# Patient Record
Sex: FEMALE | Race: WHITE | NOT HISPANIC OR LATINO | Employment: OTHER | URBAN - METROPOLITAN AREA
[De-identification: names, ages, dates, MRNs, and addresses within clinical notes are randomized per-mention and may not be internally consistent; named-entity substitution may affect disease eponyms.]

---

## 2017-01-04 RX ORDER — PROMETHAZINE HYDROCHLORIDE 25 MG/1
TABLET ORAL
Qty: 270 TABLET | Refills: 2 | Status: SHIPPED | OUTPATIENT
Start: 2017-01-04 | End: 2017-12-28 | Stop reason: SDUPTHER

## 2017-01-30 ENCOUNTER — OFFICE VISIT (OUTPATIENT)
Dept: PAIN MEDICINE | Facility: CLINIC | Age: 67
End: 2017-01-30

## 2017-01-30 VITALS
TEMPERATURE: 98.1 F | OXYGEN SATURATION: 96 % | WEIGHT: 191 LBS | HEIGHT: 63 IN | BODY MASS INDEX: 33.84 KG/M2 | HEART RATE: 64 BPM | DIASTOLIC BLOOD PRESSURE: 85 MMHG | SYSTOLIC BLOOD PRESSURE: 135 MMHG

## 2017-01-30 DIAGNOSIS — G89.4 CHRONIC PAIN SYNDROME: Primary | ICD-10-CM

## 2017-01-30 DIAGNOSIS — M54.16 LUMBAR RADICULOPATHY: ICD-10-CM

## 2017-01-30 DIAGNOSIS — Z79.899 ENCOUNTER FOR LONG-TERM (CURRENT) USE OF HIGH-RISK MEDICATION: ICD-10-CM

## 2017-01-30 DIAGNOSIS — M51.36 DEGENERATION OF INTERVERTEBRAL DISC OF LUMBAR REGION: ICD-10-CM

## 2017-01-30 PROCEDURE — 99213 OFFICE O/P EST LOW 20 MIN: CPT | Performed by: ANESTHESIOLOGY

## 2017-01-30 RX ORDER — OXYCODONE HYDROCHLORIDE 80 MG/1
160 TABLET, FILM COATED, EXTENDED RELEASE ORAL EVERY 12 HOURS SCHEDULED
Qty: 120 TABLET | Refills: 0 | Status: SHIPPED | OUTPATIENT
Start: 2017-01-30 | End: 2017-03-01 | Stop reason: SDUPTHER

## 2017-01-30 RX ORDER — OXYCODONE HYDROCHLORIDE 80 MG/1
80 TABLET, FILM COATED, EXTENDED RELEASE ORAL EVERY 8 HOURS SCHEDULED
Qty: 90 TABLET | Refills: 0 | Status: SHIPPED | OUTPATIENT
Start: 2017-01-30 | End: 2017-01-30 | Stop reason: SDUPTHER

## 2017-01-30 RX ORDER — HYDROCODONE BITARTRATE AND ACETAMINOPHEN 10; 325 MG/1; MG/1
1 TABLET ORAL EVERY 8 HOURS PRN
Qty: 90 TABLET | Refills: 0 | Status: SHIPPED | OUTPATIENT
Start: 2017-01-30 | End: 2017-03-01 | Stop reason: SDUPTHER

## 2017-02-02 ENCOUNTER — OFFICE VISIT (OUTPATIENT)
Dept: SPORTS MEDICINE | Facility: CLINIC | Age: 67
End: 2017-02-02

## 2017-02-02 VITALS
BODY MASS INDEX: 34.49 KG/M2 | HEART RATE: 68 BPM | SYSTOLIC BLOOD PRESSURE: 112 MMHG | RESPIRATION RATE: 16 BRPM | OXYGEN SATURATION: 97 % | WEIGHT: 191.6 LBS | DIASTOLIC BLOOD PRESSURE: 72 MMHG

## 2017-02-02 DIAGNOSIS — D68.9 COAGULATION DISORDER (HCC): Primary | ICD-10-CM

## 2017-02-02 DIAGNOSIS — M41.00 INFANTILE IDIOPATHIC SCOLIOSIS, UNSPECIFIED SPINAL REGION: ICD-10-CM

## 2017-02-02 PROCEDURE — 99213 OFFICE O/P EST LOW 20 MIN: CPT | Performed by: FAMILY MEDICINE

## 2017-02-02 NOTE — PROGRESS NOTES
"Subjective   Anne Jewell is a 66 y.o. female.   Chief Complaint   Patient presents with   • Med Refill     here to go over meds  and discuss hematology consult and med change to xeralto       History of Present Illness   1.  Patient wanted to let us know that her visit with hematology resulting in discontinuation of Coumadin and starting Xarelto.  Patient is not having any complaints with Xarelto.  2.  Patient had 3 episodes of syncope at home, patient was seen by neurology and cardiology and [facility], I do not have those reports but patient states her evaluation came out \"normal\".  Patient has not had any further syncopal episodes.  3.  Patient has noted more back and neck pain since her syncopal episodes, patient states that x-rays were done of her cervical and lumbar spine and she was told that her scoliosis was \"horrible\".  I do not have these reports available.  Patient has decided to see Dr. Bazzi for repeat x-rays.  4.  Patient continues to see Dr. Grajeda for pain management, patient states that is going well however she is getting to the point that her medications are very expensive.  She will discuss this with Dr. Grajeda.      I have reviewed the patient's medical history in detail and updated the computerized patient record.        Review of Systems   Constitutional: Negative.    HENT: Negative.    Respiratory: Negative.    Cardiovascular: Negative.    Musculoskeletal: Positive for back pain.     Visit Vitals   • /72 (BP Location: Left arm, Patient Position: Sitting, Cuff Size: Large Adult)   • Pulse 68   • Resp 16   • Wt 191 lb 9.6 oz (86.9 kg)   • SpO2 97%   • BMI 34.49 kg/m2         Objective   Physical Exam   Constitutional: She is oriented to person, place, and time. She appears well-developed and well-nourished.   HENT:   Head: Normocephalic and atraumatic.   Eyes: Conjunctivae and EOM are normal. Pupils are equal, round, and reactive to light.   Cardiovascular: Normal rate, regular " rhythm and normal heart sounds.    No peripheral edema   Pulmonary/Chest: Effort normal.   Musculoskeletal:   Patient uses a wheeled walker for ambulation, she walks with a forward flexion lumbar spine.   Neurological: She is alert and oriented to person, place, and time.   Skin: Skin is warm and dry.   Psychiatric: She has a normal mood and affect. Her behavior is normal.   Vitals reviewed.      Assessment/Plan   Anne was seen today for med refill.    Diagnoses and all orders for this visit:    Coagulation disorder    Infantile idiopathic scoliosis, unspecified spinal region      1.  Patient will continue with Xarelto and follow-up with hematology.  2.  Scoliosis, patient will follow-up with  for repeat x-rays and will continue with pain management with Dr. Grajeda.  3.  We will request records from [facility]  15 minutes were spent face-to-face with the patient with greater than 50% of that time spent counseling the patient.

## 2017-03-01 ENCOUNTER — OFFICE VISIT (OUTPATIENT)
Dept: PAIN MEDICINE | Facility: CLINIC | Age: 67
End: 2017-03-01

## 2017-03-01 VITALS
HEIGHT: 63 IN | HEART RATE: 63 BPM | RESPIRATION RATE: 16 BRPM | DIASTOLIC BLOOD PRESSURE: 79 MMHG | OXYGEN SATURATION: 96 % | BODY MASS INDEX: 33.84 KG/M2 | SYSTOLIC BLOOD PRESSURE: 127 MMHG | WEIGHT: 191 LBS | TEMPERATURE: 98 F

## 2017-03-01 DIAGNOSIS — M06.9 RHEUMATOID ARTHRITIS INVOLVING MULTIPLE SITES, UNSPECIFIED RHEUMATOID FACTOR PRESENCE: ICD-10-CM

## 2017-03-01 DIAGNOSIS — M51.36 DEGENERATION OF INTERVERTEBRAL DISC OF LUMBAR REGION: ICD-10-CM

## 2017-03-01 DIAGNOSIS — M54.16 LUMBAR RADICULOPATHY: ICD-10-CM

## 2017-03-01 DIAGNOSIS — M15.9 GENERALIZED OSTEOARTHRITIS: ICD-10-CM

## 2017-03-01 DIAGNOSIS — Z79.899 ENCOUNTER FOR LONG-TERM (CURRENT) USE OF HIGH-RISK MEDICATION: ICD-10-CM

## 2017-03-01 DIAGNOSIS — G89.4 CHRONIC PAIN SYNDROME: Primary | ICD-10-CM

## 2017-03-01 PROCEDURE — 99213 OFFICE O/P EST LOW 20 MIN: CPT | Performed by: ANESTHESIOLOGY

## 2017-03-01 RX ORDER — HYDROCODONE BITARTRATE AND ACETAMINOPHEN 10; 325 MG/1; MG/1
1 TABLET ORAL EVERY 8 HOURS PRN
Qty: 90 TABLET | Refills: 0 | Status: SHIPPED | OUTPATIENT
Start: 2017-03-01 | End: 2017-04-05 | Stop reason: SDUPTHER

## 2017-03-01 RX ORDER — OXYCODONE HYDROCHLORIDE 80 MG/1
160 TABLET, FILM COATED, EXTENDED RELEASE ORAL EVERY 12 HOURS SCHEDULED
Qty: 120 TABLET | Refills: 0 | Status: SHIPPED | OUTPATIENT
Start: 2017-03-01 | End: 2017-04-05 | Stop reason: SDUPTHER

## 2017-03-01 NOTE — PROGRESS NOTES
CHIEF COMPLAINT  Pt states back pain,R>L side, has moderately improved since 1/30/17 office visit.    Subjective   Anne Jewell is a 66 y.o. female  who presents to the office for follow-up.She has a history of lumbar radic & diffuse pain.      Back Pain   This is a chronic problem. The current episode started more than 1 year ago. The problem occurs constantly. The problem has been gradually improving since onset. The pain is present in the lumbar spine. The quality of the pain is described as aching. The pain radiates to the left foot and right foot. The pain is moderate. Exacerbated by: weather changes. Associated symptoms include headaches and numbness (R lower leg). Pertinent negatives include no bladder incontinence, bowel incontinence, chest pain, dysuria, fever or weakness. She has tried analgesics, bed rest, home exercises and heat (oxycodone, hydrocodone, aquatherapy) for the symptoms. The treatment provided moderate (improved pain control now that she is back on her previous regimen) relief.      Multimodal analgesic therapy is well tolerated.  No confusion, No sedation, No impairment.  She is once again meeting the functional goal of being able to participate in aquatherapy, which is helping her even more.    PEG Assessment   What number best describes your pain on average in the past week?6  What number best describes how, during the past week, pain has interfered with your enjoyment of life?7  What number best describes how, during the past week, pain has interfered with your general activity?  6      The following portions of the patient's history were reviewed and updated as appropriate: allergies, current medications, past family history, past medical history, past social history, past surgical history and problem list.    Review of Systems   Constitutional: Negative for activity change, appetite change, fatigue and fever.   HENT: Negative for ear pain.    Eyes: Negative for pain.   Respiratory:  "Negative for chest tightness and shortness of breath.    Cardiovascular: Negative for chest pain and leg swelling.   Gastrointestinal: Negative for bowel incontinence, constipation, diarrhea and nausea.   Endocrine: Negative for polydipsia, polyphagia and polyuria.   Genitourinary: Negative for bladder incontinence, dyspareunia and dysuria.   Musculoskeletal: Positive for back pain.   Skin: Negative for rash.   Allergic/Immunologic: Negative for immunocompromised state.   Neurological: Positive for numbness (R lower leg) and headaches. Negative for dizziness and weakness.   Hematological: Negative for adenopathy.   Psychiatric/Behavioral: Positive for sleep disturbance. Negative for confusion and suicidal ideas. The patient is nervous/anxious.              Vitals:    03/01/17 1439   BP: 127/79   Pulse: 63   Resp: 16   Temp: 98 °F (36.7 °C)   SpO2: 96%   Weight: 191 lb (86.6 kg)   Height: 62.5\" (158.8 cm)   PainSc: 6  Comment: LBP,R > L,ranges from 6-8/10   PainLoc: Back         Objective   Physical Exam   Constitutional: She is oriented to person, place, and time. She appears well-developed and well-nourished. She is cooperative.   HENT:   Head: Normocephalic and atraumatic.   Right Ear: External ear normal.   Left Ear: External ear normal.   Nose: Nose normal.   Eyes: Conjunctivae, EOM and lids are normal. Pupils are equal, round, and reactive to light.   Neck: Trachea normal and normal range of motion.   Cardiovascular: Normal rate, regular rhythm and normal heart sounds.    Pulmonary/Chest: Effort normal and breath sounds normal.   Abdominal: Soft. Bowel sounds are normal.   Musculoskeletal:        Thoracic back: She exhibits deformity.        Lumbar back: She exhibits decreased range of motion and tenderness.   Diffuse arthritic changes.  No inflammed joints.      Flexion painful past 45 deg.  Extension painful at 5 degrees.  Mild antalgia, use of assistive device.   Neurological: She is alert and oriented to " person, place, and time. She displays atrophy. She displays no tremor. No cranial nerve deficit. She displays a negative Romberg sign. Gait (ambulating with rolling walker) abnormal.   Reflex Scores:       Patellar reflexes are 0 on the right side and 0 on the left side.       Achilles reflexes are 0 on the right side and 0 on the left side.  SLR positive bilaterally   Skin: Skin is intact.   Psychiatric: She has a normal mood and affect. Her speech is normal and behavior is normal. Cognition and memory are normal.   Nursing note and vitals reviewed.          Assessment/Plan   Anne was seen today for back pain.    Diagnoses and all orders for this visit:    Chronic pain syndrome    Encounter for long-term (current) use of high-risk medication    Generalized osteoarthritis    Rheumatoid arthritis involving multiple sites, unspecified rheumatoid factor presence    Degeneration of intervertebral disc of lumbar region    Lumbar radiculopathy    Other orders  -     oxyCODONE ER (oxyCONTIN) 80 MG tablet extended-release 12 hour 12 hr tablet; Take 2 tablets by mouth Every 12 (Twelve) Hours.  -     HYDROcodone-acetaminophen (NORCO)  MG per tablet; Take 1 tablet by mouth Every 8 (Eight) Hours As Needed for severe pain (7-10).        --- Follow-up monthly  -- Patient appears stable with current regimen. No adverse effects. Regarding continuation of opioids, there is no evidence of aberrant behavior or any red flags.  The patient continues with appropriate response to opioid therapy. ADL's remain intact by self.   -- she does have a significant history of low back pain and arthritis and demonstrates moderate improvement with multimodal therapy including opioid therapy, which allows her to engage in ADLs and family activities. The continuation of opioid therapy is therefore not contraindicated. We will however respect the March 2016 CDC Guidelines and will plan to avoid overexposure to opioids and avoid dose  escalation.                 [facility]A REPORT    As part of the patient's treatment plan, I am prescribing controlled substances. The patient has been made aware of appropriate use of such medications, including potential risk of somnolence, limited ability to drive and/or work safely, and the potential for dependence or overdose. It has also bee made clear that these medications are for use by this patient only, without concomitant use of alcohol or other substances unless prescribed.     Patient has completed prescribing agreement detailing terms of continued prescribing of controlled substances, including monitoring VICENTA reports, urine drug screening, and pill counts if necessary. The patient is aware that inappropriate use will results in cessation of prescribing such medications.    [facility]A report has been reviewed and scanned into the patient's chart.    Date of last VICENTA : 2-26-17    History and physical exam exhibit continued safe and appropriate use of controlled substances.      EMR Dragon/Transcription disclaimer:   Much of this encounter note is an electronic transcription/translation of spoken language to printed text. The electronic translation of spoken language may permit erroneous, or at times, nonsensical words or phrases to be inadvertently transcribed; Although I have reviewed the note for such errors, some may still exist.

## 2017-03-27 ENCOUNTER — TELEPHONE (OUTPATIENT)
Dept: SPORTS MEDICINE | Facility: CLINIC | Age: 67
End: 2017-03-27

## 2017-04-05 ENCOUNTER — OFFICE VISIT (OUTPATIENT)
Dept: PAIN MEDICINE | Facility: CLINIC | Age: 67
End: 2017-04-05

## 2017-04-05 VITALS
BODY MASS INDEX: 33.84 KG/M2 | SYSTOLIC BLOOD PRESSURE: 112 MMHG | DIASTOLIC BLOOD PRESSURE: 74 MMHG | WEIGHT: 191 LBS | OXYGEN SATURATION: 99 % | HEART RATE: 71 BPM | HEIGHT: 63 IN | RESPIRATION RATE: 16 BRPM

## 2017-04-05 DIAGNOSIS — M15.9 GENERALIZED OSTEOARTHRITIS: ICD-10-CM

## 2017-04-05 DIAGNOSIS — M06.9 RHEUMATOID ARTHRITIS INVOLVING MULTIPLE SITES, UNSPECIFIED RHEUMATOID FACTOR PRESENCE: ICD-10-CM

## 2017-04-05 DIAGNOSIS — M51.36 DEGENERATION OF INTERVERTEBRAL DISC OF LUMBAR REGION: ICD-10-CM

## 2017-04-05 DIAGNOSIS — Z79.899 ENCOUNTER FOR LONG-TERM (CURRENT) USE OF HIGH-RISK MEDICATION: ICD-10-CM

## 2017-04-05 DIAGNOSIS — G89.4 CHRONIC PAIN SYNDROME: Primary | ICD-10-CM

## 2017-04-05 PROCEDURE — 99213 OFFICE O/P EST LOW 20 MIN: CPT | Performed by: NURSE PRACTITIONER

## 2017-04-05 RX ORDER — OXYCODONE HYDROCHLORIDE 80 MG/1
160 TABLET, FILM COATED, EXTENDED RELEASE ORAL EVERY 12 HOURS SCHEDULED
Qty: 120 TABLET | Refills: 0 | Status: SHIPPED | OUTPATIENT
Start: 2017-04-05 | End: 2017-05-02 | Stop reason: SDUPTHER

## 2017-04-05 RX ORDER — HYDROCODONE BITARTRATE AND ACETAMINOPHEN 10; 325 MG/1; MG/1
1 TABLET ORAL EVERY 8 HOURS PRN
Qty: 90 TABLET | Refills: 0 | Status: SHIPPED | OUTPATIENT
Start: 2017-04-05 | End: 2017-05-02 | Stop reason: SDUPTHER

## 2017-04-05 NOTE — PROGRESS NOTES
CHIEF COMPLAINT  Pt states joint pain continues to be moderately controlled overall    Subjective   Anne Jewell is a 66 y.o. female  who presents to the office for follow-up.She has a history of chronic back and diffuse joint pain, RA.    Pain unchanged, stable with regimen. She continues with Oxycontin 80 mg 2 every 12, Hydrocodone 10/325 3/day.  She denies adverse reactions, reports moderate benefit with this regimen and feels her pain is well controlled overall. ADL's by self.      Back Pain   This is a chronic problem. The current episode started more than 1 year ago. The problem occurs constantly. The problem has been gradually improving since onset. The pain is present in the lumbar spine. The quality of the pain is described as aching. The pain radiates to the left foot and right foot. The pain is at a severity of 5/10. The pain is moderate. The symptoms are aggravated by stress (weather changes). Associated symptoms include headaches and numbness (R lower leg). Pertinent negatives include no bladder incontinence, bowel incontinence, chest pain, dysuria, fever or weakness. She has tried analgesics, bed rest, home exercises and heat (oxycodone, hydrocodone, aquatherapy) for the symptoms. The treatment provided moderate relief.      PEG Assessment   What number best describes your pain on average in the past week?6  What number best describes how, during the past week, pain has interfered with your enjoyment of life?7  What number best describes how, during the past week, pain has interfered with your general activity?  7    The following portions of the patient's history were reviewed and updated as appropriate: allergies, current medications, past family history, past medical history, past social history, past surgical history and problem list.    Review of Systems   Constitutional: Negative for activity change, appetite change, fatigue and fever.   HENT: Negative for ear pain.    Eyes: Negative for pain.  "  Respiratory: Negative for chest tightness and shortness of breath.    Cardiovascular: Negative for chest pain and leg swelling.   Gastrointestinal: Negative for bowel incontinence, constipation, diarrhea and nausea.   Endocrine: Negative for polydipsia, polyphagia and polyuria.   Genitourinary: Negative for bladder incontinence, dyspareunia and dysuria.   Musculoskeletal: Positive for back pain.   Skin: Negative for rash.   Allergic/Immunologic: Negative for immunocompromised state.   Neurological: Positive for numbness (R lower leg) and headaches. Negative for dizziness and weakness.   Hematological: Negative for adenopathy.   Psychiatric/Behavioral: Positive for sleep disturbance. Negative for confusion and suicidal ideas. The patient is nervous/anxious.        Vitals:    04/05/17 1029   BP: 112/74   Pulse: 71   Resp: 16   SpO2: 99%   Weight: 191 lb (86.6 kg)   Height: 62.5\" (158.8 cm)   PainSc: 5  Comment: joint pain ranges from 4-8/10   PainLoc: Generalized     Objective   Physical Exam   Constitutional: She is oriented to person, place, and time. She appears well-developed and well-nourished. She is cooperative.   HENT:   Head: Normocephalic and atraumatic.   Nose: Nose normal.   Eyes: Conjunctivae and lids are normal.   Neck: Trachea normal.   Cardiovascular: Normal rate and regular rhythm.    Pulmonary/Chest: Effort normal. No respiratory distress.   Musculoskeletal:   Diffuse arthritic changes.       Neurological: She is alert and oriented to person, place, and time. Gait (ambulating with rolling walker) abnormal.   Skin: Skin is intact.   Psychiatric: She has a normal mood and affect. Her speech is normal and behavior is normal. Cognition and memory are normal.   Nursing note and vitals reviewed.    Assessment/Plan   Anne was seen today for pain.    Diagnoses and all orders for this visit:    Chronic pain syndrome    Degeneration of intervertebral disc of lumbar region    Generalized " osteoarthritis    Rheumatoid arthritis involving multiple sites, unspecified rheumatoid factor presence    Encounter for long-term (current) use of high-risk medication      --- Refill hydrocodone, OxyContin.  Patient appears stable with current regimen. No adverse effects. Regarding continuation of opioids, there is no evidence of aberrant behavior or any red flags.  The patient continues with appropriate response to opioid therapy. ADL's remain intact by self.   --- The urine drug screen confirmation from 12-6-2016 has been reviewed and the result is appropriate based on patient history and [facility]A report  --- Follow-up 1 month          [facility]ICENTA REPORT  As part of the patient's treatment plan, I am prescribing controlled substances. The patient has been made aware of appropriate use of such medications, including potential risk of somnolence, limited ability to drive and/or work safely, and the potential for dependence or overdose. It has also bee made clear that these medications are for use by this patient only, without concomitant use of alcohol or other substances unless prescribed.     Patient has completed prescribing agreement detailing terms of continued prescribing of controlled substances, including monitoring VICENTA reports, urine drug screening, and pill counts if necessary. The patient is aware that inappropriate use will results in cessation of prescribing such medications.    V[facility]A report has been reviewed and scanned into the patient's chart.    Date of last VICENTA : 4-4-2017    History and physical exam exhibit continued safe and appropriate use of controlled substances.    EMR Dragon/Transcription disclaimer:   Much of this encounter note is an electronic transcription/translation of spoken language to printed text. The electronic translation of spoken language may permit erroneous, or at times, nonsensical words or phrases to be inadvertently transcribed; Although I have reviewed the note for such  errors, some may still exist.

## 2017-05-02 ENCOUNTER — OFFICE VISIT (OUTPATIENT)
Dept: PAIN MEDICINE | Facility: CLINIC | Age: 67
End: 2017-05-02

## 2017-05-02 VITALS
DIASTOLIC BLOOD PRESSURE: 68 MMHG | OXYGEN SATURATION: 95 % | HEART RATE: 65 BPM | BODY MASS INDEX: 34.84 KG/M2 | HEIGHT: 63 IN | RESPIRATION RATE: 16 BRPM | TEMPERATURE: 97.7 F | SYSTOLIC BLOOD PRESSURE: 95 MMHG | WEIGHT: 196.6 LBS

## 2017-05-02 DIAGNOSIS — M06.9 RHEUMATOID ARTHRITIS INVOLVING MULTIPLE SITES, UNSPECIFIED RHEUMATOID FACTOR PRESENCE: ICD-10-CM

## 2017-05-02 DIAGNOSIS — M15.9 GENERALIZED OSTEOARTHRITIS: ICD-10-CM

## 2017-05-02 DIAGNOSIS — G89.4 CHRONIC PAIN SYNDROME: Primary | ICD-10-CM

## 2017-05-02 DIAGNOSIS — Z79.899 ENCOUNTER FOR LONG-TERM (CURRENT) USE OF HIGH-RISK MEDICATION: ICD-10-CM

## 2017-05-02 DIAGNOSIS — M51.36 DEGENERATION OF INTERVERTEBRAL DISC OF LUMBAR REGION: ICD-10-CM

## 2017-05-02 PROCEDURE — 99213 OFFICE O/P EST LOW 20 MIN: CPT | Performed by: NURSE PRACTITIONER

## 2017-05-02 RX ORDER — HYDROCODONE BITARTRATE AND ACETAMINOPHEN 10; 325 MG/1; MG/1
1 TABLET ORAL EVERY 8 HOURS PRN
Qty: 90 TABLET | Refills: 0 | Status: SHIPPED | OUTPATIENT
Start: 2017-05-02 | End: 2017-06-02 | Stop reason: SDUPTHER

## 2017-05-02 RX ORDER — CHLORHEXIDINE GLUCONATE 0.12 MG/ML
RINSE ORAL
COMMUNITY
Start: 2017-03-08 | End: 2017-08-08

## 2017-05-02 RX ORDER — OXYCODONE HYDROCHLORIDE 80 MG/1
160 TABLET, FILM COATED, EXTENDED RELEASE ORAL EVERY 12 HOURS SCHEDULED
Qty: 120 TABLET | Refills: 0 | Status: SHIPPED | OUTPATIENT
Start: 2017-05-02 | End: 2017-06-02 | Stop reason: SDUPTHER

## 2017-05-09 RX ORDER — ACETAMINOPHEN 160 MG
2000 TABLET,DISINTEGRATING ORAL DAILY
Qty: 30 CAPSULE | Refills: 11 | Status: SHIPPED | OUTPATIENT
Start: 2017-05-09 | End: 2018-05-08 | Stop reason: SDUPTHER

## 2017-05-17 ENCOUNTER — TELEPHONE (OUTPATIENT)
Dept: SPORTS MEDICINE | Facility: CLINIC | Age: 67
End: 2017-05-17

## 2017-05-17 RX ORDER — DICYCLOMINE HCL 20 MG
TABLET ORAL
Qty: 120 TABLET | Refills: 6 | Status: SHIPPED | OUTPATIENT
Start: 2017-05-17 | End: 2019-09-06 | Stop reason: SDUPTHER

## 2017-05-17 RX ORDER — DIPHENOXYLATE HYDROCHLORIDE AND ATROPINE SULFATE 2.5; .025 MG/1; MG/1
TABLET ORAL
Qty: 90 TABLET | Refills: 1 | Status: SHIPPED | OUTPATIENT
Start: 2017-05-17 | End: 2017-05-17

## 2017-05-17 RX ORDER — LEVOTHYROXINE SODIUM 0.1 MG/1
TABLET ORAL
Qty: 90 TABLET | Refills: 0 | Status: SHIPPED | OUTPATIENT
Start: 2017-05-17 | End: 2017-12-28 | Stop reason: SDUPTHER

## 2017-05-30 RX ORDER — MAGNESIUM 200 MG
TABLET ORAL
Qty: 30 EACH | Refills: 1 | Status: SHIPPED | OUTPATIENT
Start: 2017-05-30 | End: 2017-07-31 | Stop reason: SDUPTHER

## 2017-05-31 RX ORDER — LEVOTHYROXINE SODIUM 0.1 MG/1
TABLET ORAL
Qty: 90 TABLET | Refills: 0 | Status: SHIPPED | OUTPATIENT
Start: 2017-05-31 | End: 2017-06-28

## 2017-06-02 ENCOUNTER — OFFICE VISIT (OUTPATIENT)
Dept: PAIN MEDICINE | Facility: CLINIC | Age: 67
End: 2017-06-02

## 2017-06-02 VITALS
RESPIRATION RATE: 18 BRPM | OXYGEN SATURATION: 97 % | TEMPERATURE: 97.8 F | SYSTOLIC BLOOD PRESSURE: 121 MMHG | HEART RATE: 78 BPM | BODY MASS INDEX: 34.02 KG/M2 | HEIGHT: 63 IN | WEIGHT: 192 LBS | DIASTOLIC BLOOD PRESSURE: 77 MMHG

## 2017-06-02 DIAGNOSIS — M25.50 ARTHRALGIA, UNSPECIFIED JOINT: ICD-10-CM

## 2017-06-02 DIAGNOSIS — Z79.899 ENCOUNTER FOR LONG-TERM (CURRENT) USE OF HIGH-RISK MEDICATION: ICD-10-CM

## 2017-06-02 DIAGNOSIS — M51.36 DEGENERATION OF INTERVERTEBRAL DISC OF LUMBAR REGION: ICD-10-CM

## 2017-06-02 DIAGNOSIS — M06.9 RHEUMATOID ARTHRITIS INVOLVING MULTIPLE SITES, UNSPECIFIED RHEUMATOID FACTOR PRESENCE: ICD-10-CM

## 2017-06-02 DIAGNOSIS — G89.4 CHRONIC PAIN SYNDROME: Primary | ICD-10-CM

## 2017-06-02 LAB
POC AMPHETAMINES: NEGATIVE
POC BARBITURATES: NEGATIVE
POC BENZODIAZEPHINES: POSITIVE
POC COCAINE: NEGATIVE
POC METHADONE: NEGATIVE
POC METHAMPHETAMINE SCREEN URINE: NEGATIVE
POC OPIATES: POSITIVE
POC OXYCODONE: POSITIVE
POC PHENCYCLIDINE: NEGATIVE
POC PROPOXYPHENE: NEGATIVE
POC THC: NEGATIVE
POC TRICYCLIC ANTIDEPRESSANTS: POSITIVE

## 2017-06-02 PROCEDURE — 99213 OFFICE O/P EST LOW 20 MIN: CPT | Performed by: NURSE PRACTITIONER

## 2017-06-02 PROCEDURE — 80305 DRUG TEST PRSMV DIR OPT OBS: CPT | Performed by: NURSE PRACTITIONER

## 2017-06-02 RX ORDER — HYDROCODONE BITARTRATE AND ACETAMINOPHEN 10; 325 MG/1; MG/1
1 TABLET ORAL EVERY 8 HOURS PRN
Qty: 90 TABLET | Refills: 0 | Status: SHIPPED | OUTPATIENT
Start: 2017-06-02 | End: 2017-06-28 | Stop reason: SDUPTHER

## 2017-06-02 RX ORDER — OXYCODONE HYDROCHLORIDE 80 MG/1
160 TABLET, FILM COATED, EXTENDED RELEASE ORAL EVERY 12 HOURS SCHEDULED
Qty: 120 TABLET | Refills: 0 | Status: SHIPPED | OUTPATIENT
Start: 2017-06-02 | End: 2017-06-28 | Stop reason: SDUPTHER

## 2017-06-02 NOTE — PROGRESS NOTES
CHIEF COMPLAINT  Follow-up for back and joint pain. Ms. Jewell states that her pain is unchanged from her last appt.    Subjective   Anne Jewell is a 66 y.o. female  who presents to the office for follow-up.She has a history of chronic back and joint pain.    She continues with Oxycontin 80 mg 2 every 12, Hydrocodone 10/325 3/day. She denies adverse reactions, reports moderate benefit with this regimen and feels her pain is well controlled overall. ADL's by self.     Back Pain   This is a chronic problem. The current episode started more than 1 year ago. The problem occurs constantly. The problem is unchanged. The pain is present in the lumbar spine. The quality of the pain is described as aching. The pain radiates to the left foot and right foot. The pain is at a severity of 7/10. The pain is moderate. The symptoms are aggravated by stress (weather changes). Pertinent negatives include no bladder incontinence, bowel incontinence, chest pain, dysuria, fever, headaches, numbness or weakness. She has tried analgesics, bed rest, home exercises and heat (oxycodone, hydrocodone, aquatherapy) for the symptoms. The treatment provided moderate relief.      PEG Assessment   What number best describes your pain on average in the past week?8  What number best describes how, during the past week, pain has interfered with your enjoyment of life?7  What number best describes how, during the past week, pain has interfered with your general activity?  8    The following portions of the patient's history were reviewed and updated as appropriate: allergies, current medications, past family history, past medical history, past social history, past surgical history and problem list.    Review of Systems   Constitutional: Negative for fatigue and fever.   HENT: Negative for congestion.    Eyes: Negative for visual disturbance.   Respiratory: Negative for cough, shortness of breath and wheezing.    Cardiovascular: Negative.  Negative for  "chest pain.   Gastrointestinal: Negative for bowel incontinence, constipation and diarrhea.   Genitourinary: Negative for bladder incontinence, difficulty urinating and dysuria.   Musculoskeletal: Positive for arthralgias and back pain.   Neurological: Negative for weakness, numbness and headaches.   Psychiatric/Behavioral: Positive for sleep disturbance. Negative for suicidal ideas. The patient is not nervous/anxious.      Vitals:    06/02/17 1053   BP: 121/77   Pulse: 78   Resp: 18   Temp: 97.8 °F (36.6 °C)   SpO2: 97%   Weight: 192 lb (87.1 kg)   Height: 62.5\" (158.8 cm)   PainSc:   7   PainLoc: Generalized     Objective   Physical Exam   Constitutional: She is oriented to person, place, and time. She appears well-developed and well-nourished. She is cooperative. No distress.   HENT:   Head: Normocephalic and atraumatic.   Nose: Nose normal.   Eyes: Conjunctivae and lids are normal.   Neck: Trachea normal.   Cardiovascular: Normal rate.    Pulmonary/Chest: Effort normal. No respiratory distress.   Musculoskeletal:        Lumbar back: She exhibits tenderness.   Diffuse arthritic changes.     Neurological: She is alert and oriented to person, place, and time. Gait (ambulating with rolling walker) abnormal.   Skin: Skin is intact.   Psychiatric: She has a normal mood and affect. Her speech is normal and behavior is normal. Cognition and memory are normal.   Nursing note and vitals reviewed.    Assessment/Plan   Anne was seen today for joint pain and back pain.    Diagnoses and all orders for this visit:    Chronic pain syndrome    Degeneration of intervertebral disc of lumbar region    Rheumatoid arthritis involving multiple sites, unspecified rheumatoid factor presence    Arthralgia, unspecified joint    Encounter for long-term (current) use of high-risk medication      --- Refill hydrocodone, OxyContin. Patient appears stable with current regimen. No adverse effects. Regarding continuation of opioids, there is " no evidence of aberrant behavior or any red flags. The patient continues with appropriate response to opioid therapy. ADL's remain intact by self.   --- The urine drug screen confirmation from 12-6-2016 has been reviewed and the result is appropriate based on patient history and [facility] report  --- Routine UDS in office today as part of monitoring requirements for controlled substances.  The specimen was viewed and the immunoassay result reviewed and is +[id number],OXY, OPI.  This specimen will be sent to [facility] for confirmation.     --- Follow-up 1 month          [facility]A REPORT  As part of the patient's treatment plan, I am prescribing controlled substances. The patient has been made aware of appropriate use of such medications, including potential risk of somnolence, limited ability to drive and/or work safely, and the potential for dependence or overdose. It has also bee made clear that these medications are for use by this patient only, without concomitant use of alcohol or other substances unless prescribed.     Patient has completed prescribing agreement detailing terms of continued prescribing of controlled substances, including monitoring VICENTA reports, urine drug screening, and pill counts if necessary. The patient is aware that inappropriate use will results in cessation of prescribing such medications.    [facility]A report has been reviewed and scanned into the patient's chart.    Date of last VICENTA : 6-1-17    History and physical exam exhibit continued safe and appropriate use of controlled substances.    EMR Dragon/Transcription disclaimer:   Much of this encounter note is an electronic transcription/translation of spoken language to printed text. The electronic translation of spoken language may permit erroneous, or at times, nonsensical words or phrases to be inadvertently transcribed; Although I have reviewed the note for such errors, some may still exist.

## 2017-06-06 ENCOUNTER — TELEPHONE (OUTPATIENT)
Dept: SPORTS MEDICINE | Facility: CLINIC | Age: 67
End: 2017-06-06

## 2017-06-06 NOTE — TELEPHONE ENCOUNTER
Patient would like refill on promethazine 25mg as she is back on oxycodone. All her other physicians instructed her to talk to her PCP

## 2017-06-12 RX ORDER — PROMETHAZINE HYDROCHLORIDE 25 MG/1
25 TABLET ORAL EVERY 6 HOURS PRN
Qty: 90 TABLET | Refills: 3 | Status: SHIPPED | OUTPATIENT
Start: 2017-06-12 | End: 2017-08-08

## 2017-06-28 ENCOUNTER — OFFICE VISIT (OUTPATIENT)
Dept: PAIN MEDICINE | Facility: CLINIC | Age: 67
End: 2017-06-28

## 2017-06-28 VITALS
SYSTOLIC BLOOD PRESSURE: 121 MMHG | RESPIRATION RATE: 16 BRPM | HEART RATE: 70 BPM | HEIGHT: 63 IN | TEMPERATURE: 98.3 F | BODY MASS INDEX: 33.66 KG/M2 | OXYGEN SATURATION: 96 % | DIASTOLIC BLOOD PRESSURE: 70 MMHG | WEIGHT: 190 LBS

## 2017-06-28 DIAGNOSIS — M41.00 INFANTILE IDIOPATHIC SCOLIOSIS, UNSPECIFIED SPINAL REGION: ICD-10-CM

## 2017-06-28 DIAGNOSIS — M25.50 ARTHRALGIA, UNSPECIFIED JOINT: ICD-10-CM

## 2017-06-28 DIAGNOSIS — M51.36 DEGENERATION OF INTERVERTEBRAL DISC OF LUMBAR REGION: ICD-10-CM

## 2017-06-28 DIAGNOSIS — Z79.899 ENCOUNTER FOR LONG-TERM (CURRENT) USE OF HIGH-RISK MEDICATION: ICD-10-CM

## 2017-06-28 DIAGNOSIS — G89.4 CHRONIC PAIN SYNDROME: Primary | ICD-10-CM

## 2017-06-28 DIAGNOSIS — M06.9 RHEUMATOID ARTHRITIS INVOLVING MULTIPLE SITES, UNSPECIFIED RHEUMATOID FACTOR PRESENCE: ICD-10-CM

## 2017-06-28 PROCEDURE — 99213 OFFICE O/P EST LOW 20 MIN: CPT | Performed by: NURSE PRACTITIONER

## 2017-06-28 RX ORDER — HYDROCODONE BITARTRATE AND ACETAMINOPHEN 10; 325 MG/1; MG/1
1 TABLET ORAL EVERY 8 HOURS PRN
Qty: 90 TABLET | Refills: 0 | Status: SHIPPED | OUTPATIENT
Start: 2017-06-28 | End: 2017-07-26 | Stop reason: SDUPTHER

## 2017-06-28 RX ORDER — OXYCODONE HYDROCHLORIDE 80 MG/1
160 TABLET, FILM COATED, EXTENDED RELEASE ORAL EVERY 12 HOURS SCHEDULED
Qty: 120 TABLET | Refills: 0 | Status: SHIPPED | OUTPATIENT
Start: 2017-06-28 | End: 2017-07-26 | Stop reason: SDUPTHER

## 2017-06-28 NOTE — PROGRESS NOTES
"CHIEF COMPLAINT    Since last visit, pt states her back and joint pain is unchanged and been under control with pain medication.     Subjective   Anne Jewell is a 66 y.o. female  who presents to the office for follow-up.She has a history of chronic back and joint pain. Overall, her pain pattern is relatively unchanged since her last office visit.    Complains of pain in her back and joints. Today her pain is 6/10VAS. Describes the pain as continuous but not worse since last office visit.  Continues with OxyContin 80 mg 2 BID, Hydrocodone 10/325 3/day and baclofen 10 mg PRN.  \"With my medication, I'm good.\"  Denies any side effects from the regimen. The regimen helps decrease her pain moderately. \"It lets me get up and gives me the strength to have a quality of life i wouldn't have.\" ADL's by self.    Having issues with finances from hospitalization from September 2016.    Back Pain   This is a chronic problem. The current episode started more than 1 year ago. The problem occurs constantly. The problem is unchanged. The pain is present in the lumbar spine. The quality of the pain is described as aching. The pain radiates to the left foot and right foot. The pain is at a severity of 6/10. The pain is moderate. The symptoms are aggravated by stress (weather changes). Pertinent negatives include no bladder incontinence, bowel incontinence, chest pain, dysuria, fever, headaches, numbness or weakness. She has tried analgesics, bed rest, home exercises and heat (oxycodone, hydrocodone, aquatherapy) for the symptoms. The treatment provided moderate relief.   Joint Pain   This is a chronic (today her pain is 6/10VAS) problem. The current episode started more than 1 year ago. The problem occurs constantly. Progression since onset: unchanged since last office visit. Associated symptoms include arthralgias. Pertinent negatives include no chest pain, congestion, coughing, fatigue, fever, headaches, numbness or weakness. She " "has tried oral narcotics for the symptoms. The treatment provided moderate relief.      PEG Assessment   What number best describes your pain on average in the past week?6  What number best describes how, during the past week, pain has interfered with your enjoyment of life?6  What number best describes how, during the past week, pain has interfered with your general activity?  6    The following portions of the patient's history were reviewed and updated as appropriate: allergies, current medications, past family history, past medical history, past social history, past surgical history and problem list.    Review of Systems   Constitutional: Negative for activity change, appetite change, fatigue and fever.   HENT: Negative for congestion.    Eyes: Negative for visual disturbance.   Respiratory: Negative for cough, shortness of breath and wheezing.    Cardiovascular: Negative.  Negative for chest pain.   Gastrointestinal: Negative for bowel incontinence, constipation and diarrhea.   Genitourinary: Negative for bladder incontinence, difficulty urinating and dysuria.   Musculoskeletal: Positive for arthralgias and back pain.   Neurological: Negative for dizziness, weakness, light-headedness, numbness and headaches.   Psychiatric/Behavioral: Positive for sleep disturbance. Negative for agitation, confusion, hallucinations and suicidal ideas. The patient is not nervous/anxious.        Vitals:    06/28/17 0835   BP: 121/70   Pulse: 70   Resp: 16   Temp: 98.3 °F (36.8 °C)   SpO2: 96%   Weight: 190 lb (86.2 kg)   Height: 62.5\" (158.8 cm)   PainSc:   6   PainLoc: Back  Comment: and joints     Objective   Physical Exam   Constitutional: She is oriented to person, place, and time. She appears well-developed and well-nourished. She is cooperative. No distress.   HENT:   Head: Normocephalic and atraumatic.   Nose: Nose normal.   Eyes: Conjunctivae and lids are normal.   Neck: Trachea normal.   Cardiovascular: Normal rate.  "   Pulmonary/Chest: Effort normal. No respiratory distress.   Musculoskeletal:        Lumbar back: She exhibits tenderness.   Diffuse arthritic changes.      scolioitic curvature of thoracolumar spine   Neurological: She is alert and oriented to person, place, and time. Gait (ambulating with rolling walker) abnormal.   Skin: Skin is intact.   Psychiatric: She has a normal mood and affect. Her speech is normal and behavior is normal. Cognition and memory are normal.   Nursing note and vitals reviewed.      Assessment/Plan   Anne was seen today for back pain.    Diagnoses and all orders for this visit:    Chronic pain syndrome    Rheumatoid arthritis involving multiple sites, unspecified rheumatoid factor presence    Arthralgia, unspecified joint    Degeneration of intervertebral disc of lumbar region    Infantile idiopathic scoliosis, unspecified spinal region    Encounter for long-term (current) use of high-risk medication      --- The urine drug screen confirmation from 6-2-17 has been reviewed and the result is appropriate based on patient history and [facility] report  --- Refill OxyContin and Hydrocodone. Patient appears stable with current regimen. No adverse effects. Regarding continuation of opioids, there is no evidence of aberrant behavior or any red flags.  The patient continues with appropriate response to opioid therapy. ADL's remain intact by self.   --- Follow-up 1 month or sooner if needed.       [facility] REPORT    As part of the patient's treatment plan, I am prescribing controlled substances. The patient has been made aware of appropriate use of such medications, including potential risk of somnolence, limited ability to drive and/or work safely, and the potential for dependence or overdose. It has also bee made clear that these medications are for use by this patient only, without concomitant use of alcohol or other substances unless prescribed.     Patient has completed prescribing agreement detailing  terms of continued prescribing of controlled substances, including monitoring [facility] reports, urine drug screening, and pill counts if necessary. The patient is aware that inappropriate use will results in cessation of prescribing such medications.    [facility] report has been reviewed and scanned into the patient's chart.    Date of last VICENTA : 6-28-17    History and physical exam exhibit continued safe and appropriate use of controlled substances.      EMR Dragon/Transcription disclaimer:   Much of this encounter note is an electronic transcription/translation of spoken language to printed text. The electronic translation of spoken language may permit erroneous, or at times, nonsensical words or phrases to be inadvertently transcribed; Although I have reviewed the note for such errors, some may still exist.

## 2017-07-26 ENCOUNTER — OFFICE VISIT (OUTPATIENT)
Dept: PAIN MEDICINE | Facility: CLINIC | Age: 67
End: 2017-07-26

## 2017-07-26 VITALS
TEMPERATURE: 98.4 F | BODY MASS INDEX: 33.66 KG/M2 | DIASTOLIC BLOOD PRESSURE: 82 MMHG | HEIGHT: 63 IN | RESPIRATION RATE: 16 BRPM | SYSTOLIC BLOOD PRESSURE: 138 MMHG | WEIGHT: 190 LBS | OXYGEN SATURATION: 96 % | HEART RATE: 65 BPM

## 2017-07-26 DIAGNOSIS — M25.50 ARTHRALGIA, UNSPECIFIED JOINT: ICD-10-CM

## 2017-07-26 DIAGNOSIS — Z79.899 ENCOUNTER FOR LONG-TERM (CURRENT) USE OF HIGH-RISK MEDICATION: ICD-10-CM

## 2017-07-26 DIAGNOSIS — M15.9 GENERALIZED OSTEOARTHRITIS: ICD-10-CM

## 2017-07-26 DIAGNOSIS — G89.4 CHRONIC PAIN SYNDROME: Primary | ICD-10-CM

## 2017-07-26 DIAGNOSIS — M51.36 DEGENERATION OF INTERVERTEBRAL DISC OF LUMBAR REGION: ICD-10-CM

## 2017-07-26 DIAGNOSIS — M06.9 RHEUMATOID ARTHRITIS INVOLVING MULTIPLE SITES, UNSPECIFIED RHEUMATOID FACTOR PRESENCE: ICD-10-CM

## 2017-07-26 PROCEDURE — 99213 OFFICE O/P EST LOW 20 MIN: CPT | Performed by: NURSE PRACTITIONER

## 2017-07-26 RX ORDER — HYDROCODONE BITARTRATE AND ACETAMINOPHEN 10; 325 MG/1; MG/1
1 TABLET ORAL EVERY 8 HOURS PRN
Qty: 90 TABLET | Refills: 0 | Status: SHIPPED | OUTPATIENT
Start: 2017-07-26 | End: 2017-08-29 | Stop reason: SDUPTHER

## 2017-07-26 RX ORDER — OXYCODONE HYDROCHLORIDE 80 MG/1
160 TABLET, FILM COATED, EXTENDED RELEASE ORAL EVERY 12 HOURS SCHEDULED
Qty: 120 TABLET | Refills: 0 | Status: SHIPPED | OUTPATIENT
Start: 2017-07-26 | End: 2017-08-29 | Stop reason: SDUPTHER

## 2017-07-26 NOTE — PROGRESS NOTES
CHIEF COMPLAINT  Pt states LBP and generalized joint pain is basically unchanged, being tolerated overall.    Subjective   Anne Jewell is a 66 y.o. female  who presents to the office for follow-up.She has a history of chronic back and joint pain(OA). Overall, her pain pattern has been relatively unchanged.  She has good and bad days.      Complains of pain her low back and joints. Today her pain is 7/10VAS. Describes the pain as continuous.  She continues with Oxycontin 80 mg 2 every 12, Hydrocodone 10/325 3/day. She denies adverse reactions, reports moderate benefit with this regimen and feels her pain is well controlled overall. ADL's by self.     Seeing ortho regarding right knee 8/22/17. Also will be seeing hand specialist.      Back Pain   This is a chronic problem. The current episode started more than 1 year ago. The problem occurs constantly. The problem is unchanged. The pain is present in the lumbar spine. The quality of the pain is described as aching. The pain radiates to the left foot and right foot. The pain is at a severity of 7/10. The pain is moderate. The symptoms are aggravated by stress (weather changes). Associated symptoms include weakness (legs bilaterally). Pertinent negatives include no bladder incontinence, bowel incontinence, chest pain, dysuria, fever, headaches or numbness. She has tried analgesics, bed rest, home exercises and heat (oxycodone, hydrocodone, aquatherapy) for the symptoms. The treatment provided moderate relief.   Joint Pain   This is a chronic (today her pain is 6710VAS) problem. The current episode started more than 1 year ago. The problem occurs constantly. Progression since onset: unchanged since last office visit. Associated symptoms include arthralgias and weakness (legs bilaterally). Pertinent negatives include no chest pain, congestion, coughing, fatigue, fever, headaches, nausea or numbness. She has tried oral narcotics for the symptoms. The treatment provided  "moderate relief.      PEG Assessment   What number best describes your pain on average in the past week?7  What number best describes how, during the past week, pain has interfered with your enjoyment of life?8  What number best describes how, during the past week, pain has interfered with your general activity?  7    The following portions of the patient's history were reviewed and updated as appropriate: allergies, current medications, past family history, past medical history, past social history, past surgical history and problem list.    Review of Systems   Constitutional: Negative for activity change, appetite change, fatigue and fever.   HENT: Negative for congestion.    Eyes: Negative for visual disturbance.   Respiratory: Negative for cough, shortness of breath and wheezing.    Cardiovascular: Negative.  Negative for chest pain.   Gastrointestinal: Negative for bowel incontinence, constipation, diarrhea and nausea.   Genitourinary: Negative for bladder incontinence, difficulty urinating and dysuria.   Musculoskeletal: Positive for arthralgias, back pain and gait problem (uses walker,cane).   Neurological: Positive for weakness (legs bilaterally). Negative for dizziness, light-headedness, numbness and headaches.   Psychiatric/Behavioral: Positive for sleep disturbance. Negative for agitation, confusion, hallucinations and suicidal ideas. The patient is not nervous/anxious.      Vitals:    07/26/17 0952   BP: 138/82   Pulse: 65   Resp: 16   Temp: 98.4 °F (36.9 °C)   SpO2: 96%   Weight: 190 lb (86.2 kg)   Height: 62.5\" (158.8 cm)   PainSc: 7  Comment: joint,back pain ranges from 5-8/10   PainLoc: Generalized     Objective   Physical Exam   Constitutional: She is oriented to person, place, and time. She appears well-developed and well-nourished. She is cooperative.   HENT:   Head: Normocephalic and atraumatic.   Nose: Nose normal.   Eyes: Conjunctivae and lids are normal.   Neck: Trachea normal. "   Cardiovascular: Normal rate.    Pulmonary/Chest: Effort normal. No respiratory distress.   Musculoskeletal:        Lumbar back: She exhibits tenderness.   Diffuse arthritic changes.    Wearing lumbar brace   Neurological: She is alert and oriented to person, place, and time. Gait (ambulating with rolling walker) abnormal.   Skin: Skin is intact.   Psychiatric: She has a normal mood and affect. Her speech is normal and behavior is normal. Cognition and memory are normal.   Nursing note and vitals reviewed.      Assessment/Plan   Anne was seen today for back pain and joint pain.    Diagnoses and all orders for this visit:    Chronic pain syndrome    Rheumatoid arthritis involving multiple sites, unspecified rheumatoid factor presence    Generalized osteoarthritis    Degeneration of intervertebral disc of lumbar region    Arthralgia, unspecified joint    Encounter for long-term (current) use of high-risk medication    Other orders  -     oxyCODONE ER (oxyCONTIN) 80 MG tablet extended-release 12 hour 12 hr tablet; Take 2 tablets by mouth Every 12 (Twelve) Hours.  -     HYDROcodone-acetaminophen (NORCO)  MG per tablet; Take 1 tablet by mouth Every 8 (Eight) Hours As Needed for Severe Pain  (7-10).      --- The urine drug screen confirmation from 6-2-17 has been reviewed and the result is appropriate based on patient history and [facility] report  --- Refill OxyContin and Hydrocodone.  Patient appears stable with current regimen. No adverse effects. Regarding continuation of opioids, there is no evidence of aberrant behavior or any red flags.  The patient continues with appropriate response to opioid therapy. ADL's remain intact by self.   --- Follow-up 1 month or sooner if neded.       [facility] REPORT  As part of the patient's treatment plan, I am prescribing controlled substances. The patient has been made aware of appropriate use of such medications, including potential risk of somnolence, limited ability to drive  and/or work safely, and the potential for dependence or overdose. It has also bee made clear that these medications are for use by this patient only, without concomitant use of alcohol or other substances unless prescribed.     Patient has completed prescribing agreement detailing terms of continued prescribing of controlled substances, including monitoring [facility] reports, urine drug screening, and pill counts if necessary. The patient is aware that inappropriate use will results in cessation of prescribing such medications.    [facility] report has been reviewed and scanned into the patient's chart.    Date of last VICENTA : 7-22-17    History and physical exam exhibit continued safe and appropriate use of controlled substances.    EMR Dragon/Transcription disclaimer:   Much of this encounter note is an electronic transcription/translation of spoken language to printed text. The electronic translation of spoken language may permit erroneous, or at times, nonsensical words or phrases to be inadvertently transcribed; Although I have reviewed the note for such errors, some may still exist.

## 2017-08-01 RX ORDER — CHOLECALCIFEROL (VITAMIN D3) 25 MCG
TABLET,CHEWABLE ORAL
Qty: 30 LOZENGE | Refills: 3 | Status: SHIPPED | OUTPATIENT
Start: 2017-08-01 | End: 2017-12-11 | Stop reason: SDUPTHER

## 2017-08-08 ENCOUNTER — OFFICE VISIT (OUTPATIENT)
Dept: SPORTS MEDICINE | Facility: CLINIC | Age: 67
End: 2017-08-08

## 2017-08-08 VITALS
BODY MASS INDEX: 33.66 KG/M2 | DIASTOLIC BLOOD PRESSURE: 90 MMHG | HEIGHT: 63 IN | HEART RATE: 69 BPM | OXYGEN SATURATION: 98 % | SYSTOLIC BLOOD PRESSURE: 132 MMHG | WEIGHT: 190 LBS

## 2017-08-08 DIAGNOSIS — E03.9 ACQUIRED HYPOTHYROIDISM: ICD-10-CM

## 2017-08-08 DIAGNOSIS — Z00.00 MEDICARE ANNUAL WELLNESS VISIT, SUBSEQUENT: Primary | ICD-10-CM

## 2017-08-08 DIAGNOSIS — Z11.59 NEED FOR HEPATITIS C SCREENING TEST: ICD-10-CM

## 2017-08-08 DIAGNOSIS — E55.9 VITAMIN D DEFICIENCY: ICD-10-CM

## 2017-08-08 DIAGNOSIS — D51.0 PERNICIOUS ANEMIA: ICD-10-CM

## 2017-08-08 DIAGNOSIS — I10 ESSENTIAL HYPERTENSION: ICD-10-CM

## 2017-08-08 DIAGNOSIS — Z23 IMMUNIZATION DUE: ICD-10-CM

## 2017-08-08 PROCEDURE — 99214 OFFICE O/P EST MOD 30 MIN: CPT | Performed by: FAMILY MEDICINE

## 2017-08-08 PROCEDURE — G0009 ADMIN PNEUMOCOCCAL VACCINE: HCPCS | Performed by: FAMILY MEDICINE

## 2017-08-08 PROCEDURE — 90670 PCV13 VACCINE IM: CPT | Performed by: FAMILY MEDICINE

## 2017-08-08 PROCEDURE — 90471 IMMUNIZATION ADMIN: CPT | Performed by: FAMILY MEDICINE

## 2017-08-08 PROCEDURE — G0439 PPPS, SUBSEQ VISIT: HCPCS | Performed by: FAMILY MEDICINE

## 2017-08-08 PROCEDURE — 90715 TDAP VACCINE 7 YRS/> IM: CPT | Performed by: FAMILY MEDICINE

## 2017-08-08 NOTE — PROGRESS NOTES
QUICK REFERENCE INFORMATION:  The ABCs of the Annual Wellness Visit    Subsequent Medicare Wellness Visit    HEALTH RISK ASSESSMENT    1950    Recent Hospitalizations:  No hospitalization(s) within the last year..        Current Medical Providers:  Patient Care Team:  Kenneth Feldman MD as PCP - General (Sports Medicine)  EUGENIO Aguilar as PCP - Claims Attributed  Edilberto Lemus MD as Consulting Physician (Nephrology)  Rashaun Alvarado MD as Surgeon (Orthopedic Surgery)  Delma Montoya MD as Consulting Physician (Obstetrics and Gynecology)  Stuart Borges MD (Hematology and Oncology)        Smoking Status:  History   Smoking Status   • Former Smoker   Smokeless Tobacco   • Never Used       Alcohol Consumption:  History   Alcohol Use No       Depression Screen:   PHQ-9 Depression Screening 8/8/2017   Little interest or pleasure in doing things 0   Feeling down, depressed, or hopeless 0   PHQ-9 Total Score 0       Health Habits and Functional and Cognitive Screening:  No flowsheet data found.           Does the patient have evidence of cognitive impairment? No    Aspirin use counseling: Does not need ASA (and currently is not on it)      Recent Lab Results:  CMP:  Lab Results   Component Value Date     (H) 04/27/2016    BUN 18 04/27/2016    CREATININE 1.20 (H) 04/27/2016    EGFRIFNONA 45 (L) 04/27/2016    EGFRIFAFRI 55 (L) 04/27/2016    BCR 15.0 04/27/2016     04/27/2016    K 3.9 04/27/2016    CO2 23.8 04/27/2016    CALCIUM 8.9 04/27/2016    PROTENTOTREF 6.6 04/27/2016    ALBUMIN 3.90 04/27/2016    LABGLOBREF 2.7 04/27/2016    LABIL2 1.4 04/27/2016    BILITOT 0.5 04/27/2016    ALKPHOS 182 (H) 04/27/2016    AST 44 (H) 04/27/2016    ALT 23 04/27/2016     Lipid Panel:  Lab Results   Component Value Date    TRIG 130 04/27/2016    HDL 35 (L) 04/27/2016    VLDL 26 04/27/2016     HbA1c:  Lab Results   Component Value Date    HGBA1C 4.54 (L) 10/03/2016       Visual Acuity:  No exam data  present    Age-appropriate Screening Schedule:  Refer to the list below for future screening recommendations based on patient's age, sex and/or medical conditions. Orders for these recommended tests are listed in the plan section. The patient has been provided with a written plan.    Health Maintenance   Topic Date Due   • TDAP/TD VACCINES (1 - Tdap) 09/10/1969   • PNEUMOCOCCAL VACCINES (65+ LOW/MEDIUM RISK) (1 of 2 - PCV13) 09/10/2015   • MAMMOGRAM  01/27/2016   • COLONOSCOPY  01/27/2016   • DXA SCAN  04/04/2017   • ZOSTER VACCINE  08/10/2017 (Originally 4/4/2017)   • INFLUENZA VACCINE  09/01/2017        Subjective   History of Present Illness    Anne Jewell is a 66 y.o. female who presents for an Subsequent Wellness Visit.    The following portions of the patient's history were reviewed and updated as appropriate: allergies, current medications, past family history, past medical history, past social history, past surgical history and problem list.    Outpatient Medications Prior to Visit   Medication Sig Dispense Refill   • albuterol (PROVENTIL HFA;VENTOLIN HFA) 108 (90 BASE) MCG/ACT inhaler Proventil  (90 Base) MCG/ACT Inhalation Aerosol Solution; Patient Sig: Proventil  (90 Base) MCG/ACT Inhalation Aerosol Solution Inhale 2 puff(s) every 6 to 8 hours as needed; 6.7; 0; 05-Apr-2013; Active     • ALPRAZolam (XANAX) 2 MG tablet      • Azelastine-Fluticasone 137-50 MCG/ACT suspension Inhale 1 spray every 12 (twelve) hours.     • baclofen (LIORESAL) 10 MG tablet TAKE ONE TABLET BY MOUTH THREE TIMES A DAY AS NEEDED FOR MUSCLE SPASMS 30 tablet 0   • Cholecalciferol (VITAMIN D3) 2000 UNITS capsule Take 1 capsule by mouth Daily. 30 capsule 11   • Cyanocobalamin (B-12) 1000 MCG lozenge PLACE ONE TABLET UNDER THE TONGUE AND LET DISSOLVE ONCE DAILY 30 lozenge 3   • dicyclomine (BENTYL) 20 MG tablet 1 q 6-h prn 120 tablet 6   • escitalopram (LEXAPRO) 10 MG tablet Take 1 tablet by mouth daily.     •  hydrochlorothiazide (HYDRODIURIL) 25 MG tablet Take 25 mg by mouth daily.     • HYDROcodone-acetaminophen (NORCO)  MG per tablet Take 1 tablet by mouth Every 8 (Eight) Hours As Needed for Severe Pain  (7-10). 90 tablet 0   • KLOR-CON 20 MEQ CR tablet TAKE ONE TABLET BY MOUTH TWICE A  tablet 1   • levothyroxine (SYNTHROID, LEVOTHROID) 100 MCG tablet TAKE ONE TABLET BY MOUTH DAILY 90 tablet 0   • montelukast (SINGULAIR) 10 MG tablet Take 1 tablet by mouth daily.     • Multiple Vitamin tablet Take 1 tablet by mouth daily.     • oxyCODONE ER (oxyCONTIN) 80 MG tablet extended-release 12 hour 12 hr tablet Take 2 tablets by mouth Every 12 (Twelve) Hours. 120 tablet 0   • promethazine (PHENERGAN) 25 MG tablet TAKE ONE TABLET BY MOUTH EVERY 8 HOURS AS NEEDED 270 tablet 2   • rivaroxaban (XARELTO) 20 MG tablet Take 1 tablet by mouth Daily. 30 tablet 11   • calcitonin, salmon, (MIACALCIN) 200 UNIT/ACT nasal spray 1 spray into each nostril daily. Alternating days     • chlorhexidine (PERIDEX) 0.12 % solution      • promethazine (PHENERGAN) 25 MG tablet Take 1 tablet by mouth Every 6 (Six) Hours As Needed for Nausea or Vomiting. 90 tablet 3   • Sodium Fluoride 1.1 % cream Apply to teeth.     • vitamin D (ERGOCALCIFEROL) 40548 UNITS capsule capsule Take 2 capsules by mouth once a week.       No facility-administered medications prior to visit.        Patient Active Problem List   Diagnosis   • Chronic pain syndrome   • Rheumatoid arthritis   • Osteoarthritis of knee   • Generalized osteoarthritis   • Degeneration of intervertebral disc of lumbar region   • Neck pain   • Lumbar radiculopathy   • Atopic rhinitis   • Anxiety   • Diarrhea   • Indigestion   • Dysthymic disorder   • Gastroesophageal reflux disease   • Hypothyroidism   • Insomnia   • Pernicious anemia   • Osteoporosis   • Scoliosis   • Vasovagal syncope   • Vitamin D deficiency   • Trigger middle finger of right hand   • Trigger ring finger of right hand  "  • Trigger little finger of right hand   • Encounter for long-term (current) use of high-risk medication   • Coagulation disorder   • Hyperglycemia   • Joint pain       Advance Care Planning:  has an advance directive - a copy has been provided and is in file    Identification of Risk Factors:  Risk factors include: cardiovascular risk, increased fall risk and chronic pain.    Review of Systems    Compared to one year ago, the patient feels her physical health is the same.  Compared to one year ago, the patient feels her mental health is the same.    Objective     Physical Exam    Vitals:    08/08/17 0954   BP: 132/90   Pulse: 69   SpO2: 98%   Weight: 190 lb (86.2 kg)   Height: 62.5\" (158.8 cm)       Body mass index is 34.2 kg/(m^2).  Discussed the patient's BMI with her. The BMI is in the acceptable range.    Assessment/Plan   Patient Self-Management and Personalized Health Advice  The patient has been provided with information about: prevention of cardiac or vascular disease and fall prevention and preventive services including:   · Pneumococcal vaccine , TdaP vaccine.    Visit Diagnoses:    ICD-10-CM ICD-9-CM   1. Medicare annual wellness visit, subsequent Z00.00 V70.0   2. Immunization due Z23 V05.9       Orders Placed This Encounter   Procedures   • Tdap Vaccine Greater Than or Equal To 6yo IM   • Pneumococcal Conjugate Vaccine 13-Valent All       Outpatient Encounter Prescriptions as of 8/8/2017   Medication Sig Dispense Refill   • albuterol (PROVENTIL HFA;VENTOLIN HFA) 108 (90 BASE) MCG/ACT inhaler Proventil  (90 Base) MCG/ACT Inhalation Aerosol Solution; Patient Sig: Proventil  (90 Base) MCG/ACT Inhalation Aerosol Solution Inhale 2 puff(s) every 6 to 8 hours as needed; 6.7; 0; 05-Apr-2013; Active     • ALPRAZolam (XANAX) 2 MG tablet      • Azelastine-Fluticasone 137-50 MCG/ACT suspension Inhale 1 spray every 12 (twelve) hours.     • baclofen (LIORESAL) 10 MG tablet TAKE ONE TABLET BY MOUTH " THREE TIMES A DAY AS NEEDED FOR MUSCLE SPASMS 30 tablet 0   • Cholecalciferol (VITAMIN D3) 2000 UNITS capsule Take 1 capsule by mouth Daily. 30 capsule 11   • Cyanocobalamin (B-12) 1000 MCG lozenge PLACE ONE TABLET UNDER THE TONGUE AND LET DISSOLVE ONCE DAILY 30 lozenge 3   • dicyclomine (BENTYL) 20 MG tablet 1 q 6-h prn 120 tablet 6   • escitalopram (LEXAPRO) 10 MG tablet Take 1 tablet by mouth daily.     • hydrochlorothiazide (HYDRODIURIL) 25 MG tablet Take 25 mg by mouth daily.     • HYDROcodone-acetaminophen (NORCO)  MG per tablet Take 1 tablet by mouth Every 8 (Eight) Hours As Needed for Severe Pain  (7-10). 90 tablet 0   • KLOR-CON 20 MEQ CR tablet TAKE ONE TABLET BY MOUTH TWICE A  tablet 1   • levothyroxine (SYNTHROID, LEVOTHROID) 100 MCG tablet TAKE ONE TABLET BY MOUTH DAILY 90 tablet 0   • montelukast (SINGULAIR) 10 MG tablet Take 1 tablet by mouth daily.     • Multiple Vitamin tablet Take 1 tablet by mouth daily.     • oxyCODONE ER (oxyCONTIN) 80 MG tablet extended-release 12 hour 12 hr tablet Take 2 tablets by mouth Every 12 (Twelve) Hours. 120 tablet 0   • promethazine (PHENERGAN) 25 MG tablet TAKE ONE TABLET BY MOUTH EVERY 8 HOURS AS NEEDED 270 tablet 2   • rivaroxaban (XARELTO) 20 MG tablet Take 1 tablet by mouth Daily. 30 tablet 11   • [DISCONTINUED] calcitonin, salmon, (MIACALCIN) 200 UNIT/ACT nasal spray 1 spray into each nostril daily. Alternating days     • [DISCONTINUED] chlorhexidine (PERIDEX) 0.12 % solution      • [DISCONTINUED] promethazine (PHENERGAN) 25 MG tablet Take 1 tablet by mouth Every 6 (Six) Hours As Needed for Nausea or Vomiting. 90 tablet 3   • [DISCONTINUED] Sodium Fluoride 1.1 % cream Apply to teeth.     • [DISCONTINUED] vitamin D (ERGOCALCIFEROL) 39637 UNITS capsule capsule Take 2 capsules by mouth once a week.       No facility-administered encounter medications on file as of 8/8/2017.        Reviewed use of high risk medication in the elderly: not  applicable  Reviewed for potential of harmful drug interactions in the elderly: not applicable    Follow Up:  No Follow-up on file.     An After Visit Summary and PPPS with all of these plans were given to the patient.

## 2017-08-08 NOTE — PROGRESS NOTES
"Anne is a 66 y.o. year old female    Chief Complaint   Patient presents with   • Annual Exam   • Coagulation Disorder       History of Present Illness   HPI   1.  FU Xarelto, will need to have dental work, how long to stay off prior to procedure.     2.  FU low vit D  3.  FU B12 deficiency  4.  FU HTN, no chest pain, syncope or presyncope, no SOA  5.  FU Hypothyroidism  6.  Sees gyn in [place], Dr Delma Montoya  7.  Had cscope last year with Dr Fish Montoya in [place]       I have reviewed the patient's medical, family, and social history in detail and updated the computerized patient record.    Review of Systems   Constitutional: Negative for activity change, appetite change, chills, diaphoresis, fatigue, fever and unexpected weight change.   HENT: Negative for congestion, ear pain, postnasal drip, rhinorrhea, sinus pressure, sneezing, sore throat and trouble swallowing.    Eyes: Negative for visual disturbance.   Respiratory: Negative for cough, chest tightness, shortness of breath and wheezing.    Cardiovascular: Negative for chest pain and palpitations.   Gastrointestinal: Negative for abdominal pain, blood in stool, nausea and vomiting.   Endocrine: Negative for cold intolerance, polydipsia, polyphagia and polyuria.   Genitourinary: Negative for dysuria, flank pain, frequency, hematuria and urgency.   Musculoskeletal: Negative for arthralgias, back pain, joint swelling and myalgias.   Skin: Negative for rash.   Allergic/Immunologic: Negative for environmental allergies.   Neurological: Negative for dizziness, syncope, weakness, numbness and headaches.   Hematological: Negative for adenopathy. Does not bruise/bleed easily.   Psychiatric/Behavioral: Negative for agitation, decreased concentration, dysphoric mood, sleep disturbance and suicidal ideas. The patient is not nervous/anxious.        /90  Pulse 69  Ht 62.5\" (158.8 cm)  Wt 190 lb (86.2 kg)  SpO2 98%  BMI 34.2 kg/m2     Physical Exam "   Constitutional: She is oriented to person, place, and time. She appears well-developed and well-nourished.   HENT:   Head: Normocephalic and atraumatic.   Eyes: Conjunctivae and EOM are normal. No scleral icterus.   Neck: No thyromegaly present.   Cardiovascular: Normal rate, regular rhythm and normal heart sounds.    No murmur heard.  Pulmonary/Chest: Effort normal and breath sounds normal. She has no wheezes.   Lymphadenopathy:     She has no cervical adenopathy.   Neurological: She is alert and oriented to person, place, and time.   Skin: Skin is warm and dry.   Psychiatric: She has a normal mood and affect. Her behavior is normal.   Vitals reviewed.       Diagnoses and all orders for this visit:    Medicare annual wellness visit, subsequent  -     Lipid Panel With / Chol / HDL Ratio    Immunization due  -     Tdap Vaccine Greater Than or Equal To 8yo IM  -     Pneumococcal Conjugate Vaccine 13-[facility] All    Vitamin D deficiency  -     Vitamin D 25 Hydroxy    Essential hypertension  -     CBC & Differential  -     Comprehensive Metabolic Panel    Acquired hypothyroidism  -     TSH  -     T4, Free  -     Vitamin B12  -     UA / M With / Rflx Culture([facility] ONLY)    Pernicious anemia    Need for hepatitis C screening test  -     Hepatitis C Antibody        Can hold Xarelto for 3 days prior to dental procedure then restart immediately after procedure.

## 2017-08-11 LAB
25(OH)D3+25(OH)D2 SERPL-MCNC: 34.3 NG/ML (ref 30–100)
ALBUMIN SERPL-MCNC: 3.4 G/DL (ref 3.5–5.2)
ALBUMIN/GLOB SERPL: 1 G/DL
ALP SERPL-CCNC: 183 U/L (ref 39–117)
ALT SERPL-CCNC: 18 U/L (ref 1–33)
APPEARANCE UR: CLEAR
AST SERPL-CCNC: 36 U/L (ref 1–32)
BACTERIA #/AREA URNS HPF: NORMAL /HPF
BACTERIA UR CULT: ABNORMAL
BACTERIA UR CULT: ABNORMAL
BASOPHILS # BLD AUTO: 0.02 10*3/MM3 (ref 0–0.2)
BASOPHILS NFR BLD AUTO: 0.3 % (ref 0–1.5)
BILIRUB SERPL-MCNC: 0.5 MG/DL (ref 0.1–1.2)
BILIRUB UR QL STRIP: NEGATIVE
BUN SERPL-MCNC: 14 MG/DL (ref 8–23)
BUN/CREAT SERPL: 13.5 (ref 7–25)
CALCIUM SERPL-MCNC: 9.1 MG/DL (ref 8.6–10.5)
CHLORIDE SERPL-SCNC: 100 MMOL/L (ref 98–107)
CHOLEST SERPL-MCNC: 95 MG/DL (ref 0–200)
CHOLEST/HDLC SERPL: 3.17 {RATIO}
CO2 SERPL-SCNC: 25.5 MMOL/L (ref 22–29)
COLOR UR: YELLOW
CREAT SERPL-MCNC: 1.04 MG/DL (ref 0.57–1)
EOSINOPHIL # BLD AUTO: 0.34 10*3/MM3 (ref 0–0.7)
EOSINOPHIL NFR BLD AUTO: 5.9 % (ref 0.3–6.2)
EPI CELLS #/AREA URNS HPF: NORMAL /HPF
ERYTHROCYTE [DISTWIDTH] IN BLOOD BY AUTOMATED COUNT: 13.6 % (ref 11.7–13)
GLOBULIN SER CALC-MCNC: 3.4 GM/DL
GLUCOSE SERPL-MCNC: 92 MG/DL (ref 65–99)
GLUCOSE UR QL: NEGATIVE
HCT VFR BLD AUTO: 41.7 % (ref 35.6–45.5)
HCV AB S/CO SERPL IA: <0.1 S/CO RATIO (ref 0–0.9)
HDLC SERPL-MCNC: 30 MG/DL (ref 40–60)
HGB BLD-MCNC: 13.2 G/DL (ref 11.9–15.5)
HGB UR QL STRIP: NEGATIVE
IMM GRANULOCYTES # BLD: 0 10*3/MM3 (ref 0–0.03)
IMM GRANULOCYTES NFR BLD: 0 % (ref 0–0.5)
KETONES UR QL STRIP: NEGATIVE
LDLC SERPL CALC-MCNC: 47 MG/DL (ref 0–100)
LEUKOCYTE ESTERASE UR QL STRIP: ABNORMAL
LYMPHOCYTES # BLD AUTO: 2.3 10*3/MM3 (ref 0.9–4.8)
LYMPHOCYTES NFR BLD AUTO: 40 % (ref 19.6–45.3)
MCH RBC QN AUTO: 31.1 PG (ref 26.9–32)
MCHC RBC AUTO-ENTMCNC: 31.7 G/DL (ref 32.4–36.3)
MCV RBC AUTO: 98.3 FL (ref 80.5–98.2)
MICRO URNS: ABNORMAL
MONOCYTES # BLD AUTO: 0.31 10*3/MM3 (ref 0.2–1.2)
MONOCYTES NFR BLD AUTO: 5.4 % (ref 5–12)
MUCOUS THREADS URNS QL MICRO: PRESENT /HPF
NEUTROPHILS # BLD AUTO: 2.78 10*3/MM3 (ref 1.9–8.1)
NEUTROPHILS NFR BLD AUTO: 48.4 % (ref 42.7–76)
NITRITE UR QL STRIP: POSITIVE
OTHER ANTIBIOTIC SUSC ISLT: ABNORMAL
PH UR STRIP: 6 [PH] (ref 5–7.5)
PLATELET # BLD AUTO: 221 10*3/MM3 (ref 140–500)
POTASSIUM SERPL-SCNC: 3.8 MMOL/L (ref 3.5–5.2)
PROT SERPL-MCNC: 6.8 G/DL (ref 6–8.5)
PROT UR QL STRIP: NEGATIVE
RBC # BLD AUTO: 4.24 10*6/MM3 (ref 3.9–5.2)
RBC #/AREA URNS HPF: NORMAL /HPF
SODIUM SERPL-SCNC: 142 MMOL/L (ref 136–145)
SP GR UR: 1.01 (ref 1–1.03)
T4 FREE SERPL-MCNC: 1.32 NG/DL (ref 0.93–1.7)
TRIGL SERPL-MCNC: 92 MG/DL (ref 0–150)
TSH SERPL DL<=0.005 MIU/L-ACNC: 2.62 MIU/ML (ref 0.27–4.2)
URINALYSIS REFLEX: ABNORMAL
UROBILINOGEN UR STRIP-MCNC: 1 MG/DL (ref 0.2–1)
VIT B12 SERPL-MCNC: 1574 PG/ML (ref 211–946)
VLDLC SERPL CALC-MCNC: 18.4 MG/DL (ref 5–40)
WBC # BLD AUTO: 5.75 10*3/MM3 (ref 4.5–10.7)
WBC #/AREA URNS HPF: NORMAL /HPF

## 2017-08-22 ENCOUNTER — OFFICE VISIT (OUTPATIENT)
Dept: ORTHOPEDIC SURGERY | Facility: CLINIC | Age: 67
End: 2017-08-22

## 2017-08-22 DIAGNOSIS — Z96.652 STATUS POST TOTAL KNEE REPLACEMENT, LEFT: ICD-10-CM

## 2017-08-22 DIAGNOSIS — M18.11 OSTEOARTHRITIS OF THUMB, RIGHT: ICD-10-CM

## 2017-08-22 DIAGNOSIS — M25.562 LEFT KNEE PAIN, UNSPECIFIED CHRONICITY: Primary | ICD-10-CM

## 2017-08-22 DIAGNOSIS — Z96.651 STATUS POST TOTAL KNEE REPLACEMENT, RIGHT: ICD-10-CM

## 2017-08-22 PROCEDURE — 73560 X-RAY EXAM OF KNEE 1 OR 2: CPT | Performed by: ORTHOPAEDIC SURGERY

## 2017-08-22 PROCEDURE — 99213 OFFICE O/P EST LOW 20 MIN: CPT | Performed by: ORTHOPAEDIC SURGERY

## 2017-08-29 ENCOUNTER — OFFICE VISIT (OUTPATIENT)
Dept: PAIN MEDICINE | Facility: CLINIC | Age: 67
End: 2017-08-29

## 2017-08-29 VITALS
BODY MASS INDEX: 33.84 KG/M2 | WEIGHT: 191 LBS | TEMPERATURE: 99.7 F | SYSTOLIC BLOOD PRESSURE: 124 MMHG | HEART RATE: 66 BPM | OXYGEN SATURATION: 97 % | RESPIRATION RATE: 16 BRPM | HEIGHT: 63 IN | DIASTOLIC BLOOD PRESSURE: 74 MMHG

## 2017-08-29 DIAGNOSIS — G89.4 CHRONIC PAIN SYNDROME: Primary | ICD-10-CM

## 2017-08-29 DIAGNOSIS — M51.36 DEGENERATION OF INTERVERTEBRAL DISC OF LUMBAR REGION: ICD-10-CM

## 2017-08-29 DIAGNOSIS — M06.9 RHEUMATOID ARTHRITIS INVOLVING MULTIPLE SITES, UNSPECIFIED RHEUMATOID FACTOR PRESENCE: ICD-10-CM

## 2017-08-29 DIAGNOSIS — M15.9 GENERALIZED OSTEOARTHRITIS: ICD-10-CM

## 2017-08-29 DIAGNOSIS — Z79.899 ENCOUNTER FOR LONG-TERM (CURRENT) USE OF HIGH-RISK MEDICATION: ICD-10-CM

## 2017-08-29 PROCEDURE — 99213 OFFICE O/P EST LOW 20 MIN: CPT | Performed by: NURSE PRACTITIONER

## 2017-08-29 RX ORDER — OXYCODONE HYDROCHLORIDE 80 MG/1
160 TABLET, FILM COATED, EXTENDED RELEASE ORAL EVERY 12 HOURS SCHEDULED
Qty: 120 TABLET | Refills: 0 | Status: SHIPPED | OUTPATIENT
Start: 2017-08-29 | End: 2017-09-28 | Stop reason: SDUPTHER

## 2017-08-29 RX ORDER — HYDROCODONE BITARTRATE AND ACETAMINOPHEN 10; 325 MG/1; MG/1
1 TABLET ORAL EVERY 8 HOURS PRN
Qty: 90 TABLET | Refills: 0 | Status: SHIPPED | OUTPATIENT
Start: 2017-08-29 | End: 2017-09-28 | Stop reason: SDUPTHER

## 2017-08-29 NOTE — PROGRESS NOTES
CHIEF COMPLAINT  Since last visit on 7-26-17, pt states generalized joint pain and back pain is unchanged.     Subjective   Anne Jewell is a 66 y.o. female  who presents to the office for follow-up.She has a history of chronic diffuse joint pain, RA, and back pain.  Overall pain is unchanged, stable.      She continues with Oxycontin 80 mg 2 every 12, Hydrocodone 10/325 3/day. She denies adverse reactions, reports moderate benefit with this regimen and feels her pain is well controlled overall. ADL's by self.      Saw ortho regarding right knee 8/22/17, Dr. 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 Also xray ed right hand, history of hand surgery as well, might need more surgery in the future.      Back Pain   This is a chronic problem. The current episode started more than 1 year ago. The problem occurs constantly. The problem is unchanged. The pain is present in the lumbar spine. The quality of the pain is described as aching. The pain radiates to the left foot and right foot. The pain is at a severity of 6/10. The pain is moderate. The symptoms are aggravated by stress (weather changes). Associated symptoms include headaches (allergy related) and weakness (legs bilaterally). Pertinent negatives include no bladder incontinence, bowel incontinence, chest pain, dysuria, fever or numbness. She has tried analgesics, bed rest, home exercises and heat (oxycodone, hydrocodone, aquatherapy) for the symptoms. The treatment provided moderate relief.   Joint Pain   This is a chronic (today her pain is 6/10VAS) problem. The current episode started more than 1 year ago. The problem occurs constantly. Progression since onset: unchanged since last office visit. Associated symptoms include arthralgias, headaches (allergy related) and weakness (legs bilaterally). Pertinent negatives include no chest pain, congestion, coughing, fatigue, fever, nausea or numbness. She has tried oral narcotics  "for the symptoms. The treatment provided moderate relief.      PEG Assessment   What number best describes your pain on average in the past week?7  What number best describes how, during the past week, pain has interfered with your enjoyment of life?7  What number best describes how, during the past week, pain has interfered with your general activity?  7    The following portions of the patient's history were reviewed and updated as appropriate: allergies, current medications, past family history, past medical history, past social history, past surgical history and problem list.    Review of Systems   Constitutional: Negative for activity change, appetite change, fatigue and fever.   HENT: Negative for congestion.    Eyes: Negative for visual disturbance.   Respiratory: Negative for cough, shortness of breath and wheezing.    Cardiovascular: Negative.  Negative for chest pain.   Gastrointestinal: Negative for bowel incontinence, constipation, diarrhea and nausea.   Genitourinary: Negative for bladder incontinence, difficulty urinating and dysuria.   Musculoskeletal: Positive for arthralgias, back pain and gait problem (uses walker,cane).   Neurological: Positive for weakness (legs bilaterally) and headaches (allergy related). Negative for dizziness, light-headedness and numbness.   Psychiatric/Behavioral: Positive for sleep disturbance. Negative for agitation, confusion, hallucinations and suicidal ideas. The patient is not nervous/anxious.      Vitals:    08/29/17 0951   BP: 124/74   Pulse: 66   Resp: 16   Temp: 99.7 °F (37.6 °C)   SpO2: 97%   Weight: 191 lb (86.6 kg)   Height: 62.5\" (158.8 cm)   PainSc:   6   PainLoc: Back  Comment: generalized     Objective   Physical Exam   Constitutional: She is oriented to person, place, and time. She appears well-developed and well-nourished. She is cooperative.   HENT:   Head: Normocephalic and atraumatic.   Nose: Nose normal.   Eyes: Conjunctivae and lids are normal. "   Neck: Trachea normal.   Cardiovascular: Normal rate.    Pulmonary/Chest: Effort normal. No respiratory distress.   Musculoskeletal:        Lumbar back: She exhibits tenderness.   Diffuse arthritic changes.    Wearing lumbar brace   Neurological: She is alert and oriented to person, place, and time. Gait (ambulating with rolling walker) abnormal.   Reflex Scores:       Patellar reflexes are 1+ on the right side and 1+ on the left side.  Skin: Skin is intact.   Psychiatric: She has a normal mood and affect. Her speech is normal and behavior is normal. Cognition and memory are normal.   Nursing note and vitals reviewed.    Assessment/Plan   Anne was seen today for pain and back pain.    Diagnoses and all orders for this visit:    Chronic pain syndrome    Rheumatoid arthritis involving multiple sites, unspecified rheumatoid factor presence    Generalized osteoarthritis    Degeneration of intervertebral disc of lumbar region    Encounter for long-term (current) use of high-risk medication    Other orders  -     HYDROcodone-acetaminophen (NORCO)  MG per tablet; Take 1 tablet by mouth Every 8 (Eight) Hours As Needed for Severe Pain .  -     oxyCODONE ER (oxyCONTIN) 80 MG tablet extended-release 12 hour 12 hr tablet; Take 2 tablets by mouth Every 12 (Twelve) Hours.      --- The urine drug screen confirmation from 6-2-17 has been reviewed and the result is appropriate based on patient history and [facility] report  --- Refill OxyContin and Hydrocodone.  Patient appears stable with current regimen. No adverse effects. Regarding continuation of opioids, there is no evidence of aberrant behavior or any red flags.  The patient continues with appropriate response to opioid therapy. ADL's remain intact by self.   --- Follow-up 1 month          [facility] REPORT  As part of the patient's treatment plan, I am prescribing controlled substances. The patient has been made aware of appropriate use of such medications, including  potential risk of somnolence, limited ability to drive and/or work safely, and the potential for dependence or overdose. It has also bee made clear that these medications are for use by this patient only, without concomitant use of alcohol or other substances unless prescribed.     Patient has completed prescribing agreement detailing terms of continued prescribing of controlled substances, including monitoring [facility] reports, urine drug screening, and pill counts if necessary. The patient is aware that inappropriate use will results in cessation of prescribing such medications.    [facility] report has been reviewed and scanned into the patient's chart.    Date of last [facility] : 8/28/2017    History and physical exam exhibit continued safe and appropriate use of controlled substances.    EMR Dragon/Transcription disclaimer:   Much of this encounter note is an electronic transcription/translation of spoken language to printed text. The electronic translation of spoken language may permit erroneous, or at times, nonsensical words or phrases to be inadvertently transcribed; Although I have reviewed the note for such errors, some may still exist.

## 2017-08-31 PROBLEM — Z96.651 STATUS POST TOTAL KNEE REPLACEMENT, RIGHT: Status: ACTIVE | Noted: 2017-08-31

## 2017-08-31 PROBLEM — M25.562 LEFT KNEE PAIN: Status: ACTIVE | Noted: 2017-08-31

## 2017-08-31 PROBLEM — Z96.652 STATUS POST TOTAL KNEE REPLACEMENT, LEFT: Status: ACTIVE | Noted: 2017-08-31

## 2017-08-31 PROBLEM — M18.10 OSTEOARTHRITIS OF THUMB: Status: ACTIVE | Noted: 2017-08-31

## 2017-09-28 RX ORDER — HYDROCODONE BITARTRATE AND ACETAMINOPHEN 10; 325 MG/1; MG/1
1 TABLET ORAL EVERY 8 HOURS PRN
Qty: 90 TABLET | Refills: 0 | Status: SHIPPED | OUTPATIENT
Start: 2017-09-28 | End: 2017-10-26 | Stop reason: SDUPTHER

## 2017-09-28 RX ORDER — OXYCODONE HYDROCHLORIDE 80 MG/1
160 TABLET, FILM COATED, EXTENDED RELEASE ORAL EVERY 12 HOURS SCHEDULED
Qty: 120 TABLET | Refills: 0 | Status: SHIPPED | OUTPATIENT
Start: 2017-09-28 | End: 2017-10-26 | Stop reason: SDUPTHER

## 2017-09-29 ENCOUNTER — OFFICE VISIT (OUTPATIENT)
Dept: PAIN MEDICINE | Facility: CLINIC | Age: 67
End: 2017-09-29

## 2017-09-29 VITALS
DIASTOLIC BLOOD PRESSURE: 75 MMHG | OXYGEN SATURATION: 98 % | SYSTOLIC BLOOD PRESSURE: 118 MMHG | RESPIRATION RATE: 16 BRPM | HEART RATE: 59 BPM | HEIGHT: 63 IN | BODY MASS INDEX: 33.95 KG/M2 | WEIGHT: 191.6 LBS | TEMPERATURE: 97.6 F

## 2017-09-29 DIAGNOSIS — M15.9 GENERALIZED OSTEOARTHRITIS: ICD-10-CM

## 2017-09-29 DIAGNOSIS — M51.36 DEGENERATION OF INTERVERTEBRAL DISC OF LUMBAR REGION: ICD-10-CM

## 2017-09-29 DIAGNOSIS — M06.9 RHEUMATOID ARTHRITIS INVOLVING MULTIPLE SITES, UNSPECIFIED RHEUMATOID FACTOR PRESENCE: ICD-10-CM

## 2017-09-29 DIAGNOSIS — Z79.899 ENCOUNTER FOR LONG-TERM (CURRENT) USE OF HIGH-RISK MEDICATION: ICD-10-CM

## 2017-09-29 DIAGNOSIS — G89.4 CHRONIC PAIN SYNDROME: Primary | ICD-10-CM

## 2017-09-29 PROCEDURE — 99213 OFFICE O/P EST LOW 20 MIN: CPT | Performed by: NURSE PRACTITIONER

## 2017-09-29 NOTE — PROGRESS NOTES
CHIEF COMPLAINT  F/U 8-29-17 back and joint pain. Pt states is unchanged since last visit.    Subjective   Anne Jewell is a 67 y.o. female  who presents to the office for follow-up.She has a history of chronic back and multiple joint pain, RA.    She continues with Oxycontin 80 mg 2 every 12, Hydrocodone 10/325 3/day. She denies adverse reactions, reports moderate benefit with this regimen and feels her pain is well controlled overall. ADL's by self.     She is interested in a new back brace.  Has an old one, Wattage that worked very well but is falling apart. She got a new one a year or two ago which she is not happy about.      Back Pain   This is a chronic problem. The current episode started more than 1 year ago. The problem occurs constantly. The problem is unchanged. The pain is present in the lumbar spine. The quality of the pain is described as aching. The pain radiates to the left foot and right foot. The pain is at a severity of 6/10. The pain is moderate. The symptoms are aggravated by stress (weather changes). Associated symptoms include weakness (legs bilaterally). Pertinent negatives include no bladder incontinence, bowel incontinence, chest pain, dysuria, fever, headaches or numbness. She has tried analgesics, bed rest, home exercises and heat (oxycodone, hydrocodone, aquatherapy) for the symptoms. The treatment provided moderate relief.   Joint Pain   This is a chronic (today her pain is 6/10VAS) problem. The current episode started more than 1 year ago. The problem occurs constantly. Progression since onset: unchanged since last office visit. Associated symptoms include arthralgias and weakness (legs bilaterally). Pertinent negatives include no chest pain, congestion, coughing, fatigue, fever, headaches, nausea or numbness. She has tried oral narcotics for the symptoms. The treatment provided moderate relief.      PEG Assessment   What number best describes your pain on average in the past  "week?6  What number best describes how, during the past week, pain has interfered with your enjoyment of life?6  What number best describes how, during the past week, pain has interfered with your general activity?  7    The following portions of the patient's history were reviewed and updated as appropriate: allergies, current medications, past family history, past medical history, past social history, past surgical history and problem list.    Review of Systems   Constitutional: Negative for activity change, appetite change, fatigue and fever.   HENT: Negative for congestion.    Eyes: Negative for visual disturbance.   Respiratory: Negative for cough, shortness of breath and wheezing.    Cardiovascular: Negative.  Negative for chest pain.   Gastrointestinal: Negative for bowel incontinence, constipation, diarrhea and nausea.   Genitourinary: Negative for bladder incontinence, difficulty urinating and dysuria.   Musculoskeletal: Positive for arthralgias, back pain and gait problem (uses walker,cane).   Neurological: Positive for weakness (legs bilaterally). Negative for dizziness, light-headedness, numbness and headaches.   Psychiatric/Behavioral: Positive for sleep disturbance. Negative for agitation, confusion, hallucinations and suicidal ideas. The patient is not nervous/anxious.        Vitals:    09/29/17 0937   BP: 118/75   Pulse: 59   Resp: 16   Temp: 97.6 °F (36.4 °C)   SpO2: 98%   Weight: 191 lb 9.6 oz (86.9 kg)   Height: 62.5\" (158.8 cm)   PainSc:   6   PainLoc: Back     Objective   Physical Exam   Constitutional: She is oriented to person, place, and time. She appears well-developed and well-nourished. She is cooperative.   HENT:   Head: Normocephalic and atraumatic.   Nose: Nose normal.   Eyes: Conjunctivae and lids are normal.   Neck: Trachea normal.   Cardiovascular: Normal rate.    Pulmonary/Chest: Effort normal. No respiratory distress.   Musculoskeletal:        Lumbar back: She exhibits tenderness. "   Diffuse arthritic changes.    Wearing lumbar brace   Neurological: She is alert and oriented to person, place, and time. Gait (ambulating with rolling walker) abnormal.   Reflex Scores:       Patellar reflexes are 1+ on the right side and 1+ on the left side.  Skin: Skin is intact.   Psychiatric: She has a normal mood and affect. Her speech is normal and behavior is normal. Cognition and memory are normal.   Nursing note and vitals reviewed.    Assessment/Plan   Anne was seen today for back pain.    Diagnoses and all orders for this visit:    Chronic pain syndrome    Degeneration of intervertebral disc of lumbar region    Generalized osteoarthritis    Rheumatoid arthritis involving multiple sites, unspecified rheumatoid factor presence    Encounter for long-term (current) use of high-risk medication      --- The urine drug screen confirmation from 6-2-17 has been reviewed and the result is appropriate based on patient history and [facility] report  --- Refill OxyContin and Hydrocodone.  Patient appears stable with current regimen. No adverse effects. Regarding continuation of opioids, there is no evidence of aberrant behavior or any red flags.  The patient continues with appropriate response to opioid therapy. ADL's remain intact by self.   --- Order for new back brace.  Ordering a back brace with anterior, lateral, and posterior support from T-9 to S-1 to reduce pain by restricting mobility of the trunk.    --- Follow-up 1 month          [facility] REPORT  As part of the patient's treatment plan, I am prescribing controlled substances. The patient has been made aware of appropriate use of such medications, including potential risk of somnolence, limited ability to drive and/or work safely, and the potential for dependence or overdose. It has also bee made clear that these medications are for use by this patient only, without concomitant use of alcohol or other substances unless prescribed.     Patient has completed  prescribing agreement detailing terms of continued prescribing of controlled substances, including monitoring [facility] reports, urine drug screening, and pill counts if necessary. The patient is aware that inappropriate use will results in cessation of prescribing such medications.    [facility] report has been reviewed and scanned into the patient's chart.    Date of last [facility] : 9/28/2017    History and physical exam exhibit continued safe and appropriate use of controlled substances.    EMR Dragon/Transcription disclaimer:   Much of this encounter note is an electronic transcription/translation of spoken language to printed text. The electronic translation of spoken language may permit erroneous, or at times, nonsensical words or phrases to be inadvertently transcribed; Although I have reviewed the note for such errors, some may still exist.

## 2017-10-26 RX ORDER — HYDROCODONE BITARTRATE AND ACETAMINOPHEN 10; 325 MG/1; MG/1
1 TABLET ORAL EVERY 8 HOURS PRN
Qty: 90 TABLET | Refills: 0 | Status: SHIPPED | OUTPATIENT
Start: 2017-10-26 | End: 2017-11-28 | Stop reason: SDUPTHER

## 2017-10-26 RX ORDER — OXYCODONE HYDROCHLORIDE 80 MG/1
160 TABLET, FILM COATED, EXTENDED RELEASE ORAL EVERY 12 HOURS SCHEDULED
Qty: 120 TABLET | Refills: 0 | Status: SHIPPED | OUTPATIENT
Start: 2017-10-26 | End: 2017-11-28 | Stop reason: SDUPTHER

## 2017-10-27 ENCOUNTER — OFFICE VISIT (OUTPATIENT)
Dept: PAIN MEDICINE | Facility: CLINIC | Age: 67
End: 2017-10-27

## 2017-10-27 VITALS
RESPIRATION RATE: 18 BRPM | SYSTOLIC BLOOD PRESSURE: 127 MMHG | HEIGHT: 63 IN | OXYGEN SATURATION: 96 % | WEIGHT: 189.8 LBS | DIASTOLIC BLOOD PRESSURE: 70 MMHG | BODY MASS INDEX: 33.63 KG/M2 | HEART RATE: 66 BPM | TEMPERATURE: 97.6 F

## 2017-10-27 DIAGNOSIS — M15.9 GENERALIZED OSTEOARTHRITIS: ICD-10-CM

## 2017-10-27 DIAGNOSIS — M06.9 RHEUMATOID ARTHRITIS INVOLVING MULTIPLE SITES, UNSPECIFIED RHEUMATOID FACTOR PRESENCE: ICD-10-CM

## 2017-10-27 DIAGNOSIS — Z79.899 ENCOUNTER FOR LONG-TERM (CURRENT) USE OF HIGH-RISK MEDICATION: ICD-10-CM

## 2017-10-27 DIAGNOSIS — M51.36 DEGENERATION OF INTERVERTEBRAL DISC OF LUMBAR REGION: ICD-10-CM

## 2017-10-27 DIAGNOSIS — G89.4 CHRONIC PAIN SYNDROME: Primary | ICD-10-CM

## 2017-10-27 PROCEDURE — 99213 OFFICE O/P EST LOW 20 MIN: CPT | Performed by: NURSE PRACTITIONER

## 2017-10-27 NOTE — PROGRESS NOTES
CHIEF COMPLAINT  Follow-up for back pain. Ms. Jewell states that her back pain is unchanged from her last appt.    Subjective   Anne Jewell is a 67 y.o. female  who presents to the office for follow-up.She has a history of chronic pain of multiple joints, RA, low back pain.    C/o low back pain.  She continues with Oxycontin 80 mg 2 every 12, Hydrocodone 10/325 3/day. She denies adverse reactions, reports moderate benefit with this regimen and feels her pain is well controlled overall. ADL's by self.     Back Pain   This is a chronic problem. The current episode started more than 1 year ago. The problem occurs constantly. The problem is unchanged. The pain is present in the lumbar spine. The quality of the pain is described as aching. The pain radiates to the left foot and right foot. The pain is at a severity of 5/10. The pain is moderate. The symptoms are aggravated by stress (weather changes). Associated symptoms include numbness (bilateral feet) and weakness. Pertinent negatives include no bladder incontinence, bowel incontinence, chest pain, dysuria, fever or headaches. She has tried analgesics, bed rest, home exercises and heat (oxycodone, hydrocodone, aquatherapy) for the symptoms. The treatment provided moderate relief.   Joint Pain   This is a chronic problem. The current episode started more than 1 year ago. The problem occurs constantly. The problem has been unchanged. Associated symptoms include arthralgias, numbness (bilateral feet) and weakness. Pertinent negatives include no chest pain, congestion, coughing, fatigue, fever, headaches or nausea. She has tried oral narcotics for the symptoms. The treatment provided moderate relief.      PEG Assessment   What number best describes your pain on average in the past week?6  What number best describes how, during the past week, pain has interfered with your enjoyment of life?7  What number best describes how, during the past week, pain has interfered with  "your general activity?  6    The following portions of the patient's history were reviewed and updated as appropriate: allergies, current medications, past family history, past medical history, past social history, past surgical history and problem list.    Review of Systems   Constitutional: Negative for fatigue and fever.   HENT: Negative for congestion.    Eyes: Negative for visual disturbance.   Respiratory: Negative for cough, shortness of breath and wheezing.    Cardiovascular: Negative.  Negative for chest pain.   Gastrointestinal: Negative for bowel incontinence, constipation, diarrhea and nausea.   Genitourinary: Negative for bladder incontinence, difficulty urinating and dysuria.   Musculoskeletal: Positive for arthralgias and back pain.   Neurological: Positive for weakness and numbness (bilateral feet). Negative for headaches.   Psychiatric/Behavioral: Positive for sleep disturbance. Negative for suicidal ideas. The patient is not nervous/anxious.        Vitals:    10/27/17 0925   BP: 127/70   Pulse: 66   Resp: 18   Temp: 97.6 °F (36.4 °C)   SpO2: 96%   Weight: 189 lb 12.8 oz (86.1 kg)   Height: 62.5\" (158.8 cm)   PainSc:   5   PainLoc: Back       Objective   Physical Exam   Constitutional: She is oriented to person, place, and time. She appears well-developed and well-nourished. She is cooperative.   HENT:   Head: Normocephalic and atraumatic.   Nose: Nose normal.   Eyes: Conjunctivae and lids are normal.   Neck: Trachea normal.   Cardiovascular: Normal rate.    Pulmonary/Chest: Effort normal. No respiratory distress.   Musculoskeletal:        Lumbar back: She exhibits tenderness.   Diffuse arthritic changes.    Wearing lumbar brace   Neurological: She is alert and oriented to person, place, and time. Gait (ambulating with rolling walker) abnormal.   Reflex Scores:       Patellar reflexes are 1+ on the right side and 1+ on the left side.  Skin: Skin is intact.   Psychiatric: She has a normal mood and " affect. Her speech is normal and behavior is normal. Cognition and memory are normal.   Nursing note and vitals reviewed.    Assessment/Plan   Anne was seen today for back pain.    Diagnoses and all orders for this visit:    Chronic pain syndrome    Generalized osteoarthritis    Rheumatoid arthritis involving multiple sites, unspecified rheumatoid factor presence    Degeneration of intervertebral disc of lumbar region    Encounter for long-term (current) use of high-risk medication      --- The urine drug screen confirmation from 6-2-17 has been reviewed and the result is appropriate based on patient history and [facility] report  --- Refill OxyContin and Hydrocodone.  Patient appears stable with current regimen. No adverse effects. Regarding continuation of opioids, there is no evidence of aberrant behavior or any red flags.  The patient continues with appropriate response to opioid therapy. ADL's remain intact by self.   --- Follow-up 1 month          [facility] REPORT  As part of the patient's treatment plan, I am prescribing controlled substances. The patient has been made aware of appropriate use of such medications, including potential risk of somnolence, limited ability to drive and/or work safely, and the potential for dependence or overdose. It has also bee made clear that these medications are for use by this patient only, without concomitant use of alcohol or other substances unless prescribed.     Patient has completed prescribing agreement detailing terms of continued prescribing of controlled substances, including monitoring [facility] reports, urine drug screening, and pill counts if necessary. The patient is aware that inappropriate use will results in cessation of prescribing such medications.    [facility] report has been reviewed and scanned into the patient's chart.    Date of last [facility] : 10/26/2017    History and physical exam exhibit continued safe and appropriate use of controlled substances.    EMR  Dragon/Transcription disclaimer:   Much of this encounter note is an electronic transcription/translation of spoken language to printed text. The electronic translation of spoken language may permit erroneous, or at times, nonsensical words or phrases to be inadvertently transcribed; Although I have reviewed the note for such errors, some may still exist.

## 2017-11-07 RX ORDER — PROMETHAZINE HYDROCHLORIDE 25 MG/1
TABLET ORAL
Qty: 90 TABLET | Refills: 2 | Status: SHIPPED | OUTPATIENT
Start: 2017-11-07 | End: 2018-03-25 | Stop reason: SDUPTHER

## 2017-11-07 RX ORDER — LEVOTHYROXINE SODIUM 0.1 MG/1
TABLET ORAL
Qty: 90 TABLET | Refills: 0 | Status: SHIPPED | OUTPATIENT
Start: 2017-11-07 | End: 2018-02-25 | Stop reason: SDUPTHER

## 2017-11-28 ENCOUNTER — OFFICE VISIT (OUTPATIENT)
Dept: PAIN MEDICINE | Facility: CLINIC | Age: 67
End: 2017-11-28

## 2017-11-28 VITALS
RESPIRATION RATE: 18 BRPM | WEIGHT: 188 LBS | TEMPERATURE: 98.1 F | SYSTOLIC BLOOD PRESSURE: 119 MMHG | BODY MASS INDEX: 33.31 KG/M2 | HEIGHT: 63 IN | DIASTOLIC BLOOD PRESSURE: 70 MMHG | OXYGEN SATURATION: 96 % | HEART RATE: 64 BPM

## 2017-11-28 DIAGNOSIS — M51.36 DEGENERATION OF INTERVERTEBRAL DISC OF LUMBAR REGION: ICD-10-CM

## 2017-11-28 DIAGNOSIS — Z79.899 ENCOUNTER FOR LONG-TERM (CURRENT) USE OF HIGH-RISK MEDICATION: ICD-10-CM

## 2017-11-28 DIAGNOSIS — M06.9 RHEUMATOID ARTHRITIS INVOLVING MULTIPLE SITES, UNSPECIFIED RHEUMATOID FACTOR PRESENCE: ICD-10-CM

## 2017-11-28 DIAGNOSIS — G89.4 CHRONIC PAIN SYNDROME: Primary | ICD-10-CM

## 2017-11-28 DIAGNOSIS — M25.50 ARTHRALGIA, UNSPECIFIED JOINT: ICD-10-CM

## 2017-11-28 PROCEDURE — 99213 OFFICE O/P EST LOW 20 MIN: CPT | Performed by: NURSE PRACTITIONER

## 2017-11-28 RX ORDER — HYDROCODONE BITARTRATE AND ACETAMINOPHEN 10; 325 MG/1; MG/1
1 TABLET ORAL EVERY 8 HOURS PRN
Qty: 90 TABLET | Refills: 0 | Status: SHIPPED | OUTPATIENT
Start: 2017-11-28 | End: 2017-12-28 | Stop reason: SDUPTHER

## 2017-11-28 RX ORDER — OXYCODONE HYDROCHLORIDE 80 MG/1
160 TABLET, FILM COATED, EXTENDED RELEASE ORAL EVERY 12 HOURS SCHEDULED
Qty: 120 TABLET | Refills: 0 | Status: SHIPPED | OUTPATIENT
Start: 2017-11-28 | End: 2017-12-28 | Stop reason: SDUPTHER

## 2017-11-28 NOTE — PROGRESS NOTES
"CHIEF COMPLAINT  Back pain is unchanged since last visit.    Subjective   Anne Jewell is a 67 y.o. female  who presents to the office for follow-up.She has a history of chronic back pain and joint pain due to RA. Overall, her pain pattern is relatively unchanged since her last office visit.    Complains of pain in her low back and joints. Today her pain is 7/10VAS. Describes the pain as continuous and unchanged. Continues with OxyContin 80 mg 2 BID, Hydrocodone 10/325 3/day and baclofen 10 mg. Denies any side effects from the regimen. The regimen helps decrease her pain by 50%. Notes improved function and activity with regimen. \"I honestly think I'd be laying in bed all the time.\"  ADL's by self.    Back Pain   This is a chronic problem. The current episode started more than 1 year ago. The problem occurs constantly. Progression since onset: unchanged since last office visit. The pain is present in the lumbar spine. The quality of the pain is described as aching. The pain radiates to the left foot and right foot. The pain is at a severity of 7/10. The pain is moderate. The symptoms are aggravated by stress (weather changes). Associated symptoms include weakness. Pertinent negatives include no bladder incontinence, bowel incontinence, chest pain, dysuria, fever, headaches or numbness. She has tried analgesics, bed rest, home exercises and heat (oxycodone, hydrocodone, aquatherapy) for the symptoms. The treatment provided moderate relief.   Joint Pain   This is a chronic problem. The current episode started more than 1 year ago. The problem occurs constantly. The problem has been unchanged. Associated symptoms include arthralgias and weakness. Pertinent negatives include no chest pain, chills, congestion, coughing, fatigue, fever, headaches, nausea, numbness or vomiting. She has tried oral narcotics for the symptoms. The treatment provided moderate relief.      PEG Assessment   What number best describes your pain " "on average in the past week?8  What number best describes how, during the past week, pain has interfered with your enjoyment of life?8  What number best describes how, during the past week, pain has interfered with your general activity?  8    The following portions of the patient's history were reviewed and updated as appropriate: allergies, current medications, past family history, past medical history, past social history, past surgical history and problem list.    Review of Systems   Constitutional: Negative for chills, fatigue and fever.   HENT: Negative for congestion.    Respiratory: Negative for cough and shortness of breath.    Cardiovascular: Negative for chest pain.   Gastrointestinal: Negative for bowel incontinence, constipation, diarrhea, nausea and vomiting.   Genitourinary: Negative for bladder incontinence, difficulty urinating, dyspareunia and dysuria.   Musculoskeletal: Positive for arthralgias and back pain.   Neurological: Positive for weakness. Negative for dizziness, light-headedness, numbness and headaches.   Psychiatric/Behavioral: Negative for confusion, hallucinations, self-injury, sleep disturbance and suicidal ideas. The patient is not nervous/anxious.        Vitals:    11/28/17 0923   BP: 119/70   Pulse: 64   Resp: 18   Temp: 98.1 °F (36.7 °C)   SpO2: 96%   Weight: 188 lb (85.3 kg)   Height: 62.5\" (158.8 cm)   PainSc:   7   PainLoc: Back     Objective   Physical Exam   Constitutional: She is oriented to person, place, and time. She appears well-developed and well-nourished. She is cooperative.   HENT:   Head: Normocephalic and atraumatic.   Nose: Nose normal.   Eyes: Conjunctivae and lids are normal.   Neck: Trachea normal.   Cardiovascular: Normal rate.    Pulmonary/Chest: Effort normal. No respiratory distress.   Musculoskeletal:        Lumbar back: She exhibits tenderness.   Diffuse arthritic changes with no acute synovitis     Neurological: She is alert and oriented to person, " place, and time. Gait (ambulating with rolling walker) abnormal.   Reflex Scores:       Patellar reflexes are 1+ on the right side and 1+ on the left side.  Skin: Skin is intact.   Psychiatric: She has a normal mood and affect. Her speech is normal and behavior is normal. Cognition and memory are normal.   Nursing note and vitals reviewed.    Assessment/Plan   Anne was seen today for back pain.    Diagnoses and all orders for this visit:    Chronic pain syndrome    Rheumatoid arthritis involving multiple sites, unspecified rheumatoid factor presence    Arthralgia, unspecified joint    Degeneration of intervertebral disc of lumbar region    Encounter for long-term (current) use of high-risk medication    Other orders  -     oxyCODONE ER (oxyCONTIN) 80 MG tablet extended-release 12 hour 12 hr tablet; Take 2 tablets by mouth Every 12 (Twelve) Hours.  -     HYDROcodone-acetaminophen (NORCO)  MG per tablet; Take 1 tablet by mouth Every 8 (Eight) Hours As Needed for Severe Pain .      --- The urine drug screen confirmation from 6-2-17 has been reviewed and the result is appropriate based on patient history and [facility] report  --- Refill OxyContin and Hydrocodone. Patient appears stable with current regimen. No adverse effects. Regarding continuation of opioids, there is no evidence of aberrant behavior or any red flags.  The patient continues with appropriate response to opioid therapy. ADL's remain intact by self.   --- Follow-up 1 month or sooner if needed.       [facility] REPORT    As part of the patient's treatment plan, I am prescribing controlled substances. The patient has been made aware of appropriate use of such medications, including potential risk of somnolence, limited ability to drive and/or work safely, and the potential for dependence or overdose. It has also bee made clear that these medications are for use by this patient only, without concomitant use of alcohol or other substances unless  prescribed.     Patient has completed prescribing agreement detailing terms of continued prescribing of controlled substances, including monitoring [facility] reports, urine drug screening, and pill counts if necessary. The patient is aware that inappropriate use will results in cessation of prescribing such medications.    [facility] report has been reviewed and scanned into the patient's chart.    Date of last [facility] : 11-27-17    History and physical exam exhibit continued safe and appropriate use of controlled substances.      EMR Dragon/Transcription disclaimer:   Much of this encounter note is an electronic transcription/translation of spoken language to printed text. The electronic translation of spoken language may permit erroneous, or at times, nonsensical words or phrases to be inadvertently transcribed; Although I have reviewed the note for such errors, some may still exist.

## 2017-12-11 RX ORDER — MAGNESIUM 200 MG
TABLET ORAL
Qty: 30 EACH | Refills: 2 | Status: SHIPPED | OUTPATIENT
Start: 2017-12-11 | End: 2018-03-12 | Stop reason: SDUPTHER

## 2017-12-28 ENCOUNTER — OFFICE VISIT (OUTPATIENT)
Dept: PAIN MEDICINE | Facility: CLINIC | Age: 67
End: 2017-12-28

## 2017-12-28 VITALS
HEART RATE: 61 BPM | DIASTOLIC BLOOD PRESSURE: 53 MMHG | SYSTOLIC BLOOD PRESSURE: 108 MMHG | WEIGHT: 189 LBS | RESPIRATION RATE: 18 BRPM | OXYGEN SATURATION: 97 % | TEMPERATURE: 96.1 F | HEIGHT: 63 IN | BODY MASS INDEX: 33.49 KG/M2

## 2017-12-28 DIAGNOSIS — Z79.899 ENCOUNTER FOR LONG-TERM (CURRENT) USE OF HIGH-RISK MEDICATION: ICD-10-CM

## 2017-12-28 DIAGNOSIS — M06.9 RHEUMATOID ARTHRITIS INVOLVING MULTIPLE SITES, UNSPECIFIED RHEUMATOID FACTOR PRESENCE: ICD-10-CM

## 2017-12-28 DIAGNOSIS — M51.36 DEGENERATION OF INTERVERTEBRAL DISC OF LUMBAR REGION: ICD-10-CM

## 2017-12-28 DIAGNOSIS — M25.50 ARTHRALGIA, UNSPECIFIED JOINT: ICD-10-CM

## 2017-12-28 DIAGNOSIS — G89.4 CHRONIC PAIN SYNDROME: Primary | ICD-10-CM

## 2017-12-28 DIAGNOSIS — M15.9 GENERALIZED OSTEOARTHRITIS: ICD-10-CM

## 2017-12-28 PROCEDURE — 99213 OFFICE O/P EST LOW 20 MIN: CPT | Performed by: NURSE PRACTITIONER

## 2017-12-28 RX ORDER — CHLORHEXIDINE GLUCONATE 0.12 MG/ML
RINSE ORAL
COMMUNITY
Start: 2017-12-16

## 2017-12-28 RX ORDER — OXYCODONE HYDROCHLORIDE 80 MG/1
160 TABLET, FILM COATED, EXTENDED RELEASE ORAL EVERY 12 HOURS SCHEDULED
Qty: 120 TABLET | Refills: 0 | Status: SHIPPED | OUTPATIENT
Start: 2017-12-28 | End: 2018-01-24 | Stop reason: SDUPTHER

## 2017-12-28 RX ORDER — HYDROCODONE BITARTRATE AND ACETAMINOPHEN 10; 325 MG/1; MG/1
1 TABLET ORAL EVERY 8 HOURS PRN
Qty: 90 TABLET | Refills: 0 | Status: SHIPPED | OUTPATIENT
Start: 2017-12-28 | End: 2018-01-24 | Stop reason: SDUPTHER

## 2017-12-28 NOTE — PROGRESS NOTES
"CHIEF COMPLAINT  Follow-up for back pain. Ms. Jewell states that her back pain is unchanged from her last appt.    Subjective   Anne Jewell is a 67 y.o. female  who presents to the office for follow-up.She has a history of chronic back and joint pain, as well as a history of RA, which she reports as being unchanged since last office visit.    Complains of pain in her low back and joints. Today her pain is 5/10VAS. Describes the pain as continuous and unchanged. Continues with OxyContin 80 mg 2 BID, Hydrocodone 10/325 3/day and baclofen 10 mg. Denies any side effects from the regimen. The regimen helps decrease her pain by 50%. Notes improved function and activity with regimen. \"I wouldn't be alive if it weren't for the pain medication.\"  ADL's by self.     Has had family stressors. 18 year old grandson in hospital with blood clot.     Is having dental issues.    Back Pain   This is a chronic problem. The current episode started more than 1 year ago. The problem occurs constantly. Progression since onset: unchanged since last office visit. The pain is present in the lumbar spine. The quality of the pain is described as aching. The pain radiates to the left foot and right foot. The pain is at a severity of 5/10. The pain is moderate. The symptoms are aggravated by stress (weather changes). Associated symptoms include numbness (bilateral feet). Pertinent negatives include no bladder incontinence, bowel incontinence, chest pain, dysuria, fever, headaches or weakness. She has tried analgesics, bed rest, home exercises and heat (oxycodone, hydrocodone, aquatherapy) for the symptoms. The treatment provided moderate relief.   Joint Pain   This is a chronic problem. The current episode started more than 1 year ago. The problem occurs constantly. The problem has been unchanged. Associated symptoms include arthralgias, neck pain and numbness (bilateral feet). Pertinent negatives include no chest pain, chills, congestion, " "coughing, fatigue, fever, headaches, nausea, vomiting or weakness. She has tried oral narcotics for the symptoms. The treatment provided moderate relief.      PEG Assessment   What number best describes your pain on average in the past week?6  What number best describes how, during the past week, pain has interfered with your enjoyment of life?7  What number best describes how, during the past week, pain has interfered with your general activity?  6    The following portions of the patient's history were reviewed and updated as appropriate: allergies, current medications, past family history, past medical history, past social history, past surgical history and problem list.    Review of Systems   Constitutional: Negative for chills, fatigue and fever.   HENT: Negative for congestion.    Eyes: Negative for visual disturbance.   Respiratory: Negative for cough, shortness of breath and wheezing.    Cardiovascular: Negative.  Negative for chest pain.   Gastrointestinal: Negative for bowel incontinence, constipation, diarrhea, nausea and vomiting.   Genitourinary: Negative for bladder incontinence, difficulty urinating and dysuria.   Musculoskeletal: Positive for arthralgias, back pain and neck pain.   Neurological: Positive for numbness (bilateral feet). Negative for weakness and headaches.   Psychiatric/Behavioral: Negative for sleep disturbance and suicidal ideas. The patient is nervous/anxious.        Vitals:    12/28/17 0841   BP: 108/53   Pulse: 61   Resp: 18   Temp: 96.1 °F (35.6 °C)   SpO2: 97%   Weight: 85.7 kg (189 lb)   Height: 158.8 cm (62.5\")   PainSc:   5   PainLoc: Back     Objective   Physical Exam   Constitutional: She is oriented to person, place, and time. She appears well-developed and well-nourished. She is cooperative.   HENT:   Head: Normocephalic and atraumatic.   Nose: Nose normal.   Eyes: Conjunctivae and lids are normal.   Neck: Trachea normal.   Cardiovascular: Normal rate.    Pulmonary/Chest: " Effort normal.   Musculoskeletal:        Lumbar back: She exhibits tenderness.   Diffuse arthritic changes with no acute synovitis     Neurological: She is alert and oriented to person, place, and time. Gait (ambulating with rolling walker) abnormal.   Reflex Scores:       Patellar reflexes are 1+ on the right side and 1+ on the left side.  Skin: Skin is intact.   Psychiatric: She has a normal mood and affect. Her speech is normal and behavior is normal. Cognition and memory are normal.   Nursing note and vitals reviewed.    Assessment/Plan   Anne was seen today for back pain.    Diagnoses and all orders for this visit:    Chronic pain syndrome    Rheumatoid arthritis involving multiple sites, unspecified rheumatoid factor presence    Generalized osteoarthritis    Degeneration of intervertebral disc of lumbar region    Arthralgia, unspecified joint    Encounter for long-term (current) use of high-risk medication    Other orders  -     oxyCODONE ER (oxyCONTIN) 80 MG tablet extended-release 12 hour 12 hr tablet; Take 2 tablets by mouth Every 12 (Twelve) Hours.  -     HYDROcodone-acetaminophen (NORCO)  MG per tablet; Take 1 tablet by mouth Every 8 (Eight) Hours As Needed for Severe Pain .      --- Routine oral drug screen in office today as part of monitoring requirements for controlled substances.  Patient was unable to void.  This specimen will be sent to [facility] laboratory for confirmation.  --- Refill OxyContin and Hydrocodone as per Dr. Grajeda order. Discussed medication with the patient.  Included in this discussion was the potential for side effects and adverse events.  Patient verbalized understanding and wished to proceed.  Prescription will be sent to pharmacy.  --- Follow-up 1 month or sooner if needed.         [facility] REPORT    As part of the patient's treatment plan, I am prescribing controlled substances. The patient has been made aware of appropriate use of such medications, including  potential risk of somnolence, limited ability to drive and/or work safely, and the potential for dependence or overdose. It has also bee made clear that these medications are for use by this patient only, without concomitant use of alcohol or other substances unless prescribed.     Patient has completed prescribing agreement detailing terms of continued prescribing of controlled substances, including monitoring [facility] reports, urine drug screening, and pill counts if necessary. The patient is aware that inappropriate use will results in cessation of prescribing such medications.    [facility] report has been reviewed and scanned into the patient's chart.    Date of last [facility] : 12-27-17    History and physical exam exhibit continued safe and appropriate use of controlled substances.      EMR Dragon/Transcription disclaimer:   Much of this encounter note is an electronic transcription/translation of spoken language to printed text. The electronic translation of spoken language may permit erroneous, or at times, nonsensical words or phrases to be inadvertently transcribed; Although I have reviewed the note for such errors, some may still exist.

## 2018-01-22 ENCOUNTER — TELEPHONE (OUTPATIENT)
Dept: SPORTS MEDICINE | Facility: CLINIC | Age: 68
End: 2018-01-22

## 2018-01-22 RX ORDER — AZITHROMYCIN 250 MG/1
TABLET, FILM COATED ORAL
Qty: 6 TABLET | Refills: 0 | Status: SHIPPED | OUTPATIENT
Start: 2018-01-22 | End: 2018-03-27

## 2018-01-24 ENCOUNTER — OFFICE VISIT (OUTPATIENT)
Dept: PAIN MEDICINE | Facility: CLINIC | Age: 68
End: 2018-01-24

## 2018-01-24 VITALS
WEIGHT: 190 LBS | RESPIRATION RATE: 18 BRPM | HEIGHT: 63 IN | DIASTOLIC BLOOD PRESSURE: 78 MMHG | OXYGEN SATURATION: 96 % | BODY MASS INDEX: 33.66 KG/M2 | SYSTOLIC BLOOD PRESSURE: 136 MMHG | TEMPERATURE: 97.9 F | HEART RATE: 61 BPM

## 2018-01-24 DIAGNOSIS — G89.4 CHRONIC PAIN SYNDROME: Primary | ICD-10-CM

## 2018-01-24 DIAGNOSIS — M51.36 DEGENERATION OF INTERVERTEBRAL DISC OF LUMBAR REGION: ICD-10-CM

## 2018-01-24 DIAGNOSIS — M06.9 RHEUMATOID ARTHRITIS INVOLVING MULTIPLE SITES, UNSPECIFIED RHEUMATOID FACTOR PRESENCE: ICD-10-CM

## 2018-01-24 DIAGNOSIS — M15.9 GENERALIZED OSTEOARTHRITIS: ICD-10-CM

## 2018-01-24 DIAGNOSIS — M25.50 ARTHRALGIA, UNSPECIFIED JOINT: ICD-10-CM

## 2018-01-24 DIAGNOSIS — Z79.899 ENCOUNTER FOR LONG-TERM (CURRENT) USE OF HIGH-RISK MEDICATION: ICD-10-CM

## 2018-01-24 PROCEDURE — 99213 OFFICE O/P EST LOW 20 MIN: CPT | Performed by: NURSE PRACTITIONER

## 2018-01-24 RX ORDER — OXYCODONE HYDROCHLORIDE 80 MG/1
160 TABLET, FILM COATED, EXTENDED RELEASE ORAL EVERY 12 HOURS SCHEDULED
Qty: 120 TABLET | Refills: 0 | Status: SHIPPED | OUTPATIENT
Start: 2018-01-24 | End: 2018-02-22 | Stop reason: SDUPTHER

## 2018-01-24 RX ORDER — HYDROCODONE BITARTRATE AND ACETAMINOPHEN 10; 325 MG/1; MG/1
1 TABLET ORAL EVERY 8 HOURS PRN
Qty: 90 TABLET | Refills: 0 | Status: SHIPPED | OUTPATIENT
Start: 2018-01-24 | End: 2018-02-22 | Stop reason: SDUPTHER

## 2018-01-24 NOTE — PROGRESS NOTES
CHIEF COMPLAINT  Back pain is unchanged since last visit.    Subjective   Anne Jewell is a 67 y.o. female  who presents to the office for follow-up.She has a history of chronic back pain, as well as chronic joint pain due to RA.    She recently found her old back brace. It is a laminectomy belt. She found it online.     Complains of pain in her low back and joints. Today her pain is 5/10VAS. Describes the pain as continuous and unchanged since last office visit. Continues with OxyContin 80 mg 2 BID, Hydrocodone 10/325 3/day and baclofen 10 mg. Denies any side effects from the regimen. The regimen helps decrease her pain by 50%. Notes improved function and activity with regimen.  ADL's by self.     Back Pain   This is a chronic problem. The current episode started more than 1 year ago. The problem occurs constantly. Progression since onset: unchanged since last office visit. The pain is present in the lumbar spine. The quality of the pain is described as aching. The pain radiates to the left foot and right foot. The pain is at a severity of 5/10. The pain is moderate. The symptoms are aggravated by stress (weather changes). Pertinent negatives include no bladder incontinence, bowel incontinence, chest pain, dysuria, fever, headaches, numbness or weakness. She has tried analgesics, bed rest, home exercises and heat (oxycodone, hydrocodone, aquatherapy) for the symptoms. The treatment provided moderate relief.   Joint Pain   This is a chronic problem. The current episode started more than 1 year ago. The problem occurs constantly. Progression since onset: unchanged from last office visit. Associated symptoms include arthralgias and neck pain. Pertinent negatives include no chest pain, chills, congestion, coughing, fatigue, fever, headaches, nausea, numbness, vomiting or weakness. She has tried oral narcotics for the symptoms. The treatment provided moderate relief.      PEG Assessment   What number best describes  "your pain on average in the past week?6  What number best describes how, during the past week, pain has interfered with your enjoyment of life?9  What number best describes how, during the past week, pain has interfered with your general activity?  6    The following portions of the patient's history were reviewed and updated as appropriate: allergies, current medications, past family history, past medical history, past social history, past surgical history and problem list.    Review of Systems   Constitutional: Negative for chills, fatigue and fever.   HENT: Negative for congestion.    Respiratory: Negative for cough and shortness of breath.    Cardiovascular: Negative for chest pain.   Gastrointestinal: Negative for bowel incontinence, constipation, diarrhea, nausea and vomiting.   Genitourinary: Negative for bladder incontinence, difficulty urinating, dyspareunia and dysuria.   Musculoskeletal: Positive for arthralgias, back pain and neck pain.   Neurological: Negative for dizziness, weakness, light-headedness, numbness and headaches.   Psychiatric/Behavioral: Negative for confusion, hallucinations, self-injury, sleep disturbance and suicidal ideas. The patient is not nervous/anxious.        Vitals:    01/24/18 0921   BP: 136/78   Pulse: 61   Resp: 18   Temp: 97.9 °F (36.6 °C)   SpO2: 96%   Weight: 86.2 kg (190 lb)   Height: 158.8 cm (62.5\")   PainSc:   5   PainLoc: Back     Objective   Physical Exam   Constitutional: She is oriented to person, place, and time. She appears well-developed and well-nourished. She is cooperative.   HENT:   Head: Normocephalic and atraumatic.   Nose: Nose normal.   Eyes: Conjunctivae and lids are normal.   Neck: Trachea normal.   Cardiovascular: Normal rate.    Pulmonary/Chest: Effort normal.   Musculoskeletal:        Lumbar back: She exhibits tenderness.   Diffuse arthritic changes with no acute synovitis     Neurological: She is alert and oriented to person, place, and time. Gait " (ambulating with rolling walker) abnormal.   Reflex Scores:       Patellar reflexes are 1+ on the right side and 1+ on the left side.  Skin: Skin is intact.   Psychiatric: She has a normal mood and affect. Her speech is normal and behavior is normal. Cognition and memory are normal.   Nursing note and vitals reviewed.      Assessment/Plan   Anne was seen today for back pain.    Diagnoses and all orders for this visit:    Chronic pain syndrome    Rheumatoid arthritis involving multiple sites, unspecified rheumatoid factor presence    Arthralgia, unspecified joint    Generalized osteoarthritis    Degeneration of intervertebral disc of lumbar region    Encounter for long-term (current) use of high-risk medication    Other orders  -     oxyCODONE ER (oxyCONTIN) 80 MG tablet extended-release 12 hour 12 hr tablet; Take 2 tablets by mouth Every 12 (Twelve) Hours.  -     HYDROcodone-acetaminophen (NORCO)  MG per tablet; Take 1 tablet by mouth Every 8 (Eight) Hours As Needed for Severe Pain .      --- The oral drug screen confirmation from 12-28-17 has been reviewed and the result is appropriate based on patient history and [facility] report  --- Refill OxyContin and Hydrocodone as per Dr. Grajeda plan of care. Patient appears stable with current regimen. No adverse effects. Regarding continuation of opioids, there is no evidence of aberrant behavior or any red flags.  The patient continues with appropriate response to opioid therapy. ADL's remain intact by self.   --- Follow-up 1 month or sooner if needed.       [facility] REPORT    As part of the patient's treatment plan, I am prescribing controlled substances. The patient has been made aware of appropriate use of such medications, including potential risk of somnolence, limited ability to drive and/or work safely, and the potential for dependence or overdose. It has also bee made clear that these medications are for use by this patient only, without concomitant use of  alcohol or other substances unless prescribed.     Patient has completed prescribing agreement detailing terms of continued prescribing of controlled substances, including monitoring [facility] reports, urine drug screening, and pill counts if necessary. The patient is aware that inappropriate use will results in cessation of prescribing such medications.    [facility] report has been reviewed and scanned into the patient's chart.    Date of last [facility] : 1-23-18    History and physical exam exhibit continued safe and appropriate use of controlled substances.      EMR Dragon/Transcription disclaimer:   Much of this encounter note is an electronic transcription/translation of spoken language to printed text. The electronic translation of spoken language may permit erroneous, or at times, nonsensical words or phrases to be inadvertently transcribed; Although I have reviewed the note for such errors, some may still exist.         .

## 2018-02-19 RX ORDER — POTASSIUM CHLORIDE 1500 MG/1
TABLET, EXTENDED RELEASE ORAL
Qty: 180 TABLET | Refills: 0 | Status: SHIPPED | OUTPATIENT
Start: 2018-02-19 | End: 2018-04-26

## 2018-02-22 RX ORDER — OXYCODONE HYDROCHLORIDE 80 MG/1
160 TABLET, FILM COATED, EXTENDED RELEASE ORAL EVERY 12 HOURS SCHEDULED
Qty: 120 TABLET | Refills: 0 | Status: SHIPPED | OUTPATIENT
Start: 2018-02-22 | End: 2018-03-27 | Stop reason: SDUPTHER

## 2018-02-22 RX ORDER — HYDROCODONE BITARTRATE AND ACETAMINOPHEN 10; 325 MG/1; MG/1
1 TABLET ORAL EVERY 8 HOURS PRN
Qty: 90 TABLET | Refills: 0 | Status: SHIPPED | OUTPATIENT
Start: 2018-02-22 | End: 2018-03-27 | Stop reason: SDUPTHER

## 2018-02-26 ENCOUNTER — OFFICE VISIT (OUTPATIENT)
Dept: PAIN MEDICINE | Facility: CLINIC | Age: 68
End: 2018-02-26

## 2018-02-26 VITALS
DIASTOLIC BLOOD PRESSURE: 80 MMHG | SYSTOLIC BLOOD PRESSURE: 120 MMHG | OXYGEN SATURATION: 99 % | BODY MASS INDEX: 34.91 KG/M2 | RESPIRATION RATE: 18 BRPM | HEART RATE: 91 BPM | HEIGHT: 63 IN | WEIGHT: 197 LBS | TEMPERATURE: 98.1 F

## 2018-02-26 DIAGNOSIS — M25.50 ARTHRALGIA, UNSPECIFIED JOINT: ICD-10-CM

## 2018-02-26 DIAGNOSIS — M41.00 INFANTILE IDIOPATHIC SCOLIOSIS, UNSPECIFIED SPINAL REGION: ICD-10-CM

## 2018-02-26 DIAGNOSIS — M06.9 RHEUMATOID ARTHRITIS INVOLVING MULTIPLE SITES, UNSPECIFIED RHEUMATOID FACTOR PRESENCE: ICD-10-CM

## 2018-02-26 DIAGNOSIS — M51.36 DEGENERATION OF INTERVERTEBRAL DISC OF LUMBAR REGION: ICD-10-CM

## 2018-02-26 DIAGNOSIS — Z79.899 ENCOUNTER FOR LONG-TERM (CURRENT) USE OF HIGH-RISK MEDICATION: ICD-10-CM

## 2018-02-26 DIAGNOSIS — G89.4 CHRONIC PAIN SYNDROME: Primary | ICD-10-CM

## 2018-02-26 PROCEDURE — 99213 OFFICE O/P EST LOW 20 MIN: CPT | Performed by: NURSE PRACTITIONER

## 2018-02-26 RX ORDER — LEVOTHYROXINE SODIUM 0.1 MG/1
TABLET ORAL
Qty: 90 TABLET | Refills: 0 | Status: SHIPPED | OUTPATIENT
Start: 2018-02-26 | End: 2018-05-28 | Stop reason: SDUPTHER

## 2018-02-26 NOTE — PROGRESS NOTES
"CHIEF COMPLAINT  Back pain is unchanged since last visit.    Subjective   Anne Jewell is a 67 y.o. female  who presents to the office for follow-up.She has a history of chronic back pain, as well as a history of chronic joint pain due to RA. Reports her pain pattern as being unchanged since last office visit. Has been having issues with her asthma though.  Did get a Zpack from Dr. Feldman recently. Is also under care of allergist.     Complains of pain in her back and joints. Today her pain is 6/10VAS. Describes the pain as continuous and unchanged since last office visit. Continues with OxyContin 80 mg 2 BID, Hydrocodone 10/325 3/day and baclofen 10 mg. Denies any side effects from the regimen. The regimen helps decrease her pain by 50%. Notes improved function and activity with regimen.  ADL's by self. Has not been as active in aqua-therapy.     Since last office visit, her ex- passed away. This has been stressful with her daughter.     Has gained 7 lbs since last office visit.  She states this is due to her ex- dying and \"there's food everywhere.\"     Remains under care of psychiatrist.     Medication Management Agreement with Dr. Grajeda--  10-17-16    Back Pain   This is a chronic problem. The current episode started more than 1 year ago. The problem occurs constantly. Progression since onset: unchanged since last office visit. The pain is present in the lumbar spine. The quality of the pain is described as aching. The pain radiates to the left foot and right foot. The pain is at a severity of 6/10. The pain is moderate. The symptoms are aggravated by stress (weather changes). Pertinent negatives include no bladder incontinence, bowel incontinence, chest pain, dysuria, fever, headaches, numbness or weakness. She has tried analgesics, bed rest, home exercises and heat (OxyContin, hydrocodone, aquatherapy) for the symptoms. The treatment provided moderate relief.   Joint Pain   This is a chronic " "problem. The current episode started more than 1 year ago. The problem occurs constantly. Progression since onset: unchanged from last office visit. Associated symptoms include arthralgias and neck pain. Pertinent negatives include no chest pain, chills, congestion, coughing, fatigue, fever, headaches, nausea, numbness, vomiting or weakness. She has tried oral narcotics for the symptoms. The treatment provided moderate relief.      PEG Assessment   What number best describes your pain on average in the past week?6  What number best describes how, during the past week, pain has interfered with your enjoyment of life?7  What number best describes how, during the past week, pain has interfered with your general activity?  5    The following portions of the patient's history were reviewed and updated as appropriate: allergies, current medications, past family history, past medical history, past social history, past surgical history and problem list.    Review of Systems   Constitutional: Negative for chills, fatigue and fever.   HENT: Negative for congestion.    Respiratory: Negative for cough and shortness of breath.    Cardiovascular: Negative for chest pain.   Gastrointestinal: Negative for bowel incontinence, constipation, diarrhea, nausea and vomiting.   Genitourinary: Negative for bladder incontinence, difficulty urinating, dyspareunia and dysuria.   Musculoskeletal: Positive for arthralgias, back pain and neck pain.   Neurological: Negative for dizziness, weakness, light-headedness, numbness and headaches.   Psychiatric/Behavioral: Negative for confusion, hallucinations, self-injury, sleep disturbance and suicidal ideas. The patient is not nervous/anxious.        Vitals:    02/26/18 0921   BP: 120/80   Pulse: 91   Resp: 18   Temp: 98.1 °F (36.7 °C)   SpO2: 99%   Weight: 89.4 kg (197 lb)   Height: 158.8 cm (62.5\")   PainSc:   6   PainLoc: Back     Objective   Physical Exam   Constitutional: She is oriented to " person, place, and time. She appears well-developed and well-nourished. She is cooperative.   HENT:   Head: Normocephalic and atraumatic.   Nose: Nose normal.   Eyes: Conjunctivae and lids are normal.   Neck: Trachea normal.   Cardiovascular: Normal rate.    Pulmonary/Chest: Effort normal.   Musculoskeletal:        Lumbar back: She exhibits tenderness.   Diffuse arthritic changes with no acute synovitis     Neurological: She is alert and oriented to person, place, and time. Gait (ambulating with rolling walker) abnormal.   Reflex Scores:       Patellar reflexes are 1+ on the right side and 1+ on the left side.  Skin: Skin is intact.   Psychiatric: She has a normal mood and affect. Her speech is normal and behavior is normal. Cognition and memory are normal.   Nursing note and vitals reviewed.      Assessment/Plan   Anne was seen today for back pain.    Diagnoses and all orders for this visit:    Chronic pain syndrome    Degeneration of intervertebral disc of lumbar region    Infantile idiopathic scoliosis, unspecified spinal region    Rheumatoid arthritis involving multiple sites, unspecified rheumatoid factor presence    Arthralgia, unspecified joint    Encounter for long-term (current) use of high-risk medication      --- The urine drug screen confirmation from 12-28-17 has been reviewed and the result is appropriate based on patient history and [facility] report  --- Refill OxyContin and Hydrocodone as per Dr. Grajeda plan of care. Patient appears stable with current regimen. No adverse effects. Regarding continuation of opioids, there is no evidence of aberrant behavior or any red flags.  The patient continues with appropriate response to opioid therapy. ADL's remain intact by self.   --- Discussed increasing activity and work on weight loss.  --- Follow-up 1 month or sooner if needed.     [facility] REPORT    As part of the patient's treatment plan, I am prescribing controlled substances. The patient has been made  aware of appropriate use of such medications, including potential risk of somnolence, limited ability to drive and/or work safely, and the potential for dependence or overdose. It has also bee made clear that these medications are for use by this patient only, without concomitant use of alcohol or other substances unless prescribed.     Patient has completed prescribing agreement detailing terms of continued prescribing of controlled substances, including monitoring [facility] reports, urine drug screening, and pill counts if necessary. The patient is aware that inappropriate use will results in cessation of prescribing such medications.    [facility] report has been reviewed and scanned into the patient's chart.    Date of last [facility] : 2-23-18    History and physical exam exhibit continued safe and appropriate use of controlled substances.      EMR Dragon/Transcription disclaimer:   Much of this encounter note is an electronic transcription/translation of spoken language to printed text. The electronic translation of spoken language may permit erroneous, or at times, nonsensical words or phrases to be inadvertently transcribed; Although I have reviewed the note for such errors, some may still exist.

## 2018-03-06 ENCOUNTER — PRIOR AUTHORIZATION (OUTPATIENT)
Dept: PAIN MEDICINE | Facility: CLINIC | Age: 68
End: 2018-03-06

## 2018-03-06 NOTE — TELEPHONE ENCOUNTER
Sent PA for OxyContin 80 mg ER tablets to Medicare Part D through [employer] (Key # [id number]) and waiting on response

## 2018-03-12 RX ORDER — MAGNESIUM 200 MG
TABLET ORAL
Qty: 30 EACH | Refills: 1 | Status: SHIPPED | OUTPATIENT
Start: 2018-03-12 | End: 2018-04-26

## 2018-03-13 RX ORDER — MAGNESIUM 200 MG
TABLET ORAL
Qty: 30 EACH | Refills: 1 | Status: SHIPPED | OUTPATIENT
Start: 2018-03-13

## 2018-03-27 ENCOUNTER — OFFICE VISIT (OUTPATIENT)
Dept: PAIN MEDICINE | Facility: CLINIC | Age: 68
End: 2018-03-27

## 2018-03-27 VITALS
HEART RATE: 63 BPM | WEIGHT: 195 LBS | SYSTOLIC BLOOD PRESSURE: 123 MMHG | TEMPERATURE: 96.4 F | DIASTOLIC BLOOD PRESSURE: 77 MMHG | RESPIRATION RATE: 16 BRPM | BODY MASS INDEX: 34.55 KG/M2 | OXYGEN SATURATION: 99 % | HEIGHT: 63 IN

## 2018-03-27 DIAGNOSIS — Z79.899 ENCOUNTER FOR LONG-TERM (CURRENT) USE OF HIGH-RISK MEDICATION: ICD-10-CM

## 2018-03-27 DIAGNOSIS — M06.9 RHEUMATOID ARTHRITIS INVOLVING MULTIPLE SITES, UNSPECIFIED RHEUMATOID FACTOR PRESENCE: ICD-10-CM

## 2018-03-27 DIAGNOSIS — M51.36 DEGENERATION OF INTERVERTEBRAL DISC OF LUMBAR REGION: ICD-10-CM

## 2018-03-27 DIAGNOSIS — G89.4 CHRONIC PAIN SYNDROME: Primary | ICD-10-CM

## 2018-03-27 PROCEDURE — 99213 OFFICE O/P EST LOW 20 MIN: CPT | Performed by: NURSE PRACTITIONER

## 2018-03-27 RX ORDER — OXYCODONE HYDROCHLORIDE 80 MG/1
160 TABLET, FILM COATED, EXTENDED RELEASE ORAL EVERY 12 HOURS SCHEDULED
Qty: 120 TABLET | Refills: 0 | Status: SHIPPED | OUTPATIENT
Start: 2018-03-27 | End: 2018-04-26 | Stop reason: SDUPTHER

## 2018-03-27 RX ORDER — HYDROCODONE BITARTRATE AND ACETAMINOPHEN 10; 325 MG/1; MG/1
1 TABLET ORAL EVERY 8 HOURS PRN
Qty: 90 TABLET | Refills: 0 | Status: SHIPPED | OUTPATIENT
Start: 2018-03-27 | End: 2018-04-26 | Stop reason: SDUPTHER

## 2018-03-27 RX ORDER — PROMETHAZINE HYDROCHLORIDE 25 MG/1
TABLET ORAL
Qty: 90 TABLET | Refills: 1 | Status: SHIPPED | OUTPATIENT
Start: 2018-03-27 | End: 2018-06-18 | Stop reason: SDUPTHER

## 2018-03-27 NOTE — PROGRESS NOTES
CHIEF COMPLAINT  F/U back pain, last visit 2-26-18. States unchanged except worse with bad weather.    Subjective   Anne Jewell is a 67 y.o. female  who presents to the office for follow-up.She has a history of chronic diffuse joint pain, RA, low back pain.    Complains of pain in her back and joints. Today her pain is 5/10VAS. Describes the pain as continuous and unchanged since last office visit. Continues with OxyContin 80 mg 2 BID, Hydrocodone 10/325 3/day and baclofen 10 mg. Denies any side effects from the regimen. The regimen helps decrease her pain by 50%. Notes improved function and activity with regimen.  ADL's by self.     Back Pain   This is a chronic problem. The current episode started more than 1 year ago. The problem occurs constantly. Progression since onset: unchanged since last office visit. The pain is present in the lumbar spine. The quality of the pain is described as aching. The pain radiates to the left foot and right foot. The pain is at a severity of 5/10. The pain is moderate. The symptoms are aggravated by stress (weather changes). Associated symptoms include headaches (sinus headaches). Pertinent negatives include no bladder incontinence, bowel incontinence, chest pain, dysuria, fever, numbness or weakness. She has tried analgesics, bed rest, home exercises and heat (OxyContin, hydrocodone, aquatherapy) for the symptoms. The treatment provided moderate relief.   Joint Pain   This is a chronic problem. The current episode started more than 1 year ago. The problem occurs constantly. Progression since onset: unchanged from last office visit. Associated symptoms include arthralgias, headaches (sinus headaches) and neck pain. Pertinent negatives include no chest pain, chills, congestion, coughing, fatigue, fever, nausea, numbness, vomiting or weakness. She has tried oral narcotics for the symptoms. The treatment provided moderate relief.      PEG Assessment   What number best describes  "your pain on average in the past week?7  What number best describes how, during the past week, pain has interfered with your enjoyment of life?6  What number best describes how, during the past week, pain has interfered with your general activity?  6    The following portions of the patient's history were reviewed and updated as appropriate: allergies, current medications, past family history, past medical history, past social history, past surgical history and problem list.    Review of Systems   Constitutional: Negative for chills, fatigue and fever.   HENT: Positive for sinus pressure. Negative for congestion.    Respiratory: Negative for cough and shortness of breath.    Cardiovascular: Negative for chest pain.   Gastrointestinal: Negative for bowel incontinence, constipation, diarrhea, nausea and vomiting.   Genitourinary: Negative for bladder incontinence, difficulty urinating, dyspareunia and dysuria.   Musculoskeletal: Positive for arthralgias, back pain and neck pain.   Neurological: Positive for headaches (sinus headaches). Negative for dizziness, weakness, light-headedness and numbness.   Psychiatric/Behavioral: Negative for confusion, hallucinations, self-injury, sleep disturbance and suicidal ideas. The patient is not nervous/anxious.      Vitals:    03/27/18 0844   BP: 123/77   Pulse: 63   Resp: 16   Temp: 96.4 °F (35.8 °C)   SpO2: 99%   Weight: 88.5 kg (195 lb)   Height: 158.8 cm (62.52\")   PainSc:   5   PainLoc: Back     Objective   Physical Exam   Constitutional: She is oriented to person, place, and time. She appears well-developed and well-nourished. She is cooperative.   HENT:   Head: Normocephalic and atraumatic.   Nose: Nose normal.   Eyes: Conjunctivae and lids are normal.   Neck: Trachea normal.   Cardiovascular: Normal rate.    Pulmonary/Chest: Effort normal.   Musculoskeletal:        Lumbar back: She exhibits tenderness and pain.   Diffuse arthritic changes      Neurological: She is alert " and oriented to person, place, and time. Gait (ambulating with rolling walker) abnormal.   Reflex Scores:       Patellar reflexes are 1+ on the right side and 1+ on the left side.  Skin: Skin is intact.   Psychiatric: She has a normal mood and affect. Her speech is normal and behavior is normal. Cognition and memory are normal.   Nursing note and vitals reviewed.    Assessment/Plan   Anne was seen today for back pain.    Diagnoses and all orders for this visit:    Chronic pain syndrome    Rheumatoid arthritis involving multiple sites, unspecified rheumatoid factor presence    Degeneration of intervertebral disc of lumbar region    Encounter for long-term (current) use of high-risk medication    Other orders  -     oxyCODONE ER (oxyCONTIN) 80 MG tablet extended-release 12 hour 12 hr tablet; Take 2 tablets by mouth Every 12 (Twelve) Hours.  -     HYDROcodone-acetaminophen (NORCO)  MG per tablet; Take 1 tablet by mouth Every 8 (Eight) Hours As Needed for Severe Pain .      --- The urine drug screen confirmation from 12-28-17 has been reviewed and the result is appropriate based on patient history and [facility] report  --- Refill OxyContin and Hydrocodone as per Dr. Grajeda plan of care. Patient appears stable with current regimen. No adverse effects. Regarding continuation of opioids, there is no evidence of aberrant behavior or any red flags.  The patient continues with appropriate response to opioid therapy. ADL's remain intact by self.   --- Follow-up 1 month          [facility] REPORT  As part of the patient's treatment plan, I am prescribing controlled substances. The patient has been made aware of appropriate use of such medications, including potential risk of somnolence, limited ability to drive and/or work safely, and the potential for dependence or overdose. It has also bee made clear that these medications are for use by this patient only, without concomitant use of alcohol or other substances unless  prescribed.     Patient has completed prescribing agreement detailing terms of continued prescribing of controlled substances, including monitoring [facility] reports, urine drug screening, and pill counts if necessary. The patient is aware that inappropriate use will results in cessation of prescribing such medications.    [facility] report has been reviewed and scanned into the patient's chart.    Date of last [facility] : 3/26/2018    History and physical exam exhibit continued safe and appropriate use of controlled substances.    EMR Dragon/Transcription disclaimer:   Much of this encounter note is an electronic transcription/translation of spoken language to printed text. The electronic translation of spoken language may permit erroneous, or at times, nonsensical words or phrases to be inadvertently transcribed; Although I have reviewed the note for such errors, some may still exist.

## 2018-04-18 ENCOUNTER — TELEPHONE (OUTPATIENT)
Dept: SPORTS MEDICINE | Facility: CLINIC | Age: 68
End: 2018-04-18

## 2018-04-18 RX ORDER — AZITHROMYCIN 250 MG/1
TABLET, FILM COATED ORAL
Qty: 6 TABLET | Refills: 0 | Status: SHIPPED | OUTPATIENT
Start: 2018-04-18 | End: 2018-06-26

## 2018-04-23 RX ORDER — RIVAROXABAN 20 MG/1
TABLET, FILM COATED ORAL
Qty: 30 TABLET | Refills: 10 | Status: SHIPPED | OUTPATIENT
Start: 2018-04-23 | End: 2019-03-20 | Stop reason: SDUPTHER

## 2018-04-26 ENCOUNTER — OFFICE VISIT (OUTPATIENT)
Dept: SPORTS MEDICINE | Facility: CLINIC | Age: 68
End: 2018-04-26

## 2018-04-26 ENCOUNTER — OFFICE VISIT (OUTPATIENT)
Dept: PAIN MEDICINE | Facility: CLINIC | Age: 68
End: 2018-04-26

## 2018-04-26 VITALS
HEIGHT: 63 IN | TEMPERATURE: 97.6 F | WEIGHT: 197.2 LBS | DIASTOLIC BLOOD PRESSURE: 64 MMHG | SYSTOLIC BLOOD PRESSURE: 122 MMHG | OXYGEN SATURATION: 94 % | BODY MASS INDEX: 34.94 KG/M2 | HEART RATE: 66 BPM

## 2018-04-26 VITALS
SYSTOLIC BLOOD PRESSURE: 133 MMHG | BODY MASS INDEX: 35.26 KG/M2 | TEMPERATURE: 97.9 F | WEIGHT: 199 LBS | HEIGHT: 63 IN | HEART RATE: 58 BPM | DIASTOLIC BLOOD PRESSURE: 83 MMHG | OXYGEN SATURATION: 97 % | RESPIRATION RATE: 18 BRPM

## 2018-04-26 DIAGNOSIS — M79.10 MYALGIA: Primary | ICD-10-CM

## 2018-04-26 DIAGNOSIS — R53.83 OTHER FATIGUE: ICD-10-CM

## 2018-04-26 DIAGNOSIS — Z79.899 ENCOUNTER FOR LONG-TERM (CURRENT) USE OF HIGH-RISK MEDICATION: ICD-10-CM

## 2018-04-26 DIAGNOSIS — M51.36 DEGENERATION OF INTERVERTEBRAL DISC OF LUMBAR REGION: ICD-10-CM

## 2018-04-26 DIAGNOSIS — R79.89 LOW VITAMIN D LEVEL: ICD-10-CM

## 2018-04-26 DIAGNOSIS — M25.50 ARTHRALGIA, UNSPECIFIED JOINT: ICD-10-CM

## 2018-04-26 DIAGNOSIS — E03.9 ACQUIRED HYPOTHYROIDISM: ICD-10-CM

## 2018-04-26 DIAGNOSIS — M54.16 LUMBAR RADICULOPATHY: ICD-10-CM

## 2018-04-26 DIAGNOSIS — R79.9 ABNORMAL FINDING OF BLOOD CHEMISTRY: ICD-10-CM

## 2018-04-26 DIAGNOSIS — M06.9 RHEUMATOID ARTHRITIS INVOLVING MULTIPLE SITES, UNSPECIFIED RHEUMATOID FACTOR PRESENCE: ICD-10-CM

## 2018-04-26 DIAGNOSIS — G89.4 CHRONIC PAIN SYNDROME: Primary | ICD-10-CM

## 2018-04-26 DIAGNOSIS — M15.9 GENERALIZED OSTEOARTHRITIS: ICD-10-CM

## 2018-04-26 DIAGNOSIS — D51.0 PERNICIOUS ANEMIA: ICD-10-CM

## 2018-04-26 LAB
25(OH)D3+25(OH)D2 SERPL-MCNC: 45 NG/ML (ref 30–100)
ALBUMIN SERPL-MCNC: 3.8 G/DL (ref 3.5–5.2)
ALBUMIN/GLOB SERPL: 1.5 G/DL
ALP SERPL-CCNC: 118 U/L (ref 39–117)
ALT SERPL-CCNC: 12 U/L (ref 1–33)
AST SERPL-CCNC: 26 U/L (ref 1–32)
BASOPHILS # BLD AUTO: 0.02 10*3/MM3 (ref 0–0.2)
BASOPHILS NFR BLD AUTO: 0.3 % (ref 0–1.5)
BILIRUB SERPL-MCNC: 0.5 MG/DL (ref 0.1–1.2)
BUN SERPL-MCNC: 15 MG/DL (ref 8–23)
BUN/CREAT SERPL: 12.5 (ref 7–25)
CALCIUM SERPL-MCNC: 9 MG/DL (ref 8.6–10.5)
CHLORIDE SERPL-SCNC: 97 MMOL/L (ref 98–107)
CO2 SERPL-SCNC: 28.9 MMOL/L (ref 22–29)
CREAT SERPL-MCNC: 1.2 MG/DL (ref 0.57–1)
EOSINOPHIL # BLD AUTO: 0.33 10*3/MM3 (ref 0–0.7)
EOSINOPHIL NFR BLD AUTO: 5.7 % (ref 0.3–6.2)
ERYTHROCYTE [DISTWIDTH] IN BLOOD BY AUTOMATED COUNT: 13.4 % (ref 11.7–13)
FERRITIN SERPL-MCNC: 38.62 NG/ML (ref 13–150)
FOLATE SERPL-MCNC: >20 NG/ML (ref 4.78–24.2)
GFR SERPLBLD CREATININE-BSD FMLA CKD-EPI: 45 ML/MIN/1.73
GFR SERPLBLD CREATININE-BSD FMLA CKD-EPI: 54 ML/MIN/1.73
GLOBULIN SER CALC-MCNC: 2.6 GM/DL
GLUCOSE SERPL-MCNC: 89 MG/DL (ref 65–99)
HCT VFR BLD AUTO: 42 % (ref 35.6–45.5)
HGB BLD-MCNC: 13.2 G/DL (ref 11.9–15.5)
IMM GRANULOCYTES # BLD: 0 10*3/MM3 (ref 0–0.03)
IMM GRANULOCYTES NFR BLD: 0 % (ref 0–0.5)
IRON SATN MFR SERPL: 37 % (ref 20–50)
IRON SERPL-MCNC: 128 MCG/DL (ref 37–145)
LYMPHOCYTES # BLD AUTO: 2.61 10*3/MM3 (ref 0.9–4.8)
LYMPHOCYTES NFR BLD AUTO: 44.7 % (ref 19.6–45.3)
MAGNESIUM SERPL-MCNC: 1.6 MG/DL (ref 1.6–2.4)
MCH RBC QN AUTO: 30.9 PG (ref 26.9–32)
MCHC RBC AUTO-ENTMCNC: 31.4 G/DL (ref 32.4–36.3)
MCV RBC AUTO: 98.4 FL (ref 80.5–98.2)
MONOCYTES # BLD AUTO: 0.39 10*3/MM3 (ref 0.2–1.2)
MONOCYTES NFR BLD AUTO: 6.7 % (ref 5–12)
NEUTROPHILS # BLD AUTO: 2.49 10*3/MM3 (ref 1.9–8.1)
NEUTROPHILS NFR BLD AUTO: 42.6 % (ref 42.7–76)
PLATELET # BLD AUTO: 204 10*3/MM3 (ref 140–500)
POTASSIUM SERPL-SCNC: 3.7 MMOL/L (ref 3.5–5.2)
PROT SERPL-MCNC: 6.4 G/DL (ref 6–8.5)
RBC # BLD AUTO: 4.27 10*6/MM3 (ref 3.9–5.2)
SODIUM SERPL-SCNC: 138 MMOL/L (ref 136–145)
TIBC SERPL-MCNC: 347 MCG/DL
TSH SERPL DL<=0.005 MIU/L-ACNC: 1.47 MIU/ML (ref 0.27–4.2)
UIBC SERPL-MCNC: 219 MCG/DL
VIT B12 SERPL-MCNC: 1676 PG/ML (ref 211–946)
WBC # BLD AUTO: 5.84 10*3/MM3 (ref 4.5–10.7)

## 2018-04-26 PROCEDURE — 99213 OFFICE O/P EST LOW 20 MIN: CPT | Performed by: NURSE PRACTITIONER

## 2018-04-26 PROCEDURE — 99214 OFFICE O/P EST MOD 30 MIN: CPT | Performed by: FAMILY MEDICINE

## 2018-04-26 RX ORDER — HYDROCODONE BITARTRATE AND ACETAMINOPHEN 10; 325 MG/1; MG/1
1 TABLET ORAL EVERY 8 HOURS PRN
Qty: 90 TABLET | Refills: 0 | Status: SHIPPED | OUTPATIENT
Start: 2018-04-26 | End: 2018-05-24 | Stop reason: SDUPTHER

## 2018-04-26 RX ORDER — OXYCODONE HYDROCHLORIDE 80 MG/1
160 TABLET, FILM COATED, EXTENDED RELEASE ORAL EVERY 12 HOURS SCHEDULED
Qty: 120 TABLET | Refills: 0 | Status: SHIPPED | OUTPATIENT
Start: 2018-04-26 | End: 2018-05-24 | Stop reason: SDUPTHER

## 2018-04-26 NOTE — PROGRESS NOTES
"Anne is a 67 y.o. year old female    Chief Complaint   Patient presents with   • Muscle Pain       History of Present Illness   HPI      1.  Myalgias and fatigue for past several months. The myalgias are mostly in her axial spine and not generalized.   2.  Is going to need to have teeth pulled, will need to hold Xaretlo  3.  Self d/c baclofen because seemed not to be helpful for the back spasms asks about alternatives.     I have reviewed the patient's medical, family, and social history in detail and updated the computerized patient record.    Review of Systems   Constitutional: Positive for fatigue. Negative for appetite change, chills, diaphoresis and fever.   HENT: Negative.    Respiratory: Negative.    Cardiovascular: Negative.    Gastrointestinal: Negative.    Endocrine: Negative.    Genitourinary: Negative.    Musculoskeletal: Positive for back pain and myalgias.   Neurological: Negative.    Psychiatric/Behavioral: Negative.        /64 (BP Location: Left arm, Patient Position: Sitting, Cuff Size: Adult)   Pulse 66   Temp 97.6 °F (36.4 °C) (Oral)   Ht 158.8 cm (62.52\")   Wt 89.4 kg (197 lb 3.2 oz)   SpO2 94%   BMI 35.47 kg/m²      Physical Exam   Constitutional: She is oriented to person, place, and time. She appears well-developed and well-nourished.   HENT:   Head: Normocephalic and atraumatic.   Eyes: Conjunctivae and EOM are normal. Pupils are equal, round, and reactive to light.   Cardiovascular: Normal rate, regular rhythm and normal heart sounds.    No peripheral edema   Pulmonary/Chest: Effort normal and breath sounds normal.   Neurological: She is alert and oriented to person, place, and time.   Skin: Skin is warm and dry.   Psychiatric: She has a normal mood and affect. Her behavior is normal.   Vitals reviewed.       Diagnoses and all orders for this visit:    Myalgia  -     CBC & Differential  -     Comprehensive Metabolic Panel  -     Magnesium    Pernicious anemia  -     CBC & " Differential  -     Vitamin B12  -     Folate    Acquired hypothyroidism  -     TSH    Other fatigue  -     TSH  -     Vitamin D 25 Hydroxy  -     Vitamin B12  -     Folate  -     Iron Profile  -     Ferritin    Low vitamin D level  -     Vitamin D 25 Hydroxy    Abnormal finding of blood chemistry   -     Iron Profile  -     Ferritin       I spent greater than 50% of this 25 minute visit discussing the diagnosis, prognosis, treatment plan, etc. Patient's questions were answered in detail with appropriate counseling and anticipatory guidance.         EMR Dragon/Transcription disclaimer:    Much of this encounter note is an electronic transcription/translation of spoken language to printed text.  The electronic translation of spoken language may permit erroneous, or at times, nonsensical words or phrases to be inadvertently transcribed.  Although I have reviewed the note for such errors some may still exist.

## 2018-04-26 NOTE — PROGRESS NOTES
"CHIEF COMPLAINT  Back pain has increased since last visit. She states the weather has taken a toll on her.    Subjective   Anne Jewell is a 67 y.o. female  who presents to the office for follow-up.She has a history of chronic back pain and joint pain due to RA. Reports her back pain is worse since last office visit but associates this with the weather. Also states she has been cleaning cabinets. Also had a sinus infection and dog has been sick. \"I'm having muscle problems. I'm going to see Dr. Feldman later today.\"     Complains of pain in her back and joints. Today her pain is 6/10VAS. Describes the pain as continuous and slightly worse.Pain increases with movement, household chores; pain decreases with medication and rest.  Continues with OxyContin 80 mg 2 BID and Hydrocodone 10/325 3/day. Denies any side effects from the regimen, including constipation and somnolence. The regimen helps decrease her pain by 50%. Notes improved function and activity with regimen.  ADL's by self.  Tries to go to aquatherapy when able to obtain a ride.    Is planning on getting dentures soon but will need teeth removed first. Is anxious about this.   Back Pain   This is a chronic problem. The current episode started more than 1 year ago. The problem occurs constantly. Progression since onset: unchanged since last office visit. The pain is present in the lumbar spine. The quality of the pain is described as aching. The pain radiates to the left foot and right foot. The pain is at a severity of 6/10. The pain is moderate. The symptoms are aggravated by stress (weather changes). Pertinent negatives include no bladder incontinence, bowel incontinence, chest pain, dysuria, fever, headaches, numbness or weakness. She has tried analgesics, bed rest, home exercises and heat (OxyContin, hydrocodone, aquatherapy) for the symptoms. The treatment provided moderate relief.   Joint Pain   This is a chronic problem. The current episode started " "more than 1 year ago. The problem occurs constantly. Progression since onset: unchanged from last office visit. Associated symptoms include arthralgias and neck pain. Pertinent negatives include no chest pain, chills, congestion, coughing, fatigue, fever, headaches, nausea, numbness, vomiting or weakness. She has tried oral narcotics for the symptoms. The treatment provided moderate relief.      PEG Assessment   What number best describes your pain on average in the past week?5  What number best describes how, during the past week, pain has interfered with your enjoyment of life?6  What number best describes how, during the past week, pain has interfered with your general activity?  6    The following portions of the patient's history were reviewed and updated as appropriate: allergies, current medications, past family history, past medical history, past social history, past surgical history and problem list.    Review of Systems   Constitutional: Negative for chills, fatigue and fever.   HENT: Negative for congestion.    Respiratory: Negative for cough and shortness of breath.    Cardiovascular: Negative for chest pain.   Gastrointestinal: Negative for bowel incontinence, constipation, diarrhea, nausea and vomiting.   Genitourinary: Negative for bladder incontinence, difficulty urinating, dyspareunia and dysuria.   Musculoskeletal: Positive for arthralgias, back pain and neck pain.   Neurological: Negative for dizziness, weakness, light-headedness, numbness and headaches.   Psychiatric/Behavioral: Negative for confusion, hallucinations, self-injury, sleep disturbance and suicidal ideas. The patient is nervous/anxious.        Vitals:    04/26/18 0921   BP: 133/83   Pulse: 58   Resp: 18   Temp: 97.9 °F (36.6 °C)   SpO2: 97%   Weight: 90.3 kg (199 lb)   Height: 158.8 cm (62.52\")   PainSc:   6   PainLoc: Back     Objective   Physical Exam   Constitutional: She is oriented to person, place, and time. She appears " well-developed and well-nourished. She is cooperative.   HENT:   Head: Normocephalic and atraumatic.   Nose: Nose normal.   Eyes: Conjunctivae and lids are normal.   Neck: Trachea normal.   Cardiovascular: Normal rate.    Pulmonary/Chest: Effort normal.   Musculoskeletal:        Lumbar back: She exhibits tenderness.   Diffuse arthritic changes with no acute synovitis     Neurological: She is alert and oriented to person, place, and time. Gait (ambulating with rolling walker) abnormal.   Reflex Scores:       Patellar reflexes are 1+ on the right side and 1+ on the left side.  Skin: Skin is intact.   Psychiatric: She has a normal mood and affect. Her speech is normal and behavior is normal. Cognition and memory are normal.   Nursing note and vitals reviewed.      Assessment/Plan   Anne was seen today for back pain.    Diagnoses and all orders for this visit:    Chronic pain syndrome    Degeneration of intervertebral disc of lumbar region    Generalized osteoarthritis    Rheumatoid arthritis involving multiple sites, unspecified rheumatoid factor presence    Arthralgia, unspecified joint    Lumbar radiculopathy    Encounter for long-term (current) use of high-risk medication    Other orders  -     HYDROcodone-acetaminophen (NORCO)  MG per tablet; Take 1 tablet by mouth Every 8 (Eight) Hours As Needed for Severe Pain .  -     oxyCODONE ER (oxyCONTIN) 80 MG tablet extended-release 12 hour 12 hr tablet; Take 2 tablets by mouth Every 12 (Twelve) Hours.      --- The urine drug screen confirmation from 12-28-17 has been reviewed and the result is appropriate based on patient history and [facility] report  --- refill OxyContin and hydrocodone. Patient appears stable with current regimen. No adverse effects. Regarding continuation of opioids, there is no evidence of aberrant behavior or any red flags.  The patient continues with appropriate response to opioid therapy. ADL's remain intact by self.   --- Follow-up 1 month  or sooner if needed.     [facility] REPORT    As part of the patient's treatment plan, I am prescribing controlled substances. The patient has been made aware of appropriate use of such medications, including potential risk of somnolence, limited ability to drive and/or work safely, and the potential for dependence or overdose. It has also bee made clear that these medications are for use by this patient only, without concomitant use of alcohol or other substances unless prescribed.     Patient has completed prescribing agreement detailing terms of continued prescribing of controlled substances, including monitoring [facility] reports, urine drug screening, and pill counts if necessary. The patient is aware that inappropriate use will results in cessation of prescribing such medications.    [facility] report has been reviewed and scanned into the patient's chart.    Date of last [facility] : 4-25-18    History and physical exam exhibit continued safe and appropriate use of controlled substances.      EMR Dragon/Transcription disclaimer:   Much of this encounter note is an electronic transcription/translation of spoken language to printed text. The electronic translation of spoken language may permit erroneous, or at times, nonsensical words or phrases to be inadvertently transcribed; Although I have reviewed the note for such errors, some may still exist.

## 2018-04-30 ENCOUNTER — TELEPHONE (OUTPATIENT)
Dept: SPORTS MEDICINE | Facility: CLINIC | Age: 68
End: 2018-04-30

## 2018-04-30 NOTE — TELEPHONE ENCOUNTER
Patient notified via voice mail    ----- Message from Kenneth Feldman MD sent at 4/27/2018 12:28 PM EDT -----  Labs look good

## 2018-05-09 RX ORDER — ACETAMINOPHEN 160 MG
TABLET,DISINTEGRATING ORAL
Qty: 30 CAPSULE | Refills: 10 | Status: SHIPPED | OUTPATIENT
Start: 2018-05-09 | End: 2019-04-13 | Stop reason: SDUPTHER

## 2018-05-09 RX ORDER — MAGNESIUM 200 MG
TABLET ORAL
Qty: 30 EACH | Refills: 0 | Status: SHIPPED | OUTPATIENT
Start: 2018-05-09 | End: 2018-06-08 | Stop reason: SDUPTHER

## 2018-05-24 ENCOUNTER — OFFICE VISIT (OUTPATIENT)
Dept: PAIN MEDICINE | Facility: CLINIC | Age: 68
End: 2018-05-24

## 2018-05-24 VITALS
OXYGEN SATURATION: 96 % | SYSTOLIC BLOOD PRESSURE: 116 MMHG | WEIGHT: 193 LBS | BODY MASS INDEX: 34.2 KG/M2 | HEART RATE: 68 BPM | DIASTOLIC BLOOD PRESSURE: 71 MMHG | RESPIRATION RATE: 18 BRPM | TEMPERATURE: 97.4 F | HEIGHT: 63 IN

## 2018-05-24 DIAGNOSIS — M54.16 LUMBAR RADICULOPATHY: ICD-10-CM

## 2018-05-24 DIAGNOSIS — Z79.899 ENCOUNTER FOR LONG-TERM (CURRENT) USE OF HIGH-RISK MEDICATION: ICD-10-CM

## 2018-05-24 DIAGNOSIS — M15.9 GENERALIZED OSTEOARTHRITIS: ICD-10-CM

## 2018-05-24 DIAGNOSIS — M06.9 RHEUMATOID ARTHRITIS INVOLVING MULTIPLE SITES, UNSPECIFIED RHEUMATOID FACTOR PRESENCE: ICD-10-CM

## 2018-05-24 DIAGNOSIS — M51.36 DEGENERATION OF INTERVERTEBRAL DISC OF LUMBAR REGION: ICD-10-CM

## 2018-05-24 DIAGNOSIS — G89.4 CHRONIC PAIN SYNDROME: Primary | ICD-10-CM

## 2018-05-24 PROCEDURE — 99213 OFFICE O/P EST LOW 20 MIN: CPT | Performed by: NURSE PRACTITIONER

## 2018-05-24 RX ORDER — HYDROCODONE BITARTRATE AND ACETAMINOPHEN 10; 325 MG/1; MG/1
1 TABLET ORAL EVERY 8 HOURS PRN
Qty: 90 TABLET | Refills: 0 | Status: SHIPPED | OUTPATIENT
Start: 2018-05-24 | End: 2018-06-26 | Stop reason: SDUPTHER

## 2018-05-24 RX ORDER — OXYCODONE HYDROCHLORIDE 80 MG/1
160 TABLET, FILM COATED, EXTENDED RELEASE ORAL EVERY 12 HOURS SCHEDULED
Qty: 120 TABLET | Refills: 0 | Status: SHIPPED | OUTPATIENT
Start: 2018-05-24 | End: 2018-06-26 | Stop reason: SDUPTHER

## 2018-05-24 NOTE — PROGRESS NOTES
CHIEF COMPLAINT  Back pain is unchanged since last visit.    Subjective   Anne Jewell is a 67 y.o. female  who presents to the office for follow-up.She has a history of back pain.    Complains of pain in her back and joints. Today her pain is 6/10VAS. Continues with OxyContin 80 mg 2 BID and Hydrocodone 10/325 3/day. Denies any side effects from the regimen, including constipation and somnolence. The regimen helps decrease her pain by 50%. Notes improved function and activity with regimen.  ADL's by self.      Has started swimming at the [employer].      Back Pain   This is a chronic problem. The current episode started more than 1 year ago. The problem occurs constantly. The problem is unchanged. The pain is present in the lumbar spine. The quality of the pain is described as aching. The pain radiates to the left foot and right foot. The pain is at a severity of 6/10. The pain is moderate. The symptoms are aggravated by stress (weather changes). Associated symptoms include weakness. Pertinent negatives include no bladder incontinence, bowel incontinence, chest pain, dysuria, fever, headaches or numbness. She has tried analgesics, bed rest, home exercises and heat (OxyContin, hydrocodone, aquatherapy) for the symptoms. The treatment provided moderate relief.   Joint Pain   This is a chronic problem. The current episode started more than 1 year ago. The problem occurs constantly. Progression since onset: unchanged from last office visit. Associated symptoms include arthralgias, neck pain and weakness. Pertinent negatives include no chest pain, chills, congestion, coughing, fatigue, fever, headaches, nausea, numbness or vomiting. She has tried oral narcotics for the symptoms. The treatment provided moderate relief.      PEG Assessment   What number best describes your pain on average in the past week?6  What number best describes how, during the past week, pain has interfered with your enjoyment of life?6  What  "number best describes how, during the past week, pain has interfered with your general activity?  5    The following portions of the patient's history were reviewed and updated as appropriate: allergies, current medications, past family history, past medical history, past social history, past surgical history and problem list.    Review of Systems   Constitutional: Negative for chills, fatigue and fever.   HENT: Negative for congestion.    Respiratory: Negative for cough and shortness of breath.    Cardiovascular: Negative for chest pain.   Gastrointestinal: Negative for bowel incontinence, constipation, diarrhea, nausea and vomiting.   Genitourinary: Negative for bladder incontinence, difficulty urinating, dyspareunia and dysuria.   Musculoskeletal: Positive for arthralgias, back pain and neck pain.   Neurological: Positive for weakness. Negative for dizziness, light-headedness, numbness and headaches.   Psychiatric/Behavioral: Negative for confusion, hallucinations, self-injury, sleep disturbance and suicidal ideas. The patient is not nervous/anxious.      Vitals:    05/24/18 0943   BP: 116/71   Pulse: 68   Resp: 18   Temp: 97.4 °F (36.3 °C)   SpO2: 96%   Weight: 87.5 kg (193 lb)   Height: 158.8 cm (62.52\")   PainSc:   6   PainLoc: Back     Objective   Physical Exam   Constitutional: She is oriented to person, place, and time. She appears well-developed and well-nourished. She is cooperative.   HENT:   Head: Normocephalic and atraumatic.   Nose: Nose normal.   Eyes: Conjunctivae and lids are normal.   Neck: Trachea normal.   Cardiovascular: Normal rate.    Pulmonary/Chest: Effort normal.   Musculoskeletal:        Lumbar back: She exhibits tenderness.   Diffuse arthritic changes     Neurological: She is alert and oriented to person, place, and time. Gait (ambulating with rolling walker) abnormal.   Reflex Scores:       Patellar reflexes are 1+ on the right side and 1+ on the left side.  Skin: Skin is intact. "   Psychiatric: She has a normal mood and affect. Her speech is normal and behavior is normal. Cognition and memory are normal.   Nursing note and vitals reviewed.      Assessment/Plan   Anne was seen today for back pain.    Diagnoses and all orders for this visit:    Chronic pain syndrome    Degeneration of intervertebral disc of lumbar region    Lumbar radiculopathy    Generalized osteoarthritis    Rheumatoid arthritis involving multiple sites, unspecified rheumatoid factor presence    Encounter for long-term (current) use of high-risk medication      --- Refill OxyContin and hydrocodone. Patient appears stable with current regimen. No adverse effects. Regarding continuation of opioids, there is no evidence of aberrant behavior or any red flags.  The patient continues with appropriate response to opioid therapy. ADL's remain intact by self.   --- The urine drug screen confirmation from 12/28/17 has been reviewed and the result is appropriate based on patient history and [facility] report  --- Follow-up 1 month         [facility] REPORT  As part of the patient's treatment plan, I am prescribing controlled substances. The patient has been made aware of appropriate use of such medications, including potential risk of somnolence, limited ability to drive and/or work safely, and the potential for dependence or overdose. It has also bee made clear that these medications are for use by this patient only, without concomitant use of alcohol or other substances unless prescribed.     Patient has completed prescribing agreement detailing terms of continued prescribing of controlled substances, including monitoring [facility] reports, urine drug screening, and pill counts if necessary. The patient is aware that inappropriate use will results in cessation of prescribing such medications.    [facility] report has been reviewed and scanned into the patient's chart.    As the clinician, I personally reviewed the [facility] from 5/23/2018 while the  patient was in the office today.    History and physical exam exhibit continued safe and appropriate use of controlled substances.    EMR Dragon/Transcription disclaimer:   Much of this encounter note is an electronic transcription/translation of spoken language to printed text. The electronic translation of spoken language may permit erroneous, or at times, nonsensical words or phrases to be inadvertently transcribed; Although I have reviewed the note for such errors, some may still exist.

## 2018-05-25 DIAGNOSIS — I10 HYPERTENSION, UNSPECIFIED TYPE: Primary | ICD-10-CM

## 2018-05-25 RX ORDER — HYDROCHLOROTHIAZIDE 25 MG/1
25 TABLET ORAL DAILY
Qty: 30 TABLET | Refills: 3 | Status: SHIPPED | OUTPATIENT
Start: 2018-05-25 | End: 2018-09-20 | Stop reason: SDUPTHER

## 2018-05-29 RX ORDER — LEVOTHYROXINE SODIUM 0.1 MG/1
TABLET ORAL
Qty: 90 TABLET | Refills: 0 | Status: SHIPPED | OUTPATIENT
Start: 2018-05-29 | End: 2018-08-27 | Stop reason: SDUPTHER

## 2018-06-08 RX ORDER — MAGNESIUM 200 MG
TABLET ORAL
Qty: 30 EACH | Refills: 0 | Status: SHIPPED | OUTPATIENT
Start: 2018-06-08 | End: 2018-07-11 | Stop reason: SDUPTHER

## 2018-06-11 RX ORDER — MAGNESIUM 200 MG
TABLET ORAL
Qty: 30 EACH | Refills: 0 | Status: SHIPPED | OUTPATIENT
Start: 2018-06-11 | End: 2019-07-16

## 2018-06-18 RX ORDER — PROMETHAZINE HYDROCHLORIDE 25 MG/1
TABLET ORAL
Qty: 90 TABLET | Refills: 0 | Status: SHIPPED | OUTPATIENT
Start: 2018-06-18 | End: 2019-01-10 | Stop reason: SDUPTHER

## 2018-06-25 RX ORDER — PROMETHAZINE HYDROCHLORIDE 25 MG/1
TABLET ORAL
Qty: 90 TABLET | Refills: 0 | Status: SHIPPED | OUTPATIENT
Start: 2018-06-25 | End: 2018-06-26 | Stop reason: SDUPTHER

## 2018-06-26 ENCOUNTER — OFFICE VISIT (OUTPATIENT)
Dept: PAIN MEDICINE | Facility: CLINIC | Age: 68
End: 2018-06-26

## 2018-06-26 VITALS
HEIGHT: 63 IN | SYSTOLIC BLOOD PRESSURE: 121 MMHG | BODY MASS INDEX: 34.87 KG/M2 | RESPIRATION RATE: 16 BRPM | DIASTOLIC BLOOD PRESSURE: 83 MMHG | OXYGEN SATURATION: 94 % | HEART RATE: 61 BPM | WEIGHT: 196.8 LBS | TEMPERATURE: 96.7 F

## 2018-06-26 DIAGNOSIS — M51.36 DEGENERATION OF INTERVERTEBRAL DISC OF LUMBAR REGION: ICD-10-CM

## 2018-06-26 DIAGNOSIS — Z79.899 ENCOUNTER FOR LONG-TERM (CURRENT) USE OF HIGH-RISK MEDICATION: ICD-10-CM

## 2018-06-26 DIAGNOSIS — G89.4 CHRONIC PAIN SYNDROME: Primary | ICD-10-CM

## 2018-06-26 DIAGNOSIS — M06.9 RHEUMATOID ARTHRITIS INVOLVING MULTIPLE SITES, UNSPECIFIED RHEUMATOID FACTOR PRESENCE: ICD-10-CM

## 2018-06-26 LAB
POC AMPHETAMINES: NEGATIVE
POC BARBITURATES: NEGATIVE
POC BENZODIAZEPHINES: POSITIVE
POC COCAINE: NEGATIVE
POC METHADONE: NEGATIVE
POC METHAMPHETAMINE SCREEN URINE: NEGATIVE
POC OPIATES: POSITIVE
POC OXYCODONE: POSITIVE
POC PHENCYCLIDINE: NEGATIVE
POC PROPOXYPHENE: NEGATIVE
POC THC: NEGATIVE
POC TRICYCLIC ANTIDEPRESSANTS: NEGATIVE

## 2018-06-26 PROCEDURE — 99214 OFFICE O/P EST MOD 30 MIN: CPT | Performed by: NURSE PRACTITIONER

## 2018-06-26 PROCEDURE — 80305 DRUG TEST PRSMV DIR OPT OBS: CPT | Performed by: NURSE PRACTITIONER

## 2018-06-26 RX ORDER — HYDROCODONE BITARTRATE AND ACETAMINOPHEN 10; 325 MG/1; MG/1
1 TABLET ORAL EVERY 8 HOURS PRN
Qty: 90 TABLET | Refills: 0 | Status: SHIPPED | OUTPATIENT
Start: 2018-06-26 | End: 2018-07-25 | Stop reason: SDUPTHER

## 2018-06-26 RX ORDER — OXYCODONE HYDROCHLORIDE 80 MG/1
160 TABLET, FILM COATED, EXTENDED RELEASE ORAL EVERY 12 HOURS SCHEDULED
Qty: 120 TABLET | Refills: 0 | Status: SHIPPED | OUTPATIENT
Start: 2018-06-26 | End: 2018-07-25 | Stop reason: SDUPTHER

## 2018-06-26 NOTE — PROGRESS NOTES
CHIEF COMPLAINT  F/U back pain. Pt pain unchanged, controlled moderately with mediation. States swimming has been helping and she is using back brace.    Subjective   Anne Jewell is a 67 y.o. female  who presents to the office for follow-up.She has a history of chronic back pain.    Complains of pain in her back and joints. Today her pain is 6/10VAS. Continues with OxyContin 80 mg 2 BID and Hydrocodone 10/325 3/day. Denies any side effects from the regimen, including constipation and somnolence. The regimen helps decrease her pain by 50%. Notes improved function and activity with regimen.  ADL's by self.      Has been swimming, feels wonderful. Reports that it is helping her pain and energy levels.      Back Pain   This is a chronic problem. The current episode started more than 1 year ago. The problem occurs constantly. The problem is unchanged. The pain is present in the lumbar spine. The quality of the pain is described as aching. The pain radiates to the left foot and right foot. The pain is at a severity of 6/10. The pain is moderate. The symptoms are aggravated by stress (weather changes). Associated symptoms include weakness. Pertinent negatives include no bladder incontinence, bowel incontinence, chest pain, dysuria, fever, headaches or numbness. She has tried analgesics, bed rest, home exercises and heat (OxyContin, hydrocodone, aquatherapy) for the symptoms. The treatment provided moderate relief.   Joint Pain   This is a chronic problem. The current episode started more than 1 year ago. The problem occurs constantly. Progression since onset: unchanged from last office visit. Associated symptoms include arthralgias, neck pain and weakness. Pertinent negatives include no chest pain, chills, congestion, coughing, fatigue, fever, headaches, nausea, numbness or vomiting. She has tried oral narcotics for the symptoms. The treatment provided moderate relief.      I have reviewed the above HPI today, 06/26/18.  " The HPI is unchanged from the previous office visit.      PEG Assessment   What number best describes your pain on average in the past week?6  What number best describes how, during the past week, pain has interfered with your enjoyment of life?7  What number best describes how, during the past week, pain has interfered with your general activity?  6    The following portions of the patient's history were reviewed and updated as appropriate: allergies, current medications, past family history, past medical history, past social history, past surgical history and problem list.    Review of Systems   Constitutional: Negative for activity change, chills, fatigue and fever.   HENT: Negative for congestion.    Respiratory: Negative for cough and shortness of breath.    Cardiovascular: Negative for chest pain.   Gastrointestinal: Negative for bowel incontinence, constipation, diarrhea, nausea and vomiting.   Genitourinary: Negative for bladder incontinence, difficulty urinating, dyspareunia and dysuria.   Musculoskeletal: Positive for arthralgias, back pain and neck pain.   Neurological: Positive for weakness. Negative for dizziness, light-headedness, numbness and headaches.   Psychiatric/Behavioral: Negative for confusion, hallucinations, self-injury, sleep disturbance and suicidal ideas. The patient is not nervous/anxious.      Vitals:    06/26/18 0955   BP: 121/83   Pulse: 61   Resp: 16   Temp: 96.7 °F (35.9 °C)   SpO2: 94%   Weight: 89.3 kg (196 lb 12.8 oz)   Height: 158.8 cm (62.52\")   PainSc:   6   PainLoc: Back     Objective   Physical Exam   Constitutional: She is oriented to person, place, and time. She appears well-developed and well-nourished. She is cooperative.   HENT:   Head: Normocephalic and atraumatic.   Nose: Nose normal.   Eyes: Conjunctivae and lids are normal.   Neck: Trachea normal.   Cardiovascular: Normal rate.    Pulmonary/Chest: Effort normal.   Musculoskeletal:        Lumbar back: She exhibits " tenderness.   Diffuse arthritic changes     Neurological: She is alert and oriented to person, place, and time. Gait (ambulating with rolling walker) abnormal.   Reflex Scores:       Patellar reflexes are 1+ on the right side and 1+ on the left side.  Skin: Skin is intact.   Psychiatric: She has a normal mood and affect. Her speech is normal and behavior is normal. Cognition and memory are normal.   Nursing note and vitals reviewed.    I HAVE PERFORMED THE PHYSICAL EXAMINATION AS DOCUMENTED ABOVE TODAY, 06/26/2018.  THE EXAM IS UNCHANGED FROM PREVIOUS VISIT.      Assessment/Plan   Anne was seen today for back pain.    Diagnoses and all orders for this visit:    Chronic pain syndrome    Degeneration of intervertebral disc of lumbar region    Rheumatoid arthritis involving multiple sites, unspecified rheumatoid factor presence    Encounter for long-term (current) use of high-risk medication      --- Refill Oxycontin and Norco. Patient appears stable with current regimen. No adverse effects. Regarding continuation of opioids, there is no evidence of aberrant behavior or any red flags.  The patient continues with appropriate response to opioid therapy. ADL's remain intact by self.   --- The urine drug screen confirmation from 12/28/17 has been reviewed and the result is appropriate based on patient history and [facility] report  --- Routine UDS in office today as part of monitoring requirements for controlled substances.  The specimen was viewed and the immunoassay result reviewed and is +BZD, OPI, OXY.  This specimen will be sent to [facility] for confirmation.     --- Follow-up 1 month          [facility] REPORT  As part of the patient's treatment plan, I am prescribing controlled substances. The patient has been made aware of appropriate use of such medications, including potential risk of somnolence, limited ability to drive and/or work safely, and the potential for dependence or overdose. It has also bee made  clear that these medications are for use by this patient only, without concomitant use of alcohol or other substances unless prescribed.     Patient has completed prescribing agreement detailing terms of continued prescribing of controlled substances, including monitoring [facility] reports, urine drug screening, and pill counts if necessary. The patient is aware that inappropriate use will results in cessation of prescribing such medications.    [facility] report has been reviewed and scanned into the patient's chart.    As the clinician, I personally reviewed the [facility] from 6/25/2018 while the patient was in the office today.    History and physical exam exhibit continued safe and appropriate use of controlled substances.    EMR Dragon/Transcription disclaimer:   Much of this encounter note is an electronic transcription/translation of spoken language to printed text. The electronic translation of spoken language may permit erroneous, or at times, nonsensical words or phrases to be inadvertently transcribed; Although I have reviewed the note for such errors, some may still exist.

## 2018-07-01 ENCOUNTER — RESULTS ENCOUNTER (OUTPATIENT)
Dept: PAIN MEDICINE | Facility: CLINIC | Age: 68
End: 2018-07-01

## 2018-07-01 DIAGNOSIS — M06.9 RHEUMATOID ARTHRITIS INVOLVING MULTIPLE SITES, UNSPECIFIED RHEUMATOID FACTOR PRESENCE: ICD-10-CM

## 2018-07-01 DIAGNOSIS — Z79.899 ENCOUNTER FOR LONG-TERM (CURRENT) USE OF HIGH-RISK MEDICATION: ICD-10-CM

## 2018-07-01 DIAGNOSIS — G89.4 CHRONIC PAIN SYNDROME: ICD-10-CM

## 2018-07-01 DIAGNOSIS — M51.36 DEGENERATION OF INTERVERTEBRAL DISC OF LUMBAR REGION: ICD-10-CM

## 2018-07-11 RX ORDER — MAGNESIUM 200 MG
TABLET ORAL
Qty: 30 EACH | Refills: 0 | Status: SHIPPED | OUTPATIENT
Start: 2018-07-11 | End: 2018-09-13 | Stop reason: SDUPTHER

## 2018-07-16 ENCOUNTER — CLINICAL SUPPORT (OUTPATIENT)
Dept: PAIN MEDICINE | Facility: CLINIC | Age: 68
End: 2018-07-16

## 2018-07-16 DIAGNOSIS — G89.4 CHRONIC PAIN SYNDROME: Primary | ICD-10-CM

## 2018-07-16 PROCEDURE — 80305 DRUG TEST PRSMV DIR OPT OBS: CPT | Performed by: NURSE PRACTITIONER

## 2018-07-21 ENCOUNTER — RESULTS ENCOUNTER (OUTPATIENT)
Dept: PAIN MEDICINE | Facility: CLINIC | Age: 68
End: 2018-07-21

## 2018-07-21 DIAGNOSIS — G89.4 CHRONIC PAIN SYNDROME: ICD-10-CM

## 2018-07-25 ENCOUNTER — OFFICE VISIT (OUTPATIENT)
Dept: PAIN MEDICINE | Facility: CLINIC | Age: 68
End: 2018-07-25

## 2018-07-25 VITALS
SYSTOLIC BLOOD PRESSURE: 163 MMHG | OXYGEN SATURATION: 97 % | HEART RATE: 67 BPM | BODY MASS INDEX: 34.73 KG/M2 | RESPIRATION RATE: 18 BRPM | TEMPERATURE: 98.4 F | WEIGHT: 196 LBS | DIASTOLIC BLOOD PRESSURE: 99 MMHG | HEIGHT: 63 IN

## 2018-07-25 DIAGNOSIS — G89.4 CHRONIC PAIN SYNDROME: Primary | ICD-10-CM

## 2018-07-25 DIAGNOSIS — M06.9 RHEUMATOID ARTHRITIS INVOLVING MULTIPLE SITES, UNSPECIFIED RHEUMATOID FACTOR PRESENCE: ICD-10-CM

## 2018-07-25 DIAGNOSIS — M51.36 DEGENERATION OF INTERVERTEBRAL DISC OF LUMBAR REGION: ICD-10-CM

## 2018-07-25 DIAGNOSIS — Z79.899 ENCOUNTER FOR LONG-TERM (CURRENT) USE OF HIGH-RISK MEDICATION: ICD-10-CM

## 2018-07-25 PROCEDURE — 99214 OFFICE O/P EST MOD 30 MIN: CPT | Performed by: NURSE PRACTITIONER

## 2018-07-25 RX ORDER — HYDROCODONE BITARTRATE AND ACETAMINOPHEN 10; 325 MG/1; MG/1
1 TABLET ORAL EVERY 8 HOURS PRN
Qty: 90 TABLET | Refills: 0 | Status: SHIPPED | OUTPATIENT
Start: 2018-07-25 | End: 2018-08-22 | Stop reason: SDUPTHER

## 2018-07-25 RX ORDER — OXYCODONE HYDROCHLORIDE 80 MG/1
160 TABLET, FILM COATED, EXTENDED RELEASE ORAL EVERY 12 HOURS SCHEDULED
Qty: 120 TABLET | Refills: 0 | Status: SHIPPED | OUTPATIENT
Start: 2018-07-25 | End: 2018-08-22 | Stop reason: SDUPTHER

## 2018-07-25 NOTE — PROGRESS NOTES
CHIEF COMPLAINT  Back pain is unchanged since last visit.    Subjective   Anne Jewell is a 67 y.o. female  who presents to the office for follow-up.She has a history of back pain and diffuse joint pain (RA).    Complains of pain in her back and joints. Today her pain is 6/10VAS. Continues with OxyContin 80 mg 2 BID and Hydrocodone 10/325 3/day. Denies any side effects from the regimen, including constipation and somnolence. The regimen helps decrease her pain by 50%. Notes improved function and activity with regimen.  ADL's by self. Continues with swimming for exercise.     Back Pain   This is a chronic problem. The current episode started more than 1 year ago. The problem occurs constantly. The problem is unchanged. The pain is present in the lumbar spine. The quality of the pain is described as aching. The pain radiates to the left foot and right foot. The pain is at a severity of 6/10. The pain is moderate. The symptoms are aggravated by stress (weather changes). Associated symptoms include weakness. Pertinent negatives include no bladder incontinence, bowel incontinence, chest pain, dysuria, fever, headaches or numbness. She has tried analgesics, bed rest, home exercises and heat (OxyContin, hydrocodone, aquatherapy) for the symptoms. The treatment provided moderate relief.   Joint Pain   This is a chronic problem. The current episode started more than 1 year ago. The problem occurs constantly. Progression since onset: unchanged from last office visit. Associated symptoms include arthralgias, neck pain and weakness. Pertinent negatives include no chest pain, chills, congestion, coughing, fatigue, fever, headaches, nausea, numbness or vomiting. She has tried oral narcotics for the symptoms. The treatment provided moderate relief.      PEG Assessment   What number best describes your pain on average in the past week?5  What number best describes how, during the past week, pain has interfered with your enjoyment  "of life?6  What number best describes how, during the past week, pain has interfered with your general activity?  7    The following portions of the patient's history were reviewed and updated as appropriate: allergies, current medications, past family history, past medical history, past social history, past surgical history and problem list.    Review of Systems   Constitutional: Negative for chills, fatigue and fever.   HENT: Negative for congestion.    Respiratory: Negative for cough and shortness of breath.    Cardiovascular: Negative for chest pain.   Gastrointestinal: Negative for bowel incontinence, constipation, diarrhea, nausea and vomiting.   Genitourinary: Negative for bladder incontinence, difficulty urinating, dyspareunia and dysuria.   Musculoskeletal: Positive for arthralgias, back pain and neck pain.   Neurological: Positive for weakness. Negative for dizziness, light-headedness, numbness and headaches.   Psychiatric/Behavioral: Negative for confusion, hallucinations, self-injury, sleep disturbance and suicidal ideas. The patient is not nervous/anxious.      Vitals:    07/25/18 1007   BP: 163/99   Pulse: 67   Resp: 18   Temp: 98.4 °F (36.9 °C)   SpO2: 97%   Weight: 88.9 kg (196 lb)   Height: 158.8 cm (62.52\")   PainSc:   6   PainLoc: Back     Objective   Physical Exam   Constitutional: She is oriented to person, place, and time. She appears well-developed and well-nourished. She is cooperative.   HENT:   Head: Normocephalic and atraumatic.   Nose: Nose normal.   Eyes: Conjunctivae and lids are normal.   Neck: Trachea normal.   Cardiovascular: Normal rate.    Pulmonary/Chest: Effort normal.   Musculoskeletal:        Lumbar back: She exhibits tenderness and pain.   Diffuse arthritic changes     Neurological: She is alert and oriented to person, place, and time. Gait (ambulating with rolling walker) abnormal.   Skin: Skin is intact.   Psychiatric: She has a normal mood and affect. Her speech is normal " and behavior is normal. Cognition and memory are normal.   Nursing note and vitals reviewed.     Assessment/Plan   Anne was seen today for back pain.    Diagnoses and all orders for this visit:    Chronic pain syndrome    Degeneration of intervertebral disc of lumbar region    Rheumatoid arthritis involving multiple sites, unspecified rheumatoid factor presence (CMS/[facility])    Encounter for long-term (current) use of high-risk medication      --- Refill OxyContin and Hydrocodone.  Patient appears stable with current regimen. No adverse effects. Regarding continuation of opioids, there is no evidence of aberrant behavior or any red flags.  The patient continues with appropriate response to opioid therapy. ADL's remain intact by self.   --- The urine drug screen confirmation from 7/16/18 has been reviewed and the result is appropriate based on patient history and [facility] report  --- Follow-up 1 month          [facility] REPORT  As part of the patient's treatment plan, I am prescribing controlled substances. The patient has been made aware of appropriate use of such medications, including potential risk of somnolence, limited ability to drive and/or work safely, and the potential for dependence or overdose. It has also bee made clear that these medications are for use by this patient only, without concomitant use of alcohol or other substances unless prescribed.     Patient has completed prescribing agreement detailing terms of continued prescribing of controlled substances, including monitoring [facility] reports, urine drug screening, and pill counts if necessary. The patient is aware that inappropriate use will results in cessation of prescribing such medications.    [facility] report has been reviewed and scanned into the patient's chart.    As the clinician, I personally reviewed the [facility] from 7/24/2018 while the patient was in the office today.    History and physical exam exhibit continued safe and appropriate use of  controlled substances.    EMR Dragon/Transcription disclaimer:   Much of this encounter note is an electronic transcription/translation of spoken language to printed text. The electronic translation of spoken language may permit erroneous, or at times, nonsensical words or phrases to be inadvertently transcribed; Although I have reviewed the note for such errors, some may still exist.

## 2018-08-06 RX ORDER — POTASSIUM CHLORIDE 20 MEQ/1
TABLET, EXTENDED RELEASE ORAL
Qty: 180 TABLET | Refills: 0 | Status: SHIPPED | OUTPATIENT
Start: 2018-08-06 | End: 2019-07-16

## 2018-08-17 RX ORDER — PROMETHAZINE HYDROCHLORIDE 25 MG/1
TABLET ORAL
Qty: 90 TABLET | Refills: 0 | Status: SHIPPED | OUTPATIENT
Start: 2018-08-17 | End: 2018-09-20 | Stop reason: SDUPTHER

## 2018-08-22 ENCOUNTER — OFFICE VISIT (OUTPATIENT)
Dept: PAIN MEDICINE | Facility: CLINIC | Age: 68
End: 2018-08-22

## 2018-08-22 VITALS
HEIGHT: 63 IN | TEMPERATURE: 98.4 F | BODY MASS INDEX: 35.61 KG/M2 | DIASTOLIC BLOOD PRESSURE: 72 MMHG | SYSTOLIC BLOOD PRESSURE: 102 MMHG | RESPIRATION RATE: 18 BRPM | OXYGEN SATURATION: 94 % | HEART RATE: 76 BPM | WEIGHT: 201 LBS

## 2018-08-22 DIAGNOSIS — Z79.899 ENCOUNTER FOR LONG-TERM (CURRENT) USE OF HIGH-RISK MEDICATION: ICD-10-CM

## 2018-08-22 DIAGNOSIS — M51.36 DEGENERATION OF INTERVERTEBRAL DISC OF LUMBAR REGION: ICD-10-CM

## 2018-08-22 DIAGNOSIS — M06.9 RHEUMATOID ARTHRITIS INVOLVING MULTIPLE SITES, UNSPECIFIED RHEUMATOID FACTOR PRESENCE: ICD-10-CM

## 2018-08-22 DIAGNOSIS — G89.4 CHRONIC PAIN SYNDROME: Primary | ICD-10-CM

## 2018-08-22 PROCEDURE — 99214 OFFICE O/P EST MOD 30 MIN: CPT | Performed by: NURSE PRACTITIONER

## 2018-08-22 RX ORDER — OXYCODONE HYDROCHLORIDE 80 MG/1
160 TABLET, FILM COATED, EXTENDED RELEASE ORAL EVERY 12 HOURS SCHEDULED
Qty: 120 TABLET | Refills: 0 | Status: SHIPPED | OUTPATIENT
Start: 2018-08-22 | End: 2018-09-20 | Stop reason: SDUPTHER

## 2018-08-22 RX ORDER — HYDROCODONE BITARTRATE AND ACETAMINOPHEN 10; 325 MG/1; MG/1
1 TABLET ORAL EVERY 8 HOURS PRN
Qty: 90 TABLET | Refills: 0 | Status: SHIPPED | OUTPATIENT
Start: 2018-08-22 | End: 2018-09-20 | Stop reason: SDUPTHER

## 2018-08-22 NOTE — PROGRESS NOTES
CHIEF COMPLAINT  Back pain is unchanged since last visit.    Subjective   Anne Jewell is a 67 y.o. female  who presents to the office for follow-up.She has a history of back pain.    Complains of pain in her back and joints. Today her pain is 6/10VAS. Continues with OxyContin 80 mg 2 BID and Hydrocodone 10/325 3/day. Denies any side effects from the regimen, including constipation and somnolence. The regimen helps decrease her pain by 50%. Notes improved function and activity with regimen.  ADL's by self. Continues with swimming for exercise.     Back Pain   This is a chronic problem. The current episode started more than 1 year ago. The problem occurs constantly. The problem is unchanged. The pain is present in the lumbar spine. The quality of the pain is described as aching. The pain radiates to the left foot and right foot. The pain is at a severity of 6/10. The pain is moderate. The symptoms are aggravated by stress (weather changes). Pertinent negatives include no bladder incontinence, bowel incontinence, chest pain, dysuria, fever, headaches, numbness or weakness. She has tried analgesics, bed rest, home exercises and heat (OxyContin, hydrocodone, aquatherapy) for the symptoms. The treatment provided moderate relief.   Joint Pain   This is a chronic problem. The current episode started more than 1 year ago. The problem occurs constantly. Progression since onset: unchanged from last office visit. Associated symptoms include arthralgias and neck pain. Pertinent negatives include no chest pain, chills, congestion, coughing, fatigue, fever, headaches, nausea, numbness, vomiting or weakness. She has tried oral narcotics for the symptoms. The treatment provided moderate relief.      I have reviewed the above HPI today, 08/22/18.  The HPI is unchanged from the previous office visit.      PEG Assessment   What number best describes your pain on average in the past week?6  What number best describes how, during the  "past week, pain has interfered with your enjoyment of life?6  What number best describes how, during the past week, pain has interfered with your general activity?  7    The following portions of the patient's history were reviewed and updated as appropriate: allergies, current medications, past family history, past medical history, past social history, past surgical history and problem list.    Review of Systems   Constitutional: Negative for chills, fatigue and fever.   HENT: Negative for congestion.    Respiratory: Negative for cough and shortness of breath.    Cardiovascular: Negative for chest pain.   Gastrointestinal: Negative for bowel incontinence, constipation, diarrhea, nausea and vomiting.   Genitourinary: Negative for bladder incontinence, difficulty urinating, dyspareunia and dysuria.   Musculoskeletal: Positive for arthralgias, back pain and neck pain.   Neurological: Negative for dizziness, weakness, light-headedness, numbness and headaches.   Psychiatric/Behavioral: Negative for confusion, hallucinations, self-injury, sleep disturbance and suicidal ideas. The patient is not nervous/anxious.      Vitals:    08/22/18 1040   BP: 102/72   Pulse: 76   Resp: 18   Temp: 98.4 °F (36.9 °C)   SpO2: 94%   Weight: 91.2 kg (201 lb)   Height: 158.8 cm (62.52\")   PainSc:   6   PainLoc: Back     Objective   Physical Exam   Constitutional: She is oriented to person, place, and time. She appears well-developed and well-nourished. She is cooperative.   HENT:   Head: Normocephalic and atraumatic.   Nose: Nose normal.   Eyes: Conjunctivae and lids are normal.   Neck: Trachea normal.   Cardiovascular: Normal rate.    Pulmonary/Chest: Effort normal.   Musculoskeletal:        Lumbar back: She exhibits tenderness and pain.   Diffuse arthritic changes   Neurological: She is alert and oriented to person, place, and time. She has normal strength. Gait (ambulating with rolling walker) abnormal.   Skin: Skin is intact. "   Psychiatric: She has a normal mood and affect. Her speech is normal and behavior is normal. Cognition and memory are normal.   Nursing note and vitals reviewed.    Assessment/Plan   Anne was seen today for back pain.    Diagnoses and all orders for this visit:    Chronic pain syndrome    Degeneration of intervertebral disc of lumbar region    Rheumatoid arthritis involving multiple sites, unspecified rheumatoid factor presence (CMS/[facility])    Encounter for long-term (current) use of high-risk medication    Other orders  -     oxyCODONE ER (oxyCONTIN) 80 MG tablet extended-release 12 hour 12 hr tablet; Take 2 tablets by mouth Every 12 (Twelve) Hours.  -     HYDROcodone-acetaminophen (NORCO)  MG per tablet; Take 1 tablet by mouth Every 8 (Eight) Hours As Needed for Severe Pain .      --- Refill OxyContin and Hydrocodone. Patient appears stable with current regimen. No adverse effects. Regarding continuation of opioids, there is no evidence of aberrant behavior or any red flags.  The patient continues with appropriate response to opioid therapy. ADL's remain intact by self.   --- The urine drug screen confirmation from 7/16/18 has been reviewed and the result is appropriate based on patient history and [facility] report  --- Follow-up 1 month         [facility] REPORT    As part of the patient's treatment plan, I am prescribing controlled substances. The patient has been made aware of appropriate use of such medications, including potential risk of somnolence, limited ability to drive and/or work safely, and the potential for dependence or overdose. It has also bee made clear that these medications are for use by this patient only, without concomitant use of alcohol or other substances unless prescribed.     Patient has completed prescribing agreement detailing terms of continued prescribing of controlled substances, including monitoring [facility] reports, urine drug screening, and pill counts if necessary. The patient is  aware that inappropriate use will results in cessation of prescribing such medications.    [facility] report has been reviewed and scanned into the patient's chart.    As the clinician, I personally reviewed the [facility] from 8/21/2018 while the patient was in the office today.    History and physical exam exhibit continued safe and appropriate use of controlled substances.    EMR Dragon/Transcription disclaimer:   Much of this encounter note is an electronic transcription/translation of spoken language to printed text. The electronic translation of spoken language may permit erroneous, or at times, nonsensical words or phrases to be inadvertently transcribed; Although I have reviewed the note for such errors, some may still exist.

## 2018-08-27 RX ORDER — LEVOTHYROXINE SODIUM 0.1 MG/1
TABLET ORAL
Qty: 90 TABLET | Refills: 0 | Status: SHIPPED | OUTPATIENT
Start: 2018-08-27 | End: 2019-01-10 | Stop reason: SDUPTHER

## 2018-09-04 RX ORDER — LEVOTHYROXINE SODIUM 0.1 MG/1
TABLET ORAL
Qty: 90 TABLET | Refills: 0 | Status: SHIPPED | OUTPATIENT
Start: 2018-09-04 | End: 2019-02-27 | Stop reason: SDUPTHER

## 2018-09-13 RX ORDER — MAGNESIUM 200 MG
TABLET ORAL
Qty: 30 EACH | Refills: 6 | Status: SHIPPED | OUTPATIENT
Start: 2018-09-13 | End: 2019-04-13 | Stop reason: SDUPTHER

## 2018-09-20 ENCOUNTER — OFFICE VISIT (OUTPATIENT)
Dept: PAIN MEDICINE | Facility: CLINIC | Age: 68
End: 2018-09-20

## 2018-09-20 VITALS
DIASTOLIC BLOOD PRESSURE: 67 MMHG | TEMPERATURE: 98 F | WEIGHT: 200 LBS | BODY MASS INDEX: 35.44 KG/M2 | OXYGEN SATURATION: 96 % | HEIGHT: 63 IN | SYSTOLIC BLOOD PRESSURE: 110 MMHG | HEART RATE: 76 BPM | RESPIRATION RATE: 15 BRPM

## 2018-09-20 DIAGNOSIS — Z79.899 ENCOUNTER FOR LONG-TERM (CURRENT) USE OF HIGH-RISK MEDICATION: ICD-10-CM

## 2018-09-20 DIAGNOSIS — M06.9 RHEUMATOID ARTHRITIS INVOLVING MULTIPLE SITES, UNSPECIFIED RHEUMATOID FACTOR PRESENCE: ICD-10-CM

## 2018-09-20 DIAGNOSIS — M51.36 DEGENERATION OF INTERVERTEBRAL DISC OF LUMBAR REGION: ICD-10-CM

## 2018-09-20 DIAGNOSIS — I10 HYPERTENSION, UNSPECIFIED TYPE: ICD-10-CM

## 2018-09-20 DIAGNOSIS — G89.4 CHRONIC PAIN SYNDROME: Primary | ICD-10-CM

## 2018-09-20 PROCEDURE — 99214 OFFICE O/P EST MOD 30 MIN: CPT | Performed by: NURSE PRACTITIONER

## 2018-09-20 RX ORDER — CLINDAMYCIN HYDROCHLORIDE 150 MG/1
CAPSULE ORAL
COMMUNITY
Start: 2018-09-13 | End: 2019-03-13

## 2018-09-20 RX ORDER — HYDROCHLOROTHIAZIDE 25 MG/1
TABLET ORAL
Qty: 30 TABLET | Refills: 2 | Status: SHIPPED | OUTPATIENT
Start: 2018-09-20 | End: 2018-12-23 | Stop reason: SDUPTHER

## 2018-09-20 RX ORDER — HYDROCODONE BITARTRATE AND ACETAMINOPHEN 10; 325 MG/1; MG/1
1 TABLET ORAL EVERY 8 HOURS PRN
Qty: 90 TABLET | Refills: 0 | Status: SHIPPED | OUTPATIENT
Start: 2018-09-20 | End: 2018-10-17 | Stop reason: SDUPTHER

## 2018-09-20 RX ORDER — PROMETHAZINE HYDROCHLORIDE 25 MG/1
TABLET ORAL
Qty: 90 TABLET | Refills: 0 | Status: SHIPPED | OUTPATIENT
Start: 2018-09-20 | End: 2018-10-12 | Stop reason: SDUPTHER

## 2018-09-20 RX ORDER — OXYCODONE HYDROCHLORIDE 80 MG/1
160 TABLET, FILM COATED, EXTENDED RELEASE ORAL EVERY 12 HOURS SCHEDULED
Qty: 120 TABLET | Refills: 0 | Status: SHIPPED | OUTPATIENT
Start: 2018-09-20 | End: 2018-10-17 | Stop reason: SDUPTHER

## 2018-09-20 NOTE — PROGRESS NOTES
CHIEF COMPLAINT  F/U back pain. No changes to report.    Subjective   Anne eJwell is a 68 y.o. female  who presents to the office for follow-up.She has a history of chronic back pain.    Complains of pain in her back and joints. Today her pain is 6/10VAS. Continues with OxyContin 80 mg 2 BID and Hydrocodone 10/325 3/day. Denies any side effects from the regimen, including constipation and somnolence. The regimen helps decrease her pain by 50%. Notes improved function and activity with regimen.  ADL's by self. Continues with swimming for exercise when she is able to get to the pool.     Back Pain   This is a chronic problem. The current episode started more than 1 year ago. The problem occurs constantly. The problem is unchanged. The pain is present in the lumbar spine. The quality of the pain is described as aching. The pain radiates to the left foot and right foot. The pain is at a severity of 6/10. The pain is moderate. The symptoms are aggravated by stress (weather changes). Pertinent negatives include no bladder incontinence, bowel incontinence, chest pain, dysuria, fever, headaches, numbness or weakness. She has tried analgesics, bed rest, home exercises and heat (OxyContin, hydrocodone, aquatherapy) for the symptoms. The treatment provided moderate relief.   Joint Pain   This is a chronic problem. The current episode started more than 1 year ago. The problem occurs constantly. Progression since onset: unchanged from last office visit. Associated symptoms include arthralgias and neck pain. Pertinent negatives include no chest pain, chills, congestion, coughing, fatigue, fever, headaches, nausea, numbness, vomiting or weakness. She has tried oral narcotics for the symptoms. The treatment provided moderate relief.      I have reviewed the above HPI today, 09/20/18.  The HPI is unchanged from the previous office visit.      PEG Assessment   What number best describes your pain on average in the past  "week?5  What number best describes how, during the past week, pain has interfered with your enjoyment of life?6  What number best describes how, during the past week, pain has interfered with your general activity?  7    The following portions of the patient's history were reviewed and updated as appropriate: allergies, current medications, past family history, past medical history, past social history, past surgical history and problem list.    Review of Systems   Constitutional: Negative for chills, fatigue and fever.   HENT: Positive for dental problem. Negative for congestion.    Respiratory: Negative for cough and shortness of breath.    Cardiovascular: Negative for chest pain.   Gastrointestinal: Negative for bowel incontinence, constipation, diarrhea, nausea and vomiting.   Genitourinary: Negative for bladder incontinence, difficulty urinating, dyspareunia and dysuria.   Musculoskeletal: Positive for arthralgias, back pain and neck pain.   Neurological: Negative for dizziness, weakness, light-headedness, numbness and headaches.   Psychiatric/Behavioral: Negative for confusion, hallucinations, self-injury, sleep disturbance and suicidal ideas. The patient is not nervous/anxious.      Vitals:    09/20/18 0904   BP: 110/67   Pulse: 76   Resp: 15   Temp: 98 °F (36.7 °C)   SpO2: 96%   Weight: 90.7 kg (200 lb)   Height: 158.8 cm (62.52\")   PainSc:   6   PainLoc: Back     Objective   Physical Exam   Constitutional: She is oriented to person, place, and time. She appears well-developed and well-nourished. She is cooperative. No distress.   HENT:   Head: Normocephalic and atraumatic.   Nose: Nose normal.   Eyes: Conjunctivae and lids are normal.   Neck: Trachea normal.   Cardiovascular: Normal rate.    Pulmonary/Chest: Effort normal. No respiratory distress.   Musculoskeletal:        Lumbar back: She exhibits tenderness and pain.   Diffuse arthritic changes  Lumbar brace    Neurological: She is alert and oriented to " person, place, and time. She has normal strength. Gait (ambulating with rolling walker) abnormal.   Skin: Skin is intact.   Psychiatric: She has a normal mood and affect. Her speech is normal and behavior is normal. Cognition and memory are normal.   Nursing note and vitals reviewed.    Assessment/Plan   Anne was seen today for back pain.    Diagnoses and all orders for this visit:    Chronic pain syndrome    Degeneration of intervertebral disc of lumbar region    Rheumatoid arthritis involving multiple sites, unspecified rheumatoid factor presence (CMS/[facility])    Encounter for long-term (current) use of high-risk medication    Other orders  -     HYDROcodone-acetaminophen (NORCO)  MG per tablet; Take 1 tablet by mouth Every 8 (Eight) Hours As Needed for Severe Pain .  -     oxyCODONE ER (oxyCONTIN) 80 MG tablet extended-release 12 hour 12 hr tablet; Take 2 tablets by mouth Every 12 (Twelve) Hours.      --- Refill Hydrocodone and OxyContin. Patient appears stable with current regimen. No adverse effects. Regarding continuation of opioids, there is no evidence of aberrant behavior or any red flags.  The patient continues with appropriate response to opioid therapy. ADL's remain intact by self.   --- The urine drug screen confirmation from 7/16/18 has been reviewed and the result is appropriate based on patient history and [facility] report  --- Follow-up 1 month          [facility] REPORT  As part of the patient's treatment plan, I am prescribing controlled substances. The patient has been made aware of appropriate use of such medications, including potential risk of somnolence, limited ability to drive and/or work safely, and the potential for dependence or overdose. It has also bee made clear that these medications are for use by this patient only, without concomitant use of alcohol or other substances unless prescribed.     Patient has completed prescribing agreement detailing terms of continued prescribing of  controlled substances, including monitoring [facility] reports, urine drug screening, and pill counts if necessary. The patient is aware that inappropriate use will results in cessation of prescribing such medications.    [facility] report has been reviewed and scanned into the patient's chart.    As the clinician, I personally reviewed the [facility] from 9/19/2018 while the patient was in the office today.    History and physical exam exhibit continued safe and appropriate use of controlled substances.    EMR Dragon/Transcription disclaimer:   Much of this encounter note is an electronic transcription/translation of spoken language to printed text. The electronic translation of spoken language may permit erroneous, or at times, nonsensical words or phrases to be inadvertently transcribed; Although I have reviewed the note for such errors, some may still exist.

## 2018-10-12 RX ORDER — PROMETHAZINE HYDROCHLORIDE 25 MG/1
TABLET ORAL
Qty: 90 TABLET | Refills: 0 | Status: SHIPPED | OUTPATIENT
Start: 2018-10-12 | End: 2018-12-26 | Stop reason: SDUPTHER

## 2018-10-17 ENCOUNTER — OFFICE VISIT (OUTPATIENT)
Dept: PAIN MEDICINE | Facility: CLINIC | Age: 68
End: 2018-10-17

## 2018-10-17 VITALS
HEIGHT: 63 IN | DIASTOLIC BLOOD PRESSURE: 66 MMHG | OXYGEN SATURATION: 95 % | BODY MASS INDEX: 34.73 KG/M2 | WEIGHT: 196 LBS | RESPIRATION RATE: 18 BRPM | TEMPERATURE: 98.3 F | SYSTOLIC BLOOD PRESSURE: 106 MMHG | HEART RATE: 73 BPM

## 2018-10-17 DIAGNOSIS — Z79.899 ENCOUNTER FOR LONG-TERM (CURRENT) USE OF HIGH-RISK MEDICATION: ICD-10-CM

## 2018-10-17 DIAGNOSIS — M25.50 ARTHRALGIA, UNSPECIFIED JOINT: ICD-10-CM

## 2018-10-17 DIAGNOSIS — M06.9 RHEUMATOID ARTHRITIS INVOLVING MULTIPLE SITES, UNSPECIFIED RHEUMATOID FACTOR PRESENCE: ICD-10-CM

## 2018-10-17 DIAGNOSIS — G89.4 CHRONIC PAIN SYNDROME: Primary | ICD-10-CM

## 2018-10-17 DIAGNOSIS — G89.29 CHRONIC LOW BACK PAIN, UNSPECIFIED BACK PAIN LATERALITY, WITH SCIATICA PRESENCE UNSPECIFIED: ICD-10-CM

## 2018-10-17 DIAGNOSIS — M54.5 CHRONIC LOW BACK PAIN, UNSPECIFIED BACK PAIN LATERALITY, WITH SCIATICA PRESENCE UNSPECIFIED: ICD-10-CM

## 2018-10-17 PROBLEM — M54.50 CHRONIC LOW BACK PAIN: Status: ACTIVE | Noted: 2018-10-17

## 2018-10-17 PROCEDURE — 99214 OFFICE O/P EST MOD 30 MIN: CPT | Performed by: NURSE PRACTITIONER

## 2018-10-17 RX ORDER — HYDROCODONE BITARTRATE AND ACETAMINOPHEN 10; 325 MG/1; MG/1
1 TABLET ORAL EVERY 8 HOURS PRN
Qty: 90 TABLET | Refills: 0 | Status: SHIPPED | OUTPATIENT
Start: 2018-10-17 | End: 2018-11-14 | Stop reason: SDUPTHER

## 2018-10-17 RX ORDER — OXYCODONE HYDROCHLORIDE 80 MG/1
160 TABLET, FILM COATED, EXTENDED RELEASE ORAL EVERY 12 HOURS SCHEDULED
Qty: 120 TABLET | Refills: 0 | Status: SHIPPED | OUTPATIENT
Start: 2018-10-17 | End: 2018-11-14 | Stop reason: SDUPTHER

## 2018-10-17 NOTE — PROGRESS NOTES
CHIEF COMPLAINT  Follow-up for back pain. Ms. Jewell states that her back pain is unchanged from her last appt.    Subjective   Anne Jewell is a 68 y.o. female  who presents to the office for follow-up.She has a history of chronic back and joint pain.    Complains of pain in her back and joints. Today her pain is 4/10VAS. Continues with OxyContin 80 mg 2 BID and Hydrocodone 10/325 3/day. Denies any side effects from the regimen, including constipation and somnolence. The regimen helps decrease her pain by 50%. Notes improved function and activity with regimen.  ADL's by self. Continues with swimming for exercise when she is able to get to the pool.     Back Pain   This is a chronic problem. The current episode started more than 1 year ago. The problem occurs constantly. The problem has been waxing and waning since onset. The pain is present in the lumbar spine. The quality of the pain is described as aching. The pain radiates to the left foot and right foot. The pain is at a severity of 4/10. The pain is moderate. The symptoms are aggravated by stress (weather changes). Associated symptoms include weakness. Pertinent negatives include no bladder incontinence, bowel incontinence, chest pain, dysuria, fever, headaches or numbness. She has tried analgesics, bed rest, home exercises and heat (OxyContin, hydrocodone, aquatherapy) for the symptoms. The treatment provided moderate relief.   Joint Pain   This is a chronic problem. The current episode started more than 1 year ago. The problem occurs constantly. Progression since onset: unchanged from last office visit. Associated symptoms include arthralgias, neck pain and weakness. Pertinent negatives include no chest pain, chills, congestion, coughing, fatigue, fever, headaches, nausea, numbness or vomiting. She has tried oral narcotics for the symptoms. The treatment provided moderate relief.      PEG Assessment   What number best describes your pain on average in the  "past week?7  What number best describes how, during the past week, pain has interfered with your enjoyment of life?6  What number best describes how, during the past week, pain has interfered with your general activity?  5    The following portions of the patient's history were reviewed and updated as appropriate: allergies, current medications, past family history, past medical history, past social history, past surgical history and problem list.    Review of Systems   Constitutional: Negative for chills, fatigue and fever.   HENT: Negative for congestion.    Eyes: Negative for visual disturbance.   Respiratory: Negative for cough, shortness of breath and wheezing.    Cardiovascular: Positive for leg swelling (right leg). Negative for chest pain and palpitations.   Gastrointestinal: Negative for bowel incontinence, constipation, diarrhea, nausea and vomiting.   Genitourinary: Negative for bladder incontinence, difficulty urinating and dysuria.   Musculoskeletal: Positive for arthralgias, back pain and neck pain.   Neurological: Positive for weakness. Negative for numbness and headaches.   Psychiatric/Behavioral: Positive for sleep disturbance. Negative for suicidal ideas. The patient is nervous/anxious.      Vitals:    10/17/18 1348   BP: 106/66   Pulse: 73   Resp: 18   Temp: 98.3 °F (36.8 °C)   SpO2: 95%   Weight: 88.9 kg (196 lb)   Height: 158.8 cm (62.52\")   PainSc:   4   PainLoc: Back     Objective   Physical Exam   Constitutional: She is oriented to person, place, and time. She appears well-developed and well-nourished. She is cooperative. No distress.   HENT:   Head: Normocephalic and atraumatic.   Nose: Nose normal.   Eyes: Conjunctivae and lids are normal.   Neck: Trachea normal. Neck supple.   Cardiovascular: Normal rate.    Pulmonary/Chest: Effort normal. No respiratory distress.   Musculoskeletal:        Lumbar back: She exhibits tenderness and pain.   Diffuse arthritic changes  Lumbar brace  "   Neurological: She is alert and oriented to person, place, and time. She has normal strength. Gait (ambulating with rolling walker) abnormal.   Skin: Skin is intact.   Psychiatric: She has a normal mood and affect. Her speech is normal and behavior is normal. Cognition and memory are normal.   Nursing note and vitals reviewed.    Assessment/Plan   Anne was seen today for back pain.    Diagnoses and all orders for this visit:    Chronic pain syndrome    Arthralgia, unspecified joint    Rheumatoid arthritis involving multiple sites, unspecified rheumatoid factor presence (CMS/HCC)    Chronic low back pain, unspecified back pain laterality, with sciatica presence unspecified    Encounter for long-term (current) use of high-risk medication    Other orders  -     oxyCODONE ER (oxyCONTIN) 80 MG tablet extended-release 12 hour 12 hr tablet; Take 2 tablets by mouth Every 12 (Twelve) Hours.  -     HYDROcodone-acetaminophen (NORCO)  MG per tablet; Take 1 tablet by mouth Every 8 (Eight) Hours As Needed for Severe Pain .      --- Refill Hydrocodone and OxyContin. Patient appears stable with current regimen. No adverse effects. Regarding continuation of opioids, there is no evidence of aberrant behavior or any red flags.  The patient continues with appropriate response to opioid therapy. ADL's remain intact by self.   --- The urine drug screen confirmation from 7/16/18 has been reviewed and the result is appropriate based on patient history and [facility] report  --- Follow-up 1 month          [facility] REPORT    As part of the patient's treatment plan, I am prescribing controlled substances. The patient has been made aware of appropriate use of such medications, including potential risk of somnolence, limited ability to drive and/or work safely, and the potential for dependence or overdose. It has also bee made clear that these medications are for use by this patient only, without concomitant use of alcohol or other  substances unless prescribed.     Patient has completed prescribing agreement detailing terms of continued prescribing of controlled substances, including monitoring [facility] reports, urine drug screening, and pill counts if necessary. The patient is aware that inappropriate use will results in cessation of prescribing such medications.    [facility] report has been reviewed and scanned into the patient's chart.    As the clinician, I personally reviewed the [facility] from 10/16/2018 while the patient was in the office today.    History and physical exam exhibit continued safe and appropriate use of controlled substances.    EMR Dragon/Transcription disclaimer:   Much of this encounter note is an electronic transcription/translation of spoken language to printed text. The electronic translation of spoken language may permit erroneous, or at times, nonsensical words or phrases to be inadvertently transcribed; Although I have reviewed the note for such errors, some may still exist.

## 2018-11-14 ENCOUNTER — OFFICE VISIT (OUTPATIENT)
Dept: PAIN MEDICINE | Facility: CLINIC | Age: 68
End: 2018-11-14

## 2018-11-14 VITALS
HEIGHT: 63 IN | BODY MASS INDEX: 34.48 KG/M2 | HEART RATE: 68 BPM | SYSTOLIC BLOOD PRESSURE: 120 MMHG | RESPIRATION RATE: 15 BRPM | WEIGHT: 194.6 LBS | DIASTOLIC BLOOD PRESSURE: 78 MMHG | OXYGEN SATURATION: 96 % | TEMPERATURE: 96.6 F

## 2018-11-14 DIAGNOSIS — G89.4 CHRONIC PAIN SYNDROME: Primary | ICD-10-CM

## 2018-11-14 DIAGNOSIS — G89.29 CHRONIC LOW BACK PAIN, UNSPECIFIED BACK PAIN LATERALITY, WITH SCIATICA PRESENCE UNSPECIFIED: ICD-10-CM

## 2018-11-14 DIAGNOSIS — M54.5 CHRONIC LOW BACK PAIN, UNSPECIFIED BACK PAIN LATERALITY, WITH SCIATICA PRESENCE UNSPECIFIED: ICD-10-CM

## 2018-11-14 DIAGNOSIS — Z79.899 ENCOUNTER FOR LONG-TERM (CURRENT) USE OF HIGH-RISK MEDICATION: ICD-10-CM

## 2018-11-14 DIAGNOSIS — M06.9 RHEUMATOID ARTHRITIS INVOLVING MULTIPLE SITES, UNSPECIFIED RHEUMATOID FACTOR PRESENCE: ICD-10-CM

## 2018-11-14 PROCEDURE — 99214 OFFICE O/P EST MOD 30 MIN: CPT | Performed by: NURSE PRACTITIONER

## 2018-11-14 RX ORDER — OXYCODONE HYDROCHLORIDE 80 MG/1
160 TABLET, FILM COATED, EXTENDED RELEASE ORAL EVERY 12 HOURS SCHEDULED
Qty: 120 TABLET | Refills: 0 | Status: SHIPPED | OUTPATIENT
Start: 2018-11-14 | End: 2018-12-13 | Stop reason: SDUPTHER

## 2018-11-14 RX ORDER — HYDROCODONE BITARTRATE AND ACETAMINOPHEN 10; 325 MG/1; MG/1
1 TABLET ORAL EVERY 8 HOURS PRN
Qty: 90 TABLET | Refills: 0 | Status: SHIPPED | OUTPATIENT
Start: 2018-11-14 | End: 2018-12-13 | Stop reason: SDUPTHER

## 2018-11-14 NOTE — PROGRESS NOTES
CHIEF COMPLAINT  F/U back pain. Pt states pain medication is helping to relieve pain.     Subjective   Anne Jewell is a 68 y.o. female  who presents to the office for follow-up.She has a history of back and joint pain.    Complains of pain in her back and joints. Today her pain is 6/10VAS. Continues with OxyContin 80 mg 2 BID and Hydrocodone 10/325 3/day. Denies any side effects from the regimen, including constipation and somnolence. The regimen helps decrease her pain by 50%. Notes improved function and activity with regimen.  ADL's by self.     Back Pain   This is a chronic problem. The current episode started more than 1 year ago. The problem occurs constantly. The problem has been waxing and waning since onset. The pain is present in the lumbar spine. The quality of the pain is described as aching. The pain radiates to the left foot and right foot. The pain is at a severity of 6/10. The pain is moderate. The symptoms are aggravated by stress (weather changes). Associated symptoms include weakness. Pertinent negatives include no bladder incontinence, bowel incontinence, chest pain, dysuria, fever, headaches or numbness. She has tried analgesics, bed rest, home exercises and heat (OxyContin, hydrocodone, aquatherapy) for the symptoms. The treatment provided moderate relief.   Joint Pain   This is a chronic problem. The current episode started more than 1 year ago. The problem occurs daily. The problem has been waxing and waning. Associated symptoms include arthralgias, neck pain and weakness. Pertinent negatives include no chest pain, chills, congestion, coughing, fatigue, fever, headaches, nausea, numbness or vomiting. She has tried oral narcotics for the symptoms. The treatment provided moderate relief.     PEG Assessment   What number best describes your pain on average in the past week?6  What number best describes how, during the past week, pain has interfered with your enjoyment of life?6  What number  "best describes how, during the past week, pain has interfered with your general activity?  6    The following portions of the patient's history were reviewed and updated as appropriate: allergies, current medications, past family history, past medical history, past social history, past surgical history and problem list.    Review of Systems   Constitutional: Negative for chills, fatigue and fever.   HENT: Negative for congestion.    Eyes: Negative for visual disturbance.   Respiratory: Negative for cough, shortness of breath and wheezing.    Cardiovascular: Positive for leg swelling (right leg). Negative for chest pain and palpitations.   Gastrointestinal: Negative for bowel incontinence, constipation, diarrhea, nausea and vomiting.   Genitourinary: Negative for bladder incontinence, difficulty urinating and dysuria.   Musculoskeletal: Positive for arthralgias, back pain and neck pain.   Neurological: Positive for weakness. Negative for numbness and headaches.   Psychiatric/Behavioral: Positive for sleep disturbance. Negative for suicidal ideas. The patient is nervous/anxious.      Vitals:    11/14/18 0840   BP: 120/78   Pulse: 68   Resp: 15   Temp: 96.6 °F (35.9 °C)   SpO2: 96%   Weight: 88.3 kg (194 lb 9.6 oz)   Height: 158.8 cm (62.52\")   PainSc:   6   PainLoc: Back     Objective   Physical Exam   Constitutional: She is oriented to person, place, and time. She appears well-developed and well-nourished. She is cooperative. No distress.   HENT:   Head: Normocephalic and atraumatic.   Nose: Nose normal.   Eyes: Conjunctivae and lids are normal.   Neck: Trachea normal. Neck supple.   Cardiovascular: Normal rate.   Pulmonary/Chest: Effort normal. No respiratory distress.   Musculoskeletal:        Lumbar back: She exhibits tenderness and pain.   Diffuse arthritic changes  Lumbar brace    Neurological: She is alert and oriented to person, place, and time. She has normal strength. Gait (ambulating with rolling walker) " abnormal.   Skin: Skin is intact.   Psychiatric: She has a normal mood and affect. Her speech is normal and behavior is normal. Cognition and memory are normal.   Nursing note and vitals reviewed.    I HAVE PERFORMED THE PHYSICAL EXAMINATION AS DOCUMENTED ABOVE TODAY, 11/14/2018.  THE EXAM IS UNCHANGED FROM PREVIOUS VISIT.      Assessment/Plan   Anne was seen today for back pain.    Diagnoses and all orders for this visit:    Chronic pain syndrome    Chronic low back pain, unspecified back pain laterality, with sciatica presence unspecified    Rheumatoid arthritis involving multiple sites, unspecified rheumatoid factor presence (CMS/formerly [facility])    Encounter for long-term (current) use of high-risk medication    Other orders  -     HYDROcodone-acetaminophen (NORCO)  MG per tablet; Take 1 tablet by mouth Every 8 (Eight) Hours As Needed for Severe Pain .  -     oxyCODONE ER (oxyCONTIN) 80 MG tablet extended-release 12 hour 12 hr tablet; Take 2 tablets by mouth Every 12 (Twelve) Hours.      --- Refill OxyContin and Hydrocodone.  Patient appears stable with current regimen. No adverse effects. Regarding continuation of opioids, there is no evidence of aberrant behavior or any red flags.  The patient continues with appropriate response to opioid therapy. ADL's remain intact by self.   --- The urine drug screen confirmation from 7/16/18 has been reviewed and the result is appropriate based on patient history and [facility] report  --- Follow-up 1 month          [facility] REPORT  As part of the patient's treatment plan, I am prescribing controlled substances. The patient has been made aware of appropriate use of such medications, including potential risk of somnolence, limited ability to drive and/or work safely, and the potential for dependence or overdose. It has also bee made clear that these medications are for use by this patient only, without concomitant use of alcohol or other substances unless prescribed.     Patient has  completed prescribing agreement detailing terms of continued prescribing of controlled substances, including monitoring [facility] reports, urine drug screening, and pill counts if necessary. The patient is aware that inappropriate use will results in cessation of prescribing such medications.    [facility] report has been reviewed and scanned into the patient's chart.    As the clinician, I personally reviewed the [facility] from 11/13/2018 while the patient was in the office today.    History and physical exam exhibit continued safe and appropriate use of controlled substances.    EMR Dragon/Transcription disclaimer:   Much of this encounter note is an electronic transcription/translation of spoken language to printed text. The electronic translation of spoken language may permit erroneous, or at times, nonsensical words or phrases to be inadvertently transcribed; Although I have reviewed the note for such errors, some may still exist.

## 2018-12-13 ENCOUNTER — OFFICE VISIT (OUTPATIENT)
Dept: PAIN MEDICINE | Facility: CLINIC | Age: 68
End: 2018-12-13

## 2018-12-13 VITALS
OXYGEN SATURATION: 94 % | SYSTOLIC BLOOD PRESSURE: 130 MMHG | HEART RATE: 79 BPM | WEIGHT: 197 LBS | BODY MASS INDEX: 34.91 KG/M2 | DIASTOLIC BLOOD PRESSURE: 80 MMHG | HEIGHT: 63 IN | TEMPERATURE: 98.5 F | RESPIRATION RATE: 16 BRPM

## 2018-12-13 DIAGNOSIS — G89.29 CHRONIC LOW BACK PAIN, UNSPECIFIED BACK PAIN LATERALITY, WITH SCIATICA PRESENCE UNSPECIFIED: ICD-10-CM

## 2018-12-13 DIAGNOSIS — Z79.899 ENCOUNTER FOR LONG-TERM (CURRENT) USE OF HIGH-RISK MEDICATION: ICD-10-CM

## 2018-12-13 DIAGNOSIS — M15.9 GENERALIZED OSTEOARTHRITIS: ICD-10-CM

## 2018-12-13 DIAGNOSIS — G89.4 CHRONIC PAIN SYNDROME: Primary | ICD-10-CM

## 2018-12-13 DIAGNOSIS — M06.9 RHEUMATOID ARTHRITIS INVOLVING MULTIPLE SITES, UNSPECIFIED RHEUMATOID FACTOR PRESENCE: ICD-10-CM

## 2018-12-13 DIAGNOSIS — M54.5 CHRONIC LOW BACK PAIN, UNSPECIFIED BACK PAIN LATERALITY, WITH SCIATICA PRESENCE UNSPECIFIED: ICD-10-CM

## 2018-12-13 PROCEDURE — 99214 OFFICE O/P EST MOD 30 MIN: CPT | Performed by: NURSE PRACTITIONER

## 2018-12-13 RX ORDER — OXYCODONE HYDROCHLORIDE 80 MG/1
160 TABLET, FILM COATED, EXTENDED RELEASE ORAL EVERY 12 HOURS SCHEDULED
Qty: 120 TABLET | Refills: 0 | Status: SHIPPED | OUTPATIENT
Start: 2018-12-13 | End: 2019-01-10 | Stop reason: SDUPTHER

## 2018-12-13 RX ORDER — OXYCODONE HYDROCHLORIDE 80 MG/1
160 TABLET, FILM COATED, EXTENDED RELEASE ORAL EVERY 12 HOURS SCHEDULED
Qty: 120 TABLET | Refills: 0 | Status: SHIPPED | OUTPATIENT
Start: 2018-12-13 | End: 2018-12-13 | Stop reason: SDUPTHER

## 2018-12-13 RX ORDER — HYDROCODONE BITARTRATE AND ACETAMINOPHEN 10; 325 MG/1; MG/1
1 TABLET ORAL EVERY 8 HOURS PRN
Qty: 90 TABLET | Refills: 0 | Status: SHIPPED | OUTPATIENT
Start: 2018-12-13 | End: 2019-01-10 | Stop reason: SDUPTHER

## 2018-12-13 NOTE — PROGRESS NOTES
CHIEF COMPLAINT  Pt states her upper to lower back pain has increased since last seen here on 11/14/18.  Pt has had teeth pulled on 12/11/18.    Subjective   Anne Jewell is a 68 y.o. female  who presents to the office for follow-up.She has a history of chronic back pain.    Complains of pain in her back and joints. Today her pain is 7/10VAS. Continues with OxyContin 80 mg 2 BID and Hydrocodone 10/325 3/day. Denies any side effects from the regimen, including constipation and somnolence. The regimen helps decrease her pain by 50%. Notes improved function and activity with regimen.  ADL's by self.     Back Pain   This is a chronic problem. The current episode started more than 1 year ago. The problem occurs constantly. The problem has been waxing and waning since onset. The pain is present in the lumbar spine. The quality of the pain is described as aching. The pain radiates to the left foot and right foot. The pain is at a severity of 7/10. The pain is moderate. The symptoms are aggravated by stress (weather changes). Pertinent negatives include no bladder incontinence, bowel incontinence, chest pain, dysuria, fever, headaches, numbness or weakness. She has tried analgesics, bed rest, home exercises and heat (OxyContin, hydrocodone, aquatherapy) for the symptoms. The treatment provided moderate relief.   Joint Pain   This is a chronic problem. The current episode started more than 1 year ago. The problem occurs daily. The problem has been waxing and waning. Associated symptoms include arthralgias and neck pain. Pertinent negatives include no chest pain, chills, congestion, coughing, fatigue, fever, headaches, nausea, numbness, vomiting or weakness. She has tried oral narcotics for the symptoms. The treatment provided moderate relief.      PEG Assessment   What number best describes your pain on average in the past week?6  What number best describes how, during the past week, pain has interfered with your enjoyment  "of life?6  What number best describes how, during the past week, pain has interfered with your general activity?  6    The following portions of the patient's history were reviewed and updated as appropriate: allergies, current medications, past family history, past medical history, past social history, past surgical history and problem list.    Review of Systems   Constitutional: Negative for activity change, chills, fatigue and fever.   HENT: Negative for congestion.    Eyes: Negative for visual disturbance.   Respiratory: Negative for cough, shortness of breath and wheezing.    Cardiovascular: Negative for chest pain, palpitations and leg swelling (ankles bilat.).   Gastrointestinal: Negative for bowel incontinence, constipation, diarrhea, nausea and vomiting.   Genitourinary: Negative for bladder incontinence, difficulty urinating and dysuria.   Musculoskeletal: Positive for arthralgias, back pain and neck pain.   Neurological: Negative for dizziness, weakness, light-headedness, numbness and headaches.   Psychiatric/Behavioral: Positive for sleep disturbance. Negative for suicidal ideas. The patient is nervous/anxious.      Vitals:    12/13/18 1022   BP: 130/80   Pulse: 79   Resp: 16   Temp: 98.5 °F (36.9 °C)   SpO2: 94%   Weight: 89.4 kg (197 lb)   Height: 158.8 cm (62.52\")   PainSc:   7   PainLoc: Back  Comment: back pain (upper to lower) ranges from 6-7/10     Objective   Physical Exam   Constitutional: She is oriented to person, place, and time. She appears well-developed and well-nourished. She is cooperative. No distress.   HENT:   Head: Normocephalic and atraumatic.   Nose: Nose normal.   Eyes: Conjunctivae and lids are normal.   Neck: Trachea normal. Neck supple.   Cardiovascular: Normal rate.   Pulmonary/Chest: Effort normal. No respiratory distress.   Musculoskeletal:        Lumbar back: She exhibits tenderness and pain.   Diffuse arthritic changes  Lumbar brace    Neurological: She is alert and " oriented to person, place, and time. She has normal strength. Gait (ambulating with rolling walker) abnormal.   Skin: Skin is intact.   Psychiatric: She has a normal mood and affect. Her speech is normal and behavior is normal. Cognition and memory are normal.   Nursing note and vitals reviewed.    Assessment/Plan   Anne was seen today for back pain.    Diagnoses and all orders for this visit:    Chronic pain syndrome    Chronic low back pain, unspecified back pain laterality, with sciatica presence unspecified    Generalized osteoarthritis    Rheumatoid arthritis involving multiple sites, unspecified rheumatoid factor presence (CMS/[facility])    Encounter for long-term (current) use of high-risk medication    Other orders  -     oxyCODONE ER (oxyCONTIN) 80 MG tablet extended-release 12 hour 12 hr tablet; Take 2 tablets by mouth Every 12 (Twelve) Hours.      --- Refill OxyContin. Patient appears stable with current regimen. No adverse effects. Regarding continuation of opioids, there is no evidence of aberrant behavior or any red flags.  The patient continues with appropriate response to opioid therapy. ADL's remain intact by self.   --- The urine drug screen confirmation from 7/16/18 has been reviewed and the result is APPROPRIATE based on patient history and [facility] report  --- Follow-up 1 month        [facility] REPORT  As part of the patient's treatment plan, I am prescribing controlled substances. The patient has been made aware of appropriate use of such medications, including potential risk of somnolence, limited ability to drive and/or work safely, and the potential for dependence or overdose. It has also bee made clear that these medications are for use by this patient only, without concomitant use of alcohol or other substances unless prescribed.     Patient has completed prescribing agreement detailing terms of continued prescribing of controlled substances, including monitoring [facility] reports, urine drug  screening, and pill counts if necessary. The patient is aware that inappropriate use will results in cessation of prescribing such medications.    [facility] report has been reviewed and scanned into the patient's chart.    As the clinician, I personally reviewed the [facility] from 12/10/2018 while the patient was in the office today.    History and physical exam exhibit continued safe and appropriate use of controlled substances.    EMR Dragon/Transcription disclaimer:   Much of this encounter note is an electronic transcription/translation of spoken language to printed text. The electronic translation of spoken language may permit erroneous, or at times, nonsensical words or phrases to be inadvertently transcribed; Although I have reviewed the note for such errors, some may still exist.

## 2018-12-23 DIAGNOSIS — I10 HYPERTENSION, UNSPECIFIED TYPE: ICD-10-CM

## 2018-12-24 RX ORDER — HYDROCHLOROTHIAZIDE 25 MG/1
TABLET ORAL
Qty: 30 TABLET | Refills: 1 | Status: SHIPPED | OUTPATIENT
Start: 2018-12-24 | End: 2019-02-22 | Stop reason: SDUPTHER

## 2018-12-26 RX ORDER — PROMETHAZINE HYDROCHLORIDE 25 MG/1
TABLET ORAL
Qty: 90 TABLET | Refills: 0 | Status: SHIPPED | OUTPATIENT
Start: 2018-12-26 | End: 2019-01-25 | Stop reason: SDUPTHER

## 2019-01-10 ENCOUNTER — OFFICE VISIT (OUTPATIENT)
Dept: PAIN MEDICINE | Facility: CLINIC | Age: 69
End: 2019-01-10

## 2019-01-10 VITALS
WEIGHT: 199 LBS | HEART RATE: 91 BPM | BODY MASS INDEX: 35.26 KG/M2 | HEIGHT: 63 IN | OXYGEN SATURATION: 95 % | RESPIRATION RATE: 16 BRPM | TEMPERATURE: 98.3 F | DIASTOLIC BLOOD PRESSURE: 80 MMHG | SYSTOLIC BLOOD PRESSURE: 119 MMHG

## 2019-01-10 DIAGNOSIS — M54.5 CHRONIC LOW BACK PAIN, UNSPECIFIED BACK PAIN LATERALITY, WITH SCIATICA PRESENCE UNSPECIFIED: ICD-10-CM

## 2019-01-10 DIAGNOSIS — Z79.899 ENCOUNTER FOR LONG-TERM (CURRENT) USE OF HIGH-RISK MEDICATION: ICD-10-CM

## 2019-01-10 DIAGNOSIS — G89.29 CHRONIC LOW BACK PAIN, UNSPECIFIED BACK PAIN LATERALITY, WITH SCIATICA PRESENCE UNSPECIFIED: ICD-10-CM

## 2019-01-10 DIAGNOSIS — M06.9 RHEUMATOID ARTHRITIS INVOLVING MULTIPLE SITES, UNSPECIFIED RHEUMATOID FACTOR PRESENCE: ICD-10-CM

## 2019-01-10 DIAGNOSIS — G89.4 CHRONIC PAIN SYNDROME: Primary | ICD-10-CM

## 2019-01-10 DIAGNOSIS — M51.36 DEGENERATION OF INTERVERTEBRAL DISC OF LUMBAR REGION: ICD-10-CM

## 2019-01-10 LAB
POC AMPHETAMINES: NEGATIVE
POC BARBITURATES: NEGATIVE
POC BENZODIAZEPHINES: POSITIVE
POC COCAINE: NEGATIVE
POC METHADONE: NEGATIVE
POC METHAMPHETAMINE SCREEN URINE: NEGATIVE
POC OPIATES: NEGATIVE
POC OXYCODONE: POSITIVE
POC PHENCYCLIDINE: NEGATIVE
POC PROPOXYPHENE: NEGATIVE
POC THC: NEGATIVE
POC TRICYCLIC ANTIDEPRESSANTS: NEGATIVE

## 2019-01-10 PROCEDURE — 80305 DRUG TEST PRSMV DIR OPT OBS: CPT | Performed by: NURSE PRACTITIONER

## 2019-01-10 PROCEDURE — 99214 OFFICE O/P EST MOD 30 MIN: CPT | Performed by: NURSE PRACTITIONER

## 2019-01-10 RX ORDER — OXYCODONE HYDROCHLORIDE 80 MG/1
160 TABLET, FILM COATED, EXTENDED RELEASE ORAL EVERY 12 HOURS SCHEDULED
Qty: 120 TABLET | Refills: 0 | Status: SHIPPED | OUTPATIENT
Start: 2019-01-10 | End: 2019-02-12 | Stop reason: SDUPTHER

## 2019-01-10 RX ORDER — HYDROCODONE BITARTRATE AND ACETAMINOPHEN 10; 325 MG/1; MG/1
1 TABLET ORAL EVERY 8 HOURS PRN
Qty: 90 TABLET | Refills: 0 | Status: SHIPPED | OUTPATIENT
Start: 2019-01-10 | End: 2019-04-15

## 2019-01-10 NOTE — PROGRESS NOTES
CHIEF COMPLAINT  Pt states her LBP has improved/decreased notably since last seen here on 12/13/18.    Subjective   Anne Jewell is a 68 y.o. female  who presents to the office for follow-up.She has a history of chronic back and joint pain.    Complains of pain in her back and joints. Today her pain is 5/10VAS. Continues with OxyContin 80 mg 2 BID and Hydrocodone 10/325 3/day. She reports that she can tell a big difference in efficacy of the Oxycontin when it is Brand Name.  Denies any side effects from the regimen, including constipation and somnolence. The regimen helps decrease her pain by 50%. Notes improved function and activity with regimen.  ADL's by self.     Back Pain   This is a chronic problem. The current episode started more than 1 year ago. The problem occurs daily. The problem is unchanged. The pain is present in the lumbar spine. The quality of the pain is described as aching. The pain radiates to the left foot and right foot. The pain is at a severity of 5/10. The pain is moderate. The symptoms are aggravated by stress (weather changes). Pertinent negatives include no bladder incontinence, bowel incontinence, chest pain, dysuria, fever, headaches, numbness or weakness. She has tried analgesics, bed rest, home exercises and heat (OxyContin, hydrocodone, aquatherapy) for the symptoms. The treatment provided moderate relief.   Joint Pain   This is a chronic problem. The current episode started more than 1 year ago. The problem occurs constantly. The problem has been unchanged. Associated symptoms include arthralgias and neck pain. Pertinent negatives include no chest pain, chills, congestion, coughing, fatigue, fever, headaches, nausea, numbness, vomiting or weakness. She has tried oral narcotics for the symptoms. The treatment provided moderate relief.      PEG Assessment   What number best describes your pain on average in the past week?5  What number best describes how, during the past week, pain  "has interfered with your enjoyment of life?5  What number best describes how, during the past week, pain has interfered with your general activity?  4    The following portions of the patient's history were reviewed and updated as appropriate: allergies, current medications, past family history, past medical history, past social history, past surgical history and problem list.    Review of Systems   Constitutional: Negative for activity change, chills, fatigue and fever.   HENT: Negative for congestion.    Eyes: Negative for visual disturbance.   Respiratory: Negative for cough, shortness of breath and wheezing.    Cardiovascular: Negative for chest pain, palpitations and leg swelling (ankles bilat.).   Gastrointestinal: Negative for bowel incontinence, constipation, diarrhea, nausea and vomiting.   Genitourinary: Negative for bladder incontinence, difficulty urinating and dysuria.   Musculoskeletal: Positive for arthralgias, back pain and neck pain.   Neurological: Negative for dizziness, weakness, light-headedness, numbness and headaches.   Psychiatric/Behavioral: Positive for sleep disturbance. Negative for suicidal ideas. The patient is nervous/anxious.      Vitals:    01/10/19 1122   BP: 119/80   Pulse: 91   Resp: 16   Temp: 98.3 °F (36.8 °C)   SpO2: 95%   Weight: 90.3 kg (199 lb)   Height: 158.8 cm (62.52\")   PainSc:   5   PainLoc: Back  Comment: LBP from middle to R ranges from 5-7/10     Objective   Physical Exam   Constitutional: She is oriented to person, place, and time. She appears well-developed and well-nourished. She is cooperative. No distress.   HENT:   Head: Normocephalic and atraumatic.   Nose: Nose normal.   Eyes: Conjunctivae and lids are normal.   Neck: Trachea normal. Neck supple.   Cardiovascular: Normal rate.   Pulmonary/Chest: Effort normal. No respiratory distress.   Musculoskeletal:        Lumbar back: She exhibits tenderness, bony tenderness and pain.   Diffuse arthritic changes - no " acute synovitis  Lumbar brace    Neurological: She is alert and oriented to person, place, and time. She has normal strength. Gait (ambulating with rolling walker) abnormal.   Skin: Skin is intact. She is not diaphoretic.   Psychiatric: She has a normal mood and affect. Her speech is normal and behavior is normal. Cognition and memory are normal.   Nursing note and vitals reviewed.    Assessment/Plan   Anne was seen today for back pain.    Diagnoses and all orders for this visit:    Chronic pain syndrome    Chronic low back pain, unspecified back pain laterality, with sciatica presence unspecified    Degeneration of intervertebral disc of lumbar region    Rheumatoid arthritis involving multiple sites, unspecified rheumatoid factor presence (CMS/[facility])    Encounter for long-term (current) use of high-risk medication      --- Refill Oxycontin and Hydrocodone. Patient appears stable with current regimen. No adverse effects. Regarding continuation of opioids, there is no evidence of aberrant behavior or any red flags.  The patient continues with appropriate response to opioid therapy. ADL's remain intact by self.   --- The urine drug screen confirmation from 7/16/18 has been reviewed and the result is appropriate based on patient history and [facility] report  --- Routine UDS in office today as part of monitoring requirements for controlled substances.  The specimen was viewed and the immunoassay result reviewed and is +BZD, OXY.  This specimen will be sent to [facility] laboratory for confirmation.     --- Follow-up 1 month          [facility] REPORT  As part of the patient's treatment plan, I am prescribing controlled substances. The patient has been made aware of appropriate use of such medications, including potential risk of somnolence, limited ability to drive and/or work safely, and the potential for dependence or overdose. It has also bee made clear that these medications are for use by this patient only, without  concomitant use of alcohol or other substances unless prescribed.     Patient has completed prescribing agreement detailing terms of continued prescribing of controlled substances, including monitoring [facility] reports, urine drug screening, and pill counts if necessary. The patient is aware that inappropriate use will results in cessation of prescribing such medications.    [facility] report has been reviewed and scanned into the patient's chart.    As the clinician, I personally reviewed the [facility]A from 1/8/19 while the patient was in the office today.    History and physical exam exhibit continued safe and appropriate use of controlled substances.    EMR Dragon/Transcription disclaimer:   Much of this encounter note is an electronic transcription/translation of spoken language to printed text. The electronic translation of spoken language may permit erroneous, or at times, nonsensical words or phrases to be inadvertently transcribed; Although I have reviewed the note for such errors, some may still exist.

## 2019-01-15 ENCOUNTER — RESULTS ENCOUNTER (OUTPATIENT)
Dept: PAIN MEDICINE | Facility: CLINIC | Age: 69
End: 2019-01-15

## 2019-01-15 DIAGNOSIS — M51.36 DEGENERATION OF INTERVERTEBRAL DISC OF LUMBAR REGION: ICD-10-CM

## 2019-01-15 DIAGNOSIS — M54.5 CHRONIC LOW BACK PAIN, UNSPECIFIED BACK PAIN LATERALITY, WITH SCIATICA PRESENCE UNSPECIFIED: ICD-10-CM

## 2019-01-15 DIAGNOSIS — Z79.899 ENCOUNTER FOR LONG-TERM (CURRENT) USE OF HIGH-RISK MEDICATION: ICD-10-CM

## 2019-01-15 DIAGNOSIS — M06.9 RHEUMATOID ARTHRITIS INVOLVING MULTIPLE SITES, UNSPECIFIED RHEUMATOID FACTOR PRESENCE: ICD-10-CM

## 2019-01-15 DIAGNOSIS — G89.4 CHRONIC PAIN SYNDROME: ICD-10-CM

## 2019-01-15 DIAGNOSIS — G89.29 CHRONIC LOW BACK PAIN, UNSPECIFIED BACK PAIN LATERALITY, WITH SCIATICA PRESENCE UNSPECIFIED: ICD-10-CM

## 2019-01-22 NOTE — PROGRESS NOTES
This is the second abnormal uds in 7 months - both completely lacking hydrocodone.  She did have a normal uds/pill count 7/16/18, but has had another abnormal uds since this time.  How would you like to proceed?

## 2019-01-23 NOTE — PROGRESS NOTES
I reviewed all your notes.  I am glad to see you were specific in mentioning in the July 2018 note that the UDS confirmation from the July 16th random recheck was appropriate.  You've shown her improvements as well with the branded oxycontin.  Let's just plan to cut out the hydrocodone and continue with monthly follow ups.  At the next visit it would be great to find out why she was negative on the hydrocodone....  I would like to call her in for a random pill count and UDS a few days before that visit.... The visit is 2-12-18, let's make the pill count 2-7-18.  I will place that order into the future now.    If there is an extenuating circumstance that you discover at the next office visit and wish to just maybe wean down the hydrocodone instead of cancelling it all together that will be fine.

## 2019-01-25 RX ORDER — PROMETHAZINE HYDROCHLORIDE 25 MG/1
TABLET ORAL
Qty: 90 TABLET | Refills: 0 | Status: SHIPPED | OUTPATIENT
Start: 2019-01-25 | End: 2019-02-25 | Stop reason: SDUPTHER

## 2019-01-29 ENCOUNTER — TELEPHONE (OUTPATIENT)
Dept: SPORTS MEDICINE | Facility: CLINIC | Age: 69
End: 2019-01-29

## 2019-01-29 NOTE — TELEPHONE ENCOUNTER
Pt called to ask if it's OK that she take a prenatal multivitamin. Will it interact with her other medications?

## 2019-01-30 RX ORDER — POTASSIUM CHLORIDE 20 MEQ/1
20 TABLET, EXTENDED RELEASE ORAL 2 TIMES DAILY
Qty: 90 TABLET | Refills: 0 | Status: SHIPPED | OUTPATIENT
Start: 2019-01-30 | End: 2019-03-19 | Stop reason: SDUPTHER

## 2019-02-08 ENCOUNTER — TELEPHONE (OUTPATIENT)
Dept: PAIN MEDICINE | Facility: CLINIC | Age: 69
End: 2019-02-08

## 2019-02-12 ENCOUNTER — OFFICE VISIT (OUTPATIENT)
Dept: PAIN MEDICINE | Facility: CLINIC | Age: 69
End: 2019-02-12

## 2019-02-12 VITALS
RESPIRATION RATE: 15 BRPM | DIASTOLIC BLOOD PRESSURE: 81 MMHG | TEMPERATURE: 98 F | WEIGHT: 196 LBS | HEART RATE: 71 BPM | HEIGHT: 63 IN | BODY MASS INDEX: 34.73 KG/M2 | OXYGEN SATURATION: 96 % | SYSTOLIC BLOOD PRESSURE: 145 MMHG

## 2019-02-12 DIAGNOSIS — M06.9 RHEUMATOID ARTHRITIS INVOLVING MULTIPLE SITES, UNSPECIFIED RHEUMATOID FACTOR PRESENCE: ICD-10-CM

## 2019-02-12 DIAGNOSIS — M51.36 DEGENERATION OF INTERVERTEBRAL DISC OF LUMBAR REGION: ICD-10-CM

## 2019-02-12 DIAGNOSIS — G89.4 CHRONIC PAIN SYNDROME: Primary | ICD-10-CM

## 2019-02-12 DIAGNOSIS — Z79.899 ENCOUNTER FOR LONG-TERM (CURRENT) USE OF HIGH-RISK MEDICATION: ICD-10-CM

## 2019-02-12 PROCEDURE — 99214 OFFICE O/P EST MOD 30 MIN: CPT | Performed by: NURSE PRACTITIONER

## 2019-02-12 RX ORDER — OXYCODONE HYDROCHLORIDE 80 MG/1
160 TABLET, FILM COATED, EXTENDED RELEASE ORAL EVERY 12 HOURS SCHEDULED
Qty: 120 TABLET | Refills: 0 | Status: SHIPPED | OUTPATIENT
Start: 2019-02-12 | End: 2019-03-13 | Stop reason: SDUPTHER

## 2019-02-12 NOTE — PROGRESS NOTES
CHIEF COMPLAINT  F/U back pain.     Subjective   Anne Jewell is a 68 y.o. female  who presents to the office for follow-up.She has a history of chronic back pain and joint pain due to RA.    Abnormal drug screen 6/26/18 -- lacking hydrocodone  Called in for random urine drug screen and pill count on 7/16/18 -- normal  Abnormal urine drug screen 1/10/2019 -- lacking hydrocodone   Called in for random urine drug screen and pill count to 2/8/18 when she did not show up for despite multiple attempts to contact patient.  Denies change of contact information.      Admits that she does not take hydrocodone every day but does continue to fill them at [facility] every 30 days...    Complains of pain in her back and joints. Today her pain is 6/10VAS. Continues with OxyContin 80 mg 2 BID and Hydrocodone 10/325 3/day. She reports that she can tell a big difference in efficacy of the Oxycontin when it is Brand Name.  Denies any side effects from the regimen, including constipation and somnolence. The regimen helps decrease her pain by 50%. Notes improved function and activity with regimen.  ADL's by self.     Back Pain   This is a chronic problem. The current episode started more than 1 year ago. The problem occurs daily. The problem is unchanged. The pain is present in the lumbar spine. The quality of the pain is described as aching. The pain radiates to the left foot and right foot. The pain is at a severity of 6/10. The pain is moderate. The symptoms are aggravated by stress (weather changes). Pertinent negatives include no bladder incontinence, bowel incontinence, chest pain, dysuria, fever, headaches, numbness or weakness. She has tried analgesics, bed rest, home exercises and heat (OxyContin, hydrocodone, aquatherapy) for the symptoms. The treatment provided moderate relief.   Joint Pain   This is a chronic problem. The current episode started more than 1 year ago. The problem occurs constantly. The problem has been unchanged.  "Associated symptoms include arthralgias and neck pain. Pertinent negatives include no chest pain, chills, congestion, coughing, fatigue, fever, headaches, nausea, numbness, vomiting or weakness. She has tried oral narcotics for the symptoms. The treatment provided moderate relief.      PEG Assessment   What number best describes your pain on average in the past week?6  What number best describes how, during the past week, pain has interfered with your enjoyment of life?6  What number best describes how, during the past week, pain has interfered with your general activity?  6    The following portions of the patient's history were reviewed and updated as appropriate: allergies, current medications, past family history, past medical history, past social history, past surgical history and problem list.    Review of Systems   Constitutional: Negative for activity change, chills, fatigue and fever.   HENT: Positive for dental problem. Negative for congestion.    Eyes: Negative for visual disturbance.   Respiratory: Negative for cough, shortness of breath and wheezing.    Cardiovascular: Negative for chest pain, palpitations and leg swelling (ankles bilat.).   Gastrointestinal: Negative for bowel incontinence, constipation, diarrhea, nausea and vomiting.   Genitourinary: Negative for bladder incontinence, difficulty urinating and dysuria.   Musculoskeletal: Positive for arthralgias, back pain and neck pain.   Neurological: Negative for dizziness, weakness, light-headedness, numbness and headaches.   Psychiatric/Behavioral: Positive for sleep disturbance. Negative for agitation, confusion, hallucinations and suicidal ideas. The patient is nervous/anxious.      Vitals:    02/12/19 1146   BP: 145/81   Pulse: 71   Resp: 15   Temp: 98 °F (36.7 °C)   SpO2: 96%   Weight: 88.9 kg (196 lb)   Height: 158.8 cm (62.52\")   PainSc:   6   PainLoc: Back     Objective   Physical Exam   Constitutional: She is oriented to person, place, and " time. She appears well-developed and well-nourished. She is cooperative. No distress.   HENT:   Head: Normocephalic and atraumatic.   Nose: Nose normal.   Eyes: Conjunctivae and lids are normal.   Neck: Trachea normal. Neck supple.   Cardiovascular: Normal rate.   Pulmonary/Chest: Effort normal. No respiratory distress.   Musculoskeletal:        Lumbar back: She exhibits tenderness, bony tenderness and pain.   Diffuse arthritic changes - no acute synovitis  Lumbar brace    Neurological: She is alert and oriented to person, place, and time. She has normal strength. Gait (ambulating with rolling walker) abnormal.   Skin: Skin is intact. She is not diaphoretic.   Psychiatric: She has a normal mood and affect. Her speech is normal and behavior is normal. Cognition and memory are normal.   Nursing note and vitals reviewed.    Assessment/Plan   Anne was seen today for back pain.    Diagnoses and all orders for this visit:    Chronic pain syndrome    Rheumatoid arthritis involving multiple sites, unspecified rheumatoid factor presence (CMS/HCC)    Degeneration of intervertebral disc of lumbar region    Encounter for long-term (current) use of high-risk medication      --- The urine drug screen confirmation from 1-10-19 has been reviewed and the result is ABNORMAL (lacking Hydrocodone) based on patient history and [facility] report  --- Refill OxyContin.  Will discontinue hydrocodone due to above mentioned aberrancies - discussed with Dr. Grajeda. Patient appears stable with current regimen. No adverse effects. Regarding continuation of opioids, there is no evidence of aberrant behavior or any red flags.  The patient continues with appropriate response to opioid therapy. ADL's remain intact by self.   --- Follow-up 1 month         [facility] REPORT  As part of the patient's treatment plan, I am prescribing controlled substances. The patient has been made aware of appropriate use of such medications, including potential risk of  somnolence, limited ability to drive and/or work safely, and the potential for dependence or overdose. It has also bee made clear that these medications are for use by this patient only, without concomitant use of alcohol or other substances unless prescribed.     Patient has completed prescribing agreement detailing terms of continued prescribing of controlled substances, including monitoring [facility] reports, urine drug screening, and pill counts if necessary. The patient is aware that inappropriate use will results in cessation of prescribing such medications.    [facility] report has been reviewed and scanned into the patient's chart.    As the clinician, I personally reviewed the V[facility]ENTA from 2-11-19 while the patient was in the office today.    History and physical exam exhibit continued safe and appropriate use of controlled substances.    EMR Dragon/Transcription disclaimer:   Much of this encounter note is an electronic transcription/translation of spoken language to printed text. The electronic translation of spoken language may permit erroneous, or at times, nonsensical words or phrases to be inadvertently transcribed; Although I have reviewed the note for such errors, some may still exist.

## 2019-02-22 DIAGNOSIS — I10 HYPERTENSION, UNSPECIFIED TYPE: ICD-10-CM

## 2019-02-22 RX ORDER — HYDROCHLOROTHIAZIDE 25 MG/1
TABLET ORAL
Qty: 30 TABLET | Refills: 0 | Status: SHIPPED | OUTPATIENT
Start: 2019-02-22 | End: 2019-03-26 | Stop reason: SDUPTHER

## 2019-02-25 RX ORDER — PROMETHAZINE HYDROCHLORIDE 25 MG/1
TABLET ORAL
Qty: 90 TABLET | Refills: 0 | Status: SHIPPED | OUTPATIENT
Start: 2019-02-25 | End: 2019-02-26 | Stop reason: SDUPTHER

## 2019-02-26 ENCOUNTER — OFFICE VISIT (OUTPATIENT)
Dept: SPORTS MEDICINE | Facility: CLINIC | Age: 69
End: 2019-02-26

## 2019-02-26 VITALS
SYSTOLIC BLOOD PRESSURE: 124 MMHG | WEIGHT: 190 LBS | OXYGEN SATURATION: 96 % | HEART RATE: 99 BPM | HEIGHT: 63 IN | BODY MASS INDEX: 33.66 KG/M2 | DIASTOLIC BLOOD PRESSURE: 62 MMHG

## 2019-02-26 DIAGNOSIS — D51.0 PERNICIOUS ANEMIA: ICD-10-CM

## 2019-02-26 DIAGNOSIS — D68.9 COAGULATION DISORDER (HCC): ICD-10-CM

## 2019-02-26 DIAGNOSIS — E03.9 ACQUIRED HYPOTHYROIDISM: Primary | ICD-10-CM

## 2019-02-26 DIAGNOSIS — F34.1 DYSTHYMIC DISORDER: ICD-10-CM

## 2019-02-26 DIAGNOSIS — R79.9 ABNORMAL FINDING OF BLOOD CHEMISTRY: ICD-10-CM

## 2019-02-26 DIAGNOSIS — R11.0 NAUSEA: ICD-10-CM

## 2019-02-26 PROCEDURE — 99214 OFFICE O/P EST MOD 30 MIN: CPT | Performed by: FAMILY MEDICINE

## 2019-02-26 RX ORDER — PROMETHAZINE HYDROCHLORIDE 25 MG/1
25 TABLET ORAL EVERY 8 HOURS PRN
Qty: 90 TABLET | Refills: 11 | Status: SHIPPED | OUTPATIENT
Start: 2019-02-26 | End: 2020-03-02

## 2019-02-26 NOTE — PROGRESS NOTES
"Anne is a 68 y.o. year old female    Chief Complaint   Patient presents with   • Follow-up     med check        History of Present Illness   HPI   1.  FU hypothyroid, no symptoms of hypothyroid  2.  FU chronic nausea, this has been an ongoing issue for many years, the promethazine helps considerably and without adverse effects  3.  FU dysthymic d/o, very stressful past year, had to have all teeth removed, living alone now but the Lexapro is still working well and without adverse effects.     I have reviewed the patient's medical, family, and social history in detail and updated the computerized patient record.    Review of Systems   Constitutional: Negative.    HENT: Negative.    Respiratory: Negative.    Cardiovascular: Negative.    Gastrointestinal: Positive for nausea. Negative for abdominal pain, blood in stool, constipation and vomiting.   Endocrine: Negative.    Psychiatric/Behavioral: Positive for dysphoric mood. Negative for self-injury and suicidal ideas.       /62   Pulse 99   Ht 158.8 cm (62.52\")   Wt 86.2 kg (190 lb)   SpO2 96%   BMI 34.18 kg/m²      Physical Exam   Constitutional: She is oriented to person, place, and time. She appears well-developed and well-nourished.   HENT:   Head: Normocephalic and atraumatic.   Eyes: Conjunctivae and EOM are normal. Pupils are equal, round, and reactive to light.   Cardiovascular:   No peripheral edema   Pulmonary/Chest: Effort normal.   Neurological: She is alert and oriented to person, place, and time.   Skin: Skin is warm and dry.   Psychiatric: Her behavior is normal.   Is a bit anxious today but no SI or HI.    Vitals reviewed.        Current Outpatient Medications:   •  albuterol (PROVENTIL HFA;VENTOLIN HFA) 108 (90 BASE) MCG/ACT inhaler, Proventil  (90 Base) MCG/ACT Inhalation Aerosol Solution; Patient Sig: Proventil  (90 Base) MCG/ACT Inhalation Aerosol Solution Inhale 2 puff(s) every 6 to 8 hours as needed; 6.7; 0; 05-Apr-2013; " Active, Disp: , Rfl:   •  ALPRAZolam (XANAX) 2 MG tablet, , Disp: , Rfl:   •  Azelastine-Fluticasone 137-50 MCG/ACT suspension, Inhale 1 spray every 12 (twelve) hours., Disp: , Rfl:   •  chlorhexidine (PERIDEX) 0.12 % solution, , Disp: , Rfl:   •  Cholecalciferol (VITAMIN D3) 2000 units capsule, TAKE ONE CAPSULE BY MOUTH DAILY, Disp: 30 capsule, Rfl: 10  •  clindamycin (CLEOCIN) 150 MG capsule, , Disp: , Rfl:   •  Cyanocobalamin (B-12) 1000 MCG sublingual tablet, PLACE 1 TABLET UNDER THE TONGUE AND LET DISSOLVE ONCE DAILY, Disp: 30 each, Rfl: 1  •  Cyanocobalamin (VITAMIN B-12) 1000 MCG sublingual tablet, PLACE ONE TABLET UNDER THE TONGUE DAILY, Disp: 30 each, Rfl: 0  •  Cyanocobalamin (VITAMIN B-12) 1000 MCG sublingual tablet, PLACE ONE TABLET UNDER THE TONGUE DAILY, Disp: 30 each, Rfl: 6  •  dicyclomine (BENTYL) 20 MG tablet, 1 q 6-h prn, Disp: 120 tablet, Rfl: 6  •  escitalopram (LEXAPRO) 10 MG tablet, Take 1 tablet by mouth daily., Disp: , Rfl:   •  hydrochlorothiazide (HYDRODIURIL) 25 MG tablet, TAKE ONE TABLET BY MOUTH DAILY, Disp: 30 tablet, Rfl: 0  •  HYDROcodone-acetaminophen (NORCO)  MG per tablet, Take 1 tablet by mouth Every 8 (Eight) Hours As Needed for Severe Pain ., Disp: 90 tablet, Rfl: 0  •  levothyroxine (SYNTHROID, LEVOTHROID) 100 MCG tablet, TAKE ONE TABLET BY MOUTH DAILY, Disp: 90 tablet, Rfl: 0  •  montelukast (SINGULAIR) 10 MG tablet, Take 1 tablet by mouth daily., Disp: , Rfl:   •  Multiple Vitamin tablet, Take 1 tablet by mouth daily., Disp: , Rfl:   •  oxyCODONE ER (oxyCONTIN) 80 MG tablet extended-release 12 hour 12 hr tablet, Take 2 tablets by mouth Every 12 (Twelve) Hours., Disp: 120 tablet, Rfl: 0  •  potassium chloride (K-DUR,KLOR-CON) 20 MEQ CR tablet, TAKE ONE TABLET BY MOUTH TWICE A DAY, Disp: 180 tablet, Rfl: 0  •  potassium chloride (KLOR-CON) 20 MEQ CR tablet, Take 1 tablet by mouth 2 (Two) Times a Day., Disp: 90 tablet, Rfl: 0  •  promethazine (PHENERGAN) 25 MG tablet,  Take 1 tablet by mouth Every 8 (Eight) Hours As Needed for Nausea or Vomiting., Disp: 90 tablet, Rfl: 11  •  XARELTO 20 MG tablet, TAKE ONE TABLET BY MOUTH DAILY, Disp: 30 tablet, Rfl: 10     Diagnoses and all orders for this visit:    Acquired hypothyroidism  -     CBC & Differential  -     Comprehensive Metabolic Panel  -     TSH  -     T4, Free  -     Hemoglobin A1c  -     Lipid Panel With / Chol / HDL Ratio  -     UA / M With / Rflx Culture([facility] ONLY) - Urine, Clean Catch    Pernicious anemia  -     CBC & Differential  -     Comprehensive Metabolic Panel  -     TSH  -     T4, Free  -     Hemoglobin A1c  -     Lipid Panel With / Chol / HDL Ratio  -     UA / M With / Rflx Culture([facility] ONLY) - Urine, Clean Catch    Coagulation disorder (CMS/HCC)  -     CBC & Differential  -     Comprehensive Metabolic Panel  -     TSH  -     T4, Free  -     Hemoglobin A1c  -     Lipid Panel With / Chol / HDL Ratio  -     UA / M With / Rflx Culture([facility] ONLY) - Urine, Clean Catch    Dysthymic disorder  -     CBC & Differential  -     Comprehensive Metabolic Panel  -     TSH  -     T4, Free  -     Hemoglobin A1c  -     Lipid Panel With / Chol / HDL Ratio  -     UA / M With / Rflx Culture([facility] ONLY) - Urine, Clean Catch    Nausea  -     promethazine (PHENERGAN) 25 MG tablet; Take 1 tablet by mouth Every 8 (Eight) Hours As Needed for Nausea or Vomiting.    Abnormal finding of blood chemistry   -     Hemoglobin A1c         I spent greater than 50% of this 25 minute visit discussing the diagnosis, prognosis, treatment plan, etc. Patient's questions were answered in detail with appropriate counseling and anticipatory guidance.       EMR Dragon/Transcription disclaimer:    Much of this encounter note is an electronic transcription/translation of spoken language to printed text.  The electronic translation of spoken language may permit erroneous, or at times, nonsensical words or phrases to be inadvertently transcribed.   Although I have reviewed the note for such errors some may still exist.

## 2019-02-27 DIAGNOSIS — I10 HYPERTENSION, UNSPECIFIED TYPE: ICD-10-CM

## 2019-02-28 LAB
ALBUMIN SERPL-MCNC: 3 G/DL (ref 3.5–5.2)
ALBUMIN/GLOB SERPL: 1.1 G/DL
ALP SERPL-CCNC: 140 U/L (ref 39–117)
ALT SERPL-CCNC: 18 U/L (ref 1–33)
APPEARANCE UR: CLEAR
AST SERPL-CCNC: 33 U/L (ref 1–32)
BACTERIA #/AREA URNS HPF: ABNORMAL /HPF
BACTERIA UR CULT: NO GROWTH
BACTERIA UR CULT: NORMAL
BASOPHILS # BLD AUTO: 0.04 10*3/MM3 (ref 0–0.2)
BASOPHILS NFR BLD AUTO: 0.6 % (ref 0–1.5)
BILIRUB SERPL-MCNC: 0.3 MG/DL (ref 0.1–1.2)
BILIRUB UR QL STRIP: NEGATIVE
BUN SERPL-MCNC: 24 MG/DL (ref 8–23)
BUN/CREAT SERPL: 20 (ref 7–25)
CALCIUM SERPL-MCNC: 8.5 MG/DL (ref 8.6–10.5)
CASTS URNS MICRO: ABNORMAL
CASTS URNS QL MICRO: PRESENT /LPF
CHLORIDE SERPL-SCNC: 101 MMOL/L (ref 98–107)
CHOLEST SERPL-MCNC: 90 MG/DL (ref 0–200)
CHOLEST/HDLC SERPL: 2.37 {RATIO}
CO2 SERPL-SCNC: 25.9 MMOL/L (ref 22–29)
COLOR UR: YELLOW
CREAT SERPL-MCNC: 1.2 MG/DL (ref 0.57–1)
EOSINOPHIL # BLD AUTO: 0.22 10*3/MM3 (ref 0–0.4)
EOSINOPHIL NFR BLD AUTO: 3.1 % (ref 0.3–6.2)
EPI CELLS #/AREA URNS HPF: ABNORMAL /HPF
ERYTHROCYTE [DISTWIDTH] IN BLOOD BY AUTOMATED COUNT: 12.7 % (ref 12.3–15.4)
GLOBULIN SER CALC-MCNC: 2.8 GM/DL
GLUCOSE SERPL-MCNC: 80 MG/DL (ref 65–99)
GLUCOSE UR QL: NEGATIVE
HBA1C MFR BLD: 4.6 % (ref 4.8–5.6)
HCT VFR BLD AUTO: 43.3 % (ref 34–46.6)
HDLC SERPL-MCNC: 38 MG/DL (ref 40–60)
HGB BLD-MCNC: 14 G/DL (ref 12–15.9)
HGB UR QL STRIP: NEGATIVE
IMM GRANULOCYTES # BLD AUTO: 0.01 10*3/MM3 (ref 0–0.05)
IMM GRANULOCYTES NFR BLD AUTO: 0.1 % (ref 0–0.5)
KETONES UR QL STRIP: NEGATIVE
LDLC SERPL CALC-MCNC: 36 MG/DL (ref 0–100)
LEUKOCYTE ESTERASE UR QL STRIP: ABNORMAL
LYMPHOCYTES # BLD AUTO: 2.65 10*3/MM3 (ref 0.7–3.1)
LYMPHOCYTES NFR BLD AUTO: 36.8 % (ref 19.6–45.3)
MCH RBC QN AUTO: 32.7 PG (ref 26.6–33)
MCHC RBC AUTO-ENTMCNC: 32.3 G/DL (ref 31.5–35.7)
MCV RBC AUTO: 101.2 FL (ref 79–97)
MICRO URNS: ABNORMAL
MONOCYTES # BLD AUTO: 0.46 10*3/MM3 (ref 0.1–0.9)
MONOCYTES NFR BLD AUTO: 6.4 % (ref 5–12)
MUCOUS THREADS URNS QL MICRO: PRESENT /HPF
NEUTROPHILS # BLD AUTO: 3.82 10*3/MM3 (ref 1.4–7)
NEUTROPHILS NFR BLD AUTO: 53 % (ref 42.7–76)
NITRITE UR QL STRIP: NEGATIVE
NRBC BLD AUTO-RTO: 0 /100 WBC (ref 0–0)
PH UR STRIP: 5 [PH] (ref 5–7.5)
PLATELET # BLD AUTO: 214 10*3/MM3 (ref 140–450)
POTASSIUM SERPL-SCNC: 3.8 MMOL/L (ref 3.5–5.2)
PROT SERPL-MCNC: 5.8 G/DL (ref 6–8.5)
PROT UR QL STRIP: NEGATIVE
RBC # BLD AUTO: 4.28 10*6/MM3 (ref 3.77–5.28)
RBC #/AREA URNS HPF: ABNORMAL /HPF
SODIUM SERPL-SCNC: 140 MMOL/L (ref 136–145)
SP GR UR: 1.02 (ref 1–1.03)
T4 FREE SERPL-MCNC: 1.24 NG/DL (ref 0.93–1.7)
TRIGL SERPL-MCNC: 82 MG/DL (ref 0–150)
TSH SERPL DL<=0.005 MIU/L-ACNC: 1.76 MIU/ML (ref 0.27–4.2)
URINALYSIS REFLEX: ABNORMAL
UROBILINOGEN UR STRIP-MCNC: 0.2 MG/DL (ref 0.2–1)
VLDLC SERPL CALC-MCNC: 16.4 MG/DL (ref 5–40)
WBC # BLD AUTO: 7.2 10*3/MM3 (ref 3.4–10.8)
WBC #/AREA URNS HPF: ABNORMAL /HPF

## 2019-02-28 RX ORDER — HYDROCHLOROTHIAZIDE 25 MG/1
TABLET ORAL
Qty: 30 TABLET | Refills: 0 | Status: SHIPPED | OUTPATIENT
Start: 2019-02-28 | End: 2019-04-17 | Stop reason: SDUPTHER

## 2019-02-28 RX ORDER — LEVOTHYROXINE SODIUM 0.1 MG/1
TABLET ORAL
Qty: 90 TABLET | Refills: 0 | Status: SHIPPED | OUTPATIENT
Start: 2019-02-28 | End: 2019-05-21 | Stop reason: SDUPTHER

## 2019-03-06 ENCOUNTER — TELEPHONE (OUTPATIENT)
Dept: SPORTS MEDICINE | Facility: CLINIC | Age: 69
End: 2019-03-06

## 2019-03-13 ENCOUNTER — OFFICE VISIT (OUTPATIENT)
Dept: PAIN MEDICINE | Facility: CLINIC | Age: 69
End: 2019-03-13

## 2019-03-13 VITALS
SYSTOLIC BLOOD PRESSURE: 105 MMHG | TEMPERATURE: 96.7 F | DIASTOLIC BLOOD PRESSURE: 78 MMHG | WEIGHT: 199.6 LBS | OXYGEN SATURATION: 97 % | HEIGHT: 63 IN | RESPIRATION RATE: 15 BRPM | HEART RATE: 92 BPM | BODY MASS INDEX: 35.37 KG/M2

## 2019-03-13 DIAGNOSIS — G89.4 CHRONIC PAIN SYNDROME: Primary | ICD-10-CM

## 2019-03-13 DIAGNOSIS — Z79.899 ENCOUNTER FOR LONG-TERM (CURRENT) USE OF HIGH-RISK MEDICATION: ICD-10-CM

## 2019-03-13 PROCEDURE — 99214 OFFICE O/P EST MOD 30 MIN: CPT | Performed by: ANESTHESIOLOGY

## 2019-03-13 RX ORDER — CEPHALEXIN 500 MG/1
500 CAPSULE ORAL 2 TIMES DAILY
COMMUNITY
End: 2019-07-16

## 2019-03-13 RX ORDER — OXYCODONE HYDROCHLORIDE 80 MG/1
160 TABLET, FILM COATED, EXTENDED RELEASE ORAL EVERY 12 HOURS SCHEDULED
Qty: 120 TABLET | Refills: 0 | Status: SHIPPED | OUTPATIENT
Start: 2019-03-13 | End: 2019-04-15 | Stop reason: SDUPTHER

## 2019-03-13 NOTE — PROGRESS NOTES
CHIEF COMPLAINT    F/U back pain. Pt states increased since last visit.     Subjective   Anne Jewell is a 68 y.o. female  who presents to the office for follow-up.She has a history of pain syndrome and back pain.    She is previously had an apparent see on urine drug screening, which was lack of prescribed hydrocodone.  She has been using hydrocodone for intermittent flareups of pain.  For her chronic pain she utilizes extended release oxycodone.    History of Present Illness     Aching back pain is slightly worse.  She attributes the worsening of her pain to lack of hydrocodone use.  She has radicular symptoms to both feet.  She has had more pain recently which she also attributes to weather changes including the barometric changes.  She has chronic joint pain as well and this includes neck pain.  She gets moderate relief from the use of the oxycodone.  She states that she has used hydrocodone very judiciously and diligently only for severe flareups and that she makes every effort to not use it on a daily basis.  She states that she uses hydrocodone only when laying down and resting and relaxing are not enough to get out of a severe pain flareup.  She expresses that she does continue to do aqua therapy exercises several times a week.    PEG Assessment   What number best describes your pain on average in the past week?8-9  What number best describes how, during the past week, pain has interfered with your enjoyment of life?10  What number best describes how, during the past week, pain has interfered with your general activity?  10    --  The aforementioned information the Chief Complaint section and above subjective data including any HPI data has been personally reviewed and affirmed.  --        The following portions of the patient's history were reviewed and updated as appropriate: allergies, current medications, past family history, past medical history, past social history, past surgical history and problem  list.    -------    The following portions of the patient's history were reviewed and updated as appropriate: allergies, current medications, past family history, past medical history, past social history, past surgical history and problem list.    Allergies   Allergen Reactions   • Penicillins          Current Outpatient Medications:   •  albuterol (PROVENTIL HFA;VENTOLIN HFA) 108 (90 BASE) MCG/ACT inhaler, Proventil  (90 Base) MCG/ACT Inhalation Aerosol Solution; Patient Sig: Proventil  (90 Base) MCG/ACT Inhalation Aerosol Solution Inhale 2 puff(s) every 6 to 8 hours as needed; 6.7; 0; 05-Apr-2013; Active, Disp: , Rfl:   •  ALPRAZolam (XANAX) 2 MG tablet, , Disp: , Rfl:   •  Azelastine-Fluticasone 137-50 MCG/ACT suspension, Inhale 1 spray every 12 (twelve) hours., Disp: , Rfl:   •  cephalexin (KEFLEX) 500 MG capsule, Take 500 mg by mouth 2 (Two) Times a Day., Disp: , Rfl:   •  chlorhexidine (PERIDEX) 0.12 % solution, , Disp: , Rfl:   •  Cholecalciferol (VITAMIN D3) 2000 units capsule, TAKE ONE CAPSULE BY MOUTH DAILY, Disp: 30 capsule, Rfl: 10  •  Cyanocobalamin (B-12) 1000 MCG sublingual tablet, PLACE 1 TABLET UNDER THE TONGUE AND LET DISSOLVE ONCE DAILY, Disp: 30 each, Rfl: 1  •  Cyanocobalamin (VITAMIN B-12) 1000 MCG sublingual tablet, PLACE ONE TABLET UNDER THE TONGUE DAILY, Disp: 30 each, Rfl: 0  •  Cyanocobalamin (VITAMIN B-12) 1000 MCG sublingual tablet, PLACE ONE TABLET UNDER THE TONGUE DAILY, Disp: 30 each, Rfl: 6  •  dicyclomine (BENTYL) 20 MG tablet, 1 q 6-h prn, Disp: 120 tablet, Rfl: 6  •  escitalopram (LEXAPRO) 10 MG tablet, Take 1 tablet by mouth daily., Disp: , Rfl:   •  hydrochlorothiazide (HYDRODIURIL) 25 MG tablet, TAKE ONE TABLET BY MOUTH DAILY, Disp: 30 tablet, Rfl: 0  •  hydrochlorothiazide (HYDRODIURIL) 25 MG tablet, TAKE ONE TABLET BY MOUTH DAILY, Disp: 30 tablet, Rfl: 0  •  levothyroxine (SYNTHROID, LEVOTHROID) 100 MCG tablet, TAKE ONE TABLET BY MOUTH DAILY, Disp: 90 tablet,  Rfl: 0  •  montelukast (SINGULAIR) 10 MG tablet, Take 1 tablet by mouth daily., Disp: , Rfl:   •  Multiple Vitamin tablet, Take 1 tablet by mouth daily., Disp: , Rfl:   •  oxyCODONE ER (oxyCONTIN) 80 MG tablet extended-release 12 hour 12 hr tablet, Take 2 tablets by mouth Every 12 (Twelve) Hours., Disp: 120 tablet, Rfl: 0  •  potassium chloride (K-DUR,KLOR-CON) 20 MEQ CR tablet, TAKE ONE TABLET BY MOUTH TWICE A DAY, Disp: 180 tablet, Rfl: 0  •  potassium chloride (KLOR-CON) 20 MEQ CR tablet, Take 1 tablet by mouth 2 (Two) Times a Day., Disp: 90 tablet, Rfl: 0  •  promethazine (PHENERGAN) 25 MG tablet, Take 1 tablet by mouth Every 8 (Eight) Hours As Needed for Nausea or Vomiting., Disp: 90 tablet, Rfl: 11  •  XARELTO 20 MG tablet, TAKE ONE TABLET BY MOUTH DAILY, Disp: 30 tablet, Rfl: 10  •  HYDROcodone-acetaminophen (NORCO)  MG per tablet, Take 1 tablet by mouth Every 8 (Eight) Hours As Needed for Severe Pain ., Disp: 90 tablet, Rfl: 0    Current Outpatient Medications on File Prior to Visit   Medication Sig Dispense Refill   • albuterol (PROVENTIL HFA;VENTOLIN HFA) 108 (90 BASE) MCG/ACT inhaler Proventil  (90 Base) MCG/ACT Inhalation Aerosol Solution; Patient Sig: Proventil  (90 Base) MCG/ACT Inhalation Aerosol Solution Inhale 2 puff(s) every 6 to 8 hours as needed; 6.7; 0; 05-Apr-2013; Active     • ALPRAZolam (XANAX) 2 MG tablet      • Azelastine-Fluticasone 137-50 MCG/ACT suspension Inhale 1 spray every 12 (twelve) hours.     • cephalexin (KEFLEX) 500 MG capsule Take 500 mg by mouth 2 (Two) Times a Day.     • chlorhexidine (PERIDEX) 0.12 % solution      • Cholecalciferol (VITAMIN D3) 2000 units capsule TAKE ONE CAPSULE BY MOUTH DAILY 30 capsule 10   • Cyanocobalamin (B-12) 1000 MCG sublingual tablet PLACE 1 TABLET UNDER THE TONGUE AND LET DISSOLVE ONCE DAILY 30 each 1   • Cyanocobalamin (VITAMIN B-12) 1000 MCG sublingual tablet PLACE ONE TABLET UNDER THE TONGUE DAILY 30 each 0   •  Cyanocobalamin (VITAMIN B-12) 1000 MCG sublingual tablet PLACE ONE TABLET UNDER THE TONGUE DAILY 30 each 6   • dicyclomine (BENTYL) 20 MG tablet 1 q 6-h prn 120 tablet 6   • escitalopram (LEXAPRO) 10 MG tablet Take 1 tablet by mouth daily.     • hydrochlorothiazide (HYDRODIURIL) 25 MG tablet TAKE ONE TABLET BY MOUTH DAILY 30 tablet 0   • hydrochlorothiazide (HYDRODIURIL) 25 MG tablet TAKE ONE TABLET BY MOUTH DAILY 30 tablet 0   • levothyroxine (SYNTHROID, LEVOTHROID) 100 MCG tablet TAKE ONE TABLET BY MOUTH DAILY 90 tablet 0   • montelukast (SINGULAIR) 10 MG tablet Take 1 tablet by mouth daily.     • Multiple Vitamin tablet Take 1 tablet by mouth daily.     • potassium chloride (K-DUR,KLOR-CON) 20 MEQ CR tablet TAKE ONE TABLET BY MOUTH TWICE A  tablet 0   • potassium chloride (KLOR-CON) 20 MEQ CR tablet Take 1 tablet by mouth 2 (Two) Times a Day. 90 tablet 0   • promethazine (PHENERGAN) 25 MG tablet Take 1 tablet by mouth Every 8 (Eight) Hours As Needed for Nausea or Vomiting. 90 tablet 11   • XARELTO 20 MG tablet TAKE ONE TABLET BY MOUTH DAILY 30 tablet 10   • HYDROcodone-acetaminophen (NORCO)  MG per tablet Take 1 tablet by mouth Every 8 (Eight) Hours As Needed for Severe Pain . 90 tablet 0     No current facility-administered medications on file prior to visit.        Patient Active Problem List   Diagnosis   • Chronic pain syndrome   • Rheumatoid arthritis (CMS/HCC)   • Osteoarthritis of knee   • Generalized osteoarthritis   • Degeneration of intervertebral disc of lumbar region   • Neck pain   • Lumbar radiculopathy   • Atopic rhinitis   • Anxiety   • Diarrhea   • Indigestion   • Dysthymic disorder   • Gastroesophageal reflux disease   • Hypothyroidism   • Insomnia   • Pernicious anemia   • Osteoporosis   • Scoliosis   • Vasovagal syncope   • Vitamin D deficiency   • Trigger middle finger of right hand   • Trigger ring finger of right hand   • Trigger little finger of right hand   • Encounter for  long-term (current) use of high-risk medication   • Coagulation disorder (CMS/HCC)   • Hyperglycemia   • Joint pain   • Status post total knee replacement, left   • Left knee pain   • Status post total knee replacement, right   • Osteoarthritis of thumb   • Chronic low back pain       Past Medical History:   Diagnosis Date   • Allergic rhinitis    • Asthma    • Bronchospasm    • Clotting disorder (CMS/HCC)    • Deep vein thrombosis (CMS/HCC)    • Disc degeneration, lumbar    • Edema    • Esophageal reflux    • Glaucoma    • Joint pain    • Low back pain    • Osteoarthritis    • Osteopenia    • Osteoporosis    • Scoliosis    • UTI (urinary tract infection)    • Venous thrombosis        Past Surgical History:   Procedure Laterality Date   • BILATERAL BREAST REDUCTION     • BREAST SURGERY     • COLONOSCOPY  08/05/2016   • GASTRIC BYPASS     • HAND SURGERY Right 01/14/2016   • HERNIA REPAIR      x7   • HYSTERECTOMY     • OTHER SURGICAL HISTORY      vaginal sling operation for stress incontinence   • TOTAL KNEE ARTHROPLASTY Left    • TOTAL KNEE ARTHROPLASTY Bilateral        Family History   Problem Relation Age of Onset   • Prostate cancer Father    • Arthritis Other    • Hypertension Other         benign essential   • Cancer Other    • Diabetes Other    • Heart disease Other    • Nephrolithiasis Other        Social History     Socioeconomic History   • Marital status:      Spouse name: Not on file   • Number of children: Not on file   • Years of education: Not on file   • Highest education level: Not on file   Social Needs   • Financial resource strain: Not on file   • Food insecurity - worry: Not on file   • Food insecurity - inability: Not on file   • Transportation needs - medical: Not on file   • Transportation needs - non-medical: Not on file   Occupational History   • Not on file   Tobacco Use   • Smoking status: Former Smoker   • Smokeless tobacco: Never Used   Substance and Sexual Activity   • Alcohol  "use: No   • Drug use: No   • Sexual activity: Defer   Other Topics Concern   • Not on file   Social History Narrative   • Not on file       -------        Review of Systems   Constitutional: Negative for activity change, chills, fatigue and fever.   HENT: Positive for dental problem. Negative for congestion.    Eyes: Negative for visual disturbance.   Respiratory: Negative for cough, shortness of breath and wheezing.    Cardiovascular: Negative for chest pain, palpitations and leg swelling (ankles bilat.).   Gastrointestinal: Negative for constipation, diarrhea, nausea and vomiting.   Genitourinary: Negative for difficulty urinating and dysuria.   Musculoskeletal: Positive for arthralgias, back pain and neck pain.   Neurological: Positive for dizziness and headaches. Negative for weakness, light-headedness and numbness.   Psychiatric/Behavioral: Positive for sleep disturbance. Negative for agitation, confusion, hallucinations and suicidal ideas. The patient is nervous/anxious.      ---    I reviewed her Bob report today with a request #[id number].  It is a 5 page report, and the first 3 pages have data.  She has been using the same pharmacy which is a [facility] in [facility].  The last hydrocodone prescription was on January 14 of this year.  She uses alprazolam prescribed by Dr. Héctor Freeman.    I reviewed the previous [facility] urine drug screen confirmation which was positive for alprazolam and also oxycodone as well as the metabolites of oxycodone.  It was negative for hydrocodone nor its metabolites.  The date of this request was completed on January 12, 2019.      ----    Vitals:    03/13/19 1057   BP: 105/78   Pulse: 92   Resp: 15   Temp: 96.7 °F (35.9 °C)   SpO2: 97%   Weight: 90.5 kg (199 lb 9.6 oz)   Height: 158.8 cm (62.52\")   PainSc:   8   PainLoc: Back         Objective   Physical Exam  VSS, NNR, NCAT, NMNA, NRD, AAOx3.  She is quite emotional and in fact tearful when " talking about her medication use and need for medication.      Assessment/Plan   Anne was seen today for back pain.    Diagnoses and all orders for this visit:    Chronic pain syndrome    Encounter for long-term (current) use of high-risk medication    Other orders  -     oxyCODONE ER (oxyCONTIN) 80 MG tablet extended-release 12 hour 12 hr tablet; Take 2 tablets by mouth Every 12 (Twelve) Hours.      Education in this office visit was extensive with extended counseling.  This was a 26-minute office visit and 17 minutes were spent in counseling.  We discussed [place] statute same guidelines as well as [facility] [place] guidelines with regards to opiate prescribing.  We discussed the objective nature of urine drug screen confirmation.  We discussed her feelings about how she felt singled out and more somewhat mistreated or offended because of the discovery of an abnormal drug screen and we discussed that this is just an objective finding and it does not actually cause us to call into question whether she not she needs the hydrocodone as it something she is not using regularly.  A big portion of this counseling was actually to try to reassure her and in fact praise her very judicious use in attempts to try to manage her pain through other means instead of just relying on breakthrough narcotics.    The counseling session went on to discuss morphine equivalent dosages.  We discussed that with the morphine daily equivalents that she is consuming from the use of extended release oxycodone that I do question how much of any fact that PRN hydrocodone could be helping her.  I discussed the nature of pain relief that can be expected with the use of acetaminophen and next expressed to her how she is most likely getting pain relief to a higher degree with regards to breakthrough relief through the use of acetaminophen, that is because of her already high levels of opiates that I do not feel that a little more opiate really makes much  clinical significance.    --- Follow-up 1 month    -- no need for a refill of hydrocodone at this time  -- there may be a very limited but reasonable use for acute/intermittent use of hydrocodone/apap but at a much reduced supply...  -- however she will trial taking 650mg acetaminophen only for breaking-through pain / severe pain flareup instead of hydrocodone/acetaminophen               [facility] REPORT    As part of the patient's treatment plan, I am prescribing controlled substances. The patient has been made aware of appropriate use of such medications, including potential risk of somnolence, limited ability to drive and/or work safely, and the potential for dependence or overdose. It has also bee made clear that these medications are for use by this patient only, without concomitant use of alcohol or other substances unless prescribed.     Patient has completed prescribing agreement detailing terms of continued prescribing of controlled substances, including monitoring [facility] reports, urine drug screening, and pill counts if necessary. The patient is aware that inappropriate use will results in cessation of prescribing such medications.    [facility] report has been reviewed and scanned into the patient's chart.    As the clinician, I personally reviewed the [facility]ENTA from as above while the patient was in the office today.    History and physical exam exhibit continued safe and appropriate use of controlled substances.      EMR Dragon/Transcription disclaimer:   Much of this encounter note is an electronic transcription/translation of spoken language to printed text. The electronic translation of spoken language may permit erroneous, or at times, nonsensical words or phrases to be inadvertently transcribed; Although I have reviewed the note for such errors, some may still exist.

## 2019-03-13 NOTE — PATIENT INSTRUCTIONS
--- Follow-up 1 month    -- no need for a refill of hydrocodone at this time  -- there may be a reasonable use for acute/intermittent use of hydrocodone/apap but at a much reduced supply...  --- however she will trial taking 650mg acetaminophen only for breaking-through pain / severe pain flareup instead of hydrocodone/acetaminophen

## 2019-03-15 ENCOUNTER — TELEPHONE (OUTPATIENT)
Dept: PAIN MEDICINE | Facility: CLINIC | Age: 69
End: 2019-03-15

## 2019-03-18 RX ORDER — PROMETHAZINE HYDROCHLORIDE 25 MG/1
TABLET ORAL
Qty: 90 TABLET | Refills: 0 | OUTPATIENT
Start: 2019-03-18

## 2019-03-19 ENCOUNTER — TELEPHONE (OUTPATIENT)
Dept: SPORTS MEDICINE | Facility: CLINIC | Age: 69
End: 2019-03-19

## 2019-03-19 RX ORDER — POTASSIUM CHLORIDE 20 MEQ/1
TABLET, EXTENDED RELEASE ORAL
Qty: 90 TABLET | Refills: 0 | Status: SHIPPED | OUTPATIENT
Start: 2019-03-19 | End: 2019-05-02 | Stop reason: SDUPTHER

## 2019-03-20 RX ORDER — RIVAROXABAN 20 MG/1
TABLET, FILM COATED ORAL
Qty: 30 TABLET | Refills: 9 | Status: SHIPPED | OUTPATIENT
Start: 2019-03-20 | End: 2020-01-14

## 2019-03-20 RX ORDER — BACLOFEN 10 MG/1
10 TABLET ORAL 3 TIMES DAILY
Qty: 30 TABLET | Refills: 9 | Status: SHIPPED | OUTPATIENT
Start: 2019-03-20 | End: 2020-06-03

## 2019-03-26 DIAGNOSIS — I10 HYPERTENSION, UNSPECIFIED TYPE: ICD-10-CM

## 2019-03-26 RX ORDER — HYDROCHLOROTHIAZIDE 25 MG/1
TABLET ORAL
Qty: 30 TABLET | Refills: 0 | Status: SHIPPED | OUTPATIENT
Start: 2019-03-26 | End: 2019-11-04 | Stop reason: SDUPTHER

## 2019-04-15 ENCOUNTER — OFFICE VISIT (OUTPATIENT)
Dept: PAIN MEDICINE | Facility: CLINIC | Age: 69
End: 2019-04-15

## 2019-04-15 VITALS
HEIGHT: 63 IN | TEMPERATURE: 97.4 F | HEART RATE: 102 BPM | SYSTOLIC BLOOD PRESSURE: 137 MMHG | DIASTOLIC BLOOD PRESSURE: 81 MMHG | BODY MASS INDEX: 34.34 KG/M2 | OXYGEN SATURATION: 97 % | RESPIRATION RATE: 18 BRPM | WEIGHT: 193.8 LBS

## 2019-04-15 DIAGNOSIS — M15.9 GENERALIZED OSTEOARTHRITIS: ICD-10-CM

## 2019-04-15 DIAGNOSIS — M06.9 RHEUMATOID ARTHRITIS, INVOLVING UNSPECIFIED SITE, UNSPECIFIED RHEUMATOID FACTOR PRESENCE: ICD-10-CM

## 2019-04-15 DIAGNOSIS — Z79.899 ENCOUNTER FOR LONG-TERM (CURRENT) USE OF HIGH-RISK MEDICATION: ICD-10-CM

## 2019-04-15 DIAGNOSIS — M51.36 DEGENERATION OF INTERVERTEBRAL DISC OF LUMBAR REGION: ICD-10-CM

## 2019-04-15 DIAGNOSIS — G89.4 CHRONIC PAIN SYNDROME: Primary | ICD-10-CM

## 2019-04-15 DIAGNOSIS — Z79.01 ANTICOAGULANT LONG-TERM USE: ICD-10-CM

## 2019-04-15 DIAGNOSIS — M41.9 SCOLIOSIS, UNSPECIFIED SCOLIOSIS TYPE, UNSPECIFIED SPINAL REGION: ICD-10-CM

## 2019-04-15 PROCEDURE — 99214 OFFICE O/P EST MOD 30 MIN: CPT | Performed by: ANESTHESIOLOGY

## 2019-04-15 RX ORDER — MAGNESIUM 200 MG
TABLET ORAL
Qty: 30 EACH | Refills: 5 | Status: SHIPPED | OUTPATIENT
Start: 2019-04-15 | End: 2019-07-16

## 2019-04-15 RX ORDER — ACETAMINOPHEN 160 MG
TABLET,DISINTEGRATING ORAL
Qty: 30 CAPSULE | Refills: 9 | Status: SHIPPED | OUTPATIENT
Start: 2019-04-15

## 2019-04-15 RX ORDER — OXYCODONE HYDROCHLORIDE 80 MG/1
160 TABLET, FILM COATED, EXTENDED RELEASE ORAL EVERY 12 HOURS SCHEDULED
Qty: 120 TABLET | Refills: 0 | Status: SHIPPED | OUTPATIENT
Start: 2019-04-15 | End: 2019-05-15 | Stop reason: SDUPTHER

## 2019-04-15 NOTE — PROGRESS NOTES
CHIEF COMPLAINT  Follow-up for back pain. Ms. Jewell states that her back pain is unchanged from her last appt.    Subjective   Anne Jewell is a 68 y.o. female  who presents to the office for follow-up.She has a history of arthritis, back pain, moderate success with long term pain medication use.      Back Pain   This is a chronic problem. The current episode started more than 1 year ago. The problem occurs 2 to 4 times per day. The problem is unchanged. The pain is present in the lumbar spine. The quality of the pain is described as aching. The pain radiates to the left foot and right foot. The pain is at a severity of 6/10. The pain is moderate. The symptoms are aggravated by stress (weather changes). Associated symptoms include weakness. Pertinent negatives include no bladder incontinence, bowel incontinence, chest pain, dysuria, fever, headaches or numbness. Risk factors include obesity and menopause. She has tried analgesics, bed rest, home exercises and heat (OxyContin, hydrocodone, aquatherapy) for the symptoms. The treatment provided mild relief.   Joint Pain   This is a chronic problem. The current episode started more than 1 year ago. The problem occurs constantly. The problem has been unchanged. Associated symptoms include arthralgias, neck pain and weakness. Pertinent negatives include no chest pain, chills, congestion, coughing, fatigue, fever, headaches, nausea, numbness or vomiting. She has tried oral narcotics, acetaminophen and heat for the symptoms. The treatment provided mild relief.        PEG Assessment   What number best describes your pain on average in the past week?6  What number best describes how, during the past week, pain has interfered with your enjoyment of life?6  What number best describes how, during the past week, pain has interfered with your general activity?  6    --  The aforementioned information the Chief Complaint section and above subjective data including any HPI data has  been personally reviewed and affirmed.  --        The following portions of the patient's history were reviewed and updated as appropriate: allergies, current medications, past family history, past medical history, past social history, past surgical history and problem list.    -------    The following portions of the patient's history were reviewed and updated as appropriate: allergies, current medications, past family history, past medical history, past social history, past surgical history and problem list.    Allergies   Allergen Reactions   • Penicillins          Current Outpatient Medications:   •  albuterol (PROVENTIL HFA;VENTOLIN HFA) 108 (90 BASE) MCG/ACT inhaler, Proventil  (90 Base) MCG/ACT Inhalation Aerosol Solution; Patient Sig: Proventil  (90 Base) MCG/ACT Inhalation Aerosol Solution Inhale 2 puff(s) every 6 to 8 hours as needed; 6.7; 0; 05-Apr-2013; Active, Disp: , Rfl:   •  ALPRAZolam (XANAX) 2 MG tablet, , Disp: , Rfl:   •  Azelastine-Fluticasone 137-50 MCG/ACT suspension, Inhale 1 spray every 12 (twelve) hours., Disp: , Rfl:   •  baclofen (LIORESAL) 10 MG tablet, Take 1 tablet by mouth 3 (Three) Times a Day., Disp: 30 tablet, Rfl: 9  •  cephalexin (KEFLEX) 500 MG capsule, Take 500 mg by mouth 2 (Two) Times a Day., Disp: , Rfl:   •  chlorhexidine (PERIDEX) 0.12 % solution, , Disp: , Rfl:   •  Cholecalciferol (VITAMIN D3) 2000 units capsule, TAKE ONE CAPSULE BY MOUTH DAILY, Disp: 30 capsule, Rfl: 9  •  Cyanocobalamin (B-12) 1000 MCG sublingual tablet, PLACE 1 TABLET UNDER THE TONGUE AND LET DISSOLVE ONCE DAILY, Disp: 30 each, Rfl: 1  •  Cyanocobalamin (VITAMIN B-12) 1000 MCG sublingual tablet, PLACE ONE TABLET UNDER THE TONGUE DAILY, Disp: 30 each, Rfl: 0  •  Cyanocobalamin (VITAMIN B-12) 1000 MCG sublingual tablet, PLACE ONE TABLET UNDER THE TONGUE DAILY, Disp: 30 each, Rfl: 5  •  dicyclomine (BENTYL) 20 MG tablet, 1 q 6-h prn, Disp: 120 tablet, Rfl: 6  •  escitalopram (LEXAPRO) 10 MG  tablet, Take 1 tablet by mouth daily., Disp: , Rfl:   •  hydrochlorothiazide (HYDRODIURIL) 25 MG tablet, TAKE ONE TABLET BY MOUTH DAILY, Disp: 30 tablet, Rfl: 0  •  levothyroxine (SYNTHROID, LEVOTHROID) 100 MCG tablet, TAKE ONE TABLET BY MOUTH DAILY, Disp: 90 tablet, Rfl: 0  •  montelukast (SINGULAIR) 10 MG tablet, Take 1 tablet by mouth daily., Disp: , Rfl:   •  Multiple Vitamin tablet, Take 1 tablet by mouth daily., Disp: , Rfl:   •  oxyCODONE ER (oxyCONTIN) 80 MG tablet extended-release 12 hour 12 hr tablet, Take 2 tablets by mouth Every 12 (Twelve) Hours., Disp: 120 tablet, Rfl: 0  •  potassium chloride (K-DUR,KLOR-CON) 20 MEQ CR tablet, TAKE ONE TABLET BY MOUTH TWICE A DAY, Disp: 180 tablet, Rfl: 0  •  potassium chloride (K-DUR,KLOR-CON) 20 MEQ CR tablet, TAKE ONE TABLET BY MOUTH TWICE A DAY **MUST CALL MD FOR APPOINTMENT **, Disp: 90 tablet, Rfl: 0  •  promethazine (PHENERGAN) 25 MG tablet, Take 1 tablet by mouth Every 8 (Eight) Hours As Needed for Nausea or Vomiting., Disp: 90 tablet, Rfl: 11  •  XARELTO 20 MG tablet, TAKE ONE TABLET BY MOUTH DAILY, Disp: 30 tablet, Rfl: 9  •  hydrochlorothiazide (HYDRODIURIL) 25 MG tablet, TAKE ONE TABLET BY MOUTH DAILY, Disp: 90 tablet, Rfl: 2    Current Outpatient Medications on File Prior to Visit   Medication Sig Dispense Refill   • albuterol (PROVENTIL HFA;VENTOLIN HFA) 108 (90 BASE) MCG/ACT inhaler Proventil  (90 Base) MCG/ACT Inhalation Aerosol Solution; Patient Sig: Proventil  (90 Base) MCG/ACT Inhalation Aerosol Solution Inhale 2 puff(s) every 6 to 8 hours as needed; 6.7; 0; 05-Apr-2013; Active     • ALPRAZolam (XANAX) 2 MG tablet      • Azelastine-Fluticasone 137-50 MCG/ACT suspension Inhale 1 spray every 12 (twelve) hours.     • baclofen (LIORESAL) 10 MG tablet Take 1 tablet by mouth 3 (Three) Times a Day. 30 tablet 9   • cephalexin (KEFLEX) 500 MG capsule Take 500 mg by mouth 2 (Two) Times a Day.     • chlorhexidine (PERIDEX) 0.12 % solution      •  Cholecalciferol (VITAMIN D3) 2000 units capsule TAKE ONE CAPSULE BY MOUTH DAILY 30 capsule 9   • Cyanocobalamin (B-12) 1000 MCG sublingual tablet PLACE 1 TABLET UNDER THE TONGUE AND LET DISSOLVE ONCE DAILY 30 each 1   • Cyanocobalamin (VITAMIN B-12) 1000 MCG sublingual tablet PLACE ONE TABLET UNDER THE TONGUE DAILY 30 each 0   • Cyanocobalamin (VITAMIN B-12) 1000 MCG sublingual tablet PLACE ONE TABLET UNDER THE TONGUE DAILY 30 each 5   • dicyclomine (BENTYL) 20 MG tablet 1 q 6-h prn 120 tablet 6   • escitalopram (LEXAPRO) 10 MG tablet Take 1 tablet by mouth daily.     • hydrochlorothiazide (HYDRODIURIL) 25 MG tablet TAKE ONE TABLET BY MOUTH DAILY 30 tablet 0   • levothyroxine (SYNTHROID, LEVOTHROID) 100 MCG tablet TAKE ONE TABLET BY MOUTH DAILY 90 tablet 0   • montelukast (SINGULAIR) 10 MG tablet Take 1 tablet by mouth daily.     • Multiple Vitamin tablet Take 1 tablet by mouth daily.     • potassium chloride (K-DUR,KLOR-CON) 20 MEQ CR tablet TAKE ONE TABLET BY MOUTH TWICE A  tablet 0   • potassium chloride (K-DUR,KLOR-CON) 20 MEQ CR tablet TAKE ONE TABLET BY MOUTH TWICE A DAY **MUST CALL MD FOR APPOINTMENT ** 90 tablet 0   • promethazine (PHENERGAN) 25 MG tablet Take 1 tablet by mouth Every 8 (Eight) Hours As Needed for Nausea or Vomiting. 90 tablet 11   • XARELTO 20 MG tablet TAKE ONE TABLET BY MOUTH DAILY 30 tablet 9     No current facility-administered medications on file prior to visit.        Patient Active Problem List   Diagnosis   • Chronic pain syndrome   • Rheumatoid arthritis (CMS/HCC)   • Osteoarthritis of knee   • Generalized osteoarthritis   • Degeneration of intervertebral disc of lumbar region   • Neck pain   • Lumbar radiculopathy   • Atopic rhinitis   • Anxiety   • Diarrhea   • Indigestion   • Dysthymic disorder   • Gastroesophageal reflux disease   • Hypothyroidism   • Insomnia   • Pernicious anemia   • Osteoporosis   • Scoliosis   • Vasovagal syncope   • Vitamin D deficiency   • Trigger  middle finger of right hand   • Trigger ring finger of right hand   • Trigger little finger of right hand   • Encounter for long-term (current) use of high-risk medication   • Coagulation disorder (CMS/HCC)   • Hyperglycemia   • Joint pain   • Status post total knee replacement, left   • Left knee pain   • Status post total knee replacement, right   • Osteoarthritis of thumb   • Chronic low back pain       Past Medical History:   Diagnosis Date   • Allergic rhinitis    • Asthma    • Bronchospasm    • Clotting disorder (CMS/HCC)    • Deep vein thrombosis (CMS/HCC)    • Disc degeneration, lumbar    • Edema    • Esophageal reflux    • Glaucoma    • Joint pain    • Low back pain    • Osteoarthritis    • Osteopenia    • Osteoporosis    • Scoliosis    • Status post total knee replacement, left 8/31/2017   • Status post total knee replacement, right 8/31/2017   • UTI (urinary tract infection)    • Venous thrombosis        Past Surgical History:   Procedure Laterality Date   • BILATERAL BREAST REDUCTION     • BREAST SURGERY     • COLONOSCOPY  08/05/2016   • GASTRIC BYPASS     • HAND SURGERY Right 01/14/2016   • HERNIA REPAIR      x7   • HYSTERECTOMY     • OTHER SURGICAL HISTORY      vaginal sling operation for stress incontinence   • TOTAL KNEE ARTHROPLASTY Left    • TOTAL KNEE ARTHROPLASTY Bilateral        Family History   Problem Relation Age of Onset   • Prostate cancer Father    • Arthritis Other    • Hypertension Other         benign essential   • Cancer Other    • Diabetes Other    • Heart disease Other    • Nephrolithiasis Other        Social History     Socioeconomic History   • Marital status:      Spouse name: Not on file   • Number of children: Not on file   • Years of education: Not on file   • Highest education level: Not on file   Tobacco Use   • Smoking status: Former Smoker   • Smokeless tobacco: Never Used   Substance and Sexual Activity   • Alcohol use: No   • Drug use: No   • Sexual activity: Defer  "      -------        Review of Systems   Constitutional: Negative for chills, fatigue and fever.   HENT: Negative for congestion.    Eyes: Negative for visual disturbance.   Respiratory: Negative for cough, shortness of breath and wheezing.    Cardiovascular: Negative.  Negative for chest pain.   Gastrointestinal: Negative for bowel incontinence, constipation, diarrhea, nausea and vomiting.   Genitourinary: Negative for bladder incontinence, difficulty urinating and dysuria.   Musculoskeletal: Positive for arthralgias, back pain and neck pain.   Neurological: Positive for weakness. Negative for numbness and headaches.   Psychiatric/Behavioral: Positive for sleep disturbance. Negative for suicidal ideas. The patient is not nervous/anxious.                  Vitals:    04/15/19 1103   BP: 137/81   Pulse: 102   Resp: 18   Temp: 97.4 °F (36.3 °C)   SpO2: 97%   Weight: 87.9 kg (193 lb 12.8 oz)   Height: 158.8 cm (62.52\")   PainSc:   6   PainLoc: Back     This entire visit was chaperoned by my medical assistant, KENNY MORRISSEY    Objective   Physical Exam   Constitutional: She is oriented to person, place, and time. Vital signs are normal. She appears well-developed and well-nourished.  Non-toxic appearance. No distress.   HENT:   Head: Normocephalic and atraumatic.   Right Ear: Hearing and external ear normal.   Left Ear: Hearing and external ear normal.   Nose: Nose normal.   Eyes: Conjunctivae and lids are normal. Pupils are equal, round, and reactive to light.   Pulmonary/Chest: Effort normal. No respiratory distress.   Abdominal: Normal appearance.   Musculoskeletal:        Lumbar back: She exhibits decreased range of motion (She has a very difficult time flexing past 45 degrees with moderate pain.) and tenderness.   Neurological: She is alert and oriented to person, place, and time. No cranial nerve deficit.   Reflex Scores:       Patellar reflexes are 0 on the right side and 0 on the left side.       Achilles reflexes are 0 " on the right side and 0 on the left side.  Gait is slow, she uses a walker.   Psychiatric: She has a normal mood and affect. Her behavior is normal.   Nursing note and vitals reviewed.          Assessment/Plan   Anne was seen today for back pain.    Diagnoses and all orders for this visit:    Chronic pain syndrome    Encounter for long-term (current) use of high-risk medication    Degeneration of intervertebral disc of lumbar region    Generalized osteoarthritis    Rheumatoid arthritis, involving unspecified site, unspecified rheumatoid factor presence (CMS/[facility])    Scoliosis, unspecified scoliosis type, unspecified spinal region    Anticoagulant long-term use    Other orders  -     oxyCODONE ER (oxyCONTIN) 80 MG tablet extended-release 12 hour 12 hr tablet; Take 2 tablets by mouth Every 12 (Twelve) Hours.      This visit was extended, is a 25-minute visit, with 20 minutes spent in education and counseling.  She feels that she is doing worse since she had an opiate de-escalation.  Some of that opiate de-escalation was the fact that she is taking less oxycodone, as the oxycodone product she was using was in fact no longer available, and she got all that her pharmacy had left.  Therefore she has the escalated some.  She also was discontinued from immediate acting hydrocodone with acetaminophen product use.  She asks for a new prescription of the hydrocodone with acetaminophen, which is declined.  We spent a great deal of time educating counseling and discussion as to why I did not think this was in her best interest.  When she is using her dose of OxyContin she is able to mobilize and ambulate, be less sedentary, participate in exercise including water aerobics, and get out of her home and drive to appointments other activities.  I also did some mathematics with her regarding morphine equivalents and I think that the addition of 30-40 mg of hydrocodone a day is fairly insignificant compared to the daily dose of the  OxyContin.  Also counseled with her that she is likely to see the difference when she switches back to the branded product.    -- Follow-up 1 month  -- she notes that the generic Oxycontin is not available... Switch back to brand  -- discontinued Hydrocodone         Patient appears stable with current regimen. No adverse effects. Regarding continuation of opioids, there is no evidence of aberrant behavior or any red flags.  The patient continues with appropriate response to opioid therapy. ADL's remain intact by self.     she does have a significant history of back pain and joint pain and demonstrates moderate improvement with multimodal therapy including opioid therapy, which allows her to engage in ADLs and family activities. The continuation of opioid therapy is therefore not contraindicated. We will however respect the March 2016 CDC Guidelines and will plan to avoid overexposure to opioids and avoid dose escalation.    Her management is difficult with regards to multimodal perspective because she requires anticoagulants and this is made it difficult to consider interventional options.  We have discussed some varices in the past, and it seems that she is making efforts to be totally compliant.  She has had a prescription of naloxone in the past, and a new when will be reasonable to re-prescribe once the generic naloxone is commercially available.    VICENTA REPORT    As part of the patient's treatment plan, I am prescribing controlled substances. The patient has been made aware of appropriate use of such medications, including potential risk of somnolence, limited ability to drive and/or work safely, and the potential for dependence or overdose. It has also bee made clear that these medications are for use by this patient only, without concomitant use of alcohol or other substances unless prescribed.     Patient has completed prescribing agreement detailing terms of continued prescribing of controlled substances,  including monitoring VICENTA reports, urine drug screening, and pill counts if necessary. The patient is aware that inappropriate use will results in cessation of prescribing such medications.    VICENTA report has been reviewed and scanned into the patient's chart.    As the clinician, I personally reviewed the VICENTA from as above while the patient was in the office today.    History and physical exam exhibit continued safe and appropriate use of controlled substances.      EMR Dragon/Transcription disclaimer:   Much of this encounter note is an electronic transcription/translation of spoken language to printed text. The electronic translation of spoken language may permit erroneous, or at times, nonsensical words or phrases to be inadvertently transcribed; Although I have reviewed the note for such errors, some may still exist.

## 2019-04-17 DIAGNOSIS — I10 HYPERTENSION, UNSPECIFIED TYPE: ICD-10-CM

## 2019-04-17 RX ORDER — HYDROCHLOROTHIAZIDE 25 MG/1
TABLET ORAL
Qty: 90 TABLET | Refills: 2 | Status: SHIPPED | OUTPATIENT
Start: 2019-04-17 | End: 2019-07-16

## 2019-04-18 ENCOUNTER — OFFICE VISIT (OUTPATIENT)
Dept: ORTHOPEDIC SURGERY | Facility: CLINIC | Age: 69
End: 2019-04-18

## 2019-04-18 VITALS — WEIGHT: 188 LBS | BODY MASS INDEX: 33.31 KG/M2 | HEIGHT: 63 IN

## 2019-04-18 DIAGNOSIS — M65.351 TRIGGER LITTLE FINGER OF RIGHT HAND: Primary | ICD-10-CM

## 2019-04-18 DIAGNOSIS — M65.341 TRIGGER RING FINGER OF RIGHT HAND: ICD-10-CM

## 2019-04-18 DIAGNOSIS — M65.341 TRIGGER FINGER OF ALL DIGITS OF RIGHT HAND: ICD-10-CM

## 2019-04-18 DIAGNOSIS — Z96.652 STATUS POST TOTAL KNEE REPLACEMENT, LEFT: ICD-10-CM

## 2019-04-18 DIAGNOSIS — M65.331 TRIGGER MIDDLE FINGER OF RIGHT HAND: ICD-10-CM

## 2019-04-18 DIAGNOSIS — M65.351 TRIGGER FINGER OF ALL DIGITS OF RIGHT HAND: ICD-10-CM

## 2019-04-18 DIAGNOSIS — M65.321 TRIGGER FINGER OF ALL DIGITS OF RIGHT HAND: ICD-10-CM

## 2019-04-18 DIAGNOSIS — M65.311 TRIGGER FINGER OF ALL DIGITS OF RIGHT HAND: ICD-10-CM

## 2019-04-18 DIAGNOSIS — M65.331 TRIGGER FINGER OF ALL DIGITS OF RIGHT HAND: ICD-10-CM

## 2019-04-18 DIAGNOSIS — Z96.651 STATUS POST TOTAL KNEE REPLACEMENT, RIGHT: ICD-10-CM

## 2019-04-18 PROCEDURE — 99213 OFFICE O/P EST LOW 20 MIN: CPT | Performed by: ORTHOPAEDIC SURGERY

## 2019-04-18 PROCEDURE — 73562 X-RAY EXAM OF KNEE 3: CPT | Performed by: ORTHOPAEDIC SURGERY

## 2019-05-02 PROBLEM — M65.321 TRIGGER FINGER OF ALL DIGITS OF RIGHT HAND: Status: ACTIVE | Noted: 2019-05-02

## 2019-05-02 PROBLEM — M65.351 TRIGGER FINGER OF ALL DIGITS OF RIGHT HAND: Status: ACTIVE | Noted: 2019-05-02

## 2019-05-02 PROBLEM — M65.311 TRIGGER FINGER OF ALL DIGITS OF RIGHT HAND: Status: ACTIVE | Noted: 2019-05-02

## 2019-05-02 PROBLEM — M65.331 TRIGGER FINGER OF ALL DIGITS OF RIGHT HAND: Status: ACTIVE | Noted: 2019-05-02

## 2019-05-02 PROBLEM — M65.341 TRIGGER FINGER OF ALL DIGITS OF RIGHT HAND: Status: ACTIVE | Noted: 2019-05-02

## 2019-05-02 RX ORDER — POTASSIUM CHLORIDE 20 MEQ/1
TABLET, EXTENDED RELEASE ORAL
Qty: 90 TABLET | Refills: 0 | Status: SHIPPED | OUTPATIENT
Start: 2019-05-02 | End: 2019-06-21 | Stop reason: SDUPTHER

## 2019-05-14 RX ORDER — POTASSIUM CHLORIDE 20 MEQ/1
TABLET, EXTENDED RELEASE ORAL
Qty: 90 TABLET | Refills: 0 | OUTPATIENT
Start: 2019-05-14

## 2019-05-15 ENCOUNTER — OFFICE VISIT (OUTPATIENT)
Dept: PAIN MEDICINE | Facility: CLINIC | Age: 69
End: 2019-05-15

## 2019-05-15 ENCOUNTER — RESULTS ENCOUNTER (OUTPATIENT)
Dept: PAIN MEDICINE | Facility: CLINIC | Age: 69
End: 2019-05-15

## 2019-05-15 VITALS
BODY MASS INDEX: 33.81 KG/M2 | TEMPERATURE: 99 F | RESPIRATION RATE: 20 BRPM | WEIGHT: 190.8 LBS | OXYGEN SATURATION: 97 % | DIASTOLIC BLOOD PRESSURE: 78 MMHG | HEART RATE: 91 BPM | HEIGHT: 63 IN | SYSTOLIC BLOOD PRESSURE: 112 MMHG

## 2019-05-15 DIAGNOSIS — G89.29 CHRONIC LOW BACK PAIN, UNSPECIFIED BACK PAIN LATERALITY, WITH SCIATICA PRESENCE UNSPECIFIED: ICD-10-CM

## 2019-05-15 DIAGNOSIS — Z79.899 ENCOUNTER FOR LONG-TERM (CURRENT) USE OF HIGH-RISK MEDICATION: ICD-10-CM

## 2019-05-15 DIAGNOSIS — M54.5 CHRONIC LOW BACK PAIN, UNSPECIFIED BACK PAIN LATERALITY, WITH SCIATICA PRESENCE UNSPECIFIED: ICD-10-CM

## 2019-05-15 DIAGNOSIS — M06.9 RHEUMATOID ARTHRITIS, INVOLVING UNSPECIFIED SITE, UNSPECIFIED RHEUMATOID FACTOR PRESENCE: ICD-10-CM

## 2019-05-15 DIAGNOSIS — G89.4 CHRONIC PAIN SYNDROME: Primary | ICD-10-CM

## 2019-05-15 DIAGNOSIS — G89.4 CHRONIC PAIN SYNDROME: ICD-10-CM

## 2019-05-15 PROCEDURE — 99214 OFFICE O/P EST MOD 30 MIN: CPT | Performed by: NURSE PRACTITIONER

## 2019-05-15 RX ORDER — OXYCODONE HYDROCHLORIDE 80 MG/1
160 TABLET, FILM COATED, EXTENDED RELEASE ORAL EVERY 12 HOURS SCHEDULED
Qty: 120 TABLET | Refills: 0 | Status: SHIPPED | OUTPATIENT
Start: 2019-05-15 | End: 2019-06-14 | Stop reason: SDUPTHER

## 2019-05-15 NOTE — PROGRESS NOTES
"CHIEF COMPLAINT  F/u back pain. Pt states pain has stayed the same since last ov.     Subjective   Anne Jewell is a 68 y.o. female  who presents to the office for follow-up.She has a history of back pain.    Complains of pain in her back and joints. Today her pain is 6/10VAS. Continues with OxyContin 80 mg 2 BID. She reports that she can tell a big difference in efficacy of the Oxycontin when it is Brand Name versus generic.  Denies any side effects from the regimen, including constipation and somnolence. The regimen helps decrease her pain by 50%. Notes improved function and activity with regimen.  ADL's by self.     Says she has been in bed since her last visit with me.  She reports she has never been talked to that way.  \"I felt like I was at a police station\".    Back Pain   This is a chronic problem. The current episode started more than 1 year ago. The problem occurs daily. The problem is unchanged. The pain is present in the lumbar spine. The quality of the pain is described as aching. The pain radiates to the left foot and right foot. The pain is at a severity of 6/10. The pain is moderate. The symptoms are aggravated by stress (weather changes). Pertinent negatives include no bladder incontinence, bowel incontinence, chest pain, dysuria, fever, headaches, numbness or weakness. She has tried analgesics, bed rest, home exercises and heat (OxyContin, hydrocodone, aquatherapy) for the symptoms. The treatment provided moderate relief.   Joint Pain   This is a chronic problem. The current episode started more than 1 year ago. The problem occurs constantly. The problem has been unchanged. Associated symptoms include arthralgias, fatigue (occ) and neck pain. Pertinent negatives include no chest pain, chills, congestion, coughing, fever, headaches, nausea, numbness, vomiting or weakness. She has tried oral narcotics for the symptoms. The treatment provided moderate relief.     PEG Assessment   What number best " "describes your pain on average in the past week?6  What number best describes how, during the past week, pain has interfered with your enjoyment of life?6  What number best describes how, during the past week, pain has interfered with your general activity?  6    The following portions of the patient's history were reviewed and updated as appropriate: allergies, current medications, past family history, past medical history, past social history, past surgical history and problem list.    Review of Systems   Constitutional: Positive for activity change (dec) and fatigue (occ). Negative for chills and fever.   HENT: Negative for congestion.    Eyes: Negative for visual disturbance.   Respiratory: Positive for shortness of breath (hx asthma). Negative for cough.    Cardiovascular: Negative for chest pain.   Gastrointestinal: Negative for bowel incontinence, constipation, diarrhea, nausea and vomiting.   Genitourinary: Negative for bladder incontinence, difficulty urinating and dysuria.   Musculoskeletal: Positive for arthralgias, back pain, gait problem (walker) and neck pain.   Neurological: Negative for dizziness, weakness, numbness and headaches.   Psychiatric/Behavioral: Positive for sleep disturbance (occ). Negative for suicidal ideas.     Vitals:    05/15/19 1123   BP: 112/78   Pulse: 91   Resp: 20   Temp: 99 °F (37.2 °C)   TempSrc: Tympanic   SpO2: 97%   Weight: 86.5 kg (190 lb 12.8 oz)   Height: 158.8 cm (62.5\")   PainSc:   6   PainLoc: Back     Objective   Physical Exam   Constitutional: She is oriented to person, place, and time. She appears well-developed and well-nourished. She is cooperative. No distress.   HENT:   Head: Normocephalic and atraumatic.   Nose: Nose normal.   Eyes: Conjunctivae and lids are normal.   Neck: Trachea normal. Neck supple.   Cardiovascular: Normal rate.   Pulmonary/Chest: Effort normal. No respiratory distress.   Musculoskeletal:        Lumbar back: She exhibits tenderness, bony " tenderness and pain.   Diffuse arthritic changes - no acute synovitis  Lumbar brace    Neurological: She is alert and oriented to person, place, and time. She has normal strength. Gait (ambulating with rolling walker) abnormal.   Skin: Skin is intact. She is not diaphoretic.   Psychiatric: She has a normal mood and affect. Her speech is normal and behavior is normal. Cognition and memory are normal.   Nursing note and vitals reviewed.    Assessment/Plan   Anne was seen today for back pain.    Diagnoses and all orders for this visit:    Chronic pain syndrome    Chronic low back pain, unspecified back pain laterality, with sciatica presence unspecified    Rheumatoid arthritis, involving unspecified site, unspecified rheumatoid factor presence (CMS/[facility])    Encounter for long-term (current) use of high-risk medication    Other orders  -     oxyCODONE ER (oxyCONTIN) 80 MG tablet extended-release 12 hour 12 hr tablet; Take 2 tablets by mouth Every 12 (Twelve) Hours.      --- Refill OxyContin. Patient appears stable with current regimen. No adverse effects. Regarding continuation of opioids, there is no evidence of aberrant behavior or any red flags.  The patient continues with appropriate response to opioid therapy. ADL's remain intact by self.   --- Routine UDS in office today as part of monitoring requirements for controlled substances.  The specimen was viewed and the immunoassay result reviewed and is UNAVAILABLE.  This specimen will be sent to [facility] laboratory for confirmation.     --- Follow-up 1 month        [facility]ICENTA REPORT  As part of the patient's treatment plan, I am prescribing controlled substances. The patient has been made aware of appropriate use of such medications, including potential risk of somnolence, limited ability to drive and/or work safely, and the potential for dependence or overdose. It has also bee made clear that these medications are for use by this patient only, without concomitant use  of alcohol or other substances unless prescribed.     Patient has completed prescribing agreement detailing terms of continued prescribing of controlled substances, including monitoring VICENTA reports, urine drug screening, and pill counts if necessary. The patient is aware that inappropriate use will results in cessation of prescribing such medications.    VICENTA report has been reviewed and scanned into the patient's chart.    As the clinician, I personally reviewed the VICENTA from 5/14/19 while the patient was in the office today.    History and physical exam exhibit continued safe and appropriate use of controlled substances.    EMR Dragon/Transcription disclaimer:   Much of this encounter note is an electronic transcription/translation of spoken language to printed text. The electronic translation of spoken language may permit erroneous, or at times, nonsensical words or phrases to be inadvertently transcribed; Although I have reviewed the note for such errors, some may still exist.

## 2019-05-21 RX ORDER — LEVOTHYROXINE SODIUM 0.1 MG/1
TABLET ORAL
Qty: 90 TABLET | Refills: 0 | Status: SHIPPED | OUTPATIENT
Start: 2019-05-21 | End: 2019-07-23 | Stop reason: SDUPTHER

## 2019-05-22 ENCOUNTER — OFFICE VISIT CONVERTED (OUTPATIENT)
Dept: FAMILY MEDICINE CLINIC | Age: 69
End: 2019-05-22
Attending: NURSE PRACTITIONER

## 2019-06-07 ENCOUNTER — TELEPHONE (OUTPATIENT)
Dept: SPORTS MEDICINE | Facility: CLINIC | Age: 69
End: 2019-06-07

## 2019-06-07 NOTE — TELEPHONE ENCOUNTER
Patient would like to get a medrol dose pack, states that she has a sinus infection and that it usually takes two of those for it to function. Please advise, thank you!

## 2019-06-10 RX ORDER — AZITHROMYCIN 250 MG/1
TABLET, FILM COATED ORAL
Qty: 6 TABLET | Refills: 0 | Status: SHIPPED | OUTPATIENT
Start: 2019-06-10 | End: 2019-07-16

## 2019-06-11 ENCOUNTER — TELEPHONE (OUTPATIENT)
Dept: ORTHOPEDIC SURGERY | Facility: CLINIC | Age: 69
End: 2019-06-11

## 2019-06-11 RX ORDER — CIPROFLOXACIN 500 MG/1
500 TABLET, FILM COATED ORAL DAILY
Qty: 7 TABLET | Refills: 0 | Status: SHIPPED | OUTPATIENT
Start: 2019-06-11 | End: 2019-07-16

## 2019-06-11 NOTE — TELEPHONE ENCOUNTER
Patient called stating she has fluid on her right knee it is warm to touch, she has been keeping it wrapped and elevated.     She has other issues going on (ingrown toenail, allergies, asthma) she is asking if she needs a antibiotic or not?     Patient is allergic to PCN     Please Advise.     Manny Garcia

## 2019-06-11 NOTE — TELEPHONE ENCOUNTER
Please call her in a prescription of ciprofloxacin 500 mg orally once a day for 1 week to cover for the possibility of an infection.  Thank

## 2019-06-14 ENCOUNTER — OFFICE VISIT (OUTPATIENT)
Dept: PAIN MEDICINE | Facility: CLINIC | Age: 69
End: 2019-06-14

## 2019-06-14 VITALS
RESPIRATION RATE: 20 BRPM | OXYGEN SATURATION: 96 % | BODY MASS INDEX: 33.88 KG/M2 | SYSTOLIC BLOOD PRESSURE: 108 MMHG | HEIGHT: 63 IN | WEIGHT: 191.2 LBS | DIASTOLIC BLOOD PRESSURE: 70 MMHG | HEART RATE: 84 BPM | TEMPERATURE: 98.5 F

## 2019-06-14 DIAGNOSIS — M54.5 CHRONIC LOW BACK PAIN, UNSPECIFIED BACK PAIN LATERALITY, WITH SCIATICA PRESENCE UNSPECIFIED: ICD-10-CM

## 2019-06-14 DIAGNOSIS — G89.4 CHRONIC PAIN SYNDROME: Primary | ICD-10-CM

## 2019-06-14 DIAGNOSIS — M06.9 RHEUMATOID ARTHRITIS, INVOLVING UNSPECIFIED SITE, UNSPECIFIED RHEUMATOID FACTOR PRESENCE: ICD-10-CM

## 2019-06-14 DIAGNOSIS — Z79.899 ENCOUNTER FOR LONG-TERM (CURRENT) USE OF HIGH-RISK MEDICATION: ICD-10-CM

## 2019-06-14 DIAGNOSIS — G89.29 CHRONIC LOW BACK PAIN, UNSPECIFIED BACK PAIN LATERALITY, WITH SCIATICA PRESENCE UNSPECIFIED: ICD-10-CM

## 2019-06-14 PROCEDURE — 99214 OFFICE O/P EST MOD 30 MIN: CPT | Performed by: NURSE PRACTITIONER

## 2019-06-14 RX ORDER — OXYCODONE HYDROCHLORIDE 80 MG/1
160 TABLET, FILM COATED, EXTENDED RELEASE ORAL EVERY 12 HOURS SCHEDULED
Qty: 120 TABLET | Refills: 0 | Status: SHIPPED | OUTPATIENT
Start: 2019-06-14 | End: 2019-07-16 | Stop reason: SDUPTHER

## 2019-06-14 NOTE — PROGRESS NOTES
CHIEF COMPLAINT  F/u back pain. Pt states pain has stayed the same since last ov.     Subjective   Anne Jewell is a 68 y.o. female  who presents to the office for follow-up.She has a history of back pain and joint pain.    Complains of pain in her back and joints. Today her pain is 6/10VAS. Continues with OxyContin 80 mg 2 BID. She reports that she can tell a big difference in efficacy of the Oxycontin when it is Brand Name versus generic.  Denies any side effects from the regimen, including constipation and somnolence. The regimen helps decrease her pain by 50%. Notes improved function and activity with regimen.  ADL's by self.     Back Pain   This is a chronic problem. The current episode started more than 1 year ago. The problem occurs daily. The problem is unchanged. The pain is present in the lumbar spine. The quality of the pain is described as aching. The pain radiates to the left foot and right foot. The pain is at a severity of 6/10. The pain is moderate. The symptoms are aggravated by stress (weather changes). Pertinent negatives include no bladder incontinence, bowel incontinence, chest pain, dysuria, fever, headaches, numbness or weakness. She has tried analgesics, bed rest, home exercises and heat (OxyContin, hydrocodone, aquatherapy) for the symptoms. The treatment provided moderate relief.   Joint Pain   This is a chronic problem. The current episode started more than 1 year ago. The problem occurs constantly. The problem has been unchanged. Associated symptoms include arthralgias, fatigue (occ), joint swelling (right leg and ankle) and neck pain. Pertinent negatives include no chest pain, chills, congestion, coughing, fever, headaches, nausea, numbness, vomiting or weakness. She has tried oral narcotics for the symptoms. The treatment provided moderate relief.      PEG Assessment   What number best describes your pain on average in the past week?6  What number best describes how, during the past  "week, pain has interfered with your enjoyment of life?6  What number best describes how, during the past week, pain has interfered with your general activity?  7    The following portions of the patient's history were reviewed and updated as appropriate: allergies, current medications, past family history, past medical history, past social history, past surgical history and problem list.    Review of Systems   Constitutional: Positive for activity change (dec) and fatigue (occ). Negative for chills and fever.   HENT: Negative for congestion.    Eyes: Negative for visual disturbance.   Respiratory: Positive for shortness of breath (hx asthma). Negative for cough.    Cardiovascular: Negative for chest pain.   Gastrointestinal: Negative for bowel incontinence, constipation, diarrhea, nausea and vomiting.   Genitourinary: Negative for bladder incontinence, difficulty urinating and dysuria.   Musculoskeletal: Positive for arthralgias, back pain, gait problem (walker), joint swelling (right leg and ankle) and neck pain. Negative for neck stiffness.   Neurological: Negative for dizziness, weakness, numbness and headaches.   Psychiatric/Behavioral: Positive for sleep disturbance (occ). Negative for suicidal ideas.     Vitals:    06/14/19 1115   BP: 108/70   Pulse: 84   Resp: 20   Temp: 98.5 °F (36.9 °C)   SpO2: 96%   Weight: 86.7 kg (191 lb 3.2 oz)   Height: 158.8 cm (62.5\")   PainSc:   6   PainLoc: Back     Objective   Physical Exam   Constitutional: She is oriented to person, place, and time. She appears well-developed and well-nourished. She is cooperative. No distress.   HENT:   Head: Normocephalic and atraumatic.   Nose: Nose normal.   Eyes: Conjunctivae and lids are normal.   Neck: Trachea normal. Neck supple.   Cardiovascular: Normal rate.   Pulmonary/Chest: Effort normal. No respiratory distress.   Musculoskeletal:        Lumbar back: She exhibits tenderness, bony tenderness and pain.   Diffuse arthritic changes - " no acute synovitis  Lumbar brace    Neurological: She is alert and oriented to person, place, and time. She has normal strength. Gait (ambulating with rolling walker) abnormal.   Skin: Skin is intact. She is not diaphoretic.   Psychiatric: She has a normal mood and affect. Her speech is normal and behavior is normal. Cognition and memory are normal.   Nursing note and vitals reviewed.    Assessment/Plan   Anne was seen today for back pain.    Diagnoses and all orders for this visit:    Chronic pain syndrome    Chronic low back pain, unspecified back pain laterality, with sciatica presence unspecified    Rheumatoid arthritis, involving unspecified site, unspecified rheumatoid factor presence (CMS/[facility])    Encounter for long-term (current) use of high-risk medication      --- Refill OxyContin. Patient appears stable with current regimen. No adverse effects. Regarding continuation of opioids, there is no evidence of aberrant behavior or any red flags.  The patient continues with appropriate response to opioid therapy. ADL's remain intact by self.   --- The urine drug screen confirmation from 5/15/19 has been reviewed and the result is APPROPRIATE based on patient history and [facility] report  --- Follow-up 1 month        [facility] REPORT  As part of the patient's treatment plan, I am prescribing controlled substances. The patient has been made aware of appropriate use of such medications, including potential risk of somnolence, limited ability to drive and/or work safely, and the potential for dependence or overdose. It has also bee made clear that these medications are for use by this patient only, without concomitant use of alcohol or other substances unless prescribed.     Patient has completed prescribing agreement detailing terms of continued prescribing of controlled substances, including monitoring [facility] reports, urine drug screening, and pill counts if necessary. The patient is aware that inappropriate use will  results in cessation of prescribing such medications.    [facility] report has been reviewed and scanned into the patient's chart.    As the clinician, I personally reviewed the [facility] from 6/14/19 while the patient was in the office today.    History and physical exam exhibit continued safe and appropriate use of controlled substances.    EMR Dragon/Transcription disclaimer:   Much of this encounter note is an electronic transcription/translation of spoken language to printed text. The electronic translation of spoken language may permit erroneous, or at times, nonsensical words or phrases to be inadvertently transcribed; Although I have reviewed the note for such errors, some may still exist.

## 2019-06-21 RX ORDER — POTASSIUM CHLORIDE 20 MEQ/1
TABLET, EXTENDED RELEASE ORAL
Qty: 90 TABLET | Refills: 0 | Status: SHIPPED | OUTPATIENT
Start: 2019-06-21 | End: 2019-08-07 | Stop reason: SDUPTHER

## 2019-07-16 ENCOUNTER — OFFICE VISIT (OUTPATIENT)
Dept: PAIN MEDICINE | Facility: CLINIC | Age: 69
End: 2019-07-16

## 2019-07-16 VITALS
RESPIRATION RATE: 18 BRPM | HEART RATE: 78 BPM | TEMPERATURE: 97.7 F | HEIGHT: 63 IN | WEIGHT: 187.6 LBS | SYSTOLIC BLOOD PRESSURE: 135 MMHG | BODY MASS INDEX: 33.24 KG/M2 | DIASTOLIC BLOOD PRESSURE: 83 MMHG | OXYGEN SATURATION: 96 %

## 2019-07-16 DIAGNOSIS — M54.5 CHRONIC LOW BACK PAIN, UNSPECIFIED BACK PAIN LATERALITY, WITH SCIATICA PRESENCE UNSPECIFIED: ICD-10-CM

## 2019-07-16 DIAGNOSIS — Z79.899 ENCOUNTER FOR LONG-TERM (CURRENT) USE OF HIGH-RISK MEDICATION: ICD-10-CM

## 2019-07-16 DIAGNOSIS — G89.4 CHRONIC PAIN SYNDROME: Primary | ICD-10-CM

## 2019-07-16 DIAGNOSIS — M15.9 GENERALIZED OSTEOARTHRITIS: ICD-10-CM

## 2019-07-16 DIAGNOSIS — G89.29 CHRONIC LOW BACK PAIN, UNSPECIFIED BACK PAIN LATERALITY, WITH SCIATICA PRESENCE UNSPECIFIED: ICD-10-CM

## 2019-07-16 PROCEDURE — 99214 OFFICE O/P EST MOD 30 MIN: CPT | Performed by: NURSE PRACTITIONER

## 2019-07-16 RX ORDER — OXYCODONE HYDROCHLORIDE 80 MG/1
160 TABLET, FILM COATED, EXTENDED RELEASE ORAL EVERY 12 HOURS SCHEDULED
Qty: 120 TABLET | Refills: 0 | Status: SHIPPED | OUTPATIENT
Start: 2019-07-16 | End: 2019-07-16 | Stop reason: SDUPTHER

## 2019-07-16 RX ORDER — OXYCODONE HYDROCHLORIDE 80 MG/1
160 TABLET, FILM COATED, EXTENDED RELEASE ORAL EVERY 12 HOURS SCHEDULED
Qty: 120 TABLET | Refills: 0 | Status: SHIPPED | OUTPATIENT
Start: 2019-07-16 | End: 2019-08-14 | Stop reason: SDUPTHER

## 2019-07-16 NOTE — PROGRESS NOTES
CHIEF COMPLAINT  Follow-up for back pain. Ms. Jewell states that her back pain is unchanged from her last appt.    Subjective   Anne Jewell is a 68 y.o. female  who presents to the office for follow-up.She has a history of back and joint pain.    Complains of pain in her back and joints. Today her pain is 5/10VAS. Continues with OxyContin 80 mg 2 BID. She reports that she can tell a big difference in efficacy of the Oxycontin when it is Brand Name versus generic.  Denies any side effects from the regimen, including constipation and somnolence. The regimen helps decrease her pain by 50%. Notes improved function and activity with regimen.  ADL's by self.     Back Pain   This is a chronic problem. The current episode started more than 1 year ago. The problem occurs daily. The problem is unchanged. The pain is present in the lumbar spine. The quality of the pain is described as aching. The pain radiates to the left foot and right foot. The pain is at a severity of 5/10. The pain is moderate. The symptoms are aggravated by stress (weather changes). Associated symptoms include weakness. Pertinent negatives include no bladder incontinence, bowel incontinence, chest pain, dysuria, fever, headaches or numbness. She has tried analgesics, bed rest, home exercises and heat (OxyContin, hydrocodone, aquatherapy) for the symptoms. The treatment provided moderate relief.   Joint Pain   This is a chronic problem. The current episode started more than 1 year ago. The problem occurs constantly. The problem has been unchanged. Associated symptoms include arthralgias, congestion, joint swelling (right leg and ankle), neck pain and weakness. Pertinent negatives include no chest pain, chills, coughing, fatigue, fever, headaches, nausea, numbness or vomiting. She has tried oral narcotics for the symptoms. The treatment provided moderate relief.     PEG Assessment   What number best describes your pain on average in the past week?5  What  "number best describes how, during the past week, pain has interfered with your enjoyment of life?6  What number best describes how, during the past week, pain has interfered with your general activity?  5    The following portions of the patient's history were reviewed and updated as appropriate: allergies, current medications, past family history, past medical history, past social history, past surgical history and problem list.    Review of Systems   Constitutional: Negative for chills, fatigue and fever.   HENT: Positive for congestion.    Eyes: Negative for visual disturbance.   Respiratory: Positive for shortness of breath. Negative for cough and wheezing.    Cardiovascular: Positive for leg swelling (right leg). Negative for chest pain and palpitations.   Gastrointestinal: Negative for bowel incontinence, constipation, diarrhea, nausea and vomiting.   Genitourinary: Negative for bladder incontinence, difficulty urinating and dysuria.   Musculoskeletal: Positive for arthralgias, back pain, joint swelling (right leg and ankle) and neck pain.   Neurological: Positive for weakness. Negative for numbness and headaches.   Psychiatric/Behavioral: Positive for sleep disturbance. Negative for suicidal ideas. The patient is not nervous/anxious.      Vitals:    07/16/19 1419   BP: 135/83   Pulse: 78   Resp: 18   Temp: 97.7 °F (36.5 °C)   SpO2: 96%   Weight: 85.1 kg (187 lb 9.6 oz)   Height: 158.8 cm (62.5\")   PainSc:   5   PainLoc: Back     Objective   Physical Exam   Constitutional: She is oriented to person, place, and time. She appears well-developed and well-nourished. She is cooperative. No distress.   HENT:   Head: Normocephalic and atraumatic.   Nose: Nose normal.   Eyes: Conjunctivae and lids are normal.   Neck: Trachea normal. Neck supple.   Cardiovascular: Normal rate.   Pulmonary/Chest: Effort normal. No respiratory distress.   Musculoskeletal:        Lumbar back: She exhibits tenderness, bony tenderness and " pain.   Diffuse arthritic changes - no acute synovitis  Lumbar brace    Neurological: She is alert and oriented to person, place, and time. She has normal strength. Gait (ambulating with rolling walker) abnormal.   Skin: Skin is intact. She is not diaphoretic.   Psychiatric: She has a normal mood and affect. Her speech is normal and behavior is normal. Cognition and memory are normal.   Nursing note and vitals reviewed.    Assessment/Plan   Anne was seen today for back pain.    Diagnoses and all orders for this visit:    Chronic pain syndrome    Chronic low back pain, unspecified back pain laterality, with sciatica presence unspecified    Generalized osteoarthritis    Encounter for long-term (current) use of high-risk medication    Other orders  -     Discontinue: oxyCODONE ER (oxyCONTIN) 80 MG tablet extended-release 12 hour 12 hr tablet; Take 2 tablets by mouth Every 12 (Twelve) Hours.  -     oxyCODONE ER (oxyCONTIN) 80 MG tablet extended-release 12 hour 12 hr tablet; Take 2 tablets by mouth Every 12 (Twelve) Hours.      --- Refill OxyCOntin. Patient appears stable with current regimen. No adverse effects. Regarding continuation of opioids, there is no evidence of aberrant behavior or any red flags.  The patient continues with appropriate response to opioid therapy. ADL's remain intact by self.   --- The urine drug screen confirmation from 5/15/19 has been reviewed and the result is appropriate based on patient history and [facility] report  --- Follow-up 1 month          [facility] REPORT  As part of the patient's treatment plan, I am prescribing controlled substances. The patient has been made aware of appropriate use of such medications, including potential risk of somnolence, limited ability to drive and/or work safely, and the potential for dependence or overdose. It has also bee made clear that these medications are for use by this patient only, without concomitant use of alcohol or other substances unless  prescribed.     Patient has completed prescribing agreement detailing terms of continued prescribing of controlled substances, including monitoring [facility] reports, urine drug screening, and pill counts if necessary. The patient is aware that inappropriate use will results in cessation of prescribing such medications.    [facility] report has been reviewed and scanned into the patient's chart.    As the clinician, I personally reviewed the [facility] from 7/16/19 while the patient was in the office today.    History and physical exam exhibit continued safe and appropriate use of controlled substances.    EMR Dragon/Transcription disclaimer:   Much of this encounter note is an electronic transcription/translation of spoken language to printed text. The electronic translation of spoken language may permit erroneous, or at times, nonsensical words or phrases to be inadvertently transcribed; Although I have reviewed the note for such errors, some may still exist.

## 2019-07-24 RX ORDER — LEVOTHYROXINE SODIUM 0.1 MG/1
TABLET ORAL
Qty: 90 TABLET | Refills: 3 | Status: SHIPPED | OUTPATIENT
Start: 2019-07-24 | End: 2020-09-10

## 2019-07-26 RX ORDER — AZITHROMYCIN 250 MG/1
TABLET, FILM COATED ORAL
Qty: 6 TABLET | Refills: 0 | Status: SHIPPED | OUTPATIENT
Start: 2019-07-26 | End: 2019-09-06

## 2019-08-07 ENCOUNTER — TELEPHONE (OUTPATIENT)
Dept: SPORTS MEDICINE | Facility: CLINIC | Age: 69
End: 2019-08-07

## 2019-08-07 RX ORDER — POTASSIUM CHLORIDE 20 MEQ/1
TABLET, EXTENDED RELEASE ORAL
Qty: 90 TABLET | Refills: 0 | Status: SHIPPED | OUTPATIENT
Start: 2019-08-07 | End: 2019-09-19 | Stop reason: SDUPTHER

## 2019-08-07 NOTE — TELEPHONE ENCOUNTER
I don't know too much about that medication because I do not treat osteoporosis. From what I have heard form other patients it seems to be well tolerated.

## 2019-08-07 NOTE — TELEPHONE ENCOUNTER
Pt called wanting to know your opinion about prolia shots. She wants to know if you think she will benefit from them. She wants to see if this will work for her before the option of surgery. She had recent dexa scan done on 07/31/19. Her GYN said those results were really bad. The results are laying on your desk.

## 2019-08-14 ENCOUNTER — OFFICE VISIT (OUTPATIENT)
Dept: PAIN MEDICINE | Facility: CLINIC | Age: 69
End: 2019-08-14

## 2019-08-14 VITALS
WEIGHT: 178.6 LBS | BODY MASS INDEX: 31.64 KG/M2 | HEART RATE: 85 BPM | HEIGHT: 63 IN | RESPIRATION RATE: 16 BRPM | SYSTOLIC BLOOD PRESSURE: 112 MMHG | DIASTOLIC BLOOD PRESSURE: 70 MMHG | TEMPERATURE: 97.5 F | OXYGEN SATURATION: 93 %

## 2019-08-14 DIAGNOSIS — M06.9 RHEUMATOID ARTHRITIS, INVOLVING UNSPECIFIED SITE, UNSPECIFIED RHEUMATOID FACTOR PRESENCE: ICD-10-CM

## 2019-08-14 DIAGNOSIS — M54.5 CHRONIC LOW BACK PAIN, UNSPECIFIED BACK PAIN LATERALITY, WITH SCIATICA PRESENCE UNSPECIFIED: ICD-10-CM

## 2019-08-14 DIAGNOSIS — Z79.899 ENCOUNTER FOR LONG-TERM (CURRENT) USE OF HIGH-RISK MEDICATION: ICD-10-CM

## 2019-08-14 DIAGNOSIS — G89.29 CHRONIC LOW BACK PAIN, UNSPECIFIED BACK PAIN LATERALITY, WITH SCIATICA PRESENCE UNSPECIFIED: ICD-10-CM

## 2019-08-14 DIAGNOSIS — G89.4 CHRONIC PAIN SYNDROME: Primary | ICD-10-CM

## 2019-08-14 PROCEDURE — 99214 OFFICE O/P EST MOD 30 MIN: CPT | Performed by: NURSE PRACTITIONER

## 2019-08-14 NOTE — PROGRESS NOTES
CHIEF COMPLAINT  F/U back pain- patient states that her back pain has worsened since her last visit. She has had increased pain in her left hip and is concerned with the pain.     Subjective   Anne Jewell is a 68 y.o. female  who presents to the office for follow-up.She has a history of back and joint pain.    Complains of pain in her back and joints. Today her pain is 6/10VAS. Continues with OxyContin 80 mg 2 BID. She reports that she can tell a big difference in efficacy of the Oxycontin when it is Brand Name versus generic.  Denies any side effects from the regimen, including constipation and somnolence. The regimen helps decrease her pain by 50%. Notes improved function and activity with regimen.  ADL's by self.     She states that she has been diagnosed with osteoporosis.  Says she is scared her hip will break.      Going to see ortho in a few weeks (Dr. August) regarding hip pain.      Starting [employer] program.      Back Pain   This is a chronic problem. The current episode started more than 1 year ago. The problem occurs daily. The problem is unchanged. The pain is present in the lumbar spine. The quality of the pain is described as aching. The pain radiates to the left foot and right foot. The pain is at a severity of 6/10. The pain is moderate. The symptoms are aggravated by stress (weather changes). Associated symptoms include weakness. Pertinent negatives include no abdominal pain, bladder incontinence, bowel incontinence, chest pain, dysuria, fever, headaches or numbness. She has tried analgesics, bed rest, home exercises and heat (OxyContin, hydrocodone, aquatherapy) for the symptoms. The treatment provided moderate relief.   Joint Pain   This is a chronic problem. The current episode started more than 1 year ago. The problem occurs constantly. The problem has been unchanged. Associated symptoms include arthralgias, congestion, joint swelling (right leg and ankle), neck pain and weakness.  "Pertinent negatives include no abdominal pain, chest pain, chills, coughing, fatigue, fever, headaches, nausea, numbness or vomiting. She has tried oral narcotics for the symptoms. The treatment provided moderate relief.     PEG Assessment   What number best describes your pain on average in the past week?5  What number best describes how, during the past week, pain has interfered with your enjoyment of life?7  What number best describes how, during the past week, pain has interfered with your general activity?  6    The following portions of the patient's history were reviewed and updated as appropriate: allergies, current medications, past family history, past medical history, past social history, past surgical history and problem list.    Review of Systems   Constitutional: Positive for activity change (decreased). Negative for chills, fatigue and fever.   HENT: Positive for congestion.    Eyes: Negative for visual disturbance.   Respiratory: Negative for cough, chest tightness, shortness of breath and wheezing.    Cardiovascular: Positive for leg swelling (right leg). Negative for chest pain and palpitations.   Gastrointestinal: Negative for abdominal pain, bowel incontinence, constipation, diarrhea, nausea and vomiting.   Genitourinary: Negative for bladder incontinence, difficulty urinating and dysuria.   Musculoskeletal: Positive for arthralgias, back pain, joint swelling (right leg and ankle) and neck pain.   Neurological: Positive for weakness. Negative for dizziness, light-headedness, numbness and headaches.   Psychiatric/Behavioral: Positive for sleep disturbance. Negative for agitation and suicidal ideas. The patient is nervous/anxious.      Vitals:    08/14/19 1049   BP: 112/70   Pulse: 85   Resp: 16   Temp: 97.5 °F (36.4 °C)   SpO2: 93%   Weight: 81 kg (178 lb 9.6 oz)   Height: 158.8 cm (62.5\")   PainSc:   6   PainLoc: Back     Objective   Physical Exam   Constitutional: She is oriented to person, " place, and time. She appears well-developed and well-nourished. She is cooperative. No distress.   HENT:   Head: Normocephalic and atraumatic.   Nose: Nose normal.   Eyes: Conjunctivae and lids are normal.   Neck: Trachea normal. Neck supple.   Cardiovascular: Normal rate.   Pulmonary/Chest: Effort normal. No respiratory distress.   Musculoskeletal:        Lumbar back: She exhibits tenderness, bony tenderness and pain.   Diffuse arthritic changes - no acute synovitis  Lumbar brace    Neurological: She is alert and oriented to person, place, and time. She has normal strength. Gait (ambulating with rolling walker) abnormal.   Skin: Skin is intact. She is not diaphoretic.   Psychiatric: She has a normal mood and affect. Her speech is normal and behavior is normal. Cognition and memory are normal.   Nursing note and vitals reviewed.    Assessment/Plan   Anne was seen today for back pain.    Diagnoses and all orders for this visit:    Chronic pain syndrome    Chronic low back pain, unspecified back pain laterality, with sciatica presence unspecified    Rheumatoid arthritis, involving unspecified site, unspecified rheumatoid factor presence (CMS/[facility])    Encounter for long-term (current) use of high-risk medication      --- Refill OxyContin. Patient appears stable with current regimen. No adverse effects. Regarding continuation of opioids, there is no evidence of aberrant behavior or any red flags.  The patient continues with appropriate response to opioid therapy. ADL's remain intact by self.   --- The urine drug screen confirmation from 5/15/19 has been reviewed and the result is appropriate based on patient history and [facility] report  --- Follow-up 1 month        [facility] REPORT    As part of the patient's treatment plan, I am prescribing controlled substances. The patient has been made aware of appropriate use of such medications, including potential risk of somnolence, limited ability to drive and/or work safely, and  the potential for dependence or overdose. It has also bee made clear that these medications are for use by this patient only, without concomitant use of alcohol or other substances unless prescribed.     Patient has completed prescribing agreement detailing terms of continued prescribing of controlled substances, including monitoring [facility] reports, urine drug screening, and pill counts if necessary. The patient is aware that inappropriate use will results in cessation of prescribing such medications.    [facility] report has been reviewed and scanned into the patient's chart.    As the clinician, I personally reviewed the [facility] from 8/14/19 while the patient was in the office today.    History and physical exam exhibit continued safe and appropriate use of controlled substances.    EMR Dragon/Transcription disclaimer:   Much of this encounter note is an electronic transcription/translation of spoken language to printed text. The electronic translation of spoken language may permit erroneous, or at times, nonsensical words or phrases to be inadvertently transcribed; Although I have reviewed the note for such errors, some may still exist.

## 2019-08-15 RX ORDER — OXYCODONE HYDROCHLORIDE 80 MG/1
160 TABLET, FILM COATED, EXTENDED RELEASE ORAL EVERY 12 HOURS SCHEDULED
Qty: 120 TABLET | Refills: 0 | Status: SHIPPED | OUTPATIENT
Start: 2019-08-15 | End: 2019-09-12 | Stop reason: SDUPTHER

## 2019-09-05 ENCOUNTER — OFFICE VISIT (OUTPATIENT)
Dept: ORTHOPEDIC SURGERY | Facility: CLINIC | Age: 69
End: 2019-09-05

## 2019-09-05 VITALS — TEMPERATURE: 98.5 F | HEIGHT: 68 IN | WEIGHT: 178 LBS | BODY MASS INDEX: 26.98 KG/M2

## 2019-09-05 DIAGNOSIS — M16.0 PRIMARY OSTEOARTHRITIS OF BOTH HIPS: ICD-10-CM

## 2019-09-05 DIAGNOSIS — M25.552 LEFT HIP PAIN: Primary | ICD-10-CM

## 2019-09-05 DIAGNOSIS — G89.29 CHRONIC RADICULAR LUMBAR PAIN: ICD-10-CM

## 2019-09-05 DIAGNOSIS — M54.16 CHRONIC RADICULAR LUMBAR PAIN: ICD-10-CM

## 2019-09-05 PROCEDURE — 99213 OFFICE O/P EST LOW 20 MIN: CPT | Performed by: ORTHOPAEDIC SURGERY

## 2019-09-05 PROCEDURE — 73502 X-RAY EXAM HIP UNI 2-3 VIEWS: CPT | Performed by: ORTHOPAEDIC SURGERY

## 2019-09-05 NOTE — PROGRESS NOTES
FOLLOW UP VISIT    Patient: Anne Jewell  ?  YOB: 1950    MRN: [id number]  ?  Chief Complaint   Patient presents with   • Left Hip - Pain, Establish Care      ?  HPI: The patient is complaining of bilateral hip pain and discomfort.  Most of the symptoms are on the lateral side of the greater trochanter.  She also has severe scoliosis of the lumbar spine and has been diagnosed with osteoporosis.  There are no risk factors for avascular necrosis of the hip.  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  She states that she is not interested in any form of surgical intervention for her hip pathology at this point.    Pain Location: bilateral hip(s) and lumbar spine  Radiation: Lumbar spine into the hip, the buttock and the thigh.  Quality: aching, burning  Intensity/Severity: moderate  Duration: For several month(s)  Onset quality: gradual   Timing: intermittent  Aggravating Factors: any weight bearing, going up and down stairs, kneeling, rising after sitting, walking/running, squatting, walking or standing for prolonged time  Alleviating Factors: OTC analgesics, NSAID's  Previous Episodes: yes  Associated Symptoms: swelling, decreased strength, numbness/tingling  ADLs Affected: ambulating, recreational activities/sports  Previous Treatment: Anti-inflammatory medication and chiropractic care.    This patient is an established patient.  This problem is not new to this examiner.      Allergies:   Allergies   Allergen Reactions   • Penicillins        Medications:   Home Medications:  Current Outpatient Medications on File Prior to Visit   Medication Sig   • albuterol (PROVENTIL HFA;VENTOLIN HFA) 108 (90 BASE) MCG/ACT inhaler Proventil  (90 Base) MCG/ACT Inhalation Aerosol Solution; Patient Sig: Proventil  (90 Base) MCG/ACT Inhalation Aerosol Solution Inhale 2 puff(s) every 6 to 8 hours as needed; 6.7;  0; 05-Apr-2013; Active   • Azelastine-Fluticasone 137-50 MCG/ACT suspension Inhale 1 spray every 12 (twelve) hours.   • baclofen (LIORESAL) 10 MG tablet Take 1 tablet by mouth 3 (Three) Times a Day.   • chlorhexidine (PERIDEX) 0.12 % solution    • Cholecalciferol (VITAMIN D3) 2000 units capsule TAKE ONE CAPSULE BY MOUTH DAILY   • Cyanocobalamin (B-12) 1000 MCG sublingual tablet PLACE 1 TABLET UNDER THE TONGUE AND LET DISSOLVE ONCE DAILY   • dicyclomine (BENTYL) 20 MG tablet 1 q 6-h prn   • escitalopram (LEXAPRO) 10 MG tablet Take 1 tablet by mouth daily.   • hydrochlorothiazide (HYDRODIURIL) 25 MG tablet TAKE ONE TABLET BY MOUTH DAILY   • levothyroxine (SYNTHROID, LEVOTHROID) 100 MCG tablet TAKE ONE TABLET BY MOUTH DAILY   • montelukast (SINGULAIR) 10 MG tablet Take 1 tablet by mouth daily.   • Multiple Vitamin tablet Take 1 tablet by mouth daily.   • oxyCODONE ER (oxyCONTIN) 80 MG tablet extended-release 12 hour 12 hr tablet Take 2 tablets by mouth Every 12 (Twelve) Hours.   • potassium chloride (K-DUR,KLOR-CON) 20 MEQ CR tablet TAKE ONE TABLET BY MOUTH TWICE A DAY **MUST CALL MD FOR APPOINTMENT   • promethazine (PHENERGAN) 25 MG tablet Take 1 tablet by mouth Every 8 (Eight) Hours As Needed for Nausea or Vomiting.   • XARELTO 20 MG tablet TAKE ONE TABLET BY MOUTH DAILY     No current facility-administered medications on file prior to visit.      Current Medications:  Scheduled Meds:  PRN Meds:.    I have reviewed the patient's medical history in detail and updated the computerized patient record.  Review and summarization of old records include:    Past Medical History:   Diagnosis Date   • Allergic rhinitis    • Asthma    • Bronchospasm    • Clotting disorder (CMS/HCC)    • Deep vein thrombosis (CMS/HCC)    • Disc degeneration, lumbar    • Edema    • Esophageal reflux    • Glaucoma    • Joint pain    • Low back pain    • Osteoarthritis    • Osteopenia    • Osteoporosis    • Scoliosis    • Status post total knee  "replacement, left 8/31/2017   • Status post total knee replacement, right 8/31/2017   • UTI (urinary tract infection)    • Venous thrombosis      Past Surgical History:   Procedure Laterality Date   • BILATERAL BREAST REDUCTION     • BREAST SURGERY     • COLONOSCOPY  08/05/2016   • GASTRIC BYPASS     • HAND SURGERY Right 01/14/2016   • HERNIA REPAIR      x7   • HYSTERECTOMY     • OTHER SURGICAL HISTORY      vaginal sling operation for stress incontinence   • TOTAL KNEE ARTHROPLASTY Left    • TOTAL KNEE ARTHROPLASTY Bilateral      Social History     Occupational History   • Not on file   Tobacco Use   • Smoking status: Former Smoker   • Smokeless tobacco: Never Used   Substance and Sexual Activity   • Alcohol use: No   • Drug use: No   • Sexual activity: Defer      Social History     Social History Narrative   • Not on file     Family History   Problem Relation Age of Onset   • Prostate cancer Father    • Arthritis Other    • Hypertension Other         benign essential   • Cancer Other    • Diabetes Other    • Heart disease Other    • Nephrolithiasis Other          Review of Systems   Constitutional: Negative.  Negative for fever.   HENT: Negative.    Eyes: Negative.    Respiratory: Negative.    Cardiovascular: Negative.    Gastrointestinal: Negative.    Endocrine: Negative.    Genitourinary: Negative.  Negative for dysuria.   Musculoskeletal: Positive for arthralgias, gait problem and joint swelling.   Skin: Negative.  Negative for rash and wound.   Allergic/Immunologic: Negative.    Neurological: Negative for numbness.   Hematological: Negative.    Psychiatric/Behavioral: Negative.           Wt Readings from Last 3 Encounters:   09/06/19 85.3 kg (188 lb)   09/05/19 80.7 kg (178 lb)   08/14/19 81 kg (178 lb 9.6 oz)     Ht Readings from Last 3 Encounters:   09/06/19 172.7 cm (67.99\")   09/05/19 172.7 cm (68\")   08/14/19 158.8 cm (62.5\")     Body mass index is 27.06 kg/m².  Facility age limit for growth percentiles is " 20 years.  Vitals:    09/05/19 1427   Temp: 98.5 °F (36.9 °C)         Physical Exam  Constitutional: Patient is oriented to person, place, and time. Appears well-developed and well-nourished.   HENT:   Head: Normocephalic and atraumatic.   Eyes: Conjunctivae and EOM are normal. Pupils are equal, round, and reactive to light.   Cardiovascular: Normal rate, regular rhythm, normal heart sounds and intact distal pulses.   Pulmonary/Chest: Effort normal and breath sounds normal.   Musculoskeletal:   See detailed exam below   Neurological: Alert and oriented to person, place, and time. No sensory deficit. Coordination normal.   Skin: Skin is warm and dry. Capillary refill takes less than 2 seconds. No rash noted. No erythema.   Psychiatric: Patient has a normal mood and affect. Her behavior is normal. Judgment and thought content normal.   Nursing note and vitals reviewed.      Ortho Exam:   Bilateral hip (gtb). Skin and soft tissues over the greater trochanteric bursa are painful and tender for the patient. Neurovascular status is intact. IT band is painful and tender. Cross body adduction bothers the patient significantly. Internal and external rotations bother the patient significantly with tenderness over the greater trochanter. There is no clinical deformity. No shortening. The patient does have a significant limp because by the trochanteric pain caused by the abductors. The piriformis is tight and with any rotation there is significant capsular tightness. Dorsalis pedis and posterior tibial artery pulses are palpable. Capillary refill is 2 seconds with a brisk return. Common peroneal nerve function is well preserved.  Bilateral hip (djd). Neurovascular status is intact. Patient does have a limp on the affected side. Internal and external rotations are associated with pain and discomfort. Anterior joint line pain and tenderness is significant. Stinchfield sign is positive. Figure of 4 sign is positive. Patient is  unable to perform an active straight leg raise exam. Greater trochanter is tender. Crossover adduction test is positive. Cross body adduction is limited and painful for the patient. Patient has very significant limp and joint line tenderness anteriorly, posteriorly and medially. Dorsalis pedis and posterior tibial artery pulses are palpable. Common peroneal nerve function is well preserved.     Lumbar Spine: The overlying skin and soft tissues are mildly swollen. Deep tendon reflexes are bilaterally, symmetric and equal.  No long tract signs are noted. There is No evidence of myelopathy. No bowel or bladder deficit noted. Mild spasm of erector spinae muscle is noted. bilaterally sided rotation is associated with pain and discomfort. Straight leg raise test is positive to 60 degrees. Contralateral straight leg raise is negative. Pain radiates to buttock and thigh. Get up and go is slow due to the lumbar pain.No objective motor or sensory function loss is noted.       Diagnostics:  xrays obtained today  left Hip X-Ray  Indication: evaluation of pain and discomfort with walking.  AP and lateral  Findings: Moderate osteoarthritis with some narrowing of the joint space  no bony lesion  Soft tissues within normal limits  decreased joint spaces  Hardware appropriately positioned not applicable      no prior studies available for comparison.    X-RAY was ordered and reviewed by Rashaun Alvarado MD    Scan reports discussed with the patient.  These images are available in the office today.  The images show a T score of -2.8.  The patient states that she is afraid of taking bisphosphonates because of jaw osteonecrosis and will contact her primary care physician for assistance with managing her osteoporosis.    Assessment:  Anne was seen today for pain and establish care.    Diagnoses and all orders for this visit:    Left hip pain  -     XR Hip With or Without Pelvis 2 - 3 View Left    Primary osteoarthritis of both  hips    Chronic radicular lumbar pain          Procedures  ?    Plan    · Is a cane/walker for assistance with ambulation.  · Falls precautions.  · Calcium and vitamin D for bone health.  · Glucosamine and chondroitin for cartilage health management.  · Steroid injection to the SI joint and to the greater trochanter of the hip joint discussed and offered to the patient.  · Rest, ice, compression, and elevation (RICE) therapy  · Stretching and strengthening exercises hip flexors and abductors.  · [employer] exercise program for chronic pain of the lumbar spine.  · Discussed with the patient about the merits and the risks of chiropractic care for chronic spinal painful conditions.  · Tylenol 500-1000mg by mouth every 6 hours as needed for pain   · Follow up in 1 year(s)    Date of encounter: 09/05/2019   Rashaun Alvarado MD

## 2019-09-06 ENCOUNTER — OFFICE VISIT (OUTPATIENT)
Dept: SPORTS MEDICINE | Facility: CLINIC | Age: 69
End: 2019-09-06

## 2019-09-06 VITALS
WEIGHT: 188 LBS | OXYGEN SATURATION: 94 % | HEART RATE: 74 BPM | BODY MASS INDEX: 28.49 KG/M2 | DIASTOLIC BLOOD PRESSURE: 60 MMHG | TEMPERATURE: 97.9 F | HEIGHT: 68 IN | SYSTOLIC BLOOD PRESSURE: 116 MMHG

## 2019-09-06 DIAGNOSIS — Z00.00 MEDICARE ANNUAL WELLNESS VISIT, SUBSEQUENT: Primary | ICD-10-CM

## 2019-09-06 DIAGNOSIS — Z23 IMMUNIZATION DUE: ICD-10-CM

## 2019-09-06 DIAGNOSIS — M81.0 AGE-RELATED OSTEOPOROSIS WITHOUT CURRENT PATHOLOGICAL FRACTURE: ICD-10-CM

## 2019-09-06 DIAGNOSIS — E55.9 VITAMIN D DEFICIENCY: ICD-10-CM

## 2019-09-06 PROCEDURE — G0439 PPPS, SUBSEQ VISIT: HCPCS | Performed by: FAMILY MEDICINE

## 2019-09-06 PROCEDURE — 90732 PPSV23 VACC 2 YRS+ SUBQ/IM: CPT | Performed by: FAMILY MEDICINE

## 2019-09-06 PROCEDURE — G0009 ADMIN PNEUMOCOCCAL VACCINE: HCPCS | Performed by: FAMILY MEDICINE

## 2019-09-07 LAB
25(OH)D3+25(OH)D2 SERPL-MCNC: 52.7 NG/ML (ref 30–100)
ALBUMIN SERPL-MCNC: 2.7 G/DL (ref 3.5–5.2)
ALBUMIN/GLOB SERPL: 1 G/DL
ALP SERPL-CCNC: 128 U/L (ref 39–117)
ALT SERPL-CCNC: 38 U/L (ref 1–33)
AST SERPL-CCNC: 46 U/L (ref 1–32)
BILIRUB SERPL-MCNC: 0.5 MG/DL (ref 0.2–1.2)
BUN SERPL-MCNC: 27 MG/DL (ref 8–23)
BUN/CREAT SERPL: 22 (ref 7–25)
CALCIUM SERPL-MCNC: 7.7 MG/DL (ref 8.6–10.5)
CHLORIDE SERPL-SCNC: 103 MMOL/L (ref 98–107)
CO2 SERPL-SCNC: 24.1 MMOL/L (ref 22–29)
CREAT SERPL-MCNC: 1.23 MG/DL (ref 0.57–1)
GLOBULIN SER CALC-MCNC: 2.7 GM/DL
GLUCOSE SERPL-MCNC: 85 MG/DL (ref 65–99)
POTASSIUM SERPL-SCNC: 4.1 MMOL/L (ref 3.5–5.2)
PROT SERPL-MCNC: 5.4 G/DL (ref 6–8.5)
SODIUM SERPL-SCNC: 140 MMOL/L (ref 136–145)

## 2019-09-10 PROBLEM — G89.29 CHRONIC RADICULAR LUMBAR PAIN: Status: ACTIVE | Noted: 2019-09-10

## 2019-09-10 PROBLEM — M54.16 CHRONIC RADICULAR LUMBAR PAIN: Status: ACTIVE | Noted: 2019-09-10

## 2019-09-10 PROBLEM — M16.0 PRIMARY OSTEOARTHRITIS OF BOTH HIPS: Status: ACTIVE | Noted: 2019-09-10

## 2019-09-10 PROBLEM — M25.552 LEFT HIP PAIN: Status: ACTIVE | Noted: 2019-09-10

## 2019-09-10 RX ORDER — DICYCLOMINE HCL 20 MG
TABLET ORAL
Qty: 120 TABLET | Refills: 5 | Status: SHIPPED | OUTPATIENT
Start: 2019-09-10 | End: 2021-01-22

## 2019-09-12 RX ORDER — OXYCODONE HYDROCHLORIDE 80 MG/1
160 TABLET, FILM COATED, EXTENDED RELEASE ORAL EVERY 12 HOURS SCHEDULED
Qty: 120 TABLET | Refills: 0 | Status: SHIPPED | OUTPATIENT
Start: 2019-09-12 | End: 2019-10-15 | Stop reason: SDUPTHER

## 2019-09-13 ENCOUNTER — OFFICE VISIT (OUTPATIENT)
Dept: PAIN MEDICINE | Facility: CLINIC | Age: 69
End: 2019-09-13

## 2019-09-13 VITALS
SYSTOLIC BLOOD PRESSURE: 113 MMHG | OXYGEN SATURATION: 95 % | RESPIRATION RATE: 16 BRPM | BODY MASS INDEX: 28.37 KG/M2 | WEIGHT: 187.2 LBS | DIASTOLIC BLOOD PRESSURE: 67 MMHG | TEMPERATURE: 98 F | HEIGHT: 68 IN | HEART RATE: 76 BPM

## 2019-09-13 DIAGNOSIS — M51.36 DEGENERATION OF INTERVERTEBRAL DISC OF LUMBAR REGION: ICD-10-CM

## 2019-09-13 DIAGNOSIS — Z79.899 ENCOUNTER FOR LONG-TERM (CURRENT) USE OF HIGH-RISK MEDICATION: ICD-10-CM

## 2019-09-13 DIAGNOSIS — M06.9 RHEUMATOID ARTHRITIS, INVOLVING UNSPECIFIED SITE, UNSPECIFIED RHEUMATOID FACTOR PRESENCE: ICD-10-CM

## 2019-09-13 DIAGNOSIS — G89.29 OTHER CHRONIC PAIN: Primary | ICD-10-CM

## 2019-09-13 PROCEDURE — 99214 OFFICE O/P EST MOD 30 MIN: CPT | Performed by: NURSE PRACTITIONER

## 2019-09-13 NOTE — PROGRESS NOTES
CHIEF COMPLAINT  F/U back pain- patient states that her pain has remained the same.     Subjective   Anne Jewell is a 69 y.o. female  who presents to the office for follow-up.She has a history of back pain, joint pain.    Complains of pain in her back and joints. Today her pain is 5/10VAS. Continues with OxyContin 80 mg 2 BID. She reports that she can tell a big difference in efficacy of the Oxycontin when it is Brand Name versus generic.  Denies any side effects from the regimen, including constipation and somnolence. The regimen helps decrease her pain by 50%. Notes improved function and activity with regimen.  ADL's by self.      Saw Dr. Alvarado on 9/5/2019 regarding hip pain.  Patient not interested in surgery currently.  Offered the patient SI joint injection and greater trochanteric hip joint injection.      Starting [employer] program.      Osteoporosis. Cannot take medications due to osteonecrosis of jaw.  Trying to manage with PCP and GYN.      Back Pain   This is a chronic problem. The current episode started more than 1 year ago. The problem occurs daily. The problem is unchanged. The pain is present in the lumbar spine. The quality of the pain is described as aching. The pain radiates to the left foot and right foot. The pain is at a severity of 5/10. The pain is moderate. The symptoms are aggravated by stress (weather changes). Associated symptoms include weakness. Pertinent negatives include no abdominal pain, bladder incontinence, bowel incontinence, chest pain, dysuria, fever, headaches or numbness. She has tried analgesics, bed rest, home exercises and heat (OxyContin, hydrocodone, aquatherapy) for the symptoms. The treatment provided moderate relief.   Joint Pain   This is a chronic problem. The current episode started more than 1 year ago. The problem occurs constantly. The problem has been unchanged. Associated symptoms include arthralgias, joint swelling (right leg and ankle), neck pain  "and weakness. Pertinent negatives include no abdominal pain, chest pain, chills, congestion, coughing, fatigue, fever, headaches, nausea, numbness or vomiting. She has tried oral narcotics for the symptoms. The treatment provided moderate relief.      PEG Assessment   What number best describes your pain on average in the past week?7  What number best describes how, during the past week, pain has interfered with your enjoyment of life?7  What number best describes how, during the past week, pain has interfered with your general activity?  6    The following portions of the patient's history were reviewed and updated as appropriate: allergies, current medications, past family history, past medical history, past social history, past surgical history and problem list.    Review of Systems   Constitutional: Positive for activity change (decreased). Negative for chills, fatigue and fever.   HENT: Negative for congestion.    Eyes: Negative for visual disturbance.   Respiratory: Negative for cough, chest tightness, shortness of breath and wheezing.    Cardiovascular: Positive for leg swelling (right leg). Negative for chest pain and palpitations.   Gastrointestinal: Negative for abdominal pain, bowel incontinence, constipation, diarrhea, nausea and vomiting.   Genitourinary: Negative for bladder incontinence, difficulty urinating and dysuria.   Musculoskeletal: Positive for arthralgias, back pain, joint swelling (right leg and ankle) and neck pain.   Neurological: Positive for weakness. Negative for dizziness, light-headedness, numbness and headaches.   Psychiatric/Behavioral: Positive for sleep disturbance. Negative for agitation and suicidal ideas. The patient is not nervous/anxious.      Vitals:    09/13/19 1056   BP: 113/67   Pulse: 76   Resp: 16   Temp: 98 °F (36.7 °C)   SpO2: 95%   Weight: 84.9 kg (187 lb 3.2 oz)   Height: 172.7 cm (68\")   PainSc:   5   PainLoc: Back     Objective   Physical Exam   Constitutional: " She is oriented to person, place, and time. She appears well-developed and well-nourished. She is cooperative. No distress.   HENT:   Head: Normocephalic and atraumatic.   Nose: Nose normal.   Eyes: Conjunctivae and lids are normal.   Neck: Trachea normal. Neck supple.   Cardiovascular: Normal rate.   Pulmonary/Chest: Effort normal. No respiratory distress.   Musculoskeletal:        Lumbar back: She exhibits tenderness, bony tenderness and pain.   Diffuse arthritic changes - no acute synovitis  Lumbar brace    Neurological: She is alert and oriented to person, place, and time. She has normal strength. Gait (ambulating with rolling walker) abnormal.   Skin: Skin is intact. She is not diaphoretic.   Psychiatric: She has a normal mood and affect. Her speech is normal and behavior is normal. Cognition and memory are normal.   Nursing note and vitals reviewed.    Assessment/Plan   Anne was seen today for back pain.    Diagnoses and all orders for this visit:    Other chronic pain    Rheumatoid arthritis, involving unspecified site, unspecified rheumatoid factor presence (CMS/[facility])    Degeneration of intervertebral disc of lumbar region    Encounter for long-term (current) use of high-risk medication      --- Refill OxyContin. Patient appears stable with current regimen. No adverse effects. Regarding continuation of opioids, there is no evidence of aberrant behavior or any red flags.  The patient continues with appropriate response to opioid therapy. ADL's remain intact by self.   --- The urine drug screen confirmation from 5/15/19 has been reviewed and the result is appropriate based on patient history and [facility] report  --- Follow-up 1 months        [facility] REPORT  As part of the patient's treatment plan, I am prescribing controlled substances. The patient has been made aware of appropriate use of such medications, including potential risk of somnolence, limited ability to drive and/or work safely, and the potential  for dependence or overdose. It has also bee made clear that these medications are for use by this patient only, without concomitant use of alcohol or other substances unless prescribed.     Patient has completed prescribing agreement detailing terms of continued prescribing of controlled substances, including monitoring [facility] reports, urine drug screening, and pill counts if necessary. The patient is aware that inappropriate use will results in cessation of prescribing such medications.    [facility] report has been reviewed and scanned into the patient's chart.    As the clinician, I personally reviewed the [facility] from 9/13/19 while the patient was in the office today.    History and physical exam exhibit continued safe and appropriate use of controlled substances.    EMR Dragon/Transcription disclaimer:   Much of this encounter note is an electronic transcription/translation of spoken language to printed text. The electronic translation of spoken language may permit erroneous, or at times, nonsensical words or phrases to be inadvertently transcribed; Although I have reviewed the note for such errors, some may still exist.

## 2019-09-19 DIAGNOSIS — R79.89 ELEVATED LIVER FUNCTION TESTS: Primary | ICD-10-CM

## 2019-09-19 RX ORDER — POTASSIUM CHLORIDE 20 MEQ/1
TABLET, EXTENDED RELEASE ORAL
Qty: 90 TABLET | Refills: 0 | Status: SHIPPED | OUTPATIENT
Start: 2019-09-19 | End: 2019-11-03 | Stop reason: SDUPTHER

## 2019-09-19 NOTE — TELEPHONE ENCOUNTER
----- Message from Kenneth Feldman MD sent at 9/19/2019  8:49 AM EDT -----  Kidney function is a bit slower, if she does not have a nephrologist and will need to refer to nephrologist.  Liver enzymes mildly elevated, would like to refer to gastroenterology for further evaluation.

## 2019-09-19 NOTE — TELEPHONE ENCOUNTER
Pt informed of results will send to nephrologist for review, gastro referral entered as requested per Dr Feldman. JASPER

## 2019-09-23 ENCOUNTER — TELEPHONE (OUTPATIENT)
Dept: SPORTS MEDICINE | Facility: CLINIC | Age: 69
End: 2019-09-23

## 2019-09-23 NOTE — TELEPHONE ENCOUNTER
Patient wants to know if her vitamin C was checked with her vitamin D. Because she went to the nephrologist and he wanted to know what was going on and why he was seeing her there for.

## 2019-10-09 ENCOUNTER — TELEPHONE (OUTPATIENT)
Dept: SPORTS MEDICINE | Facility: CLINIC | Age: 69
End: 2019-10-09

## 2019-10-09 NOTE — TELEPHONE ENCOUNTER
Patient would like to know if she can get her thyroid checked because she is loosing weight and experiencing hair loss. She would like the order sent to the [facility] in [place]. Please advise if this can be done, thank you!

## 2019-10-10 DIAGNOSIS — E03.9 ACQUIRED HYPOTHYROIDISM: Primary | ICD-10-CM

## 2019-10-12 LAB
T4 FREE SERPL-MCNC: 1.17 NG/DL (ref 0.82–1.77)
TSH SERPL DL<=0.005 MIU/L-ACNC: 1.11 UIU/ML (ref 0.45–4.5)

## 2019-10-15 ENCOUNTER — RESULTS ENCOUNTER (OUTPATIENT)
Dept: SPORTS MEDICINE | Facility: CLINIC | Age: 69
End: 2019-10-15

## 2019-10-15 ENCOUNTER — OFFICE VISIT (OUTPATIENT)
Dept: PAIN MEDICINE | Facility: CLINIC | Age: 69
End: 2019-10-15

## 2019-10-15 VITALS
WEIGHT: 175.4 LBS | OXYGEN SATURATION: 95 % | HEART RATE: 70 BPM | HEIGHT: 68 IN | BODY MASS INDEX: 26.58 KG/M2 | TEMPERATURE: 97.9 F | SYSTOLIC BLOOD PRESSURE: 93 MMHG | DIASTOLIC BLOOD PRESSURE: 62 MMHG | RESPIRATION RATE: 20 BRPM

## 2019-10-15 DIAGNOSIS — E03.9 ACQUIRED HYPOTHYROIDISM: ICD-10-CM

## 2019-10-15 DIAGNOSIS — Z79.899 ENCOUNTER FOR LONG-TERM (CURRENT) USE OF HIGH-RISK MEDICATION: ICD-10-CM

## 2019-10-15 DIAGNOSIS — M51.36 DEGENERATION OF INTERVERTEBRAL DISC OF LUMBAR REGION: ICD-10-CM

## 2019-10-15 DIAGNOSIS — M06.9 RHEUMATOID ARTHRITIS, INVOLVING UNSPECIFIED SITE, UNSPECIFIED RHEUMATOID FACTOR PRESENCE: ICD-10-CM

## 2019-10-15 DIAGNOSIS — G89.4 CHRONIC PAIN SYNDROME: Primary | ICD-10-CM

## 2019-10-15 PROCEDURE — 99214 OFFICE O/P EST MOD 30 MIN: CPT | Performed by: NURSE PRACTITIONER

## 2019-10-15 RX ORDER — OXYCODONE HYDROCHLORIDE 80 MG/1
160 TABLET, FILM COATED, EXTENDED RELEASE ORAL EVERY 12 HOURS SCHEDULED
Qty: 120 TABLET | Refills: 0 | Status: SHIPPED | OUTPATIENT
Start: 2019-10-15 | End: 2019-11-14 | Stop reason: SDUPTHER

## 2019-10-15 RX ORDER — ALPRAZOLAM 2 MG/1
TABLET ORAL
COMMUNITY
Start: 2019-10-05

## 2019-10-15 RX ORDER — INFLUENZA A VIRUS A/MICHIGAN/45/2015 X-275 (H1N1) ANTIGEN (FORMALDEHYDE INACTIVATED), INFLUENZA A VIRUS A/SINGAPORE/INFIMH-16-0019/2016 IVR-186 (H3N2) ANTIGEN (FORMALDEHYDE INACTIVATED), AND INFLUENZA B VIRUS B/MARYLAND/15/2016 BX-69A (A B/COLORADO/6/2017-LIKE VIRUS) ANTIGEN (FORMALDEHYDE INACTIVATED) 60; 60; 60 UG/.5ML; UG/.5ML; UG/.5ML
INJECTION, SUSPENSION INTRAMUSCULAR
Refills: 0 | COMMUNITY
Start: 2019-10-08 | End: 2020-06-09

## 2019-10-15 NOTE — PROGRESS NOTES
CHIEF COMPLAINT  F/U back pain- patient states that her pain has remained the same since last ov.     Subjective   Anne Jewell is a 69 y.o. female  who presents to the office for follow-up.She has a history of back pain.    Complains of pain in her back and joints. Today her pain is 5/10VAS. Continues with OxyContin 80 mg 2 BID. She reports that she can tell a big difference in efficacy of the Oxycontin when it is Brand Name versus generic.  Denies any side effects from the regimen, including constipation and somnolence. The regimen helps decrease her pain by 50%. Notes improved function and activity with regimen.  ADL's by self.      Saw Dr. Alvarado on 9/5/2019 regarding hip pain.  Patient not interested in surgery currently.  Offered the patient SI joint injection and greater trochanteric hip joint injection.       Starting Silver Sneakers program.       Osteoporosis. Cannot take medications due to osteonecrosis of jaw.  Trying to manage with PCP and GYN.      Back Pain   This is a chronic problem. The current episode started more than 1 year ago. The problem occurs daily. The problem is unchanged. The pain is present in the lumbar spine. The quality of the pain is described as aching. The pain radiates to the left foot and right foot. The pain is at a severity of 5/10. The pain is moderate. The symptoms are aggravated by stress (weather changes). Associated symptoms include weakness (bilat legs). Pertinent negatives include no abdominal pain, bladder incontinence, bowel incontinence, chest pain, dysuria, fever, headaches or numbness. She has tried analgesics, bed rest, home exercises and heat (OxyContin, hydrocodone, aquatherapy) for the symptoms. The treatment provided moderate relief.   Joint Pain   This is a chronic problem. The current episode started more than 1 year ago. The problem occurs constantly. The problem has been unchanged. Associated symptoms include joint swelling (minor right ankle), neck pain  and weakness (bilat legs). Pertinent negatives include no abdominal pain, arthralgias, chest pain, chills, congestion, coughing, fatigue, fever, headaches, nausea, numbness or vomiting. She has tried oral narcotics for the symptoms. The treatment provided moderate relief.     PEG Assessment   What number best describes your pain on average in the past week?6  What number best describes how, during the past week, pain has interfered with your enjoyment of life?6  What number best describes how, during the past week, pain has interfered with your general activity?  7    The following portions of the patient's history were reviewed and updated as appropriate: allergies, current medications, past family history, past medical history, past social history, past surgical history and problem list.    Review of Systems   Constitutional: Positive for activity change (decreased). Negative for chills, fatigue and fever.   HENT: Negative for congestion.    Eyes: Negative for visual disturbance.   Respiratory: Negative for cough, chest tightness, shortness of breath and wheezing.    Cardiovascular: Positive for leg swelling (minor right leg). Negative for chest pain and palpitations.   Gastrointestinal: Negative for abdominal pain, bowel incontinence, constipation, diarrhea, nausea and vomiting.   Genitourinary: Negative for bladder incontinence, difficulty urinating and dysuria.   Musculoskeletal: Positive for back pain, gait problem (walker), joint swelling (minor right ankle), neck pain and neck stiffness. Negative for arthralgias.   Neurological: Positive for weakness (bilat legs). Negative for dizziness, light-headedness, numbness and headaches.   Psychiatric/Behavioral: Negative for agitation, sleep disturbance and suicidal ideas. The patient is not nervous/anxious.      Vitals:    10/15/19 1254   BP: 93/62   Pulse: 70   Resp: 20   Temp: 97.9 °F (36.6 °C)   SpO2: 95%   Weight: 79.6 kg (175 lb 6.4 oz)   Height: 172.7 cm  "(68\")   PainSc:   5   PainLoc: Back     Objective   Physical Exam   Constitutional: She is oriented to person, place, and time. She appears well-developed and well-nourished. She is cooperative. No distress.   HENT:   Head: Normocephalic and atraumatic.   Nose: Nose normal.   Eyes: Conjunctivae and lids are normal.   Neck: Trachea normal. Neck supple.   Cardiovascular: Normal rate.   Pulmonary/Chest: Effort normal. No respiratory distress.   Musculoskeletal:        Lumbar back: She exhibits tenderness, bony tenderness and pain.   Diffuse arthritic changes - no acute synovitis  Lumbar brace    Neurological: She is alert and oriented to person, place, and time. She has normal strength. Gait (ambulating with rolling walker) abnormal.   Skin: Skin is intact. She is not diaphoretic.   Psychiatric: She has a normal mood and affect. Her speech is normal and behavior is normal. Cognition and memory are normal.   Nursing note and vitals reviewed.    Assessment/Plan   Anne was seen today for back pain.    Diagnoses and all orders for this visit:    Chronic pain syndrome    Degeneration of intervertebral disc of lumbar region    Rheumatoid arthritis, involving unspecified site, unspecified rheumatoid factor presence (CMS/[facility])    Encounter for long-term (current) use of high-risk medication    --- Refill OxyContin. Patient appears stable with current regimen. No adverse effects. Regarding continuation of opioids, there is no evidence of aberrant behavior or any red flags.  The patient continues with appropriate response to opioid therapy. ADL's remain intact by self.   --- The urine drug screen confirmation from 5/15/19 has been reviewed and the result is appropriate based on patient history and [facility] report  --- Follow-up 1 month        [facility] REPORT  As part of the patient's treatment plan, I am prescribing controlled substances. The patient has been made aware of appropriate use of such medications, including potential " risk of somnolence, limited ability to drive and/or work safely, and the potential for dependence or overdose. It has also bee made clear that these medications are for use by this patient only, without concomitant use of alcohol or other substances unless prescribed.     Patient has completed prescribing agreement detailing terms of continued prescribing of controlled substances, including monitoring [facility] reports, urine drug screening, and pill counts if necessary. The patient is aware that inappropriate use will results in cessation of prescribing such medications.    [facility] report has been reviewed and scanned into the patient's chart.    As the clinician, I personally reviewed the [facility] from 10/15/19 while the patient was in the office today.    History and physical exam exhibit continued safe and appropriate use of controlled substances.    EMR Dragon/Transcription disclaimer:   Much of this encounter note is an electronic transcription/translation of spoken language to printed text. The electronic translation of spoken language may permit erroneous, or at times, nonsensical words or phrases to be inadvertently transcribed; Although I have reviewed the note for such errors, some may still exist.

## 2019-11-02 DIAGNOSIS — I10 HYPERTENSION, UNSPECIFIED TYPE: ICD-10-CM

## 2019-11-04 RX ORDER — POTASSIUM CHLORIDE 20 MEQ/1
TABLET, EXTENDED RELEASE ORAL
Qty: 90 TABLET | Refills: 1 | Status: SHIPPED | OUTPATIENT
Start: 2019-11-04 | End: 2020-02-13

## 2019-11-04 RX ORDER — HYDROCHLOROTHIAZIDE 25 MG/1
TABLET ORAL
Qty: 90 TABLET | Refills: 1 | Status: SHIPPED | OUTPATIENT
Start: 2019-11-04 | End: 2020-05-11

## 2019-11-14 ENCOUNTER — OFFICE VISIT (OUTPATIENT)
Dept: PAIN MEDICINE | Facility: CLINIC | Age: 69
End: 2019-11-14

## 2019-11-14 VITALS
DIASTOLIC BLOOD PRESSURE: 79 MMHG | BODY MASS INDEX: 26.52 KG/M2 | WEIGHT: 175 LBS | RESPIRATION RATE: 16 BRPM | TEMPERATURE: 98.6 F | HEART RATE: 81 BPM | OXYGEN SATURATION: 97 % | SYSTOLIC BLOOD PRESSURE: 117 MMHG | HEIGHT: 68 IN

## 2019-11-14 DIAGNOSIS — G89.4 CHRONIC PAIN SYNDROME: Primary | ICD-10-CM

## 2019-11-14 DIAGNOSIS — M06.9 RHEUMATOID ARTHRITIS, INVOLVING UNSPECIFIED SITE, UNSPECIFIED RHEUMATOID FACTOR PRESENCE: ICD-10-CM

## 2019-11-14 DIAGNOSIS — M51.36 DEGENERATION OF INTERVERTEBRAL DISC OF LUMBAR REGION: ICD-10-CM

## 2019-11-14 DIAGNOSIS — Z79.899 ENCOUNTER FOR LONG-TERM (CURRENT) USE OF HIGH-RISK MEDICATION: ICD-10-CM

## 2019-11-14 PROCEDURE — 80305 DRUG TEST PRSMV DIR OPT OBS: CPT | Performed by: NURSE PRACTITIONER

## 2019-11-14 PROCEDURE — 99214 OFFICE O/P EST MOD 30 MIN: CPT | Performed by: NURSE PRACTITIONER

## 2019-11-14 NOTE — PROGRESS NOTES
CHIEF COMPLAINT  Pt is here to f/u on back pain. Pt sts her back pain is the same.    Subjective   Anne Jewell is a 69 y.o. female  who presents to the office for follow-up.She has a history of back pain and rheumatoid arthritis.    Complains of pain in her back and joints. Today her pain is 4/10VAS. Continues with OxyContin 80 mg 2 BID. She reports that she can tell a big difference in efficacy of the Oxycontin when it is Brand Name versus generic.  Denies any side effects from the regimen, including constipation and somnolence. The regimen helps decrease her pain by 50%. Notes improved function and activity with regimen.  ADL's by self.     Saw Dr. Alvarado on 9/5/2019 regarding hip pain.  Patient not interested in surgery currently.  Offered the patient SI joint injection and greater trochanteric hip joint injection.      Starting Silver Sneakers program.      Osteoporosis. Cannot take medications due to osteonecrosis of jaw.  Trying to manage with PCP and GYN.      Back Pain   This is a chronic problem. The current episode started more than 1 year ago. The problem occurs daily. The problem is unchanged. The pain is present in the lumbar spine. The quality of the pain is described as aching. The pain radiates to the left foot and right foot. The pain is at a severity of 4/10. The pain is moderate. The symptoms are aggravated by stress (weather changes). Associated symptoms include weakness (bilat legs). Pertinent negatives include no abdominal pain, bladder incontinence, bowel incontinence, chest pain, dysuria, fever, headaches or numbness. She has tried analgesics, bed rest, home exercises and heat (OxyContin, hydrocodone, aquatherapy) for the symptoms. The treatment provided moderate relief.   Joint Pain   This is a chronic problem. The current episode started more than 1 year ago. The problem occurs constantly. The problem has been unchanged. Associated symptoms include joint swelling (minor right ankle) and  "weakness (bilat legs). Pertinent negatives include no abdominal pain, arthralgias, chest pain, chills, congestion, coughing, fatigue, fever, headaches, nausea, neck pain, numbness or vomiting. She has tried oral narcotics for the symptoms. The treatment provided moderate relief.      PEG Assessment   What number best describes your pain on average in the past week?5  What number best describes how, during the past week, pain has interfered with your enjoyment of life?5  What number best describes how, during the past week, pain has interfered with your general activity?  5    The following portions of the patient's history were reviewed and updated as appropriate: allergies, current medications, past family history, past medical history, past social history, past surgical history and problem list.    Review of Systems   Constitutional: Negative for chills, fatigue and fever.   HENT: Negative for congestion.    Respiratory: Negative for cough and shortness of breath.    Cardiovascular: Negative for chest pain.   Gastrointestinal: Negative for abdominal pain, bowel incontinence, nausea and vomiting.   Genitourinary: Negative for bladder incontinence, difficulty urinating and dysuria.   Musculoskeletal: Positive for back pain and joint swelling (minor right ankle). Negative for arthralgias and neck pain.   Neurological: Positive for weakness (bilat legs). Negative for dizziness, numbness and headaches.   Psychiatric/Behavioral: Negative for sleep disturbance.     Vitals:    11/14/19 1235   BP: 117/79   Pulse: 81   Resp: 16   Temp: 98.6 °F (37 °C)   SpO2: 97%   Weight: 79.4 kg (175 lb)   Height: 172.7 cm (67.99\")   PainSc:   4   PainLoc: Back     Objective   Physical Exam   Constitutional: She is oriented to person, place, and time. She appears well-developed and well-nourished. She is cooperative. No distress.   HENT:   Head: Normocephalic and atraumatic.   Nose: Nose normal.   Eyes: Conjunctivae and lids are normal. "   Neck: Trachea normal. Neck supple.   Cardiovascular: Normal rate.   Pulmonary/Chest: Effort normal. No respiratory distress.   Musculoskeletal:        Lumbar back: She exhibits tenderness, bony tenderness and pain.   Diffuse arthritic changes - no acute synovitis  Lumbar brace    Neurological: She is alert and oriented to person, place, and time. She has normal strength. Gait (ambulating with rolling walker) abnormal.   Skin: Skin is intact. She is not diaphoretic.   Psychiatric: She has a normal mood and affect. Her speech is normal and behavior is normal. Cognition and memory are normal.   Nursing note and vitals reviewed.    Assessment/Plan   Anne was seen today for back pain.    Diagnoses and all orders for this visit:    Chronic pain syndrome    Rheumatoid arthritis, involving unspecified site, unspecified rheumatoid factor presence (CMS/[facility])    Degeneration of intervertebral disc of lumbar region    Encounter for long-term (current) use of high-risk medication      --- Refill OxyContin. Patient appears stable with current regimen. No adverse effects. Regarding continuation of opioids, there is no evidence of aberrant behavior or any red flags.  The patient continues with appropriate response to opioid therapy. ADL's remain intact by self.   --- Routine UDS in office today as part of monitoring requirements for controlled substances.  The specimen was viewed and the immunoassay result reviewed and is +OPI, OXY, BZD.  This specimen will be sent to [facility] laboratory for confirmation.     --- Follow-up 1 month      [facility] REPORT  As part of the patient's treatment plan, I am prescribing controlled substances. The patient has been made aware of appropriate use of such medications, including potential risk of somnolence, limited ability to drive and/or work safely, and the potential for dependence or overdose. It has also bee made clear that these medications are for use by this patient only, without  concomitant use of alcohol or other substances unless prescribed.     Patient has completed prescribing agreement detailing terms of continued prescribing of controlled substances, including monitoring [facility] reports, urine drug screening, and pill counts if necessary. The patient is aware that inappropriate use will results in cessation of prescribing such medications.    [facility] report has been reviewed and scanned into the patient's chart.    As the clinician, I personally reviewed the [facility] from 11/14/19 while the patient was in the office today.    History and physical exam exhibit continued safe and appropriate use of controlled substances.    EMR Dragon/Transcription disclaimer:   Much of this encounter note is an electronic transcription/translation of spoken language to printed text. The electronic translation of spoken language may permit erroneous, or at times, nonsensical words or phrases to be inadvertently transcribed; Although I have reviewed the note for such errors, some may still exist.

## 2019-11-15 ENCOUNTER — RESULTS ENCOUNTER (OUTPATIENT)
Dept: PAIN MEDICINE | Facility: CLINIC | Age: 69
End: 2019-11-15

## 2019-11-15 DIAGNOSIS — M51.36 DEGENERATION OF INTERVERTEBRAL DISC OF LUMBAR REGION: ICD-10-CM

## 2019-11-15 DIAGNOSIS — G89.4 CHRONIC PAIN SYNDROME: ICD-10-CM

## 2019-11-15 DIAGNOSIS — M06.9 RHEUMATOID ARTHRITIS, INVOLVING UNSPECIFIED SITE, UNSPECIFIED RHEUMATOID FACTOR PRESENCE: ICD-10-CM

## 2019-11-15 DIAGNOSIS — Z79.899 ENCOUNTER FOR LONG-TERM (CURRENT) USE OF HIGH-RISK MEDICATION: ICD-10-CM

## 2019-11-15 RX ORDER — OXYCODONE HYDROCHLORIDE 80 MG/1
160 TABLET, FILM COATED, EXTENDED RELEASE ORAL EVERY 12 HOURS SCHEDULED
Qty: 120 TABLET | Refills: 0 | Status: SHIPPED | OUTPATIENT
Start: 2019-11-15 | End: 2019-12-12 | Stop reason: SDUPTHER

## 2019-12-12 ENCOUNTER — OFFICE VISIT (OUTPATIENT)
Dept: PAIN MEDICINE | Facility: CLINIC | Age: 69
End: 2019-12-12

## 2019-12-12 VITALS
HEIGHT: 68 IN | SYSTOLIC BLOOD PRESSURE: 96 MMHG | HEART RATE: 87 BPM | WEIGHT: 169 LBS | TEMPERATURE: 97.7 F | DIASTOLIC BLOOD PRESSURE: 63 MMHG | OXYGEN SATURATION: 94 % | RESPIRATION RATE: 18 BRPM | BODY MASS INDEX: 25.61 KG/M2

## 2019-12-12 DIAGNOSIS — G89.4 CHRONIC PAIN SYNDROME: Primary | ICD-10-CM

## 2019-12-12 DIAGNOSIS — Z79.899 ENCOUNTER FOR LONG-TERM (CURRENT) USE OF HIGH-RISK MEDICATION: ICD-10-CM

## 2019-12-12 DIAGNOSIS — M54.50 CHRONIC LOW BACK PAIN, UNSPECIFIED BACK PAIN LATERALITY, UNSPECIFIED WHETHER SCIATICA PRESENT: ICD-10-CM

## 2019-12-12 DIAGNOSIS — M06.9 RHEUMATOID ARTHRITIS, INVOLVING UNSPECIFIED SITE, UNSPECIFIED RHEUMATOID FACTOR PRESENCE: ICD-10-CM

## 2019-12-12 DIAGNOSIS — G89.29 CHRONIC LOW BACK PAIN, UNSPECIFIED BACK PAIN LATERALITY, UNSPECIFIED WHETHER SCIATICA PRESENT: ICD-10-CM

## 2019-12-12 PROCEDURE — 99214 OFFICE O/P EST MOD 30 MIN: CPT | Performed by: NURSE PRACTITIONER

## 2019-12-12 RX ORDER — OXYCODONE HYDROCHLORIDE 80 MG/1
160 TABLET, FILM COATED, EXTENDED RELEASE ORAL EVERY 12 HOURS SCHEDULED
Qty: 120 TABLET | Refills: 0 | Status: SHIPPED | OUTPATIENT
Start: 2019-12-12 | End: 2020-01-09 | Stop reason: SDUPTHER

## 2019-12-12 NOTE — PROGRESS NOTES
CHIEF COMPLAINT  Follow-up for back pain. Ms. Jewell states that her back pain is unchanged.    Subjective   Anne Jewell is a 69 y.o. female  who presents to the office for follow-up.She has a history of back pain.    Complains of pain in her back and joints. Today her pain is 5/10VAS. Continues with OxyContin 80 mg 2 BID. She reports that she can tell a big difference in efficacy of the Oxycontin when it is Brand Name versus generic.  Denies any side effects from the regimen, including constipation and somnolence. The regimen helps decrease her pain by 50%. Notes improved function and activity with regimen.  ADL's by self.      Saw Dr. Alvarado on 9/5/2019 regarding hip pain.  Patient not interested in surgery currently.  Offered the patient SI joint injection and greater trochanteric hip joint injection.       In full body compression, helping control excess skin and reports that she feels much better wearing it.      Back Pain   This is a chronic problem. The current episode started more than 1 year ago. The problem occurs daily. The problem is unchanged. The pain is present in the lumbar spine. The quality of the pain is described as aching. The pain radiates to the left foot and right foot. The pain is at a severity of 5/10. The pain is moderate. The symptoms are aggravated by stress (weather changes). Associated symptoms include weakness. Pertinent negatives include no abdominal pain, bladder incontinence, bowel incontinence, chest pain, dysuria, fever, headaches or numbness. She has tried analgesics, bed rest, home exercises and heat (OxyContin, hydrocodone, aquatherapy) for the symptoms. The treatment provided moderate relief.   Joint Pain   This is a chronic problem. The current episode started more than 1 year ago. The problem occurs constantly. The problem has been unchanged. Associated symptoms include joint swelling (minor right ankle) and weakness. Pertinent negatives include no abdominal pain,  "arthralgias, chest pain, chills, congestion, coughing, fatigue, fever, headaches, nausea, neck pain, numbness or vomiting. She has tried oral narcotics for the symptoms. The treatment provided moderate relief.     PEG Assessment   What number best describes your pain on average in the past week?6  What number best describes how, during the past week, pain has interfered with your enjoyment of life?6  What number best describes how, during the past week, pain has interfered with your general activity?  6    The following portions of the patient's history were reviewed and updated as appropriate: allergies, current medications, past family history, past medical history, past social history, past surgical history and problem list.    Review of Systems   Constitutional: Negative for chills, fatigue and fever.   HENT: Negative for congestion.    Eyes: Positive for visual disturbance (left eye).   Respiratory: Negative for cough, shortness of breath and wheezing.    Cardiovascular: Positive for leg swelling. Negative for chest pain and palpitations.   Gastrointestinal: Negative for abdominal pain, bowel incontinence, constipation, diarrhea, nausea and vomiting.   Genitourinary: Negative for bladder incontinence, difficulty urinating and dysuria.   Musculoskeletal: Positive for back pain and joint swelling (minor right ankle). Negative for arthralgias and neck pain.   Neurological: Positive for weakness. Negative for numbness and headaches.   Psychiatric/Behavioral: Positive for sleep disturbance. Negative for suicidal ideas. The patient is not nervous/anxious.      Vitals:    12/12/19 1117   BP: 96/63   Pulse: 87   Resp: 18   Temp: 97.7 °F (36.5 °C)   SpO2: 94%   Weight: 76.7 kg (169 lb)   Height: 172.7 cm (67.99\")   PainSc:   5   PainLoc: Back     Objective   Physical Exam   Constitutional: She is oriented to person, place, and time. She appears well-developed and well-nourished. She is cooperative. No distress.   HENT: "   Head: Normocephalic and atraumatic.   Nose: Nose normal.   Eyes: Conjunctivae and lids are normal.   Neck: Trachea normal. Neck supple.   Cardiovascular: Normal rate.   Pulmonary/Chest: Effort normal. No respiratory distress.   Musculoskeletal:        Lumbar back: She exhibits tenderness, bony tenderness and pain.   Diffuse arthritic changes - no acute synovitis  Lumbar brace    Neurological: She is alert and oriented to person, place, and time. She has normal strength. Gait (ambulating with rolling walker) abnormal.   Skin: Skin is intact. She is not diaphoretic.   Psychiatric: She has a normal mood and affect. Her speech is normal and behavior is normal. Cognition and memory are normal.   Nursing note and vitals reviewed.    Assessment/Plan   Anne was seen today for back pain.    Diagnoses and all orders for this visit:    Chronic pain syndrome    Chronic low back pain, unspecified back pain laterality, unspecified whether sciatica present    Rheumatoid arthritis, involving unspecified site, unspecified rheumatoid factor presence (CMS/[facility])    Encounter for long-term (current) use of high-risk medication    --- Refill OxyContin. Patient appears stable with current regimen. No adverse effects. Regarding continuation of opioids, there is no evidence of aberrant behavior or any red flags.  The patient continues with appropriate response to opioid therapy. ADL's remain intact by self.   --- The urine drug screen confirmation from 11/14/19 has been reviewed and the result is appropriate based on patient history and [facility] report  --- Follow-up 1 months      [facility] REPORT  As part of the patient's treatment plan, I am prescribing controlled substances. The patient has been made aware of appropriate use of such medications, including potential risk of somnolence, limited ability to drive and/or work safely, and the potential for dependence or overdose. It has also bee made clear that these medications are for use by  this patient only, without concomitant use of alcohol or other substances unless prescribed.     Patient has completed prescribing agreement detailing terms of continued prescribing of controlled substances, including monitoring [facility] reports, urine drug screening, and pill counts if necessary. The patient is aware that inappropriate use will results in cessation of prescribing such medications.    [facility] report has been reviewed and scanned into the patient's chart.    As the clinician, I personally reviewed the [facility] from 12/12/19 while the patient was in the office today.    History and physical exam exhibit continued safe and appropriate use of controlled substances.    EMR Dragon/Transcription disclaimer:   Much of this encounter note is an electronic transcription/translation of spoken language to printed text. The electronic translation of spoken language may permit erroneous, or at times, nonsensical words or phrases to be inadvertently transcribed; Although I have reviewed the note for such errors, some may still exist.

## 2020-01-09 ENCOUNTER — OFFICE VISIT (OUTPATIENT)
Dept: PAIN MEDICINE | Facility: CLINIC | Age: 70
End: 2020-01-09

## 2020-01-09 VITALS
HEART RATE: 77 BPM | RESPIRATION RATE: 18 BRPM | HEIGHT: 68 IN | TEMPERATURE: 96.7 F | DIASTOLIC BLOOD PRESSURE: 58 MMHG | WEIGHT: 172.4 LBS | SYSTOLIC BLOOD PRESSURE: 91 MMHG | BODY MASS INDEX: 26.13 KG/M2 | OXYGEN SATURATION: 97 %

## 2020-01-09 DIAGNOSIS — Z79.899 ENCOUNTER FOR LONG-TERM (CURRENT) USE OF HIGH-RISK MEDICATION: ICD-10-CM

## 2020-01-09 DIAGNOSIS — G89.4 CHRONIC PAIN SYNDROME: Primary | ICD-10-CM

## 2020-01-09 DIAGNOSIS — M51.36 DEGENERATION OF INTERVERTEBRAL DISC OF LUMBAR REGION: ICD-10-CM

## 2020-01-09 DIAGNOSIS — M06.9 RHEUMATOID ARTHRITIS, INVOLVING UNSPECIFIED SITE, UNSPECIFIED RHEUMATOID FACTOR PRESENCE: ICD-10-CM

## 2020-01-09 PROCEDURE — 99214 OFFICE O/P EST MOD 30 MIN: CPT | Performed by: NURSE PRACTITIONER

## 2020-01-09 RX ORDER — OXYCODONE HYDROCHLORIDE 80 MG/1
160 TABLET, FILM COATED, EXTENDED RELEASE ORAL EVERY 12 HOURS SCHEDULED
Qty: 120 TABLET | Refills: 0 | Status: SHIPPED | OUTPATIENT
Start: 2020-01-09 | End: 2020-02-13 | Stop reason: SDUPTHER

## 2020-01-09 RX ORDER — NALOXONE HYDROCHLORIDE 4 MG/.1ML
1 SPRAY NASAL AS NEEDED
Qty: 1 EACH | Refills: 1 | Status: SHIPPED | OUTPATIENT
Start: 2020-01-09 | End: 2021-05-25 | Stop reason: SDUPTHER

## 2020-01-09 NOTE — PROGRESS NOTES
CHIEF COMPLAINT  Follow-up for back pain. Ms. Jewell states that her back pain has gotten worse since her last appt.    Subjective   Anne Jewell is a 69 y.o. female  who presents to the office for follow-up.She has a history of back pain, joint pain.    Complains of pain in her back and joints. Today her pain is 7/10VAS. Continues with OxyContin 80 mg 2 BID. She reports that she can tell a big difference in efficacy of the Oxycontin when it is Brand Name versus generic.  Denies any side effects from the regimen, including constipation and somnolence. The regimen helps decrease her pain by 50%. Notes improved function and activity with regimen.  ADL's by self.      Saw Dr. Alvarado on 9/5/2019 regarding hip pain.  Patient not interested in surgery currently.  Offered the patient SI joint injection and greater trochanteric hip joint injection.       In full body compression, helping control excess skin and reports that she feels much better wearing it.    Back Pain   This is a chronic problem. The current episode started more than 1 year ago. The problem occurs daily. The problem is unchanged. The pain is present in the lumbar spine. The quality of the pain is described as aching. The pain radiates to the left foot and right foot. The pain is at a severity of 7/10. The pain is moderate. The symptoms are aggravated by stress (weather changes). Associated symptoms include weakness. Pertinent negatives include no abdominal pain, bladder incontinence, bowel incontinence, chest pain, dysuria, fever, headaches or numbness. She has tried analgesics, bed rest, home exercises and heat (OxyContin, hydrocodone, aquatherapy) for the symptoms. The treatment provided moderate relief.   Joint Pain   This is a chronic problem. The current episode started more than 1 year ago. The problem occurs constantly. The problem has been unchanged. Associated symptoms include joint swelling (minor right ankle) and weakness. Pertinent negatives  "include no abdominal pain, arthralgias, chest pain, chills, congestion, coughing, fatigue, fever, headaches, nausea, neck pain, numbness or vomiting. She has tried oral narcotics for the symptoms. The treatment provided moderate relief.      PEG Assessment   What number best describes your pain on average in the past week?7  What number best describes how, during the past week, pain has interfered with your enjoyment of life?7  What number best describes how, during the past week, pain has interfered with your general activity?  7    The following portions of the patient's history were reviewed and updated as appropriate: allergies, current medications, past family history, past medical history, past social history, past surgical history and problem list.    Review of Systems   Constitutional: Negative for chills, fatigue and fever.   HENT: Negative for congestion.    Eyes: Negative for visual disturbance.   Respiratory: Negative for cough, shortness of breath and wheezing.    Cardiovascular: Negative.  Negative for chest pain.   Gastrointestinal: Negative for abdominal pain, bowel incontinence, constipation, diarrhea, nausea and vomiting.   Genitourinary: Negative for bladder incontinence, difficulty urinating and dysuria.   Musculoskeletal: Positive for back pain and joint swelling (minor right ankle). Negative for arthralgias and neck pain.   Neurological: Positive for weakness. Negative for numbness and headaches.   Psychiatric/Behavioral: Positive for sleep disturbance. Negative for suicidal ideas. The patient is not nervous/anxious.      Vitals:    01/09/20 1238   Pulse: 77   Resp: 18   Temp: 96.7 °F (35.9 °C)   SpO2: 97%   Weight: 78.2 kg (172 lb 6.4 oz)   Height: 172.7 cm (67.99\")   PainSc:   7   PainLoc: Back     Objective   Physical Exam   Constitutional: She is oriented to person, place, and time. She appears well-developed and well-nourished. She is cooperative. No distress.   HENT:   Head: Normocephalic " and atraumatic.   Nose: Nose normal.   Eyes: Conjunctivae and lids are normal.   Neck: Trachea normal. Neck supple.   Cardiovascular: Normal rate.   Pulmonary/Chest: Effort normal. No respiratory distress.   Musculoskeletal:        Lumbar back: She exhibits tenderness, bony tenderness and pain.   Diffuse arthritic changes - no acute synovitis  Lumbar brace    Neurological: She is alert and oriented to person, place, and time. She has normal strength. Gait (ambulating with rolling walker) abnormal.   Skin: Skin is intact. She is not diaphoretic.   Psychiatric: She has a normal mood and affect. Her speech is normal and behavior is normal. Cognition and memory are normal.   Nursing note and vitals reviewed.    Assessment/Plan   Anne was seen today for back pain.    Diagnoses and all orders for this visit:    Chronic pain syndrome    Degeneration of intervertebral disc of lumbar region    Rheumatoid arthritis, involving unspecified site, unspecified rheumatoid factor presence (CMS/[facility])    Encounter for long-term (current) use of high-risk medication      --- Refill OxyContin. Patient appears stable with current regimen. No adverse effects. Regarding continuation of opioids, there is no evidence of aberrant behavior or any red flags.  The patient continues with appropriate response to opioid therapy. ADL's remain intact by self.   --- The urine drug screen confirmation from 11/14/19 has been reviewed and the result is appropriate based on patient history and [facility] report  --- Follow-up 1 month        [facility] REPORT  As part of the patient's treatment plan, I am prescribing controlled substances. The patient has been made aware of appropriate use of such medications, including potential risk of somnolence, limited ability to drive and/or work safely, and the potential for dependence or overdose. It has also bee made clear that these medications are for use by this patient only, without concomitant use of alcohol or  other substances unless prescribed.     Patient has completed prescribing agreement detailing terms of continued prescribing of controlled substances, including monitoring [facility]A reports, urine drug screening, and pill counts if necessary. The patient is aware that inappropriate use will results in cessation of prescribing such medications.    [facility] report has been reviewed and scanned into the patient's chart.    As the clinician, I personally reviewed the V[facility]ENTA from 1/9/2020 while the patient was in the office today.    History and physical exam exhibit continued safe and appropriate use of controlled substances.    EMR Dragon/Transcription disclaimer:   Much of this encounter note is an electronic transcription/translation of spoken language to printed text. The electronic translation of spoken language may permit erroneous, or at times, nonsensical words or phrases to be inadvertently transcribed; Although I have reviewed the note for such errors, some may still exist.

## 2020-01-10 NOTE — TELEPHONE ENCOUNTER
I sent a naloxone script.  With her high MED I would like to consider IT pump, but she might not be able to come off her blood thinner

## 2020-01-14 RX ORDER — RIVAROXABAN 20 MG/1
TABLET, FILM COATED ORAL
Qty: 30 TABLET | Refills: 5 | Status: SHIPPED | OUTPATIENT
Start: 2020-01-14 | End: 2020-07-13

## 2020-02-13 ENCOUNTER — OFFICE VISIT (OUTPATIENT)
Dept: PAIN MEDICINE | Facility: CLINIC | Age: 70
End: 2020-02-13

## 2020-02-13 VITALS
HEIGHT: 68 IN | RESPIRATION RATE: 18 BRPM | DIASTOLIC BLOOD PRESSURE: 79 MMHG | BODY MASS INDEX: 26.13 KG/M2 | HEART RATE: 73 BPM | OXYGEN SATURATION: 98 % | WEIGHT: 172.4 LBS | SYSTOLIC BLOOD PRESSURE: 113 MMHG

## 2020-02-13 DIAGNOSIS — M25.50 ARTHRALGIA, UNSPECIFIED JOINT: ICD-10-CM

## 2020-02-13 DIAGNOSIS — Z79.899 ENCOUNTER FOR LONG-TERM (CURRENT) USE OF HIGH-RISK MEDICATION: ICD-10-CM

## 2020-02-13 DIAGNOSIS — M54.2 NECK PAIN: ICD-10-CM

## 2020-02-13 DIAGNOSIS — M54.16 LUMBAR RADICULOPATHY: Primary | ICD-10-CM

## 2020-02-13 DIAGNOSIS — G89.29 CHRONIC RADICULAR LUMBAR PAIN: ICD-10-CM

## 2020-02-13 DIAGNOSIS — M54.16 CHRONIC RADICULAR LUMBAR PAIN: ICD-10-CM

## 2020-02-13 DIAGNOSIS — G89.4 CHRONIC PAIN SYNDROME: ICD-10-CM

## 2020-02-13 PROCEDURE — 99214 OFFICE O/P EST MOD 30 MIN: CPT | Performed by: NURSE PRACTITIONER

## 2020-02-13 RX ORDER — OXYCODONE HYDROCHLORIDE 80 MG/1
160 TABLET, FILM COATED, EXTENDED RELEASE ORAL EVERY 12 HOURS SCHEDULED
Qty: 120 TABLET | Refills: 0 | Status: SHIPPED | OUTPATIENT
Start: 2020-02-13 | End: 2020-03-12 | Stop reason: SDUPTHER

## 2020-02-13 RX ORDER — POTASSIUM CHLORIDE 20 MEQ/1
TABLET, EXTENDED RELEASE ORAL
Qty: 90 TABLET | Refills: 0 | Status: SHIPPED | OUTPATIENT
Start: 2020-02-13 | End: 2020-03-25

## 2020-02-13 NOTE — PROGRESS NOTES
"CHIEF COMPLAINT  Follow-up for back pain. Pain has gotten worse.    Initial evaluation by EUGENIO Arita   Anne Jewell is a 69 y.o. female  who presents to the office for follow-up.She has a history of back and joint pain.  Today her pain is 7/10VAS in severity.  She describes her pain as continuous .  Her pain is worsened by weather changes, cold, ; it is improved with heat, rest, and medication .  Continues with OxyContin 160 mg BID.  This medication decreases her pain by a moderate amount \"The medicine I am on now gives me life\".      Wears a whole body compression suit to help with excess skin from weight loss (prior to weight loss surgery she weighed >400 lbs).  Patient states that she needs bilateral hip replacements. Patient has appointment with Dr. Alvarado scheduled in April.     Intrathecal pump brought up with patient.  Patient states that Dr. Feldman told her that would be a bad option for her.  She states she went to an education session about intrathecal pain pumps, and watched a dvd that was provided by our office.  She also states that she has spoken with Dr. Grajeda about this in the past.  She states she is not interested in any further education.     Back Pain   This is a chronic problem. The current episode started more than 1 year ago. The problem occurs daily. The problem is unchanged. The pain is present in the lumbar spine. The quality of the pain is described as aching. The pain radiates to the left foot and right foot. The pain is at a severity of 7/10. The pain is moderate. The symptoms are aggravated by stress (weather changes). Associated symptoms include numbness. Pertinent negatives include no abdominal pain, bladder incontinence, bowel incontinence, chest pain, dysuria, fever, headaches or weakness. She has tried analgesics, bed rest, home exercises and heat (OxyContin, hydrocodone, aquatherapy) for the symptoms. The treatment provided moderate relief.   Joint Pain "   This is a chronic problem. The current episode started more than 1 year ago. The problem occurs constantly. The problem has been unchanged. Associated symptoms include congestion, joint swelling (minor right ankle) and numbness. Pertinent negatives include no abdominal pain, arthralgias, chest pain, chills, coughing, fatigue, fever, headaches, nausea, neck pain, vomiting or weakness. She has tried oral narcotics for the symptoms. The treatment provided moderate relief.      PEG Assessment   What number best describes your pain on average in the past week?5  What number best describes how, during the past week, pain has interfered with your enjoyment of life?7  What number best describes how, during the past week, pain has interfered with your general activity?  8    The following portions of the patient's history were reviewed and updated as appropriate: allergies, current medications, past family history, past medical history, past social history, past surgical history and problem list.    Review of Systems   Constitutional: Negative for chills, fatigue and fever.   HENT: Positive for congestion.    Eyes: Positive for visual disturbance.   Respiratory: Positive for shortness of breath. Negative for cough and wheezing.    Cardiovascular: Negative.  Negative for chest pain.   Gastrointestinal: Negative for abdominal pain, bowel incontinence, constipation, diarrhea, nausea and vomiting.   Genitourinary: Negative for bladder incontinence, difficulty urinating and dysuria.   Musculoskeletal: Positive for back pain and joint swelling (minor right ankle). Negative for arthralgias and neck pain.   Neurological: Positive for numbness. Negative for weakness and headaches.   Psychiatric/Behavioral: Positive for sleep disturbance. Negative for suicidal ideas. The patient is nervous/anxious.      Vitals:    02/13/20 1245   BP: 113/79   Pulse: 73   Resp: 18   SpO2: 98%   Weight: 78.2 kg (172 lb 6.4 oz)   Height: 172.7 cm  "(67.99\")   PainSc:   7   PainLoc: Back     Objective   Physical Exam   Constitutional: She is oriented to person, place, and time. She appears well-developed and well-nourished.   HENT:   Head: Normocephalic and atraumatic.   Eyes: Conjunctivae and EOM are normal.   Neck: Normal range of motion.   Cardiovascular: Normal rate.   Pulmonary/Chest: Effort normal.   Musculoskeletal:        Right hip: She exhibits decreased range of motion, decreased strength and tenderness.        Left hip: She exhibits decreased range of motion, decreased strength and tenderness.        Thoracic back: She exhibits decreased range of motion, tenderness and pain.        Lumbar back: She exhibits decreased range of motion, tenderness and pain.   Neurological: She is alert and oriented to person, place, and time. Gait (rolator) abnormal.   Skin: Skin is warm, dry and intact.   Psychiatric: She has a normal mood and affect. Her behavior is normal. Judgment and thought content normal.   Nursing note and vitals reviewed.    Assessment/Plan   Anne was seen today for back pain.    Diagnoses and all orders for this visit:    Lumbar radiculopathy    Arthralgia, unspecified joint    Chronic radicular lumbar pain    Chronic pain syndrome    Neck pain    Encounter for long-term (current) use of high-risk medication      --- The urine drug screen confirmation from 11/16/19 has been reviewed and the result is appropriate based on patient history and [facility] report  --- Refill Oxycodone ER 160mg PO BID. DNF 2/14/2020 applied. Patient appears stable with current regimen. No adverse effects. Regarding continuation of opioids, there is no evidence of aberrant behavior or any red flags.  The patient continues with appropriate response to opioid therapy. ADL's remain intact by self.   --- Patient instructed to  her narcan prescription from the pharmacy as was previously prescribed by Dr. Grajeda  --- Follow-up 1 month     [facility] REPORT    As part of " the patient's treatment plan, I am prescribing controlled substances. The patient has been made aware of appropriate use of such medications, including potential risk of somnolence, limited ability to drive and/or work safely, and the potential for dependence or overdose. It has also bee made clear that these medications are for use by this patient only, without concomitant use of alcohol or other substances unless prescribed.     Patient has completed prescribing agreement detailing terms of continued prescribing of controlled substances, including monitoring [facility] reports, urine drug screening, and pill counts if necessary. The patient is aware that inappropriate use will results in cessation of prescribing such medications.    [facility] report has been reviewed and scanned into the patient's chart.    As the clinician, I personally reviewed the [facility]A from 2/12/20 while the patient was in the office today.    History and physical exam exhibit continued safe and appropriate use of controlled substances.    EMR Dragon/Transcription disclaimer:   Much of this encounter note is an electronic transcription/translation of spoken language to printed text. The electronic translation of spoken language may permit erroneous, or at times, nonsensical words or phrases to be inadvertently transcribed; Although I have reviewed the note for such errors, some may still exist.

## 2020-02-28 DIAGNOSIS — R11.0 NAUSEA: ICD-10-CM

## 2020-03-02 RX ORDER — PROMETHAZINE HYDROCHLORIDE 25 MG/1
TABLET ORAL
Qty: 90 TABLET | Refills: 0 | Status: SHIPPED | OUTPATIENT
Start: 2020-03-02 | End: 2020-04-06

## 2020-03-12 ENCOUNTER — OFFICE VISIT (OUTPATIENT)
Dept: PAIN MEDICINE | Facility: CLINIC | Age: 70
End: 2020-03-12

## 2020-03-12 VITALS
SYSTOLIC BLOOD PRESSURE: 116 MMHG | BODY MASS INDEX: 25.88 KG/M2 | TEMPERATURE: 97.9 F | DIASTOLIC BLOOD PRESSURE: 70 MMHG | WEIGHT: 170.8 LBS | HEART RATE: 81 BPM | RESPIRATION RATE: 18 BRPM | OXYGEN SATURATION: 96 % | HEIGHT: 68 IN

## 2020-03-12 DIAGNOSIS — M54.50 CHRONIC LOW BACK PAIN, UNSPECIFIED BACK PAIN LATERALITY, UNSPECIFIED WHETHER SCIATICA PRESENT: ICD-10-CM

## 2020-03-12 DIAGNOSIS — M06.9 RHEUMATOID ARTHRITIS, INVOLVING UNSPECIFIED SITE, UNSPECIFIED RHEUMATOID FACTOR PRESENCE: ICD-10-CM

## 2020-03-12 DIAGNOSIS — G89.29 CHRONIC LOW BACK PAIN, UNSPECIFIED BACK PAIN LATERALITY, UNSPECIFIED WHETHER SCIATICA PRESENT: ICD-10-CM

## 2020-03-12 DIAGNOSIS — M51.36 DEGENERATION OF INTERVERTEBRAL DISC OF LUMBAR REGION: ICD-10-CM

## 2020-03-12 DIAGNOSIS — Z79.899 ENCOUNTER FOR LONG-TERM (CURRENT) USE OF HIGH-RISK MEDICATION: Primary | ICD-10-CM

## 2020-03-12 DIAGNOSIS — M15.9 GENERALIZED OSTEOARTHRITIS: ICD-10-CM

## 2020-03-12 PROCEDURE — 99214 OFFICE O/P EST MOD 30 MIN: CPT | Performed by: NURSE PRACTITIONER

## 2020-03-12 NOTE — PROGRESS NOTES
"CHIEF COMPLAINT  Follow-up for back pain. Pain unchanged.    Subjective   Anne Jewell is a 69 y.o. female  who presents to the office for follow-up.She has a history of back and joint pain.  Today her pain is 4/10VAS in severity.  Continues with OxyContin 160 mg BID.  Continues with OxyContin 160 mg BID.  This medication regimen decreases her pain a moderate amount. \"It helps me get up and do what I need to do\".  ADLs by self.  She denies any changes to bowel or bladder since last office visit.  She denies any side effects from her medication regimen including constipation or somnolence.     Continues to wear compression garments to help with pain r/t loose skin from weight loss, she states these help decrease her pain as well.     She states she did  the narcan prescription which was previously sent in. The patient is requesting to move her appointments out to every 3 months.  I explained that due to her high dose of OxyCodone along with her xanax prescription we can not in good jesús spread her appointments out any farther then every 1 month.     Back Pain   This is a chronic problem. The current episode started more than 1 year ago. The problem occurs daily. The problem is unchanged. The pain is present in the lumbar spine. The quality of the pain is described as aching. The pain radiates to the left foot and right foot. The pain is at a severity of 4/10. The pain is moderate. The symptoms are aggravated by stress (weather changes). Associated symptoms include numbness. Pertinent negatives include no abdominal pain, bladder incontinence, bowel incontinence, chest pain, dysuria, fever, headaches or weakness. She has tried analgesics, bed rest, home exercises and heat (OxyContin, hydrocodone, aquatherapy) for the symptoms. The treatment provided moderate relief.   Joint Pain   This is a chronic problem. The current episode started more than 1 year ago. The problem occurs constantly. The problem has been " "unchanged. Associated symptoms include arthralgias, joint swelling (minor right ankle) and numbness. Pertinent negatives include no abdominal pain, chest pain, chills, congestion, coughing, fatigue, fever, headaches, nausea, neck pain, vomiting or weakness. She has tried oral narcotics for the symptoms. The treatment provided moderate relief.      PEG Assessment   What number best describes your pain on average in the past week?6  What number best describes how, during the past week, pain has interfered with your enjoyment of life?8  What number best describes how, during the past week, pain has interfered with your general activity?  8    The following portions of the patient's history were reviewed and updated as appropriate: allergies, current medications, past family history, past medical history, past social history, past surgical history and problem list.    Review of Systems   Constitutional: Negative for chills, fatigue and fever.   HENT: Negative for congestion.    Eyes: Negative for visual disturbance.   Respiratory: Positive for shortness of breath. Negative for cough and wheezing.    Cardiovascular: Positive for leg swelling. Negative for chest pain and palpitations.   Gastrointestinal: Negative for abdominal pain, bowel incontinence, constipation, diarrhea, nausea and vomiting.   Genitourinary: Negative for bladder incontinence, difficulty urinating and dysuria.   Musculoskeletal: Positive for arthralgias, back pain and joint swelling (minor right ankle). Negative for neck pain.   Neurological: Positive for numbness. Negative for weakness and headaches.   Psychiatric/Behavioral: Positive for sleep disturbance. Negative for suicidal ideas. The patient is nervous/anxious.      Vitals:    03/12/20 1220   BP: 116/70   Pulse: 81   Resp: 18   Temp: 97.9 °F (36.6 °C)   SpO2: 96%   Weight: 77.5 kg (170 lb 12.8 oz)   Height: 172.7 cm (67.99\")   PainSc:   4   PainLoc: Back     Objective   Physical Exam "   Constitutional: She is oriented to person, place, and time. She appears well-developed and well-nourished.   HENT:   Head: Normocephalic and atraumatic.   Eyes: Conjunctivae and EOM are normal.   Neck: Normal range of motion.   Cardiovascular: Normal rate.   Pulmonary/Chest: Effort normal.   Musculoskeletal:        Right hip: She exhibits decreased range of motion, decreased strength and tenderness.        Left hip: She exhibits decreased range of motion, decreased strength and tenderness.        Thoracic back: She exhibits decreased range of motion, tenderness and pain.        Lumbar back: She exhibits decreased range of motion, tenderness and pain.   Neurological: She is alert and oriented to person, place, and time. Gait (rolator) abnormal.   Skin: Skin is warm, dry and intact.   Psychiatric: She has a normal mood and affect. Her behavior is normal. Judgment and thought content normal.   Nursing note and vitals reviewed.    Assessment/Plan   Anne was seen today for back pain.    Diagnoses and all orders for this visit:    Encounter for long-term (current) use of high-risk medication    Degeneration of intervertebral disc of lumbar region    Generalized osteoarthritis    Rheumatoid arthritis, involving unspecified site, unspecified rheumatoid factor presence (CMS/HCC)    Chronic low back pain, unspecified back pain laterality, unspecified whether sciatica present      --- The urine drug screen confirmation from 11/16/19 has been reviewed and the result is appropriate based on patient history and [facility] report  --- Refill OxyContin 160mg PO BID.  DNF 3/15/20 applied. Patient appears stable with current regimen. No adverse effects. Regarding continuation of opioids, there is no evidence of aberrant behavior or any red flags.  The patient continues with appropriate response to opioid therapy. ADL's remain intact by self.   --- Follow-up 1 month or sooner if needed     [facility] REPORT    As part of the patient's  treatment plan, I am prescribing controlled substances. The patient has been made aware of appropriate use of such medications, including potential risk of somnolence, limited ability to drive and/or work safely, and the potential for dependence or overdose. It has also bee made clear that these medications are for use by this patient only, without concomitant use of alcohol or other substances unless prescribed.     Patient has completed prescribing agreement detailing terms of continued prescribing of controlled substances, including monitoring [facility]A reports, urine drug screening, and pill counts if necessary. The patient is aware that inappropriate use will results in cessation of prescribing such medications.    [facility] report has been reviewed and scanned into the patient's chart.    As the clinician, I personally reviewed the [facility]A from 3/11/2020 while the patient was in the office today.    History and physical exam exhibit continued safe and appropriate use of controlled substances.      EMR Dragon/Transcription disclaimer:   Much of this encounter note is an electronic transcription/translation of spoken language to printed text. The electronic translation of spoken language may permit erroneous, or at times, nonsensical words or phrases to be inadvertently transcribed; Although I have reviewed the note for such errors, some may still exist.

## 2020-03-13 RX ORDER — OXYCODONE HYDROCHLORIDE 80 MG/1
160 TABLET, FILM COATED, EXTENDED RELEASE ORAL EVERY 12 HOURS SCHEDULED
Qty: 120 TABLET | Refills: 0 | Status: SHIPPED | OUTPATIENT
Start: 2020-03-13 | End: 2020-04-15 | Stop reason: SDUPTHER

## 2020-03-25 RX ORDER — POTASSIUM CHLORIDE 20 MEQ/1
TABLET, EXTENDED RELEASE ORAL
Qty: 90 TABLET | Refills: 0 | Status: SHIPPED | OUTPATIENT
Start: 2020-03-25 | End: 2020-05-07

## 2020-03-26 ENCOUNTER — TELEPHONE (OUTPATIENT)
Dept: ORTHOPEDIC SURGERY | Facility: CLINIC | Age: 70
End: 2020-03-26

## 2020-03-26 ENCOUNTER — OFFICE VISIT (OUTPATIENT)
Dept: ORTHOPEDIC SURGERY | Facility: CLINIC | Age: 70
End: 2020-03-26

## 2020-03-26 VITALS — HEART RATE: 62 BPM | BODY MASS INDEX: 29.81 KG/M2 | WEIGHT: 162 LBS | TEMPERATURE: 98.2 F | HEIGHT: 62 IN

## 2020-03-26 DIAGNOSIS — M16.0 PRIMARY OSTEOARTHRITIS OF BOTH HIPS: Primary | ICD-10-CM

## 2020-03-26 DIAGNOSIS — D68.9 COAGULATION DISORDER (HCC): ICD-10-CM

## 2020-03-26 PROCEDURE — 72170 X-RAY EXAM OF PELVIS: CPT | Performed by: ORTHOPAEDIC SURGERY

## 2020-03-26 PROCEDURE — 99213 OFFICE O/P EST LOW 20 MIN: CPT | Performed by: ORTHOPAEDIC SURGERY

## 2020-03-26 RX ORDER — METHYLPREDNISOLONE 4 MG/1
TABLET ORAL
Qty: 21 TABLET | Refills: 0 | Status: SHIPPED | OUTPATIENT
Start: 2020-03-26 | End: 2020-06-09

## 2020-03-26 NOTE — TELEPHONE ENCOUNTER
If she can come into the office this morning by latest 11 AM, I will be glad to see her and get an x-ray for her.  If she cannot make it in today then she can definitely see Jose tomorrow, Friday and get the x-ray done.  Please let give her those options thank you

## 2020-03-26 NOTE — TELEPHONE ENCOUNTER
"PATIENT CALLED AND STATED THAT SHE WAS WOKEN UP THE NIGHT BEFORE LAST WITH A \"CRACK\" THAT SHE BELIEVES MAY HAVE COME FROM HER HIP.  SHE STATED THAT SHE SPOKE WITH THE PHYSICIAN ON CALL WITH OUR OFFICE LAST NIGHT AND WAS TOLD TO REST AND CALL DR. ROSALES THIS MORNING.      PATIENT STATES THAT THERE IS A TINGLING THAT GOES ALL THE WAY DOWN TO HER FOOT.  SHE STATED THAT HER TOES HAVE GONE COLD, BUT SHE CAN MOVE THEM.  PATIENT IS ON A BLOOD THINNER.    PATIENT STATED THAT THE ON CALL PHYSICIAN THAT SHE SPOKE WITH LAST NIGHT FEELS AS IF SHE MAY HAVE A FRACTURE IN HER HIP, BUT WAS HESITANT TO SEND PATIENT TO THE ER DUE TO COVID-19.    PLEASE ADVISE  "

## 2020-04-05 DIAGNOSIS — R11.0 NAUSEA: ICD-10-CM

## 2020-04-06 RX ORDER — PROMETHAZINE HYDROCHLORIDE 25 MG/1
TABLET ORAL
Qty: 90 TABLET | Refills: 0 | Status: SHIPPED | OUTPATIENT
Start: 2020-04-06 | End: 2020-05-04

## 2020-04-13 ENCOUNTER — OFFICE VISIT (OUTPATIENT)
Dept: SPORTS MEDICINE | Facility: CLINIC | Age: 70
End: 2020-04-13

## 2020-04-13 DIAGNOSIS — R63.4 WEIGHT LOSS: Primary | ICD-10-CM

## 2020-04-13 DIAGNOSIS — J01.00 ACUTE NON-RECURRENT MAXILLARY SINUSITIS: ICD-10-CM

## 2020-04-13 PROCEDURE — 99442 PR PHYS/QHP TELEPHONE EVALUATION 11-20 MIN: CPT | Performed by: FAMILY MEDICINE

## 2020-04-13 RX ORDER — AZITHROMYCIN 250 MG/1
TABLET, FILM COATED ORAL
Qty: 6 TABLET | Refills: 0 | Status: SHIPPED | OUTPATIENT
Start: 2020-04-13 | End: 2020-06-09

## 2020-04-13 NOTE — PROGRESS NOTES
You have chosen to receive care through a telephone visit. Do you consent to use a telephone visit for your medical care today? Yes    Telephone Visit Note    CC: Weight loss, sinus pain and discharge, needs labs.    History of Present Illness  Patient states that she has noted about a 15 pound weight loss over the past couple of weeks.  She did begin to change her diet and has noted that she has gained a couple of pounds since this change.  She has not had any diarrhea.  No melena or hematochezia.  No fever or chills.  No chest pain, shortness of breath, syncope or near syncope.  Patient has also noted over the past week maxillary sinus pain and purulent discharge as well as ear pressure.  If any labs need to be done so would like to have this done at [facility] in [place].      Diagnoses and all orders for this visit:    Weight loss  -     CBC & Differential  -     Comprehensive Metabolic Panel  -     TSH  -     T4, Free    Acute non-recurrent maxillary sinusitis  -     azithromycin (Zithromax) 250 MG tablet; Take 2 tablets the first day, then 1 tablet daily for 4 days.      Since the patient has seen some weight gain with change in her diet I would consider that a good sign however I would like to do the above labs.  We will also send in Z-Doni for her sinusitis.    This patient was evaluated by telephone due to current precautions with COVID-19.  I spent a total of 15 minutes on the phone with the patient.  I spent greater than 50% of this 15 minute visit discussing the diagnosis, prognosis, treatment plan, etc. Patient's questions were answered in detail with appropriate counseling and anticipatory guidance.

## 2020-04-15 ENCOUNTER — OFFICE VISIT (OUTPATIENT)
Dept: PAIN MEDICINE | Facility: CLINIC | Age: 70
End: 2020-04-15

## 2020-04-15 DIAGNOSIS — G89.4 CHRONIC PAIN SYNDROME: ICD-10-CM

## 2020-04-15 DIAGNOSIS — G89.29 CHRONIC LOW BACK PAIN, UNSPECIFIED BACK PAIN LATERALITY, UNSPECIFIED WHETHER SCIATICA PRESENT: ICD-10-CM

## 2020-04-15 DIAGNOSIS — Z79.899 ENCOUNTER FOR LONG-TERM (CURRENT) USE OF HIGH-RISK MEDICATION: Primary | ICD-10-CM

## 2020-04-15 DIAGNOSIS — M15.9 GENERALIZED OSTEOARTHRITIS: ICD-10-CM

## 2020-04-15 DIAGNOSIS — M06.9 RHEUMATOID ARTHRITIS, INVOLVING UNSPECIFIED SITE, UNSPECIFIED RHEUMATOID FACTOR PRESENCE: ICD-10-CM

## 2020-04-15 DIAGNOSIS — M54.50 CHRONIC LOW BACK PAIN, UNSPECIFIED BACK PAIN LATERALITY, UNSPECIFIED WHETHER SCIATICA PRESENT: ICD-10-CM

## 2020-04-15 DIAGNOSIS — M51.36 DEGENERATION OF INTERVERTEBRAL DISC OF LUMBAR REGION: ICD-10-CM

## 2020-04-15 PROCEDURE — 99442 PR PHYS/QHP TELEPHONE EVALUATION 11-20 MIN: CPT | Performed by: NURSE PRACTITIONER

## 2020-04-15 RX ORDER — OXYCODONE HYDROCHLORIDE 80 MG/1
160 TABLET, FILM COATED, EXTENDED RELEASE ORAL EVERY 12 HOURS SCHEDULED
Qty: 120 TABLET | Refills: 0 | Status: SHIPPED | OUTPATIENT
Start: 2020-04-15 | End: 2020-05-14 | Stop reason: SDUPTHER

## 2020-04-15 NOTE — PROGRESS NOTES
"CHIEF COMPLAINT  You have chosen to receive care through a telephone visit. Do you consent to use a telephone visit for your medical care today? Yes    F/U Back pain- patient states that her pain has remained the same. She states that she is having increased left hip pain. She states that she heard it pop and she thinks that it has popped out. She went to see Dr. Feldman's office.     Subjective   Anne Jewell is a 69 y.o. female  who presents for a telephone visit  for follow-up.She has a history of back pain and joint pain.  Today her pain is 6/10VAS in severity.  She is currently taking Oxycodone ER 80mg 3-4/day. She has tried to decrease the amount of her medication due to being cost-prohibitive. Her medication decreases her pain \"a lot\", \"I can get dressed, I can go, I can do what I need to do\".  She denies any side effects from her medication regimen.  ADLs by self.  She denies any changes to bowel or bladder since last office visit.     Mrs. Jewell states that she is concerned about her ability to afford her prescription at this time.     Back Pain   This is a chronic problem. The current episode started more than 1 year ago. The problem occurs daily. The problem is unchanged. The pain is present in the lumbar spine. The quality of the pain is described as aching. The pain radiates to the left foot and right foot. The pain is at a severity of 6/10. The pain is moderate. The symptoms are aggravated by stress (weather changes). Associated symptoms include numbness. Pertinent negatives include no abdominal pain, bladder incontinence, bowel incontinence, chest pain, dysuria, fever, headaches or weakness. She has tried analgesics, bed rest, home exercises and heat (OxyContin, hydrocodone, aquatherapy) for the symptoms. The treatment provided moderate relief.   Joint Pain   This is a chronic problem. The current episode started more than 1 year ago. The problem occurs constantly. The problem has been unchanged. " Associated symptoms include arthralgias, joint swelling (minor right ankle) and numbness. Pertinent negatives include no abdominal pain, chest pain, chills, congestion, coughing, fatigue, fever, headaches, nausea, neck pain, vomiting or weakness. She has tried oral narcotics for the symptoms. The treatment provided moderate relief.      The following portions of the patient's history were reviewed and updated as appropriate: allergies, current medications, past family history, past medical history, past social history, past surgical history and problem list.    Review of Systems   Constitutional: Positive for activity change (decreased). Negative for chills, fatigue and fever.   HENT: Negative for congestion.    Eyes: Negative for visual disturbance.   Respiratory: Positive for shortness of breath. Negative for cough and wheezing.    Cardiovascular: Positive for leg swelling. Negative for chest pain and palpitations.   Gastrointestinal: Negative for abdominal pain, bowel incontinence, constipation, diarrhea, nausea and vomiting.   Genitourinary: Negative for bladder incontinence, difficulty urinating and dysuria.   Musculoskeletal: Positive for arthralgias, back pain and joint swelling (minor right ankle). Negative for neck pain.   Neurological: Positive for numbness. Negative for weakness and headaches.   Psychiatric/Behavioral: Positive for sleep disturbance. Negative for suicidal ideas. The patient is nervous/anxious.      There were no vitals filed for this visit.    Objective   Physical Exam   Constitutional: She is oriented to person, place, and time.   Neurological: She is alert and oriented to person, place, and time.   Psychiatric: She has a normal mood and affect. Thought content normal.     Assessment/Plan   Anne was seen today for back pain.    Diagnoses and all orders for this visit:    Encounter for long-term (current) use of high-risk medication    Rheumatoid arthritis, involving unspecified site,  unspecified rheumatoid factor presence (CMS/HCC)    Degeneration of intervertebral disc of lumbar region    Chronic low back pain, unspecified back pain laterality, unspecified whether sciatica present    Chronic pain syndrome    Generalized osteoarthritis      --- This visit has been rescheduled as a phone visit to comply with patient safety concerns in accordance with CDC recommendations. Total time of discussion was 18 minutes.  --- The urine drug screen confirmation from 11/16/2019 has been reviewed and the result is appropriate based on patient history and [facility] report  --- Refill OxyCodone ER 80mg. DNF 4/16/2020 applied. Patient appears stable with current regimen. No adverse effects. Regarding continuation of opioids, there is no evidence of aberrant behavior or any red flags.  The patient continues with appropriate response to opioid therapy. ADL's remain intact by self.   --- Follow-up 1 month or sooner if needed     [facility] REPORT    As part of the patient's treatment plan, I am prescribing controlled substances. The patient has been made aware of appropriate use of such medications, including potential risk of somnolence, limited ability to drive and/or work safely, and the potential for dependence or overdose. It has also bee made clear that these medications are for use by this patient only, without concomitant use of alcohol or other substances unless prescribed.     Patient has completed prescribing agreement detailing terms of continued prescribing of controlled substances, including monitoring [facility] reports, urine drug screening, and pill counts if necessary. The patient is aware that inappropriate use will results in cessation of prescribing such medications.    [facility] report has been reviewed and scanned into the patient's chart.    As the clinician, I personally reviewed the [facility]A from 4/14/2020 while the patient was on the telephone visit today.    History and physical exam exhibit continued  safe and appropriate use of controlled substances.      EMR Dragon/Transcription disclaimer:   Much of this encounter note is an electronic transcription/translation of spoken language to printed text. The electronic translation of spoken language may permit erroneous, or at times, nonsensical words or phrases to be inadvertently transcribed; Although I have reviewed the note for such errors, some may still exist.

## 2020-04-25 LAB
ALBUMIN SERPL-MCNC: 2.7 G/DL (ref 3.8–4.8)
ALBUMIN/GLOB SERPL: 1 {RATIO} (ref 1.2–2.2)
ALP SERPL-CCNC: 105 IU/L (ref 39–117)
ALT SERPL-CCNC: 17 IU/L (ref 0–32)
AST SERPL-CCNC: 32 IU/L (ref 0–40)
BASOPHILS # BLD AUTO: 0 X10E3/UL (ref 0–0.2)
BASOPHILS NFR BLD AUTO: 0 %
BILIRUB SERPL-MCNC: 0.4 MG/DL (ref 0–1.2)
BUN SERPL-MCNC: 24 MG/DL (ref 8–27)
BUN/CREAT SERPL: 20 (ref 12–28)
CALCIUM SERPL-MCNC: 8.2 MG/DL (ref 8.7–10.3)
CHLORIDE SERPL-SCNC: 104 MMOL/L (ref 96–106)
CO2 SERPL-SCNC: 22 MMOL/L (ref 20–29)
CREAT SERPL-MCNC: 1.18 MG/DL (ref 0.57–1)
EOSINOPHIL # BLD AUTO: 0.1 X10E3/UL (ref 0–0.4)
EOSINOPHIL NFR BLD AUTO: 2 %
ERYTHROCYTE [DISTWIDTH] IN BLOOD BY AUTOMATED COUNT: 13 % (ref 11.7–15.4)
GLOBULIN SER CALC-MCNC: 2.6 G/DL (ref 1.5–4.5)
GLUCOSE SERPL-MCNC: 87 MG/DL (ref 65–99)
HCT VFR BLD AUTO: 36.5 % (ref 34–46.6)
HGB BLD-MCNC: 12.2 G/DL (ref 11.1–15.9)
IMM GRANULOCYTES # BLD AUTO: 0 X10E3/UL (ref 0–0.1)
IMM GRANULOCYTES NFR BLD AUTO: 0 %
LYMPHOCYTES # BLD AUTO: 2.2 X10E3/UL (ref 0.7–3.1)
LYMPHOCYTES NFR BLD AUTO: 34 %
MCH RBC QN AUTO: 33.5 PG (ref 26.6–33)
MCHC RBC AUTO-ENTMCNC: 33.4 G/DL (ref 31.5–35.7)
MCV RBC AUTO: 100 FL (ref 79–97)
MONOCYTES # BLD AUTO: 0.3 X10E3/UL (ref 0.1–0.9)
MONOCYTES NFR BLD AUTO: 5 %
NEUTROPHILS # BLD AUTO: 3.7 X10E3/UL (ref 1.4–7)
NEUTROPHILS NFR BLD AUTO: 59 %
PLATELET # BLD AUTO: 191 X10E3/UL (ref 150–450)
POTASSIUM SERPL-SCNC: 3.9 MMOL/L (ref 3.5–5.2)
PROT SERPL-MCNC: 5.3 G/DL (ref 6–8.5)
RBC # BLD AUTO: 3.64 X10E6/UL (ref 3.77–5.28)
SODIUM SERPL-SCNC: 142 MMOL/L (ref 134–144)
T4 FREE SERPL-MCNC: 1.01 NG/DL (ref 0.82–1.77)
TSH SERPL DL<=0.005 MIU/L-ACNC: 1.38 UIU/ML (ref 0.45–4.5)
WBC # BLD AUTO: 6.3 X10E3/UL (ref 3.4–10.8)

## 2020-05-02 DIAGNOSIS — R11.0 NAUSEA: ICD-10-CM

## 2020-05-04 DIAGNOSIS — R11.0 NAUSEA: ICD-10-CM

## 2020-05-04 RX ORDER — PROMETHAZINE HYDROCHLORIDE 25 MG/1
TABLET ORAL
Qty: 90 TABLET | Refills: 2 | Status: SHIPPED | OUTPATIENT
Start: 2020-05-04 | End: 2020-05-05

## 2020-05-05 RX ORDER — PROMETHAZINE HYDROCHLORIDE 25 MG/1
TABLET ORAL
Qty: 90 TABLET | Refills: 3 | Status: SHIPPED | OUTPATIENT
Start: 2020-05-05 | End: 2020-07-27

## 2020-05-07 RX ORDER — POTASSIUM CHLORIDE 20 MEQ/1
TABLET, EXTENDED RELEASE ORAL
Qty: 180 TABLET | Refills: 3 | Status: SHIPPED | OUTPATIENT
Start: 2020-05-07 | End: 2020-05-12

## 2020-05-09 DIAGNOSIS — I10 HYPERTENSION, UNSPECIFIED TYPE: ICD-10-CM

## 2020-05-11 RX ORDER — HYDROCHLOROTHIAZIDE 25 MG/1
TABLET ORAL
Qty: 90 TABLET | Refills: 3 | Status: SHIPPED | OUTPATIENT
Start: 2020-05-11 | End: 2021-05-07

## 2020-05-12 RX ORDER — POTASSIUM CHLORIDE 20 MEQ/1
TABLET, EXTENDED RELEASE ORAL
Qty: 180 TABLET | Refills: 3 | Status: SHIPPED | OUTPATIENT
Start: 2020-05-12 | End: 2021-08-06

## 2020-05-14 ENCOUNTER — OFFICE VISIT (OUTPATIENT)
Dept: PAIN MEDICINE | Facility: CLINIC | Age: 70
End: 2020-05-14

## 2020-05-14 DIAGNOSIS — M51.36 DEGENERATION OF INTERVERTEBRAL DISC OF LUMBAR REGION: ICD-10-CM

## 2020-05-14 DIAGNOSIS — Z79.899 ENCOUNTER FOR LONG-TERM (CURRENT) USE OF HIGH-RISK MEDICATION: Primary | ICD-10-CM

## 2020-05-14 DIAGNOSIS — G89.29 CHRONIC LOW BACK PAIN, UNSPECIFIED BACK PAIN LATERALITY, UNSPECIFIED WHETHER SCIATICA PRESENT: ICD-10-CM

## 2020-05-14 DIAGNOSIS — G89.4 CHRONIC PAIN SYNDROME: ICD-10-CM

## 2020-05-14 DIAGNOSIS — M15.9 GENERALIZED OSTEOARTHRITIS: ICD-10-CM

## 2020-05-14 DIAGNOSIS — M06.9 RHEUMATOID ARTHRITIS, INVOLVING UNSPECIFIED SITE, UNSPECIFIED RHEUMATOID FACTOR PRESENCE: ICD-10-CM

## 2020-05-14 DIAGNOSIS — M54.50 CHRONIC LOW BACK PAIN, UNSPECIFIED BACK PAIN LATERALITY, UNSPECIFIED WHETHER SCIATICA PRESENT: ICD-10-CM

## 2020-05-14 DIAGNOSIS — M25.562 LEFT KNEE PAIN, UNSPECIFIED CHRONICITY: ICD-10-CM

## 2020-05-14 PROCEDURE — 99442 PR PHYS/QHP TELEPHONE EVALUATION 11-20 MIN: CPT | Performed by: NURSE PRACTITIONER

## 2020-05-14 RX ORDER — OXYCODONE HYDROCHLORIDE 80 MG/1
160 TABLET, FILM COATED, EXTENDED RELEASE ORAL EVERY 12 HOURS SCHEDULED
Qty: 120 TABLET | Refills: 0 | Status: SHIPPED | OUTPATIENT
Start: 2020-05-14 | End: 2020-06-09 | Stop reason: SDUPTHER

## 2020-05-14 NOTE — PROGRESS NOTES
"TELEPHONE VISIT    You have chosen to receive care through a telephone visit. Do you consent to use a telephone visit for your medical care today? Yes      CHIEF COMPLAINT: F/u back pain and joint pain    Subjective   Anne Jewell is a 69 y.o. female  who presents for a telephonic follow-up.She has a history of back pain and joint pain. Today her pain is 7/10VAS in severity. She is currently taking Oxycodone ER 80mg (2 tablets in the am and 2 tablets in the pm).  When asked how the medication helps her pain he patient states \"It brings me back to life, I am able get up in the morning\".  This medication regimen decreases her pain by a moderate amount.  She denies any side effects including somnolence or constipation.  ADLs by self. She denies any changes to bowel or bladder since her last office visit.     Back Pain   This is a chronic problem. The current episode started more than 1 year ago. The problem occurs daily. The problem is unchanged. The pain is present in the lumbar spine. The quality of the pain is described as aching. The pain radiates to the left foot and right foot. The pain is at a severity of 7/10. The pain is moderate. The symptoms are aggravated by stress (weather changes). Associated symptoms include numbness. Pertinent negatives include no abdominal pain, bladder incontinence, bowel incontinence, chest pain, dysuria, fever, headaches or weakness. She has tried analgesics, bed rest, home exercises and heat (OxyContin, hydrocodone, aquatherapy) for the symptoms. The treatment provided moderate relief.   Joint Pain   This is a chronic problem. The current episode started more than 1 year ago. The problem occurs constantly. The problem has been unchanged. Associated symptoms include arthralgias, joint swelling (minor right ankle) and numbness. Pertinent negatives include no abdominal pain, chest pain, chills, congestion, coughing, fatigue, fever, headaches, nausea, neck pain, vomiting or weakness. " She has tried oral narcotics for the symptoms. The treatment provided moderate relief.      The following portions of the patient's history were reviewed and updated as appropriate: allergies, current medications, past family history, past medical history, past social history, past surgical history and problem list.    Review of Systems   Constitutional: Negative for chills, fatigue and fever.   HENT: Negative for congestion.    Respiratory: Negative for cough.    Cardiovascular: Negative for chest pain.   Gastrointestinal: Negative for abdominal pain, bowel incontinence, nausea and vomiting.   Genitourinary: Negative for bladder incontinence and dysuria.   Musculoskeletal: Positive for arthralgias, back pain and joint swelling (minor right ankle). Negative for neck pain.   Neurological: Positive for numbness. Negative for weakness and headaches.     Vitals:    05/14/20 1348   PainSc:   7       Objective   Physical Exam  As this is a telephone check-in, the ability to perform a routine physical exam is extremely limited. The patient seems alert and is oriented appropriately.   On this phone call there is not any evidence of respiratory distress.   The patient seems of normal mood.   The remainder of a routine physical exam is deferred.      Assessment/Plan   Diagnoses and all orders for this visit:    Encounter for long-term (current) use of high-risk medication    Left knee pain, unspecified chronicity    Degeneration of intervertebral disc of lumbar region    Generalized osteoarthritis    Rheumatoid arthritis, involving unspecified site, unspecified rheumatoid factor presence (CMS/HCC)    Chronic low back pain, unspecified back pain laterality, unspecified whether sciatica present    Chronic pain syndrome      ----------------    Our practice is offering alternative &/or electronic methods to continue to follow our patients while at the same time further the efforts toward social distancing, in accordance with our  organizational policies, professional societies' guidance, and [facility] mandates.  I support the Healthy at Home campaign and in this visit I have counseled the patient on our needs to limit in-person office visits and physical encounters with medical facilities whenever possible.  I have also educated the patient on the medical necessities of maintaining social distancing while we continue to function during this crisis period.      ----------------    -= This visit has been rescheduled as a phone visit to comply with patient safety concerns in accordance with CDC recommendations. Total time of discussion was 16 minutes.  --- The urine drug screen confirmation from 11/16/2019 has been reviewed and the result is appropriate based on patient history and [facility] report  --- Refill Oxycodone ER. DNF 5/17/2020 applied. Patient appears stable with current regimen. No adverse effects. Regarding continuation of opioids, there is no evidence of aberrant behavior or any red flags.  The patient continues with appropriate response to opioid therapy. ADL's remain intact by self.    --- Follow-up 1 month or sooner if needed     [facility] REPORT    As part of the patient's treatment plan, I am prescribing controlled substances. The patient has been made aware of appropriate use of such medications, including potential risk of somnolence, limited ability to drive and/or work safely, and the potential for dependence or overdose. It has also been made clear that these medications are for use by this patient only, without concomitant use of alcohol or other substances unless prescribed.     Patient has completed prescribing agreement detailing terms of continued prescribing of controlled substances, including monitoring [facility] reports, urine drug screening, and pill counts if necessary. The patient is aware that inappropriate use will results in cessation of prescribing such medications.    [facility] report has been reviewed and  scanned into the patient's chart.    As the clinician, I personally reviewed the [facility] from 5/13/2020 while the patient was on the telephonic visit today.    History and physical exam exhibit continued safe and appropriate use of controlled substances.    -------    EMR Dragon/Transcription disclaimer:   Much of this encounter note is an electronic transcription/translation of spoken language to printed text. The electronic translation of spoken language may permit erroneous, or at times, nonsensical words or phrases to be inadvertently transcribed; Although I have reviewed the note for such errors, some may still exist.

## 2020-06-03 RX ORDER — BACLOFEN 10 MG/1
TABLET ORAL
Qty: 270 TABLET | Refills: 3 | Status: SHIPPED | OUTPATIENT
Start: 2020-06-03 | End: 2021-08-23

## 2020-06-09 ENCOUNTER — TELEPHONE (OUTPATIENT)
Dept: SPORTS MEDICINE | Facility: CLINIC | Age: 70
End: 2020-06-09

## 2020-06-09 ENCOUNTER — RESULTS ENCOUNTER (OUTPATIENT)
Dept: PAIN MEDICINE | Facility: CLINIC | Age: 70
End: 2020-06-09

## 2020-06-09 ENCOUNTER — OFFICE VISIT (OUTPATIENT)
Dept: PAIN MEDICINE | Facility: CLINIC | Age: 70
End: 2020-06-09

## 2020-06-09 ENCOUNTER — OFFICE VISIT (OUTPATIENT)
Dept: SPORTS MEDICINE | Facility: CLINIC | Age: 70
End: 2020-06-09

## 2020-06-09 VITALS
WEIGHT: 174 LBS | TEMPERATURE: 98.5 F | BODY MASS INDEX: 32.02 KG/M2 | HEIGHT: 62 IN | HEART RATE: 73 BPM | RESPIRATION RATE: 20 BRPM | SYSTOLIC BLOOD PRESSURE: 127 MMHG | DIASTOLIC BLOOD PRESSURE: 73 MMHG | OXYGEN SATURATION: 98 %

## 2020-06-09 VITALS
SYSTOLIC BLOOD PRESSURE: 128 MMHG | BODY MASS INDEX: 32.02 KG/M2 | DIASTOLIC BLOOD PRESSURE: 68 MMHG | WEIGHT: 174 LBS | HEIGHT: 62 IN | HEART RATE: 73 BPM | OXYGEN SATURATION: 98 % | TEMPERATURE: 98.4 F

## 2020-06-09 DIAGNOSIS — Z00.00 PE (PHYSICAL EXAM), ANNUAL: Primary | ICD-10-CM

## 2020-06-09 DIAGNOSIS — M06.9 RHEUMATOID ARTHRITIS, INVOLVING UNSPECIFIED SITE, UNSPECIFIED RHEUMATOID FACTOR PRESENCE: ICD-10-CM

## 2020-06-09 DIAGNOSIS — M41.9 SCOLIOSIS, UNSPECIFIED SCOLIOSIS TYPE, UNSPECIFIED SPINAL REGION: ICD-10-CM

## 2020-06-09 DIAGNOSIS — G89.4 CHRONIC PAIN SYNDROME: Primary | ICD-10-CM

## 2020-06-09 DIAGNOSIS — Z79.899 ENCOUNTER FOR LONG-TERM (CURRENT) USE OF HIGH-RISK MEDICATION: ICD-10-CM

## 2020-06-09 DIAGNOSIS — L65.9 HAIR THINNING: ICD-10-CM

## 2020-06-09 DIAGNOSIS — M51.36 DEGENERATION OF INTERVERTEBRAL DISC OF LUMBAR REGION: ICD-10-CM

## 2020-06-09 DIAGNOSIS — M54.16 CHRONIC RADICULAR LUMBAR PAIN: ICD-10-CM

## 2020-06-09 DIAGNOSIS — G89.29 CHRONIC RADICULAR LUMBAR PAIN: ICD-10-CM

## 2020-06-09 DIAGNOSIS — G89.4 CHRONIC PAIN SYNDROME: ICD-10-CM

## 2020-06-09 DIAGNOSIS — E03.9 ACQUIRED HYPOTHYROIDISM: Primary | ICD-10-CM

## 2020-06-09 DIAGNOSIS — E88.09 PROTEINS SERUM PLASMA LOW: ICD-10-CM

## 2020-06-09 PROCEDURE — 80305 DRUG TEST PRSMV DIR OPT OBS: CPT | Performed by: NURSE PRACTITIONER

## 2020-06-09 PROCEDURE — 99214 OFFICE O/P EST MOD 30 MIN: CPT | Performed by: NURSE PRACTITIONER

## 2020-06-09 PROCEDURE — 99214 OFFICE O/P EST MOD 30 MIN: CPT | Performed by: FAMILY MEDICINE

## 2020-06-09 RX ORDER — OXYCODONE HYDROCHLORIDE 80 MG/1
160 TABLET, FILM COATED, EXTENDED RELEASE ORAL EVERY 12 HOURS SCHEDULED
Qty: 120 TABLET | Refills: 0 | Status: SHIPPED | OUTPATIENT
Start: 2020-06-09 | End: 2020-07-15 | Stop reason: SDUPTHER

## 2020-06-09 NOTE — PROGRESS NOTES
"CHIEF COMPLAINT  F/u back and joint pain. Pt sts she saw Dr. Alvarado on 3/26/20 for bilat hip pain. Pt sts left hip pain has worsened since last ov. Pt had XR pelvis on 3/26/20, report not available in chart. Pt sts increased bilat leg swelling.     Subjective   Anne Jewell is a 69 y.o. female  who presents for follow-up.She has a history of chronic back and joint pain. Reports her left hip pain is worse since last evaluation.     Complains of pain in her left hip. Seen by Dr Alvarado. Had xrays. Xrays not available for review. Was treated with medrol dosepack. Unsure if had improvement.  Also states she needs her right knee replaced again. \"the left hip crushed the joint and it fell on the nerve.\" \"that's why my nerves went crazy.\"  Follows up with Dr Alvarado later this week.    Seeing Dr Feldman later today. Concerned about her skin thinning with Xarelto.    Complains of pain in her low back and joints. Today her pain is 4/10VAS. Describes her pain as continuous aching and throbbing. Pain increases with walking, standing, activity, prolonged position; pain decreases with medication and rest.  Continues with OxyContin 80 mg 2 BID and baclofen. Denies any side effects from the regimen. The regimen helps decrease her pain moderately. ADL's by self.     Has Narcan at home.    Is under care of psychiatrist. Is prescribed xanax 2 mg TID PRN.     Patient remained masked during entire encounter. No cough present. I donned a mask and gloves throughout entire visit. Prior to donning mask and gloves, hand hygiene was performed, as well as when it was doffed.  I was closer than 6 feet, but not for an extended period of time. No obvious exposure to any bodily fluids.    Back Pain   This is a chronic problem. The current episode started more than 1 year ago. The problem occurs constantly. Progression since onset: variable since last office visit. The pain is present in the lumbar spine. The quality of the pain is described as " aching. The pain radiates to the left foot and right foot. The pain is at a severity of 4/10. The pain is moderate. The symptoms are aggravated by stress (weather changes). Associated symptoms include numbness. Pertinent negatives include no abdominal pain, bladder incontinence, bowel incontinence, chest pain, dysuria, fever, headaches or weakness. She has tried analgesics, bed rest, home exercises and heat (OxyContin, aquatherapy) for the symptoms. The treatment provided moderate relief.   Joint Pain   This is a chronic problem. The current episode started more than 1 year ago. The problem occurs constantly. Progression since onset: worse  from last office visit-- left hip-- seen by Dr Alvarado. Associated symptoms include arthralgias, joint swelling (minor right ankle) and numbness. Pertinent negatives include no abdominal pain, chest pain, chills, congestion, coughing, fatigue, fever, headaches, nausea, neck pain, vomiting or weakness. She has tried oral narcotics for the symptoms. The treatment provided moderate relief.      PEG Assessment   What number best describes your pain on average in the past week?5  What number best describes how, during the past week, pain has interfered with your enjoyment of life?5  What number best describes how, during the past week, pain has interfered with your general activity?  5    The following portions of the patient's history were reviewed and updated as appropriate: allergies, current medications, past family history, past medical history, past social history, past surgical history and problem list.    Review of Systems   Constitutional: Negative for activity change, chills, fatigue and fever.   HENT: Negative for congestion.    Eyes: Negative for visual disturbance.   Respiratory: Negative for cough and shortness of breath.    Cardiovascular: Negative for chest pain.   Gastrointestinal: Negative for abdominal pain, bowel incontinence, constipation, diarrhea, nausea and  "vomiting.   Endocrine: Negative for polyuria.   Genitourinary: Negative for bladder incontinence, difficulty urinating and dysuria.   Musculoskeletal: Positive for arthralgias, back pain, gait problem (walker) and joint swelling (minor right ankle). Negative for neck pain.   Allergic/Immunologic: Negative for immunocompromised state.   Neurological: Positive for numbness. Negative for dizziness, weakness and headaches.   Psychiatric/Behavioral: Positive for sleep disturbance. Negative for agitation and suicidal ideas. The patient is not nervous/anxious.        Vitals:    06/09/20 1340   BP: 127/73   Pulse: 73   Resp: 20   Temp: 98.5 °F (36.9 °C)   SpO2: 98%   Weight: 78.9 kg (174 lb)   Height: 157.5 cm (62\")   PainSc:   4   PainLoc: Back     Objective   Physical Exam   Constitutional: She is oriented to person, place, and time. She appears well-developed and well-nourished. She is cooperative.   HENT:   Head: Normocephalic and atraumatic.   Nose: Nose normal.   Eyes: Conjunctivae and lids are normal.   Neck: Trachea normal.   Cardiovascular: Normal rate.   Pulmonary/Chest: Effort normal.   Musculoskeletal:        Lumbar back: She exhibits tenderness.   Diffuse arthritic changes with no acute synovitis     Neurological: She is alert and oriented to person, place, and time. Gait (ambulating with rolling walker) abnormal.   Skin: Skin is intact.   Psychiatric: She has a normal mood and affect. Her speech is normal and behavior is normal. Cognition and memory are normal.   Nursing note and vitals reviewed.    Assessment/Plan   Anne was seen today for back pain and joint pain.    Diagnoses and all orders for this visit:    Chronic pain syndrome    Chronic radicular lumbar pain    Rheumatoid arthritis, involving unspecified site, unspecified rheumatoid factor presence (CMS/[facility])    Degeneration of intervertebral disc of lumbar region    Scoliosis, unspecified scoliosis type, unspecified spinal region    Encounter for " long-term (current) use of high-risk medication      --- Routine UDS in office today as part of monitoring requirements for controlled substances.  The specimen was viewed and the immunoassay result reviewed and is +OPI, +OXY, +BZD.  This specimen will be sent to [facility] laboratory for confirmation.     --- Obtain records from Dr Alvarado-- HOWIE  --- Refill OxyContin DNF. Patient appears stable with current regimen. No adverse effects. Regarding continuation of opioids, there is no evidence of aberrant behavior or any red flags.  The patient continues with appropriate response to opioid therapy. ADL's remain intact by self.   --- Follow-up with other specialists as planned.  --- Follow-up 1 month or sooner if needed         [facility] REPORT    As part of the patient's treatment plan, I am prescribing controlled substances. The patient has been made aware of appropriate use of such medications, including potential risk of somnolence, limited ability to drive and/or work safely, and the potential for dependence or overdose. It has also bee made clear that these medications are for use by this patient only, without concomitant use of alcohol or other substances unless prescribed.     Patient has completed prescribing agreement detailing terms of continued prescribing of controlled substances, including monitoring [facility] reports, urine drug screening, and pill counts if necessary. The patient is aware that inappropriate use will results in cessation of prescribing such medications.    [facility] report has been reviewed and scanned into the patient's chart.    As the clinician, I personally reviewed the [facility]A from 6-8-20 .    History and physical exam exhibit continued safe and appropriate use of controlled substances.      EMR Dragon/Transcription disclaimer:   Much of this encounter note is an electronic transcription/translation of spoken language to printed text. The electronic translation of spoken language may permit  erroneous, or at times, nonsensical words or phrases to be inadvertently transcribed; Although I have reviewed the note for such errors, some may still exist.

## 2020-06-09 NOTE — PROGRESS NOTES
"Anne is a 69 y.o. year old female evaluation of a problem that is new to this examiner.    CC: FU hypothyroidism, low protein    History of Present Illness   HPI   1.  FU hypothyroidism, had labs 4/24/2020 and they are normal. Has noted hair thinning over past 4-5 months ( I think this could be due to nutrition).   2.  FU low protein, has been eating more protein over past few months. She was going through a lot of dental procedures over past 6 months so not taking in as many calories. Will get permanent dentures next week.   3.  Questions about Xarelto, wondering if should stay on this. She has history of several DVT and PE, was on warfarin but had problems getting to the lab for PT/INR so was switched to Xarelto. No problems with Xarelto.     I have reviewed the patient's medical, family, and social history in detail and updated the computerized patient record.    Review of Systems   Constitutional: Negative for appetite change, chills, diaphoresis, fatigue and fever.   HENT:        Per HPI dental.   Respiratory: Negative.    Cardiovascular: Negative.    Gastrointestinal: Negative.    Endocrine: Negative.    Genitourinary: Negative.    Skin:        Hair thinning.    Neurological: Negative.        /68 (BP Location: Left arm, Patient Position: Sitting, Cuff Size: Adult)   Pulse 73   Temp 98.4 °F (36.9 °C) (Oral)   Ht 157.5 cm (62.01\")   Wt 78.9 kg (174 lb)   SpO2 98%   BMI 31.82 kg/m²      Physical Exam   Constitutional: She is oriented to person, place, and time. She appears well-developed and well-nourished.   HENT:   Head: Normocephalic and atraumatic.   Eyes: Pupils are equal, round, and reactive to light. Conjunctivae are normal.   Neck: Normal range of motion. No thyromegaly present.   Cardiovascular: Normal rate, regular rhythm and normal heart sounds.   Pulmonary/Chest: Effort normal and breath sounds normal.   Neurological: She is alert and oriented to person, place, and time.   Skin: Skin is " warm and dry.   Mild hair thinning.   Psychiatric: She has a normal mood and affect. Her behavior is normal.   Vitals reviewed.        Current Outpatient Medications:   •  albuterol (PROVENTIL HFA;VENTOLIN HFA) 108 (90 BASE) MCG/ACT inhaler, Proventil  (90 Base) MCG/ACT Inhalation Aerosol Solution; Patient Sig: Proventil  (90 Base) MCG/ACT Inhalation Aerosol Solution Inhale 2 puff(s) every 6 to 8 hours as needed; 6.7; 0; 05-Apr-2013; Active, Disp: , Rfl:   •  ALPRAZolam (XANAX) 2 MG tablet, , Disp: , Rfl:   •  Azelastine-Fluticasone 137-50 MCG/ACT suspension, Inhale 1 spray every 12 (twelve) hours., Disp: , Rfl:   •  baclofen (LIORESAL) 10 MG tablet, TAKE ONE TABLET BY MOUTH THREE TIMES A DAY, Disp: 270 tablet, Rfl: 3  •  chlorhexidine (PERIDEX) 0.12 % solution, , Disp: , Rfl:   •  Cholecalciferol (VITAMIN D3) 2000 units capsule, TAKE ONE CAPSULE BY MOUTH DAILY, Disp: 30 capsule, Rfl: 9  •  Cyanocobalamin (B-12) 1000 MCG sublingual tablet, PLACE 1 TABLET UNDER THE TONGUE AND LET DISSOLVE ONCE DAILY, Disp: 30 each, Rfl: 1  •  escitalopram (LEXAPRO) 10 MG tablet, Take 1 tablet by mouth daily., Disp: , Rfl:   •  hydroCHLOROthiazide (HYDRODIURIL) 25 MG tablet, TAKE ONE TABLET BY MOUTH DAILY, Disp: 90 tablet, Rfl: 3  •  levothyroxine (SYNTHROID, LEVOTHROID) 100 MCG tablet, TAKE ONE TABLET BY MOUTH DAILY, Disp: 90 tablet, Rfl: 3  •  montelukast (SINGULAIR) 10 MG tablet, Take 1 tablet by mouth daily., Disp: , Rfl:   •  Multiple Vitamin tablet, Take 1 tablet by mouth daily., Disp: , Rfl:   •  oxyCODONE ER (oxyCONTIN) 80 MG tablet extended-release 12 hour 12 hr tablet, Take 2 tablets by mouth Every 12 (Twelve) Hours. dnf 5/17/2020, Disp: 120 tablet, Rfl: 0  •  potassium chloride (K-DUR,KLOR-CON) 20 MEQ CR tablet, TAKE ONE TABLET BY MOUTH TWICE A DAY **MUST CALL MD FOR APPOINTMENT **, Disp: 180 tablet, Rfl: 3  •  promethazine (PHENERGAN) 25 MG tablet, TAKE ONE TABLET BY MOUTH EVERY 8 HOURS AS NEEDED FOR NAUSEA  AND VOMITING, Disp: 90 tablet, Rfl: 3  •  XARELTO 20 MG tablet, TAKE ONE TABLET BY MOUTH DAILY, Disp: 30 tablet, Rfl: 5  •  dicyclomine (BENTYL) 20 MG tablet, TAKE ONE TABLET BY MOUTH EVERY 6 HOURS AS NEEDED, Disp: 120 tablet, Rfl: 5  •  naloxone (NARCAN) 4 MG/0.1ML nasal spray, 1 spray into the nostril(s) as directed by provider As Needed (sedation or accidental overdose of opioid)., Disp: 1 each, Rfl: 1     Diagnoses and all orders for this visit:    Acquired hypothyroidism    Proteins serum plasma low    Hair thinning         1.  The thyroid labs were normal 2 months ago. I feel like the hair thinning could be from her nutrition changes since dental issues.   2.  Awaiting labs for protein  3.  Hair thinning due to nutrition.       I spent greater than 50% of this 25 minute visit discussing the diagnosis, prognosis, treatment plan, etc. Patient's questions were answered in detail with appropriate counseling and anticipatory guidance.       EMR Dragon/Transcription disclaimer:    Much of this encounter note is an electronic transcription/translation of spoken language to printed text.  The electronic translation of spoken language may permit erroneous, or at times, nonsensical words or phrases to be inadvertently transcribed.  Although I have reviewed the note for such errors some may still exist.

## 2020-06-10 ENCOUNTER — TELEPHONE (OUTPATIENT)
Dept: SPORTS MEDICINE | Facility: CLINIC | Age: 70
End: 2020-06-10

## 2020-06-10 DIAGNOSIS — R94.5 LIVER FUNCTION ABNORMALITY: Primary | ICD-10-CM

## 2020-06-10 LAB
ALBUMIN SERPL-MCNC: 2.9 G/DL (ref 3.8–4.8)
ALBUMIN/GLOB SERPL: 1.1 {RATIO} (ref 1.2–2.2)
ALP SERPL-CCNC: 128 IU/L (ref 39–117)
ALT SERPL-CCNC: 11 IU/L (ref 0–32)
AST SERPL-CCNC: 25 IU/L (ref 0–40)
BASOPHILS # BLD AUTO: 0 X10E3/UL (ref 0–0.2)
BASOPHILS NFR BLD AUTO: 1 %
BILIRUB SERPL-MCNC: 0.4 MG/DL (ref 0–1.2)
BUN SERPL-MCNC: 21 MG/DL (ref 8–27)
BUN/CREAT SERPL: 19 (ref 12–28)
CALCIUM SERPL-MCNC: 8.3 MG/DL (ref 8.7–10.3)
CHLORIDE SERPL-SCNC: 103 MMOL/L (ref 96–106)
CO2 SERPL-SCNC: 21 MMOL/L (ref 20–29)
CREAT SERPL-MCNC: 1.1 MG/DL (ref 0.57–1)
EOSINOPHIL # BLD AUTO: 0.3 X10E3/UL (ref 0–0.4)
EOSINOPHIL NFR BLD AUTO: 4 %
ERYTHROCYTE [DISTWIDTH] IN BLOOD BY AUTOMATED COUNT: 13.1 % (ref 11.7–15.4)
GLOBULIN SER CALC-MCNC: 2.6 G/DL (ref 1.5–4.5)
GLUCOSE SERPL-MCNC: 85 MG/DL (ref 65–99)
HCT VFR BLD AUTO: 36.4 % (ref 34–46.6)
HGB BLD-MCNC: 12.1 G/DL (ref 11.1–15.9)
IMM GRANULOCYTES # BLD AUTO: 0 X10E3/UL (ref 0–0.1)
IMM GRANULOCYTES NFR BLD AUTO: 0 %
IRON SATN MFR SERPL: 27 % (ref 15–55)
IRON SERPL-MCNC: 52 UG/DL (ref 27–139)
LYMPHOCYTES # BLD AUTO: 3.2 X10E3/UL (ref 0.7–3.1)
LYMPHOCYTES NFR BLD AUTO: 42 %
MCH RBC QN AUTO: 33.2 PG (ref 26.6–33)
MCHC RBC AUTO-ENTMCNC: 33.2 G/DL (ref 31.5–35.7)
MCV RBC AUTO: 100 FL (ref 79–97)
MONOCYTES # BLD AUTO: 0.5 X10E3/UL (ref 0.1–0.9)
MONOCYTES NFR BLD AUTO: 6 %
NEUTROPHILS # BLD AUTO: 3.6 X10E3/UL (ref 1.4–7)
NEUTROPHILS NFR BLD AUTO: 47 %
PLATELET # BLD AUTO: 210 X10E3/UL (ref 150–450)
POTASSIUM SERPL-SCNC: 4.1 MMOL/L (ref 3.5–5.2)
PROT SERPL-MCNC: 5.5 G/DL (ref 6–8.5)
RBC # BLD AUTO: 3.64 X10E6/UL (ref 3.77–5.28)
SODIUM SERPL-SCNC: 138 MMOL/L (ref 134–144)
TIBC SERPL-MCNC: 194 UG/DL (ref 250–450)
UIBC SERPL-MCNC: 142 UG/DL (ref 118–369)
WBC # BLD AUTO: 7.7 X10E3/UL (ref 3.4–10.8)

## 2020-06-10 NOTE — TELEPHONE ENCOUNTER
Patient called and states that the office  is in is not taking new patients. So she would like to get schedule somewhere. Please advise, thank you!

## 2020-06-10 NOTE — TELEPHONE ENCOUNTER
Patient called back with a gastro dr she would like to go to. She found someone at [facility], her name is Dr. Vivi Nieto. She said an order will need to be faxed to them, she gave me this fax number for her [phone].  She said if there is another dr you would recommend just let her know but Dr. Nieto is who she found.  Please advise, thank you.

## 2020-06-11 ENCOUNTER — OFFICE VISIT (OUTPATIENT)
Dept: ORTHOPEDIC SURGERY | Facility: CLINIC | Age: 70
End: 2020-06-11

## 2020-06-11 ENCOUNTER — HOSPITAL ENCOUNTER (OUTPATIENT)
Dept: OTHER | Facility: HOSPITAL | Age: 70
Discharge: HOME | End: 2020-06-11
Attending: ORTHOPAEDIC SURGERY

## 2020-06-11 VITALS — WEIGHT: 174 LBS | TEMPERATURE: 97.8 F | BODY MASS INDEX: 32.02 KG/M2 | HEIGHT: 62 IN

## 2020-06-11 DIAGNOSIS — Z96.651 STATUS POST TOTAL KNEE REPLACEMENT, RIGHT: Primary | ICD-10-CM

## 2020-06-11 DIAGNOSIS — Z96.652 STATUS POST TOTAL KNEE REPLACEMENT, LEFT: Primary | ICD-10-CM

## 2020-06-11 DIAGNOSIS — Z96.651 STATUS POST TOTAL KNEE REPLACEMENT, RIGHT: ICD-10-CM

## 2020-06-11 PROCEDURE — 99213 OFFICE O/P EST LOW 20 MIN: CPT | Performed by: ORTHOPAEDIC SURGERY

## 2020-06-11 NOTE — TELEPHONE ENCOUNTER
I kendallnt see a referral to orthopedic surgery, however she is already scheduled to see Dr. Rashaun Alvarado fora year f/u for hips.     Can you please carify?

## 2020-06-11 NOTE — PROGRESS NOTES
FOLLOW UP VISIT    Patient: Anne Jewell  ?  YOB: 1950    MRN: [id number]  ?  Chief Complaint   Patient presents with   • Right Knee - Pain, Follow-up   • Right Hand - Pain, Follow-up      ?  HPI: 1 YR F/U R KNEE / R HAND  Anne Marie is following up on bilateral knee arthroplasty.  She has had her right knee replaced in 2002.  She does have a progressive valgus orientation of that knee.  The possibility of medial collateral ligament insufficiency has been discussed with the patient.  She has multiple medical problems and is therefore not interested in any form of surgical revision of the knee.  Her left knee was replaced by me in 2011.  She is now 9 years postop and is doing very well with that side.  She does not have any issues with that knee whatsoever.  Because of her knee replacement she has become quite active and states that she has recently lost 20 pounds.  This is helping her overall sense of wellbeing.  She is also following up on her hand where she has had a history of carpal tunnel syndrome and previous injury to the wrist.  She states that her hand is essentially asymptomatic at this point.      Pain Location: bilateral knee  Radiation: none  Quality: dull  Intensity/Severity: mild   Duration: several months  Onset quality: gradual   Timing: intermittent  Aggravating Factors: kneeling  Alleviating Factors: NSAIDs  Previous Episodes: no  Associated Symptoms: clicking/popping  ADLs Affected: ambulating  Previous Treatment: BiLateral total knee arthroplasty.    This patient is an established patient.  This problem is not new to this examiner.      Allergies:   Allergies   Allergen Reactions   • Penicillins Hives       Medications:   Home Medications:  Current Outpatient Medications on File Prior to Visit   Medication Sig   • albuterol (PROVENTIL HFA;VENTOLIN HFA) 108 (90 BASE) MCG/ACT inhaler Proventil  (90 Base) MCG/ACT Inhalation Aerosol Solution; Patient Sig: Proventil   (90 Base) MCG/ACT Inhalation Aerosol Solution Inhale 2 puff(s) every 6 to 8 hours as needed; 6.7; 0; 05-Apr-2013; Active   • ALPRAZolam (XANAX) 2 MG tablet    • Azelastine-Fluticasone 137-50 MCG/ACT suspension Inhale 1 spray every 12 (twelve) hours.   • baclofen (LIORESAL) 10 MG tablet TAKE ONE TABLET BY MOUTH THREE TIMES A DAY   • chlorhexidine (PERIDEX) 0.12 % solution    • Cholecalciferol (VITAMIN D3) 2000 units capsule TAKE ONE CAPSULE BY MOUTH DAILY   • Cyanocobalamin (B-12) 1000 MCG sublingual tablet PLACE 1 TABLET UNDER THE TONGUE AND LET DISSOLVE ONCE DAILY   • dicyclomine (BENTYL) 20 MG tablet TAKE ONE TABLET BY MOUTH EVERY 6 HOURS AS NEEDED   • escitalopram (LEXAPRO) 10 MG tablet Take 1 tablet by mouth daily.   • hydroCHLOROthiazide (HYDRODIURIL) 25 MG tablet TAKE ONE TABLET BY MOUTH DAILY   • levothyroxine (SYNTHROID, LEVOTHROID) 100 MCG tablet TAKE ONE TABLET BY MOUTH DAILY   • montelukast (SINGULAIR) 10 MG tablet Take 1 tablet by mouth daily.   • Multiple Vitamin tablet Take 1 tablet by mouth daily.   • naloxone (NARCAN) 4 MG/0.1ML nasal spray 1 spray into the nostril(s) as directed by provider As Needed (sedation or accidental overdose of opioid).   • oxyCODONE ER (oxyCONTIN) 80 MG tablet extended-release 12 hour 12 hr tablet Take 2 tablets by mouth Every 12 (Twelve) Hours. dnf 6-16-20   • potassium chloride (K-DUR,KLOR-CON) 20 MEQ CR tablet TAKE ONE TABLET BY MOUTH TWICE A DAY **MUST CALL MD FOR APPOINTMENT **   • promethazine (PHENERGAN) 25 MG tablet TAKE ONE TABLET BY MOUTH EVERY 8 HOURS AS NEEDED FOR NAUSEA AND VOMITING   • XARELTO 20 MG tablet TAKE ONE TABLET BY MOUTH DAILY     No current facility-administered medications on file prior to visit.      Current Medications:  Scheduled Meds:  PRN Meds:.    I have reviewed the patient's medical history in detail and updated the computerized patient record.  Review and summarization of old records include:    Past Medical History:   Diagnosis  Date   • Allergic rhinitis    • Asthma    • Bronchospasm    • Clotting disorder (CMS/HCC)    • Deep vein thrombosis (CMS/HCC)    • Disc degeneration, lumbar    • Edema    • Esophageal reflux    • Glaucoma    • Joint pain    • Low back pain    • Osteoarthritis    • Osteopenia    • Osteoporosis    • Scoliosis    • Status post total knee replacement, left 8/31/2017   • Status post total knee replacement, right 8/31/2017   • UTI (urinary tract infection)    • Venous thrombosis      Past Surgical History:   Procedure Laterality Date   • BILATERAL BREAST REDUCTION     • BREAST SURGERY     • COLONOSCOPY  08/05/2016   • GASTRIC BYPASS     • HAND SURGERY Right 01/14/2016   • HERNIA REPAIR      x7   • HYSTERECTOMY     • OTHER SURGICAL HISTORY      vaginal sling operation for stress incontinence   • TOTAL KNEE ARTHROPLASTY Left    • TOTAL KNEE ARTHROPLASTY Bilateral      Social History     Occupational History   • Not on file   Tobacco Use   • Smoking status: Former Smoker   • Smokeless tobacco: Never Used   Substance and Sexual Activity   • Alcohol use: No   • Drug use: No   • Sexual activity: Defer      Family History   Problem Relation Age of Onset   • Prostate cancer Father    • Arthritis Other    • Hypertension Other         benign essential   • Cancer Other    • Diabetes Other    • Heart disease Other    • Nephrolithiasis Other          Review of Systems   Constitutional: Negative.  Negative for fever.   HENT: Negative.    Eyes: Negative.    Respiratory: Negative.    Cardiovascular: Negative.    Endocrine: Negative.    Genitourinary: Negative.    Musculoskeletal: Positive for arthralgias, gait problem and joint swelling.   Skin: Negative.  Negative for rash and wound.   Allergic/Immunologic: Negative.    Neurological: Negative for numbness.   Hematological: Negative.    Psychiatric/Behavioral: Negative.           Wt Readings from Last 3 Encounters:   06/11/20 78.9 kg (174 lb)   06/09/20 78.9 kg (174 lb)   06/09/20 78.9  "kg (174 lb)     Ht Readings from Last 3 Encounters:   06/11/20 157.5 cm (62\")   06/09/20 157.5 cm (62.01\")   06/09/20 157.5 cm (62\")     Body mass index is 31.83 kg/m².  Facility age limit for growth percentiles is 20 years.  Vitals:    06/11/20 1415   Temp: 97.8 °F (36.6 °C)         Physical Exam  Constitutional: Patient is oriented to person, place, and time. Appears well-developed and well-nourished.   HENT:   Head: Normocephalic and atraumatic.   Eyes: Conjunctivae and EOM are normal. Pupils are equal, round, and reactive to light.   Cardiovascular: Normal rate, regular rhythm, normal heart sounds and intact distal pulses.   Pulmonary/Chest: Effort normal and breath sounds normal.   Musculoskeletal:   See detailed exam below   Neurological: Alert and oriented to person, place, and time. No sensory deficit. Coordination normal.   Skin: Skin is warm and dry. Capillary refill takes less than 2 seconds. No rash noted. No erythema.   Psychiatric: Patient has a normal mood and affect. Her behavior is normal. Judgment and thought content normal.   Nursing note and vitals reviewed.      Ortho Exam:     Bilateral knee.The patient is status post total knee arthroplasty postoperative 18 years on the right side and on the left side it has been 9 year(s). Incision is clean. Calf is soft and nontender. Homans sign is negative. There is no clicking, popping or catching. Anterior and posterior drawer signs are negative.  There is no instability of the components. Appropriate amounts of swelling and bruising are noted. Dorsalis pedis and posterior tibial artery pulses are palpable. Common peroneal nerve function is well preserved. Range of motion is from 0-125 degrees of flexion. Gait is cautious but otherwise fairly normal. There is no evidence of a deep seated joint infection.        Diagnostics:  Bilateral knees xrays AP/Lat views were ordered by Rashaun Alvarado MD and performed at [facility]. These images " were independently viewed and interpreted by myself, my impression as follows:  bilateral Knee X-Ray  Indication: evaluation of implant position after TKA  AP, Lateral views  Findings:Well Placed implants with a good cement mantle without any subsidence of the implant.  no bony lesion  Soft tissues within normal limits  within normal limits joint spaces  Hardware appropriately positioned yes      yes prior studies available for comparison.      X-RAY was ordered and reviewed by Rashaun Alvarado MD    Assessment:  Anne was seen today for pain, follow-up, pain and follow-up.    Diagnoses and all orders for this visit:    Status post total knee replacement, left    Status post total knee replacement, right          Procedures  ?    Plan    · Compression/brace especially to the right knee to prevent instability of the knee.  · , GI and dental procedure prophylaxis with antibiotics to prevent metastatic infection of the knee arthroplasty implants.  · Use a supportive brace on the hand to minimize the pressure over the transverse carpal ligament.  · Rest, ice, compression, and elevation (RICE) therapy  · Stretching and strengthening exercises of the quads and the hamstrings.  · Calcium and vitamin D for bone health.  · Falls precautions.  · Ibuprofen 600mg by mouth every 6-8 hours as needed for pain and swelling  · Follow up in 1 year(s)    Date of encounter: 06/11/2020   Rashaun Alvarado MD

## 2020-06-11 NOTE — TELEPHONE ENCOUNTER
Thank you. Referral has been redirected to a different office, they will contact patient to schedule.   no

## 2020-06-11 NOTE — TELEPHONE ENCOUNTER
It is not ortho, it is for a gastroenterologist. The referral is already in the chart and authorized. Patient wanted to get in with  but she is not taking any new patients for gastroenterology so she wanted to inform the office that she would just rather have us schedule with a gastro that is willing to take her in. Sorry about the confusion.

## 2020-06-12 DIAGNOSIS — Z96.651 STATUS POST TOTAL KNEE REPLACEMENT, RIGHT: ICD-10-CM

## 2020-06-14 ENCOUNTER — RESULTS ENCOUNTER (OUTPATIENT)
Dept: SPORTS MEDICINE | Facility: CLINIC | Age: 70
End: 2020-06-14

## 2020-06-14 DIAGNOSIS — Z00.00 PE (PHYSICAL EXAM), ANNUAL: ICD-10-CM

## 2020-07-13 RX ORDER — RIVAROXABAN 20 MG/1
TABLET, FILM COATED ORAL
Qty: 30 TABLET | Refills: 4 | Status: SHIPPED | OUTPATIENT
Start: 2020-07-13 | End: 2020-12-08

## 2020-07-15 ENCOUNTER — OFFICE VISIT (OUTPATIENT)
Dept: PAIN MEDICINE | Facility: CLINIC | Age: 70
End: 2020-07-15

## 2020-07-15 DIAGNOSIS — G89.4 CHRONIC PAIN SYNDROME: ICD-10-CM

## 2020-07-15 DIAGNOSIS — M16.0 PRIMARY OSTEOARTHRITIS OF BOTH HIPS: ICD-10-CM

## 2020-07-15 DIAGNOSIS — G89.29 CHRONIC LOW BACK PAIN, UNSPECIFIED BACK PAIN LATERALITY, UNSPECIFIED WHETHER SCIATICA PRESENT: ICD-10-CM

## 2020-07-15 DIAGNOSIS — M54.50 CHRONIC LOW BACK PAIN, UNSPECIFIED BACK PAIN LATERALITY, UNSPECIFIED WHETHER SCIATICA PRESENT: ICD-10-CM

## 2020-07-15 DIAGNOSIS — M15.9 GENERALIZED OSTEOARTHRITIS: ICD-10-CM

## 2020-07-15 DIAGNOSIS — M51.36 DEGENERATION OF INTERVERTEBRAL DISC OF LUMBAR REGION: ICD-10-CM

## 2020-07-15 DIAGNOSIS — G89.29 CHRONIC RADICULAR LUMBAR PAIN: ICD-10-CM

## 2020-07-15 DIAGNOSIS — M54.16 CHRONIC RADICULAR LUMBAR PAIN: ICD-10-CM

## 2020-07-15 DIAGNOSIS — Z79.899 ENCOUNTER FOR LONG-TERM (CURRENT) USE OF HIGH-RISK MEDICATION: Primary | ICD-10-CM

## 2020-07-15 PROCEDURE — 99443 PR PHYS/QHP TELEPHONE EVALUATION 21-30 MIN: CPT | Performed by: NURSE PRACTITIONER

## 2020-07-15 RX ORDER — OXYCODONE HYDROCHLORIDE 80 MG/1
160 TABLET, FILM COATED, EXTENDED RELEASE ORAL EVERY 12 HOURS SCHEDULED
Qty: 120 TABLET | Refills: 0 | Status: SHIPPED | OUTPATIENT
Start: 2020-07-15 | End: 2020-08-20 | Stop reason: SDUPTHER

## 2020-07-15 NOTE — PROGRESS NOTES
"TELEPHONE VISIT    You have chosen to receive care through a telephone visit. Do you consent to use a telephone visit for your medical care today? Yes    CHIEF COMPLAINT: F/u back pain and joint pain    Subjective   Anne Jewell is a 69 y.o. female  who presents for a telephonic follow-up.She has a history of back and diffuse joint pain.  Today her pain is 6/10VAS in severity. Continues with OxyContin 80 mg 2 BID and Baclofen PRN. Her medication regimen decreases her pain by a moderate amount. ADLs by self. She denies any side effects including constipation or somnolence. She denies any changes to her bowel or bladder since her last office visit.     She was evaluated by Dr. Alvarado for joint pain who recommended bracing of her right knee as well as supportive brace to right wrist/hand for carpal tunnel symptoms.  She states that she is going to see GI in the next few weeks to be evaluated for her lack of appetite and weight loss as well as for what the patient describes as \"scarring\".     Prescribed Xanax 2 mg TID by psychiatrist, Narcan prescription at home (most recent prescription 1/9/2020).     Back Pain   This is a chronic problem. The current episode started more than 1 year ago. The problem occurs constantly. The problem has been waxing and waning since onset. The pain is present in the lumbar spine. The quality of the pain is described as aching. The pain radiates to the left foot and right foot. The pain is at a severity of 6/10. The pain is moderate. The symptoms are aggravated by stress (weather changes). Associated symptoms include numbness. Pertinent negatives include no abdominal pain, bladder incontinence, bowel incontinence, chest pain, dysuria, fever, headaches or weakness. She has tried analgesics, bed rest, home exercises and heat (OxyContin) for the symptoms. The treatment provided moderate relief.   Joint Pain   This is a chronic problem. The current episode started more than 1 year ago. The " problem occurs constantly. Associated symptoms include arthralgias, joint swelling (minor right ankle) and numbness. Pertinent negatives include no abdominal pain, chest pain, chills, congestion, coughing, fatigue, fever, headaches, nausea, neck pain, vomiting or weakness. The symptoms are aggravated by bending, twisting and walking (use of joints). She has tried oral narcotics for the symptoms. The treatment provided moderate relief.      The following portions of the patient's history were reviewed and updated as appropriate: allergies, current medications, past family history, past medical history, past social history, past surgical history and problem list.    Review of Systems   Constitutional: Negative for chills, fatigue and fever.   HENT: Negative for congestion.    Respiratory: Negative for cough.    Cardiovascular: Negative for chest pain.   Gastrointestinal: Negative for abdominal pain, bowel incontinence, nausea and vomiting.   Genitourinary: Negative for bladder incontinence and dysuria.   Musculoskeletal: Positive for arthralgias, back pain and joint swelling (minor right ankle). Negative for neck pain.   Neurological: Positive for numbness. Negative for weakness and headaches.     Vitals:    07/15/20 1339   PainSc:   6     Objective   Physical Exam  As this is a telephone check-in, the ability to perform a routine physical exam is extremely limited. The patient seems alert and is oriented appropriately.   On this phone call there is not any evidence of respiratory distress.   The patient seems of normal mood.   The remainder of a routine physical exam is deferred.    Assessment/Plan   Diagnoses and all orders for this visit:    Encounter for long-term (current) use of high-risk medication    Primary osteoarthritis of both hips    Degeneration of intervertebral disc of lumbar region    Chronic radicular lumbar pain    Chronic low back pain, unspecified back pain laterality, unspecified whether sciatica  present    Chronic pain syndrome      ----------------    Our practice is offering alternative &/or electronic methods to continue to follow our patients while at the same time further the efforts toward social distancing, in accordance with our organizational policies, professional societies' guidance, and [facility] mandates.  I support the Healthy at Home campaign and in this visit I have counseled the patient on our needs to limit in-person office visits and physical encounters with medical facilities whenever possible.  I have also educated the patient on the medical necessities of maintaining social distancing while we continue to function during this crisis period.      ----------------    --- This visit has been rescheduled as a phone visit to comply with patient safety concerns in accordance with CDC recommendations. Total time of discussion was 22 minutes.  --- The urine drug screen confirmation from 6/10/2020 has been reviewed and the result is appropriate based on patient history and [facility] report  --- Refill OxyContin 80 mg two tablets BID. DNF 7/19/2020 applied. Patient appears stable with current regimen. No adverse effects. Regarding continuation of opioids, there is no evidence of aberrant behavior or any red flags.  The patient continues with appropriate response to opioid therapy. ADL's remain intact by self.   --- Follow-up 1 month or sooner if needed     [facility] REPORT    As part of the patient's treatment plan, I am prescribing controlled substances. The patient has been made aware of appropriate use of such medications, including potential risk of somnolence, limited ability to drive and/or work safely, and the potential for dependence or overdose. It has also been made clear that these medications are for use by this patient only, without concomitant use of alcohol or other substances unless prescribed.     Patient has completed prescribing agreement detailing terms of continued prescribing  of controlled substances, including monitoring [facility] reports, urine drug screening, and pill counts if necessary. The patient is aware that inappropriate use will results in cessation of prescribing such medications.    [facility] report has been reviewed and scanned into the patient's chart.    As the clinician, I personally reviewed the [facility] from 7/14/2020 while the patient was on the telephonic visit today.    History and physical exam exhibit continued safe and appropriate use of controlled substances.    -------    EMR Dragon/Transcription disclaimer:   Much of this encounter note is an electronic transcription/translation of spoken language to printed text. The electronic translation of spoken language may permit erroneous, or at times, nonsensical words or phrases to be inadvertently transcribed; Although I have reviewed the note for such errors, some may still exist.

## 2020-07-25 DIAGNOSIS — R11.0 NAUSEA: ICD-10-CM

## 2020-07-27 ENCOUNTER — OFFICE VISIT (OUTPATIENT)
Dept: GASTROENTEROLOGY | Facility: CLINIC | Age: 70
End: 2020-07-27

## 2020-07-27 VITALS
OXYGEN SATURATION: 97 % | DIASTOLIC BLOOD PRESSURE: 80 MMHG | HEIGHT: 62 IN | HEART RATE: 51 BPM | SYSTOLIC BLOOD PRESSURE: 136 MMHG | TEMPERATURE: 97.7 F | BODY MASS INDEX: 32.2 KG/M2 | WEIGHT: 175 LBS

## 2020-07-27 DIAGNOSIS — R63.0 ANOREXIA: ICD-10-CM

## 2020-07-27 DIAGNOSIS — R74.8 ELEVATED ALKALINE PHOSPHATASE LEVEL: Primary | ICD-10-CM

## 2020-07-27 DIAGNOSIS — R74.8 ELEVATED LIVER ENZYMES: ICD-10-CM

## 2020-07-27 DIAGNOSIS — K21.9 GASTROESOPHAGEAL REFLUX DISEASE, ESOPHAGITIS PRESENCE NOT SPECIFIED: ICD-10-CM

## 2020-07-27 DIAGNOSIS — E77.8 HYPOPROTEINEMIA (HCC): ICD-10-CM

## 2020-07-27 DIAGNOSIS — D51.0 PERNICIOUS ANEMIA: ICD-10-CM

## 2020-07-27 PROCEDURE — 99204 OFFICE O/P NEW MOD 45 MIN: CPT | Performed by: INTERNAL MEDICINE

## 2020-07-27 RX ORDER — CETIRIZINE HYDROCHLORIDE 10 MG/1
10 TABLET ORAL DAILY
COMMUNITY

## 2020-07-27 RX ORDER — PROMETHAZINE HYDROCHLORIDE 25 MG/1
TABLET ORAL
Qty: 90 TABLET | Refills: 1 | Status: SHIPPED | OUTPATIENT
Start: 2020-07-27 | End: 2020-12-16 | Stop reason: SDUPTHER

## 2020-07-27 NOTE — PROGRESS NOTES
Establish Care (New pt ) and ELEVATED PROTEIN LEVELS      HPI  Patient is a 69 year old female who presents today for evaluation.    She was referred for low serum protein and elevated liver enzymes. Her total protein level has been low dating back to February of 2019 and ranged from 5.8-5.3. Her albumin level has also been low, ranging from 3.0 to 2.7. She is also noted to have had elevated LFTs dating back to 2015 with elevation of alkaline phosphatase and transaminases.     She reports history of gastric bypass. She reports history of osteonecrosis of the jaw. She has had her teeth removed and uses dentures however reports she has some difficulty eating well due to this. She reports she has had some poor appetite and has not been eating as well during the COVID pandemic.    She reports her last colonoscopy was 3 years ago with Dr. Montoya. She reports a history of polyps.    She has had a cholecystectomy and 7 hernias repaired with mesh placement. She reports a history of pulmonary embolism following a surgery, she is on Xarelto for this.    She denies abdominal pain. She denies history of pancreatitis. Denies any rectal bleeding.    Review of Systems   Constitutional: Negative for appetite change, chills, diaphoresis, fatigue, fever and unexpected weight change.   HENT: Negative for dental problem, ear pain, mouth sores, rhinorrhea, sore throat and voice change.    Eyes: Negative for pain, redness and visual disturbance.   Respiratory: Negative for cough, chest tightness and wheezing.    Cardiovascular: Negative for chest pain, palpitations and leg swelling.   Endocrine: Negative for cold intolerance, heat intolerance, polydipsia, polyphagia and polyuria.   Genitourinary: Negative for dysuria, frequency, hematuria and urgency.   Musculoskeletal: Negative for arthralgias, back pain, joint swelling, myalgias and neck pain.   Skin: Negative for rash.   Allergic/Immunologic: Negative for environmental allergies,  From: Purnima Young  To: Abdoulaye Mendes CNP  Sent: 10/10/2017 7:39 AM EDT  Subject: Non-Urgent Medical Question    Angelito Mcdonough,    I need to schedule a followup visit for my cholesterol refill. I believe you wanted me to get a blood draw before doing so. Can you write an order for me to  at the  to get this done? If so can someone notify me when it is there. Also, I am going on a cruise and would like some patches for nausea.      Thanks I look forward to hearing from you,    Purvi Renteria food allergies and immunocompromised state.   Neurological: Negative for dizziness, seizures, weakness, numbness and headaches.   Hematological: Bruises/bleeds easily.   Psychiatric/Behavioral: Positive for sleep disturbance. The patient is nervous/anxious.         I have reviewed and confirmed the accuracy of the HPI and ROS as documented by the APRN EUGENIO Salazar     Problem List:    Patient Active Problem List   Diagnosis   • Chronic pain syndrome   • Rheumatoid arthritis (CMS/HCC)   • Osteoarthritis of knee   • Generalized osteoarthritis   • Degeneration of intervertebral disc of lumbar region   • Neck pain   • Lumbar radiculopathy   • Atopic rhinitis   • Anxiety   • Diarrhea   • Indigestion   • Dysthymic disorder   • Gastroesophageal reflux disease   • Hypothyroidism   • Insomnia   • Pernicious anemia   • Osteoporosis   • Scoliosis   • Vasovagal syncope   • Vitamin D deficiency   • Trigger middle finger of right hand   • Trigger ring finger of right hand   • Trigger little finger of right hand   • Encounter for long-term (current) use of high-risk medication   • Coagulation disorder (CMS/HCC)   • Hyperglycemia   • Joint pain   • Status post total knee replacement, left   • Left knee pain   • Status post total knee replacement, right   • Osteoarthritis of thumb   • Chronic low back pain   • Trigger finger of all digits of right hand   • Left hip pain   • Primary osteoarthritis of both hips   • Chronic radicular lumbar pain       Medical History:    Past Medical History:   Diagnosis Date   • Allergic rhinitis    • Asthma    • Bronchospasm    • Clotting disorder (CMS/HCC)    • Deep vein thrombosis (CMS/HCC)    • Disc degeneration, lumbar    • Edema    • Esophageal reflux    • Glaucoma    • Joint pain    • Low back pain    • Osteoarthritis    • Osteopenia    • Osteoporosis    • Scoliosis    • Status post total knee replacement, left 8/31/2017   • Status post total knee replacement, right 8/31/2017   • UTI  (urinary tract infection)    • Venous thrombosis         Social History:    Social History     Socioeconomic History   • Marital status:      Spouse name: Not on file   • Number of children: Not on file   • Years of education: Not on file   • Highest education level: Not on file   Tobacco Use   • Smoking status: Former Smoker   • Smokeless tobacco: Never Used   Substance and Sexual Activity   • Alcohol use: No   • Drug use: No   • Sexual activity: Defer       Family History:   Family History   Problem Relation Age of Onset   • Prostate cancer Father    • Arthritis Other    • Hypertension Other         benign essential   • Cancer Other    • Diabetes Other    • Heart disease Other    • Nephrolithiasis Other        Surgical History:   Past Surgical History:   Procedure Laterality Date   • BILATERAL BREAST REDUCTION     • BREAST SURGERY     • COLONOSCOPY  08/05/2016   • GASTRIC BYPASS     • HAND SURGERY Right 01/14/2016   • HERNIA REPAIR      x7   • HYSTERECTOMY     • OTHER SURGICAL HISTORY      vaginal sling operation for stress incontinence   • TOTAL KNEE ARTHROPLASTY Left    • TOTAL KNEE ARTHROPLASTY Bilateral          Current Outpatient Medications:   •  albuterol (PROVENTIL HFA;VENTOLIN HFA) 108 (90 BASE) MCG/ACT inhaler, Proventil  (90 Base) MCG/ACT Inhalation Aerosol Solution; Patient Sig: Proventil  (90 Base) MCG/ACT Inhalation Aerosol Solution Inhale 2 puff(s) every 6 to 8 hours as needed; 6.7; 0; 05-Apr-2013; Active, Disp: , Rfl:   •  ALPRAZolam (XANAX) 2 MG tablet, , Disp: , Rfl:   •  Azelastine-Fluticasone 137-50 MCG/ACT suspension, Inhale 1 spray every 12 (twelve) hours., Disp: , Rfl:   •  baclofen (LIORESAL) 10 MG tablet, TAKE ONE TABLET BY MOUTH THREE TIMES A DAY, Disp: 270 tablet, Rfl: 3  •  cetirizine (zyrTEC) 10 MG tablet, Take 10 mg by mouth Daily., Disp: , Rfl:   •  chlorhexidine (PERIDEX) 0.12 % solution, , Disp: , Rfl:   •  Cholecalciferol (VITAMIN D3) 2000 units capsule, TAKE  ONE CAPSULE BY MOUTH DAILY, Disp: 30 capsule, Rfl: 9  •  Cyanocobalamin (B-12) 1000 MCG sublingual tablet, PLACE 1 TABLET UNDER THE TONGUE AND LET DISSOLVE ONCE DAILY, Disp: 30 each, Rfl: 1  •  dicyclomine (BENTYL) 20 MG tablet, TAKE ONE TABLET BY MOUTH EVERY 6 HOURS AS NEEDED, Disp: 120 tablet, Rfl: 5  •  escitalopram (LEXAPRO) 10 MG tablet, Take 1 tablet by mouth daily., Disp: , Rfl:   •  hydroCHLOROthiazide (HYDRODIURIL) 25 MG tablet, TAKE ONE TABLET BY MOUTH DAILY, Disp: 90 tablet, Rfl: 3  •  Lactobacillus (ACIDOPHILUS PO), Take  by mouth., Disp: , Rfl:   •  levothyroxine (SYNTHROID, LEVOTHROID) 100 MCG tablet, TAKE ONE TABLET BY MOUTH DAILY, Disp: 90 tablet, Rfl: 3  •  montelukast (SINGULAIR) 10 MG tablet, Take 1 tablet by mouth daily., Disp: , Rfl:   •  Multiple Vitamin tablet, Take 1 tablet by mouth daily., Disp: , Rfl:   •  naloxone (NARCAN) 4 MG/0.1ML nasal spray, 1 spray into the nostril(s) as directed by provider As Needed (sedation or accidental overdose of opioid)., Disp: 1 each, Rfl: 1  •  oxyCODONE ER (oxyCONTIN) 80 MG tablet extended-release 12 hour 12 hr tablet, Take 2 tablets by mouth Every 12 (Twelve) Hours. DNF 7/19/2020, Disp: 120 tablet, Rfl: 0  •  potassium chloride (K-DUR,KLOR-CON) 20 MEQ CR tablet, TAKE ONE TABLET BY MOUTH TWICE A DAY **MUST CALL MD FOR APPOINTMENT **, Disp: 180 tablet, Rfl: 3  •  promethazine (PHENERGAN) 25 MG tablet, TAKE ONE TABLET BY MOUTH EVERY 8 HOURS AS NEEDED FOR NAUSEA AND VOMITING, Disp: 90 tablet, Rfl: 1  •  Simethicone (GAS-X PO), Take  by mouth., Disp: , Rfl:   •  XARELTO 20 MG tablet, TAKE ONE TABLET BY MOUTH DAILY, Disp: 30 tablet, Rfl: 4    Allergies:   Allergies   Allergen Reactions   • Penicillins Hives        The following portions of the patient's history were reviewed and updated as appropriate: allergies, current medications, past family history, past medical history, past social history, past surgical history and problem list.    Vitals:    07/27/20  1302   BP: 136/80   Pulse: 51   Temp: 97.7 °F (36.5 °C)   SpO2: 97%         07/27/20  1302   Weight: 79.4 kg (175 lb)     Body mass index is 32 kg/m².      PHYSICAL EXAM:  Physical Exam   Constitutional: She appears well-developed.   HENT:   Nose: Nose normal. No nasal deformity.   Eyes: No scleral icterus.   Neck: No tracheal deviation present.   Pulmonary/Chest: Effort normal and breath sounds normal. No respiratory distress.   Abdominal: Soft. Normal appearance and bowel sounds are normal. She exhibits no shifting dullness and no distension. There is no hepatosplenomegaly. There is no tenderness. There is no rigidity, no rebound and no guarding. No hernia.     Laparotomy scar in the midline   Musculoskeletal: She exhibits edema.   Lymphadenopathy:   No periumbilical lymphadenopathy   Neurological: She is alert.   Skin: Skin is warm. No cyanosis.   Psychiatric: She has a normal mood and affect. Her behavior is normal.   Vitals reviewed.          Assessment/ Plan  Anne was seen today for establish care and elevated protein levels.    Diagnoses and all orders for this visit:    Elevated alkaline phosphatase level    Hypoproteinemia (CMS/HCC)    Pernicious anemia    Gastroesophageal reflux disease, esophagitis presence not specified    Elevated liver enzymes         No follow-ups on file.    Patient Instructions   Check labs to evaluate cause of liver function test elevation and low protein.              Discussion:  We will check a full panel on the liver as well as stool for fecal fat and her celiac panel.  We will also send for records from her prior colonoscopy and polypectomy.  At this point, I do not think she needs endoscopic intervention.  With the elevated alkaline phosphatase level and bone density issues, would like to rule out primary biliary cirrhosis.    Documentation by Haven BECKER acting as a scribe in the following sections on behalf of the billable provider: HPI, ROS, assessment, &  plan.

## 2020-07-27 NOTE — PATIENT INSTRUCTIONS
Check labs to evaluate cause of liver function test elevation and low protein.    Submit stool study to assess for fecal fat.

## 2020-07-29 LAB
A1AT SERPL-MCNC: 140 MG/DL (ref 101–187)
ACTIN IGG SERPL-ACNC: 5 UNITS (ref 0–19)
ALBUMIN SERPL-MCNC: 3.6 G/DL (ref 3.5–5.2)
ALBUMIN/GLOB SERPL: 1.4 G/DL
ALP SERPL-CCNC: 137 U/L (ref 39–117)
ALT SERPL-CCNC: 8 U/L (ref 1–33)
ANA SER QL: POSITIVE
AST SERPL-CCNC: 21 U/L (ref 1–32)
BILIRUB SERPL-MCNC: 0.6 MG/DL (ref 0–1.2)
BUN SERPL-MCNC: 27 MG/DL (ref 8–23)
BUN/CREAT SERPL: 23.5 (ref 7–25)
CALCIUM SERPL-MCNC: 8.9 MG/DL (ref 8.6–10.5)
CERULOPLASMIN SERPL-MCNC: 21.7 MG/DL (ref 19–39)
CHLORIDE SERPL-SCNC: 101 MMOL/L (ref 98–107)
CO2 SERPL-SCNC: 28.3 MMOL/L (ref 22–29)
CREAT SERPL-MCNC: 1.15 MG/DL (ref 0.57–1)
DSDNA AB SER-ACNC: 1 IU/ML (ref 0–9)
ENDOMYSIUM IGA SER QL: NEGATIVE
FERRITIN SERPL-MCNC: 32.2 NG/ML (ref 13–150)
GLOBULIN SER CALC-MCNC: 2.6 GM/DL
GLUCOSE SERPL-MCNC: 101 MG/DL (ref 65–99)
HAV IGM SERPL QL IA: NEGATIVE
HBV CORE IGM SERPL QL IA: NEGATIVE
HBV SURFACE AG SERPL QL IA: NEGATIVE
HCV AB S/CO SERPL IA: <0.1 S/CO RATIO (ref 0–0.9)
IGA SERPL-MCNC: 468 MG/DL (ref 87–352)
IGG SERPL-MCNC: 1078 MG/DL (ref 586–1602)
IGM SERPL-MCNC: 53 MG/DL (ref 26–217)
IRON SATN MFR SERPL: 22 % (ref 20–50)
IRON SERPL-MCNC: 77 MCG/DL (ref 37–145)
Lab: NORMAL
MITOCHONDRIA M2 IGG SER-ACNC: <20 UNITS (ref 0–20)
POTASSIUM SERPL-SCNC: 3.7 MMOL/L (ref 3.5–5.2)
PROT SERPL-MCNC: 6.2 G/DL (ref 6–8.5)
SODIUM SERPL-SCNC: 138 MMOL/L (ref 136–145)
TIBC SERPL-MCNC: 349 MCG/DL
TTG IGA SER-ACNC: <2 U/ML (ref 0–3)
UIBC SERPL-MCNC: 272 MCG/DL (ref 112–346)

## 2020-07-30 ENCOUNTER — TELEPHONE (OUTPATIENT)
Dept: GASTROENTEROLOGY | Facility: CLINIC | Age: 70
End: 2020-07-30

## 2020-07-30 ENCOUNTER — PREP FOR SURGERY (OUTPATIENT)
Dept: OTHER | Facility: HOSPITAL | Age: 70
End: 2020-07-30

## 2020-07-30 DIAGNOSIS — Z12.11 ENCOUNTER FOR SCREENING FOR MALIGNANT NEOPLASM OF COLON: ICD-10-CM

## 2020-07-30 DIAGNOSIS — Z86.010 HISTORY OF COLON POLYPS: Primary | ICD-10-CM

## 2020-07-30 NOTE — TELEPHONE ENCOUNTER
----- Message from EUGENIO Salazar sent at 7/30/2020  9:30 AM EDT -----  Delma, please let patient know we received her prior CLS and Dr. Barker has reviewed. She is due for CLS due to history of colon polyps. I placed order, please schedule. Thanks.  ----- Message -----  From: Isael Barker MD  Sent: 7/30/2020   9:00 AM EDT  To: EUGENIO Salazar    Would go ahead and do now  ----- Message -----  From: Haven Gr APRN  Sent: 7/30/2020   8:25 AM EDT  To: Isael Barker MD    Received prior CLS from [facility]. Per review she had a flat based polyp at the cecum that was resected, on pathology it was adenomatous.  It was done in July 2016.,  Pathologist commented the base of polyp could not be evaluated.  Okay for a 5-year recall or do feel this needs to be performed now?  ----- Message -----  From: Abbey Burroughs MA  Sent: 7/29/2020   3:09 PM EDT  To: EUGENIO Salazar    Indexed into chart    ----- Message -----  From: Haven Gr APRN  Sent: 7/27/2020   1:20 PM EDT  To: [facility] Clinical Pool    Please obtain copy of colonoscopy plus pathology from [facility] for review.

## 2020-07-31 DIAGNOSIS — Z01.818 PRE-OP TESTING: Primary | ICD-10-CM

## 2020-08-03 DIAGNOSIS — R79.89 ELEVATED LFTS: Primary | ICD-10-CM

## 2020-08-04 ENCOUNTER — RESULTS ENCOUNTER (OUTPATIENT)
Dept: GASTROENTEROLOGY | Facility: CLINIC | Age: 70
End: 2020-08-04

## 2020-08-04 DIAGNOSIS — R79.89 ELEVATED LFTS: ICD-10-CM

## 2020-08-06 ENCOUNTER — TELEPHONE (OUTPATIENT)
Dept: GASTROENTEROLOGY | Facility: CLINIC | Age: 70
End: 2020-08-06

## 2020-08-06 NOTE — TELEPHONE ENCOUNTER
Patient is scheduled for a scope on Wednesday.  She was unable to get the fecal fat lab test done.  She will be in [place] for the Covid Swab on Monday and can  the stool kit at that time.   Is it ok if she proceeds with the scope without these results?

## 2020-08-10 ENCOUNTER — TRANSCRIBE ORDERS (OUTPATIENT)
Dept: ADMINISTRATIVE | Facility: HOSPITAL | Age: 70
End: 2020-08-10

## 2020-08-10 ENCOUNTER — HOSPITAL ENCOUNTER (OUTPATIENT)
Dept: CARDIOLOGY | Facility: HOSPITAL | Age: 70
Discharge: HOME | End: 2020-08-10
Admitting: INTERNAL MEDICINE

## 2020-08-10 ENCOUNTER — HOSPITAL ENCOUNTER (OUTPATIENT)
Dept: CARDIOLOGY | Facility: HOSPITAL | Age: 70
Discharge: HOME | End: 2020-08-10

## 2020-08-10 ENCOUNTER — LAB REQUISITION (OUTPATIENT)
Dept: LAB | Facility: HOSPITAL | Age: 70
End: 2020-08-10

## 2020-08-10 DIAGNOSIS — R79.89 ELEVATED LFTS: Primary | ICD-10-CM

## 2020-08-10 DIAGNOSIS — Z01.818 PRE-OP TESTING: ICD-10-CM

## 2020-08-10 DIAGNOSIS — Z00.00 ENCOUNTER FOR GENERAL ADULT MEDICAL EXAMINATION WITHOUT ABNORMAL FINDINGS: ICD-10-CM

## 2020-08-10 DIAGNOSIS — R79.89 ELEVATED LFTS: ICD-10-CM

## 2020-08-10 DIAGNOSIS — Z01.818 PRE-OP TESTING: Primary | ICD-10-CM

## 2020-08-10 PROCEDURE — C9803 HOPD COVID-19 SPEC COLLECT: HCPCS | Performed by: INTERNAL MEDICINE

## 2020-08-10 PROCEDURE — 93005 ELECTROCARDIOGRAM TRACING: CPT | Performed by: INTERNAL MEDICINE

## 2020-08-10 PROCEDURE — U0004 COV-19 TEST NON-CDC HGH THRU: HCPCS | Performed by: INTERNAL MEDICINE

## 2020-08-10 PROCEDURE — 93010 ELECTROCARDIOGRAM REPORT: CPT | Performed by: INTERNAL MEDICINE

## 2020-08-11 LAB
PREALB SERPL-MCNC: 12 MG/DL (ref 10–36)
REF LAB TEST METHOD: NORMAL
SARS-COV-2 RNA RESP QL NAA+PROBE: NOT DETECTED

## 2020-08-12 ENCOUNTER — OUTSIDE FACILITY SERVICE (OUTPATIENT)
Dept: GASTROENTEROLOGY | Facility: CLINIC | Age: 70
End: 2020-08-12

## 2020-08-12 PROCEDURE — G0105 COLORECTAL SCRN; HI RISK IND: HCPCS | Performed by: INTERNAL MEDICINE

## 2020-08-20 ENCOUNTER — OFFICE VISIT (OUTPATIENT)
Dept: PAIN MEDICINE | Facility: CLINIC | Age: 70
End: 2020-08-20

## 2020-08-20 VITALS
HEIGHT: 62 IN | HEART RATE: 75 BPM | TEMPERATURE: 98.1 F | BODY MASS INDEX: 32.94 KG/M2 | RESPIRATION RATE: 18 BRPM | WEIGHT: 179 LBS | DIASTOLIC BLOOD PRESSURE: 67 MMHG | SYSTOLIC BLOOD PRESSURE: 116 MMHG | OXYGEN SATURATION: 97 %

## 2020-08-20 DIAGNOSIS — M06.9 RHEUMATOID ARTHRITIS, INVOLVING UNSPECIFIED SITE, UNSPECIFIED RHEUMATOID FACTOR PRESENCE: ICD-10-CM

## 2020-08-20 DIAGNOSIS — Z79.899 ENCOUNTER FOR LONG-TERM (CURRENT) USE OF HIGH-RISK MEDICATION: ICD-10-CM

## 2020-08-20 DIAGNOSIS — M51.36 DEGENERATION OF INTERVERTEBRAL DISC OF LUMBAR REGION: ICD-10-CM

## 2020-08-20 DIAGNOSIS — G89.4 CHRONIC PAIN SYNDROME: Primary | ICD-10-CM

## 2020-08-20 PROBLEM — G89.29 CHRONIC RADICULAR LUMBAR PAIN: Status: RESOLVED | Noted: 2019-09-10 | Resolved: 2020-08-20

## 2020-08-20 PROBLEM — M54.50 CHRONIC LOW BACK PAIN: Status: RESOLVED | Noted: 2018-10-17 | Resolved: 2020-08-20

## 2020-08-20 PROBLEM — G89.29 CHRONIC LOW BACK PAIN: Status: RESOLVED | Noted: 2018-10-17 | Resolved: 2020-08-20

## 2020-08-20 PROBLEM — M54.16 CHRONIC RADICULAR LUMBAR PAIN: Status: RESOLVED | Noted: 2019-09-10 | Resolved: 2020-08-20

## 2020-08-20 LAB
FAT STL QL: NORMAL
NEUTRAL FAT STL QL: NORMAL
SPECIMEN STATUS: NORMAL

## 2020-08-20 PROCEDURE — 99214 OFFICE O/P EST MOD 30 MIN: CPT | Performed by: NURSE PRACTITIONER

## 2020-08-20 RX ORDER — OXYCODONE HYDROCHLORIDE 80 MG/1
160 TABLET, FILM COATED, EXTENDED RELEASE ORAL EVERY 12 HOURS SCHEDULED
Qty: 120 TABLET | Refills: 0 | Status: SHIPPED | OUTPATIENT
Start: 2020-08-20 | End: 2020-09-17 | Stop reason: SDUPTHER

## 2020-08-20 NOTE — PROGRESS NOTES
"CHIEF COMPLAINT  Follow-up for back and joint pain.    Subjective   Anne Jewell is a 69 y.o. female  who presents for follow-up.  She has a history of chronic back and joint pain. Reports her pain is UNCHANGED since last evaluation.    Complains of pain in her low back and joints. Today her pain is 6/10VAS. Describes the pain as continuous aching and throbbing.  Pain increases with walking, activity, household chores; pain decreases with medication and rest. Also uses a back brace. Continues with OxyContin 80 mg 2 BID and baclofen. Denies any side effects from the regimen, including constipation. The regimen helps decrease her pain moderately. ADL's by self.  Reports her motel that she used to do her water therapy has remained closed during COVID pandemic.     \"my bones are falling apart but i'm walking more.\"  Been having weekly allergy shots.     Was having issues with pharmacy refilling OxyContin. Difficulty with them keeping it ordered/stocked.    Patient was evaluated by GI, Dr. Isael Harley on 7/27/2020.  She presents for low serum protein and elevated liver enzymes, as well as reported weight loss.  Patient has a history of gastric bypass.  Also complains of poor appetite.  Plan is to check labs and evaluate because of liver function test elevation and low protein.  Also checking stool for fecal fat and celiac panel.  Need to rule out primary biliary cirrhosis. She reports she started a new supplement. Her protein has increased. Plan is to repeat colonoscopy per patient. She just completed fecal fat and sent this in. Results pending.     She was evaluated by Dr. Alvarado for joint pain who recommended bracing of her right knee as well as supportive brace to right wrist/hand for carpal tunnel symptoms.  Prescribed Xanax 2 mg TID by psychiatrist, Narcan prescription at home (most recent prescription 1/9/2020).    Patient remained masked during entire encounter. No cough present. I donned a mask and gloves " throughout entire visit. Prior to donning mask and gloves, hand hygiene was performed, as well as when it was doffed.  I was closer than 6 feet, but not for an extended period of time. No obvious exposure to any bodily fluids.    Back Pain   This is a chronic problem. The current episode started more than 1 year ago. The problem occurs constantly. The pain is present in the lumbar spine. The quality of the pain is described as aching. The pain radiates to the left foot and right foot. The pain is at a severity of 6/10. The pain is moderate. The symptoms are aggravated by stress (weather changes). Associated symptoms include numbness (bilateral lower legs) and weakness. Pertinent negatives include no abdominal pain, bladder incontinence, bowel incontinence, chest pain, dysuria, fever or headaches. She has tried analgesics, bed rest, home exercises and heat (OxyContin, aquatherapy) for the symptoms. The treatment provided moderate relief.   Joint Pain   This is a chronic problem. The current episode started more than 1 year ago. The problem occurs constantly. Associated symptoms include arthralgias, congestion, joint swelling (minor right ankle), numbness (bilateral lower legs) and weakness. Pertinent negatives include no abdominal pain, chest pain, chills, coughing, fatigue, fever, headaches, nausea, neck pain or vomiting. She has tried oral narcotics for the symptoms. The treatment provided moderate relief.      PEG Assessment   What number best describes your pain on average in the past week?6  What number best describes how, during the past week, pain has interfered with your enjoyment of life?8  What number best describes how, during the past week, pain has interfered with your general activity?  6    The following portions of the patient's history were reviewed and updated as appropriate: allergies, current medications, past family history, past medical history, past social history, past surgical history and  "problem list.    Review of Systems   Constitutional: Negative for chills, fatigue and fever.   HENT: Positive for congestion.    Eyes: Negative for visual disturbance.   Respiratory: Negative for cough, shortness of breath and wheezing.    Cardiovascular: Positive for leg swelling. Negative for chest pain and palpitations.   Gastrointestinal: Negative for abdominal pain, bowel incontinence, constipation, diarrhea, nausea and vomiting.   Genitourinary: Negative for bladder incontinence, difficulty urinating and dysuria.   Musculoskeletal: Positive for arthralgias, back pain and joint swelling (minor right ankle). Negative for neck pain.   Neurological: Positive for weakness and numbness (bilateral lower legs). Negative for headaches.   Psychiatric/Behavioral: Negative for sleep disturbance and suicidal ideas. The patient is not nervous/anxious.      I have reviewed and confirmed the accuracy of the ROS as documented by the MA/LPN/RN EUGENIO Gaming      Vitals:    08/20/20 1243   BP: 116/67   Pulse: 75   Resp: 18   Temp: 98.1 °F (36.7 °C)   SpO2: 97%   Weight: 81.2 kg (179 lb)   Height: 157.5 cm (62\")   PainSc:   6   PainLoc: Comment: joint pain     Objective   Physical Exam   Constitutional: She is oriented to person, place, and time. She appears well-developed and well-nourished. She is cooperative.   HENT:   Head: Normocephalic and atraumatic.   Nose: Nose normal.   Eyes: Conjunctivae and lids are normal.   Neck: Trachea normal.   Cardiovascular: Normal rate.   Pulmonary/Chest: Effort normal.   Musculoskeletal:        Lumbar back: She exhibits tenderness.   Diffuse arthritic changes with no acute synovitis     Neurological: She is alert and oriented to person, place, and time. Gait (ambulating with rolling walker) abnormal.   Skin: Skin is intact.   Psychiatric: She has a normal mood and affect. Her speech is normal and behavior is normal. Cognition and memory are normal.   Nursing note and vitals " reviewed.      Assessment/Plan   Anne was seen today for back pain and joint pain.    Diagnoses and all orders for this visit:    Chronic pain syndrome    Rheumatoid arthritis, involving unspecified site, unspecified rheumatoid factor presence (CMS/[facility])    Degeneration of intervertebral disc of lumbar region    Encounter for long-term (current) use of high-risk medication      --- The urine drug screen confirmation from 6-10-20 has been reviewed and the result is APPROPRIATE based on patient history and [facility] report  --- Refill OxyContin. Patient appears stable with current regimen. No adverse effects. Regarding continuation of opioids, there is no evidence of aberrant behavior or any red flags.  The patient continues with appropriate response to opioid therapy. ADL's remain intact by self.   --- Reviewed risks related to opioids and Xanax prescriptions. Reviewed risk for respiratory depression.  --- Continue with other specialists as planned.  --- Follow-up 1 month or sooner if needed.       [facility] REPORT  As part of the patient's treatment plan, I am prescribing controlled substances. The patient has been made aware of appropriate use of such medications, including potential risk of somnolence, limited ability to drive and/or work safely, and the potential for dependence or overdose. It has also bee made clear that these medications are for use by this patient only, without concomitant use of alcohol or other substances unless prescribed.     Patient has completed prescribing agreement detailing terms of continued prescribing of controlled substances, including monitoring [facility] reports, urine drug screening, and pill counts if necessary. The patient is aware that inappropriate use will results in cessation of prescribing such medications.    [facility] report has been reviewed and scanned into the patient's chart.    As the clinician, I personally reviewed the [facility] from 8-19-20 while the patient was in the  office today.    History and physical exam exhibit continued safe and appropriate use of controlled substances.        EMR Dragon/Transcription disclaimer:   Much of this encounter note is an electronic transcription/translation of spoken language to printed text. The electronic translation of spoken language may permit erroneous, or at times, nonsensical words or phrases to be inadvertently transcribed; Although I have reviewed the note for such errors, some may still exist.

## 2020-08-24 ENCOUNTER — OFFICE VISIT CONVERTED (OUTPATIENT)
Dept: FAMILY MEDICINE CLINIC | Age: 70
End: 2020-08-24
Attending: NURSE PRACTITIONER

## 2020-08-25 LAB
FAT 24H STL-MRATE: 8.7 G/24 HR (ref 0–7.1)
SPECIMEN WT STL QN: 930 G
WRITTEN AUTHORIZATION: NORMAL

## 2020-08-26 ENCOUNTER — TELEPHONE (OUTPATIENT)
Dept: GASTROENTEROLOGY | Facility: CLINIC | Age: 70
End: 2020-08-26

## 2020-08-26 DIAGNOSIS — K90.9 STEATORRHEA: ICD-10-CM

## 2020-08-26 DIAGNOSIS — R74.8 ELEVATED ALKALINE PHOSPHATASE LEVEL: ICD-10-CM

## 2020-08-26 DIAGNOSIS — R79.89 ELEVATED LFTS: Primary | ICD-10-CM

## 2020-08-26 NOTE — TELEPHONE ENCOUNTER
Please let patient know the Dr. Barker has reviewed her labs.  She had low serum protein and elevated fecal fat which could be consistent with pancreatic insufficiency.  He would like to get a CT scan of the pancreas for further evaluation and start her on pancreatic enzymes.  I sent through the prescription to her pharmacy and placed orders for CT, scheduling will contact her to arrange.

## 2020-08-28 NOTE — TELEPHONE ENCOUNTER
S/W patient, she would like to go to [place] if they can do 9/17 before her other appointment at 1:30pm.  Called [facility] and scheduled patient for 9/17 arriving at 11:00am.  Patient informed.

## 2020-08-31 ENCOUNTER — TELEPHONE (OUTPATIENT)
Dept: GASTROENTEROLOGY | Facility: CLINIC | Age: 70
End: 2020-08-31

## 2020-09-10 RX ORDER — LEVOTHYROXINE SODIUM 0.1 MG/1
TABLET ORAL
Qty: 90 TABLET | Refills: 2 | Status: SHIPPED | OUTPATIENT
Start: 2020-09-10 | End: 2021-06-04

## 2020-09-17 ENCOUNTER — HOSPITAL ENCOUNTER (OUTPATIENT)
Dept: CT IMAGING | Facility: HOSPITAL | Age: 70
Discharge: HOME | End: 2020-09-17
Admitting: INTERNAL MEDICINE

## 2020-09-17 ENCOUNTER — OFFICE VISIT (OUTPATIENT)
Dept: PAIN MEDICINE | Facility: CLINIC | Age: 70
End: 2020-09-17

## 2020-09-17 VITALS
OXYGEN SATURATION: 97 % | RESPIRATION RATE: 20 BRPM | WEIGHT: 178.2 LBS | BODY MASS INDEX: 32.79 KG/M2 | DIASTOLIC BLOOD PRESSURE: 73 MMHG | HEIGHT: 62 IN | SYSTOLIC BLOOD PRESSURE: 146 MMHG | HEART RATE: 65 BPM | TEMPERATURE: 97.1 F

## 2020-09-17 DIAGNOSIS — M51.36 DEGENERATION OF INTERVERTEBRAL DISC OF LUMBAR REGION: ICD-10-CM

## 2020-09-17 DIAGNOSIS — K90.9 STEATORRHEA: ICD-10-CM

## 2020-09-17 DIAGNOSIS — R79.89 ELEVATED LFTS: ICD-10-CM

## 2020-09-17 DIAGNOSIS — Z79.899 ENCOUNTER FOR LONG-TERM (CURRENT) USE OF HIGH-RISK MEDICATION: ICD-10-CM

## 2020-09-17 DIAGNOSIS — R74.8 ELEVATED ALKALINE PHOSPHATASE LEVEL: ICD-10-CM

## 2020-09-17 DIAGNOSIS — G89.4 CHRONIC PAIN SYNDROME: Primary | ICD-10-CM

## 2020-09-17 DIAGNOSIS — M06.9 RHEUMATOID ARTHRITIS, INVOLVING UNSPECIFIED SITE, UNSPECIFIED RHEUMATOID FACTOR PRESENCE: ICD-10-CM

## 2020-09-17 PROCEDURE — 74160 CT ABDOMEN W/CONTRAST: CPT

## 2020-09-17 PROCEDURE — 25010000002 IOPAMIDOL 61 % SOLUTION: Performed by: INTERNAL MEDICINE

## 2020-09-17 PROCEDURE — 82565 ASSAY OF CREATININE: CPT

## 2020-09-17 PROCEDURE — 99214 OFFICE O/P EST MOD 30 MIN: CPT | Performed by: NURSE PRACTITIONER

## 2020-09-17 PROCEDURE — 0 DIATRIZOATE MEGLUMINE & SODIUM PER 1 ML: Performed by: INTERNAL MEDICINE

## 2020-09-17 RX ORDER — INFLUENZA A VIRUS A/MICHIGAN/45/2015 X-275 (H1N1) ANTIGEN (FORMALDEHYDE INACTIVATED), INFLUENZA A VIRUS A/SINGAPORE/INFIMH-16-0019/2016 IVR-186 (H3N2) ANTIGEN (FORMALDEHYDE INACTIVATED), INFLUENZA B VIRUS B/PHUKET/3073/2013 ANTIGEN (FORMALDEHYDE INACTIVATED), AND INFLUENZA B VIRUS B/MARYLAND/15/2016 BX-69A ANTIGEN (FORMALDEHYDE INACTIVATED) 60; 60; 60; 60 UG/.7ML; UG/.7ML; UG/.7ML; UG/.7ML
INJECTION, SUSPENSION INTRAMUSCULAR
COMMUNITY
Start: 2020-09-12 | End: 2021-09-20

## 2020-09-17 RX ORDER — EPINEPHRINE 0.3 MG/.3ML
INJECTION SUBCUTANEOUS
COMMUNITY
Start: 2020-09-11

## 2020-09-17 RX ORDER — OXYCODONE HYDROCHLORIDE 80 MG/1
160 TABLET, FILM COATED, EXTENDED RELEASE ORAL EVERY 12 HOURS SCHEDULED
Qty: 120 TABLET | Refills: 0 | Status: SHIPPED | OUTPATIENT
Start: 2020-09-17 | End: 2020-10-20 | Stop reason: SDUPTHER

## 2020-09-17 RX ADMIN — DIATRIZOATE MEGLUMINE AND DIATRIZOATE SODIUM 30 ML: 660; 100 LIQUID ORAL; RECTAL at 11:34

## 2020-09-17 RX ADMIN — IOPAMIDOL 85 ML: 612 INJECTION, SOLUTION INTRAVENOUS at 12:04

## 2020-09-17 NOTE — PROGRESS NOTES
"CHIEF COMPLAINT  F/u back and joint pain. Pt sts pain is the same.     Subjective   Anne Jewell is a 70 y.o. female  who presents for follow-up.  She has a history of chronic back and joint pain. Reports her pain is unchanged since last evaluation.    Complains of pain in her low back and joints. Today her pain is 6/10VAS. Describes her pain as continuous throbbing with intermittent sharp pain. Pain increases with standing, activity, household chores; pain decreases with medication, rest, and back brace. Continues with OxyContin 80 mg 2 BID and baclofen. Denies any side effects from the regimen, including constipation. The regimen helps decrease her pain moderately.  Notes improvement in her activity and function with medication. \" I worry about what I wouldn't be able to do anything without it.\" ADL's by self.   Can no longer go to her motel for warm water therapy due to COVID pandemic.     Patient was evaluated by GI, Dr. Isael Barker on 7/27/2020.  She presents for low serum protein and elevated liver enzymes, as well as reported weight loss.  Patient has a history of gastric bypass.  Also complains of poor appetite.  Plan is to check labs and evaluate because of liver function test elevation and low protein.  Also checking stool for fecal fat and celiac panel.  Need to rule out primary biliary cirrhosis. She reports she started a new supplement. Her protein has increased. Plan is to repeat colonoscopy per patient. She just completed fecal fat and sent this in. Concern for pancreatic insufficiency. Was given a trial of Creon but could not tolerate this due to diarrhea. Sent for CT-ABD/PEL. Results pending.      She was evaluated by Dr. Alvarado for joint pain who recommended bracing of her right knee as well as supportive brace to right wrist/hand for carpal tunnel symptoms.    Prescribed Xanax 2 mg TID by psychiatrist, Narcan prescription at home (most recent prescription 1/9/2020).    Patient remained " masked during entire encounter. No cough present. I donned a mask and faceshield throughout entire visit. Prior to donning mask and faceshield, hand hygiene was performed, as well as when it was doffed.  I was closer than 6 feet, but not for an extended period of time. No obvious exposure to any bodily fluids.    Back Pain  This is a chronic problem. The current episode started more than 1 year ago. The problem occurs constantly. The problem has been gradually worsening (unchanged since last evaluation) since onset. The pain is present in the lumbar spine. The quality of the pain is described as aching. The pain radiates to the left foot and right foot. The pain is at a severity of 6/10. The pain is moderate. The symptoms are aggravated by stress (weather changes). Pertinent negatives include no abdominal pain, bladder incontinence, bowel incontinence, chest pain, dysuria, fever, headaches, numbness or weakness. She has tried analgesics, bed rest, home exercises and heat (OxyContin, aquatherapy) for the symptoms. The treatment provided moderate relief.   Joint Pain  This is a chronic problem. The current episode started more than 1 year ago. The problem occurs constantly. The problem has been gradually worsening (unchanged since last evaluation). Associated symptoms include arthralgias (joints) and joint swelling (minor right ankle). Pertinent negatives include no abdominal pain, chest pain, chills, congestion, coughing, fatigue, fever, headaches, nausea, neck pain, numbness, vomiting or weakness. She has tried oral narcotics for the symptoms. The treatment provided moderate relief.      PEG Assessment   What number best describes your pain on average in the past week?6  What number best describes how, during the past week, pain has interfered with your enjoyment of life?6  What number best describes how, during the past week, pain has interfered with your general activity?  6    The following portions of the  "patient's history were reviewed and updated as appropriate: allergies, current medications, past family history, past medical history, past social history, past surgical history and problem list.    Review of Systems   Constitutional: Negative for activity change, chills, fatigue and fever.   HENT: Negative for congestion.    Eyes: Negative for visual disturbance.   Respiratory: Negative for cough and shortness of breath.    Cardiovascular: Negative for chest pain.   Gastrointestinal: Negative for abdominal pain, bowel incontinence, constipation, diarrhea, nausea and vomiting.   Genitourinary: Negative for bladder incontinence, difficulty urinating and dysuria.   Musculoskeletal: Positive for arthralgias (joints), back pain, gait problem (walker) and joint swelling (minor right ankle). Negative for neck pain.   Allergic/Immunologic: Negative for immunocompromised state.   Neurological: Negative for dizziness, weakness, light-headedness, numbness and headaches.   Psychiatric/Behavioral: Negative for agitation, sleep disturbance and suicidal ideas. The patient is not nervous/anxious.      I have reviewed and confirmed the accuracy of the ROS as documented by the MA/LPN/RN EUGENIO Gaming    Vitals:    09/17/20 1301   BP: 146/73   Pulse: 65   Resp: 20   Temp: 97.1 °F (36.2 °C)   SpO2: 97%   Weight: 80.8 kg (178 lb 3.2 oz)   Height: 157.5 cm (62\")   PainSc:   6   PainLoc: Comment: joints     Objective   Physical Exam  Vitals signs and nursing note reviewed.   Constitutional:       Appearance: Normal appearance. She is well-developed.   HENT:      Head: Normocephalic and atraumatic.      Nose: Nose normal.   Eyes:      General: Lids are normal.      Conjunctiva/sclera: Conjunctivae normal.   Cardiovascular:      Rate and Rhythm: Normal rate.   Pulmonary:      Effort: Pulmonary effort is normal. No respiratory distress.   Musculoskeletal:      Lumbar back: She exhibits decreased range of motion and tenderness.     "  Comments: Widespread OA changes with no acute synovitis   Skin:     General: Skin is warm and dry.   Neurological:      Mental Status: She is alert and oriented to person, place, and time.      Gait: Gait abnormal (rolling walker).   Psychiatric:         Attention and Perception: Attention normal.         Mood and Affect: Mood normal.         Speech: Speech normal.         Behavior: Behavior normal. Behavior is cooperative.         Thought Content: Thought content normal.         Judgment: Judgment normal.         Assessment/Plan   Anne was seen today for back pain and joint pain.    Diagnoses and all orders for this visit:    Chronic pain syndrome    Rheumatoid arthritis, involving unspecified site, unspecified rheumatoid factor presence (CMS/[facility])    Degeneration of intervertebral disc of lumbar region    Encounter for long-term (current) use of high-risk medication      --- The urine drug screen confirmation from 6-10-20 has been reviewed and the result is APPROPRIATE based on patient history and [facility] report  --- Refill oxyContin DNF. Patient appears stable with current regimen. No adverse effects. Regarding continuation of opioids, there is no evidence of aberrant behavior or any red flags.  The patient continues with appropriate response to opioid therapy. ADL's remain intact by self.   --- Follow-up 1 month or sooner if needed       [facility]A REPORT  As part of the patient's treatment plan, I am prescribing controlled substances. The patient has been made aware of appropriate use of such medications, including potential risk of somnolence, limited ability to drive and/or work safely, and the potential for dependence or overdose. It has also bee made clear that these medications are for use by this patient only, without concomitant use of alcohol or other substances unless prescribed.     Patient has completed prescribing agreement detailing terms of continued prescribing of controlled substances, including  monitoring VICENTA reports, urine drug screening, and pill counts if necessary. The patient is aware that inappropriate use will results in cessation of prescribing such medications.    [facility] report has been reviewed and scanned into the patient's chart.    As the clinician, I personally reviewed the VICENTA from 9-15-20 while the patient was in the office today.    History and physical exam exhibit continued safe and appropriate use of controlled substances.        EMR Dragon/Transcription disclaimer:   Much of this encounter note is an electronic transcription/translation of spoken language to printed text. The electronic translation of spoken language may permit erroneous, or at times, nonsensical words or phrases to be inadvertently transcribed; Although I have reviewed the note for such errors, some may still exist.

## 2020-09-18 LAB — CREAT BLDA-MCNC: 1 MG/DL (ref 0.6–1.3)

## 2020-10-20 ENCOUNTER — OFFICE VISIT (OUTPATIENT)
Dept: PAIN MEDICINE | Facility: CLINIC | Age: 70
End: 2020-10-20

## 2020-10-20 DIAGNOSIS — G89.4 CHRONIC PAIN SYNDROME: Primary | ICD-10-CM

## 2020-10-20 DIAGNOSIS — M51.36 DEGENERATION OF INTERVERTEBRAL DISC OF LUMBAR REGION: ICD-10-CM

## 2020-10-20 DIAGNOSIS — Z79.899 ENCOUNTER FOR LONG-TERM (CURRENT) USE OF HIGH-RISK MEDICATION: ICD-10-CM

## 2020-10-20 DIAGNOSIS — M06.9 RHEUMATOID ARTHRITIS, INVOLVING UNSPECIFIED SITE, UNSPECIFIED WHETHER RHEUMATOID FACTOR PRESENT (HCC): ICD-10-CM

## 2020-10-20 PROCEDURE — 99442 PR PHYS/QHP TELEPHONE EVALUATION 11-20 MIN: CPT | Performed by: NURSE PRACTITIONER

## 2020-10-20 RX ORDER — OXYCODONE HYDROCHLORIDE 80 MG/1
160 TABLET, FILM COATED, EXTENDED RELEASE ORAL EVERY 12 HOURS SCHEDULED
Qty: 120 TABLET | Refills: 0 | Status: SHIPPED | OUTPATIENT
Start: 2020-10-20 | End: 2020-11-18 | Stop reason: SDUPTHER

## 2020-10-20 NOTE — PROGRESS NOTES
TELEPHONE VISIT    You have chosen to receive care through a telephone visit. Do you consent to use a telephone visit for your medical care today? Yes      CHIEF COMPLAINT  Back and joint pain    Subjective   Anne Jewell is a 70 y.o. female  who presents for a telephonic follow-up.She has a history of chronic back and joint pain. Reports her pain is unchanged since last evaluation.    Complains of pain her low back and joints. Today her pain is 6-7/10VAS. Describes the pain as continuous aching and throbbing. Pain increases with activity, household chores, prolonged activity and positioning; pain decreases with medication, rest. Continues with OxyContin 80 mg 2 BID and baclofen. Denies any side effects from the regimen, including constipation. The regimen helps decrease her pain moderately. Reports improvement in function and activity with regimen. ADL's by self.   Can no longer go to her motel for warm water therapy due to COVID pandemic.     Reports her daughter is staying with her. Issues with her health. Reports this has increased her stress as well.     Patient was evaluated by GI, Dr. Isael Barker on 7/27/2020.  She presents for low serum protein and elevated liver enzymes, as well as reported weight loss.  Patient has a history of gastric bypass.  Also complains of poor appetite.  Plan is to check labs and evaluate because of liver function test elevation and low protein.  Also checking stool for fecal fat and celiac panel.  Need to rule out primary biliary cirrhosis. She reports she started a new supplement. Her protein has increased. Plan is to repeat colonoscopy per patient. She just completed fecal fat and sent this in. Concern for pancreatic insufficiency. Was given a trial of Creon but could not tolerate this due to diarrhea. Sent for CT-ABD/PEL. Reports she has not gotten results of this yet. Has not followed back up yet.      She was evaluated by Dr. Alvarado for joint pain who recommended bracing  of her right knee as well as supportive brace to right wrist/hand for carpal tunnel symptoms.     Prescribed Xanax 2 mg TID by psychiatrist, Narcan prescription at home (most recent prescription 1/9/2020).    Back Pain  This is a chronic problem. The current episode started more than 1 year ago. The problem occurs constantly. The problem has been gradually worsening (unchanged since last evaluation) since onset. The pain is present in the lumbar spine. The quality of the pain is described as aching. The pain radiates to the left foot and right foot. The pain is at a severity of 6/10. The pain is moderate. The symptoms are aggravated by stress (weather changes). Pertinent negatives include no abdominal pain, bladder incontinence, bowel incontinence, chest pain, dysuria, fever, headaches, numbness or weakness. She has tried analgesics, bed rest, home exercises and heat (OxyContin, aquatherapy) for the symptoms. The treatment provided moderate relief.   Joint Pain  This is a chronic problem. The current episode started more than 1 year ago. The problem occurs constantly. The problem has been gradually worsening (unchanged since last evaluation). Associated symptoms include arthralgias (joints) and joint swelling (minor right ankle). Pertinent negatives include no abdominal pain, chest pain, chills, congestion, coughing, fatigue, fever, headaches, nausea, neck pain, numbness, vomiting or weakness. She has tried oral narcotics for the symptoms. The treatment provided moderate relief.      The following portions of the patient's history were reviewed and updated as appropriate: allergies, current medications, past family history, past medical history, past social history, past surgical history and problem list.    Review of Systems   Constitutional: Negative for chills, fatigue and fever.   HENT: Negative for congestion.    Respiratory: Negative for cough.    Cardiovascular: Negative for chest pain.   Gastrointestinal:  Negative for abdominal pain, bowel incontinence, nausea and vomiting.   Genitourinary: Negative for bladder incontinence and dysuria.   Musculoskeletal: Positive for arthralgias (joints), back pain and joint swelling (minor right ankle). Negative for neck pain.   Neurological: Negative for weakness, numbness and headaches.     There were no vitals filed for this visit.      Objective   Physical Exam  As this is a telephone check-in, the ability to perform a routine physical exam is extremely limited. The patient seems alert and is oriented appropriately.   On this phone call there is not any evidence of respiratory distress.   The patient seems of normal mood.   The remainder of a routine physical exam is deferred.      Assessment/Plan   Diagnoses and all orders for this visit:    1. Chronic pain syndrome (Primary)    2. Rheumatoid arthritis, involving unspecified site, unspecified whether rheumatoid factor present (CMS/[facility])    3. Degeneration of intervertebral disc of lumbar region    4. Encounter for long-term (current) use of high-risk medication      ----------------  Our practice is offering alternative &/or electronic methods to continue to follow our patients while at the same time further the efforts toward social distancing, in accordance with our organizational policies, professional societies' guidance, and gubernatorial mandates.  I support the Healthy at Home campaign and in this visit I have counseled the patient on our needs to limit in-person office visits and physical encounters with medical facilities whenever possible.  I have also educated the patient on the medical necessities of maintaining social distancing while we continue to function during this crisis period.      The patient was counseled on the need to consider telehealth options. The patient was offered the opportunity for a Video Visit using [employer]. The patient had obstacles which preclude consideration for a Video Visit. As a Video  Visit was not practical, the patient was offered the option for a Telephone Check-In. The patient agreed to a Telephone Check-In. The patient was counseled on the need for a check-in visit. The patient was educated about our efforts to comply with monitoring standards when prescribing potent medications.    TIME:  Total Time:  12 minutes. Topics discussed are outlined in the Assessment/Plan section of the note.      ----------------    --- The urine drug screen confirmation from 6-10-20 has been reviewed and the result is APPROPRIATE based on patient history and [facility] report  --- refill OxyContin. Patient appears stable with current regimen. No adverse effects. Regarding continuation of opioids, there is no evidence of aberrant behavior or any red flags.  The patient continues with appropriate response to opioid therapy. ADL's remain intact by self.   --- Follow-up 1 month or sooner if needed             [facility]A REPORT  As part of the patient's treatment plan, I am prescribing controlled substances. The patient has been made aware of appropriate use of such medications, including potential risk of somnolence, limited ability to drive and/or work safely, and the potential for dependence or overdose. It has also bee made clear that these medications are for use by this patient only, without concomitant use of alcohol or other substances unless prescribed.     Patient has completed prescribing agreement detailing terms of continued prescribing of controlled substances, including monitoring VICENTA reports, urine drug screening, and pill counts if necessary. The patient is aware that inappropriate use will results in cessation of prescribing such medications.    [facility] report has been reviewed and scanned into the patient's chart.    As the clinician, I personally reviewed the [facility]ECNTA from 10-20-20 while the patient was in the office today.    History and physical exam exhibit continued safe and appropriate use of  controlled substances.    -------    EMR Dragon/Transcription disclaimer:   Much of this encounter note is an electronic transcription/translation of spoken language to printed text. The electronic translation of spoken language may permit erroneous, or at times, nonsensical words or phrases to be inadvertently transcribed; Although I have reviewed the note for such errors, some may still exist.

## 2020-11-18 ENCOUNTER — RESULTS ENCOUNTER (OUTPATIENT)
Dept: PAIN MEDICINE | Facility: CLINIC | Age: 70
End: 2020-11-18

## 2020-11-18 ENCOUNTER — OFFICE VISIT (OUTPATIENT)
Dept: PAIN MEDICINE | Facility: CLINIC | Age: 70
End: 2020-11-18

## 2020-11-18 VITALS
SYSTOLIC BLOOD PRESSURE: 132 MMHG | HEIGHT: 62 IN | HEART RATE: 72 BPM | TEMPERATURE: 96.9 F | WEIGHT: 176 LBS | RESPIRATION RATE: 15 BRPM | OXYGEN SATURATION: 94 % | BODY MASS INDEX: 32.39 KG/M2 | DIASTOLIC BLOOD PRESSURE: 89 MMHG

## 2020-11-18 DIAGNOSIS — M51.36 DEGENERATION OF INTERVERTEBRAL DISC OF LUMBAR REGION: ICD-10-CM

## 2020-11-18 DIAGNOSIS — Z79.899 ENCOUNTER FOR LONG-TERM (CURRENT) USE OF HIGH-RISK MEDICATION: ICD-10-CM

## 2020-11-18 DIAGNOSIS — G89.4 CHRONIC PAIN SYNDROME: Primary | ICD-10-CM

## 2020-11-18 DIAGNOSIS — M06.9 RHEUMATOID ARTHRITIS, INVOLVING UNSPECIFIED SITE, UNSPECIFIED WHETHER RHEUMATOID FACTOR PRESENT (HCC): ICD-10-CM

## 2020-11-18 DIAGNOSIS — G89.4 CHRONIC PAIN SYNDROME: ICD-10-CM

## 2020-11-18 PROCEDURE — 99214 OFFICE O/P EST MOD 30 MIN: CPT | Performed by: NURSE PRACTITIONER

## 2020-11-18 PROCEDURE — 80305 DRUG TEST PRSMV DIR OPT OBS: CPT | Performed by: NURSE PRACTITIONER

## 2020-11-18 NOTE — PROGRESS NOTES
CHIEF COMPLAINT  Patient presents to office today for  pain    Subjective   Anne Jewell is a 70 y.o. female  who presents for follow-up.  She has a history of chronic back, neck and joint pain. Reports her pain pattern is UNCHANGED since last evaluation.    Complains of pain in her neck, low back and joints. Today her pain is 3/10VAS. Describes the pain as continuous aching and throbbing. Pain increases with walking, activity, household chores, cold/wet weather; pain decreases with medication, rest and warmth. Admits her pain is worse during the morning. Continues with OxyContin 80 mg 2 BID and baclofen. Denies any side effects from the regimen, including constipation. The regimen helps decrease her pain by 50-75%. Reports improvement in function and activity with regimen. ADL's by self.   Can no longer go to her motel for warm water therapy due to COVID pandemic.      She was evaluated by Dr. Alvarado for joint pain who recommended bracing of her right knee as well as supportive brace to right wrist/hand for carpal tunnel symptoms. Also having issues with right wrist and right thumb pain.      Prescribed Xanax 2 mg TID by psychiatrist, Narcan prescription at home (most recent prescription 1/9/2020).    She reports Dr Feldman started her on oxycontin in 2000.      Patient remained masked during entire encounter. No cough present. I donned a mask and eye protection throughout entire visit. Prior to donning mask and eye protection, hand hygiene was performed, as well as when it was doffed.  I was closer than 6 feet, but not for an extended period of time. No obvious exposure to any bodily fluids.    Back Pain  This is a chronic problem. The current episode started more than 1 year ago. The problem occurs constantly. The problem has been gradually worsening since onset. The pain is present in the lumbar spine. The quality of the pain is described as aching. The pain radiates to the left foot and right foot. The pain is  at a severity of 3/10. The pain is moderate. The symptoms are aggravated by stress (weather changes). Pertinent negatives include no abdominal pain, bladder incontinence, bowel incontinence, chest pain, dysuria, fever, headaches, numbness or weakness. She has tried analgesics, bed rest, home exercises and heat (OxyContin, aquatherapy) for the symptoms. The treatment provided moderate relief.   Joint Pain  This is a chronic problem. The current episode started more than 1 year ago. The problem occurs constantly. The problem has been gradually worsening. Associated symptoms include arthralgias (joints), joint swelling (minor right ankle), myalgias and neck pain. Pertinent negatives include no abdominal pain, chest pain, chills, congestion, coughing, fatigue, fever, headaches, nausea, numbness, vomiting or weakness. She has tried oral narcotics for the symptoms. The treatment provided moderate relief.      PEG Assessment   What number best describes your pain on average in the past week?7  What number best describes how, during the past week, pain has interfered with your enjoyment of life?6  What number best describes how, during the past week, pain has interfered with your general activity?  6    The following portions of the patient's history were reviewed and updated as appropriate: allergies, current medications, past family history, past medical history, past social history, past surgical history and problem list.    Review of Systems   Constitutional: Negative.  Negative for chills, fatigue and fever.   HENT: Positive for sinus pain. Negative for congestion.    Eyes: Positive for pain.   Respiratory: Negative.  Negative for cough.    Cardiovascular: Negative.  Negative for chest pain.   Gastrointestinal: Negative.  Negative for abdominal pain, bowel incontinence, nausea and vomiting.   Endocrine: Negative.    Genitourinary: Negative.  Negative for bladder incontinence and dysuria.   Musculoskeletal: Positive for  "arthralgias (joints), back pain, gait problem, joint swelling (minor right ankle), myalgias, neck pain and neck stiffness.   Skin: Negative.    Allergic/Immunologic: Negative.    Neurological: Negative for weakness, numbness and headaches.   Hematological: Bruises/bleeds easily.   Psychiatric/Behavioral: Negative.      I have reviewed and confirmed the accuracy of the ROS as documented by the MA/OLGA/RN EUGENIO Gaming    Vitals:    11/18/20 1305   BP: 132/89   BP Location: Right arm   Patient Position: Sitting   Cuff Size: Adult   Pulse: 72   Resp: 15   Temp: 96.9 °F (36.1 °C)   TempSrc: Temporal   SpO2: 94%   Weight: 79.8 kg (176 lb)   Height: 157.5 cm (62.01\")   PainSc:   3   PainLoc: Neck     Objective   Physical Exam  Vitals signs and nursing note reviewed.   Constitutional:       Appearance: Normal appearance. She is well-developed.   HENT:      Head: Normocephalic and atraumatic.      Nose: Nose normal.   Eyes:      General: Lids are normal.      Conjunctiva/sclera: Conjunctivae normal.   Cardiovascular:      Rate and Rhythm: Normal rate.   Pulmonary:      Effort: Pulmonary effort is normal. No respiratory distress.   Musculoskeletal:      Lumbar back: She exhibits decreased range of motion and tenderness.      Comments: Widespread OA changes with no acute synovitis   Skin:     General: Skin is warm and dry.   Neurological:      Mental Status: She is alert and oriented to person, place, and time.      Gait: Gait abnormal (rolling walker).   Psychiatric:         Attention and Perception: Attention normal.         Mood and Affect: Mood normal.         Speech: Speech normal.         Behavior: Behavior normal. Behavior is cooperative.         Thought Content: Thought content normal.         Judgment: Judgment normal.         Assessment/Plan   Diagnoses and all orders for this visit:    1. Chronic pain syndrome (Primary)    2. Rheumatoid arthritis, involving unspecified site, unspecified whether rheumatoid " factor present (CMS/HCC)    3. Degeneration of intervertebral disc of lumbar region    4. Encounter for long-term (current) use of high-risk medication      --- Routine UDS in office today as part of monitoring requirements for controlled substances.  The specimen was viewed and the immunoassay result reviewed and is +OXY.  This specimen will be sent to [facility] for confirmation.     --- refill OxyContin DNF 11-25-20. Patient appears stable with current regimen. No adverse effects. Regarding continuation of opioids, there is no evidence of aberrant behavior or any red flags.  The patient continues with appropriate response to opioid therapy. ADL's remain intact by self.   --- Follow-up 1 month or sooner if needed.           VICENTA REPORT  As part of the patient's treatment plan, I am prescribing controlled substances. The patient has been made aware of appropriate use of such medications, including potential risk of somnolence, limited ability to drive and/or work safely, and the potential for dependence or overdose. It has also bee made clear that these medications are for use by this patient only, without concomitant use of alcohol or other substances unless prescribed.     Patient has completed prescribing agreement detailing terms of continued prescribing of controlled substances, including monitoring VICENTA reports, urine drug screening, and pill counts if necessary. The patient is aware that inappropriate use will results in cessation of prescribing such medications.    VICENTA report has been reviewed and scanned into the patient's chart.    As the clinician, I personally reviewed the VICENTA from 11-18-20 while the patient was in the office today.    History and physical exam exhibit continued safe and appropriate use of controlled substances.        EMR Dragon/Transcription disclaimer:   Much of this encounter note is an electronic transcription/translation of spoken language to printed text. The  electronic translation of spoken language may permit erroneous, or at times, nonsensical words or phrases to be inadvertently transcribed; Although I have reviewed the note for such errors, some may still exist.

## 2020-11-22 RX ORDER — OXYCODONE HYDROCHLORIDE 80 MG/1
160 TABLET, FILM COATED, EXTENDED RELEASE ORAL EVERY 12 HOURS SCHEDULED
Qty: 120 TABLET | Refills: 0 | Status: SHIPPED | OUTPATIENT
Start: 2020-11-22 | End: 2020-12-16 | Stop reason: SDUPTHER

## 2020-12-08 RX ORDER — RIVAROXABAN 20 MG/1
TABLET, FILM COATED ORAL
Qty: 30 TABLET | Refills: 3 | Status: SHIPPED | OUTPATIENT
Start: 2020-12-08 | End: 2021-04-05

## 2020-12-16 ENCOUNTER — OFFICE VISIT (OUTPATIENT)
Dept: PAIN MEDICINE | Facility: CLINIC | Age: 70
End: 2020-12-16

## 2020-12-16 ENCOUNTER — OFFICE VISIT (OUTPATIENT)
Dept: SPORTS MEDICINE | Facility: CLINIC | Age: 70
End: 2020-12-16

## 2020-12-16 VITALS
RESPIRATION RATE: 18 BRPM | OXYGEN SATURATION: 94 % | HEART RATE: 71 BPM | WEIGHT: 184 LBS | BODY MASS INDEX: 33.86 KG/M2 | HEIGHT: 62 IN | DIASTOLIC BLOOD PRESSURE: 82 MMHG | SYSTOLIC BLOOD PRESSURE: 144 MMHG | TEMPERATURE: 97.5 F

## 2020-12-16 VITALS
HEIGHT: 62 IN | WEIGHT: 184.2 LBS | BODY MASS INDEX: 33.9 KG/M2 | TEMPERATURE: 96.7 F | HEART RATE: 73 BPM | OXYGEN SATURATION: 95 % | DIASTOLIC BLOOD PRESSURE: 66 MMHG | SYSTOLIC BLOOD PRESSURE: 138 MMHG | RESPIRATION RATE: 14 BRPM

## 2020-12-16 DIAGNOSIS — M51.36 DEGENERATION OF INTERVERTEBRAL DISC OF LUMBAR REGION: ICD-10-CM

## 2020-12-16 DIAGNOSIS — Z00.00 MEDICARE ANNUAL WELLNESS VISIT, SUBSEQUENT: Primary | ICD-10-CM

## 2020-12-16 DIAGNOSIS — R94.5 LIVER FUNCTION ABNORMALITY: ICD-10-CM

## 2020-12-16 DIAGNOSIS — Z79.899 ENCOUNTER FOR LONG-TERM (CURRENT) USE OF HIGH-RISK MEDICATION: ICD-10-CM

## 2020-12-16 DIAGNOSIS — E03.9 ACQUIRED HYPOTHYROIDISM: ICD-10-CM

## 2020-12-16 DIAGNOSIS — G89.4 CHRONIC PAIN SYNDROME: Primary | ICD-10-CM

## 2020-12-16 DIAGNOSIS — M06.9 RHEUMATOID ARTHRITIS, INVOLVING UNSPECIFIED SITE, UNSPECIFIED WHETHER RHEUMATOID FACTOR PRESENT (HCC): ICD-10-CM

## 2020-12-16 DIAGNOSIS — R11.0 NAUSEA: ICD-10-CM

## 2020-12-16 PROCEDURE — G0439 PPPS, SUBSEQ VISIT: HCPCS | Performed by: FAMILY MEDICINE

## 2020-12-16 PROCEDURE — 99214 OFFICE O/P EST MOD 30 MIN: CPT | Performed by: NURSE PRACTITIONER

## 2020-12-16 RX ORDER — PROMETHAZINE HYDROCHLORIDE 25 MG/1
25 TABLET ORAL EVERY 8 HOURS PRN
Qty: 90 TABLET | Refills: 5 | Status: SHIPPED | OUTPATIENT
Start: 2020-12-16 | End: 2021-06-30 | Stop reason: SDUPTHER

## 2020-12-16 RX ORDER — OXYCODONE HYDROCHLORIDE 80 MG/1
160 TABLET, FILM COATED, EXTENDED RELEASE ORAL EVERY 12 HOURS SCHEDULED
Qty: 120 TABLET | Refills: 0 | Status: SHIPPED | OUTPATIENT
Start: 2020-12-16 | End: 2021-01-27 | Stop reason: SDUPTHER

## 2020-12-16 NOTE — PROGRESS NOTES
CHIEF COMPLAINT  General pain is unchanged since last visit    Subjective   Anne Jewell is a 70 y.o. female  who presents for follow-up.  She has a history of general pain with back, neck and joint pain.  Reports her pain pattern is unchanged since last evaluation.    Complains of pain in her low back, neck and joints. Today her pain is 6/10VAS. Describes the pain as continuous aching and throbbing. Pain increases with walking, standing, activity, household chores; pain decreases with medication and rest. Continues with OxyContin 80 mg 2 BID and baclofen. Denies any side effects from the regimen, including constipation. The regimen helps decrease her pain by 40-50%. Reports improvement in function and activity with regimen. ADL's by self.     Can no longer go to her motel for warm water therapy due to COVID pandemic.     Prescribed Xanax 2 mg TID by psychiatrist, Narcan prescription at home (most recent prescription 1/9/2020).     She reports Dr Feldman started her on oxycontin in 2000.     Patient remained masked during entire encounter. No cough present. I donned a mask and eye protection throughout entire visit. Prior to donning mask and eye protection, hand hygiene was performed, as well as when it was doffed.  I was closer than 6 feet, but not for an extended period of time. No obvious exposure to any bodily fluids.    Back Pain  This is a chronic problem. The current episode started more than 1 year ago. The problem occurs constantly. The problem has been gradually worsening since onset. The pain is present in the lumbar spine. The quality of the pain is described as aching. The pain radiates to the left foot and right foot. The pain is at a severity of 6/10. The pain is moderate. The symptoms are aggravated by stress (weather changes). Pertinent negatives include no abdominal pain, bladder incontinence, bowel incontinence, chest pain, dysuria, fever, headaches, numbness or weakness. She has tried  analgesics, bed rest, home exercises and heat (OxyContin, aquatherapy) for the symptoms. The treatment provided moderate relief.   Joint Pain  This is a chronic problem. The current episode started more than 1 year ago. The problem occurs constantly. The problem has been gradually worsening. Associated symptoms include arthralgias (joints), joint swelling (minor right ankle), myalgias and neck pain. Pertinent negatives include no abdominal pain, chest pain, chills, congestion, coughing, fatigue, fever, headaches, nausea, numbness, vomiting or weakness. She has tried oral narcotics for the symptoms. The treatment provided moderate relief.      PEG Assessment   What number best describes your pain on average in the past week?6  What number best describes how, during the past week, pain has interfered with your enjoyment of life?5  What number best describes how, during the past week, pain has interfered with your general activity?  5    The following portions of the patient's history were reviewed and updated as appropriate: allergies, current medications, past family history, past medical history, past social history, past surgical history and problem list.    Review of Systems   Constitutional: Negative for chills, fatigue and fever.   HENT: Negative for congestion.    Respiratory: Negative for cough and shortness of breath.    Cardiovascular: Negative for chest pain.   Gastrointestinal: Negative for abdominal pain, bowel incontinence, constipation, diarrhea, nausea and vomiting.   Genitourinary: Negative for bladder incontinence, difficulty urinating, dyspareunia and dysuria.   Musculoskeletal: Positive for arthralgias (joints), back pain, joint swelling (minor right ankle), myalgias and neck pain.   Neurological: Negative for dizziness, weakness, light-headedness, numbness and headaches.   Psychiatric/Behavioral: Negative for confusion, hallucinations, self-injury, sleep disturbance and suicidal ideas. The patient  "is not nervous/anxious.    All other systems reviewed and are negative.    I have reviewed and confirmed the accuracy of the ROS as documented by the MA/LPN/RN EUGENIO Gaming      Vitals:    12/16/20 1103   BP: 144/82   Pulse: 71   Resp: 18   Temp: 97.5 °F (36.4 °C)   SpO2: 94%   Weight: 83.5 kg (184 lb)   Height: 157.5 cm (62\")   PainSc:   6   PainLoc: Neck     Objective   Physical Exam  Vitals signs and nursing note reviewed.   Constitutional:       Appearance: Normal appearance. She is well-developed.   HENT:      Head: Normocephalic and atraumatic.      Nose: Nose normal.   Eyes:      General: Lids are normal.      Conjunctiva/sclera: Conjunctivae normal.   Cardiovascular:      Rate and Rhythm: Normal rate.   Pulmonary:      Effort: Pulmonary effort is normal. No respiratory distress.   Musculoskeletal:      Lumbar back: She exhibits decreased range of motion and tenderness.      Comments: Widespread OA changes with no acute synovitis  Wearing back brace   Skin:     General: Skin is warm and dry.   Neurological:      Mental Status: She is alert and oriented to person, place, and time.      Gait: Gait abnormal (rolling walker).   Psychiatric:         Attention and Perception: Attention normal.         Mood and Affect: Mood normal.         Speech: Speech normal.         Behavior: Behavior normal. Behavior is cooperative.         Thought Content: Thought content normal.         Judgment: Judgment normal.         Assessment/Plan   Diagnoses and all orders for this visit:    1. Chronic pain syndrome (Primary)    2. Degeneration of intervertebral disc of lumbar region    3. Rheumatoid arthritis, involving unspecified site, unspecified whether rheumatoid factor present (CMS/[facility])    4. Encounter for long-term (current) use of high-risk medication      --- The urine drug screen confirmation from 11-18-20 has been reviewed and the result is APPROPRIATE based on patient history and VICENTA report  --- The patient " signed an updated copy of the controlled substance agreement on 12-16-20.   --- Refill Oxycontin DNF. Patient appears stable with current regimen. No adverse effects. Regarding continuation of opioids, there is no evidence of aberrant behavior or any red flags.  The patient continues with appropriate response to opioid therapy. ADL's remain intact by self.   --- Follow-up 1 month or sooner if needed.       VICENTA REPORT  As part of the patient's treatment plan, I am prescribing controlled substances. The patient has been made aware of appropriate use of such medications, including potential risk of somnolence, limited ability to drive and/or work safely, and the potential for dependence or overdose. It has also bee made clear that these medications are for use by this patient only, without concomitant use of alcohol or other substances unless prescribed.     Patient has completed prescribing agreement detailing terms of continued prescribing of controlled substances, including monitoring VICENTA reports, urine drug screening, and pill counts if necessary. The patient is aware that inappropriate use will results in cessation of prescribing such medications.    VICENTA report has been reviewed and scanned into the patient's chart.    As the clinician, I personally reviewed the VICENTA from 12-16-20 while the patient was in the office today.    History and physical exam exhibit continued safe and appropriate use of controlled substances.        EMR Dragon/Transcription disclaimer:   Much of this encounter note is an electronic transcription/translation of spoken language to printed text. The electronic translation of spoken language may permit erroneous, or at times, nonsensical words or phrases to be inadvertently transcribed; Although I have reviewed the note for such errors, some may still exist.

## 2020-12-16 NOTE — PROGRESS NOTES
Subsequent Medicare Wellness Visit   The ABC's of the Annual Wellness Visit    Chief Complaint   Patient presents with   • Medicare Wellness-subsequent       HPI:  Anne Jewell, -1950, is a 70 y.o. female who presents for a Subsequent Medicare Wellness Visit.    Recent Hospitalizations:  No hospitalization(s) within the last year..    Current Medical Providers:  Patient Care Team:  Kenneth Feldman MD as PCP - General (Sports Medicine)  Edilberto Lemus MD as Consulting Physician (Nephrology)  Rashaun Alvarado MD as Surgeon (Orthopedic Surgery)  Delma Montoya MD as Consulting Physician (Obstetrics and Gynecology)  Stuart Borges MD (Hematology and Oncology)  Isael Barker MD as Consulting Physician (Gastroenterology)    Health Habits and Functional and Cognitive Screening and Depression Screening:  Functional & Cognitive Status 2020   Do you have difficulty preparing food and eating? No   Do you have difficulty bathing yourself, getting dressed or grooming yourself? No   Do you have difficulty using the toilet? No   Do you have difficulty moving around from place to place? No   Do you have trouble with steps or getting out of a bed or a chair? No   Current Diet Well Balanced Diet   Dental Exam Up to date   Eye Exam Up to date   Exercise (times per week) 7 times per week   Current Exercise Activities Include Cardiovasular Workout on Exercise Equipment   Do you need help using the phone?  No   Are you deaf or do you have serious difficulty hearing?  No   Do you need help with transportation? No   Do you need help shopping? No   Do you need help preparing meals?  No   Do you need help with housework?  Yes   Do you need help with laundry? Yes   Do you need help taking your medications? No   Do you need help managing money? No   Do you ever drive or ride in a car without wearing a seat belt? No   Have you felt unusual stress, anger or loneliness in the last month? No   Who do you live  with? Child   If you need help, do you have trouble finding someone available to you? No   Have you been bothered in the last four weeks by sexual problems? No   Do you have difficulty concentrating, remembering or making decisions? No       Compared to one year ago, the patient feels her physical health is the same and her mental health is the same.    Depression Screen:  PHQ-2/PHQ-9 Depression Screening 12/16/2020   Little interest or pleasure in doing things 0   Feeling down, depressed, or hopeless 0   Total Score 0         Past Medical/Family/Social History:  The following portions of the patient's history were reviewed and updated as appropriate: allergies, current medications, past family history, past medical history, past social history, past surgical history and problem list.    Allergies   Allergen Reactions   • Penicillins Hives         Current Outpatient Medications:   •  ALPRAZolam (XANAX) 2 MG tablet, , Disp: , Rfl:   •  Azelastine-Fluticasone 137-50 MCG/ACT suspension, Inhale 1 spray every 12 (twelve) hours., Disp: , Rfl:   •  baclofen (LIORESAL) 10 MG tablet, TAKE ONE TABLET BY MOUTH THREE TIMES A DAY, Disp: 270 tablet, Rfl: 3  •  chlorhexidine (PERIDEX) 0.12 % solution, , Disp: , Rfl:   •  Cholecalciferol (VITAMIN D3) 2000 units capsule, TAKE ONE CAPSULE BY MOUTH DAILY, Disp: 30 capsule, Rfl: 9  •  Cyanocobalamin (B-12) 1000 MCG sublingual tablet, PLACE 1 TABLET UNDER THE TONGUE AND LET DISSOLVE ONCE DAILY, Disp: 30 each, Rfl: 1  •  EPINEPHrine (EPIPEN) 0.3 MG/0.3ML solution auto-injector injection, , Disp: , Rfl:   •  escitalopram (LEXAPRO) 10 MG tablet, Take 1 tablet by mouth daily., Disp: , Rfl:   •  hydroCHLOROthiazide (HYDRODIURIL) 25 MG tablet, TAKE ONE TABLET BY MOUTH DAILY, Disp: 90 tablet, Rfl: 3  •  Lactobacillus (ACIDOPHILUS PO), Take  by mouth., Disp: , Rfl:   •  levothyroxine (SYNTHROID, LEVOTHROID) 100 MCG tablet, TAKE ONE TABLET BY MOUTH DAILY, Disp: 90 tablet, Rfl: 2  •  montelukast  (SINGULAIR) 10 MG tablet, Take 1 tablet by mouth daily., Disp: , Rfl:   •  Multiple Vitamin tablet, Take 1 tablet by mouth daily., Disp: , Rfl:   •  oxyCODONE ER (oxyCONTIN) 80 MG tablet extended-release 12 hour 12 hr tablet, Take 2 tablets by mouth Every 12 (Twelve) Hours. DNF Until 11-25-20, Disp: 120 tablet, Rfl: 0  •  potassium chloride (K-DUR,KLOR-CON) 20 MEQ CR tablet, TAKE ONE TABLET BY MOUTH TWICE A DAY **MUST CALL MD FOR APPOINTMENT **, Disp: 180 tablet, Rfl: 3  •  promethazine (PHENERGAN) 25 MG tablet, Take 1 tablet by mouth Every 8 (Eight) Hours As Needed for Nausea or Vomiting., Disp: 90 tablet, Rfl: 5  •  Simethicone (GAS-X PO), Take  by mouth., Disp: , Rfl:   •  Xarelto 20 MG tablet, TAKE ONE TABLET BY MOUTH DAILY, Disp: 30 tablet, Rfl: 3  •  albuterol (PROVENTIL HFA;VENTOLIN HFA) 108 (90 BASE) MCG/ACT inhaler, Proventil  (90 Base) MCG/ACT Inhalation Aerosol Solution; Patient Sig: Proventil  (90 Base) MCG/ACT Inhalation Aerosol Solution Inhale 2 puff(s) every 6 to 8 hours as needed; 6.7; 0; 05-Apr-2013; Active, Disp: , Rfl:   •  cetirizine (zyrTEC) 10 MG tablet, Take 10 mg by mouth Daily., Disp: , Rfl:   •  dicyclomine (BENTYL) 20 MG tablet, TAKE ONE TABLET BY MOUTH EVERY 6 HOURS AS NEEDED, Disp: 120 tablet, Rfl: 5  •  Fluzone High-Dose Quadrivalent 0.7 ML suspension prefilled syringe, ADM 0.7ML IM UTD, Disp: , Rfl:   •  naloxone (NARCAN) 4 MG/0.1ML nasal spray, 1 spray into the nostril(s) as directed by provider As Needed (sedation or accidental overdose of opioid)., Disp: 1 each, Rfl: 1  •  pancrelipase, Lip-Prot-Amyl, (CREON) 54466-54671 units capsule delayed-release particles capsule, take 2 capsules with each meal and 1 capsule with snacks., Disp: 270 capsule, Rfl: 2    Aspirin use counseling: Does not need ASA (and currently is not on it)    Current medication list contains high risk medications. No harmful drug interactions have been identified.  Plan of action d/w her pain mgmt MD  and psych    Family History   Problem Relation Age of Onset   • Prostate cancer Father    • Arthritis Other    • Hypertension Other         benign essential   • Cancer Other    • Diabetes Other    • Heart disease Other    • Nephrolithiasis Other        Social History     Tobacco Use   • Smoking status: Former Smoker   • Smokeless tobacco: Never Used   Substance Use Topics   • Alcohol use: No       Past Surgical History:   Procedure Laterality Date   • BILATERAL BREAST REDUCTION     • BREAST SURGERY     • COLONOSCOPY  08/05/2016   • GASTRIC BYPASS     • HAND SURGERY Right 01/14/2016   • HERNIA REPAIR      x7   • HYSTERECTOMY     • OTHER SURGICAL HISTORY      vaginal sling operation for stress incontinence   • TOTAL KNEE ARTHROPLASTY Left    • TOTAL KNEE ARTHROPLASTY Bilateral        Patient Active Problem List   Diagnosis   • Chronic pain syndrome   • Rheumatoid arthritis (CMS/HCC)   • Osteoarthritis of knee   • Generalized osteoarthritis   • Degeneration of intervertebral disc of lumbar region   • Neck pain   • Lumbar radiculopathy   • Atopic rhinitis   • Anxiety   • Diarrhea   • Indigestion   • Dysthymic disorder   • Gastroesophageal reflux disease   • Hypothyroidism   • Insomnia   • Pernicious anemia   • Osteoporosis   • Scoliosis   • Vasovagal syncope   • Vitamin D deficiency   • Trigger middle finger of right hand   • Trigger ring finger of right hand   • Trigger little finger of right hand   • Encounter for long-term (current) use of high-risk medication   • Coagulation disorder (CMS/HCC)   • Hyperglycemia   • Joint pain   • Status post total knee replacement, left   • Left knee pain   • Status post total knee replacement, right   • Osteoarthritis of thumb   • Trigger finger of all digits of right hand   • Left hip pain   • Primary osteoarthritis of both hips       Review of Systems   Constitutional: Negative.    HENT: Negative.    Respiratory: Negative.    Cardiovascular: Negative.    Gastrointestinal:  "Positive for nausea.   Genitourinary: Negative.    Musculoskeletal: Positive for arthralgias, back pain and gait problem.   Skin: Negative.    Psychiatric/Behavioral: The patient is nervous/anxious.        Objective     Vitals:    12/16/20 0955   BP: 138/66   BP Location: Left arm   Patient Position: Sitting   Cuff Size: Adult   Pulse: 73   Resp: 14   Temp: 96.7 °F (35.9 °C)   TempSrc: Temporal   SpO2: 95%   Weight: 83.6 kg (184 lb 3.2 oz)   Height: 157.5 cm (62\")       Patient's Body mass index is 33.69 kg/m². BMI is above normal parameters. Recommendations include: nutrition counseling.      No exam data present    The patient has no evidence of cognitve impairment.     Physical Exam  Constitutional:       Appearance: Normal appearance.   HENT:      Head: Normocephalic and atraumatic.   Eyes:      Extraocular Movements: Extraocular movements intact.      Conjunctiva/sclera: Conjunctivae normal.      Pupils: Pupils are equal, round, and reactive to light.   Neck:      Musculoskeletal: Neck supple.   Cardiovascular:      Rate and Rhythm: Normal rate and regular rhythm.   Pulmonary:      Effort: Pulmonary effort is normal.      Breath sounds: Normal breath sounds.   Skin:     General: Skin is warm and dry.   Neurological:      General: No focal deficit present.      Mental Status: She is alert.   Psychiatric:         Mood and Affect: Mood normal.         Behavior: Behavior normal.         Recent Lab Results:  Lab Results   Component Value Date     (H) 07/27/2020     Lab Results   Component Value Date    TRIG 82 02/26/2019    HDL 38 (L) 02/26/2019    VLDL 16.4 02/26/2019       Assessment/Plan   Age-appropriate Screening Schedule:  Refer to the list below for future screening recommendations based on patient's age, sex and/or medical conditions.      Health Maintenance   Topic Date Due   • ZOSTER VACCINE (1 of 2) 09/10/2000   • PAP SMEAR  04/04/2017   • MAMMOGRAM  01/27/2018   • DXA SCAN  08/07/2021   • TDAP/TD " VACCINES (2 - Td) 08/08/2027   • COLONOSCOPY  08/12/2030   • INFLUENZA VACCINE  Completed       Medicare Risks and Personalized Health Plan:  Breast Cancer/Mammogram Screening  Immunizations Discussed/Encouraged (specific immunizations; Shingrix )      CMS-Preventive Services Quick Reference  Medicare Preventive Services Addressed:  Annual Wellness Visit (AWV)    Advance Care Planning:  ACP discussion was held with the patient during this visit. Patient has an advance directive (not in EMR), copy requested.    Diagnoses and all orders for this visit:    1. Medicare annual wellness visit, subsequent (Primary)  -     CBC & Differential  -     Comprehensive Metabolic Panel  -     Lipid Panel With / Chol / HDL Ratio  -     TSH  -     T4, Free  -     Urinalysis With Microscopic - Urine, Clean Catch    2. Nausea  -     promethazine (PHENERGAN) 25 MG tablet; Take 1 tablet by mouth Every 8 (Eight) Hours As Needed for Nausea or Vomiting.  Dispense: 90 tablet; Refill: 5  -     CBC & Differential  -     Comprehensive Metabolic Panel  -     Lipid Panel With / Chol / HDL Ratio  -     TSH  -     T4, Free  -     Urinalysis With Microscopic - Urine, Clean Catch    3. Liver function abnormality  -     CBC & Differential  -     Comprehensive Metabolic Panel  -     Lipid Panel With / Chol / HDL Ratio  -     TSH  -     T4, Free  -     Urinalysis With Microscopic - Urine, Clean Catch    4. Acquired hypothyroidism  -     CBC & Differential  -     Comprehensive Metabolic Panel  -     Lipid Panel With / Chol / HDL Ratio  -     TSH  -     T4, Free  -     Urinalysis With Microscopic - Urine, Clean Catch        An After Visit Summary and PPPS with all of these plans were given to the patient.      Follow Up:  No follow-ups on file.

## 2020-12-17 DIAGNOSIS — D64.9 LOW HEMOGLOBIN: Primary | ICD-10-CM

## 2020-12-17 DIAGNOSIS — D50.8 IRON DEFICIENCY ANEMIA SECONDARY TO INADEQUATE DIETARY IRON INTAKE: Primary | ICD-10-CM

## 2020-12-17 LAB
ALBUMIN SERPL-MCNC: 3.3 G/DL (ref 3.5–5.2)
ALBUMIN/GLOB SERPL: 1.2 G/DL
ALP SERPL-CCNC: 172 U/L (ref 39–117)
ALT SERPL-CCNC: 10 U/L (ref 1–33)
APPEARANCE UR: CLEAR
AST SERPL-CCNC: 25 U/L (ref 1–32)
BACTERIA #/AREA URNS HPF: ABNORMAL /HPF
BASOPHILS # BLD AUTO: 0.01 10*3/MM3 (ref 0–0.2)
BASOPHILS NFR BLD AUTO: 0.2 % (ref 0–1.5)
BILIRUB SERPL-MCNC: 0.4 MG/DL (ref 0–1.2)
BILIRUB UR QL STRIP: NEGATIVE
BUN SERPL-MCNC: 19 MG/DL (ref 8–23)
BUN/CREAT SERPL: 18.1 (ref 7–25)
CALCIUM SERPL-MCNC: 8.6 MG/DL (ref 8.6–10.5)
CASTS URNS MICRO: ABNORMAL
CHLORIDE SERPL-SCNC: 105 MMOL/L (ref 98–107)
CHOLEST SERPL-MCNC: 110 MG/DL (ref 0–200)
CHOLEST/HDLC SERPL: 2.29 {RATIO}
CO2 SERPL-SCNC: 31 MMOL/L (ref 22–29)
COLOR UR: YELLOW
CREAT SERPL-MCNC: 1.05 MG/DL (ref 0.57–1)
EOSINOPHIL # BLD AUTO: 0.18 10*3/MM3 (ref 0–0.4)
EOSINOPHIL NFR BLD AUTO: 3.7 % (ref 0.3–6.2)
EPI CELLS #/AREA URNS HPF: ABNORMAL /HPF
ERYTHROCYTE [DISTWIDTH] IN BLOOD BY AUTOMATED COUNT: 12.8 % (ref 12.3–15.4)
GLOBULIN SER CALC-MCNC: 2.7 GM/DL
GLUCOSE SERPL-MCNC: 97 MG/DL (ref 65–99)
GLUCOSE UR QL: NEGATIVE
HCT VFR BLD AUTO: 34.3 % (ref 34–46.6)
HDLC SERPL-MCNC: 48 MG/DL (ref 40–60)
HGB BLD-MCNC: 11.3 G/DL (ref 12–15.9)
HGB UR QL STRIP: NEGATIVE
IMM GRANULOCYTES # BLD AUTO: 0.01 10*3/MM3 (ref 0–0.05)
IMM GRANULOCYTES NFR BLD AUTO: 0.2 % (ref 0–0.5)
IRON SATN MFR SERPL: 9 % (ref 20–50)
IRON SERPL-MCNC: 37 MCG/DL (ref 37–145)
KETONES UR QL STRIP: NEGATIVE
LDLC SERPL CALC-MCNC: 40 MG/DL (ref 0–100)
LEUKOCYTE ESTERASE UR QL STRIP: NEGATIVE
LYMPHOCYTES # BLD AUTO: 1.77 10*3/MM3 (ref 0.7–3.1)
LYMPHOCYTES NFR BLD AUTO: 36.8 % (ref 19.6–45.3)
Lab: NORMAL
Lab: NORMAL
MCH RBC QN AUTO: 29.8 PG (ref 26.6–33)
MCHC RBC AUTO-ENTMCNC: 32.9 G/DL (ref 31.5–35.7)
MCV RBC AUTO: 90.5 FL (ref 79–97)
MONOCYTES # BLD AUTO: 0.39 10*3/MM3 (ref 0.1–0.9)
MONOCYTES NFR BLD AUTO: 8.1 % (ref 5–12)
NEUTROPHILS # BLD AUTO: 2.45 10*3/MM3 (ref 1.7–7)
NEUTROPHILS NFR BLD AUTO: 51 % (ref 42.7–76)
NITRITE UR QL STRIP: POSITIVE
NRBC BLD AUTO-RTO: 0 /100 WBC (ref 0–0.2)
PH UR STRIP: 5.5 [PH] (ref 5–8)
PLATELET # BLD AUTO: 205 10*3/MM3 (ref 140–450)
POTASSIUM SERPL-SCNC: 4.4 MMOL/L (ref 3.5–5.2)
PROT SERPL-MCNC: 6 G/DL (ref 6–8.5)
PROT UR QL STRIP: NEGATIVE
RBC # BLD AUTO: 3.79 10*6/MM3 (ref 3.77–5.28)
RBC #/AREA URNS HPF: ABNORMAL /HPF
SODIUM SERPL-SCNC: 143 MMOL/L (ref 136–145)
SP GR UR: 1.02 (ref 1–1.03)
T4 FREE SERPL-MCNC: 1.1 NG/DL (ref 0.93–1.7)
TIBC SERPL-MCNC: 391 MCG/DL
TRIGL SERPL-MCNC: 126 MG/DL (ref 0–150)
TSH SERPL DL<=0.005 MIU/L-ACNC: 4.33 UIU/ML (ref 0.27–4.2)
UIBC SERPL-MCNC: 354 MCG/DL (ref 112–346)
UROBILINOGEN UR STRIP-MCNC: ABNORMAL MG/DL
VLDLC SERPL CALC-MCNC: 22 MG/DL (ref 5–40)
WBC # BLD AUTO: 4.81 10*3/MM3 (ref 3.4–10.8)
WBC #/AREA URNS HPF: ABNORMAL /HPF
WRITTEN AUTHORIZATION: NORMAL

## 2021-01-20 DIAGNOSIS — D64.9 LOW HEMOGLOBIN: Primary | ICD-10-CM

## 2021-01-22 RX ORDER — DICYCLOMINE HCL 20 MG
TABLET ORAL
Qty: 120 TABLET | Refills: 5 | Status: SHIPPED | OUTPATIENT
Start: 2021-01-22 | End: 2022-02-24

## 2021-01-23 LAB
BASOPHILS # BLD AUTO: 0 X10E3/UL (ref 0–0.2)
BASOPHILS NFR BLD AUTO: 1 %
EOSINOPHIL # BLD AUTO: 0.2 X10E3/UL (ref 0–0.4)
EOSINOPHIL NFR BLD AUTO: 3 %
ERYTHROCYTE [DISTWIDTH] IN BLOOD BY AUTOMATED COUNT: 16.2 % (ref 11.7–15.4)
HCT VFR BLD AUTO: 37.5 % (ref 34–46.6)
HGB BLD-MCNC: 12.3 G/DL (ref 11.1–15.9)
IMM GRANULOCYTES # BLD AUTO: 0 X10E3/UL (ref 0–0.1)
IMM GRANULOCYTES NFR BLD AUTO: 0 %
IRON SATN MFR SERPL: 27 % (ref 15–55)
IRON SERPL-MCNC: 62 UG/DL (ref 27–139)
LYMPHOCYTES # BLD AUTO: 1.9 X10E3/UL (ref 0.7–3.1)
LYMPHOCYTES NFR BLD AUTO: 43 %
MCH RBC QN AUTO: 30.8 PG (ref 26.6–33)
MCHC RBC AUTO-ENTMCNC: 32.8 G/DL (ref 31.5–35.7)
MCV RBC AUTO: 94 FL (ref 79–97)
MONOCYTES # BLD AUTO: 0.4 X10E3/UL (ref 0.1–0.9)
MONOCYTES NFR BLD AUTO: 9 %
NEUTROPHILS # BLD AUTO: 1.9 X10E3/UL (ref 1.4–7)
NEUTROPHILS NFR BLD AUTO: 44 %
PLATELET # BLD AUTO: 170 X10E3/UL (ref 150–450)
RBC # BLD AUTO: 4 X10E6/UL (ref 3.77–5.28)
TIBC SERPL-MCNC: 227 UG/DL (ref 250–450)
UIBC SERPL-MCNC: 165 UG/DL (ref 118–369)
WBC # BLD AUTO: 4.4 X10E3/UL (ref 3.4–10.8)

## 2021-01-25 ENCOUNTER — TELEPHONE (OUTPATIENT)
Dept: SPORTS MEDICINE | Facility: CLINIC | Age: 71
End: 2021-01-25

## 2021-01-25 NOTE — TELEPHONE ENCOUNTER
Informed her of results. Does she need to still take iron supplement of 325?    ----- Message from Kenneth Feldman MD sent at 1/25/2021 12:39 PM EST -----  This looks much better

## 2021-01-25 NOTE — PROGRESS NOTES
Called patient in regards to blood work results. Informed her (labs look much better) per Dr. Feldman. Addressed with Dr. Feldman if she still needs to take iron supplement of 325.

## 2021-01-27 ENCOUNTER — TELEMEDICINE (OUTPATIENT)
Dept: PAIN MEDICINE | Facility: CLINIC | Age: 71
End: 2021-01-27

## 2021-01-27 DIAGNOSIS — G89.4 CHRONIC PAIN SYNDROME: Primary | ICD-10-CM

## 2021-01-27 DIAGNOSIS — M51.36 DEGENERATION OF INTERVERTEBRAL DISC OF LUMBAR REGION: ICD-10-CM

## 2021-01-27 DIAGNOSIS — Z79.899 ENCOUNTER FOR LONG-TERM (CURRENT) USE OF HIGH-RISK MEDICATION: ICD-10-CM

## 2021-01-27 DIAGNOSIS — M06.9 RHEUMATOID ARTHRITIS, INVOLVING UNSPECIFIED SITE, UNSPECIFIED WHETHER RHEUMATOID FACTOR PRESENT (HCC): ICD-10-CM

## 2021-01-27 PROCEDURE — 99214 OFFICE O/P EST MOD 30 MIN: CPT | Performed by: NURSE PRACTITIONER

## 2021-01-27 RX ORDER — OXYCODONE HYDROCHLORIDE 80 MG/1
160 TABLET, FILM COATED, EXTENDED RELEASE ORAL EVERY 12 HOURS SCHEDULED
Qty: 120 TABLET | Refills: 0 | Status: SHIPPED | OUTPATIENT
Start: 2021-01-27 | End: 2021-02-25 | Stop reason: SDUPTHER

## 2021-01-27 NOTE — PROGRESS NOTES
"TELEMEDICINE - VIDEO VISIT    You have chosen to receive care through a telehealth visit.  Do you consent to use a video/audio connection for your medical care today? Yes    CHIEF COMPLAINT  Back and joint pain    Subjective   Anne Jewell is a 70 y.o. female  who presents for a video visit follow-up.She has a history of chronic back and joint pain. Reports her pain pattern is UNCHANGED since last evaluation.    Complains of pain in her low back and joints. Today her pain is 6/10VAS.  Describes this pain as continuous aching and throbbing.  Continues with OxyContin 80 mg 2 BID and baclofen. Denies any side effects from the regimen, including constipation and somnolence. The regimen helps decrease her pain by 50%. \"it's the only thing that works and it worked for years.\"  Reports improvement in function and activity with regimen. ADL's by self.       Was diagnosed with iron deficiency anemia. Was treated with iron by PCP.   Also evaluated by nephrology since last evaluation. Labs look better.     Can no longer go to her motel for warm water therapy due to COVID pandemic.      Prescribed Xanax 2 mg TID by psychiatrist, Narcan prescription at home (most recent prescription 1-1-21).     She reports Dr Feldman started her on oxycontin in 2000.     Back Pain  This is a chronic problem. The current episode started more than 1 year ago. The problem occurs constantly. The problem has been gradually worsening since onset. The pain is present in the lumbar spine. The quality of the pain is described as aching. The pain radiates to the left foot and right foot. The pain is moderate. The symptoms are aggravated by stress (weather changes). Pertinent negatives include no abdominal pain, bladder incontinence, bowel incontinence, chest pain, dysuria, fever, headaches, numbness or weakness. She has tried analgesics, bed rest, home exercises and heat (OxyContin, aquatherapy) for the symptoms. The treatment provided moderate relief. "   Joint Pain  This is a chronic problem. The current episode started more than 1 year ago. The problem occurs constantly. The problem has been gradually worsening. Associated symptoms include arthralgias (joints), joint swelling (minor right ankle), myalgias and neck pain. Pertinent negatives include no abdominal pain, chest pain, chills, congestion, coughing, fatigue, fever, headaches, nausea, numbness, vomiting or weakness. She has tried oral narcotics for the symptoms. The treatment provided moderate relief.      The following portions of the patient's history were reviewed and updated as appropriate: allergies, current medications, past family history, past medical history, past social history, past surgical history and problem list.    Review of Systems   Constitutional: Negative for chills, fatigue and fever.   HENT: Negative for congestion.    Respiratory: Negative for cough.    Cardiovascular: Negative for chest pain.   Gastrointestinal: Negative for abdominal pain, bowel incontinence, nausea and vomiting.   Genitourinary: Negative for bladder incontinence and dysuria.   Musculoskeletal: Positive for arthralgias (joints), back pain, joint swelling (minor right ankle), myalgias and neck pain.   Neurological: Negative for weakness, numbness and headaches.       Vitals:    01/27/21 1253   PainSc:   6   PainLoc: Back  Comment: joints     Objective   Physical Exam  Constitutional:       Appearance: She is well-developed.   HENT:      Head: Normocephalic and atraumatic.   Pulmonary:      Effort: Pulmonary effort is normal.   Neurological:      Mental Status: She is alert and oriented to person, place, and time.   Psychiatric:         Speech: Speech normal.         Behavior: Behavior normal.         Thought Content: Thought content normal.         Judgment: Judgment normal.         Assessment/Plan   Diagnoses and all orders for this visit:    1. Chronic pain syndrome (Primary)    2. Rheumatoid arthritis, involving  unspecified site, unspecified whether rheumatoid factor present (CMS/[facility])    3. Degeneration of intervertebral disc of lumbar region    4. Encounter for long-term (current) use of high-risk medication      ----------------  Our practice is offering alternative &/or electronic methods to continue to follow our patients while at the same time further the efforts toward social distancing, in accordance with our organizational policies, professional societies' guidance, and gubernatorial mandates.  I support the Healthy at Home campaign and in this visit I have counseled the patient on our needs to limit in-person office visits and physical encounters with medical facilities whenever possible.  I have also educated the patient on the medical necessities of maintaining social distancing while we continue to function during this crisis period.      The patient was counseled on the need to consider telehealth options. The patient was offered the opportunity for a Video Visit using [employer]. The patient agreed to a Video Visit. The patient was counseled on the need for a check-in visit. The patient was educated about our efforts to comply with monitoring standards when prescribing potent medications.    TIME:  Total Time:  15 (in combination with ZOOM and telephone) minutes. Topics discussed are outlined in the Assessment/Plan section of the note.    ----------------  --- The urine drug screen confirmation from 11-18-20 has been reviewed and the result is APPROPRIATE based on patient history and [facility] report  --- The patient signed an updated copy of the controlled substance agreement on 12-16-20  --- Refill OxyContin DNF. Patient appears stable with current regimen. No adverse effects. Regarding continuation of opioids, there is no evidence of aberrant behavior or any red flags.  The patient continues with appropriate response to opioid therapy. ADL's remain intact by self.   --- Follow-up 1 month or sooner if  needed.    Unable to complete visit using a video connection to the patient. A phone visit was used to complete this visits. Total time of discussion was 15(combined) minutes.         [facility] REPORT  As part of the patient's treatment plan, I am prescribing controlled substances. The patient has been made aware of appropriate use of such medications, including potential risk of somnolence, limited ability to drive and/or work safely, and the potential for dependence or overdose. It has also bee made clear that these medications are for use by this patient only, without concomitant use of alcohol or other substances unless prescribed.     Patient has completed prescribing agreement detailing terms of continued prescribing of controlled substances, including monitoring [facility] reports, urine drug screening, and pill counts if necessary. The patient is aware that inappropriate use will results in cessation of prescribing such medications.    [facility] report has been reviewed and scanned into the patient's chart.    As the clinician, I personally reviewed the [facility] from 1-27-21 while the patient was in the office today.    History and physical exam exhibit continued safe and appropriate use of controlled substances.    -------    EMR Dragon/Transcription disclaimer:   Much of this encounter note is an electronic transcription/translation of spoken language to printed text. The electronic translation of spoken language may permit erroneous, or at times, nonsensical words or phrases to be inadvertently transcribed; Although I have reviewed the note for such errors, some may still exist.

## 2021-02-25 ENCOUNTER — TELEMEDICINE (OUTPATIENT)
Dept: PAIN MEDICINE | Facility: CLINIC | Age: 71
End: 2021-02-25

## 2021-02-25 DIAGNOSIS — D50.8 IRON DEFICIENCY ANEMIA SECONDARY TO INADEQUATE DIETARY IRON INTAKE: Primary | ICD-10-CM

## 2021-02-25 DIAGNOSIS — Z79.899 ENCOUNTER FOR LONG-TERM (CURRENT) USE OF HIGH-RISK MEDICATION: ICD-10-CM

## 2021-02-25 DIAGNOSIS — G89.4 CHRONIC PAIN SYNDROME: Primary | ICD-10-CM

## 2021-02-25 DIAGNOSIS — M51.36 DEGENERATION OF INTERVERTEBRAL DISC OF LUMBAR REGION: ICD-10-CM

## 2021-02-25 DIAGNOSIS — M06.9 RHEUMATOID ARTHRITIS, INVOLVING UNSPECIFIED SITE, UNSPECIFIED WHETHER RHEUMATOID FACTOR PRESENT (HCC): ICD-10-CM

## 2021-02-25 PROCEDURE — 99214 OFFICE O/P EST MOD 30 MIN: CPT | Performed by: NURSE PRACTITIONER

## 2021-02-25 RX ORDER — OXYCODONE HYDROCHLORIDE 80 MG/1
160 TABLET, FILM COATED, EXTENDED RELEASE ORAL EVERY 12 HOURS SCHEDULED
Qty: 120 TABLET | Refills: 0 | Status: SHIPPED | OUTPATIENT
Start: 2021-02-25 | End: 2021-03-30 | Stop reason: SDUPTHER

## 2021-02-25 NOTE — PROGRESS NOTES
TELEMEDICINE - VIDEO VISIT    You have chosen to receive care through a telehealth visit.  Do you consent to use a video/audio connection for your medical care today? Yes    CHIEF COMPLAINT  Back and joint pain    Subjective   Anne Jewell is a 70 y.o. female  who presents for a video visit follow-up.She has a history of chronic back and joint pain. Reports her pain is UNCHANGED since last  Evaluation.    Complains of pain in her low back and joints. Today her pain is 6/10VAS. Describes her pain as continuous throbbing. Pain increases with movement, activity and household chores; pain decreases with medication, rest and heat. Continues with OxyContin 80 mg 2 BID and baclofen. Denies any side effects from the regimen, including constipation and somnolence. The regimen helps decrease her pain by 40-50%.  Reports improvement in function and activity with regimen. ADL's by self.  Admits her activity has been lower lately due to winter weather storms and COVID.     Is worried she may need surgery on right hand in near future. Trying to do HEP. Under the care of Dr Alvarado.    Had first dose of COVID vaccine at [facility].    Was diagnosed with iron deficiency anemia. Was treated with iron by PCP.   Also evaluated by nephrology since last evaluation. Labs look better.      Can no longer go to her motel for warm water therapy due to COVID pandemic.      Prescribed Xanax 2 mg TID by psychiatrist, Narcan prescription at home (most recent prescription 1-1-21).     She reports Dr Feldman started her on oxycontin in 2000.     Back Pain  This is a chronic problem. The current episode started more than 1 year ago. The problem occurs constantly. The problem has been gradually worsening since onset. The pain is present in the lumbar spine. The quality of the pain is described as aching. The pain radiates to the left foot and right foot. The pain is at a severity of 6/10. The pain is moderate. The symptoms are aggravated by stress  (weather changes). Pertinent negatives include no abdominal pain, bladder incontinence, bowel incontinence, chest pain, dysuria, fever, headaches, numbness or weakness. She has tried analgesics, bed rest, home exercises and heat (OxyContin, aquatherapy) for the symptoms. The treatment provided moderate relief.   Joint Pain  This is a chronic problem. The current episode started more than 1 year ago. The problem occurs constantly. The problem has been gradually worsening. Associated symptoms include arthralgias (joints), joint swelling (minor right ankle), myalgias and neck pain. Pertinent negatives include no abdominal pain, chest pain, chills, congestion, coughing, fatigue, fever, headaches, nausea, numbness, vomiting or weakness. She has tried oral narcotics for the symptoms. The treatment provided moderate relief.      The following portions of the patient's history were reviewed and updated as appropriate: allergies, current medications, past family history, past medical history, past social history, past surgical history and problem list.    Review of Systems   Constitutional: Negative for chills, fatigue and fever.   HENT: Negative for congestion.    Respiratory: Negative for cough.    Cardiovascular: Negative for chest pain.   Gastrointestinal: Negative for abdominal pain, bowel incontinence, nausea and vomiting.   Genitourinary: Negative for bladder incontinence and dysuria.   Musculoskeletal: Positive for arthralgias (joints), back pain, joint swelling (minor right ankle), myalgias and neck pain.   Neurological: Negative for weakness, numbness and headaches.     Vitals:    02/25/21 1310   PainSc:   6   PainLoc: Back  Comment: joints       Objective   Physical Exam  Constitutional:       Appearance: She is well-developed.   HENT:      Head: Normocephalic and atraumatic.   Pulmonary:      Effort: Pulmonary effort is normal.   Neurological:      Mental Status: She is alert and oriented to person, place, and  time.   Psychiatric:         Speech: Speech normal.         Behavior: Behavior normal.         Thought Content: Thought content normal.         Judgment: Judgment normal.         Assessment/Plan   Diagnoses and all orders for this visit:    1. Chronic pain syndrome (Primary)    2. Degeneration of intervertebral disc of lumbar region    3. Rheumatoid arthritis, involving unspecified site, unspecified whether rheumatoid factor present (CMS/[facility])    4. Encounter for long-term (current) use of high-risk medication      ----------------  Our practice is offering alternative &/or electronic methods to continue to follow our patients while at the same time further the efforts toward social distancing, in accordance with our organizational policies, professional societies' guidance, and gubernatorial mandates.  I support the Healthy at Home campaign and in this visit I have counseled the patient on our needs to limit in-person office visits and physical encounters with medical facilities whenever possible.  I have also educated the patient on the medical necessities of maintaining social distancing while we continue to function during this crisis period.      The patient was counseled on the need to consider telehealth options. The patient was offered the opportunity for a Video Visit using [employer]. The patient agreed to a Video Visit. The patient was counseled on the need for a telehealth visit for necessary monitoring. The patient was educated about our efforts to comply with monitoring standards when prescribing potent medications. During these pandemic conditions, telephone encounters are federally mandated as acceptable alternative to videoconference or in-person encounters to ensure that an individual can receive appropriate care and be monitored regardless of access to technology.   Coding & Billing for telehealth encounter is completed based on medical decision making concurrent with the 2021 E&M criteria, based on  current federally mandated guidelines.    TIME:  Total Time:  12 minutes. Topics discussed are outlined in the Assessment/Plan section of the note.    ----------------    --- The urine drug screen confirmation from 11-18-20 has been reviewed and the result is APPROPRIATE based on patient history and [facility] report  --- The patient signed an updated copy of the controlled substance agreement on 12-16-20  --- Refill OxyContin DNF. Patient appears stable with current regimen. No adverse effects. Regarding continuation of opioids, there is no evidence of aberrant behavior or any red flags.  The patient continues with appropriate response to opioid therapy. ADL's remain intact by self.   --- Follow-up 1 month or sooner if needed.       [facility]ENTA REPORT  As part of the patient's treatment plan, I am prescribing controlled substances. The patient has been made aware of appropriate use of such medications, including potential risk of somnolence, limited ability to drive and/or work safely, and the potential for dependence or overdose. It has also bee made clear that these medications are for use by this patient only, without concomitant use of alcohol or other substances unless prescribed.     Patient has completed prescribing agreement detailing terms of continued prescribing of controlled substances, including monitoring VICENTA reports, urine drug screening, and pill counts if necessary. The patient is aware that inappropriate use will results in cessation of prescribing such medications.    [facility]A report has been reviewed and scanned into the patient's chart.    As the clinician, I personally reviewed the VICENTA from 2-25-21 while the patient was in the office today.    History and physical exam exhibit continued safe and appropriate use of controlled substances.    -------    EMR Dragon/Transcription disclaimer:   Much of this encounter note is an electronic transcription/translation of spoken language to printed text. The  electronic translation of spoken language may permit erroneous, or at times, nonsensical words or phrases to be inadvertently transcribed; Although I have reviewed the note for such errors, some may still exist.

## 2021-03-04 LAB
BASOPHILS # BLD AUTO: 0 X10E3/UL (ref 0–0.2)
BASOPHILS NFR BLD AUTO: 0 %
EOSINOPHIL # BLD AUTO: 0.2 X10E3/UL (ref 0–0.4)
EOSINOPHIL NFR BLD AUTO: 4 %
ERYTHROCYTE [DISTWIDTH] IN BLOOD BY AUTOMATED COUNT: 14.5 % (ref 11.7–15.4)
HCT VFR BLD AUTO: 39.8 % (ref 34–46.6)
HGB BLD-MCNC: 13 G/DL (ref 11.1–15.9)
IMM GRANULOCYTES # BLD AUTO: 0 X10E3/UL (ref 0–0.1)
IMM GRANULOCYTES NFR BLD AUTO: 0 %
IRON SATN MFR SERPL: 30 % (ref 15–55)
IRON SERPL-MCNC: 99 UG/DL (ref 27–139)
LYMPHOCYTES # BLD AUTO: 2.1 X10E3/UL (ref 0.7–3.1)
LYMPHOCYTES NFR BLD AUTO: 36 %
MCH RBC QN AUTO: 31.4 PG (ref 26.6–33)
MCHC RBC AUTO-ENTMCNC: 32.7 G/DL (ref 31.5–35.7)
MCV RBC AUTO: 96 FL (ref 79–97)
MONOCYTES # BLD AUTO: 0.4 X10E3/UL (ref 0.1–0.9)
MONOCYTES NFR BLD AUTO: 6 %
NEUTROPHILS # BLD AUTO: 3.1 X10E3/UL (ref 1.4–7)
NEUTROPHILS NFR BLD AUTO: 54 %
PLATELET # BLD AUTO: 189 X10E3/UL (ref 150–450)
RBC # BLD AUTO: 4.14 X10E6/UL (ref 3.77–5.28)
TIBC SERPL-MCNC: 325 UG/DL (ref 250–450)
UIBC SERPL-MCNC: 226 UG/DL (ref 118–369)
WBC # BLD AUTO: 5.8 X10E3/UL (ref 3.4–10.8)

## 2021-03-05 ENCOUNTER — PRIOR AUTHORIZATION (OUTPATIENT)
Dept: PAIN MEDICINE | Facility: CLINIC | Age: 71
End: 2021-03-05

## 2021-03-05 NOTE — TELEPHONE ENCOUNTER
Sent   THERESA KHAN (Chappell: [id number]) - [id number]  OxyCONTIN 80MG er tablets  Status: PA Request    Created: March 5th, 2021 [phone]    Sent: March 5th, 2021    Open

## 2021-03-05 NOTE — TELEPHONE ENCOUNTER
THERESA KHAN (Chappell: [id number]) - [id number]  OxyCONTIN 80MG er tablets  Status: PA Response - Approved    Created: March 5th, 2021 [phone]    Sent: March 5th, 2021    Open  Archive

## 2021-03-30 ENCOUNTER — OFFICE VISIT (OUTPATIENT)
Dept: PAIN MEDICINE | Facility: CLINIC | Age: 71
End: 2021-03-30

## 2021-03-30 VITALS
SYSTOLIC BLOOD PRESSURE: 147 MMHG | BODY MASS INDEX: 35.88 KG/M2 | TEMPERATURE: 97.2 F | RESPIRATION RATE: 18 BRPM | WEIGHT: 195 LBS | DIASTOLIC BLOOD PRESSURE: 72 MMHG | HEART RATE: 66 BPM | OXYGEN SATURATION: 94 % | HEIGHT: 62 IN

## 2021-03-30 DIAGNOSIS — G89.4 CHRONIC PAIN SYNDROME: Primary | ICD-10-CM

## 2021-03-30 DIAGNOSIS — Z79.899 ENCOUNTER FOR LONG-TERM (CURRENT) USE OF HIGH-RISK MEDICATION: ICD-10-CM

## 2021-03-30 DIAGNOSIS — M15.9 GENERALIZED OSTEOARTHRITIS: ICD-10-CM

## 2021-03-30 DIAGNOSIS — M54.16 LUMBAR RADICULOPATHY: ICD-10-CM

## 2021-03-30 DIAGNOSIS — M51.36 DEGENERATION OF INTERVERTEBRAL DISC OF LUMBAR REGION: ICD-10-CM

## 2021-03-30 PROCEDURE — 99214 OFFICE O/P EST MOD 30 MIN: CPT | Performed by: NURSE PRACTITIONER

## 2021-03-30 RX ORDER — OXYCODONE HYDROCHLORIDE 80 MG/1
160 TABLET, FILM COATED, EXTENDED RELEASE ORAL EVERY 12 HOURS SCHEDULED
Qty: 120 TABLET | Refills: 0 | Status: SHIPPED | OUTPATIENT
Start: 2021-03-30 | End: 2021-04-27 | Stop reason: SDUPTHER

## 2021-03-30 NOTE — PROGRESS NOTES
"CHIEF COMPLAINT  Back and joint pain is unchanged since last visit    Subjective   Anne Jewell is a 70 y.o. female  who presents for follow-up.  She has a history of back and joint pain. Today her pain is 7/10VAS in severity.  Continues with OxyContin 80 mg 2 tablets twice a day and baclofen 10 mg (prescribed by PCP). This medication regimen decreases her pain by \"a lot, it makes me feel human a little bit\". ADLs by self.     Patient brought hemp seed lotion to clinic to ask if this was OK to use.  Discussed with patient that while this is likely OK if this is used in abundance there is a possibility of testing positive for THC. If this were to occur she would need to stop use of the lotion and we would expect a follow up UDS to be normal.  Patient states understanding.     Patient remained masked during entire encounter. No cough present. I donned a mask and eye protection throughout entire visit. Prior to donning mask and eye protection, hand hygiene was performed, as well as when it was doffed.  I was closer than 6 feet, but not for an extended period of time. No obvious exposure to any bodily fluids.    Back Pain  This is a chronic problem. The current episode started more than 1 year ago. The problem occurs constantly. The problem has been gradually worsening since onset. The pain is present in the lumbar spine. The quality of the pain is described as aching. The pain radiates to the left foot and right foot. The pain is at a severity of 7/10. The pain is moderate. The symptoms are aggravated by stress (weather changes). Pertinent negatives include no abdominal pain, bladder incontinence, bowel incontinence, chest pain, dysuria, fever, headaches, numbness or weakness. She has tried analgesics, bed rest, home exercises and heat (OxyContin, aquatherapy) for the symptoms. The treatment provided moderate relief.   Joint Pain  This is a chronic problem. The current episode started more than 1 year ago. The " problem occurs constantly. The problem has been gradually worsening. Associated symptoms include arthralgias (joints), joint swelling, myalgias and neck pain. Pertinent negatives include no abdominal pain, chest pain, chills, congestion, coughing, fatigue, fever, headaches, nausea, numbness, vomiting or weakness. She has tried oral narcotics for the symptoms. The treatment provided moderate relief.      PEG Assessment   What number best describes your pain on average in the past week?5  What number best describes how, during the past week, pain has interfered with your enjoyment of life?5  What number best describes how, during the past week, pain has interfered with your general activity?  4    The following portions of the patient's history were reviewed and updated as appropriate: allergies, current medications, past family history, past medical history, past social history, past surgical history and problem list.    Review of Systems   Constitutional: Negative for chills, fatigue and fever.   HENT: Negative for congestion.    Respiratory: Negative for cough and shortness of breath.    Cardiovascular: Negative for chest pain.   Gastrointestinal: Negative for abdominal pain, bowel incontinence, constipation, diarrhea, nausea and vomiting.   Genitourinary: Negative for bladder incontinence, difficulty urinating, dyspareunia and dysuria.   Musculoskeletal: Positive for arthralgias (joints), back pain, joint swelling, myalgias and neck pain.   Neurological: Negative for dizziness, weakness, light-headedness, numbness and headaches.   Psychiatric/Behavioral: Negative for confusion, hallucinations, self-injury, sleep disturbance and suicidal ideas. The patient is not nervous/anxious.    All other systems reviewed and are negative.    --  The aforementioned information the Chief Complaint section and above subjective data including any HPI data, and also the Review of Systems data, has been personally reviewed and  "affirmed.  --    Vitals:    03/30/21 1041   BP: 147/72   Pulse: 66   Resp: 18   Temp: 97.2 °F (36.2 °C)   SpO2: 94%   Weight: 88.5 kg (195 lb)   Height: 157.5 cm (62\")   PainSc:   7   PainLoc: Back     Objective   Physical Exam  Vitals and nursing note reviewed.   Constitutional:       Appearance: Normal appearance. She is well-developed.   Eyes:      General: Lids are normal.   Cardiovascular:      Rate and Rhythm: Normal rate.   Pulmonary:      Effort: Pulmonary effort is normal.   Musculoskeletal:      Cervical back: Normal range of motion.      Lumbar back: Tenderness present. Decreased range of motion.      Right hip: Tenderness present.      Left hip: Tenderness present.      Right knee: Tenderness present.      Left knee: Tenderness present.      Comments: Lumbar support in place   Neurological:      Mental Status: She is alert and oriented to person, place, and time.      Gait: Gait abnormal (rollator).   Psychiatric:         Attention and Perception: Attention normal.         Mood and Affect: Mood normal.         Speech: Speech normal.         Behavior: Behavior normal.         Judgment: Judgment normal.       Assessment/Plan   Diagnoses and all orders for this visit:    1. Chronic pain syndrome (Primary)    2. Degeneration of intervertebral disc of lumbar region    3. Generalized osteoarthritis    4. Encounter for long-term (current) use of high-risk medication    5. Lumbar radiculopathy      --- The urine drug screen confirmation from 11/18/2020 has been reviewed and the result is appropriate based on patient history and [facility]A report  --- Refill OxyContin 80 mg. DNF 4/7/2021 applied. Patient appears stable with current regimen. No adverse effects. Regarding continuation of opioids, there is no evidence of aberrant behavior or any red flags.  The patient continues with appropriate response to opioid therapy. ADL's remain intact by self.   --- Follow-up 1 month or sooner if needed     VICENTA REPORT  As " part of the patient's treatment plan, I am prescribing controlled substances. The patient has been made aware of appropriate use of such medications, including potential risk of somnolence, limited ability to drive and/or work safely, and the potential for dependence or overdose. It has also bee made clear that these medications are for use by this patient only, without concomitant use of alcohol or other substances unless prescribed.     Patient has completed prescribing agreement detailing terms of continued prescribing of controlled substances, including monitoring VICENTA reports, urine drug screening, and pill counts if necessary. The patient is aware that inappropriate use will results in cessation of prescribing such medications.    V[facility]A report has been reviewed and scanned into the patient's chart.    As the clinician, I personally reviewed the VICENTA from 3/30/2021 while the patient was in the office today.    History and physical exam exhibit continued safe and appropriate use of controlled substances.    EMR Dragon/Transcription disclaimer:   Much of this encounter note is an electronic transcription/translation of spoken language to printed text. The electronic translation of spoken language may permit erroneous, or at times, nonsensical words or phrases to be inadvertently transcribed; Although I have reviewed the note for such errors, some may still exist.

## 2021-04-05 RX ORDER — RIVAROXABAN 20 MG/1
TABLET, FILM COATED ORAL
Qty: 30 TABLET | Refills: 11 | Status: SHIPPED | OUTPATIENT
Start: 2021-04-05 | End: 2022-04-01

## 2021-04-26 ENCOUNTER — TELEPHONE (OUTPATIENT)
Dept: SPORTS MEDICINE | Facility: CLINIC | Age: 71
End: 2021-04-26

## 2021-04-26 DIAGNOSIS — D50.8 IRON DEFICIENCY ANEMIA SECONDARY TO INADEQUATE DIETARY IRON INTAKE: Primary | ICD-10-CM

## 2021-04-26 DIAGNOSIS — D64.9 LOW HEMOGLOBIN: ICD-10-CM

## 2021-04-26 NOTE — TELEPHONE ENCOUNTER
Patient called to make a lab appointment, she will be in tomorrow, 4/27/21.   She stated that the order cannot say routine or medicare will not pay for it like last time.   Can you place those orders for her?  Please advise, thank you.

## 2021-04-27 ENCOUNTER — OFFICE VISIT (OUTPATIENT)
Dept: PAIN MEDICINE | Facility: CLINIC | Age: 71
End: 2021-04-27

## 2021-04-27 VITALS
WEIGHT: 197 LBS | HEIGHT: 62 IN | TEMPERATURE: 96.9 F | HEART RATE: 64 BPM | DIASTOLIC BLOOD PRESSURE: 70 MMHG | OXYGEN SATURATION: 95 % | SYSTOLIC BLOOD PRESSURE: 135 MMHG | BODY MASS INDEX: 36.25 KG/M2 | RESPIRATION RATE: 18 BRPM

## 2021-04-27 DIAGNOSIS — M54.16 LUMBAR RADICULOPATHY: ICD-10-CM

## 2021-04-27 DIAGNOSIS — M15.9 GENERALIZED OSTEOARTHRITIS: ICD-10-CM

## 2021-04-27 DIAGNOSIS — M54.2 NECK PAIN: ICD-10-CM

## 2021-04-27 DIAGNOSIS — Z79.899 ENCOUNTER FOR LONG-TERM (CURRENT) USE OF HIGH-RISK MEDICATION: Primary | ICD-10-CM

## 2021-04-27 DIAGNOSIS — M51.36 DEGENERATION OF INTERVERTEBRAL DISC OF LUMBAR REGION: ICD-10-CM

## 2021-04-27 DIAGNOSIS — G89.4 CHRONIC PAIN SYNDROME: ICD-10-CM

## 2021-04-27 PROCEDURE — 99214 OFFICE O/P EST MOD 30 MIN: CPT | Performed by: NURSE PRACTITIONER

## 2021-04-27 RX ORDER — OXYCODONE HYDROCHLORIDE 80 MG/1
160 TABLET, FILM COATED, EXTENDED RELEASE ORAL EVERY 12 HOURS SCHEDULED
Qty: 120 TABLET | Refills: 0 | Status: SHIPPED | OUTPATIENT
Start: 2021-04-27 | End: 2021-05-25 | Stop reason: SDUPTHER

## 2021-04-27 NOTE — PROGRESS NOTES
CHIEF COMPLAINT:  Back and joint pain is unchanged since last visit    Subjective   Anne Jewell is a 70 y.o. female  who presents for follow-up.  She has a history of back and joint pain. Today her pain is 6-7/10VAS in severity.   Continues with OxyContin 80 mg 2 tablets twice a day and baclofen 10 mg (prescribed by PCP). This medication regimen decreases her pain by a moderate amount and allows her to continue to get up and move. She denies any side effects including constipation or somnolence.     She has started utilizing hemp seed lotion and states that this has been helpful to her bilateral shoulder and arm pain. Discussed with patient that this is likely fine, but if her UDS is abnormal she will need to discontinue this medication.  Patient states understanding.     Patient remained masked during entire encounter. No cough present. I donned a mask and eye protection throughout entire visit. Prior to donning mask and eye protection, hand hygiene was performed, as well as when it was doffed.  I was closer than 6 feet, but not for an extended period of time. No obvious exposure to any bodily fluids.    Back Pain  This is a chronic problem. The current episode started more than 1 year ago. The problem occurs constantly. The problem has been gradually worsening since onset. The pain is present in the lumbar spine. The quality of the pain is described as aching. The pain radiates to the left foot and right foot. The pain is at a severity of 6/10. The pain is moderate. The symptoms are aggravated by stress (weather changes). Associated symptoms include numbness and weakness. Pertinent negatives include no abdominal pain, bladder incontinence, bowel incontinence, chest pain, dysuria, fever or headaches. She has tried analgesics, bed rest, home exercises and heat (OxyContin, aquatherapy) for the symptoms. The treatment provided moderate relief.   Joint Pain  This is a chronic problem. The current episode started  more than 1 year ago. The problem occurs constantly. The problem has been gradually worsening. Associated symptoms include arthralgias (joints), joint swelling, myalgias, neck pain, numbness and weakness. Pertinent negatives include no abdominal pain, chest pain, chills, congestion, coughing, fatigue, fever, headaches, nausea or vomiting. She has tried oral narcotics for the symptoms. The treatment provided moderate relief.      PEG Assessment   What number best describes your pain on average in the past week?5  What number best describes how, during the past week, pain has interfered with your enjoyment of life?5  What number best describes how, during the past week, pain has interfered with your general activity?  4    The following portions of the patient's history were reviewed and updated as appropriate: allergies, current medications, past family history, past medical history, past social history, past surgical history and problem list.    Review of Systems   Constitutional: Negative for chills, fatigue and fever.   HENT: Negative for congestion.    Respiratory: Negative for cough and shortness of breath.    Cardiovascular: Negative for chest pain.   Gastrointestinal: Negative for abdominal pain, bowel incontinence, constipation, diarrhea, nausea and vomiting.   Genitourinary: Negative for bladder incontinence, difficulty urinating, dyspareunia and dysuria.   Musculoskeletal: Positive for arthralgias (joints), back pain, joint swelling, myalgias and neck pain.   Neurological: Positive for weakness and numbness. Negative for dizziness, light-headedness and headaches.   Psychiatric/Behavioral: Negative for confusion, hallucinations, self-injury, sleep disturbance and suicidal ideas. The patient is not nervous/anxious.    All other systems reviewed and are negative.    --  The aforementioned information the Chief Complaint section and above subjective data including any HPI data, and also the Review of Systems  "data, has been personally reviewed and affirmed.  --    Vitals:    04/27/21 1119   BP: 135/70   Pulse: 64   Resp: 18   Temp: 96.9 °F (36.1 °C)   SpO2: 95%   Weight: 89.4 kg (197 lb)   Height: 157.5 cm (62\")   PainSc:   6   PainLoc: Back     Objective   Physical Exam  Vitals and nursing note reviewed.   Constitutional:       Appearance: Normal appearance. She is well-developed.   Eyes:      General: Lids are normal.   Cardiovascular:      Rate and Rhythm: Normal rate.   Pulmonary:      Effort: Pulmonary effort is normal.   Musculoskeletal:      Cervical back: Normal range of motion.      Lumbar back: Tenderness present. Decreased range of motion.      Comments: Diffuse arthritic changes   Neurological:      Mental Status: She is alert and oriented to person, place, and time.   Psychiatric:         Attention and Perception: Attention normal.         Mood and Affect: Mood normal.         Speech: Speech normal.         Behavior: Behavior normal.         Judgment: Judgment normal.       Assessment/Plan   Diagnoses and all orders for this visit:    1. Encounter for long-term (current) use of high-risk medication (Primary)    2. Degeneration of intervertebral disc of lumbar region    3. Neck pain    4. Lumbar radiculopathy    5. Generalized osteoarthritis    6. Chronic pain syndrome      --- The urine drug screen confirmation from 11/18/2020 has been reviewed and the result is appropriate based on patient history and [facility] report   --- Refill OxyContin 80 mg 2 tablets twice a day. DNF 5/7/2021 applied. Patient appears stable with current regimen. No adverse effects. Regarding continuation of opioids, there is no evidence of aberrant behavior or any red flags.  The patient continues with appropriate response to opioid therapy. ADL's remain intact by self.   --- Discussed that she plans to start swimming for exercise.   --- Follow-up 1 month or sooner if needed.      [facility]A REPORT  As part of the patient's treatment plan, I " am prescribing controlled substances. The patient has been made aware of appropriate use of such medications, including potential risk of somnolence, limited ability to drive and/or work safely, and the potential for dependence or overdose. It has also bee made clear that these medications are for use by this patient only, without concomitant use of alcohol or other substances unless prescribed.     Patient has completed prescribing agreement detailing terms of continued prescribing of controlled substances, including monitoring VICENTA reports, urine drug screening, and pill counts if necessary. The patient is aware that inappropriate use will results in cessation of prescribing such medications.    [facility]A report has been reviewed and scanned into the patient's chart.    As the clinician, I personally reviewed the VICENTA from 4/27/2021 while the patient was in the office today.    History and physical exam exhibit continued safe and appropriate use of controlled substances.    EMR Dragon/Transcription disclaimer:   Much of this encounter note is an electronic transcription/translation of spoken language to printed text. The electronic translation of spoken language may permit erroneous, or at times, nonsensical words or phrases to be inadvertently transcribed; Although I have reviewed the note for such errors, some may still exist.

## 2021-04-28 LAB
BASOPHILS # BLD AUTO: 0.03 10*3/MM3 (ref 0–0.2)
BASOPHILS NFR BLD AUTO: 0.6 % (ref 0–1.5)
EOSINOPHIL # BLD AUTO: 0.27 10*3/MM3 (ref 0–0.4)
EOSINOPHIL NFR BLD AUTO: 5 % (ref 0.3–6.2)
ERYTHROCYTE [DISTWIDTH] IN BLOOD BY AUTOMATED COUNT: 12.4 % (ref 12.3–15.4)
HCT VFR BLD AUTO: 39.9 % (ref 34–46.6)
HGB BLD-MCNC: 13.2 G/DL (ref 12–15.9)
IMM GRANULOCYTES # BLD AUTO: 0.01 10*3/MM3 (ref 0–0.05)
IMM GRANULOCYTES NFR BLD AUTO: 0.2 % (ref 0–0.5)
IRON SATN MFR SERPL: 21 % (ref 20–50)
IRON SERPL-MCNC: 81 MCG/DL (ref 37–145)
LYMPHOCYTES # BLD AUTO: 2.02 10*3/MM3 (ref 0.7–3.1)
LYMPHOCYTES NFR BLD AUTO: 37.1 % (ref 19.6–45.3)
MCH RBC QN AUTO: 31.8 PG (ref 26.6–33)
MCHC RBC AUTO-ENTMCNC: 33.1 G/DL (ref 31.5–35.7)
MCV RBC AUTO: 96.1 FL (ref 79–97)
MONOCYTES # BLD AUTO: 0.45 10*3/MM3 (ref 0.1–0.9)
MONOCYTES NFR BLD AUTO: 8.3 % (ref 5–12)
NEUTROPHILS # BLD AUTO: 2.66 10*3/MM3 (ref 1.7–7)
NEUTROPHILS NFR BLD AUTO: 48.8 % (ref 42.7–76)
NRBC BLD AUTO-RTO: 0 /100 WBC (ref 0–0.2)
PLATELET # BLD AUTO: 177 10*3/MM3 (ref 140–450)
RBC # BLD AUTO: 4.15 10*6/MM3 (ref 3.77–5.28)
TIBC SERPL-MCNC: 378 MCG/DL
UIBC SERPL-MCNC: 297 MCG/DL (ref 112–346)
WBC # BLD AUTO: 5.44 10*3/MM3 (ref 3.4–10.8)

## 2021-05-07 DIAGNOSIS — I10 HYPERTENSION, UNSPECIFIED TYPE: ICD-10-CM

## 2021-05-07 RX ORDER — HYDROCHLOROTHIAZIDE 25 MG/1
TABLET ORAL
Qty: 90 TABLET | Refills: 2 | Status: SHIPPED | OUTPATIENT
Start: 2021-05-07 | End: 2022-01-28

## 2021-05-18 NOTE — PROGRESS NOTES
Anne Jewell  1950     Office/Outpatient Visit    Visit Date: Mon, Aug 24, 2020 02:15 pm    Provider: Jewell Stephens N.P. (Assistant: Sheng Guajardo, )    Location: [facility]        Electronically signed by Jewell Stephens N.P. on  2020 03:47:49 PM                             Subjective:        CC: Ms. Jewell is a 69 year old White female.  This is a follow-up visit.  she has a PCP in [place] she has seen for years, gets allergy injections here         HPI:           PHQ-9 Depression Screening: Completed form scanned and in chart; Total Score enter total score :  3           Allergic rhinitis, unspecified details; she is currently on immunotherapy, which was started more than 3 years ago.  Has an appt family allergy and asthma next week.     ROS:     CONSTITUTIONAL:  Negative for fever.      CARDIOVASCULAR:  Negative for chest pain, palpitations, tachycardia, orthopnea, and edema.      RESPIRATORY:  Negative for recent cough and dyspnea.      GASTROINTESTINAL:  Positive for recent colonoscopy.      MUSCULOSKELETAL:  Positive for sees dr nazario/knee pain / DJD.  has chronic pain     NEUROLOGICAL:  Negative for dizziness, headaches, paresthesias, and weakness.      ALLERGIC/IMMUNOLOGIC:  Positive for screened for COVID 8-10-20.      PSYCHIATRIC:  Positive for sees psychiatrist.   Negative for suicidal thoughts.          Past Medical History / Family History / Social History:         Last Reviewed on 2020 02:38 PM by Jewell Stephens    Past Medical History:             PAST MEDICAL HISTORY         Positive for    Deep Venous Thrombosis: 2 DVT and a PE; ;     Positive for    Hypothyroidism: dx'd in ; ;     Positive for    Obesity: used to weigh over 400 pounds; ;         GYNECOLOGICAL HISTORY:             CURRENT MEDICAL PROVIDERS:    Allergist: [facility]    Orthopedist: Dr Nazario    Psychiatrist: [facility]    Dr. Hipolito Feldman, PCP    pain  management Dr. Grajeda and JORGE LUIS Friedman         PREVENTIVE HEALTH MAINTENANCE             COLORECTAL CANCER SCREENING: Up to date (colonoscopy q10y; sigmoidoscopy q5y; Cologuard q3y) was last done 20/ dr Barker         Surgical History:         Cholecystectomy    Hysterectomy: TAHBSO; Other Surgeries    Hernia Repair: 7 different surgeries;     Joint Replacement: bilateral knees; , ;     Gastric Bypass ;    bladder repair;    right hand surgery ;    breast reduction ;     Procedures:    Colonoscopy ( / [facility] )    EGD         Family History:     Father:  at age 89;     ; Positive for Prostate Cancer;     Mother:  at age 78; Cause of death was renal failure    ; Positive for Congestive Heart Failure;     ; Positive for Breast Cancer;     ; Positive for Type 2 Diabetes;     ; Positive for blind;     Brother(s): 1 brother(s) total    ; Positive for Type 2 Diabetes;     Son(s): 1 ;   in 2016     Daughter(s): 1 daughter(s) total;  Migraines         Social History:     Occupation: Disabled (due to DJD)     Marital Status:      Children: 2 children and 2 step-children         Tobacco/Alcohol/Supplements:     Last Reviewed on 2020 02:19 PM by Sheng Guajardo    Tobacco: She has a past history of cigarette smoking; quit date:  .  Non-drinker         Immunizations:     zzPrevnar-13 2017    Influenza A (H1N1), IM (3+ years) Monovalent 2/3/2010    Influenza High-Dose Virus Vaccine pf (>=65 yr) 10/15/2016    Adacel (Tdap) 2017        Allergies:     Last Reviewed on 2020 02:19 PM by Sheng Guajardo    Penicillins:          Current Medications:     Last Reviewed on 2020 02:22 PM by Sheng Guajardo    allergy shot every 2- 28 days     Singulair  10 mg oral tablet [1 tab daily]    levothyroxine 100 mcg oral tablet [1 tab daily]    Multivitamins     Glucosamine     b-12      Potassium Chloride 20 mEq oral Tablet, Extended Release  Particles/Crystals [1 tab daily]    Oxycontin 80 mg oral tablet,oral only,extended release 12 hr [2 tablets q12h]    Baclofen 10 mg oral tablet [1 tab po TID prn for pain/muscle pain]    promethazine 25 mg oral tablet    Escitalopram Oxalate 10 mg oral tablet [1 tab daily]    Alprazolam 2 mg oral tablet [1 tab tid]    hydroCHLOROthiazide 25 mg oral tablet [1 tab daily]    Xarelto 20 mg oral tablet [1 tablet daily]    Vitamin D3 25 mcg (1,000 unit) oral capsule [bid]        Objective:        Vitals:         Current: 8/24/2020 2:23:40 PM    Ht:  5 ft, 4 in;  Wt: 182.2 lbs;  BMI: 31.3T: 97.4 F (temporal);  BP: 145/68 mm Hg (left arm, sitting);  P: 67 bpm (left arm (BP Cuff), sitting)        Exams:     PHYSICAL EXAM:     GENERAL: vital signs recorded - well developed, well nourished;  no apparent distress;     RESPIRATORY: normal respiratory rate and pattern with no distress; normal breath sounds with no rales, rhonchi, wheezes or rubs;     CARDIOVASCULAR: normal rate; rhythm is regular;  no systolic murmur;     MUSCULOSKELETAL: gait: uses a walker;     PSYCHIATRIC:  appropriate affect and demeanor; normal speech pattern; grossly normal memory;         Procedures:     Allergic rhinitis, unspecified        Single Allergy Injection exp 2/21/21/ pdr             Assessment:         Z13.31   Encounter for screening for depression       J30.9   Allergic rhinitis, unspecified       G89.29   Other chronic pain           ORDERS:         Radiology/Test Orders:       [id number]  Professional services for allergen immunotherapy not including provision of allergenic extracts; single injection  (In-House)              Other Orders:         Depression screen negative  (In-House)                      Plan:         Encounter for screening for depressionsend for screening colonoscopy at surgical center per Dr Jose Barker    MIPS PHQ-9 Depression Screening: Completed form scanned and in chart; Total Score 3; Negative Depression Screen            Orders:         Depression screen negative  (In-House)              Allergic rhinitis, unspecifiedcontinue with allergy therapy/keep her follow up next week        FOLLOW-UP: Schedule follow-up appointments on a p.r.n. basis.            Orders:       57144  Professional services for allergen immunotherapy not including provision of allergenic extracts; single injection  (In-House)              Other chronic painsend for Alvarado's notes /x-ray  hip at [facility] and then here right knee            Patient Recommendations:        For  Allergic rhinitis, unspecified:    Schedule follow-up appointments as needed.              Charge Capture:         Primary Diagnosis:     Z13.31  Encounter for screening for depression           Orders:      19006  Office/outpatient visit; established patient, level 3  (In-House)              Depression screen negative  (In-House)              J30.9  Allergic rhinitis, unspecified           Orders:      87540  Professional services for allergen immunotherapy not including provision of allergenic extracts; single injection  (In-House)              G89.29  Other chronic pain

## 2021-05-18 NOTE — PROGRESS NOTES
Anne Jewell 1950     Office/Outpatient Visit    Visit Date: Wed, May 22, 2019 04:26 pm    Provider: Jewell Stephens N.P. (Assistant: Yarely Bruns LPN)    Location: [facility]        Electronically signed by Jewell Stephens N.P. on  2019 07:09:42 PM                             SUBJECTIVE:        CC: Patient not taking Triamterene/HCTZ , Hydrocodone/Acetaminophen     Ms. Jewell is a 68 year old White female.  Allergy shot follow up;         HPI:         Allergies noted.  The allergy pattern seems to be perennial.  Symptoms seem better with allergy imm.  She is currently on immunotherapy.      ROS:     CONSTITUTIONAL:  Negative for chills, fatigue, fever, and weight change.      CARDIOVASCULAR:  Negative for chest pain, palpitations, tachycardia, orthopnea, and edema.      RESPIRATORY:  Negative for cough, dyspnea, and hemoptysis.      MUSCULOSKELETAL:  Positive for arthralgias.  has been on narcotic pain rx since  and goes to pain management in [place] /she also sees dr nazario, has been to [facility]  for PT         PMH/FMH/SH:     Last Reviewed on 2019 04:39 PM by Jewell Stephens    Past Medical History:             PAST MEDICAL HISTORY         Positive for    Deep Venous Thrombosis: 2 DVT and a PE; ;     Positive for    Hypothyroidism: dx'd in ; ;     Positive for    Obesity: used to weigh over 400 pounds; ;         GYNECOLOGICAL HISTORY:             CURRENT MEDICAL PROVIDERS:    [facility]ergist: [facility]    Orthopedist: Dr Nazario    Psychiatrist: [facility]    Dr. Hipolito Feldman, PCP    pain management Dr. Grajeda and NP GRETEL Friedman         Surgical History:         Cholecystectomy    Hysterectomy: TAHBSO; Other Surgeries    Hernia Repair: 7 different surgeries;     Joint Replacement: bilateral knees; , ;     Gastric Bypass ;    bladder repair;    right hand surgery -;    breast reduction ;     Procedures:    Colonoscopy ( /  [facility] )    EGD         Family History:     Father:  at age 89;     ; Positive for Prostate Cancer;     Mother:  at age 78; Cause of death was renal failure    ; Positive for Congestive Heart Failure;     ; Positive for Breast Cancer;     ; Positive for Type 2 Diabetes;     ; Positive for blind;     Brother(s): 1 brother(s) total    ; Positive for Type 2 Diabetes;     Son(s): 1 ;   in 2016     Daughter(s): 1 daughter(s) total;  Migraines         Social History:     Occupation: Disabled (due to DJD)     Marital Status:      Children: 2 children and 2 step-children         Tobacco/Alcohol/Supplements:     Last Reviewed on 2019 04:29 PM by Yarely Burns    Tobacco: She has a past history of cigarette smoking; quit date:  .  Non-drinker             Immunizations:     zzPrevnar-13 2017     Influenza A (H1N1), IM (3+ years) Monovalent 2/3/2010     Influenza High-Dose Virus Vaccine pf (>=65 yr) 10/15/2016     Adacel (Tdap) 2017         Allergies:     Last Reviewed on 2019 04:29 PM by Yarely Burns    Penicillins:        Current Medications:     Last Reviewed on 2019 04:32 PM by Yarely Burns    allergy shot every 2- 28 days     Hydrochlorothiazide (HCTZ) 25mg Tablet 1 tab daily     Xarelto 20mg Tablet 1 tablet daily     Alprazolam 2mg Tablet 1 tab tid     Baclofen 10mg Tablet 1 tab po TID prn for pain/muscle pain     Diphenoxylate/Atropine 2.5mg/0.025mg Tablet     Escitalopram Oxalate 10mg Tablet 1 tab daily     Fish Oil 1,000mg Capsules 1 capsules daily     Oxycontin 80mg Tablets, Controlled Release 2 tablets q12h     Potassium Chloride 20mEq Tablets, Extended Release 1 tab daily     Promethazine HCl 25mg Tablet     b-12     Glucosamine     Levothyroxine Sodium 0.1mg Tablet 1 tab daily     Multivitamins     Singulair  10mg Tablet 1 tab daily     Vitamin D3 50,000IU Capsules 2 capsules wkly         OBJECTIVE:        Vitals:         Current: 2019  4:35:14 PM    Ht:  5 ft, 4 in;  Wt: 192 lbs;  BMI: 33.0    T: 97.6 F (oral);  BP: 110/60 mm Hg (left arm, sitting);  P: 84 bpm (left arm (BP Cuff), sitting)        Exams:     PHYSICAL EXAM:     GENERAL: vital signs recorded - well developed, well nourished;  no apparent distress;     RESPIRATORY: normal respiratory rate and pattern with no distress; normal breath sounds with no rales, rhonchi, wheezes or rubs;     CARDIOVASCULAR: normal rate; rhythm is regular;  no systolic murmur;     PSYCHIATRIC:  appropriate affect and demeanor; normal speech pattern; grossly normal memory;         ASSESSMENT           477.0   J30.9  Allergies              DDx:     780.99   G89.29  chronic pain              DDx:         ORDERS:         Radiology/Test Orders:       [id number]  Pro services for allergen immunotherapy not including provision of allergenic extracts; 1 injection  (In-House)                   PLAN:          Allergies continue allergy immunotherapy           Orders:       [id number]  Pro services for allergen immunotherapy not including provision of allergenic extracts; 1 injection  (In-House)            chronic pain wants come here instead of her current pain specialist in [place]          FOLLOW-UP:.   for pain .Dr Mcgarth             Patient Recommendations:        For  chronic pain:                     APPOINTMENT INFORMATION:        Monday Tuesday Wednesday Thursday Friday Saturday Sunday            Time:___________________AM  PM   Date:_____________________             CHARGE CAPTURE           **Please note: ICD descriptions below are intended for billing purposes only and may not represent clinical diagnoses**        Primary Diagnosis:         477.0 Allergies            J30.9    Allergic rhinitis, unspecified              Orders:          40389   Office/outpatient visit; established patient, level 3  (In-House)             77561   Pro services for allergen immunotherapy not including provision of allergenic  extracts; 1 injection  (In-House)           780.99 chronic pain            G89.29    Other chronic pain

## 2021-05-25 ENCOUNTER — OFFICE VISIT (OUTPATIENT)
Dept: PAIN MEDICINE | Facility: CLINIC | Age: 71
End: 2021-05-25

## 2021-05-25 VITALS
BODY MASS INDEX: 36.8 KG/M2 | OXYGEN SATURATION: 98 % | DIASTOLIC BLOOD PRESSURE: 73 MMHG | WEIGHT: 200 LBS | HEIGHT: 62 IN | RESPIRATION RATE: 18 BRPM | SYSTOLIC BLOOD PRESSURE: 132 MMHG | TEMPERATURE: 97.3 F | HEART RATE: 73 BPM

## 2021-05-25 DIAGNOSIS — M15.9 GENERALIZED OSTEOARTHRITIS: ICD-10-CM

## 2021-05-25 DIAGNOSIS — G89.4 CHRONIC PAIN SYNDROME: ICD-10-CM

## 2021-05-25 DIAGNOSIS — M25.50 ARTHRALGIA, UNSPECIFIED JOINT: ICD-10-CM

## 2021-05-25 DIAGNOSIS — M54.16 LUMBAR RADICULOPATHY: ICD-10-CM

## 2021-05-25 DIAGNOSIS — M54.2 NECK PAIN: ICD-10-CM

## 2021-05-25 DIAGNOSIS — M54.50 CHRONIC LOW BACK PAIN, UNSPECIFIED BACK PAIN LATERALITY, UNSPECIFIED WHETHER SCIATICA PRESENT: ICD-10-CM

## 2021-05-25 DIAGNOSIS — Z79.899 ENCOUNTER FOR LONG-TERM (CURRENT) USE OF HIGH-RISK MEDICATION: Primary | ICD-10-CM

## 2021-05-25 DIAGNOSIS — G89.29 CHRONIC LOW BACK PAIN, UNSPECIFIED BACK PAIN LATERALITY, UNSPECIFIED WHETHER SCIATICA PRESENT: ICD-10-CM

## 2021-05-25 PROCEDURE — 80305 DRUG TEST PRSMV DIR OPT OBS: CPT | Performed by: NURSE PRACTITIONER

## 2021-05-25 PROCEDURE — 99214 OFFICE O/P EST MOD 30 MIN: CPT | Performed by: NURSE PRACTITIONER

## 2021-05-25 RX ORDER — OXYCODONE HYDROCHLORIDE 80 MG/1
160 TABLET, FILM COATED, EXTENDED RELEASE ORAL EVERY 12 HOURS SCHEDULED
Qty: 120 TABLET | Refills: 0 | Status: SHIPPED | OUTPATIENT
Start: 2021-05-25 | End: 2021-06-29 | Stop reason: SDUPTHER

## 2021-05-25 RX ORDER — NALOXONE HYDROCHLORIDE 4 MG/.1ML
1 SPRAY NASAL AS NEEDED
Qty: 1 EACH | Refills: 1 | Status: SHIPPED | OUTPATIENT
Start: 2021-05-25 | End: 2022-08-30 | Stop reason: SDUPTHER

## 2021-05-25 NOTE — PROGRESS NOTES
CHIEF COMPLAINT  Back and joint pain is unchanged since last visit    Subjective   Anne Jewell is a 70 y.o. female  who presents for follow-up.  She has a history of back and joint pain.  Today her pain is 5-6/10VAS in severity.     Continues with OxyContin 80 mg 2 tablets twice a day and baclofen 10 mg (prescribed by PCP). She denies any side effects including somnolence or constipation. She denies any changes to bowel or bladder since her last office visit. Patient states that her medication allows her to continue to function and remain mobile and active.     Patient is scheduled to see Dr. Alvarado (orthopedic surgery) on 6/10/2021.     Patient brought OxyContin 80 mg bottle into office today.  She was prescribed #100 due her pharmacy not having the full prescription. Will adjust her refill date due to this.     Patient remained masked during entire encounter. No cough present. I donned a mask and eye protection throughout entire visit. Prior to donning mask and eye protection, hand hygiene was performed, as well as when it was doffed.  I was closer than 6 feet, but not for an extended period of time. No obvious exposure to any bodily fluids.    Back Pain  This is a chronic problem. The current episode started more than 1 year ago. The problem occurs constantly. The problem is unchanged. The pain is present in the lumbar spine. The quality of the pain is described as aching. The pain radiates to the left foot and right foot. The pain is at a severity of 5/10. The pain is moderate. The symptoms are aggravated by stress (weather changes). Associated symptoms include weakness. Pertinent negatives include no abdominal pain, bladder incontinence, bowel incontinence, chest pain, dysuria, fever, headaches or numbness. She has tried analgesics, bed rest, home exercises and heat (OxyContin, aquatherapy) for the symptoms. The treatment provided moderate relief.   Joint Pain  This is a chronic problem. The current episode  There are some findings in your electrolytes that need followup- namely high sugar, high bilirubin, and high white cell count.  Some of these findings may go away with hydration.    Please followup with your PCP in the next 1-2 days for a recheck.    Also followup with José Miguel.   started more than 1 year ago. The problem occurs constantly. The problem has been unchanged. Associated symptoms include arthralgias (joints), joint swelling, myalgias, neck pain and weakness. Pertinent negatives include no abdominal pain, chest pain, chills, congestion, coughing, fatigue, fever, headaches, nausea, numbness or vomiting. She has tried oral narcotics for the symptoms. The treatment provided moderate relief.      PEG Assessment   What number best describes your pain on average in the past week?5  What number best describes how, during the past week, pain has interfered with your enjoyment of life?4  What number best describes how, during the past week, pain has interfered with your general activity?  5    The following portions of the patient's history were reviewed and updated as appropriate: allergies, current medications, past family history, past medical history, past social history, past surgical history and problem list.    Review of Systems   Constitutional: Negative for chills, fatigue and fever.   HENT: Negative for congestion.    Respiratory: Negative for cough and shortness of breath.    Cardiovascular: Negative for chest pain.   Gastrointestinal: Negative for abdominal pain, bowel incontinence, constipation, diarrhea, nausea and vomiting.   Genitourinary: Negative for bladder incontinence, difficulty urinating, dyspareunia and dysuria.   Musculoskeletal: Positive for arthralgias (joints), back pain, joint swelling, myalgias and neck pain.   Neurological: Positive for weakness. Negative for dizziness, light-headedness, numbness and headaches.   Psychiatric/Behavioral: Negative for confusion, hallucinations, self-injury, sleep disturbance and suicidal ideas. The patient is not nervous/anxious.    All other systems reviewed and are negative.    --  The aforementioned information the Chief Complaint section and above subjective data including any HPI data, and also the Review of Systems data,  "has been personally reviewed and affirmed.  --    Vitals:    05/25/21 1125   BP: 132/73   Pulse: 73   Resp: 18   Temp: 97.3 °F (36.3 °C)   SpO2: 98%   Weight: 90.7 kg (200 lb)   Height: 157.5 cm (62\")   PainSc:   5   PainLoc: Back     Objective   Physical Exam  Vitals and nursing note reviewed.   Constitutional:       Appearance: Normal appearance. She is well-developed.   Eyes:      General: Lids are normal.   Cardiovascular:      Rate and Rhythm: Normal rate.   Pulmonary:      Effort: Pulmonary effort is normal.   Musculoskeletal:      Cervical back: Normal range of motion.      Lumbar back: Tenderness and bony tenderness present. Decreased range of motion. Scoliosis present.      Comments: Diffuse arthritic changes   Neurological:      Mental Status: She is alert and oriented to person, place, and time.      Gait: Gait abnormal (rollator).   Psychiatric:         Attention and Perception: Attention normal.         Mood and Affect: Mood normal.         Speech: Speech normal.         Behavior: Behavior normal.         Judgment: Judgment normal.       Assessment/Plan   Diagnoses and all orders for this visit:    1. Encounter for long-term (current) use of high-risk medication (Primary)    2. Lumbar radiculopathy    3. Chronic low back pain, unspecified back pain laterality, unspecified whether sciatica present    4. Generalized osteoarthritis    5. Neck pain    6. Chronic pain syndrome    7. Arthralgia, unspecified joint    Other orders  -     naloxone (NARCAN) 4 MG/0.1ML nasal spray; 1 spray into the nostril(s) as directed by provider As Needed (sedation or accidental overdose of opioid).  Dispense: 1 each; Refill: 1      --- Routine UDS in office today as part of monitoring requirements for controlled substances.  The specimen was viewed and the immunoassay result reviewed and is +OXY, +BZO.  This specimen will be sent to [facility] for confirmation.     --- Refill OxyContin 80 mg. DNF 6/1/2021 applied. " Patient appears stable with current regimen. No adverse effects. Regarding continuation of opioids, there is no evidence of aberrant behavior or any red flags.  The patient continues with appropriate response to opioid therapy. ADL's remain intact by self.   --- Patient is due for a new Narcan prescription as this was last prescribed in January 2020. Discussed with patient that she must pick this prescription up and have this with her at home. Discussed that this is a nasal spray to be used for over-sedation or accidental overdose which will reverse the affect of her opioid medication. Patient states understanding.   --- Follow-up 1 month or sooner if needed.      VICENTA REPORT  As part of the patient's treatment plan, I am prescribing controlled substances. The patient has been made aware of appropriate use of such medications, including potential risk of somnolence, limited ability to drive and/or work safely, and the potential for dependence or overdose. It has also bee made clear that these medications are for use by this patient only, without concomitant use of alcohol or other substances unless prescribed.     Patient has completed prescribing agreement detailing terms of continued prescribing of controlled substances, including monitoring VICENTA reports, urine drug screening, and pill counts if necessary. The patient is aware that inappropriate use will results in cessation of prescribing such medications.    VICENTA report has been reviewed and scanned into the patient's chart.    As the clinician, I personally reviewed the VICENTA from 5/25/2021 while the patient was in the office today.    History and physical exam exhibit continued safe and appropriate use of controlled substances.    EMR Dragon/Transcription disclaimer:   Much of this encounter note is an electronic transcription/translation of spoken language to printed text. The electronic translation of spoken language may permit erroneous, or at times,  nonsensical words or phrases to be inadvertently transcribed; Although I have reviewed the note for such errors, some may still exist.

## 2021-05-25 NOTE — TELEPHONE ENCOUNTER
Seen Today. DNF 6/1/2021 applied. Patient only given 25 day supply due to pharmacy supply issues with last refill. Thanks, TT

## 2021-06-04 RX ORDER — LEVOTHYROXINE SODIUM 0.1 MG/1
TABLET ORAL
Qty: 90 TABLET | Refills: 3 | Status: SHIPPED | OUTPATIENT
Start: 2021-06-04 | End: 2022-05-31

## 2021-06-07 DIAGNOSIS — Z96.652 STATUS POST TOTAL KNEE REPLACEMENT, LEFT: ICD-10-CM

## 2021-06-07 DIAGNOSIS — Z96.651 STATUS POST TOTAL KNEE REPLACEMENT, RIGHT: Primary | ICD-10-CM

## 2021-06-10 ENCOUNTER — HOSPITAL ENCOUNTER (OUTPATIENT)
Dept: GENERAL RADIOLOGY | Facility: HOSPITAL | Age: 71
Discharge: HOME | End: 2021-06-10

## 2021-06-10 ENCOUNTER — CLINICAL SUPPORT (OUTPATIENT)
Dept: FAMILY MEDICINE CLINIC | Age: 71
End: 2021-06-10

## 2021-06-10 ENCOUNTER — TELEPHONE (OUTPATIENT)
Dept: ORTHOPEDIC SURGERY | Facility: CLINIC | Age: 71
End: 2021-06-10

## 2021-06-10 ENCOUNTER — OFFICE VISIT (OUTPATIENT)
Dept: ORTHOPEDIC SURGERY | Facility: CLINIC | Age: 71
End: 2021-06-10

## 2021-06-10 VITALS — TEMPERATURE: 97.8 F | BODY MASS INDEX: 36.06 KG/M2 | WEIGHT: 191 LBS | HEIGHT: 61 IN

## 2021-06-10 DIAGNOSIS — Z96.652 STATUS POST TOTAL KNEE REPLACEMENT, LEFT: ICD-10-CM

## 2021-06-10 DIAGNOSIS — M25.552 LEFT HIP PAIN: Primary | ICD-10-CM

## 2021-06-10 DIAGNOSIS — Z96.651 STATUS POST TOTAL KNEE REPLACEMENT, RIGHT: ICD-10-CM

## 2021-06-10 DIAGNOSIS — M79.641 BILATERAL HAND PAIN: ICD-10-CM

## 2021-06-10 DIAGNOSIS — J30.9 ALLERGIC RHINITIS, UNSPECIFIED SEASONALITY, UNSPECIFIED TRIGGER: Primary | ICD-10-CM

## 2021-06-10 DIAGNOSIS — M79.642 BILATERAL HAND PAIN: ICD-10-CM

## 2021-06-10 DIAGNOSIS — M25.552 LEFT HIP PAIN: ICD-10-CM

## 2021-06-10 PROCEDURE — 95115 IMMUNOTHERAPY ONE INJECTION: CPT | Performed by: FAMILY MEDICINE

## 2021-06-10 PROCEDURE — 73560 X-RAY EXAM OF KNEE 1 OR 2: CPT

## 2021-06-10 PROCEDURE — 99213 OFFICE O/P EST LOW 20 MIN: CPT | Performed by: ORTHOPAEDIC SURGERY

## 2021-06-10 PROCEDURE — 73502 X-RAY EXAM HIP UNI 2-3 VIEWS: CPT

## 2021-06-10 PROCEDURE — 73130 X-RAY EXAM OF HAND: CPT

## 2021-06-10 NOTE — TELEPHONE ENCOUNTER
Patient had x-rays done after her visit today, left voicemail for the patient with Dr. Alvarado's results..    Can you please call patient Anne Jewell and let her know that the hip x-rays look fine. She has severe scoliosis of the low back. The left-sided hip pain is most likely coming from the back. Her hand x-rays also look good and her symptoms are most likely from carpal tunnel syndrome. No surgical intervention is indicated for either of those conditions.

## 2021-06-10 NOTE — PROGRESS NOTES
"Chief Complaint  Follow-up and Pain of the Left Knee and Follow-up and Pain of the Right Knee    Subjective    History of Present Illness      Anne Jewell is a 70 y.o. female who presents to [facility] for follow-up on bilateral knee replacement surgery.  She is also has bilateral carpal tunnel syndrome.   History of Present Illness she states that the knee replacement is doing quite well.  She does not have any symptoms from her knees as such.  Her rheumatoid arthritis has flared up in the past few months and that has made her very uncomfortable.  She states that the left hip also bothers her over the base of the greater trochanter.  She also has compression of the median nerve and the transverse carpal ligament.  The altered sensation in the poor  strength of the hands is a significant impediment for this patient.  This patient has very significant lumbar spine disease and I do not recommend her obtaining a chiropractic evaluation because her lumbar spine changes are structural and very little can be done nonoperatively to improve her lumbar spine symptoms.  Pain Location:  BILATERAL knee and  BILATERAL wrist  Radiation: none  Quality: dull, aching  Intensity/Severity: moderate  Duration: Several months  Progression of symptoms: no worsening, symptoms stable/unchanged  Onset quality: sudden  Timing: intermittent  Aggravating Factors: going up and down stairs, kneeling, rising after sitting  Alleviating Factors: Tylenol, NSAIDs  Previous Episodes: none mentioned  Associated Symptoms: pain, decreased ROM, decreased strength  ADLs Affected: grooming/hygiene/toileting/personal care, dressing, ambulating  Previous Treatment: NSAIDs       Objective   Vital Signs:   Temp 97.8 °F (36.6 °C)   Ht 154.9 cm (61\")   Wt 86.6 kg (191 lb)   BMI 36.09 kg/m²     Physical Exam  Physical Exam  Vitals signs and nursing note reviewed.   Constitutional:       Appearance: Normal appearance. "   Pulmonary:      Effort: Pulmonary effort is normal.   Skin:     General: Skin is warm and dry.      Capillary Refill: Capillary refill takes less than 2 seconds.   Neurological:      General: No focal deficit present.      Mental Status: He is alert and oriented to person, place, and time. Mental status is at baseline.   Psychiatric:         Mood and Affect: Mood normal.         Behavior: Behavior normal.         Thought Content: Thought content normal.         Judgment: Judgment normal.     Ortho Exam   Bilateral wrist-carpal tunnel. The patient is right hand dominant. The Skin is mildly swollen volarly over the proximal crease of the wrist. Radial pulse is palpable. Capillary refill is 2 seconds with a brisk return. Positive Tinel's sign at the wrist. Positive Phalen's sign at the wrist .  strength is impaired.  There is no muscle wasting or atrophy specifically involving the thenar muscle group.  Sensation to light touch and pinprick are diminished over the thumb, index and middle fingers.  Flexor and extensor tendon functions are well preserved. Moving 2 point discrimination is prolonged to 12mm over the median nerve distribution. No evidence of RSD.  There is no clinical evidence of renal disease, thyroid disease or any generalized neurological condition that could be causing nerve dysfunction.    Bilateral knee.The patient is status post total knee arthroplasty postoperative 19 years on the right side and on the left side it has been 1 year(s). Incision is clean. Calf is soft and nontender. Homans sign is negative. There is no clicking, popping or catching. Anterior and posterior drawer signs are negative.  There is no instability of the components. Appropriate amounts of swelling and bruising are noted. Dorsalis pedis and posterior tibial artery pulses are palpable. Common peroneal nerve function is well preserved. Range of motion is from 0-125 degrees of flexion. Gait is cautious but otherwise fairly  normal. There is no evidence of a deep seated joint infection.      Result Review :   The following data was reviewed by: Rashaun Alvarado MD on 06/10/2021:    xrays obtained today  bilateral Knee X-Ray  Indication: Evaluation of implant position after total knee arthroplasty.  AP, Lateral views  Findings: Well-placed implants with a good cement mantle without any subsidence of the implant.  no bony lesion  Soft tissues within normal limits  within normal limits joint spaces  Hardware appropriately positioned yes      no prior studies available for comparison.        X-RAY was ordered and reviewed by Rashaun Alvarado MD    bilateral Hand X-Ray  Indication: Pain and limited range of motion following rheumatoid arthritis.  AP, Lateral views  Findings: Periarticular osteopenia with slight narrowing of the PIP and DIP joint spaces  no bony lesion  Soft tissues within normal limits  decreased joint spaces  Hardware appropriately positioned not applicable      no prior studies available for comparison.    X-RAY was ordered and reviewed by Rashaun Alvarado MD      Procedures           Assessment   Assessment and Plan    Diagnoses and all orders for this visit:    1. Left hip pain (Primary)  -     XR Hip With or Without Pelvis 2 - 3 View Left; Future    2. Bilateral hand pain  -     XR Hand 3+ View Bilateral; Future    3. Status post total knee replacement, right    4. Status post total knee replacement, left          Follow Up   · Compression/brace to the hands to minimize the pressure over the transverse carpal ligament and the median nerve at the wrist.  · Calcium and vitamin D for bone health.  · Discussed with the patient about a possible steroid injection to the transverse carpal ligament to help improve the symptoms of median nerve compression.  · , GI and dental procedure prophylaxis with antibiotics to prevent metastatic infection of the hand arthroplasties.  · There is no indication for revision surgery for any component  of the knee replacement implants.  · Rest, ice, compression, and elevation (RICE) therapy  · Stretching and strengthening exercises of the quads and the hamstrings.  · Falls precaution discussed with the patient and she will use a walker for assistance with ambulation.  · She will talk to her primary care physician in managing the rheumatoid disease with disease modifying agents.  · OTC Tylenol 500-1000mg by mouth every 6 hours as needed for pain   · Follow up in 1 year(s)  • Patient was given instructions and counseling regarding her condition or for health maintenance advice. Please see specific information pulled into the AVS if appropriate.     Rashaun Alvarado MD   Date of Encounter: 6/10/2021       EMR Dragon/Transcription disclaimer:  Much of this encounter note is an electronic transcription/translation of spoken language to printed text. The electronic translation of spoken language may permit erroneous, or at times, nonsensical words or phrases to be inadvertently transcribed; Although I have reviewed the note for such errors, some may still exist.

## 2021-06-22 ENCOUNTER — CLINICAL SUPPORT (OUTPATIENT)
Dept: FAMILY MEDICINE CLINIC | Age: 71
End: 2021-06-22

## 2021-06-22 DIAGNOSIS — J30.9 ALLERGIC RHINITIS, UNSPECIFIED SEASONALITY, UNSPECIFIED TRIGGER: Primary | ICD-10-CM

## 2021-06-22 PROCEDURE — 95115 IMMUNOTHERAPY ONE INJECTION: CPT | Performed by: FAMILY MEDICINE

## 2021-06-29 ENCOUNTER — OFFICE VISIT (OUTPATIENT)
Dept: SPORTS MEDICINE | Facility: CLINIC | Age: 71
End: 2021-06-29

## 2021-06-29 ENCOUNTER — OFFICE VISIT (OUTPATIENT)
Dept: PAIN MEDICINE | Facility: CLINIC | Age: 71
End: 2021-06-29

## 2021-06-29 VITALS
RESPIRATION RATE: 16 BRPM | TEMPERATURE: 98.2 F | OXYGEN SATURATION: 97 % | SYSTOLIC BLOOD PRESSURE: 140 MMHG | HEART RATE: 76 BPM | BODY MASS INDEX: 37.57 KG/M2 | HEIGHT: 61 IN | WEIGHT: 199 LBS | DIASTOLIC BLOOD PRESSURE: 90 MMHG

## 2021-06-29 VITALS
OXYGEN SATURATION: 97 % | HEIGHT: 61 IN | HEART RATE: 68 BPM | TEMPERATURE: 97.1 F | BODY MASS INDEX: 37.95 KG/M2 | SYSTOLIC BLOOD PRESSURE: 132 MMHG | RESPIRATION RATE: 20 BRPM | WEIGHT: 201 LBS | DIASTOLIC BLOOD PRESSURE: 77 MMHG

## 2021-06-29 DIAGNOSIS — G89.4 CHRONIC PAIN SYNDROME: ICD-10-CM

## 2021-06-29 DIAGNOSIS — M06.9 RHEUMATOID ARTHRITIS, INVOLVING UNSPECIFIED SITE, UNSPECIFIED WHETHER RHEUMATOID FACTOR PRESENT (HCC): Primary | Chronic | ICD-10-CM

## 2021-06-29 DIAGNOSIS — E03.9 ACQUIRED HYPOTHYROIDISM: Chronic | ICD-10-CM

## 2021-06-29 DIAGNOSIS — M25.50 ARTHRALGIA, UNSPECIFIED JOINT: ICD-10-CM

## 2021-06-29 DIAGNOSIS — M54.16 LUMBAR RADICULOPATHY: ICD-10-CM

## 2021-06-29 DIAGNOSIS — Z79.899 ENCOUNTER FOR LONG-TERM (CURRENT) USE OF HIGH-RISK MEDICATION: Primary | ICD-10-CM

## 2021-06-29 DIAGNOSIS — M51.36 DEGENERATION OF INTERVERTEBRAL DISC OF LUMBAR REGION: ICD-10-CM

## 2021-06-29 DIAGNOSIS — G56.03 BILATERAL CARPAL TUNNEL SYNDROME: ICD-10-CM

## 2021-06-29 DIAGNOSIS — M06.9 RHEUMATOID ARTHRITIS, INVOLVING UNSPECIFIED SITE, UNSPECIFIED WHETHER RHEUMATOID FACTOR PRESENT (HCC): ICD-10-CM

## 2021-06-29 PROCEDURE — 99214 OFFICE O/P EST MOD 30 MIN: CPT | Performed by: NURSE PRACTITIONER

## 2021-06-29 PROCEDURE — 99214 OFFICE O/P EST MOD 30 MIN: CPT | Performed by: FAMILY MEDICINE

## 2021-06-29 RX ORDER — OXYCODONE HYDROCHLORIDE 80 MG/1
160 TABLET, FILM COATED, EXTENDED RELEASE ORAL EVERY 12 HOURS SCHEDULED
Qty: 120 TABLET | Refills: 0 | Status: SHIPPED | OUTPATIENT
Start: 2021-06-29 | End: 2021-07-27 | Stop reason: SDUPTHER

## 2021-06-29 NOTE — PROGRESS NOTES
"Chief Complaint  Follow-up (f/u after eval with ortho who informed she has carpal tunnel in bilateral hands - thought to be due to her RA  - also wants to discuss possible order for ambulatory devices )    Subjective          Anne Jewell presents to [facility]  History of Present Illness  1.  Bilateral carpal tunnel, is due for thyroid labs.   2.  Would like a 1500 james diet. She has begun to gain weight again.  3.   Has not done well with DMR in the distant past.     Objective   Vital Signs:   /90 (BP Location: Left arm, Patient Position: Sitting, Cuff Size: Adult)   Pulse 76   Temp 98.2 °F (36.8 °C) (Temporal)   Resp 16   Ht 154.9 cm (61\")   Wt 90.3 kg (199 lb)   SpO2 97%   BMI 37.60 kg/m²     Physical Exam  Vitals reviewed.   Constitutional:       Appearance: She is well-developed.   HENT:      Head: Normocephalic and atraumatic.   Eyes:      Conjunctiva/sclera: Conjunctivae normal.      Pupils: Pupils are equal, round, and reactive to light.   Pulmonary:      Effort: Pulmonary effort is normal.   Musculoskeletal:      Comments: + Tinel and Phalen over palmar wrist bilaterally.M5/5   Skin:     General: Skin is warm and dry.   Neurological:      Mental Status: She is alert and oriented to person, place, and time.   Psychiatric:         Behavior: Behavior normal.        Result Review :{Labs  Result Review  Imaging  Med Tab  Media  Procedures :23}                 Assessment and Plan    Diagnoses and all orders for this visit:    1. Rheumatoid arthritis, involving unspecified site, unspecified whether rheumatoid factor present (CMS/[facility]) (Primary)    2. Bilateral carpal tunnel syndrome    3. Acquired hypothyroidism  -     TSH  -     T4, Free      Place in cock-up wrist splints at  and check thyroid labs. She will continue with her current prescriptions medications at their present dosages.       Follow Up   No follow-ups on file.  Patient was given instructions " and counseling regarding her condition or for health maintenance advice. Please see specific information pulled into the AVS if appropriate.

## 2021-06-29 NOTE — PROGRESS NOTES
"CHIEF COMPLAINT  F/u back and joint pain. Pt sts pain is the same.     Subjective   Anne Jewell is a 70 y.o. female  who presents for follow-up.  She has a history of back and joint pain.  Today her pain is 6/10VAS in severity. Continues with OxyContin 80 mg 2 tablets every 12 hours and baclofen 10 mg TID (prescribed by PCP). This medication regimen decreases her pain by a significant amount. \"I wouldn't be able to walk if I didn't have my medication\".  She denies any side effects including somnolence or constipation.     Patient states that she did refill her Narcan nasal spray due to her previous prescription being over 1 year old.     Patient is also utilizing a CBD lotion which she states is helpful.     Patient remained masked during entire encounter. No cough present. I donned a mask and eye protection throughout entire visit. Prior to donning mask and eye protection, hand hygiene was performed, as well as when it was doffed.  I was closer than 6 feet, but not for an extended period of time. No obvious exposure to any bodily fluids.    Back Pain  This is a chronic problem. The current episode started more than 1 year ago. The problem occurs constantly. The problem is unchanged. The pain is present in the lumbar spine. The quality of the pain is described as aching. The pain radiates to the left foot and right foot. The pain is at a severity of 6/10. The pain is moderate. The symptoms are aggravated by stress (weather changes). Pertinent negatives include no abdominal pain, bladder incontinence, bowel incontinence, chest pain, dysuria, fever, headaches, numbness or weakness. She has tried analgesics, bed rest, home exercises and heat (OxyContin, aquatherapy) for the symptoms. The treatment provided moderate relief.   Joint Pain  This is a chronic problem. The current episode started more than 1 year ago. The problem occurs constantly. The problem has been unchanged. Associated symptoms include arthralgias " (joint), joint swelling, myalgias and neck pain. Pertinent negatives include no abdominal pain, chest pain, chills, congestion, coughing, fatigue, fever, headaches, nausea, numbness, vomiting or weakness. She has tried oral narcotics for the symptoms. The treatment provided moderate relief.      PEG Assessment   What number best describes your pain on average in the past week?5  What number best describes how, during the past week, pain has interfered with your enjoyment of life?5  What number best describes how, during the past week, pain has interfered with your general activity?  5    The following portions of the patient's history were reviewed and updated as appropriate: allergies, current medications, past family history, past medical history, past social history, past surgical history and problem list.    Review of Systems   Constitutional: Negative for activity change, chills, fatigue and fever.   HENT: Negative for congestion.    Eyes: Negative for visual disturbance.   Respiratory: Negative for cough and shortness of breath.    Cardiovascular: Negative for chest pain.   Gastrointestinal: Negative for abdominal pain, bowel incontinence, constipation, diarrhea, nausea and vomiting.   Genitourinary: Negative for bladder incontinence, difficulty urinating and dysuria.   Musculoskeletal: Positive for arthralgias (joint), back pain, gait problem (walker), joint swelling, myalgias and neck pain.   Neurological: Negative for dizziness, weakness, light-headedness, numbness and headaches.   Psychiatric/Behavioral: Negative for agitation, sleep disturbance and suicidal ideas. The patient is not nervous/anxious.      --  The aforementioned information the Chief Complaint section and above subjective data including any HPI data, and also the Review of Systems data, has been personally reviewed and affirmed.  --    Vitals:    06/29/21 1149   BP: 132/77   Pulse: 68   Resp: 20   Temp: 97.1 °F (36.2 °C)   SpO2: 97%  "  Weight: 91.2 kg (201 lb)   Height: 154.9 cm (61\")   PainSc:   6   PainLoc: Back  Comment: and joint     Objective   Physical Exam  Vitals and nursing note reviewed.   Constitutional:       Appearance: Normal appearance. She is well-developed.   Eyes:      General: Lids are normal.   Cardiovascular:      Rate and Rhythm: Normal rate.   Pulmonary:      Effort: Pulmonary effort is normal.   Musculoskeletal:      Cervical back: Normal range of motion.      Lumbar back: Tenderness and bony tenderness present. Decreased range of motion.      Comments: Diffuse arthritic changes   Neurological:      Mental Status: She is alert and oriented to person, place, and time.      Gait: Gait abnormal (rollator).   Psychiatric:         Attention and Perception: Attention normal.         Mood and Affect: Mood normal.         Speech: Speech normal.         Behavior: Behavior normal.         Judgment: Judgment normal.       Assessment/Plan   Diagnoses and all orders for this visit:    1. Encounter for long-term (current) use of high-risk medication (Primary)    2. Arthralgia, unspecified joint    3. Chronic pain syndrome    4. Degeneration of intervertebral disc of lumbar region    5. Lumbar radiculopathy    6. Rheumatoid arthritis, involving unspecified site, unspecified whether rheumatoid factor present (CMS/[facility])      --- The urine drug screen confirmation from 5/25/2021 has been reviewed and the result is appropriate based on patient history and [facility] report  --- Refill OxyContin 80 mg. DNF 7/2/2021 applied. Patient appears stable with current regimen. No adverse effects. Regarding continuation of opioids, there is no evidence of aberrant behavior or any red flags.  The patient continues with appropriate response to opioid therapy. ADL's remain intact by self.   --- Follow-up 1 month or sooner if needed.      VICENTA REPORT  As part of the patient's treatment plan, I am prescribing controlled substances. The patient has been made " aware of appropriate use of such medications, including potential risk of somnolence, limited ability to drive and/or work safely, and the potential for dependence or overdose. It has also bee made clear that these medications are for use by this patient only, without concomitant use of alcohol or other substances unless prescribed.     Patient has completed prescribing agreement detailing terms of continued prescribing of controlled substances, including monitoring VICENTA reports, urine drug screening, and pill counts if necessary. The patient is aware that inappropriate use will results in cessation of prescribing such medications.    As the clinician, I personally reviewed the VICENTA from 6/29/2021 while the patient was in the office today.    History and physical exam exhibit continued safe and appropriate use of controlled substances.    EMR Dragon/Transcription disclaimer:   Much of this encounter note is an electronic transcription/translation of spoken language to printed text. The electronic translation of spoken language may permit erroneous, or at times, nonsensical words or phrases to be inadvertently transcribed; Although I have reviewed the note for such errors, some may still exist.

## 2021-06-30 DIAGNOSIS — R11.0 NAUSEA: ICD-10-CM

## 2021-06-30 LAB
T4 FREE SERPL-MCNC: 1.13 NG/DL (ref 0.93–1.7)
TSH SERPL DL<=0.005 MIU/L-ACNC: 3.43 UIU/ML (ref 0.27–4.2)

## 2021-06-30 NOTE — PROGRESS NOTES
Called patient via phone and verbally relayed results and recommendations per Dr. Feldman. All information was verbally understood by the patient.   Thank you  Graciela

## 2021-07-01 VITALS
HEART RATE: 84 BPM | BODY MASS INDEX: 32.78 KG/M2 | WEIGHT: 192 LBS | HEIGHT: 64 IN | SYSTOLIC BLOOD PRESSURE: 110 MMHG | TEMPERATURE: 97.6 F | DIASTOLIC BLOOD PRESSURE: 60 MMHG

## 2021-07-01 RX ORDER — PROMETHAZINE HYDROCHLORIDE 25 MG/1
25 TABLET ORAL EVERY 8 HOURS PRN
Qty: 90 TABLET | Refills: 6 | Status: SHIPPED | OUTPATIENT
Start: 2021-07-01 | End: 2022-03-24

## 2021-07-02 VITALS
TEMPERATURE: 97.4 F | SYSTOLIC BLOOD PRESSURE: 145 MMHG | HEART RATE: 67 BPM | DIASTOLIC BLOOD PRESSURE: 68 MMHG | WEIGHT: 182.2 LBS | HEIGHT: 64 IN | BODY MASS INDEX: 31.1 KG/M2

## 2021-07-07 ENCOUNTER — CLINICAL SUPPORT (OUTPATIENT)
Dept: FAMILY MEDICINE CLINIC | Age: 71
End: 2021-07-07

## 2021-07-07 DIAGNOSIS — J30.9 ALLERGIC RHINITIS, UNSPECIFIED SEASONALITY, UNSPECIFIED TRIGGER: Primary | ICD-10-CM

## 2021-07-07 PROCEDURE — 95115 IMMUNOTHERAPY ONE INJECTION: CPT | Performed by: FAMILY MEDICINE

## 2021-07-21 ENCOUNTER — TELEPHONE (OUTPATIENT)
Dept: PAIN MEDICINE | Facility: CLINIC | Age: 71
End: 2021-07-21

## 2021-07-21 ENCOUNTER — CLINICAL SUPPORT (OUTPATIENT)
Dept: FAMILY MEDICINE CLINIC | Age: 71
End: 2021-07-21

## 2021-07-21 DIAGNOSIS — J30.9 ALLERGIC RHINITIS, UNSPECIFIED SEASONALITY, UNSPECIFIED TRIGGER: Primary | ICD-10-CM

## 2021-07-21 PROCEDURE — 95115 IMMUNOTHERAPY ONE INJECTION: CPT | Performed by: FAMILY MEDICINE

## 2021-07-22 NOTE — PROGRESS NOTES
I have reviewed the notes, assessments, and/or procedures performed by Rocío Issa, and I concur with her documentation of Anne Jewell.

## 2021-07-27 ENCOUNTER — OFFICE VISIT (OUTPATIENT)
Dept: PAIN MEDICINE | Facility: CLINIC | Age: 71
End: 2021-07-27

## 2021-07-27 VITALS
RESPIRATION RATE: 18 BRPM | TEMPERATURE: 97.1 F | HEART RATE: 58 BPM | DIASTOLIC BLOOD PRESSURE: 69 MMHG | SYSTOLIC BLOOD PRESSURE: 146 MMHG | BODY MASS INDEX: 37.52 KG/M2 | OXYGEN SATURATION: 98 % | WEIGHT: 198.7 LBS | HEIGHT: 61 IN

## 2021-07-27 DIAGNOSIS — M15.9 GENERALIZED OSTEOARTHRITIS: Chronic | ICD-10-CM

## 2021-07-27 DIAGNOSIS — M54.16 LUMBAR RADICULOPATHY: Chronic | ICD-10-CM

## 2021-07-27 DIAGNOSIS — G89.4 CHRONIC PAIN SYNDROME: Chronic | ICD-10-CM

## 2021-07-27 DIAGNOSIS — Z79.899 ENCOUNTER FOR LONG-TERM (CURRENT) USE OF HIGH-RISK MEDICATION: Primary | ICD-10-CM

## 2021-07-27 DIAGNOSIS — M06.9 RHEUMATOID ARTHRITIS, INVOLVING UNSPECIFIED SITE, UNSPECIFIED WHETHER RHEUMATOID FACTOR PRESENT (HCC): Chronic | ICD-10-CM

## 2021-07-27 DIAGNOSIS — M51.36 DEGENERATION OF INTERVERTEBRAL DISC OF LUMBAR REGION: Chronic | ICD-10-CM

## 2021-07-27 PROCEDURE — 99214 OFFICE O/P EST MOD 30 MIN: CPT | Performed by: NURSE PRACTITIONER

## 2021-07-27 RX ORDER — OXYCODONE HYDROCHLORIDE 80 MG/1
160 TABLET, FILM COATED, EXTENDED RELEASE ORAL EVERY 12 HOURS SCHEDULED
Qty: 120 TABLET | Refills: 0 | Status: SHIPPED | OUTPATIENT
Start: 2021-07-27 | End: 2021-08-25 | Stop reason: SDUPTHER

## 2021-08-04 ENCOUNTER — CLINICAL SUPPORT (OUTPATIENT)
Dept: FAMILY MEDICINE CLINIC | Age: 71
End: 2021-08-04

## 2021-08-04 DIAGNOSIS — J30.9 ALLERGIC RHINITIS, UNSPECIFIED SEASONALITY, UNSPECIFIED TRIGGER: Primary | ICD-10-CM

## 2021-08-04 PROCEDURE — 95115 IMMUNOTHERAPY ONE INJECTION: CPT | Performed by: FAMILY MEDICINE

## 2021-08-06 RX ORDER — POTASSIUM CHLORIDE 1500 MG/1
TABLET, EXTENDED RELEASE ORAL
Qty: 180 TABLET | Refills: 3 | Status: SHIPPED | OUTPATIENT
Start: 2021-08-06 | End: 2022-08-15

## 2021-08-18 ENCOUNTER — CLINICAL SUPPORT (OUTPATIENT)
Dept: FAMILY MEDICINE CLINIC | Age: 71
End: 2021-08-18

## 2021-08-18 DIAGNOSIS — J30.9 ALLERGIC RHINITIS, UNSPECIFIED SEASONALITY, UNSPECIFIED TRIGGER: Primary | ICD-10-CM

## 2021-08-18 PROCEDURE — 95115 IMMUNOTHERAPY ONE INJECTION: CPT | Performed by: FAMILY MEDICINE

## 2021-08-23 RX ORDER — BACLOFEN 10 MG/1
TABLET ORAL
Qty: 270 TABLET | Refills: 3 | Status: SHIPPED | OUTPATIENT
Start: 2021-08-23 | End: 2022-04-11 | Stop reason: SDUPTHER

## 2021-08-25 ENCOUNTER — OFFICE VISIT (OUTPATIENT)
Dept: PAIN MEDICINE | Facility: CLINIC | Age: 71
End: 2021-08-25

## 2021-08-25 VITALS
RESPIRATION RATE: 16 BRPM | HEIGHT: 61 IN | DIASTOLIC BLOOD PRESSURE: 69 MMHG | WEIGHT: 195.4 LBS | BODY MASS INDEX: 36.89 KG/M2 | OXYGEN SATURATION: 96 % | TEMPERATURE: 96.6 F | HEART RATE: 64 BPM | SYSTOLIC BLOOD PRESSURE: 152 MMHG

## 2021-08-25 DIAGNOSIS — M06.9 RHEUMATOID ARTHRITIS, INVOLVING UNSPECIFIED SITE, UNSPECIFIED WHETHER RHEUMATOID FACTOR PRESENT (HCC): ICD-10-CM

## 2021-08-25 DIAGNOSIS — M51.36 DEGENERATION OF INTERVERTEBRAL DISC OF LUMBAR REGION: ICD-10-CM

## 2021-08-25 DIAGNOSIS — M15.9 GENERALIZED OSTEOARTHRITIS: ICD-10-CM

## 2021-08-25 DIAGNOSIS — Z79.899 ENCOUNTER FOR LONG-TERM (CURRENT) USE OF HIGH-RISK MEDICATION: Primary | ICD-10-CM

## 2021-08-25 DIAGNOSIS — M25.50 ARTHRALGIA, UNSPECIFIED JOINT: ICD-10-CM

## 2021-08-25 DIAGNOSIS — G89.4 CHRONIC PAIN SYNDROME: ICD-10-CM

## 2021-08-25 PROCEDURE — 99214 OFFICE O/P EST MOD 30 MIN: CPT | Performed by: NURSE PRACTITIONER

## 2021-08-25 NOTE — PROGRESS NOTES
"CHIEF COMPLAINT  F/U for back and joint pain . Pt states her pain has remained the same since her last office visit.    Subjective   Anne Jewell is a 70 y.o. female  who presents for follow-up.  She has a history of back and diffuse joint pain. Today her pain is 7/10VAS in severity. She continues with OxyContin 80 mg 2 tablets every 12 hours and baclofen 10 mg TID (prescribed by PCP). This medication regimen decreases her pain by significant amount. \"I don't know what'd I do without it\". ADLs by self. She denies any side effects including somnolence or constipation.     She has been swimming once a week for exercise which has been helpful for her pain.     She states she plans to get her Pfizer COVID19 Booster today.     Patient remained masked during entire encounter. No cough present. I donned a mask and eye protection throughout entire visit. Prior to donning mask and eye protection, hand hygiene was performed, as well as when it was doffed.  I was closer than 6 feet, but not for an extended period of time. No obvious exposure to any bodily fluids.    Back Pain  This is a chronic problem. The current episode started more than 1 year ago. The problem occurs constantly. The problem is unchanged. The pain is present in the lumbar spine. The quality of the pain is described as aching. The pain radiates to the left foot and right foot. The pain is at a severity of 7/10. The pain is moderate. The symptoms are aggravated by stress (weather changes). Pertinent negatives include no abdominal pain, bladder incontinence, bowel incontinence, chest pain, dysuria, fever, headaches, numbness or weakness. She has tried analgesics, bed rest, home exercises and heat (OxyContin, aquatherapy) for the symptoms. The treatment provided moderate relief.   Joint Pain  This is a chronic problem. The current episode started more than 1 year ago. The problem occurs constantly. The problem has been unchanged. Associated symptoms include " arthralgias, joint swelling, myalgias and neck pain. Pertinent negatives include no abdominal pain, chest pain, chills, congestion, coughing, fatigue, fever, headaches, nausea, numbness, vomiting or weakness. She has tried oral narcotics for the symptoms. The treatment provided moderate relief.      PEG Assessment   What number best describes your pain on average in the past week?6  What number best describes how, during the past week, pain has interfered with your enjoyment of life?6  What number best describes how, during the past week, pain has interfered with your general activity?  6    The following portions of the patient's history were reviewed and updated as appropriate: allergies, current medications, past family history, past medical history, past social history, past surgical history and problem list.    Review of Systems   Constitutional: Negative for activity change, chills, fatigue and fever.   HENT: Negative for congestion.    Eyes: Negative for visual disturbance.   Respiratory: Negative for cough and chest tightness.    Cardiovascular: Negative for chest pain.   Gastrointestinal: Negative for abdominal pain, bowel incontinence, constipation, diarrhea, nausea and vomiting.   Genitourinary: Negative for bladder incontinence, difficulty urinating and dysuria.   Musculoskeletal: Positive for arthralgias, back pain, joint swelling, myalgias and neck pain.   Neurological: Negative for dizziness, weakness, light-headedness, numbness and headaches.   Psychiatric/Behavioral: Negative for agitation, sleep disturbance and suicidal ideas. The patient is not nervous/anxious.      --  The aforementioned information the Chief Complaint section and above subjective data including any HPI data, and also the Review of Systems data, has been personally reviewed and affirmed.  --    Vitals:    08/25/21 1110   BP: 152/69   Pulse: 64   Resp: 16   Temp: 96.6 °F (35.9 °C)   TempSrc: Temporal   SpO2: 96%   Weight: 88.6 kg  "(195 lb 6.4 oz)   Height: 154.9 cm (61\")   PainSc:   7   PainLoc: Back  Comment: and joint pain     Objective   Physical Exam  Vitals and nursing note reviewed.   Constitutional:       Appearance: Normal appearance. She is well-developed.   Eyes:      General: Lids are normal.   Cardiovascular:      Rate and Rhythm: Normal rate.   Pulmonary:      Effort: Pulmonary effort is normal.   Musculoskeletal:      Cervical back: Normal range of motion.      Lumbar back: Tenderness and bony tenderness present. Decreased range of motion. Scoliosis present.      Comments: Diffuse arthritic changes  Back brace in place   Neurological:      Mental Status: She is alert and oriented to person, place, and time.      Gait: Gait abnormal (rollator).   Psychiatric:         Attention and Perception: Attention normal.         Mood and Affect: Mood normal.         Speech: Speech normal.         Behavior: Behavior normal.         Judgment: Judgment normal.       Assessment/Plan   Diagnoses and all orders for this visit:    1. Encounter for long-term (current) use of high-risk medication (Primary)    2. Chronic pain syndrome    3. Generalized osteoarthritis    4. Degeneration of intervertebral disc of lumbar region    5. Arthralgia, unspecified joint    6. Rheumatoid arthritis, involving unspecified site, unspecified whether rheumatoid factor present (CMS/[facility])      --- The urine drug screen confirmation from 5/25/2021 has been reviewed and the result is appropriate based on patient history and V[facility] report  --- CSA updated 12/16/2020  --- Refill OxyContin 80 mg. DNF 9/9/2021 applied. Patient appears stable with current regimen. No adverse effects. Regarding continuation of opioids, there is no evidence of aberrant behavior or any red flags.  The patient continues with appropriate response to opioid therapy. ADL's remain intact by self.   --- Follow-up 1 month or sooner if needed.      [facility]A REPORT  As part of the patient's treatment plan, " I am prescribing controlled substances. The patient has been made aware of appropriate use of such medications, including potential risk of somnolence, limited ability to drive and/or work safely, and the potential for dependence or overdose. It has also bee made clear that these medications are for use by this patient only, without concomitant use of alcohol or other substances unless prescribed.     Patient has completed prescribing agreement detailing terms of continued prescribing of controlled substances, including monitoring [facility] reports, urine drug screening, and pill counts if necessary. The patient is aware that inappropriate use will results in cessation of prescribing such medications.    As the clinician, I personally reviewed the VICENTA from 8/25/2021 while the patient was in the office today.    History and physical exam exhibit continued safe and appropriate use of controlled substances.     Dictated utilizing Dragon dictation.

## 2021-08-30 RX ORDER — OXYCODONE HYDROCHLORIDE 80 MG/1
160 TABLET, FILM COATED, EXTENDED RELEASE ORAL EVERY 12 HOURS SCHEDULED
Qty: 120 TABLET | Refills: 0 | Status: SHIPPED | OUTPATIENT
Start: 2021-08-30 | End: 2021-10-11 | Stop reason: SDUPTHER

## 2021-09-02 ENCOUNTER — CLINICAL SUPPORT (OUTPATIENT)
Dept: FAMILY MEDICINE CLINIC | Age: 71
End: 2021-09-02

## 2021-09-02 DIAGNOSIS — J30.9 ALLERGIC RHINITIS, UNSPECIFIED SEASONALITY, UNSPECIFIED TRIGGER: Primary | ICD-10-CM

## 2021-09-02 PROCEDURE — 95115 IMMUNOTHERAPY ONE INJECTION: CPT | Performed by: FAMILY MEDICINE

## 2021-09-15 ENCOUNTER — CLINICAL SUPPORT (OUTPATIENT)
Dept: FAMILY MEDICINE CLINIC | Age: 71
End: 2021-09-15

## 2021-09-15 DIAGNOSIS — J30.9 ALLERGIC RHINITIS, UNSPECIFIED SEASONALITY, UNSPECIFIED TRIGGER: Primary | ICD-10-CM

## 2021-09-15 PROCEDURE — 95115 IMMUNOTHERAPY ONE INJECTION: CPT | Performed by: FAMILY MEDICINE

## 2021-09-20 ENCOUNTER — OFFICE VISIT (OUTPATIENT)
Dept: PAIN MEDICINE | Facility: CLINIC | Age: 71
End: 2021-09-20

## 2021-09-20 VITALS
HEIGHT: 61 IN | HEART RATE: 58 BPM | WEIGHT: 198.2 LBS | BODY MASS INDEX: 37.42 KG/M2 | RESPIRATION RATE: 18 BRPM | TEMPERATURE: 96.8 F | DIASTOLIC BLOOD PRESSURE: 86 MMHG | OXYGEN SATURATION: 97 % | SYSTOLIC BLOOD PRESSURE: 168 MMHG

## 2021-09-20 DIAGNOSIS — M06.9 RHEUMATOID ARTHRITIS, INVOLVING UNSPECIFIED SITE, UNSPECIFIED WHETHER RHEUMATOID FACTOR PRESENT (HCC): Chronic | ICD-10-CM

## 2021-09-20 DIAGNOSIS — M16.0 PRIMARY OSTEOARTHRITIS OF BOTH HIPS: ICD-10-CM

## 2021-09-20 DIAGNOSIS — Z79.899 ENCOUNTER FOR LONG-TERM (CURRENT) USE OF HIGH-RISK MEDICATION: ICD-10-CM

## 2021-09-20 DIAGNOSIS — M51.36 DEGENERATION OF INTERVERTEBRAL DISC OF LUMBAR REGION: Chronic | ICD-10-CM

## 2021-09-20 DIAGNOSIS — M15.9 GENERALIZED OSTEOARTHRITIS: Chronic | ICD-10-CM

## 2021-09-20 DIAGNOSIS — G89.4 CHRONIC PAIN SYNDROME: Primary | Chronic | ICD-10-CM

## 2021-09-20 PROCEDURE — 99214 OFFICE O/P EST MOD 30 MIN: CPT | Performed by: ANESTHESIOLOGY

## 2021-09-20 NOTE — PROGRESS NOTES
CHIEF COMPLAINT  Follow-up for back and joint pain.    Subjective   Anne Jewell is a 71 y.o. female  who presents for follow-up.  She has a history of chronic pain.    We reviewed that she has a current Narcan prescription for safety that is good until August 2022.      She continues to see Dr. Alvarado for arthritic issues.  RA & OA.  Bilateral hip and knee issues.   Also carpal tunnel bilaterally.     Patient remained masked during entire encounter. No cough present. I donned a mask and eye protection throughout entire visit. Prior to donning mask and eye protection, hand hygiene was performed, as well as when it was doffed.  I was closer than 6 feet, but not for an extended period of time. No obvious exposure to any bodily fluids.    Back Pain  This is a chronic problem. The current episode started more than 1 year ago. The problem occurs constantly. The problem is unchanged. The pain is present in the lumbar spine. The quality of the pain is described as aching. The pain radiates to the left foot and right foot. The pain is moderate. The symptoms are aggravated by stress (weather changes). Associated symptoms include numbness (bilateral hands) and weakness. Pertinent negatives include no abdominal pain, bladder incontinence, bowel incontinence, chest pain, dysuria, fever or headaches. Risk factors include sedentary lifestyle and poor posture. She has tried analgesics, bed rest, home exercises and heat (OxyContin, aquatherapy) for the symptoms. The treatment provided moderate relief.   Joint Pain  This is a chronic problem. The current episode started more than 1 year ago. The problem occurs constantly. The problem has been unchanged. Associated symptoms include arthralgias, joint swelling, myalgias, neck pain, numbness (bilateral hands) and weakness. Pertinent negatives include no abdominal pain, chest pain, chills, congestion, coughing, fatigue, fever, headaches, nausea or vomiting. She has tried oral narcotics  for the symptoms. The treatment provided moderate relief.        PEG Assessment   What number best describes your pain on average in the past week?6  What number best describes how, during the past week, pain has interfered with your enjoyment of life?6  What number best describes how, during the past week, pain has interfered with your general activity?  6    --  The aforementioned information the Chief Complaint section and above subjective data including any HPI data, and also the Review of Systems data, has been personally reviewed and affirmed.  --        The following portions of the patient's history were reviewed and updated as appropriate: allergies, current medications, past family history, past medical history, past social history, past surgical history and problem list.    -------    The following portions of the patient's history were reviewed and updated as appropriate: allergies, current medications, past family history, past medical history, past social history, past surgical history and problem list.    Allergies   Allergen Reactions   • Penicillins Hives         Current Outpatient Medications:   •  albuterol (PROVENTIL HFA;VENTOLIN HFA) 108 (90 BASE) MCG/ACT inhaler, Proventil  (90 Base) MCG/ACT Inhalation Aerosol Solution; Patient Sig: Proventil  (90 Base) MCG/ACT Inhalation Aerosol Solution Inhale 2 puff(s) every 6 to 8 hours as needed; 6.7; 0; 05-Apr-2013; Active, Disp: , Rfl:   •  ALPRAZolam (XANAX) 2 MG tablet, , Disp: , Rfl:   •  baclofen (LIORESAL) 10 MG tablet, TAKE ONE TABLET BY MOUTH THREE TIMES A DAY, Disp: 270 tablet, Rfl: 3  •  cetirizine (zyrTEC) 10 MG tablet, Take 10 mg by mouth Daily., Disp: , Rfl:   •  chlorhexidine (PERIDEX) 0.12 % solution, , Disp: , Rfl:   •  Cholecalciferol (VITAMIN D3) 2000 units capsule, TAKE ONE CAPSULE BY MOUTH DAILY, Disp: 30 capsule, Rfl: 9  •  Cyanocobalamin (B-12) 1000 MCG sublingual tablet, PLACE 1 TABLET UNDER THE TONGUE AND LET DISSOLVE  ONCE DAILY, Disp: 30 each, Rfl: 1  •  dicyclomine (BENTYL) 20 MG tablet, TAKE ONE TABLET BY MOUTH EVERY 6 HOURS AS NEEDED, Disp: 120 tablet, Rfl: 5  •  EPINEPHrine (EPIPEN) 0.3 MG/0.3ML solution auto-injector injection, , Disp: , Rfl:   •  escitalopram (LEXAPRO) 10 MG tablet, Take 1 tablet by mouth daily., Disp: , Rfl:   •  hydroCHLOROthiazide (HYDRODIURIL) 25 MG tablet, TAKE ONE TABLET BY MOUTH DAILY, Disp: 90 tablet, Rfl: 2  •  KLOR-CON 20 MEQ CR tablet, TAKE ONE TABLET BY MOUTH TWICE A DAY **MUST CALL MD FOR APPOINTMENT, Disp: 180 tablet, Rfl: 3  •  Lactobacillus (ACIDOPHILUS PO), Take  by mouth., Disp: , Rfl:   •  levothyroxine (SYNTHROID, LEVOTHROID) 100 MCG tablet, TAKE ONE TABLET BY MOUTH DAILY, Disp: 90 tablet, Rfl: 3  •  montelukast (SINGULAIR) 10 MG tablet, Take 1 tablet by mouth daily., Disp: , Rfl:   •  Multiple Vitamin tablet, Take 1 tablet by mouth daily., Disp: , Rfl:   •  naloxone (NARCAN) 4 MG/0.1ML nasal spray, 1 spray into the nostril(s) as directed by provider As Needed (sedation or accidental overdose of opioid)., Disp: 1 each, Rfl: 1  •  oxyCODONE ER (oxyCONTIN) 80 MG tablet extended-release 12 hour 12 hr tablet, Take 2 tablets by mouth Every 12 (Twelve) Hours. DNF Until 9/9/2021, Disp: 120 tablet, Rfl: 0  •  promethazine (PHENERGAN) 25 MG tablet, Take 1 tablet by mouth Every 8 (Eight) Hours As Needed for Nausea or Vomiting., Disp: 90 tablet, Rfl: 6  •  Xarelto 20 MG tablet, TAKE ONE TABLET BY MOUTH DAILY, Disp: 30 tablet, Rfl: 11    Current Outpatient Medications on File Prior to Visit   Medication Sig Dispense Refill   • albuterol (PROVENTIL HFA;VENTOLIN HFA) 108 (90 BASE) MCG/ACT inhaler Proventil  (90 Base) MCG/ACT Inhalation Aerosol Solution; Patient Sig: Proventil  (90 Base) MCG/ACT Inhalation Aerosol Solution Inhale 2 puff(s) every 6 to 8 hours as needed; 6.7; 0; 05-Apr-2013; Active     • ALPRAZolam (XANAX) 2 MG tablet      • baclofen (LIORESAL) 10 MG tablet TAKE ONE TABLET  BY MOUTH THREE TIMES A  tablet 3   • cetirizine (zyrTEC) 10 MG tablet Take 10 mg by mouth Daily.     • chlorhexidine (PERIDEX) 0.12 % solution      • Cholecalciferol (VITAMIN D3) 2000 units capsule TAKE ONE CAPSULE BY MOUTH DAILY 30 capsule 9   • Cyanocobalamin (B-12) 1000 MCG sublingual tablet PLACE 1 TABLET UNDER THE TONGUE AND LET DISSOLVE ONCE DAILY 30 each 1   • dicyclomine (BENTYL) 20 MG tablet TAKE ONE TABLET BY MOUTH EVERY 6 HOURS AS NEEDED 120 tablet 5   • EPINEPHrine (EPIPEN) 0.3 MG/0.3ML solution auto-injector injection      • escitalopram (LEXAPRO) 10 MG tablet Take 1 tablet by mouth daily.     • hydroCHLOROthiazide (HYDRODIURIL) 25 MG tablet TAKE ONE TABLET BY MOUTH DAILY 90 tablet 2   • KLOR-CON 20 MEQ CR tablet TAKE ONE TABLET BY MOUTH TWICE A DAY **MUST CALL MD FOR APPOINTMENT 180 tablet 3   • Lactobacillus (ACIDOPHILUS PO) Take  by mouth.     • levothyroxine (SYNTHROID, LEVOTHROID) 100 MCG tablet TAKE ONE TABLET BY MOUTH DAILY 90 tablet 3   • montelukast (SINGULAIR) 10 MG tablet Take 1 tablet by mouth daily.     • Multiple Vitamin tablet Take 1 tablet by mouth daily.     • naloxone (NARCAN) 4 MG/0.1ML nasal spray 1 spray into the nostril(s) as directed by provider As Needed (sedation or accidental overdose of opioid). 1 each 1   • oxyCODONE ER (oxyCONTIN) 80 MG tablet extended-release 12 hour 12 hr tablet Take 2 tablets by mouth Every 12 (Twelve) Hours. DNF Until 9/9/2021 120 tablet 0   • promethazine (PHENERGAN) 25 MG tablet Take 1 tablet by mouth Every 8 (Eight) Hours As Needed for Nausea or Vomiting. 90 tablet 6   • Xarelto 20 MG tablet TAKE ONE TABLET BY MOUTH DAILY 30 tablet 11   • [DISCONTINUED] Fluzone High-Dose Quadrivalent 0.7 ML suspension prefilled syringe ADM 0.7ML IM UTD     • [DISCONTINUED] Simethicone (GAS-X PO) Take  by mouth.       No current facility-administered medications on file prior to visit.       Patient Active Problem List   Diagnosis   • Chronic pain syndrome   •  Rheumatoid arthritis (CMS/HCC)   • Osteoarthritis of knee   • Generalized osteoarthritis   • Degeneration of intervertebral disc of lumbar region   • Neck pain   • Lumbar radiculopathy   • Atopic rhinitis   • Anxiety   • Diarrhea   • Indigestion   • Dysthymic disorder   • Gastroesophageal reflux disease   • Hypothyroidism   • Insomnia   • Pernicious anemia   • Osteoporosis   • Scoliosis   • Vasovagal syncope   • Vitamin D deficiency   • Trigger middle finger of right hand   • Trigger ring finger of right hand   • Trigger little finger of right hand   • Encounter for long-term (current) use of high-risk medication   • Coagulation disorder (CMS/HCC)   • Hyperglycemia   • Joint pain   • Status post total knee replacement, left   • Left knee pain   • Status post total knee replacement, right   • Osteoarthritis of thumb   • Trigger finger of all digits of right hand   • Left hip pain   • Primary osteoarthritis of both hips       Past Medical History:   Diagnosis Date   • Allergic rhinitis    • Asthma    • Bronchospasm    • Carpal tunnel syndrome, bilateral    • Clotting disorder (CMS/HCC)    • Deep vein thrombosis (CMS/[facility])    • Disc degeneration, lumbar    • Edema    • Esophageal reflux    • Glaucoma    • Joint pain    • Low back pain    • Osteoarthritis    • Osteopenia    • Osteoporosis    • Scoliosis    • Status post total knee replacement, left 8/31/2017   • Status post total knee replacement, right 8/31/2017   • UTI (urinary tract infection)    • Venous thrombosis        Past Surgical History:   Procedure Laterality Date   • BILATERAL BREAST REDUCTION     • BREAST SURGERY     • COLONOSCOPY  08/05/2016   • GASTRIC BYPASS     • HAND SURGERY Right 01/14/2016   • HERNIA REPAIR      x7   • HYSTERECTOMY     • OTHER SURGICAL HISTORY      vaginal sling operation for stress incontinence   • TOTAL KNEE ARTHROPLASTY Left    • TOTAL KNEE ARTHROPLASTY Bilateral        Family History   Problem Relation Age of Onset   • Prostate  cancer Father    • Arthritis Other    • Hypertension Other         benign essential   • Cancer Other    • Diabetes Other    • Heart disease Other    • Nephrolithiasis Other        Social History     Socioeconomic History   • Marital status: Other     Spouse name: Not on file   • Number of children: Not on file   • Years of education: Not on file   • Highest education level: Not on file   Tobacco Use   • Smoking status: Former Smoker   • Smokeless tobacco: Never Used   Vaping Use   • Vaping Use: Never used   Substance and Sexual Activity   • Alcohol use: No   • Drug use: No   • Sexual activity: Defer       -------        Review of Systems   Constitutional: Negative for chills, fatigue and fever.   HENT: Negative for congestion.    Eyes: Negative for visual disturbance.   Respiratory: Negative for cough and shortness of breath.    Cardiovascular: Negative for chest pain.   Gastrointestinal: Negative for abdominal pain, bowel incontinence, constipation, nausea and vomiting.   Genitourinary: Negative for bladder incontinence, difficulty urinating and dysuria.   Musculoskeletal: Positive for arthralgias, back pain, joint swelling, myalgias and neck pain.   Neurological: Positive for weakness and numbness (bilateral hands). Negative for headaches.   Psychiatric/Behavioral: Negative for sleep disturbance and suicidal ideas. The patient is not nervous/anxious.      E-shan report is reviewed:  I reviewed the document in the electronic form under the PDMP tab in the Epic EMR...  - In this function, the report is a current report in as close to real-time as possible.  - The report was available for immediate review.    - I did cesar the report as reviewed.  - The patient has been using the same pharmacy. The patient has most recently used a [facility] pharmacy. There is concern for aberrant behavior based on this ekasper review.    Vitals:    09/20/21 1039   BP: 168/86   Pulse: 58   Resp: 18   Temp: 96.8 °F (36 °C)   SpO2: 97%  "  Weight: 89.9 kg (198 lb 3.2 oz)   Height: 154.9 cm (61\")   PainSc:   8   PainLoc: Neck         Objective   Physical Exam  Vitals and nursing note reviewed.   Constitutional:       Appearance: Normal appearance. She is well-developed. She is ill-appearing. She is not toxic-appearing.   HENT:      Head: Normocephalic and atraumatic.   Eyes:      General: Lids are normal.   Pulmonary:      Effort: No respiratory distress.   Musculoskeletal:      Lumbar back: Tenderness and bony tenderness present. Decreased range of motion. Scoliosis present.      Comments: Diffuse stigmata of arthritic changes noted.   Back brace being worn.   Neurological:      Mental Status: She is alert and oriented to person, place, and time.      Gait: Gait abnormal (using a walker).      Deep Tendon Reflexes:      Reflex Scores:       Patellar reflexes are 0 on the right side and 0 on the left side.       Achilles reflexes are 0 on the right side and 0 on the left side.  Psychiatric:         Attention and Perception: Attention normal.         Mood and Affect: Mood normal.         Speech: Speech normal.         Behavior: Behavior normal.         Judgment: Judgment normal.             Assessment/Plan   Diagnoses and all orders for this visit:    1. Chronic pain syndrome (Primary)    2. Encounter for long-term (current) use of high-risk medication    3. Primary osteoarthritis of both hips    4. Rheumatoid arthritis, involving unspecified site, unspecified whether rheumatoid factor present (CMS/[facility])    5. Generalized osteoarthritis    6. Degeneration of intervertebral disc of lumbar region        -- continue monthly follow ups  -- in review, she is not a good candidate for interventional pain management options  -- Follow-up monthly with EUGENIO  -- continue oxycontin  -- continue baclofen             VICENTA GERMAIN  As part of the patient's treatment plan, I am prescribing controlled substances. The patient has been made aware of appropriate use of " such medications, including potential risk of somnolence, limited ability to drive and/or work safely, and the potential for dependence or overdose. It has also bee made clear that these medications are for use by this patient only, without concomitant use of alcohol or other substances unless prescribed.     Patient has completed prescribing agreement detailing terms of continued prescribing of controlled substances, including monitoring VICENTA reports, urine drug screening, and pill counts if necessary. The patient is aware that inappropriate use will results in cessation of prescribing such medications.    As the clinician, I personally reviewed the VICENTA from as above while the patient was in the office today.    History and physical exam exhibit continued safe and appropriate use of controlled substances.  --    Patient appears stable with current regimen. No adverse effects. Regarding continuation of opioids, there is no evidence of aberrant behavior or any red flags.  The patient continues with appropriate response to opioid therapy. ADL's remain intact by self.     she does have a significant history of spine degeneration and arthritis, and demonstrates moderate improvement with multimodal therapy including opioid therapy, which allows her to engage in ADLs and family activities. The continuation of opioid therapy is therefore not contraindicated. We will however respect the March 2016 CDC Guidelines and will plan to avoid overexposure to opioids and avoid dose escalation.  She has some factors which make consideration of advanced interventional options quite difficult, including transportation and support, as well as anticoagulation.    ---   Dictated utilizing Dragon dictation.   ---    Vitals:    09/20/21 1039   PainSc:   8   PainLoc: Neck        Anne Jewell reports a pain score of 8.  Given her pain assessment as noted, treatment options were discussed and the following options were decided upon as a  follow-up plan to address the patient's pain: continuation of current treatment plan for pain, prescription for non-opiod analgesics and prescription for opiod analgesics.

## 2021-09-29 ENCOUNTER — CLINICAL SUPPORT (OUTPATIENT)
Dept: FAMILY MEDICINE CLINIC | Age: 71
End: 2021-09-29

## 2021-09-29 DIAGNOSIS — J30.9 ALLERGIC RHINITIS, UNSPECIFIED SEASONALITY, UNSPECIFIED TRIGGER: Primary | ICD-10-CM

## 2021-09-29 PROCEDURE — 95115 IMMUNOTHERAPY ONE INJECTION: CPT | Performed by: FAMILY MEDICINE

## 2021-10-06 ENCOUNTER — CLINICAL SUPPORT (OUTPATIENT)
Dept: FAMILY MEDICINE CLINIC | Age: 71
End: 2021-10-06

## 2021-10-06 DIAGNOSIS — J30.9 ALLERGIC RHINITIS, UNSPECIFIED SEASONALITY, UNSPECIFIED TRIGGER: Primary | ICD-10-CM

## 2021-10-06 PROCEDURE — 95115 IMMUNOTHERAPY ONE INJECTION: CPT | Performed by: FAMILY MEDICINE

## 2021-10-11 ENCOUNTER — TELEPHONE (OUTPATIENT)
Dept: PAIN MEDICINE | Facility: CLINIC | Age: 71
End: 2021-10-11

## 2021-10-11 RX ORDER — OXYCODONE HYDROCHLORIDE 80 MG/1
160 TABLET, FILM COATED, EXTENDED RELEASE ORAL EVERY 12 HOURS SCHEDULED
Qty: 120 TABLET | Refills: 0 | Status: SHIPPED | OUTPATIENT
Start: 2021-10-13 | End: 2021-11-12 | Stop reason: SDUPTHER

## 2021-10-11 NOTE — TELEPHONE ENCOUNTER
Medication Refill Request    Date of phone call: 10/11/21    Medication being requested: oxycodone ER 80mg si tabs po q 12 hours   Qty: 120    Date of last visit: 21    Date of last refill:     VICENTA up to date?:     Next Follow up?: 10/20/21    Any new pertinent information? (i.e, new medication allergies, new use of medications, change in patient's health or condition, non-compliance or inconsistency with prescribing agreement?): can you please fill for Dr. Grajeda? Thank you.

## 2021-10-11 NOTE — TELEPHONE ENCOUNTER
VICENTA reviewed and appropriate Request# [id number].  UDS 5/25/21 appropriate.  Refill sent.  This patient is under the care of my colleague and I am covering patient care for him at this time.  I have reviewed pertinent information/documentation as necessary and will continue the plan of care as previously directed to the best of my ability.

## 2021-10-11 NOTE — TELEPHONE ENCOUNTER
Caller: THERESA KHAN    Relationship: SELF    Medication requested (name and dosage):   oxyCODONE ER (oxyCONTIN) 80 MG tablet extended-release 12 hour 12 hr tablet    Pharmacy where request should be sent: [facility] - [place], [place] - [address]  - [phone]  - [phone]    Additional details provided by patient: PATIENT NEEDS TO GET THIS REFILLED. SHE CURRENTLY HAS MEDICATION, BUT SHE NEEDS TO HAVE IT REFILLED ASAP. HER PHARMACY SOMETIMES HAS TO ORDER IT AND IT MAY TAKE HER SOME TIME TO GET IT. THEY WILL NOT ORDER IT UNTIL THEY RECEIVE THE PRESCRIPTION.    Best call back number: [phone]    Does the patient have less than a 3 day supply:  [] Yes  [x] No    [facility] Rep   10/11/21 15:32 EDT

## 2021-10-13 ENCOUNTER — CLINICAL SUPPORT (OUTPATIENT)
Dept: FAMILY MEDICINE CLINIC | Age: 71
End: 2021-10-13

## 2021-10-13 DIAGNOSIS — J30.9 ALLERGIC RHINITIS, UNSPECIFIED SEASONALITY, UNSPECIFIED TRIGGER: Primary | ICD-10-CM

## 2021-10-13 PROCEDURE — 95115 IMMUNOTHERAPY ONE INJECTION: CPT | Performed by: FAMILY MEDICINE

## 2021-10-15 ENCOUNTER — TELEPHONE (OUTPATIENT)
Dept: PAIN MEDICINE | Facility: CLINIC | Age: 71
End: 2021-10-15

## 2021-10-15 NOTE — TELEPHONE ENCOUNTER
Pt returned my call. We have a PA approval on file for oxycontin good from 1/1/21 to 12/31/21. Printed letter for pt, when she comes in for appt with Varsha on 10/20/21 I will give her a copy for her pharmacy.

## 2021-10-15 NOTE — TELEPHONE ENCOUNTER
Caller: THERESA KHAN    Relationship: SELF    Best call back number: [phone]    What was the call regarding: PATIENT'S PHARMACY INFORMED HER THAT HER APPROVAL FOR HER MEDICATION WOULD  ON 10/26/2021. THEY INSTRUCTED HER TO LET HER DOCTOR'S OFFICE KNOW.     Do you require a callback: ONLY IF NEEDED

## 2021-10-20 ENCOUNTER — OFFICE VISIT (OUTPATIENT)
Dept: PAIN MEDICINE | Facility: CLINIC | Age: 71
End: 2021-10-20

## 2021-10-20 VITALS
OXYGEN SATURATION: 97 % | BODY MASS INDEX: 37.57 KG/M2 | HEART RATE: 55 BPM | HEIGHT: 61 IN | SYSTOLIC BLOOD PRESSURE: 157 MMHG | DIASTOLIC BLOOD PRESSURE: 85 MMHG | TEMPERATURE: 96.8 F | WEIGHT: 199 LBS | RESPIRATION RATE: 20 BRPM

## 2021-10-20 DIAGNOSIS — G89.29 CHRONIC LOW BACK PAIN, UNSPECIFIED BACK PAIN LATERALITY, UNSPECIFIED WHETHER SCIATICA PRESENT: ICD-10-CM

## 2021-10-20 DIAGNOSIS — M16.0 PRIMARY OSTEOARTHRITIS OF BOTH HIPS: ICD-10-CM

## 2021-10-20 DIAGNOSIS — G89.4 CHRONIC PAIN SYNDROME: Primary | ICD-10-CM

## 2021-10-20 DIAGNOSIS — M54.16 CHRONIC RADICULAR LUMBAR PAIN: ICD-10-CM

## 2021-10-20 DIAGNOSIS — M54.50 CHRONIC LOW BACK PAIN, UNSPECIFIED BACK PAIN LATERALITY, UNSPECIFIED WHETHER SCIATICA PRESENT: ICD-10-CM

## 2021-10-20 DIAGNOSIS — Z79.899 ENCOUNTER FOR LONG-TERM (CURRENT) USE OF HIGH-RISK MEDICATION: ICD-10-CM

## 2021-10-20 DIAGNOSIS — M25.50 ARTHRALGIA, UNSPECIFIED JOINT: ICD-10-CM

## 2021-10-20 DIAGNOSIS — G89.29 CHRONIC RADICULAR LUMBAR PAIN: ICD-10-CM

## 2021-10-20 DIAGNOSIS — M06.9 RHEUMATOID ARTHRITIS, INVOLVING UNSPECIFIED SITE, UNSPECIFIED WHETHER RHEUMATOID FACTOR PRESENT (HCC): ICD-10-CM

## 2021-10-20 PROCEDURE — 99214 OFFICE O/P EST MOD 30 MIN: CPT | Performed by: NURSE PRACTITIONER

## 2021-10-20 NOTE — PROGRESS NOTES
CHIEF COMPLAINT  F/u back and joint pain. Pt sts pain is the same.     Subjective   Anne Jewell is a 71 y.o. female  who presents for follow-up.  She has a history of back and diffuse joint pain. She continues with OxyContin 80 mg 2 tablets every 12 hours and baclofen 10 mg 3/day (prescribed by PCP). This medication regimen decreases her pain.  She states that it helps her be able to get up, remain active, and move. ADLs by self. She denies any side effects including somnolence or constipation.     She state that she was able to get COVID Booster.     Patient remained masked during entire encounter. No cough present. I donned a mask and eye protection throughout entire visit. Prior to donning mask and eye protection, hand hygiene was performed, as well as when it was doffed.  I was closer than 6 feet, but not for an extended period of time. No obvious exposure to any bodily fluids.    Back Pain  This is a chronic problem. The current episode started more than 1 year ago. The problem occurs constantly. The problem is unchanged. The pain is present in the lumbar spine. The quality of the pain is described as aching. The pain radiates to the left foot and right foot. The pain is at a severity of 4/10. The pain is moderate. The symptoms are aggravated by stress (weather changes). Pertinent negatives include no abdominal pain, bladder incontinence, bowel incontinence, chest pain, dysuria, fever, headaches, numbness or weakness. She has tried analgesics, bed rest, home exercises and heat (OxyContin, aquatherapy) for the symptoms. The treatment provided moderate relief.   Joint Pain  This is a chronic problem. The current episode started more than 1 year ago. The problem occurs constantly. The problem has been unchanged. Associated symptoms include arthralgias (joint), fatigue (occ), myalgias and neck pain. Pertinent negatives include no abdominal pain, chest pain, chills, congestion, coughing, fever, headaches, joint  swelling, nausea, numbness, vomiting or weakness. She has tried oral narcotics for the symptoms. The treatment provided moderate relief.      PEG Assessment   What number best describes your pain on average in the past week?5  What number best describes how, during the past week, pain has interfered with your enjoyment of life?5  What number best describes how, during the past week, pain has interfered with your general activity?  5    The following portions of the patient's history were reviewed and updated as appropriate: allergies, current medications, past family history, past medical history, past social history, past surgical history and problem list.    Review of Systems   Constitutional: Positive for fatigue (occ). Negative for activity change, chills and fever.   HENT: Negative for congestion.    Eyes: Negative for visual disturbance.   Respiratory: Negative for cough and shortness of breath.    Cardiovascular: Negative for chest pain.   Gastrointestinal: Negative for abdominal pain, bowel incontinence, constipation, diarrhea, nausea and vomiting.   Genitourinary: Negative for bladder incontinence, difficulty urinating and dysuria.   Musculoskeletal: Positive for arthralgias (joint), back pain, gait problem (walker), myalgias and neck pain. Negative for joint swelling.   Neurological: Negative for dizziness, weakness, light-headedness, numbness and headaches.   Psychiatric/Behavioral: Negative for agitation, sleep disturbance and suicidal ideas. The patient is not nervous/anxious.      --  The aforementioned information the Chief Complaint section and above subjective data including any HPI data, and also the Review of Systems data, has been personally reviewed and affirmed.  --    Office visit 9/20/2020 with Dr. Grajeda reviewed.  Continue OxyContin and baclofen, continue monthly follow-ups.  Patient is not a good candidate for interventional pain management options.  Follow-up monthly with  "APRN.    Vitals:    10/20/21 1247   BP: 157/85   Pulse: 55   Resp: 20   Temp: 96.8 °F (36 °C)   SpO2: 97%   Weight: 90.3 kg (199 lb)   Height: 154.9 cm (61\")   PainSc:   4   PainLoc: Back  Comment: and joint     Objective   Physical Exam  Vitals and nursing note reviewed.   Constitutional:       Appearance: Normal appearance. She is well-developed.   Eyes:      General: Lids are normal.   Cardiovascular:      Rate and Rhythm: Normal rate.   Pulmonary:      Effort: Pulmonary effort is normal.   Musculoskeletal:      Cervical back: Normal range of motion.      Thoracic back: Scoliosis present.      Lumbar back: Tenderness and bony tenderness present. Decreased range of motion. Scoliosis present.      Comments: Diffuse arthritic changes   Neurological:      Mental Status: She is alert and oriented to person, place, and time.      Gait: Gait abnormal (rolator).   Psychiatric:         Attention and Perception: Attention normal.         Mood and Affect: Mood normal.         Speech: Speech normal.         Behavior: Behavior normal.         Judgment: Judgment normal.       Assessment/Plan   Diagnoses and all orders for this visit:    1. Chronic pain syndrome (Primary)    2. Encounter for long-term (current) use of high-risk medication    3. Primary osteoarthritis of both hips    4. Arthralgia, unspecified joint    5. Rheumatoid arthritis, involving unspecified site, unspecified whether rheumatoid factor present (HCC)    6. Chronic low back pain, unspecified back pain laterality, unspecified whether sciatica present    7. Chronic radicular lumbar pain      --- The urine drug screen confirmation from 5/25/2021 has been reviewed and the result is appropriate based on patient history and VICENTA report  --- CSA updated 12/16/2020  --- Continue OxyContin 80 mg every 12 hours. Patient appears stable with current regimen. No adverse effects. Regarding continuation of opioids, there is no evidence of aberrant behavior or any red " flags.  The patient continues with appropriate response to opioid therapy. ADL's remain intact by self.   --- Follow-up 1 month or sooner if needed.      VICENTA REPORT  As part of the patient's treatment plan, I am prescribing controlled substances. The patient has been made aware of appropriate use of such medications, including potential risk of somnolence, limited ability to drive and/or work safely, and the potential for dependence or overdose. It has also bee made clear that these medications are for use by this patient only, without concomitant use of alcohol or other substances unless prescribed.     Patient has completed prescribing agreement detailing terms of continued prescribing of controlled substances, including monitoring VICENTA reports, urine drug screening, and pill counts if necessary. The patient is aware that inappropriate use will results in cessation of prescribing such medications.    As the clinician, I personally reviewed the VICENTA from 10/20/2021 while the patient was in the office today.    History and physical exam exhibit continued safe and appropriate use of controlled substances.    Dictated utilizing Dragon dictation.

## 2021-10-27 ENCOUNTER — CLINICAL SUPPORT (OUTPATIENT)
Dept: FAMILY MEDICINE CLINIC | Age: 71
End: 2021-10-27

## 2021-10-27 DIAGNOSIS — J30.9 ALLERGIC RHINITIS, UNSPECIFIED SEASONALITY, UNSPECIFIED TRIGGER: Primary | ICD-10-CM

## 2021-10-27 PROCEDURE — 95115 IMMUNOTHERAPY ONE INJECTION: CPT | Performed by: FAMILY MEDICINE

## 2021-11-02 ENCOUNTER — CLINICAL SUPPORT (OUTPATIENT)
Dept: FAMILY MEDICINE CLINIC | Age: 71
End: 2021-11-02

## 2021-11-02 DIAGNOSIS — J30.9 ALLERGIC RHINITIS, UNSPECIFIED SEASONALITY, UNSPECIFIED TRIGGER: Primary | ICD-10-CM

## 2021-11-02 PROCEDURE — 95115 IMMUNOTHERAPY ONE INJECTION: CPT | Performed by: FAMILY MEDICINE

## 2021-11-12 ENCOUNTER — TELEPHONE (OUTPATIENT)
Dept: PAIN MEDICINE | Facility: CLINIC | Age: 71
End: 2021-11-12

## 2021-11-12 NOTE — TELEPHONE ENCOUNTER
Caller: Anne Jewell    Relationship: Self    Best call back number: [phone]    Requested Prescriptions:   Requested Prescriptions     Pending Prescriptions Disp Refills   • oxyCODONE ER (oxyCONTIN) 80 MG tablet extended-release 12 hour 12 hr tablet 120 tablet 0     Sig: Take 2 tablets by mouth Every 12 (Twelve) Hours. DNF Until 9/9/2021        Pharmacy where request should be sent:  [facility]- IN PATIENT'S CHART    Additional details provided by patient: PATIENT ADVISED HER PHARMACY TO HER TO CALL DR. EVANGELISTA TO CALL IN PRESCRIPTION.     Does the patient have less than a 3 day supply:  [] Yes  [x] No    [employer]Bandar Pearce Rep   11/12/21 14:50 EST

## 2021-11-12 NOTE — TELEPHONE ENCOUNTER
Medication Refill Request    Date of phone call: 21    Medication being requested: oxycontin 80mg ER 12 hr si tabs po q 12 hours  Qty: 120    Date of last visit: 10/20/21    Date of last refill:     VICENTA up to date?:     Next Follow up?: 21    Any new pertinent information? (i.e, new medication allergies, new use of medications, change in patient's health or condition, non-compliance or inconsistency with prescribing agreement?):

## 2021-11-15 RX ORDER — OXYCODONE HYDROCHLORIDE 80 MG/1
160 TABLET, FILM COATED, EXTENDED RELEASE ORAL EVERY 12 HOURS SCHEDULED
Qty: 120 TABLET | Refills: 0 | Status: SHIPPED | OUTPATIENT
Start: 2021-11-15 | End: 2021-12-09 | Stop reason: SDUPTHER

## 2021-11-17 ENCOUNTER — CLINICAL SUPPORT (OUTPATIENT)
Dept: FAMILY MEDICINE CLINIC | Age: 71
End: 2021-11-17

## 2021-11-17 DIAGNOSIS — J30.9 ALLERGIC RHINITIS, UNSPECIFIED SEASONALITY, UNSPECIFIED TRIGGER: Primary | ICD-10-CM

## 2021-11-17 PROCEDURE — 95115 IMMUNOTHERAPY ONE INJECTION: CPT | Performed by: FAMILY MEDICINE

## 2021-11-18 ENCOUNTER — TELEPHONE (OUTPATIENT)
Dept: SPORTS MEDICINE | Facility: CLINIC | Age: 71
End: 2021-11-18

## 2021-11-18 NOTE — TELEPHONE ENCOUNTER
Lm for patient to call the office back. Also scheduled pt for next available in April 2022.  
No, I may be needing to take that day off  
Patient called and wants to know if we can work her in on 12/20/21 for a physical.   Please advise, thank you.     
Pt called back and notified.     
03-Nov-2017

## 2021-11-22 ENCOUNTER — OFFICE VISIT (OUTPATIENT)
Dept: PAIN MEDICINE | Facility: CLINIC | Age: 71
End: 2021-11-22

## 2021-11-22 VITALS
WEIGHT: 195.8 LBS | DIASTOLIC BLOOD PRESSURE: 90 MMHG | SYSTOLIC BLOOD PRESSURE: 156 MMHG | HEART RATE: 62 BPM | RESPIRATION RATE: 16 BRPM | HEIGHT: 61 IN | OXYGEN SATURATION: 97 % | TEMPERATURE: 96.6 F | BODY MASS INDEX: 36.97 KG/M2

## 2021-11-22 DIAGNOSIS — G89.29 CHRONIC LOW BACK PAIN, UNSPECIFIED BACK PAIN LATERALITY, UNSPECIFIED WHETHER SCIATICA PRESENT: ICD-10-CM

## 2021-11-22 DIAGNOSIS — G89.4 CHRONIC PAIN SYNDROME: Primary | ICD-10-CM

## 2021-11-22 DIAGNOSIS — M15.9 GENERALIZED OSTEOARTHRITIS: ICD-10-CM

## 2021-11-22 DIAGNOSIS — Z79.899 ENCOUNTER FOR LONG-TERM (CURRENT) USE OF HIGH-RISK MEDICATION: ICD-10-CM

## 2021-11-22 DIAGNOSIS — G89.29 CHRONIC RADICULAR LUMBAR PAIN: ICD-10-CM

## 2021-11-22 DIAGNOSIS — M54.16 CHRONIC RADICULAR LUMBAR PAIN: ICD-10-CM

## 2021-11-22 DIAGNOSIS — M25.50 ARTHRALGIA, UNSPECIFIED JOINT: ICD-10-CM

## 2021-11-22 DIAGNOSIS — M54.50 CHRONIC LOW BACK PAIN, UNSPECIFIED BACK PAIN LATERALITY, UNSPECIFIED WHETHER SCIATICA PRESENT: ICD-10-CM

## 2021-11-22 PROCEDURE — 80305 DRUG TEST PRSMV DIR OPT OBS: CPT | Performed by: NURSE PRACTITIONER

## 2021-11-22 PROCEDURE — 99214 OFFICE O/P EST MOD 30 MIN: CPT | Performed by: NURSE PRACTITIONER

## 2021-11-22 NOTE — PROGRESS NOTES
"CHIEF COMPLAINT  F/U for back and joint pain.  Pt states her pain level has gotten worse.     Subjective    Anne Jewell is a 71 y.o. female  who presents for follow-up.  She has a history of back and joint pain.  Today her pain is 8/10VAS in severity. She continues with OxyContin 80 mg 2 tablets every 12 hours and baclofen 10 mg 3/day (prescribed by PCP).  This medication regimen decreases her pain by a significant amount.  \"I wouldn't be able to get up without it\".  ADLs by self. She denies any side effects including somnolence or constipation.     Prescribed Xanax 2 mg TID by psychiatrist, Narcan prescription at home (most recent prescription 5/25/2021).     She reports Dr Feldman started her on OxyContin in 2000    Patient remained masked during entire encounter. No cough present. I donned a mask and eye protection throughout entire visit. Prior to donning mask and eye protection, hand hygiene was performed, as well as when it was doffed.  I was closer than 6 feet, but not for an extended period of time. No obvious exposure to any bodily fluids.    Back Pain  This is a chronic problem. The current episode started more than 1 year ago. The problem occurs constantly. The problem is unchanged. The pain is present in the lumbar spine. The quality of the pain is described as aching. The pain radiates to the left foot and right foot. The pain is at a severity of 8/10. The pain is moderate. The symptoms are aggravated by stress (weather changes). Associated symptoms include weakness. Pertinent negatives include no abdominal pain, bladder incontinence, bowel incontinence, chest pain, dysuria, fever, headaches or numbness. She has tried analgesics, bed rest, home exercises and heat (OxyContin, aquatherapy) for the symptoms. The treatment provided moderate relief.   Joint Pain  This is a chronic problem. The current episode started more than 1 year ago. The problem occurs constantly. The problem has been unchanged. " Associated symptoms include arthralgias (joint), myalgias, neck pain and weakness. Pertinent negatives include no abdominal pain, chest pain, chills, congestion, coughing, fatigue, fever, headaches, joint swelling, nausea, numbness or vomiting. She has tried oral narcotics for the symptoms. The treatment provided moderate relief.      PEG Assessment   What number best describes your pain on average in the past week?8  What number best describes how, during the past week, pain has interfered with your enjoyment of life?8  What number best describes how, during the past week, pain has interfered with your general activity?  8    The following portions of the patient's history were reviewed and updated as appropriate: allergies, current medications, past family history, past medical history, past social history, past surgical history and problem list.    Review of Systems   Constitutional: Negative for activity change, chills, fatigue and fever.   HENT: Negative for congestion.    Eyes: Negative for visual disturbance.   Respiratory: Negative for cough and chest tightness.    Cardiovascular: Negative for chest pain.   Gastrointestinal: Negative for abdominal pain, bowel incontinence, constipation, diarrhea, nausea and vomiting.   Genitourinary: Negative for bladder incontinence, difficulty urinating and dysuria.   Musculoskeletal: Positive for arthralgias (joint), back pain, myalgias and neck pain. Negative for joint swelling.   Neurological: Positive for weakness. Negative for dizziness, light-headedness, numbness and headaches.   Psychiatric/Behavioral: Negative for agitation, sleep disturbance and suicidal ideas. The patient is not nervous/anxious.      --  The aforementioned information the Chief Complaint section and above subjective data including any HPI data, and also the Review of Systems data, has been personally reviewed and affirmed.  --    Vitals:    11/22/21 0959   BP: 156/90   Pulse: 62   Resp: 16  "  Temp: 96.6 °F (35.9 °C)   SpO2: 97%   Weight: 88.8 kg (195 lb 12.8 oz)   Height: 154.9 cm (61\")   PainSc:   8   PainLoc: Back  Comment: joint     Objective   Physical Exam  Vitals and nursing note reviewed.   Constitutional:       Appearance: Normal appearance. She is well-developed.   Eyes:      General: Lids are normal.   Cardiovascular:      Rate and Rhythm: Normal rate.   Pulmonary:      Effort: Pulmonary effort is normal.   Musculoskeletal:      Cervical back: Normal range of motion.      Thoracic back: Scoliosis present.      Lumbar back: Tenderness and bony tenderness present. Decreased range of motion. Scoliosis present.      Comments: Diffuse arthritic changes   Neurological:      Mental Status: She is alert and oriented to person, place, and time.      Gait: Gait abnormal (rolator).   Psychiatric:         Attention and Perception: Attention normal.         Mood and Affect: Mood normal.         Speech: Speech normal.         Behavior: Behavior normal.         Judgment: Judgment normal.       Assessment/Plan   Diagnoses and all orders for this visit:    1. Chronic pain syndrome (Primary)    2. Encounter for long-term (current) use of high-risk medication    3. Arthralgia, unspecified joint    4. Chronic low back pain, unspecified back pain laterality, unspecified whether sciatica present    5. Chronic radicular lumbar pain    6. Generalized osteoarthritis      --- Routine UDS in office today as part of monitoring requirements for controlled substances.  The specimen was viewed and the immunoassay result reviewed and is +OXY, +BZO.  This specimen will be sent to [facility] laboratory for confirmation.     --- CSA updated 12/16/2020  --- Continue OxyContin. No refill needed. Patient appears stable with current regimen. No adverse effects. Regarding continuation of opioids, there is no evidence of aberrant behavior or any red flags.  The patient continues with appropriate response to opioid therapy. ADL's " remain intact by self.   --- Discussed that with regards of treatment of her osteoporosis I recommend she discussed this with her PCP and OBGYN. Patient states understanding.   --- Follow-up 1 month or sooner if needed.      VICENTA REPORT  As part of the patient's treatment plan, I am prescribing controlled substances. The patient has been made aware of appropriate use of such medications, including potential risk of somnolence, limited ability to drive and/or work safely, and the potential for dependence or overdose. It has also bee made clear that these medications are for use by this patient only, without concomitant use of alcohol or other substances unless prescribed.     Patient has completed prescribing agreement detailing terms of continued prescribing of controlled substances, including monitoring VICENTA reports, urine drug screening, and pill counts if necessary. The patient is aware that inappropriate use will results in cessation of prescribing such medications.    As the clinician, I personally reviewed the VICENTA from 11/22/2021 while the patient was in the office today.    History and physical exam exhibit continued safe and appropriate use of controlled substances.    Dictated utilizing Dragon dictation.

## 2021-11-29 ENCOUNTER — TELEPHONE (OUTPATIENT)
Dept: PAIN MEDICINE | Facility: CLINIC | Age: 71
End: 2021-11-29

## 2021-11-29 NOTE — TELEPHONE ENCOUNTER
Caller: THERESA  Relationship to Patient: SELF    Phone Number: [phone]  Reason for Call: PT CALLED STATING THAT SHE HAS AN APPT 12/20/2021 WITH STEVE AND WOULD LIKE TO KNOW IF SHE CAN BE SQUEEZED IN EARLIER THAT DAY DUE TO SCHEDULING CONFLICTS AND HER MEDICATION SCHEDULE. PLEASE ADVISE PT AT ABOVE PHONE NUMBER

## 2021-11-30 ENCOUNTER — CLINICAL SUPPORT (OUTPATIENT)
Dept: FAMILY MEDICINE CLINIC | Age: 71
End: 2021-11-30

## 2021-11-30 DIAGNOSIS — J30.9 ALLERGIC RHINITIS, UNSPECIFIED SEASONALITY, UNSPECIFIED TRIGGER: Primary | ICD-10-CM

## 2021-11-30 PROCEDURE — 95115 IMMUNOTHERAPY ONE INJECTION: CPT | Performed by: FAMILY MEDICINE

## 2021-11-30 NOTE — PROGRESS NOTES
I have reviewed the notes, assessments, and/or procedures performed by Candida Ventura LPN, and I concur with her documentation of Anne Jewell.

## 2021-12-09 NOTE — TELEPHONE ENCOUNTER
Medication Refill Request    Date of phone call: 12/9/21    Medication being requested: Oxycontin 80 mg sig: Take 2 tabs po q 12 hrs  Qty: 120    Date of last visit: 11/22/21    Date of last refill:     VICENTA up to date?: no    Next Follow up?: 12/20/21    Any new pertinent information? (i.e, new medication allergies, new use of medications, change in patient's health or condition, non-compliance or inconsistency with prescribing agreement?):

## 2021-12-10 RX ORDER — OXYCODONE HYDROCHLORIDE 80 MG/1
160 TABLET, FILM COATED, EXTENDED RELEASE ORAL EVERY 12 HOURS SCHEDULED
Qty: 120 TABLET | Refills: 0 | Status: SHIPPED | OUTPATIENT
Start: 2021-12-10 | End: 2022-01-10 | Stop reason: SDUPTHER

## 2021-12-14 ENCOUNTER — CLINICAL SUPPORT (OUTPATIENT)
Dept: FAMILY MEDICINE CLINIC | Age: 71
End: 2021-12-14

## 2021-12-14 DIAGNOSIS — J30.9 ALLERGIC RHINITIS, UNSPECIFIED SEASONALITY, UNSPECIFIED TRIGGER: Primary | ICD-10-CM

## 2021-12-14 PROCEDURE — 95115 IMMUNOTHERAPY ONE INJECTION: CPT | Performed by: FAMILY MEDICINE

## 2021-12-20 ENCOUNTER — OFFICE VISIT (OUTPATIENT)
Dept: PAIN MEDICINE | Facility: CLINIC | Age: 71
End: 2021-12-20

## 2021-12-20 VITALS
SYSTOLIC BLOOD PRESSURE: 144 MMHG | OXYGEN SATURATION: 98 % | HEART RATE: 70 BPM | HEIGHT: 61 IN | WEIGHT: 197.8 LBS | DIASTOLIC BLOOD PRESSURE: 78 MMHG | RESPIRATION RATE: 16 BRPM | BODY MASS INDEX: 37.34 KG/M2 | TEMPERATURE: 96.9 F

## 2021-12-20 DIAGNOSIS — M15.9 GENERALIZED OSTEOARTHRITIS: ICD-10-CM

## 2021-12-20 DIAGNOSIS — G89.29 CHRONIC RADICULAR LUMBAR PAIN: ICD-10-CM

## 2021-12-20 DIAGNOSIS — G89.4 CHRONIC PAIN SYNDROME: Primary | ICD-10-CM

## 2021-12-20 DIAGNOSIS — M54.16 CHRONIC RADICULAR LUMBAR PAIN: ICD-10-CM

## 2021-12-20 DIAGNOSIS — Z79.899 ENCOUNTER FOR LONG-TERM (CURRENT) USE OF HIGH-RISK MEDICATION: ICD-10-CM

## 2021-12-20 DIAGNOSIS — M54.50 CHRONIC LOW BACK PAIN, UNSPECIFIED BACK PAIN LATERALITY, UNSPECIFIED WHETHER SCIATICA PRESENT: ICD-10-CM

## 2021-12-20 DIAGNOSIS — M25.50 ARTHRALGIA, UNSPECIFIED JOINT: ICD-10-CM

## 2021-12-20 DIAGNOSIS — G89.29 CHRONIC LOW BACK PAIN, UNSPECIFIED BACK PAIN LATERALITY, UNSPECIFIED WHETHER SCIATICA PRESENT: ICD-10-CM

## 2021-12-20 PROCEDURE — 99214 OFFICE O/P EST MOD 30 MIN: CPT | Performed by: NURSE PRACTITIONER

## 2021-12-20 NOTE — PROGRESS NOTES
CHIEF COMPLAINT  F/U back and joint.   Pt states her pain has stayed the same.     Subjective   Anne Jewell is a 71 y.o. female  who presents for follow-up.  She has a history of back and joint pain.  Today her pain is 5/10VAS in severity. She continues with OxyContin 80 mg 2 tablets every 12 hours and baclofen 10 mg 3/day (prescribed by PCP).  This medication regimen is helpful and allows her to remain active. She denies any side effects including somnolence or constipation.     Prescribed Xanax 2 mg TID by psychiatrist, Narcan prescription at home (most recent prescription 5/25/2021).    Not a good candidate for interventions.     Patient remained masked during entire encounter. No cough present. I donned a mask and eye protection throughout entire visit. Prior to donning mask and eye protection, hand hygiene was performed, as well as when it was doffed.  I was closer than 6 feet, but not for an extended period of time. No obvious exposure to any bodily fluids.    Back Pain  This is a chronic problem. The current episode started more than 1 year ago. The problem occurs constantly. The problem is unchanged. The pain is present in the lumbar spine. The quality of the pain is described as aching. The pain radiates to the left foot and right foot. The pain is at a severity of 5/10. The pain is moderate. The symptoms are aggravated by stress (weather changes). Associated symptoms include numbness (fingers) and weakness (fingers). Pertinent negatives include no abdominal pain, bladder incontinence, bowel incontinence, chest pain, dysuria, fever or headaches. She has tried analgesics, bed rest, home exercises and heat (OxyContin, aquatherapy) for the symptoms. The treatment provided moderate relief.   Joint Pain  This is a chronic problem. The current episode started more than 1 year ago. The problem occurs constantly. The problem has been unchanged. Associated symptoms include arthralgias (joint), myalgias, neck  "pain, numbness (fingers) and weakness (fingers). Pertinent negatives include no abdominal pain, chest pain, chills, congestion, coughing, fatigue, fever, headaches, joint swelling, nausea or vomiting. She has tried oral narcotics for the symptoms. The treatment provided moderate relief.      The following portions of the patient's history were reviewed and updated as appropriate: allergies, current medications, past family history, past medical history, past social history, past surgical history and problem list.    Review of Systems   Constitutional: Negative for activity change, chills, fatigue and fever.   HENT: Negative for congestion.    Eyes: Negative for visual disturbance.   Respiratory: Negative for cough and chest tightness.    Cardiovascular: Negative for chest pain.   Gastrointestinal: Negative for abdominal pain, bowel incontinence, constipation, diarrhea, nausea and vomiting.   Genitourinary: Negative for bladder incontinence, difficulty urinating and dysuria.   Musculoskeletal: Positive for arthralgias (joint), back pain, myalgias and neck pain. Negative for joint swelling.   Neurological: Positive for weakness (fingers) and numbness (fingers). Negative for dizziness, light-headedness and headaches.   Psychiatric/Behavioral: Negative for agitation, sleep disturbance and suicidal ideas. The patient is not nervous/anxious.      --  The aforementioned information the Chief Complaint section and above subjective data including any HPI data, and also the Review of Systems data, has been personally reviewed and affirmed.  --    Vitals:    12/20/21 1313   BP: 144/78   Pulse: 70   Resp: 16   Temp: 96.9 °F (36.1 °C)   SpO2: 98%   Weight: 89.7 kg (197 lb 12.8 oz)   Height: 154.9 cm (61\")   PainSc:   5   PainLoc: Back  Comment: joint     Objective   Physical Exam  Vitals and nursing note reviewed.   Constitutional:       Appearance: Normal appearance. She is well-developed.   Eyes:      General: Lids are normal. "   Cardiovascular:      Rate and Rhythm: Normal rate.   Pulmonary:      Effort: Pulmonary effort is normal.   Musculoskeletal:      Cervical back: Normal range of motion.      Lumbar back: Tenderness and bony tenderness present. Decreased range of motion. Scoliosis present.      Comments: Diffuse arthritic changes   Neurological:      Mental Status: She is alert and oriented to person, place, and time.      Gait: Gait abnormal (rolator).   Psychiatric:         Attention and Perception: Attention normal.         Mood and Affect: Mood normal.         Speech: Speech normal.         Behavior: Behavior normal.         Judgment: Judgment normal.       Assessment/Plan   Diagnoses and all orders for this visit:    1. Chronic pain syndrome (Primary)    2. Encounter for long-term (current) use of high-risk medication    3. Arthralgia, unspecified joint    4. Chronic low back pain, unspecified back pain laterality, unspecified whether sciatica present    5. Generalized osteoarthritis    6. Chronic radicular lumbar pain      --- The urine drug screen confirmation from 11/22/2021 has been reviewed and the result is appropriate based on patient history and [facility] report  --- The patient signed an updated copy of the controlled substance agreement on 12/20/2021  --- Continue OxyContin. No refill needed. Patient appears stable with current regimen. No adverse effects. Regarding continuation of opioids, there is no evidence of aberrant behavior or any red flags.  The patient continues with appropriate response to opioid therapy. ADL's remain intact by self.   --- Follow-up 3 weeks or sooner if needed.      [facility] REPORT  As part of the patient's treatment plan, I am prescribing controlled substances. The patient has been made aware of appropriate use of such medications, including potential risk of somnolence, limited ability to drive and/or work safely, and the potential for dependence or overdose. It has also bee made clear that  these medications are for use by this patient only, without concomitant use of alcohol or other substances unless prescribed.     Patient has completed prescribing agreement detailing terms of continued prescribing of controlled substances, including monitoring [facility] reports, urine drug screening, and pill counts if necessary. The patient is aware that inappropriate use will results in cessation of prescribing such medications.    As the clinician, I personally reviewed the [facility] from 12/20/2021 while the patient was in the office today.    History and physical exam exhibit continued safe and appropriate use of controlled substances.    Dictated utilizing Dragon dictation.     This document is intended for medical expert use only. Reading of this document by patients and/or patient's family without participating medical staff guidance may result in misinterpretation and unintended morbidity.   Any interpretation of such data is the responsibility of the patient and/or family member responsible for the patient in concert with their primary or specialist providers, not to be left for sources of online searches such as [employer], [employer] or similar queries. Relying on these approaches to knowledge may result in misinterpretation, misguided goals of care and even death should patients or family members try recommendations outside of the realm of professional medical care in a supervised way.

## 2022-01-03 ENCOUNTER — CLINICAL SUPPORT (OUTPATIENT)
Dept: FAMILY MEDICINE CLINIC | Age: 72
End: 2022-01-03

## 2022-01-03 DIAGNOSIS — J30.9 ALLERGIC RHINITIS, UNSPECIFIED SEASONALITY, UNSPECIFIED TRIGGER: Primary | ICD-10-CM

## 2022-01-03 PROCEDURE — 95115 IMMUNOTHERAPY ONE INJECTION: CPT | Performed by: FAMILY MEDICINE

## 2022-01-10 RX ORDER — OXYCODONE HYDROCHLORIDE 80 MG/1
160 TABLET, FILM COATED, EXTENDED RELEASE ORAL EVERY 12 HOURS SCHEDULED
Qty: 120 TABLET | Refills: 0 | Status: SHIPPED | OUTPATIENT
Start: 2022-01-15 | End: 2022-02-18 | Stop reason: SDUPTHER

## 2022-01-10 NOTE — TELEPHONE ENCOUNTER
Medication Refill Request    Date of phone call: 01/10/2022    Medication being requested: oxycodone 80 mg sig: Take 2 tablets by mouth Every 12 (Twelve) Hours  Qty: 120    Date of last visit: 12/20/21    Date of last refill:     [facility]A up to date?:     Next Follow up?: 01/19/2022    Any new pertinent information? (i.e, new medication allergies, new use of medications, change in patient's health or condition, non-compliance or inconsistency with prescribing agreement?):

## 2022-01-11 ENCOUNTER — CLINICAL SUPPORT (OUTPATIENT)
Dept: FAMILY MEDICINE CLINIC | Age: 72
End: 2022-01-11

## 2022-01-11 DIAGNOSIS — J30.9 ALLERGIC RHINITIS, UNSPECIFIED SEASONALITY, UNSPECIFIED TRIGGER: Primary | ICD-10-CM

## 2022-01-11 PROCEDURE — 95115 IMMUNOTHERAPY ONE INJECTION: CPT | Performed by: FAMILY MEDICINE

## 2022-01-19 ENCOUNTER — OFFICE VISIT (OUTPATIENT)
Dept: PAIN MEDICINE | Facility: CLINIC | Age: 72
End: 2022-01-19

## 2022-01-19 VITALS
SYSTOLIC BLOOD PRESSURE: 123 MMHG | RESPIRATION RATE: 16 BRPM | OXYGEN SATURATION: 96 % | WEIGHT: 196 LBS | HEART RATE: 60 BPM | BODY MASS INDEX: 37 KG/M2 | TEMPERATURE: 96 F | DIASTOLIC BLOOD PRESSURE: 76 MMHG | HEIGHT: 61 IN

## 2022-01-19 DIAGNOSIS — M15.9 GENERALIZED OSTEOARTHRITIS: ICD-10-CM

## 2022-01-19 DIAGNOSIS — M54.50 CHRONIC LOW BACK PAIN, UNSPECIFIED BACK PAIN LATERALITY, UNSPECIFIED WHETHER SCIATICA PRESENT: ICD-10-CM

## 2022-01-19 DIAGNOSIS — G89.4 CHRONIC PAIN SYNDROME: Primary | ICD-10-CM

## 2022-01-19 DIAGNOSIS — M25.50 ARTHRALGIA, UNSPECIFIED JOINT: ICD-10-CM

## 2022-01-19 DIAGNOSIS — M54.16 CHRONIC RADICULAR LUMBAR PAIN: ICD-10-CM

## 2022-01-19 DIAGNOSIS — G89.29 CHRONIC RADICULAR LUMBAR PAIN: ICD-10-CM

## 2022-01-19 DIAGNOSIS — Z79.899 ENCOUNTER FOR LONG-TERM (CURRENT) USE OF HIGH-RISK MEDICATION: ICD-10-CM

## 2022-01-19 DIAGNOSIS — G89.29 CHRONIC LOW BACK PAIN, UNSPECIFIED BACK PAIN LATERALITY, UNSPECIFIED WHETHER SCIATICA PRESENT: ICD-10-CM

## 2022-01-19 PROCEDURE — 99214 OFFICE O/P EST MOD 30 MIN: CPT | Performed by: NURSE PRACTITIONER

## 2022-01-19 NOTE — PROGRESS NOTES
"CHIEF COMPLAINT  F/U back and joint pain.  Pt states her pain level has been the same since last office visit .     Subjective   Anne Jewell is a 71 y.o. female  who presents for follow-up.  She has a history of back and joint pain.  Today her pain is 8/10VAS in severity. She continues with OxyContin 80 mg 2 tablets every 12 hours and baclofen 10 mg 3/day (prescribed by PCP).  This medication regimen decreases her pain by moderate amount.  \"I wouldn't be able to get out of bed without it\".  ADLs by self.     Prescribed Xanax 2 mg TID by psychiatrist, Narcan prescription at home (most recent prescription 5/25/2021).    Not a good candidate for interventions    Patient remained masked during entire encounter. No cough present. I donned a mask and eye protection throughout entire visit. Prior to donning mask and eye protection, hand hygiene was performed, as well as when it was doffed.  I was closer than 6 feet, but not for an extended period of time. No obvious exposure to any bodily fluids.    Back Pain  This is a chronic problem. The current episode started more than 1 year ago. The problem occurs constantly. The problem is unchanged. The pain is present in the lumbar spine. The quality of the pain is described as aching. The pain radiates to the left foot and right foot. The pain is at a severity of 8/10. The pain is moderate. The symptoms are aggravated by stress (weather changes). Pertinent negatives include no abdominal pain, bladder incontinence, bowel incontinence, chest pain, dysuria, fever, headaches, numbness or weakness. She has tried analgesics, bed rest, home exercises and heat (OxyContin, aquatherapy) for the symptoms. The treatment provided moderate relief.   Joint Pain  This is a chronic (8/10VAS in severity) problem. The current episode started more than 1 year ago. The problem occurs constantly. The problem has been unchanged. Associated symptoms include arthralgias (joint), myalgias and neck " pain. Pertinent negatives include no abdominal pain, chest pain, chills, congestion, coughing, fatigue, fever, headaches, joint swelling, nausea, numbness, vomiting or weakness. She has tried oral narcotics for the symptoms. The treatment provided moderate relief.      PEG Assessment   What number best describes your pain on average in the past week?10  What number best describes how, during the past week, pain has interfered with your enjoyment of life?10  What number best describes how, during the past week, pain has interfered with your general activity?  10    The following portions of the patient's history were reviewed and updated as appropriate: allergies, current medications, past family history, past medical history, past social history, past surgical history and problem list.    Review of Systems   Constitutional: Negative for activity change, chills, fatigue and fever.   HENT: Negative for congestion.    Eyes: Negative for visual disturbance.   Respiratory: Negative for cough and chest tightness.    Cardiovascular: Negative for chest pain.   Gastrointestinal: Negative for abdominal pain, bowel incontinence, constipation, diarrhea, nausea and vomiting.   Genitourinary: Negative for bladder incontinence, difficulty urinating and dysuria.   Musculoskeletal: Positive for arthralgias (joint), back pain, myalgias and neck pain. Negative for joint swelling.   Neurological: Negative for dizziness, weakness, light-headedness, numbness and headaches.   Psychiatric/Behavioral: Negative for agitation, sleep disturbance and suicidal ideas. The patient is not nervous/anxious.      --  The aforementioned information the Chief Complaint section and above subjective data including any HPI data, and also the Review of Systems data, has been personally reviewed and affirmed.  --    Vitals:    01/19/22 1448   BP: 123/76   Pulse: 60   Resp: 16   Temp: 96 °F (35.6 °C)   SpO2: 96%   Weight: 88.9 kg (196 lb)   Height: 154.9 cm  "(61\")   PainSc:   8   PainLoc: Back  Comment: joint     Objective   Physical Exam  Vitals and nursing note reviewed.   Constitutional:       Appearance: Normal appearance. She is well-developed.   Eyes:      General: Lids are normal.   Cardiovascular:      Rate and Rhythm: Normal rate.   Pulmonary:      Effort: Pulmonary effort is normal.   Musculoskeletal:      Cervical back: Tenderness and bony tenderness present. Decreased range of motion.      Lumbar back: Tenderness and bony tenderness present. Decreased range of motion. Scoliosis present.      Comments: Diffuse arthritic changes   Neurological:      Mental Status: She is alert and oriented to person, place, and time.      Gait: Gait abnormal (rollator).   Psychiatric:         Attention and Perception: Attention normal.         Mood and Affect: Mood normal.         Speech: Speech normal.         Behavior: Behavior normal.         Judgment: Judgment normal.       Assessment/Plan   Diagnoses and all orders for this visit:    1. Chronic pain syndrome (Primary)    2. Encounter for long-term (current) use of high-risk medication    3. Arthralgia, unspecified joint    4. Chronic low back pain, unspecified back pain laterality, unspecified whether sciatica present    5. Generalized osteoarthritis    6. Chronic radicular lumbar pain      --- The urine drug screen confirmation from 11/22/2021 has been reviewed and the result is appropriate based on patient history and [facility] report  --- CSA updated 12/16/2020  --- Continue OxyContin 80 mg. Patient appears stable with current regimen. No adverse effects. Regarding continuation of opioids, there is no evidence of aberrant behavior or any red flags.  The patient continues with appropriate response to opioid therapy. ADL's remain intact by self.    --- Follow-up 1 month or sooner if needed.      [facility] REPORT  As part of the patient's treatment plan, I am prescribing controlled substances. The patient has been made aware of " appropriate use of such medications, including potential risk of somnolence, limited ability to drive and/or work safely, and the potential for dependence or overdose. It has also bee made clear that these medications are for use by this patient only, without concomitant use of alcohol or other substances unless prescribed.     Patient has completed prescribing agreement detailing terms of continued prescribing of controlled substances, including monitoring [facility] reports, urine drug screening, and pill counts if necessary. The patient is aware that inappropriate use will results in cessation of prescribing such medications.    As the clinician, I personally reviewed the [facility] from 1/19/2022 while the patient was in the office today.    History and physical exam exhibit continued safe and appropriate use of controlled substances.    Dictated utilizing Dragon dictation.     This document is intended for medical expert use only. Reading of this document by patients and/or patient's family without participating medical staff guidance may result in misinterpretation and unintended morbidity.   Any interpretation of such data is the responsibility of the patient and/or family member responsible for the patient in concert with their primary or specialist providers, not to be left for sources of online searches such as [employer], [employer] or similar queries. Relying on these approaches to knowledge may result in misinterpretation, misguided goals of care and even death should patients or family members try recommendations outside of the realm of professional medical care in a supervised way.

## 2022-01-28 DIAGNOSIS — I10 HYPERTENSION, UNSPECIFIED TYPE: ICD-10-CM

## 2022-01-28 RX ORDER — HYDROCHLOROTHIAZIDE 25 MG/1
TABLET ORAL
Qty: 90 TABLET | Refills: 2 | Status: SHIPPED | OUTPATIENT
Start: 2022-01-28 | End: 2022-10-19 | Stop reason: SDUPTHER

## 2022-02-02 ENCOUNTER — CLINICAL SUPPORT (OUTPATIENT)
Dept: FAMILY MEDICINE CLINIC | Age: 72
End: 2022-02-02

## 2022-02-02 DIAGNOSIS — J30.9 ALLERGIC RHINITIS, UNSPECIFIED SEASONALITY, UNSPECIFIED TRIGGER: Primary | ICD-10-CM

## 2022-02-02 PROCEDURE — 95115 IMMUNOTHERAPY ONE INJECTION: CPT | Performed by: FAMILY MEDICINE

## 2022-02-16 ENCOUNTER — CLINICAL SUPPORT (OUTPATIENT)
Dept: FAMILY MEDICINE CLINIC | Age: 72
End: 2022-02-16

## 2022-02-16 DIAGNOSIS — J30.9 ALLERGIC RHINITIS, UNSPECIFIED SEASONALITY, UNSPECIFIED TRIGGER: Primary | ICD-10-CM

## 2022-02-16 PROCEDURE — 95115 IMMUNOTHERAPY ONE INJECTION: CPT | Performed by: FAMILY MEDICINE

## 2022-02-18 ENCOUNTER — OFFICE VISIT (OUTPATIENT)
Dept: PAIN MEDICINE | Facility: CLINIC | Age: 72
End: 2022-02-18

## 2022-02-18 VITALS
OXYGEN SATURATION: 96 % | DIASTOLIC BLOOD PRESSURE: 76 MMHG | RESPIRATION RATE: 12 BRPM | TEMPERATURE: 97.1 F | HEART RATE: 60 BPM | WEIGHT: 197.8 LBS | HEIGHT: 61 IN | BODY MASS INDEX: 37.34 KG/M2 | SYSTOLIC BLOOD PRESSURE: 158 MMHG

## 2022-02-18 DIAGNOSIS — M25.50 ARTHRALGIA, UNSPECIFIED JOINT: ICD-10-CM

## 2022-02-18 DIAGNOSIS — G89.29 CHRONIC LOW BACK PAIN, UNSPECIFIED BACK PAIN LATERALITY, UNSPECIFIED WHETHER SCIATICA PRESENT: ICD-10-CM

## 2022-02-18 DIAGNOSIS — G89.29 CHRONIC RADICULAR LUMBAR PAIN: ICD-10-CM

## 2022-02-18 DIAGNOSIS — M15.9 GENERALIZED OSTEOARTHRITIS: ICD-10-CM

## 2022-02-18 DIAGNOSIS — G89.4 CHRONIC PAIN SYNDROME: Primary | ICD-10-CM

## 2022-02-18 DIAGNOSIS — Z79.899 ENCOUNTER FOR LONG-TERM (CURRENT) USE OF HIGH-RISK MEDICATION: ICD-10-CM

## 2022-02-18 DIAGNOSIS — M54.50 CHRONIC LOW BACK PAIN, UNSPECIFIED BACK PAIN LATERALITY, UNSPECIFIED WHETHER SCIATICA PRESENT: ICD-10-CM

## 2022-02-18 DIAGNOSIS — M54.16 CHRONIC RADICULAR LUMBAR PAIN: ICD-10-CM

## 2022-02-18 PROCEDURE — 99214 OFFICE O/P EST MOD 30 MIN: CPT | Performed by: NURSE PRACTITIONER

## 2022-02-18 RX ORDER — OXYCODONE HYDROCHLORIDE 80 MG/1
160 TABLET, FILM COATED, EXTENDED RELEASE ORAL EVERY 12 HOURS SCHEDULED
Qty: 120 TABLET | Refills: 0 | Status: SHIPPED | OUTPATIENT
Start: 2022-02-18 | End: 2022-03-21 | Stop reason: SDUPTHER

## 2022-02-18 NOTE — PROGRESS NOTES
CHIEF COMPLAINT  FOLLOW-UP BACK AND JOINT PAIN.   Pt states her pain level has been stable since last office visit.     Subjective   Anne Jewell is a 71 y.o. female  who presents for follow-up.  She has a history of back and diffuse joint pain.  Today her pain is 7/10VAS in severity. She continues with OxyContin 80 mg 2 tablets every 12 hours and baclofen 10 mg 3/day (prescribed by PCP). This medication greatly decreases her pain and she denies any side effects including somnolence or constipation.     Discussed consideration of decreasing her dosage.  She states she has started to utilize 1 tablet of the OxyContin at night compared to 2 intermittently. I encouraged her to continue this.     Prescribed Xanax 2 mg TID by psychiatrist, Narcan prescription at home (most recent prescription 5/25/2021).    Not a good candidate for interventions     Patient remained masked during entire encounter. No cough present. I donned a mask and eye protection throughout entire visit. Prior to donning mask and eye protection, hand hygiene was performed, as well as when it was doffed.  I was closer than 6 feet, but not for an extended period of time. No obvious exposure to any bodily fluids.    Back Pain  This is a chronic problem. The current episode started more than 1 year ago. The problem occurs constantly. The problem is unchanged. The pain is present in the lumbar spine. The quality of the pain is described as aching. The pain radiates to the left foot and right foot. The pain is at a severity of 7/10. The pain is moderate. The symptoms are aggravated by stress (weather changes). Pertinent negatives include no abdominal pain, bladder incontinence, bowel incontinence, chest pain, dysuria, fever, headaches, numbness or weakness. She has tried analgesics, bed rest, home exercises and heat (OxyContin, aquatherapy) for the symptoms. The treatment provided moderate relief.   Joint Pain  This is a chronic (7/10VAS in severity)  problem. The current episode started more than 1 year ago. The problem occurs constantly. The problem has been unchanged. Associated symptoms include arthralgias (joint), myalgias and neck pain. Pertinent negatives include no abdominal pain, chest pain, chills, congestion, coughing, fatigue, fever, headaches, joint swelling, nausea, numbness, vomiting or weakness. She has tried oral narcotics for the symptoms. The treatment provided moderate relief.      PEG Assessment   What number best describes your pain on average in the past week?10  What number best describes how, during the past week, pain has interfered with your enjoyment of life?10  What number best describes how, during the past week, pain has interfered with your general activity?  10    The following portions of the patient's history were reviewed and updated as appropriate: allergies, current medications, past family history, past medical history, past social history, past surgical history and problem list.    Review of Systems   Constitutional: Negative for activity change, chills, fatigue and fever.   HENT: Negative for congestion.    Eyes: Negative for visual disturbance.   Respiratory: Negative for cough and chest tightness.    Cardiovascular: Negative for chest pain.   Gastrointestinal: Negative for abdominal pain, bowel incontinence, constipation, diarrhea, nausea and vomiting.   Genitourinary: Negative for bladder incontinence, difficulty urinating and dysuria.   Musculoskeletal: Positive for arthralgias (joint), back pain, myalgias and neck pain. Negative for joint swelling.   Neurological: Negative for dizziness, weakness, light-headedness, numbness and headaches.   Psychiatric/Behavioral: Negative for agitation, sleep disturbance and suicidal ideas. The patient is nervous/anxious.      --  The aforementioned information the Chief Complaint section and above subjective data including any HPI data, and also the Review of Systems data, has been  "personally reviewed and affirmed.  --    Vitals:    02/18/22 0842   BP: 158/76   Pulse: 60   Resp: 12   Temp: 97.1 °F (36.2 °C)   SpO2: 96%   Weight: 89.7 kg (197 lb 12.8 oz)   Height: 154.9 cm (61\")   PainSc:   7   PainLoc: Back  Comment: JOINT     Objective   Physical Exam  Vitals and nursing note reviewed.   Constitutional:       Appearance: Normal appearance. She is well-developed.   Eyes:      General: Lids are normal.   Cardiovascular:      Rate and Rhythm: Normal rate.   Pulmonary:      Effort: Pulmonary effort is normal.   Musculoskeletal:      Lumbar back: Tenderness present. Decreased range of motion. Scoliosis present.      Comments: Diffuse arthritic changes   Neurological:      Mental Status: She is alert and oriented to person, place, and time.   Psychiatric:         Speech: Speech normal.         Behavior: Behavior normal.         Judgment: Judgment normal.       Assessment/Plan   Diagnoses and all orders for this visit:    1. Chronic pain syndrome (Primary)    2. Encounter for long-term (current) use of high-risk medication    3. Arthralgia, unspecified joint    4. Chronic low back pain, unspecified back pain laterality, unspecified whether sciatica present    5. Generalized osteoarthritis    6. Chronic radicular lumbar pain      --- The urine drug screen confirmation from 11/22/2021 has been reviewed and the result is appropriate based on patient history and [facility] report  --- CSA updated 12/16/2020  --- Refill OxyContin 80 mg. DNF 2/20/2022 applied. Patient appears stable with current regimen. No adverse effects. Regarding continuation of opioids, there is no evidence of aberrant behavior or any red flags.  The patient continues with appropriate response to opioid therapy. ADL's remain intact by self.   --- Follow-up 1 month or sooner if needed.      [facility] REPORT  As part of the patient's treatment plan, I am prescribing controlled substances. The patient has been made aware of appropriate use of " such medications, including potential risk of somnolence, limited ability to drive and/or work safely, and the potential for dependence or overdose. It has also bee made clear that these medications are for use by this patient only, without concomitant use of alcohol or other substances unless prescribed.     Patient has completed prescribing agreement detailing terms of continued prescribing of controlled substances, including monitoring [facility] reports, urine drug screening, and pill counts if necessary. The patient is aware that inappropriate use will results in cessation of prescribing such medications.    As the clinician, I personally reviewed the [facility] from 2/18/2022 while the patient was in the office today.    History and physical exam exhibit continued safe and appropriate use of controlled substances.    Dictated utilizing Dragon dictation.     This document is intended for medical expert use only. Reading of this document by patients and/or patient's family without participating medical staff guidance may result in misinterpretation and unintended morbidity.   Any interpretation of such data is the responsibility of the patient and/or family member responsible for the patient in concert with their primary or specialist providers, not to be left for sources of online searches such as [employer], [employer] or similar queries. Relying on these approaches to knowledge may result in misinterpretation, misguided goals of care and even death should patients or family members try recommendations outside of the realm of professional medical care in a supervised way.

## 2022-02-24 RX ORDER — DICYCLOMINE HCL 20 MG
TABLET ORAL
Qty: 120 TABLET | Refills: 5 | Status: SHIPPED | OUTPATIENT
Start: 2022-02-24 | End: 2022-11-16 | Stop reason: SDUPTHER

## 2022-03-02 ENCOUNTER — CLINICAL SUPPORT (OUTPATIENT)
Dept: FAMILY MEDICINE CLINIC | Age: 72
End: 2022-03-02

## 2022-03-02 DIAGNOSIS — J30.9 ALLERGIC RHINITIS, UNSPECIFIED SEASONALITY, UNSPECIFIED TRIGGER: Primary | ICD-10-CM

## 2022-03-02 PROCEDURE — 95115 IMMUNOTHERAPY ONE INJECTION: CPT | Performed by: FAMILY MEDICINE

## 2022-03-14 ENCOUNTER — CLINICAL SUPPORT (OUTPATIENT)
Dept: FAMILY MEDICINE CLINIC | Age: 72
End: 2022-03-14

## 2022-03-14 DIAGNOSIS — J30.9 ALLERGIC RHINITIS, UNSPECIFIED SEASONALITY, UNSPECIFIED TRIGGER: Primary | ICD-10-CM

## 2022-03-14 PROCEDURE — 95115 IMMUNOTHERAPY ONE INJECTION: CPT | Performed by: FAMILY MEDICINE

## 2022-03-14 NOTE — PROGRESS NOTES
I have reviewed the notes, assessments, and/or procedures performed by Rocío Issa LPN, and I concur with her documentation of Anne Jewell.

## 2022-03-21 RX ORDER — OXYCODONE HYDROCHLORIDE 80 MG/1
160 TABLET, FILM COATED, EXTENDED RELEASE ORAL EVERY 12 HOURS SCHEDULED
Qty: 120 TABLET | Refills: 0 | Status: SHIPPED | OUTPATIENT
Start: 2022-03-21 | End: 2022-04-19 | Stop reason: SDUPTHER

## 2022-03-21 NOTE — TELEPHONE ENCOUNTER
/Medication Refill Request    Date of phone call: 03/21/2022    Medication being requested: Oxycodone 80MG sig: Take 2 tablets by mouth Every 12 (Twelve) Hours.  Qty: 120    Date of last visit: 02/18/2022    Date of last refill:     V[facility]ENTA up to date?:     Next Follow up?: 03/22/2022    Any new pertinent information? (i.e, new medication allergies, new use of medications, change in patient's health or condition, non-compliance or inconsistency with prescribing agreement?):

## 2022-03-22 ENCOUNTER — OFFICE VISIT (OUTPATIENT)
Dept: PAIN MEDICINE | Facility: CLINIC | Age: 72
End: 2022-03-22

## 2022-03-22 VITALS
RESPIRATION RATE: 20 BRPM | BODY MASS INDEX: 36.32 KG/M2 | DIASTOLIC BLOOD PRESSURE: 78 MMHG | HEIGHT: 61 IN | HEART RATE: 68 BPM | WEIGHT: 192.4 LBS | TEMPERATURE: 97.3 F | OXYGEN SATURATION: 98 % | SYSTOLIC BLOOD PRESSURE: 152 MMHG

## 2022-03-22 DIAGNOSIS — G89.29 CHRONIC LOW BACK PAIN, UNSPECIFIED BACK PAIN LATERALITY, UNSPECIFIED WHETHER SCIATICA PRESENT: ICD-10-CM

## 2022-03-22 DIAGNOSIS — M54.16 CHRONIC RADICULAR LUMBAR PAIN: ICD-10-CM

## 2022-03-22 DIAGNOSIS — M25.50 ARTHRALGIA, UNSPECIFIED JOINT: ICD-10-CM

## 2022-03-22 DIAGNOSIS — Z79.899 ENCOUNTER FOR LONG-TERM (CURRENT) USE OF HIGH-RISK MEDICATION: ICD-10-CM

## 2022-03-22 DIAGNOSIS — G89.29 CHRONIC RADICULAR LUMBAR PAIN: ICD-10-CM

## 2022-03-22 DIAGNOSIS — G89.4 CHRONIC PAIN SYNDROME: Primary | ICD-10-CM

## 2022-03-22 DIAGNOSIS — M15.9 GENERALIZED OSTEOARTHRITIS: ICD-10-CM

## 2022-03-22 DIAGNOSIS — M54.50 CHRONIC LOW BACK PAIN, UNSPECIFIED BACK PAIN LATERALITY, UNSPECIFIED WHETHER SCIATICA PRESENT: ICD-10-CM

## 2022-03-22 PROCEDURE — 99214 OFFICE O/P EST MOD 30 MIN: CPT | Performed by: NURSE PRACTITIONER

## 2022-03-22 PROCEDURE — 80305 DRUG TEST PRSMV DIR OPT OBS: CPT | Performed by: NURSE PRACTITIONER

## 2022-03-22 NOTE — PROGRESS NOTES
CHIEF COMPLAINT  F/U severe chronic low back pain     Subjective   Anne Jewell is a 71 y.o. female  who presents to the office for follow-up of chronic back pain and diffuse joint pain.  Today her pain is 8/10VAS in severity. She has a new dog which has increased her physical activity.  She has lost ~5 lbs with her increased activity level.     She continues with OxyContin 80 mg 2 tablets every 12 hours and baclofen 10 mg 3/day (prescribed by PCP). She denies any side effects including somnolence or constipation. ADLs by self.     Prescribed Xanax 2 mg TID by psychiatrist, Narcan prescription at home (most recent prescription 5/25/2021).     Not a good candidate for interventions.     Patient remained masked during entire encounter. No cough present. I donned a mask and eye protection throughout entire visit. Prior to donning mask and eye protection, hand hygiene was performed, as well as when it was doffed.  I was closer than 6 feet, but not for an extended period of time. No obvious exposure to any bodily fluids.    Back Pain  This is a chronic problem. The current episode started more than 1 year ago. The problem occurs constantly. The problem is unchanged. The pain is present in the lumbar spine. The quality of the pain is described as aching. The pain radiates to the left foot and right foot. The pain is at a severity of 8/10. The pain is moderate. The symptoms are aggravated by stress (weather changes). Pertinent negatives include no abdominal pain, bladder incontinence, bowel incontinence, chest pain, dysuria, fever, headaches, numbness or weakness. She has tried analgesics, bed rest, home exercises and heat (OxyContin, aquatherapy) for the symptoms. The treatment provided moderate relief.   Joint Pain  This is a chronic (8/10VAS in severity) problem. The current episode started more than 1 year ago. The problem occurs constantly. The problem has been unchanged. Associated symptoms include arthralgias,  myalgias and neck pain. Pertinent negatives include no abdominal pain, chest pain, chills, congestion, coughing, fatigue, fever, headaches, joint swelling, nausea, numbness, vomiting or weakness. She has tried oral narcotics for the symptoms. The treatment provided moderate relief.      PEG Assessment   What number best describes your pain on average in the past week?7  What number best describes how, during the past week, pain has interfered with your enjoyment of life?7  What number best describes how, during the past week, pain has interfered with your general activity?  7    The following portions of the patient's history were reviewed and updated as appropriate: allergies, current medications, past family history, past medical history, past social history, past surgical history and problem list.    Review of Systems   Constitutional: Negative.  Negative for chills, fatigue and fever.   HENT: Negative.  Negative for congestion.    Eyes: Negative.    Respiratory: Negative.  Negative for cough.    Cardiovascular: Negative.  Negative for chest pain.   Gastrointestinal: Negative.  Negative for abdominal pain, bowel incontinence, nausea and vomiting.   Endocrine: Negative.    Genitourinary: Negative.  Negative for bladder incontinence and dysuria.   Musculoskeletal: Positive for arthralgias, back pain, gait problem, myalgias and neck pain. Negative for joint swelling.   Skin: Negative.    Allergic/Immunologic: Negative.    Neurological: Negative for weakness, numbness and headaches.   Hematological: Negative.    Psychiatric/Behavioral: Negative.      --  The aforementioned information the Chief Complaint section and above subjective data including any HPI data, and also the Review of Systems data, has been personally reviewed and affirmed.  --     Vitals:    03/22/22 1145   BP: 152/78   BP Location: Left arm   Patient Position: Sitting   Cuff Size: Adult   Pulse: 68   Resp: 20   Temp: 97.3 °F (36.3 °C)   TempSrc:  "Temporal   SpO2: 98%   Weight: 87.3 kg (192 lb 6.4 oz)   Height: 154.9 cm (61\")   PainSc: 8  Comment: low back     Objective   Physical Exam  Vitals and nursing note reviewed.   Constitutional:       Appearance: Normal appearance. She is well-developed.   Eyes:      General: Lids are normal.   Cardiovascular:      Rate and Rhythm: Normal rate.   Pulmonary:      Effort: Pulmonary effort is normal.   Musculoskeletal:      Lumbar back: Tenderness present. Decreased range of motion.      Comments: Diffuse arthritic joint changes  Back brace in place  Compression gloves to bilateral hands   Neurological:      Mental Status: She is alert and oriented to person, place, and time.      Gait: Gait abnormal (rollator).   Psychiatric:         Attention and Perception: Attention normal.         Mood and Affect: Mood normal.         Speech: Speech normal.         Behavior: Behavior normal.         Judgment: Judgment normal.       Assessment/Plan   Diagnoses and all orders for this visit:    1. Chronic pain syndrome (Primary)    2. Encounter for long-term (current) use of high-risk medication    3. Arthralgia, unspecified joint    4. Chronic low back pain, unspecified back pain laterality, unspecified whether sciatica present    5. Generalized osteoarthritis    6. Chronic radicular lumbar pain      --- The urine drug screen confirmation from 11/22/2021 has been reviewed and the result is appropriate based on patient history and [facility] report  --- CSA updated 3/22/2022  --- Continue OxyContin 80 mg. No refill needed. Patient appears stable with current regimen. No adverse effects. Regarding continuation of opioids, there is no evidence of aberrant behavior or any red flags.  The patient continues with appropriate response to opioid therapy. ADL's remain intact by self.   --- Narcan prescription current.   --- Follow-up 1 month or sooner if needed.      [facility] REPORT  As part of the patient's treatment plan, I am prescribing " controlled substances. The patient has been made aware of appropriate use of such medications, including potential risk of somnolence, limited ability to drive and/or work safely, and the potential for dependence or overdose. It has also bee made clear that these medications are for use by this patient only, without concomitant use of alcohol or other substances unless prescribed.     Patient has completed prescribing agreement detailing terms of continued prescribing of controlled substances, including monitoring [facility] reports, urine drug screening, and pill counts if necessary. The patient is aware that inappropriate use will results in cessation of prescribing such medications.    As the clinician, I personally reviewed the [facility]A from 3/22/2022 while the patient was in the office today.    History and physical exam exhibit continued safe and appropriate use of controlled substances.    Dictated utilizing Dragon dictation.      This document is intended for medical expert use only. Reading of this document by patients and/or patient's family without participating medical staff guidance may result in misinterpretation and unintended morbidity.   Any interpretation of such data is the responsibility of the patient and/or family member responsible for the patient in concert with their primary or specialist providers, not to be left for sources of online searches such as Display[employer], [employer] or similar queries. Relying on these approaches to knowledge may result in misinterpretation, misguided goals of care and even death should patients or family members try recommendations outside of the realm of professional medical care in a supervised way.

## 2022-03-23 DIAGNOSIS — R11.0 NAUSEA: ICD-10-CM

## 2022-03-24 RX ORDER — PROMETHAZINE HYDROCHLORIDE 25 MG/1
TABLET ORAL
Qty: 90 TABLET | Refills: 2 | Status: SHIPPED | OUTPATIENT
Start: 2022-03-24 | End: 2022-04-11 | Stop reason: SDUPTHER

## 2022-03-30 ENCOUNTER — CLINICAL SUPPORT (OUTPATIENT)
Dept: FAMILY MEDICINE CLINIC | Age: 72
End: 2022-03-30

## 2022-03-30 DIAGNOSIS — J30.9 ALLERGIC RHINITIS, UNSPECIFIED SEASONALITY, UNSPECIFIED TRIGGER: Primary | ICD-10-CM

## 2022-03-30 PROCEDURE — 95115 IMMUNOTHERAPY ONE INJECTION: CPT | Performed by: FAMILY MEDICINE

## 2022-04-01 RX ORDER — RIVAROXABAN 20 MG/1
TABLET, FILM COATED ORAL
Qty: 90 TABLET | Refills: 0 | Status: SHIPPED | OUTPATIENT
Start: 2022-04-01 | End: 2022-07-07

## 2022-04-04 ENCOUNTER — TELEPHONE (OUTPATIENT)
Dept: FAMILY MEDICINE CLINIC | Age: 72
End: 2022-04-04

## 2022-04-04 NOTE — TELEPHONE ENCOUNTER
Caller: Anne Jewell    Relationship to patient: Self    Best call back number: [phone]       Patient is needing: PATIENT IS WANTING TO KNOW CAN YOU BE HER NEW PCP. PLEASE CALL AND ADVISE.

## 2022-04-04 NOTE — PROGRESS NOTES
Subsequent Medicare Wellness Visit   The ABC's of the Annual Wellness Visit    Chief Complaint   Patient presents with   • Annual Exam     Recent DEXA with osteoporosis, need vit d level. Will need to check vit d level today.     HPI:  Anne Jewell -1950, is a 68 y.o. female who presents for a Subsequent Medicare Wellness Visit.    Recent Hospitalizations:  No hospitalization(s) within the last year..    Current Medical Providers:  Patient Care Team:  Kenneth Feldman MD as PCP - General (Sports Medicine)  Vaishnavi Cazares APRN as PCP - Claims Attributed  Edilberto Lemus MD as Consulting Physician (Nephrology)  Rashaun Alvarado MD as Surgeon (Orthopedic Surgery)  Delma Montoya MD as Consulting Physician (Obstetrics and Gynecology)  Stuart Borges MD (Hematology and Oncology)    Health Habits and Functional and Cognitive Screening and Depression Screening:  Functional & Cognitive Status 2019   Do you have difficulty preparing food and eating? Yes   Do you have difficulty bathing yourself, getting dressed or grooming yourself? No   Do you have difficulty using the toilet? No   Do you have difficulty moving around from place to place? Yes   Do you have trouble with steps or getting out of a bed or a chair? Yes   Current Diet Well Balanced Diet   Dental Exam Up to date   Eye Exam Up to date   Exercise (times per week) 0 times per week   Current Exercise Activities Include None   Do you need help using the phone?  No   Are you deaf or do you have serious difficulty hearing?  No   Do you need help with transportation? Yes   Do you need help shopping? Yes   Do you need help preparing meals?  Yes   Do you need help with housework?  Yes   Do you need help with laundry? Yes   Do you need help taking your medications? No   Do you need help managing money? No   Do you ever drive or ride in a car without wearing a seat belt? No       Compared to one year ago, the patient feels her physical health  is the same and her mental health is the same.    Depression Screen:  PHQ-2/PHQ-9 Depression Screening 8/8/2017   Little interest or pleasure in doing things 0   Feeling down, depressed, or hopeless 0   Total Score 0         Past Medical/Family/Social History:  The following portions of the patient's history were reviewed and updated as appropriate: allergies, current medications, past family history, past medical history, past social history, past surgical history and problem list.    Allergies   Allergen Reactions   • Penicillins          Current Outpatient Medications:   •  albuterol (PROVENTIL HFA;VENTOLIN HFA) 108 (90 BASE) MCG/ACT inhaler, Proventil  (90 Base) MCG/ACT Inhalation Aerosol Solution; Patient Sig: Proventil  (90 Base) MCG/ACT Inhalation Aerosol Solution Inhale 2 puff(s) every 6 to 8 hours as needed; 6.7; 0; 05-Apr-2013; Active, Disp: , Rfl:   •  ALPRAZolam (XANAX) 2 MG tablet, , Disp: , Rfl:   •  Azelastine-Fluticasone 137-50 MCG/ACT suspension, Inhale 1 spray every 12 (twelve) hours., Disp: , Rfl:   •  baclofen (LIORESAL) 10 MG tablet, Take 1 tablet by mouth 3 (Three) Times a Day., Disp: 30 tablet, Rfl: 9  •  chlorhexidine (PERIDEX) 0.12 % solution, , Disp: , Rfl:   •  Cholecalciferol (VITAMIN D3) 2000 units capsule, TAKE ONE CAPSULE BY MOUTH DAILY, Disp: 30 capsule, Rfl: 9  •  Cyanocobalamin (B-12) 1000 MCG sublingual tablet, PLACE 1 TABLET UNDER THE TONGUE AND LET DISSOLVE ONCE DAILY, Disp: 30 each, Rfl: 1  •  dicyclomine (BENTYL) 20 MG tablet, 1 q 6-h prn, Disp: 120 tablet, Rfl: 6  •  escitalopram (LEXAPRO) 10 MG tablet, Take 1 tablet by mouth daily., Disp: , Rfl:   •  hydrochlorothiazide (HYDRODIURIL) 25 MG tablet, TAKE ONE TABLET BY MOUTH DAILY, Disp: 30 tablet, Rfl: 0  •  levothyroxine (SYNTHROID, LEVOTHROID) 100 MCG tablet, TAKE ONE TABLET BY MOUTH DAILY, Disp: 90 tablet, Rfl: 3  •  montelukast (SINGULAIR) 10 MG tablet, Take 1 tablet by mouth daily., Disp: , Rfl:   •  Multiple  Vitamin tablet, Take 1 tablet by mouth daily., Disp: , Rfl:   •  oxyCODONE ER (oxyCONTIN) 80 MG tablet extended-release 12 hour 12 hr tablet, Take 2 tablets by mouth Every 12 (Twelve) Hours., Disp: 120 tablet, Rfl: 0  •  potassium chloride (K-DUR,KLOR-CON) 20 MEQ CR tablet, TAKE ONE TABLET BY MOUTH TWICE A DAY **MUST CALL MD FOR APPOINTMENT, Disp: 90 tablet, Rfl: 0  •  promethazine (PHENERGAN) 25 MG tablet, Take 1 tablet by mouth Every 8 (Eight) Hours As Needed for Nausea or Vomiting., Disp: 90 tablet, Rfl: 11  •  XARELTO 20 MG tablet, TAKE ONE TABLET BY MOUTH DAILY, Disp: 30 tablet, Rfl: 9  •  azithromycin (ZITHROMAX) 250 MG tablet, TAKE TWO TABLETS BY MOUTH AS ONE DOSE ON THE FIRST DAY THEN TAKE ONE TABLET DAILY THEREAFTER, Disp: 6 tablet, Rfl: 0    Aspirin use counseling: Does not need ASA (and currently is not on it)    Current medication list contains no high risk medications.  No harmful drug interactions have been identified.     Family History   Problem Relation Age of Onset   • Prostate cancer Father    • Arthritis Other    • Hypertension Other         benign essential   • Cancer Other    • Diabetes Other    • Heart disease Other    • Nephrolithiasis Other        Social History     Tobacco Use   • Smoking status: Former Smoker   • Smokeless tobacco: Never Used   Substance Use Topics   • Alcohol use: No       Past Surgical History:   Procedure Laterality Date   • BILATERAL BREAST REDUCTION     • BREAST SURGERY     • COLONOSCOPY  08/05/2016   • GASTRIC BYPASS     • HAND SURGERY Right 01/14/2016   • HERNIA REPAIR      x7   • HYSTERECTOMY     • OTHER SURGICAL HISTORY      vaginal sling operation for stress incontinence   • TOTAL KNEE ARTHROPLASTY Left    • TOTAL KNEE ARTHROPLASTY Bilateral        Patient Active Problem List   Diagnosis   • Chronic pain syndrome   • Rheumatoid arthritis (CMS/HCC)   • Osteoarthritis of knee   • Generalized osteoarthritis   • Degeneration of intervertebral disc of lumbar region  "  • Neck pain   • Lumbar radiculopathy   • Atopic rhinitis   • Anxiety   • Diarrhea   • Indigestion   • Dysthymic disorder   • Gastroesophageal reflux disease   • Hypothyroidism   • Insomnia   • Pernicious anemia   • Osteoporosis   • Scoliosis   • Vasovagal syncope   • Vitamin D deficiency   • Trigger middle finger of right hand   • Trigger ring finger of right hand   • Trigger little finger of right hand   • Encounter for long-term (current) use of high-risk medication   • Coagulation disorder (CMS/HCC)   • Hyperglycemia   • Joint pain   • Status post total knee replacement, left   • Left knee pain   • Status post total knee replacement, right   • Osteoarthritis of thumb   • Chronic low back pain   • Trigger finger of all digits of right hand       Review of Systems    Objective     Vitals:    09/06/19 1301   BP: 116/60   BP Location: Left arm   Patient Position: Sitting   Cuff Size: Adult   Pulse: 74   Temp: 97.9 °F (36.6 °C)   TempSrc: Oral   SpO2: 94%   Weight: 85.3 kg (188 lb)   Height: 172.7 cm (67.99\")       Patient's Body mass index is 28.59 kg/m². BMI is within normal parameters. No follow-up required..      No exam data present    The patient has no evidence of cognitve impairment.     Physical Exam    Recent Lab Results:  Lab Results   Component Value Date    GLU 80 02/26/2019     Lab Results   Component Value Date    TRIG 82 02/26/2019    HDL 38 (L) 02/26/2019    VLDL 16.4 02/26/2019       Assessment/Plan   Age-appropriate Screening Schedule:  Refer to the list below for future screening recommendations based on patient's age, sex and/or medical conditions.      Health Maintenance   Topic Date Due   • ZOSTER VACCINE (1 of 2) 09/10/2000   • MAMMOGRAM  01/27/2018   • DXA SCAN  01/27/2018   • PNEUMOCOCCAL VACCINES (65+ LOW/MEDIUM RISK) (2 of 2 - PPSV23) 08/08/2018   • INFLUENZA VACCINE  08/01/2019   • COLONOSCOPY  01/27/2026   • TDAP/TD VACCINES (2 - Td) 08/08/2027       Medicare Risks and Personalized " Health Plan:  Immunizations Discussed/Encouraged (specific immunizations; Pneumococcal 23 and Shingrix )      CMS-Preventive Services Quick Reference  Medicare Preventive Services Addressed:  Annual Wellness Visit (AWV)  Pneumococcal Vaccine and Administration    Advance Care Planning:  Patient has an advance directive - a copy has not been provided. Have asked the patient to send this to us to add to record    Diagnoses and all orders for this visit:    1. Medicare annual wellness visit, subsequent (Primary)        An After Visit Summary and PPPS with all of these plans were given to the patient.      Follow Up:  No Follow-up on file.                                             full weight-bearing

## 2022-04-06 ENCOUNTER — CLINICAL SUPPORT (OUTPATIENT)
Dept: FAMILY MEDICINE CLINIC | Age: 72
End: 2022-04-06

## 2022-04-06 DIAGNOSIS — J30.9 ALLERGIC RHINITIS, UNSPECIFIED SEASONALITY, UNSPECIFIED TRIGGER: Primary | ICD-10-CM

## 2022-04-06 PROCEDURE — 95115 IMMUNOTHERAPY ONE INJECTION: CPT | Performed by: FAMILY MEDICINE

## 2022-04-11 ENCOUNTER — OFFICE VISIT (OUTPATIENT)
Dept: FAMILY MEDICINE CLINIC | Age: 72
End: 2022-04-11

## 2022-04-11 VITALS
SYSTOLIC BLOOD PRESSURE: 146 MMHG | WEIGHT: 190.8 LBS | HEART RATE: 63 BPM | DIASTOLIC BLOOD PRESSURE: 76 MMHG | BODY MASS INDEX: 36.05 KG/M2

## 2022-04-11 DIAGNOSIS — R60.9 EDEMA, UNSPECIFIED TYPE: ICD-10-CM

## 2022-04-11 DIAGNOSIS — R11.0 NAUSEA: ICD-10-CM

## 2022-04-11 DIAGNOSIS — J30.9 ALLERGIC RHINITIS, UNSPECIFIED SEASONALITY, UNSPECIFIED TRIGGER: ICD-10-CM

## 2022-04-11 DIAGNOSIS — F34.1 DYSTHYMIC DISORDER: Chronic | ICD-10-CM

## 2022-04-11 DIAGNOSIS — E03.9 ACQUIRED HYPOTHYROIDISM: Chronic | ICD-10-CM

## 2022-04-11 DIAGNOSIS — G89.4 CHRONIC PAIN SYNDROME: Primary | Chronic | ICD-10-CM

## 2022-04-11 DIAGNOSIS — K58.0 IRRITABLE BOWEL SYNDROME WITH DIARRHEA: ICD-10-CM

## 2022-04-11 PROCEDURE — 95115 IMMUNOTHERAPY ONE INJECTION: CPT | Performed by: NURSE PRACTITIONER

## 2022-04-11 PROCEDURE — 99214 OFFICE O/P EST MOD 30 MIN: CPT | Performed by: NURSE PRACTITIONER

## 2022-04-11 RX ORDER — BACLOFEN 10 MG/1
10 TABLET ORAL 3 TIMES DAILY
Qty: 270 TABLET | Refills: 1 | Status: SHIPPED | OUTPATIENT
Start: 2022-04-11 | End: 2023-02-08

## 2022-04-11 RX ORDER — PROMETHAZINE HYDROCHLORIDE 25 MG/1
25 TABLET ORAL EVERY 8 HOURS PRN
Qty: 90 TABLET | Refills: 2 | Status: SHIPPED | OUTPATIENT
Start: 2022-04-11 | End: 2022-10-19 | Stop reason: SDUPTHER

## 2022-04-11 NOTE — PROGRESS NOTES
Anne Jewell presents to [facility].    Chief Complaint:  Establish Care (Pt pcp Kenneth Feldman MD is retiring )         History of Present Illness:  Dr Feldman retiring next month/ she has an appt later this week   Takes protein supplement and collagen / gets off [employer]      Anxiety/ Depression  Current medication/lexapro and xanax per her psych   Current symptoms: rx's working for her     Hypothyroidism  Current rx: levothyroxine 100 mcg   Tolerating rx: yes  Refills needed No, seeing shane later this week and she thinks he will order labs   Lab Results       Component                Value               Date                       TSH                      3.430               06/29/2021         Hypertension:  Current medication: HCTZ  Tolerating Medication: Yes  Needs refills: No  Labs:  Lab Results       Component                Value               Date                       GLUCOSE                  97                  12/16/2020                 BUN                      19                  12/16/2020                 CREATININE               1.05 (H)            12/16/2020                 EGFRIFNONA               52 (L)              12/16/2020                 EGFRIFAFRI               63                  12/16/2020                 BCR                      18.1                12/16/2020                 K                        4.4                 12/16/2020                 CO2                      31.0 (H)            12/16/2020                 CALCIUM                  8.6                 12/16/2020                 PROTENTOTREF             6.0                 12/16/2020                 ALBUMIN                  3.30 (L)            12/16/2020                 LABIL2                   1.2                 12/16/2020                 AST                      25                  12/16/2020                 ALT                      10                  12/16/2020                   PAST  MEDICAL HISTORY changes since :         + bilateral CTS     Positive for    Deep Venous Thrombosis: 2 DVT and a PE; ;     Positive for    Hypothyroidism: dx'd in ; ;     Positive for    Obesity: used to weigh over 400 pounds; ;         GYNECOLOGICAL HISTORY:             CURRENT MEDICAL PROVIDERS:    Allergist: [facility]: allergy rx / goes yearly     Orthopedist: Dr Alvarado    Psychiatrist: [facility], oz Gonzales, amando chavez   Chiropractor [facility]  GYN, samson Feldman, PCP, retiring     pain management Dr. Grajeda and NP GRETEL Friedman         PREVENTIVE HEALTH MAINTENANCE         DEXA 3-31-22 at [facility] and mammogram same day     COLORECTAL CANCER SCREENING: Up to date (colonoscopy q10y; sigmoidoscopy q5y; Cologuard q3y) was last done 20/ dr Barker         Surgical History:     Teeth removed      Cholecystectomy    Hysterectomy: TAHBSO; Other Surgeries    Hernia Repair: 7 different surgeries;     Joint Replacement: bilateral knees; , ;     Gastric Bypass ;    bladder repair;    right hand surgery ;    breast reduction ;     Procedures:    Colonoscopy ( / [facility] )    EGD         Family History:     Father:  at age 89;     ; Positive for Prostate Cancer;     Mother:  at age 78; Cause of death was renal failure    ; Positive for Congestive Heart Failure;     ; Positive for Breast Cancer;     ; Positive for Type 2 Diabetes;     ; Positive for blind;     Brother(s): 1 brother(s) total    ; Positive for Type 2 Diabetes;     Son(s): 1 ;   in 2016     Daughter(s): 1 daughter(s) total;  Migraines         Social History:     Occupation: Disabled (due to [employer])     Marital Status:  /spouse comes and goes     Children: 2 children and 2 step-children       Review of Systems:  Review of Systems   Constitutional: Negative for fatigue and fever.   Respiratory: Negative for cough and  shortness of breath.    Cardiovascular: Negative for chest pain, palpitations and leg swelling.   Gastrointestinal: Positive for nausea (takes phenergan regularly TID with her pain rx's ).   Musculoskeletal:        Takes baclofen, needs refills    Allergic/Immunologic:        Takes allergy inj   Neurological: Negative for numbness.   Psychiatric/Behavioral: Negative for suicidal ideas.          Current Outpatient Medications:   •  albuterol (PROVENTIL HFA;VENTOLIN HFA) 108 (90 BASE) MCG/ACT inhaler, Proventil  (90 Base) MCG/ACT Inhalation Aerosol Solution; Patient Sig: Proventil  (90 Base) MCG/ACT Inhalation Aerosol Solution Inhale 2 puff(s) every 6 to 8 hours as needed; 6.7; 0; 05-Apr-2013; Active, Disp: , Rfl:   •  ALPRAZolam (XANAX) 2 MG tablet, , Disp: , Rfl:   •  cetirizine (zyrTEC) 10 MG tablet, Take 10 mg by mouth Daily., Disp: , Rfl:   •  chlorhexidine (PERIDEX) 0.12 % solution, , Disp: , Rfl:   •  Cholecalciferol (VITAMIN D3) 2000 units capsule, TAKE ONE CAPSULE BY MOUTH DAILY, Disp: 30 capsule, Rfl: 9  •  Cyanocobalamin (B-12) 1000 MCG sublingual tablet, PLACE 1 TABLET UNDER THE TONGUE AND LET DISSOLVE ONCE DAILY, Disp: 30 each, Rfl: 1  •  dicyclomine (BENTYL) 20 MG tablet, TAKE ONE TABLET BY MOUTH EVERY 6 HOURS AS NEEDED, Disp: 120 tablet, Rfl: 5  •  EPINEPHrine (EPIPEN) 0.3 MG/0.3ML solution auto-injector injection, , Disp: , Rfl:   •  escitalopram (LEXAPRO) 10 MG tablet, Take 1 tablet by mouth daily., Disp: , Rfl:   •  hydroCHLOROthiazide (HYDRODIURIL) 25 MG tablet, TAKE ONE TABLET BY MOUTH DAILY, Disp: 90 tablet, Rfl: 2  •  KLOR-CON 20 MEQ CR tablet, TAKE ONE TABLET BY MOUTH TWICE A DAY **MUST CALL MD FOR APPOINTMENT, Disp: 180 tablet, Rfl: 3  •  Lactobacillus (ACIDOPHILUS PO), Take  by mouth., Disp: , Rfl:   •  levothyroxine (SYNTHROID, LEVOTHROID) 100 MCG tablet, TAKE ONE TABLET BY MOUTH DAILY, Disp: 90 tablet, Rfl: 3  •  montelukast (SINGULAIR) 10 MG tablet, Take 1 tablet by mouth  daily., Disp: , Rfl:   •  Multiple Vitamin tablet, Take 1 tablet by mouth daily., Disp: , Rfl:   •  naloxone (NARCAN) 4 MG/0.1ML nasal spray, 1 spray into the nostril(s) as directed by provider As Needed (sedation or accidental overdose of opioid)., Disp: 1 each, Rfl: 1  •  oxyCODONE ER (oxyCONTIN) 80 MG tablet extended-release 12 hour 12 hr tablet, Take 2 tablets by mouth Every 12 (Twelve) Hours. DNF Until [facility] 20, 2022, Disp: 120 tablet, Rfl: 0  •  Xarelto 20 MG tablet, TAKE ONE TABLET BY MOUTH DAILY, Disp: 90 tablet, Rfl: 0  •  baclofen (LIORESAL) 10 MG tablet, Take 1 tablet by mouth 3 (Three) Times a Day., Disp: 270 tablet, Rfl: 1  •  promethazine (PHENERGAN) 25 MG tablet, Take 1 tablet by mouth Every 8 (Eight) Hours As Needed for Nausea or Vomiting., Disp: 90 tablet, Rfl: 2    Vital Signs:   Vitals:    04/11/22 1405   BP: 146/76   BP Location: Right arm   Patient Position: Sitting   Pulse: 63   Weight: 86.5 kg (190 lb 12.8 oz)         Physical Exam:  Physical Exam  Vitals reviewed.   Constitutional:       General: She is not in acute distress.     Appearance: Normal appearance.   Neck:      Vascular: No carotid bruit.   Cardiovascular:      Rate and Rhythm: Normal rate and regular rhythm.      Heart sounds: Normal heart sounds. No murmur heard.  Pulmonary:      Effort: Pulmonary effort is normal. No respiratory distress.      Breath sounds: Normal breath sounds.   Musculoskeletal:      Right lower leg: No edema.      Left lower leg: No edema.   Neurological:      Mental Status: She is alert.   Psychiatric:         Mood and Affect: Mood normal.         Behavior: Behavior normal.         Result Review      The following data was reviewed by: EUGENIO Rodriguez on 04/11/2022:    Results for orders placed or performed in visit on 03/22/22   POC Urine Drug Screen, Triage    Specimen: Urine   Result Value Ref Range    Methamphetaine Screen, Urine Negative Negative    POC Amphetamines Negative Negative     Barbiturates Screen Negative Negative    Benzodiazepine Screen Positive Negative    Cocaine Screen Negative Negative    Methadone Screen Negative Negative    Opiate Screen Negative Negative    Oxycodone, Screen Positive Negative    Phencyclidine (PCP) Screen Negative Negative    Propoxyphene Screen Negative Negative    THC, Screen Negative Negative    Tricyclic Antidepressants Screen Negative Negative               Assessment and Plan:          Diagnoses and all orders for this visit:    1. Chronic pain syndrome (Primary)  Assessment & Plan:  Continue to follow up with her pain management, refilled her phenergan she takes with this     Orders:  -     promethazine (PHENERGAN) 25 MG tablet; Take 1 tablet by mouth Every 8 (Eight) Hours As Needed for Nausea or Vomiting.  Dispense: 90 tablet; Refill: 2  -     baclofen (LIORESAL) 10 MG tablet; Take 1 tablet by mouth 3 (Three) Times a Day.  Dispense: 270 tablet; Refill: 1    2. Dysthymic disorder  Assessment & Plan:  Continue to follow up with her psych       3. Acquired hypothyroidism  Assessment & Plan:  Will have labs later this week with her PCP       4. Edema, unspecified type  Assessment & Plan:  Takes water pill and ETHEL / shane   Having medicare wellness appt with her PCP and will have labs       5. Nausea  Assessment & Plan:  Will take Phenergan as needed     Orders:  -     promethazine (PHENERGAN) 25 MG tablet; Take 1 tablet by mouth Every 8 (Eight) Hours As Needed for Nausea or Vomiting.  Dispense: 90 tablet; Refill: 2    6. Irritable bowel syndrome with diarrhea  Assessment & Plan:  Takes bentyl prn, shane has given to her prn           Follow Up   Return for Next scheduled follow up.  Patient was given instructions and counseling regarding her condition or for health maintenance advice. Please see specific information pulled into the AVS if appropriate.

## 2022-04-14 ENCOUNTER — OFFICE VISIT (OUTPATIENT)
Dept: SPORTS MEDICINE | Facility: CLINIC | Age: 72
End: 2022-04-14

## 2022-04-14 VITALS
BODY MASS INDEX: 36.32 KG/M2 | SYSTOLIC BLOOD PRESSURE: 142 MMHG | RESPIRATION RATE: 16 BRPM | OXYGEN SATURATION: 97 % | WEIGHT: 192.4 LBS | HEIGHT: 61 IN | HEART RATE: 61 BPM | DIASTOLIC BLOOD PRESSURE: 76 MMHG | TEMPERATURE: 97.8 F

## 2022-04-14 DIAGNOSIS — N18.31 STAGE 3A CHRONIC KIDNEY DISEASE: Chronic | ICD-10-CM

## 2022-04-14 DIAGNOSIS — F34.1 DYSTHYMIC DISORDER: Chronic | ICD-10-CM

## 2022-04-14 DIAGNOSIS — R79.9 ABNORMAL FINDING OF BLOOD CHEMISTRY, UNSPECIFIED: ICD-10-CM

## 2022-04-14 DIAGNOSIS — Z00.00 MEDICARE ANNUAL WELLNESS VISIT, SUBSEQUENT: Primary | ICD-10-CM

## 2022-04-14 DIAGNOSIS — E03.9 ACQUIRED HYPOTHYROIDISM: Chronic | ICD-10-CM

## 2022-04-14 PROBLEM — N18.30 STAGE 3 CHRONIC KIDNEY DISEASE: Status: ACTIVE | Noted: 2021-12-03

## 2022-04-14 PROCEDURE — 1159F MED LIST DOCD IN RCRD: CPT | Performed by: FAMILY MEDICINE

## 2022-04-14 PROCEDURE — G0439 PPPS, SUBSEQ VISIT: HCPCS | Performed by: FAMILY MEDICINE

## 2022-04-14 NOTE — PROGRESS NOTES
QUICK REFERENCE INFORMATION:  The ABCs of the Annual Wellness Visit    Subsequent Medicare Wellness Visit    HEALTH RISK ASSESSMENT    1950    Recent Hospitalizations:  No hospitalization(s) within the last year..        Current Medical Providers:  Patient Care Team:  Jewell Stephens APRN as PCP - General (Family Medicine)  Edilberto Lemus MD as Consulting Physician (Nephrology)  Rashaun Alvarado MD as Surgeon (Orthopedic Surgery)  Delma oMntoya MD as Consulting Physician (Obstetrics and Gynecology)  Stuart Borges MD (Hematology and Oncology)  Isael Barker MD as Consulting Physician (Gastroenterology)        Smoking Status:  Social History     Tobacco Use   Smoking Status Former Smoker   Smokeless Tobacco Never Used       Alcohol Consumption:  Social History     Substance and Sexual Activity   Alcohol Use No       Depression Screen:   PHQ-2/PHQ-9 Depression Screening 1/19/2022   Retired PHQ-9 Total Score 0   Retired Total Score 0       Health Habits and Functional and Cognitive Screening:  Functional & Cognitive Status 4/14/2022   Do you have difficulty preparing food and eating? No   Do you have difficulty bathing yourself, getting dressed or grooming yourself? No   Do you have difficulty using the toilet? No   Do you have difficulty moving around from place to place? No   Do you have trouble with steps or getting out of a bed or a chair? No   Current Diet Well Balanced Diet   Dental Exam Other        Dental Exam Comment False Teeth Upper and Lower   Eye Exam Up to date   Exercise (times per week) 7 times per week   Current Exercises Include Light Weights   Current Exercise Activities Include -   Do you need help using the phone?  No   Are you deaf or do you have serious difficulty hearing?  No   Do you need help with transportation? No   Do you need help shopping? No   Do you need help preparing meals?  No   Do you need help with housework?  No   Do you need help with laundry? No   Do  you need help taking your medications? No   Do you need help managing money? No   Do you ever drive or ride in a car without wearing a seat belt? No   Have you felt unusual stress, anger or loneliness in the last month? -   Who do you live with? -   If you need help, do you have trouble finding someone available to you? -   Have you been bothered in the last four weeks by sexual problems? -   Do you have difficulty concentrating, remembering or making decisions? -           Does the patient have evidence of cognitive impairment? No    Aspirin use counseling: Does not need ASA (and currently is not on it)      Recent Lab Results:  CMP:  Lab Results   Component Value Date    BUN 19 12/16/2020    CREATININE 1.05 (H) 12/16/2020    EGFRIFNONA 52 (L) 12/16/2020    EGFRIFAFRI 63 12/16/2020    BCR 18.1 12/16/2020     12/16/2020    K 4.4 12/16/2020    CO2 31.0 (H) 12/16/2020    CALCIUM 8.6 12/16/2020    PROTENTOTREF 6.0 12/16/2020    ALBUMIN 3.30 (L) 12/16/2020    LABGLOBREF 2.7 12/16/2020    LABIL2 1.2 12/16/2020    BILITOT 0.4 12/16/2020    ALKPHOS 172 (H) 12/16/2020    AST 25 12/16/2020    ALT 10 12/16/2020     Lipid Panel:  Lab Results   Component Value Date    TRIG 126 12/16/2020    HDL 48 12/16/2020    VLDL 22 12/16/2020     HbA1c:  Lab Results   Component Value Date    HGBA1C 4.60 (L) 02/26/2019       Visual Acuity:  No exam data present    Age-appropriate Screening Schedule:  Refer to the list below for future screening recommendations based on patient's age, sex and/or medical conditions. Orders for these recommended tests are listed in the plan section. The patient has been provided with a written plan.    Health Maintenance   Topic Date Due   • ZOSTER VACCINE (1 of 2) Never done   • PAP SMEAR  Never done   • MAMMOGRAM  07/30/2021   • DXA SCAN  08/07/2021   • INFLUENZA VACCINE  08/01/2022   • TDAP/TD VACCINES (2 - Td or Tdap) 08/08/2027        Subjective   History of Present Illness    Anne Jewell is a 71  y.o. female who presents for an Subsequent Wellness Visit.    The following portions of the patient's history were reviewed and updated as appropriate: allergies, current medications, past family history, past medical history, past social history, past surgical history and problem list.    Outpatient Medications Prior to Visit   Medication Sig Dispense Refill   • albuterol (PROVENTIL HFA;VENTOLIN HFA) 108 (90 BASE) MCG/ACT inhaler Proventil  (90 Base) MCG/ACT Inhalation Aerosol Solution; Patient Sig: Proventil  (90 Base) MCG/ACT Inhalation Aerosol Solution Inhale 2 puff(s) every 6 to 8 hours as needed; 6.7; 0; 05-Apr-2013; Active     • ALPRAZolam (XANAX) 2 MG tablet      • baclofen (LIORESAL) 10 MG tablet Take 1 tablet by mouth 3 (Three) Times a Day. 270 tablet 1   • cetirizine (zyrTEC) 10 MG tablet Take 10 mg by mouth Daily.     • chlorhexidine (PERIDEX) 0.12 % solution      • Cholecalciferol (VITAMIN D3) 2000 units capsule TAKE ONE CAPSULE BY MOUTH DAILY 30 capsule 9   • Cyanocobalamin (B-12) 1000 MCG sublingual tablet PLACE 1 TABLET UNDER THE TONGUE AND LET DISSOLVE ONCE DAILY 30 each 1   • dicyclomine (BENTYL) 20 MG tablet TAKE ONE TABLET BY MOUTH EVERY 6 HOURS AS NEEDED 120 tablet 5   • EPINEPHrine (EPIPEN) 0.3 MG/0.3ML solution auto-injector injection      • escitalopram (LEXAPRO) 10 MG tablet Take 1 tablet by mouth daily.     • hydroCHLOROthiazide (HYDRODIURIL) 25 MG tablet TAKE ONE TABLET BY MOUTH DAILY 90 tablet 2   • KLOR-CON 20 MEQ CR tablet TAKE ONE TABLET BY MOUTH TWICE A DAY **MUST CALL MD FOR APPOINTMENT 180 tablet 3   • Lactobacillus (ACIDOPHILUS PO) Take  by mouth.     • levothyroxine (SYNTHROID, LEVOTHROID) 100 MCG tablet TAKE ONE TABLET BY MOUTH DAILY 90 tablet 3   • montelukast (SINGULAIR) 10 MG tablet Take 1 tablet by mouth daily.     • Multiple Vitamin tablet Take 1 tablet by mouth daily.     • naloxone (NARCAN) 4 MG/0.1ML nasal spray 1 spray into the nostril(s) as directed by  provider As Needed (sedation or accidental overdose of opioid). 1 each 1   • oxyCODONE ER (oxyCONTIN) 80 MG tablet extended-release 12 hour 12 hr tablet Take 2 tablets by mouth Every 12 (Twelve) Hours. DNF Until [facility] 20, 2022 120 tablet 0   • promethazine (PHENERGAN) 25 MG tablet Take 1 tablet by mouth Every 8 (Eight) Hours As Needed for Nausea or Vomiting. 90 tablet 2   • Xarelto 20 MG tablet TAKE ONE TABLET BY MOUTH DAILY 90 tablet 0     No facility-administered medications prior to visit.       Patient Active Problem List   Diagnosis   • Chronic pain syndrome   • Rheumatoid arthritis (HCC)   • Osteoarthritis of knee   • Generalized osteoarthritis   • Degeneration of intervertebral disc of lumbar region   • Neck pain   • Lumbar radiculopathy   • Atopic rhinitis   • Anxiety   • Diarrhea   • Indigestion   • Dysthymic disorder   • Gastroesophageal reflux disease   • Hypothyroidism   • Insomnia   • Pernicious anemia   • Osteoporosis   • Scoliosis   • Vasovagal syncope   • Vitamin D deficiency   • Trigger middle finger of right hand   • Trigger ring finger of right hand   • Trigger little finger of right hand   • Encounter for long-term (current) use of high-risk medication   • Coagulation disorder (HCC)   • Hyperglycemia   • Joint pain   • Status post total knee replacement, left   • Left knee pain   • Status post total knee replacement, right   • Osteoarthritis of thumb   • Trigger finger of all digits of right hand   • Left hip pain   • Primary osteoarthritis of both hips   • Edema   • Nausea   • Irritable bowel syndrome with diarrhea   • Stage 3 chronic kidney disease (HCC)       Advance Care Planning:  ACP discussion was held with the patient during this visit. Patient has an advance directive in EMR which is still valid.     Identification of Risk Factors:  Risk factors include: Breast Cancer/Mammogram Screening  Osteoporosis Risk.    Review of Systems    Compared to one year ago, the patient feels her  "physical health is the same.  Compared to one year ago, the patient feels her mental health is the same.    Objective     Physical Exam  Vitals reviewed.   Constitutional:       Appearance: She is well-developed.   HENT:      Head: Normocephalic and atraumatic.   Eyes:      Conjunctiva/sclera: Conjunctivae normal.      Pupils: Pupils are equal, round, and reactive to light.   Cardiovascular:      Rate and Rhythm: Normal rate and regular rhythm.      Pulses: Normal pulses.      Heart sounds: Normal heart sounds.      Comments: No peripheral edema  Pulmonary:      Effort: Pulmonary effort is normal.      Breath sounds: Normal breath sounds.   Skin:     General: Skin is warm and dry.   Neurological:      Mental Status: She is alert and oriented to person, place, and time.   Psychiatric:         Behavior: Behavior normal.         Vitals:    04/14/22 0840   BP: 142/76   BP Location: Left arm   Patient Position: Sitting   Cuff Size: Adult   Pulse: 61   Resp: 16   Temp: 97.8 °F (36.6 °C)   TempSrc: Temporal   SpO2: 97%   Weight: 87.3 kg (192 lb 6.4 oz)   Height: 154.9 cm (61\")   PainSc:   6       Patient's Body mass index is 36.35 kg/m². indicating that she is obese (BMI >30). Obesity-related health conditions include the following: hypertension. Obesity is unchanged. BMI is is above average; no BMI management plan is appropriate. We discussed portion control and increasing exercise..      Assessment/Plan   Patient Self-Management and Personalized Health Advice  The patient has been provided with information about: exercise and preventive services including:   · Annual Wellness Visit (AWV).    Visit Diagnoses:    ICD-10-CM ICD-9-CM   1. Medicare annual wellness visit, subsequent  Z00.00 V70.0   2. Acquired hypothyroidism  E03.9 244.9   3. Dysthymic disorder  F34.1 300.4   4. Stage 3a chronic kidney disease (HCC)  N18.31 585.3   5. Abnormal finding of blood chemistry, unspecified   R79.9 790.6       Orders Placed This " Encounter   Procedures   • Hemoglobin A1c     Order Specific Question:   Release to patient     Answer:   Immediate   • Comprehensive Metabolic Panel     Order Specific Question:   Release to patient     Answer:   Immediate   • Lipid Panel With / Chol / HDL Ratio     Order Specific Question:   Release to patient     Answer:   Immediate   • TSH     Order Specific Question:   Release to patient     Answer:   Immediate   • T4, Free     Order Specific Question:   Release to patient     Answer:   Immediate   • UA / M With / Rflx Culture([facility] ONLY) - Urine, Clean Catch     Order Specific Question:   Release to patient     Answer:   Immediate   • CBC & Differential     Order Specific Question:   Manual Differential     Answer:   No       Outpatient Encounter Medications as of 4/14/2022   Medication Sig Dispense Refill   • albuterol (PROVENTIL HFA;VENTOLIN HFA) 108 (90 BASE) MCG/ACT inhaler Proventil  (90 Base) MCG/ACT Inhalation Aerosol Solution; Patient Sig: Proventil  (90 Base) MCG/ACT Inhalation Aerosol Solution Inhale 2 puff(s) every 6 to 8 hours as needed; 6.7; 0; 05-Apr-2013; Active     • ALPRAZolam (XANAX) 2 MG tablet      • baclofen (LIORESAL) 10 MG tablet Take 1 tablet by mouth 3 (Three) Times a Day. 270 tablet 1   • cetirizine (zyrTEC) 10 MG tablet Take 10 mg by mouth Daily.     • chlorhexidine (PERIDEX) 0.12 % solution      • Cholecalciferol (VITAMIN D3) 2000 units capsule TAKE ONE CAPSULE BY MOUTH DAILY 30 capsule 9   • Cyanocobalamin (B-12) 1000 MCG sublingual tablet PLACE 1 TABLET UNDER THE TONGUE AND LET DISSOLVE ONCE DAILY 30 each 1   • dicyclomine (BENTYL) 20 MG tablet TAKE ONE TABLET BY MOUTH EVERY 6 HOURS AS NEEDED 120 tablet 5   • EPINEPHrine (EPIPEN) 0.3 MG/0.3ML solution auto-injector injection      • escitalopram (LEXAPRO) 10 MG tablet Take 1 tablet by mouth daily.     • hydroCHLOROthiazide (HYDRODIURIL) 25 MG tablet TAKE ONE TABLET BY MOUTH DAILY 90 tablet 2   • KLOR-CON 20 MEQ CR  tablet TAKE ONE TABLET BY MOUTH TWICE A DAY **MUST CALL MD FOR APPOINTMENT 180 tablet 3   • Lactobacillus (ACIDOPHILUS PO) Take  by mouth.     • levothyroxine (SYNTHROID, LEVOTHROID) 100 MCG tablet TAKE ONE TABLET BY MOUTH DAILY 90 tablet 3   • montelukast (SINGULAIR) 10 MG tablet Take 1 tablet by mouth daily.     • Multiple Vitamin tablet Take 1 tablet by mouth daily.     • naloxone (NARCAN) 4 MG/0.1ML nasal spray 1 spray into the nostril(s) as directed by provider As Needed (sedation or accidental overdose of opioid). 1 each 1   • oxyCODONE ER (oxyCONTIN) 80 MG tablet extended-release 12 hour 12 hr tablet Take 2 tablets by mouth Every 12 (Twelve) Hours. DNF Until [facility] 20, 2022 120 tablet 0   • promethazine (PHENERGAN) 25 MG tablet Take 1 tablet by mouth Every 8 (Eight) Hours As Needed for Nausea or Vomiting. 90 tablet 2   • Xarelto 20 MG tablet TAKE ONE TABLET BY MOUTH DAILY 90 tablet 0     No facility-administered encounter medications on file as of 4/14/2022.       Reviewed use of high risk medication in the elderly: not applicable  Reviewed for potential of harmful drug interactions in the elderly: not applicable    Follow Up:  No follow-ups on file.     An After Visit Summary and PPPS with all of these plans were given to the patient.

## 2022-04-19 ENCOUNTER — TELEPHONE (OUTPATIENT)
Dept: SPORTS MEDICINE | Facility: CLINIC | Age: 72
End: 2022-04-19

## 2022-04-19 LAB
ALBUMIN SERPL-MCNC: 3.7 G/DL (ref 3.7–4.7)
ALBUMIN/GLOB SERPL: 1.4 {RATIO} (ref 1.2–2.2)
ALP SERPL-CCNC: 204 IU/L (ref 44–121)
ALT SERPL-CCNC: 10 IU/L (ref 0–32)
APPEARANCE UR: CLEAR
AST SERPL-CCNC: 22 IU/L (ref 0–40)
BACTERIA #/AREA URNS HPF: ABNORMAL /[HPF]
BACTERIA UR CULT: ABNORMAL
BACTERIA UR CULT: ABNORMAL
BASOPHILS # BLD AUTO: 0 X10E3/UL (ref 0–0.2)
BASOPHILS NFR BLD AUTO: 0 %
BILIRUB SERPL-MCNC: 0.3 MG/DL (ref 0–1.2)
BILIRUB UR QL STRIP: NEGATIVE
BUN SERPL-MCNC: 17 MG/DL (ref 8–27)
BUN/CREAT SERPL: 18 (ref 12–28)
CALCIUM SERPL-MCNC: 9.2 MG/DL (ref 8.7–10.3)
CASTS URNS QL MICRO: ABNORMAL /LPF
CHLORIDE SERPL-SCNC: 102 MMOL/L (ref 96–106)
CHOLEST SERPL-MCNC: 121 MG/DL (ref 100–199)
CHOLEST/HDLC SERPL: 2.5 RATIO (ref 0–4.4)
CO2 SERPL-SCNC: 24 MMOL/L (ref 20–29)
COLOR UR: YELLOW
CREAT SERPL-MCNC: 0.93 MG/DL (ref 0.57–1)
EGFRCR SERPLBLD CKD-EPI 2021: 66 ML/MIN/1.73
EOSINOPHIL # BLD AUTO: 0.2 X10E3/UL (ref 0–0.4)
EOSINOPHIL NFR BLD AUTO: 5 %
EPI CELLS #/AREA URNS HPF: ABNORMAL /HPF (ref 0–10)
ERYTHROCYTE [DISTWIDTH] IN BLOOD BY AUTOMATED COUNT: 14.5 % (ref 11.7–15.4)
GLOBULIN SER CALC-MCNC: 2.6 G/DL (ref 1.5–4.5)
GLUCOSE SERPL-MCNC: 96 MG/DL (ref 65–99)
GLUCOSE UR QL STRIP: NEGATIVE
HBA1C MFR BLD: 5.7 % (ref 4.8–5.6)
HCT VFR BLD AUTO: 38.7 % (ref 34–46.6)
HDLC SERPL-MCNC: 49 MG/DL
HGB BLD-MCNC: 11.7 G/DL (ref 11.1–15.9)
HGB UR QL STRIP: NEGATIVE
IMM GRANULOCYTES # BLD AUTO: 0 X10E3/UL (ref 0–0.1)
IMM GRANULOCYTES NFR BLD AUTO: 0 %
KETONES UR QL STRIP: NEGATIVE
LDLC SERPL CALC-MCNC: 56 MG/DL (ref 0–99)
LEUKOCYTE ESTERASE UR QL STRIP: NEGATIVE
LYMPHOCYTES # BLD AUTO: 1.9 X10E3/UL (ref 0.7–3.1)
LYMPHOCYTES NFR BLD AUTO: 38 %
MCH RBC QN AUTO: 25.4 PG (ref 26.6–33)
MCHC RBC AUTO-ENTMCNC: 30.2 G/DL (ref 31.5–35.7)
MCV RBC AUTO: 84 FL (ref 79–97)
MICRO URNS: ABNORMAL
MONOCYTES # BLD AUTO: 0.4 X10E3/UL (ref 0.1–0.9)
MONOCYTES NFR BLD AUTO: 7 %
NEUTROPHILS # BLD AUTO: 2.5 X10E3/UL (ref 1.4–7)
NEUTROPHILS NFR BLD AUTO: 50 %
NITRITE UR QL STRIP: POSITIVE
OTHER ANTIBIOTIC SUSC ISLT: ABNORMAL
PH UR STRIP: 5.5 [PH] (ref 5–7.5)
PLATELET # BLD AUTO: 219 X10E3/UL (ref 150–450)
POTASSIUM SERPL-SCNC: 4.1 MMOL/L (ref 3.5–5.2)
PROT SERPL-MCNC: 6.3 G/DL (ref 6–8.5)
PROT UR QL STRIP: NEGATIVE
RBC # BLD AUTO: 4.61 X10E6/UL (ref 3.77–5.28)
RBC #/AREA URNS HPF: ABNORMAL /HPF (ref 0–2)
SODIUM SERPL-SCNC: 141 MMOL/L (ref 134–144)
SP GR UR STRIP: 1.02 (ref 1–1.03)
T4 FREE SERPL-MCNC: 1.19 NG/DL (ref 0.82–1.77)
TRIGL SERPL-MCNC: 79 MG/DL (ref 0–149)
TSH SERPL DL<=0.005 MIU/L-ACNC: 2.15 UIU/ML (ref 0.45–4.5)
URINALYSIS REFLEX: ABNORMAL
UROBILINOGEN UR STRIP-MCNC: 1 MG/DL (ref 0.2–1)
VLDLC SERPL CALC-MCNC: 16 MG/DL (ref 5–40)
WBC # BLD AUTO: 5 X10E3/UL (ref 3.4–10.8)
WBC #/AREA URNS HPF: ABNORMAL /HPF (ref 0–5)

## 2022-04-19 RX ORDER — OXYCODONE HYDROCHLORIDE 80 MG/1
160 TABLET, FILM COATED, EXTENDED RELEASE ORAL EVERY 12 HOURS SCHEDULED
Qty: 120 TABLET | Refills: 0 | Status: SHIPPED | OUTPATIENT
Start: 2022-04-23 | End: 2022-05-23 | Stop reason: SDUPTHER

## 2022-04-19 NOTE — TELEPHONE ENCOUNTER
Medication Refill Request    Date of phone call: 22    Medication being requested: oxycodone ER 80mg si tabs po q 12 hours  Qty: 120    Date of last visit: 3/22/22    Date of last refill:     VICENTA up to date?:     Next Follow up?: 22    Any new pertinent information? (i.e, new medication allergies, new use of medications, change in patient's health or condition, non-compliance or inconsistency with prescribing agreement?): can you please fill for Dr. Grajeda? Thank you.

## 2022-04-19 NOTE — TELEPHONE ENCOUNTER
Patient wanted to get something called in, she stated like an antibiotic or a steroid pack. She already had a neg covid-19 test she states. Please advise, thank you! [facility] is [facility]. Thank you!

## 2022-04-19 NOTE — TELEPHONE ENCOUNTER
VICENTA reviewed and appropriate.  Last drug screen 3/22/22 appropriate.     This patient is under the care of my colleague and I am covering patient care for him at this time.  I have reviewed pertinent information/documentation as necessary and will continue the plan of care as previously directed to the best of my ability.

## 2022-04-19 NOTE — TELEPHONE ENCOUNTER
Patient called and states that she believes she has a sinus infection and has tested neg for covid for her at home test. She would like to know if you can send something in for her. Please advise, thank you!

## 2022-04-20 ENCOUNTER — TELEPHONE (OUTPATIENT)
Dept: SPORTS MEDICINE | Facility: CLINIC | Age: 72
End: 2022-04-20

## 2022-04-20 RX ORDER — AZITHROMYCIN 250 MG/1
TABLET, FILM COATED ORAL
Qty: 6 TABLET | Refills: 0 | Status: SHIPPED | OUTPATIENT
Start: 2022-04-20 | End: 2022-07-13

## 2022-04-20 RX ORDER — NITROFURANTOIN 25; 75 MG/1; MG/1
100 CAPSULE ORAL 2 TIMES DAILY
Qty: 20 CAPSULE | Refills: 0 | Status: SHIPPED | OUTPATIENT
Start: 2022-04-20 | End: 2022-07-13

## 2022-04-20 NOTE — TELEPHONE ENCOUNTER
Patient returned Dariela's call - I rellayed Dr. Cazares message about her labs. She states she is having UTI symptoms.     Please advise,

## 2022-04-20 NOTE — TELEPHONE ENCOUNTER
Patient called in wanting to know the status of her message.   Informed rx was sent in, all information was verbally understood.     Thanks  Graciela

## 2022-04-26 ENCOUNTER — CLINICAL SUPPORT (OUTPATIENT)
Dept: FAMILY MEDICINE CLINIC | Age: 72
End: 2022-04-26

## 2022-04-26 ENCOUNTER — OFFICE VISIT (OUTPATIENT)
Dept: PAIN MEDICINE | Facility: CLINIC | Age: 72
End: 2022-04-26

## 2022-04-26 VITALS
BODY MASS INDEX: 37 KG/M2 | WEIGHT: 196 LBS | RESPIRATION RATE: 20 BRPM | HEART RATE: 66 BPM | TEMPERATURE: 96.8 F | OXYGEN SATURATION: 99 % | DIASTOLIC BLOOD PRESSURE: 78 MMHG | SYSTOLIC BLOOD PRESSURE: 153 MMHG | HEIGHT: 61 IN

## 2022-04-26 DIAGNOSIS — M54.50 CHRONIC LOW BACK PAIN, UNSPECIFIED BACK PAIN LATERALITY, UNSPECIFIED WHETHER SCIATICA PRESENT: ICD-10-CM

## 2022-04-26 DIAGNOSIS — Z79.899 ENCOUNTER FOR LONG-TERM (CURRENT) USE OF HIGH-RISK MEDICATION: ICD-10-CM

## 2022-04-26 DIAGNOSIS — G89.29 CHRONIC LOW BACK PAIN, UNSPECIFIED BACK PAIN LATERALITY, UNSPECIFIED WHETHER SCIATICA PRESENT: ICD-10-CM

## 2022-04-26 DIAGNOSIS — M54.16 CHRONIC RADICULAR LUMBAR PAIN: ICD-10-CM

## 2022-04-26 DIAGNOSIS — G89.29 CHRONIC RADICULAR LUMBAR PAIN: ICD-10-CM

## 2022-04-26 DIAGNOSIS — M25.50 ARTHRALGIA, UNSPECIFIED JOINT: ICD-10-CM

## 2022-04-26 DIAGNOSIS — J30.9 ALLERGIC RHINITIS, UNSPECIFIED SEASONALITY, UNSPECIFIED TRIGGER: Primary | ICD-10-CM

## 2022-04-26 DIAGNOSIS — G89.4 CHRONIC PAIN SYNDROME: Primary | ICD-10-CM

## 2022-04-26 DIAGNOSIS — M15.9 GENERALIZED OSTEOARTHRITIS: ICD-10-CM

## 2022-04-26 PROCEDURE — 95115 IMMUNOTHERAPY ONE INJECTION: CPT | Performed by: FAMILY MEDICINE

## 2022-04-26 PROCEDURE — 99214 OFFICE O/P EST MOD 30 MIN: CPT | Performed by: NURSE PRACTITIONER

## 2022-04-26 NOTE — PROGRESS NOTES
CHIEF COMPLAINT  F/u back and joint pain. Pt sts pain is the same.     Subjective   Anne Jewell is a 71 y.o. female  who presents for follow-up.  She has a history of back and diffuse joint pain.  Today her pain is 6/10VAS in severity. She continues with OxyContin 80 mg 2 tablets every 12 hours and baclofen 10 mg 3/day (prescribed by PCP). This medication regimen decreases her pain by a significant amount. ADLs by self. She denies any side effects including somnolence or constipation.     She has a puppy and this is keeping her active and busy.     Prescribed Xanax 2 mg TID by psychiatrist, Narcan prescription at home (most recent prescription 5/25/2021).     Not a good candidate for interventions.     Patient remained masked during entire encounter. No cough present. I donned a mask and eye protection throughout entire visit. Prior to donning mask and eye protection, hand hygiene was performed, as well as when it was doffed.  I was closer than 6 feet, but not for an extended period of time. No obvious exposure to any bodily fluids.    Back Pain  This is a chronic problem. The current episode started more than 1 year ago. The problem occurs constantly. The problem is unchanged. The pain is present in the lumbar spine. The quality of the pain is described as aching. The pain radiates to the left foot and right foot. The pain is at a severity of 6/10. The pain is moderate. The symptoms are aggravated by stress (weather changes). Associated symptoms include numbness (bilat feet). Pertinent negatives include no abdominal pain, bladder incontinence, bowel incontinence, chest pain, dysuria, fever, headaches or weakness. She has tried analgesics, bed rest, home exercises and heat (OxyContin, aquatherapy) for the symptoms. The treatment provided moderate relief.   Joint Pain  This is a chronic (6/10VAS in severity) problem. The current episode started more than 1 year ago. The problem occurs constantly. The problem has  been unchanged. Associated symptoms include arthralgias (joint), myalgias, neck pain and numbness (bilat feet). Pertinent negatives include no abdominal pain, chest pain, chills, congestion, coughing, fatigue, fever, headaches, joint swelling, nausea, vomiting or weakness. She has tried oral narcotics for the symptoms. The treatment provided moderate relief.      PEG Assessment   What number best describes your pain on average in the past week?6  What number best describes how, during the past week, pain has interfered with your enjoyment of life?6  What number best describes how, during the past week, pain has interfered with your general activity?  6    The following portions of the patient's history were reviewed and updated as appropriate: allergies, current medications, past family history, past medical history, past social history, past surgical history and problem list.    Review of Systems   Constitutional: Negative for activity change, chills, fatigue and fever.   HENT: Negative for congestion.    Eyes: Negative for visual disturbance.   Respiratory: Negative for cough and shortness of breath.    Cardiovascular: Negative for chest pain.   Gastrointestinal: Negative for abdominal pain, bowel incontinence, constipation, diarrhea, nausea and vomiting.   Endocrine: Negative for polyuria.   Genitourinary: Negative for bladder incontinence, difficulty urinating and dysuria.   Musculoskeletal: Positive for arthralgias (joint), back pain, gait problem (walker), myalgias and neck pain. Negative for joint swelling.   Neurological: Positive for numbness (bilat feet). Negative for dizziness, weakness, light-headedness and headaches.   Psychiatric/Behavioral: Negative for agitation, sleep disturbance and suicidal ideas. The patient is not nervous/anxious.      --  The aforementioned information the Chief Complaint section and above subjective data including any HPI data, and also the Review of Systems data, has been  "personally reviewed and affirmed.  --    Vitals:    04/26/22 1133   BP: 153/78   Pulse: 66   Resp: 20   Temp: 96.8 °F (36 °C)   SpO2: 99%   Weight: 88.9 kg (196 lb)   Height: 154.9 cm (61\")   PainSc:   6   PainLoc: Back  Comment: and joint     Objective   Physical Exam  Vitals and nursing note reviewed.   Constitutional:       Appearance: Normal appearance. She is well-developed.   Eyes:      General: Lids are normal.   Cardiovascular:      Rate and Rhythm: Normal rate.   Pulmonary:      Effort: Pulmonary effort is normal.   Musculoskeletal:      Lumbar back: Tenderness and bony tenderness present. Decreased range of motion.      Comments: Diffuse arthritic changes   Neurological:      Mental Status: She is alert and oriented to person, place, and time.      Gait: Gait abnormal (rollator).   Psychiatric:         Attention and Perception: Attention normal.         Mood and Affect: Mood normal.         Speech: Speech normal.         Behavior: Behavior normal.         Judgment: Judgment normal.       Assessment/Plan   Diagnoses and all orders for this visit:    1. Chronic pain syndrome (Primary)    2. Encounter for long-term (current) use of high-risk medication    3. Arthralgia, unspecified joint    4. Chronic low back pain, unspecified back pain laterality, unspecified whether sciatica present    5. Generalized osteoarthritis    6. Chronic radicular lumbar pain      --- The urine drug screen confirmation from 11/22/2021 has been reviewed and the result is appropriate based on patient history and V[facility]A report  --- CSA updated 3/22/2022  --- Continue OxyContin. No refill needed. Patient appears stable with current regimen. No adverse effects. Regarding continuation of opioids, there is no evidence of aberrant behavior or any red flags.  The patient continues with appropriate response to opioid therapy. ADL's remain intact by self.   --- Narcan prescription current  --- Follow-up 1 month or sooner if needed     V[facility]ENTA " REPORT  As part of the patient's treatment plan, I am prescribing controlled substances. The patient has been made aware of appropriate use of such medications, including potential risk of somnolence, limited ability to drive and/or work safely, and the potential for dependence or overdose. It has also bee made clear that these medications are for use by this patient only, without concomitant use of alcohol or other substances unless prescribed.     Patient has completed prescribing agreement detailing terms of continued prescribing of controlled substances, including monitoring VICENTA reports, urine drug screening, and pill counts if necessary. The patient is aware that inappropriate use will results in cessation of prescribing such medications.    As the clinician, I personally reviewed the VICENTA from 4/26/2022 while the patient was in the office today.    History and physical exam exhibit continued safe and appropriate use of controlled substances.    Dictated utilizing Dragon dictation.     This document is intended for medical expert use only. Reading of this document by patients and/or patient's family without participating medical staff guidance may result in misinterpretation and unintended morbidity.   Any interpretation of such data is the responsibility of the patient and/or family member responsible for the patient in concert with their primary or specialist providers, not to be left for sources of online searches such as [employer], [employer] or similar queries. Relying on these approaches to knowledge may result in misinterpretation, misguided goals of care and even death should patients or family members try recommendations outside of the realm of professional medical care in a supervised way.

## 2022-05-11 ENCOUNTER — CLINICAL SUPPORT (OUTPATIENT)
Dept: FAMILY MEDICINE CLINIC | Age: 72
End: 2022-05-11

## 2022-05-11 DIAGNOSIS — J30.9 ALLERGIC RHINITIS, UNSPECIFIED SEASONALITY, UNSPECIFIED TRIGGER: Primary | ICD-10-CM

## 2022-05-11 PROCEDURE — 95115 IMMUNOTHERAPY ONE INJECTION: CPT | Performed by: FAMILY MEDICINE

## 2022-05-23 ENCOUNTER — OFFICE VISIT (OUTPATIENT)
Dept: PAIN MEDICINE | Facility: CLINIC | Age: 72
End: 2022-05-23

## 2022-05-23 VITALS
OXYGEN SATURATION: 96 % | HEIGHT: 61 IN | DIASTOLIC BLOOD PRESSURE: 82 MMHG | BODY MASS INDEX: 37.49 KG/M2 | SYSTOLIC BLOOD PRESSURE: 160 MMHG | WEIGHT: 198.6 LBS | TEMPERATURE: 96.4 F | HEART RATE: 81 BPM | RESPIRATION RATE: 12 BRPM

## 2022-05-23 DIAGNOSIS — G89.4 CHRONIC PAIN SYNDROME: Primary | ICD-10-CM

## 2022-05-23 DIAGNOSIS — M25.50 ARTHRALGIA, UNSPECIFIED JOINT: ICD-10-CM

## 2022-05-23 DIAGNOSIS — M54.50 CHRONIC LOW BACK PAIN, UNSPECIFIED BACK PAIN LATERALITY, UNSPECIFIED WHETHER SCIATICA PRESENT: ICD-10-CM

## 2022-05-23 DIAGNOSIS — G89.29 CHRONIC RADICULAR LUMBAR PAIN: ICD-10-CM

## 2022-05-23 DIAGNOSIS — G89.29 CHRONIC LOW BACK PAIN, UNSPECIFIED BACK PAIN LATERALITY, UNSPECIFIED WHETHER SCIATICA PRESENT: ICD-10-CM

## 2022-05-23 DIAGNOSIS — Z79.899 ENCOUNTER FOR LONG-TERM (CURRENT) USE OF HIGH-RISK MEDICATION: ICD-10-CM

## 2022-05-23 DIAGNOSIS — M54.16 CHRONIC RADICULAR LUMBAR PAIN: ICD-10-CM

## 2022-05-23 DIAGNOSIS — M15.9 GENERALIZED OSTEOARTHRITIS: ICD-10-CM

## 2022-05-23 PROCEDURE — 80305 DRUG TEST PRSMV DIR OPT OBS: CPT | Performed by: NURSE PRACTITIONER

## 2022-05-23 PROCEDURE — 99214 OFFICE O/P EST MOD 30 MIN: CPT | Performed by: NURSE PRACTITIONER

## 2022-05-23 RX ORDER — OXYCODONE HYDROCHLORIDE 80 MG/1
160 TABLET, FILM COATED, EXTENDED RELEASE ORAL EVERY 12 HOURS SCHEDULED
Qty: 120 TABLET | Refills: 0 | Status: SHIPPED | OUTPATIENT
Start: 2022-05-23 | End: 2022-06-21 | Stop reason: SDUPTHER

## 2022-05-23 NOTE — TELEPHONE ENCOUNTER
Seen today. DNF applied 6/14/2022 (she is not always taking 4/day, brought  Bottle in and had a surplus). Thanks, TT

## 2022-05-23 NOTE — PROGRESS NOTES
CHIEF COMPLAINT  FOLLOW UP LUMBAR/JOINT PAIN  1 Month evaluation- Patient in office reports her pain level has continued to remain consistent  over the last month.     Subjective   Anne Jewell is a 71 y.o. female  who presents for follow-up.  She has a history of back and diffuse joint.  Today her pain is 6/10VAS in severity.  She continues with OxyContin 80 mg 2 tablets in the morning and 1-2 tablets at night and baclofen 10 mg 3/day (prescribed by PCP). Her medication regimen decreases her pain and allows her to be more active. She denies any side effects including somnolence or constipation.     Prescribed Xanax 2 mg TID by psychiatrist, Narcan prescription at home (patient just filled a new prescription of this and disposed of her old prescription).     Not a good candidate for interventions.     She presents with her OxyContin Bottle today which was filled on 4/25/2022. She has #91 remaining.  She states she has a surplus due to not always taking 2 tablets at night. Discussed that we want to avoid having a surplus of medication.  We will date out her refill and then may consider decreasing her dose in the future. She will bring her bottle to every appointment so her refills are dated appropriately.      Patient remained masked during entire encounter. No cough present. I donned a mask and eye protection throughout entire visit. Prior to donning mask and eye protection, hand hygiene was performed, as well as when it was doffed.  I was closer than 6 feet, but not for an extended period of time. No obvious exposure to any bodily fluids.    Back Pain  This is a chronic problem. The current episode started more than 1 year ago. The problem occurs constantly. The problem is unchanged. The pain is present in the lumbar spine. The quality of the pain is described as aching. The pain radiates to the left foot and right foot. The pain is at a severity of 8/10. The pain is moderate. The symptoms are aggravated by  stress (weather changes). Pertinent negatives include no abdominal pain, bladder incontinence, bowel incontinence, chest pain, dysuria, fever, headaches, numbness or weakness. She has tried analgesics, bed rest, home exercises and heat (OxyContin, aquatherapy) for the symptoms. The treatment provided moderate relief.   Joint Pain  This is a chronic (8/10VAS in severity) problem. The current episode started more than 1 year ago. The problem occurs constantly. The problem has been unchanged. Associated symptoms include arthralgias (joint), myalgias and neck pain. Pertinent negatives include no abdominal pain, chest pain, chills, congestion, coughing, fatigue, fever, headaches, joint swelling, nausea, numbness, vomiting or weakness. She has tried oral narcotics for the symptoms. The treatment provided moderate relief.      PEG Assessment   What number best describes your pain on average in the past week?10  What number best describes how, during the past week, pain has interfered with your enjoyment of life?10  What number best describes how, during the past week, pain has interfered with your general activity?  10    The following portions of the patient's history were reviewed and updated as appropriate: allergies, current medications, past family history, past medical history, past social history, past surgical history and problem list.    Review of Systems   Constitutional: Negative for activity change, chills, fatigue and fever.   HENT: Negative for congestion.    Eyes: Negative for visual disturbance.   Respiratory: Negative for cough and chest tightness.    Cardiovascular: Negative for chest pain.   Gastrointestinal: Negative for abdominal pain, bowel incontinence, constipation, diarrhea, nausea and vomiting.   Genitourinary: Negative for bladder incontinence, difficulty urinating and dysuria.   Musculoskeletal: Positive for arthralgias (joint), back pain, myalgias and neck pain. Negative for joint swelling.  "  Neurological: Negative for dizziness, weakness, light-headedness, numbness and headaches.   Psychiatric/Behavioral: Positive for sleep disturbance. Negative for agitation and suicidal ideas. The patient is not nervous/anxious.      --  The aforementioned information the Chief Complaint section and above subjective data including any HPI data, and also the Review of Systems data, has been personally reviewed and affirmed.  --    Vitals:    05/23/22 1100   BP: 160/82   BP Location: Right arm   Patient Position: Sitting   Pulse: 81   Resp: 12   Temp: 96.4 °F (35.8 °C)   SpO2: 96%   Weight: 90.1 kg (198 lb 9.6 oz)   Height: 154.9 cm (61\")   PainSc:   8   PainLoc: Back  Comment: joint     Objective   Physical Exam  Vitals and nursing note reviewed.   Constitutional:       Appearance: Normal appearance. She is well-developed.   Eyes:      General: Lids are normal.   Cardiovascular:      Rate and Rhythm: Normal rate.   Pulmonary:      Effort: Pulmonary effort is normal.   Musculoskeletal:      Cervical back: Normal range of motion.      Lumbar back: Tenderness present. Decreased range of motion. Scoliosis present.      Comments: Diffuse arthritic changes   Neurological:      Mental Status: She is alert and oriented to person, place, and time.      Gait: Gait abnormal (rollator).   Psychiatric:         Attention and Perception: Attention normal.         Mood and Affect: Mood normal.         Speech: Speech normal.         Behavior: Behavior normal.         Judgment: Judgment normal.       Assessment & Plan   Diagnoses and all orders for this visit:    1. Chronic pain syndrome (Primary)    2. Encounter for long-term (current) use of high-risk medication    3. Arthralgia, unspecified joint    4. Chronic low back pain, unspecified back pain laterality, unspecified whether sciatica present    5. Generalized osteoarthritis    6. Chronic radicular lumbar pain      --- Routine UDS in office today as part of monitoring " requirements for controlled substances.  The specimen was viewed and the immunoassay result reviewed and is +OXY, +BZO.  This specimen will be sent to [facility] for confirmation.     --- CSA updated 3/22/2022  --- Refill OxyContin. DNF 6/14/2022 applied. Patient appears stable with current regimen. No adverse effects. Regarding continuation of opioids, there is no evidence of aberrant behavior or any red flags.  The patient continues with appropriate response to opioid therapy. ADL's remain intact by self.   --- Follow-up 1 month or sooner if needed.      [facility] REPORT  As part of the patient's treatment plan, I am prescribing controlled substances. The patient has been made aware of appropriate use of such medications, including potential risk of somnolence, limited ability to drive and/or work safely, and the potential for dependence or overdose. It has also bee made clear that these medications are for use by this patient only, without concomitant use of alcohol or other substances unless prescribed.     Patient has completed prescribing agreement detailing terms of continued prescribing of controlled substances, including monitoring [facility]A reports, urine drug screening, and pill counts if necessary. The patient is aware that inappropriate use will results in cessation of prescribing such medications.    As the clinician, I personally reviewed the VICENTA from 5/23/2022 while the patient was in the office today.    History and physical exam exhibit continued safe and appropriate use of controlled substances.    Dictated utilizing Dragon dictation.     This document is intended for medical expert use only. Reading of this document by patients and/or patient's family without participating medical staff guidance may result in misinterpretation and unintended morbidity.   Any interpretation of such data is the responsibility of the patient and/or family member responsible for the patient in concert with  their primary or specialist providers, not to be left for sources of online searches such as [employer], [employer] or similar queries. Relying on these approaches to knowledge may result in misinterpretation, misguided goals of care and even death should patients or family members try recommendations outside of the realm of professional medical care in a supervised way.

## 2022-05-25 ENCOUNTER — CLINICAL SUPPORT (OUTPATIENT)
Dept: FAMILY MEDICINE CLINIC | Age: 72
End: 2022-05-25

## 2022-05-25 DIAGNOSIS — J30.9 ALLERGIC RHINITIS, UNSPECIFIED SEASONALITY, UNSPECIFIED TRIGGER: Primary | ICD-10-CM

## 2022-05-25 PROCEDURE — 95115 IMMUNOTHERAPY ONE INJECTION: CPT | Performed by: FAMILY MEDICINE

## 2022-05-31 RX ORDER — LEVOTHYROXINE SODIUM 0.1 MG/1
TABLET ORAL
Qty: 90 TABLET | Refills: 0 | Status: SHIPPED | OUTPATIENT
Start: 2022-05-31 | End: 2022-10-19 | Stop reason: SDUPTHER

## 2022-06-06 ENCOUNTER — TELEPHONE (OUTPATIENT)
Dept: ORTHOPEDIC SURGERY | Facility: CLINIC | Age: 72
End: 2022-06-06

## 2022-06-06 DIAGNOSIS — Z96.652 STATUS POST TOTAL KNEE REPLACEMENT, LEFT: ICD-10-CM

## 2022-06-06 DIAGNOSIS — M79.641 BILATERAL HAND PAIN: ICD-10-CM

## 2022-06-06 DIAGNOSIS — Z96.651 STATUS POST TOTAL KNEE REPLACEMENT, RIGHT: Primary | ICD-10-CM

## 2022-06-06 DIAGNOSIS — M16.0 PRIMARY OSTEOARTHRITIS OF BOTH HIPS: ICD-10-CM

## 2022-06-06 DIAGNOSIS — M79.642 BILATERAL HAND PAIN: ICD-10-CM

## 2022-06-06 NOTE — TELEPHONE ENCOUNTER
Caller: Anne Jewell    Relationship: Self    Best call back number: [phone]    What orders are you requesting (i.e. lab or imaging): XRAY ORDER FOR BILATERAL HIP, BILATERAL KNEES, AND BILATERAL HANDS      In what timeframe would the patient need to come in: COMING IN Thursday      Where will you receive your lab/imaging services: DOWNSTAIRS    Additional notes: PATIENT WANTS TO ENSURE THAT ORDERS ARE SENT DOWNSTAIRS PRIOR TO HER APPT Thursday- VERY HARD FOR HER TO GET UP AND DOWN STAIRS.     PATIENT STATED SHE WILL ARRIVE 1 HOUR EARLY @1 PM TO HAVE IMAGING DONE PRIOR TO APPT AT 2 W/ DR ROSALES--- IF SHE NEEDS TO COME IN EARLIER PLEASE LET HER KNOW. THANK YOU!

## 2022-06-08 ENCOUNTER — HOSPITAL ENCOUNTER (OUTPATIENT)
Dept: GENERAL RADIOLOGY | Facility: HOSPITAL | Age: 72
Discharge: HOME | End: 2022-06-08

## 2022-06-08 DIAGNOSIS — Z96.652 STATUS POST TOTAL KNEE REPLACEMENT, LEFT: ICD-10-CM

## 2022-06-08 DIAGNOSIS — M16.0 PRIMARY OSTEOARTHRITIS OF BOTH HIPS: ICD-10-CM

## 2022-06-08 DIAGNOSIS — M79.641 BILATERAL HAND PAIN: ICD-10-CM

## 2022-06-08 DIAGNOSIS — Z96.651 STATUS POST TOTAL KNEE REPLACEMENT, RIGHT: ICD-10-CM

## 2022-06-08 DIAGNOSIS — M79.642 BILATERAL HAND PAIN: ICD-10-CM

## 2022-06-08 PROCEDURE — 73560 X-RAY EXAM OF KNEE 1 OR 2: CPT

## 2022-06-08 PROCEDURE — 73522 X-RAY EXAM HIPS BI 3-4 VIEWS: CPT

## 2022-06-08 PROCEDURE — 73130 X-RAY EXAM OF HAND: CPT

## 2022-06-09 ENCOUNTER — CLINICAL SUPPORT (OUTPATIENT)
Dept: FAMILY MEDICINE CLINIC | Age: 72
End: 2022-06-09

## 2022-06-09 ENCOUNTER — OFFICE VISIT (OUTPATIENT)
Dept: ORTHOPEDIC SURGERY | Facility: CLINIC | Age: 72
End: 2022-06-09

## 2022-06-09 VITALS — HEIGHT: 61 IN | BODY MASS INDEX: 35.87 KG/M2 | WEIGHT: 190 LBS | TEMPERATURE: 97.8 F

## 2022-06-09 DIAGNOSIS — M65.351 TRIGGER LITTLE FINGER OF RIGHT HAND: ICD-10-CM

## 2022-06-09 DIAGNOSIS — M65.331 TRIGGER MIDDLE FINGER OF RIGHT HAND: ICD-10-CM

## 2022-06-09 DIAGNOSIS — J30.9 ALLERGIC RHINITIS, UNSPECIFIED SEASONALITY, UNSPECIFIED TRIGGER: Primary | ICD-10-CM

## 2022-06-09 DIAGNOSIS — Z96.652 STATUS POST TOTAL KNEE REPLACEMENT, LEFT: ICD-10-CM

## 2022-06-09 DIAGNOSIS — M65.341 TRIGGER RING FINGER OF RIGHT HAND: ICD-10-CM

## 2022-06-09 DIAGNOSIS — Z96.651 STATUS POST TOTAL KNEE REPLACEMENT, RIGHT: Primary | ICD-10-CM

## 2022-06-09 PROCEDURE — 95115 IMMUNOTHERAPY ONE INJECTION: CPT | Performed by: FAMILY MEDICINE

## 2022-06-09 PROCEDURE — 99213 OFFICE O/P EST LOW 20 MIN: CPT | Performed by: ORTHOPAEDIC SURGERY

## 2022-06-09 NOTE — PROGRESS NOTES
Chief Complaint  Follow-up of the Left Hip, Follow-up of the Left Knee, Follow-up of the Right Knee, and Follow-up of the Right Hand    Subjective    History of Present Illness      Anne Jewell is a 71 y.o. female who presents to [facility] for multiple joint pains and discomfort.  History of Present Illness the patient has had bilateral total knee arthroplasty in the past.  The right side was done 20 years ago in 2002 and the left side was done 11 years ago in 2011.  She has reasonable range of motion in both knees.  There are no signs of wear-and-tear from either knee.  The polyethylene inserts appear to be intact.  She does not have any significant symptoms of knee pain and arthritis.  The patient states that she has pain and discomfort in both of her thumbs.  The pain is over the CMC joint of the thumbs.  Axial loading of the thumbs bothers the patient quite a bit.  She states that she can manage her activities of daily living without too much discomfort.  He does have early degenerative change in the hips as well.  The symptoms are based over the greater trochanteric bursa.  There are no risk factors for avascular necrosis.  The symptoms are not bad enough to consider arthroplasty surgery of the hip joint.      Pain Location:  BILATERAL knee, BILATERAL hip and  BILATERAL hand  Radiation: none  Quality: dull, aching  Intensity/Severity: mild-moderate  Duration: several years  Progression of symptoms: no worsening, symptoms stable/unchanged  Onset quality: gradual   Timing: intermittent  Aggravating Factors: going up and down stairs, kneeling, rotating motion, repetitive motion  Alleviating Factors: NSAIDs, rest, brace  Previous Episodes: yes  Associated Symptoms: pain, swelling, decreased strength  ADLs Affected: grooming/hygiene/toileting/personal care, dressing, recreational activities/sports  Previous Treatment: brace and prior surgery       Objective   Vital Signs:   Temp  "97.8 °F (36.6 °C)   Ht 154.9 cm (61\")   Wt 86.2 kg (190 lb)   BMI 35.90 kg/m²     Physical Exam  Physical Exam  Vitals signs and nursing note reviewed.   Constitutional:       Appearance: Normal appearance.   Pulmonary:      Effort: Pulmonary effort is normal.   Skin:     General: Skin is warm and dry.      Capillary Refill: Capillary refill takes less than 2 seconds.   Neurological:      General: No focal deficit present.      Mental Status: He is alert and oriented to person, place, and time. Mental status is at baseline.   Psychiatric:         Mood and Affect: Mood normal.         Behavior: Behavior normal.         Thought Content: Thought content normal.         Judgment: Judgment normal.     Ortho Exam   Right hip (djd): Neurovascular status is intact. Patient does not have a limp on the affected side. Internal and external rotations are not associated with pain and discomfort. Anterior joint line pain and tenderness is significant. Stinchfield sign is positive. Figure of 4 sign is positive. Patient is unable to perform an active straight leg raise exam. Greater trochanter is tender. Crossover adduction test is positive. Cross body adduction is limited and painful for the patient. Patient has very significant limp and joint line tenderness anteriorly, posteriorly and medially. Dorsalis pedis and posterior tibial artery pulses are palpable. Common peroneal nerve function is well preserved.     Bilateral hand-CMC joint. The patient is right hand dominant. Tenderness is present at base of the 1st metacarpal. Skin and soft tissues are mildly swollen. Flexor and extensor tendon function is well preserved. Capillary refill is 2 seconds with a brisk return. Axial lading of the joint causes crepitus and pain. Pinch strength is compromised. Slight subluxation of the 1st metacarpal base is noted. Neurovascular status is intact.  Right knee.The patient is status post total knee arthroplasty postoperative 20 year(s). " Incision is clean. Calf is soft and nontender. Homans sign is negative. There is no clicking, popping or catching. Anterior and posterior drawer signs are negative.  There is no instability of the components. Appropriate amounts of swelling and bruising are noted. Dorsalis pedis and posterior tibial artery pulses are palpable. Common peroneal nerve function is well preserved. Range of motion is from 0-125 degrees of flexion. Gait is cautious but otherwise fairly normal. There is no evidence of a deep seated joint infection.  Left knee.The patient is status post total knee arthroplasty postoperative 11 year(s). Incision is clean. Calf is soft and nontender. Homans sign is negative. There is no clicking, popping or catching. Anterior and posterior drawer signs are negative.  There is no instability of the components. Appropriate amounts of swelling and bruising are noted. Dorsalis pedis and posterior tibial artery pulses are palpable. Common peroneal nerve function is well preserved. Range of motion is from 0-125 degrees of flexion. Gait is cautious but otherwise fairly normal. There is no evidence of a deep seated joint infection.          Result Review :   The following data was reviewed by: Rashaun Alvarado MD on 06/09/2022:  Radiologic studies - see below for interpretation  BILATERAL knee, BILATERAL hip with pelvis and BILATERAL hand xrays  weightbearing/standing ap/lateral views were ordered by Rashaun Alvarado MD. Performed at [facility] on 06/08/2022. Images were independently viewed and interpreted by myself, my impression as follows:      bilateral Knee X-Ray  Indication: Evaluation of implant position after total knee arthroplasty.  AP, Lateral views  Findings: Acceptable position of the implants with a reasonable cement mantle.  There is some evidence of polyethylene wear but no evidence of small particle disease.  no bony lesion  Soft tissues within normal limits  within normal limits  joint spaces  Hardware appropriately positioned yes      no prior studies available for comparison.    X-RAY was ordered and reviewed by Rashaun Alvarado MD     bilateral Hip X-Ray  Indication: Evaluation of pain and discomfort in the lateral aspect of the greater trochanter.  AP and lateral  Findings: Early degenerative changes slight narrowing of the joint space.  no bony lesion  Soft tissues within normal limits  within normal limits joint spaces  Hardware appropriately positioned not applicable      no prior studies available for comparison.    X-RAY was ordered and reviewed by Rashaun Alvarado MD    bilateral Hand X-Ray  Indication: Pain and discomfort at the base of the CMC joint of the thumbs.  AP, Lateral views  Findings: Narrowing of the joint space at the base of the first metacarpal.  no bony lesion  Soft tissues within normal limits  decreased joint spaces  Hardware appropriately positioned not applicable      no prior studies available for comparison.    X-RAY was ordered and reviewed by Rashaun Alvarado MD        Procedures           Assessment   Assessment and Plan    Diagnoses and all orders for this visit:    1. Status post total knee replacement, right (Primary)    2. Status post total knee replacement, left    3. Trigger ring finger of right hand    4. Trigger middle finger of right hand    5. Trigger little finger of right hand          Follow Up   · Compression/brace to the thumbs to minimize pressure over the CMC joint of both thumbs.  · Calcium and vitamin D for bone health.  · , GI and dental procedure prophylaxis with antibiotics to prevent metastatic infection of the knee arthroplasty implants.  · Falls precautions discussed with the patient.  · Steroid injection offered to the patient to help manage the symptoms of the CMC joint.  · Nonoperative care of the hip discussed with the patient.  · Rest, ice, compression, and elevation (RICE) therapy  · Stretching and strengthening exercises  · OTC  Alternate Ibuprofen and Tylenol as needed  · Follow up in 1-2 year(s)  • Patient was given instructions and counseling regarding her condition or for health maintenance advice. Please see specific information pulled into the AVS if appropriate.     Rashaun Alvarado MD   Date of Encounter: 6/9/2022     EMR Dragon/Transcription disclaimer:  Much of this encounter note is an electronic transcription/translation of spoken language to printed text. The electronic translation of spoken language may permit erroneous, or at times, nonsensical words or phrases to be inadvertently transcribed; Although I have reviewed the note for such errors, some may still exist.

## 2022-06-21 ENCOUNTER — OFFICE VISIT (OUTPATIENT)
Dept: PAIN MEDICINE | Facility: CLINIC | Age: 72
End: 2022-06-21

## 2022-06-21 ENCOUNTER — CLINICAL SUPPORT (OUTPATIENT)
Dept: FAMILY MEDICINE CLINIC | Age: 72
End: 2022-06-21

## 2022-06-21 VITALS
HEIGHT: 61 IN | SYSTOLIC BLOOD PRESSURE: 158 MMHG | RESPIRATION RATE: 20 BRPM | BODY MASS INDEX: 36.44 KG/M2 | DIASTOLIC BLOOD PRESSURE: 69 MMHG | TEMPERATURE: 97.8 F | HEART RATE: 71 BPM | WEIGHT: 193 LBS | OXYGEN SATURATION: 97 %

## 2022-06-21 DIAGNOSIS — J30.9 ALLERGIC RHINITIS, UNSPECIFIED SEASONALITY, UNSPECIFIED TRIGGER: Primary | ICD-10-CM

## 2022-06-21 DIAGNOSIS — G89.29 CHRONIC LOW BACK PAIN, UNSPECIFIED BACK PAIN LATERALITY, UNSPECIFIED WHETHER SCIATICA PRESENT: ICD-10-CM

## 2022-06-21 DIAGNOSIS — G89.4 CHRONIC PAIN SYNDROME: Primary | ICD-10-CM

## 2022-06-21 DIAGNOSIS — M15.9 GENERALIZED OSTEOARTHRITIS: ICD-10-CM

## 2022-06-21 DIAGNOSIS — M54.16 CHRONIC RADICULAR LUMBAR PAIN: ICD-10-CM

## 2022-06-21 DIAGNOSIS — G89.29 CHRONIC RADICULAR LUMBAR PAIN: ICD-10-CM

## 2022-06-21 DIAGNOSIS — Z79.899 ENCOUNTER FOR LONG-TERM (CURRENT) USE OF HIGH-RISK MEDICATION: ICD-10-CM

## 2022-06-21 DIAGNOSIS — M25.50 ARTHRALGIA, UNSPECIFIED JOINT: ICD-10-CM

## 2022-06-21 DIAGNOSIS — M54.50 CHRONIC LOW BACK PAIN, UNSPECIFIED BACK PAIN LATERALITY, UNSPECIFIED WHETHER SCIATICA PRESENT: ICD-10-CM

## 2022-06-21 PROCEDURE — 95115 IMMUNOTHERAPY ONE INJECTION: CPT | Performed by: FAMILY MEDICINE

## 2022-06-21 PROCEDURE — 99214 OFFICE O/P EST MOD 30 MIN: CPT | Performed by: NURSE PRACTITIONER

## 2022-06-21 RX ORDER — OXYCODONE HYDROCHLORIDE 80 MG/1
160 TABLET, FILM COATED, EXTENDED RELEASE ORAL EVERY 12 HOURS SCHEDULED
Qty: 120 TABLET | Refills: 0 | Status: SHIPPED | OUTPATIENT
Start: 2022-06-21 | End: 2022-07-26 | Stop reason: SDUPTHER

## 2022-06-21 NOTE — PROGRESS NOTES
CHIEF COMPLAINT  F/u back and joint pain. Pt sts pain is same.     Subjective   Anne Jewell is a 71 y.o. female  who presents for follow-up.  She has a history of back and joint pain.  Today her pain is 6/10VAS in severity. She continues with OxyContin 80 mg 2 tablets in the morning and 1-2 tablets at night and baclofen 10 mg 3/day (prescribed by PCP). This medication regimen decreases her pain by a significant amount. She states it allows her to meet her functional goals including being able to take care of her dog and her house. ADLs by self.     Prescribed Xanax 2 mg TID by psychiatrist, Narcan prescription at home.      Not a good candidate for interventions.     Continues to work with Dr. Alvarado (orthopedics).     Patient remained masked during entire encounter. No cough present. I donned a mask and eye protection throughout entire visit. Prior to donning mask and eye protection, hand hygiene was performed, as well as when it was doffed.  I was closer than 6 feet, but not for an extended period of time. No obvious exposure to any bodily fluids.    Back Pain  This is a chronic problem. The current episode started more than 1 year ago. The problem occurs constantly. The problem is unchanged. The pain is present in the lumbar spine. The quality of the pain is described as aching. The pain radiates to the left foot and right foot. The pain is at a severity of 6/10. The pain is moderate. The symptoms are aggravated by stress (weather changes). Pertinent negatives include no abdominal pain, bladder incontinence, bowel incontinence, chest pain, dysuria, fever, headaches, numbness or weakness. She has tried analgesics, bed rest, home exercises and heat (OxyContin, aquatherapy) for the symptoms. The treatment provided moderate relief.   Joint Pain  This is a chronic (6/10VAS in severity) problem. The current episode started more than 1 year ago. The problem occurs constantly. The problem has been unchanged. Associated  symptoms include arthralgias (joint), myalgias and neck pain. Pertinent negatives include no abdominal pain, chest pain, chills, congestion, coughing, fatigue, fever, headaches, joint swelling, nausea, numbness, vomiting or weakness. She has tried oral narcotics for the symptoms. The treatment provided moderate relief.      PEG Assessment   What number best describes your pain on average in the past week?6  What number best describes how, during the past week, pain has interfered with your enjoyment of life?6  What number best describes how, during the past week, pain has interfered with your general activity?  6    The following portions of the patient's history were reviewed and updated as appropriate: allergies, current medications, past family history, past medical history, past social history, past surgical history and problem list.    Review of Systems   Constitutional: Negative for activity change, chills, fatigue and fever.   HENT: Negative for congestion.    Eyes: Negative for visual disturbance.   Respiratory: Negative for cough and shortness of breath.    Cardiovascular: Negative for chest pain.   Gastrointestinal: Negative for abdominal pain, bowel incontinence, constipation, diarrhea, nausea and vomiting.   Genitourinary: Negative for bladder incontinence, difficulty urinating and dysuria.   Musculoskeletal: Positive for arthralgias (joint), back pain, gait problem (walker), myalgias and neck pain. Negative for joint swelling.   Neurological: Negative for dizziness, weakness, light-headedness, numbness and headaches.   Psychiatric/Behavioral: Negative for agitation, sleep disturbance and suicidal ideas. The patient is not nervous/anxious.      --  The aforementioned information the Chief Complaint section and above subjective data including any HPI data, and also the Review of Systems data, has been personally reviewed and affirmed.  --    Vitals:    06/21/22 1047   BP: 158/69   Pulse: 71   Resp: 20  "  Temp: 97.8 °F (36.6 °C)   SpO2: 97%   Weight: 87.5 kg (193 lb)   Height: 154.9 cm (61\")   PainSc:   6   PainLoc: Back  Comment: and joint     Objective   Physical Exam  Vitals and nursing note reviewed.   Constitutional:       Appearance: Normal appearance. She is well-developed.   Eyes:      General: Lids are normal.   Cardiovascular:      Rate and Rhythm: Normal rate.   Pulmonary:      Effort: Pulmonary effort is normal.   Musculoskeletal:      Lumbar back: Tenderness present. Decreased range of motion.      Comments: Diffuse arthritic changes   Neurological:      Mental Status: She is alert and oriented to person, place, and time.      Gait: Gait abnormal (rollator).   Psychiatric:         Attention and Perception: Attention normal.         Mood and Affect: Mood normal.         Speech: Speech normal.         Behavior: Behavior normal.         Judgment: Judgment normal.       Assessment & Plan   Diagnoses and all orders for this visit:    1. Chronic pain syndrome (Primary)    2. Encounter for long-term (current) use of high-risk medication    3. Arthralgia, unspecified joint    4. Chronic low back pain, unspecified back pain laterality, unspecified whether sciatica present    5. Generalized osteoarthritis    6. Chronic radicular lumbar pain      --- The urine drug screen confirmation from 5/23/2022 has been reviewed and the result is appropriate based on patient history and [facility] report  --- CSA updated 3/22/2022  --- Refill OxyContin. DNF 7/14/2022 applied. Patient appears stable with current regimen. No adverse effects. Regarding continuation of opioids, there is no evidence of aberrant behavior or any red flags.  The patient continues with appropriate response to opioid therapy. ADL's remain intact by self.   --- Follow-up 5 weeks (will see every 5 weeks until appointments are better aligned with  Her refill dates) or sooner if needed      [facility] REPORT  As part of the patient's treatment plan, I am " prescribing controlled substances. The patient has been made aware of appropriate use of such medications, including potential risk of somnolence, limited ability to drive and/or work safely, and the potential for dependence or overdose. It has also been made clear that these medications are for use by this patient only, without concomitant use of alcohol or other substances unless prescribed.     Patient has completed prescribing agreement detailing terms of continued prescribing of controlled substances, including monitoring [facility] reports, urine drug screening, and pill counts if necessary. The patient is aware that inappropriate use will results in cessation of prescribing such medications.    As the clinician, I personally reviewed the [facility] from 6/21/2022 while the patient was in the office today.    History and physical exam exhibit continued safe and appropriate use of controlled substances.    Dictated utilizing Dragon dictation.     This document is intended for medical expert use only. Reading of this document by patients and/or patient's family without participating medical staff guidance may result in misinterpretation and unintended morbidity.   Any interpretation of such data is the responsibility of the patient and/or family member responsible for the patient in concert with their primary or specialist providers, not to be left for sources of online searches such as [employer], [employer] or similar queries. Relying on these approaches to knowledge may result in misinterpretation, misguided goals of care and even death should patients or family members try recommendations outside of the realm of professional medical care in a supervised way.

## 2022-07-06 ENCOUNTER — CLINICAL SUPPORT (OUTPATIENT)
Dept: FAMILY MEDICINE CLINIC | Age: 72
End: 2022-07-06

## 2022-07-06 DIAGNOSIS — J30.9 ALLERGIC RHINITIS, UNSPECIFIED SEASONALITY, UNSPECIFIED TRIGGER: Primary | ICD-10-CM

## 2022-07-06 PROCEDURE — 95115 IMMUNOTHERAPY ONE INJECTION: CPT | Performed by: FAMILY MEDICINE

## 2022-07-07 RX ORDER — RIVAROXABAN 20 MG/1
TABLET, FILM COATED ORAL
Qty: 90 TABLET | Refills: 0 | Status: SHIPPED | OUTPATIENT
Start: 2022-07-07 | End: 2022-08-15

## 2022-07-13 ENCOUNTER — OFFICE VISIT (OUTPATIENT)
Dept: FAMILY MEDICINE CLINIC | Age: 72
End: 2022-07-13

## 2022-07-13 VITALS
OXYGEN SATURATION: 96 % | SYSTOLIC BLOOD PRESSURE: 123 MMHG | DIASTOLIC BLOOD PRESSURE: 62 MMHG | BODY MASS INDEX: 38.17 KG/M2 | HEART RATE: 79 BPM | WEIGHT: 202 LBS | TEMPERATURE: 98.5 F

## 2022-07-13 DIAGNOSIS — J30.9 ALLERGIC RHINITIS, UNSPECIFIED SEASONALITY, UNSPECIFIED TRIGGER: ICD-10-CM

## 2022-07-13 DIAGNOSIS — R05.9 COUGH: Primary | ICD-10-CM

## 2022-07-13 DIAGNOSIS — R11.0 NAUSEA: ICD-10-CM

## 2022-07-13 DIAGNOSIS — J01.10 ACUTE NON-RECURRENT FRONTAL SINUSITIS: ICD-10-CM

## 2022-07-13 LAB
EXPIRATION DATE: NORMAL
FLUAV AG UPPER RESP QL IA.RAPID: NOT DETECTED
FLUBV AG UPPER RESP QL IA.RAPID: NOT DETECTED
INTERNAL CONTROL: NORMAL
Lab: NORMAL
SARS-COV-2 AG UPPER RESP QL IA.RAPID: NOT DETECTED

## 2022-07-13 PROCEDURE — 87428 SARSCOV & INF VIR A&B AG IA: CPT | Performed by: NURSE PRACTITIONER

## 2022-07-13 PROCEDURE — 95115 IMMUNOTHERAPY ONE INJECTION: CPT | Performed by: NURSE PRACTITIONER

## 2022-07-13 PROCEDURE — 99214 OFFICE O/P EST MOD 30 MIN: CPT | Performed by: NURSE PRACTITIONER

## 2022-07-13 RX ORDER — DOXYCYCLINE HYCLATE 100 MG/1
100 CAPSULE ORAL 2 TIMES DAILY
Qty: 20 CAPSULE | Refills: 0 | Status: SHIPPED | OUTPATIENT
Start: 2022-07-13 | End: 2022-08-24

## 2022-07-13 RX ORDER — ONDANSETRON 4 MG/1
4 TABLET, FILM COATED ORAL EVERY 8 HOURS PRN
Qty: 15 TABLET | Refills: 0 | Status: SHIPPED | OUTPATIENT
Start: 2022-07-13 | End: 2022-11-16

## 2022-07-13 NOTE — PROGRESS NOTES
Anne Jewell presents to [facility].    Chief Complaint:  Sinusitis (Pt declines flu and covid test. )         History of Present Illness:  URI  When did symptoms started 7 days ago   Any exposures:no  Symptoms: headache, muscle aches, wheeze, yellow congestion and LGF  Treatment tried:mucinex, helped   covid test at home last week was negative     PAST MEDICAL HISTORY changes since :           + bilateral CTS     Positive for    Deep Venous Thrombosis: 2 DVT and a PE; ;     Positive for    Hypothyroidism: dx'd in ; ;     Positive for    Obesity: used to weigh over 400 pounds; ;         GYNECOLOGICAL HISTORY:             CURRENT MEDICAL PROVIDERS:    Allergist: Bill allergy: allergy rx / goes yearly     Orthopedist: Dr Alvarado    Psychiatrist: [facility], oz Gonzales, amando chavez   Chiropractor [facility]  GYN, samson Feldman, PCP, retiring     pain management Dr. Grajeda and JORGE LUIS Friedman         PREVENTIVE HEALTH MAINTENANCE         DEXA 3-31-22 at [facility] and mammogram same day     COLORECTAL CANCER SCREENING: Up to date (colonoscopy q10y; sigmoidoscopy q5y; Cologuard q3y) was last done 20/ dr Barker         Surgical History:     Teeth removed      Cholecystectomy    Hysterectomy: TAHBSO; Other Surgeries    Hernia Repair: 7 different surgeries;     Joint Replacement: bilateral knees; , ;     Gastric Bypass ;    bladder repair;    right hand surgery ;    breast reduction ;     Procedures:    Colonoscopy ( / [facility] )    EGD         Family History:     Father:  at age 89;     ; Positive for Prostate Cancer;     Mother:  at age 78; Cause of death was renal failure    ; Positive for Congestive Heart Failure;     ; Positive for Breast Cancer;     ; Positive for Type 2 Diabetes;     ; Positive for blind;     Brother(s): 1 brother(s) total    ; Positive for Type 2  Diabetes;     Son(s): 1 ;   in MVA 2016     Daughter(s): 1 daughter(s) total;  Migraines         Social History:     Occupation: Disabled (due to DJD)     Marital Status:  /spouse comes and goes     Children: 2 children and 2 step-children          Review of Systems:  Review of Systems   HENT: Negative for sore throat.    Respiratory: Positive for shortness of breath (was SOA earlier this week).    Gastrointestinal: Positive for nausea (occ, in the past she tried phenergan wants something on hand that does not make you drowsy ).   Neurological:        No loss of taste or smell           Current Outpatient Medications:   •  albuterol (PROVENTIL HFA;VENTOLIN HFA) 108 (90 BASE) MCG/ACT inhaler, Proventil  (90 Base) MCG/ACT Inhalation Aerosol Solution; Patient Sig: Proventil  (90 Base) MCG/ACT Inhalation Aerosol Solution Inhale 2 puff(s) every 6 to 8 hours as needed; 6.7; 0; 2013; Active, Disp: , Rfl:   •  ALPRAZolam (XANAX) 2 MG tablet, , Disp: , Rfl:   •  baclofen (LIORESAL) 10 MG tablet, Take 1 tablet by mouth 3 (Three) Times a Day., Disp: 270 tablet, Rfl: 1  •  cetirizine (zyrTEC) 10 MG tablet, Take 10 mg by mouth Daily., Disp: , Rfl:   •  chlorhexidine (PERIDEX) 0.12 % solution, , Disp: , Rfl:   •  Cholecalciferol (VITAMIN D3) 2000 units capsule, TAKE ONE CAPSULE BY MOUTH DAILY, Disp: 30 capsule, Rfl: 9  •  Cyanocobalamin (B-12) 1000 MCG sublingual tablet, PLACE 1 TABLET UNDER THE TONGUE AND LET DISSOLVE ONCE DAILY, Disp: 30 each, Rfl: 1  •  dicyclomine (BENTYL) 20 MG tablet, TAKE ONE TABLET BY MOUTH EVERY 6 HOURS AS NEEDED, Disp: 120 tablet, Rfl: 5  •  EPINEPHrine (EPIPEN) 0.3 MG/0.3ML solution auto-injector injection, , Disp: , Rfl:   •  escitalopram (LEXAPRO) 10 MG tablet, Take 1 tablet by mouth daily., Disp: , Rfl:   •  hydroCHLOROthiazide (HYDRODIURIL) 25 MG tablet, TAKE ONE TABLET BY MOUTH DAILY, Disp: 90 tablet, Rfl: 2  •  KLOR-CON 20 MEQ CR tablet, TAKE ONE TABLET  BY MOUTH TWICE A DAY **MUST CALL MD FOR APPOINTMENT, Disp: 180 tablet, Rfl: 3  •  Lactobacillus (ACIDOPHILUS PO), Take  by mouth., Disp: , Rfl:   •  levothyroxine (SYNTHROID, LEVOTHROID) 100 MCG tablet, TAKE ONE TABLET BY MOUTH DAILY, Disp: 90 tablet, Rfl: 0  •  montelukast (SINGULAIR) 10 MG tablet, Take 1 tablet by mouth daily., Disp: , Rfl:   •  Multiple Vitamin tablet, Take 1 tablet by mouth daily., Disp: , Rfl:   •  naloxone (NARCAN) 4 MG/0.1ML nasal spray, 1 spray into the nostril(s) as directed by provider As Needed (sedation or accidental overdose of opioid)., Disp: 1 each, Rfl: 1  •  oxyCODONE ER (oxyCONTIN) 80 MG tablet extended-release 12 hour 12 hr tablet, Take 2 tablets by mouth Every 12 (Twelve) Hours., Disp: 120 tablet, Rfl: 0  •  promethazine (PHENERGAN) 25 MG tablet, Take 1 tablet by mouth Every 8 (Eight) Hours As Needed for Nausea or Vomiting., Disp: 90 tablet, Rfl: 2  •  Xarelto 20 MG tablet, TAKE ONE TABLET BY MOUTH DAILY, Disp: 90 tablet, Rfl: 0  •  doxycycline (VIBRAMYCIN) 100 MG capsule, Take 1 capsule by mouth 2 (Two) Times a Day., Disp: 20 capsule, Rfl: 0  •  nitrofurantoin, macrocrystal-monohydrate, (MACROBID) 100 MG capsule, Take 1 capsule by mouth 2 (Two) Times a Day., Disp: 20 capsule, Rfl: 0  •  ondansetron (Zofran) 4 MG tablet, Take 1 tablet by mouth Every 8 (Eight) Hours As Needed for Nausea or Vomiting., Disp: 15 tablet, Rfl: 0  No current facility-administered medications for this visit.    Vital Signs:   Vitals:    07/13/22 1047   BP: 123/62   BP Location: Left arm   Patient Position: Sitting   Pulse: 79   Temp: 98.5 °F (36.9 °C)   TempSrc: Oral   SpO2: 96%   Weight: 91.6 kg (202 lb)         Physical Exam:  Physical Exam  Constitutional:       General: She is not in acute distress.     Appearance: Normal appearance.   HENT:      Right Ear: Tympanic membrane, ear canal and external ear normal.      Left Ear: Tympanic membrane, ear canal and external ear normal.      Nose: Congestion  present.      Comments: Frontal sinus tenderness      Mouth/Throat:      Pharynx: Oropharynx is clear. No posterior oropharyngeal erythema.   Cardiovascular:      Rate and Rhythm: Normal rate and regular rhythm.      Heart sounds: No murmur heard.  Pulmonary:      Effort: Pulmonary effort is normal.      Breath sounds: Normal breath sounds.   Lymphadenopathy:      Cervical: No cervical adenopathy.   Neurological:      Mental Status: She is alert.   Psychiatric:         Mood and Affect: Mood normal.         Behavior: Behavior normal.         Result Review      The following data was reviewed by: EUGENIO Rodriguez on 07/13/2022:    Results for orders placed or performed in visit on 07/13/22   POCT SARS-CoV-2 Antigen PIERRE + Flu    Specimen: Swab   Result Value Ref Range    SARS Antigen Not Detected Not Detected, Presumptive Negative    Influenza A Antigen PIERRE Not Detected Not Detected    Influenza B Antigen PIERRE Not Detected Not Detected    Internal Control Passed Passed    Lot Number [id number]     Expiration Date 1/21/23                Assessment and Plan:          Diagnoses and all orders for this visit:    1. Cough (Primary)  Assessment & Plan:  Rapid flu and covid negative, Rest, increase fluids, follow up if symptoms progress or change   Use her inhaler if needed     Orders:  -     POCT SARS-CoV-2 Antigen PIERRE + Flu    2. Nausea  Assessment & Plan:  Wants to try zofran     Orders:  -     POCT SARS-CoV-2 Antigen PIERRE + Flu  -     ondansetron (Zofran) 4 MG tablet; Take 1 tablet by mouth Every 8 (Eight) Hours As Needed for Nausea or Vomiting.  Dispense: 15 tablet; Refill: 0    3. Acute non-recurrent frontal sinusitis  Assessment & Plan:  Will cover her with doxy, follow up if not improving     Orders:  -     doxycycline (VIBRAMYCIN) 100 MG capsule; Take 1 capsule by mouth 2 (Two) Times a Day.  Dispense: 20 capsule; Refill: 0    4. Allergic rhinitis, unspecified seasonality, unspecified trigger  -     Allergy Serum  Injection        Follow Up   Return if symptoms worsen or fail to improve.  Patient was given instructions and counseling regarding her condition or for health maintenance advice. Please see specific information pulled into the AVS if appropriate.

## 2022-07-13 NOTE — ASSESSMENT & PLAN NOTE
Rapid flu and covid negative, Rest, increase fluids, follow up if symptoms progress or change   Use her inhaler if needed

## 2022-07-26 ENCOUNTER — OFFICE VISIT (OUTPATIENT)
Dept: PAIN MEDICINE | Facility: CLINIC | Age: 72
End: 2022-07-26

## 2022-07-26 VITALS
TEMPERATURE: 96.3 F | SYSTOLIC BLOOD PRESSURE: 125 MMHG | WEIGHT: 196 LBS | OXYGEN SATURATION: 96 % | DIASTOLIC BLOOD PRESSURE: 81 MMHG | HEART RATE: 75 BPM | BODY MASS INDEX: 37 KG/M2 | RESPIRATION RATE: 12 BRPM | HEIGHT: 61 IN

## 2022-07-26 DIAGNOSIS — M15.9 GENERALIZED OSTEOARTHRITIS: ICD-10-CM

## 2022-07-26 DIAGNOSIS — G89.29 CHRONIC LOW BACK PAIN, UNSPECIFIED BACK PAIN LATERALITY, UNSPECIFIED WHETHER SCIATICA PRESENT: ICD-10-CM

## 2022-07-26 DIAGNOSIS — M25.50 ARTHRALGIA, UNSPECIFIED JOINT: ICD-10-CM

## 2022-07-26 DIAGNOSIS — Z79.899 ENCOUNTER FOR LONG-TERM (CURRENT) USE OF HIGH-RISK MEDICATION: ICD-10-CM

## 2022-07-26 DIAGNOSIS — M54.50 CHRONIC LOW BACK PAIN, UNSPECIFIED BACK PAIN LATERALITY, UNSPECIFIED WHETHER SCIATICA PRESENT: ICD-10-CM

## 2022-07-26 DIAGNOSIS — M54.16 CHRONIC RADICULAR LUMBAR PAIN: ICD-10-CM

## 2022-07-26 DIAGNOSIS — G89.29 CHRONIC RADICULAR LUMBAR PAIN: ICD-10-CM

## 2022-07-26 DIAGNOSIS — G89.4 CHRONIC PAIN SYNDROME: Primary | ICD-10-CM

## 2022-07-26 PROCEDURE — 99214 OFFICE O/P EST MOD 30 MIN: CPT | Performed by: NURSE PRACTITIONER

## 2022-07-26 RX ORDER — OXYCODONE HYDROCHLORIDE 80 MG/1
160 TABLET, FILM COATED, EXTENDED RELEASE ORAL EVERY 12 HOURS SCHEDULED
Qty: 120 TABLET | Refills: 0 | Status: SHIPPED | OUTPATIENT
Start: 2022-07-26 | End: 2022-08-30 | Stop reason: SDUPTHER

## 2022-07-26 NOTE — PROGRESS NOTES
CHIEF COMPLAINT  1 Month lumbar and joint pain evaluation  Patient in office reports her pain level has continued to remain stabilized over the last month.     Subjective   Anne Jewell is a 71 y.o. female  who presents for follow-up.  She has a history of back pain.  Today her pain is 6/10VAS in severity. She continues with OxyContin 80 mg 2 tablets in the morning and 1-2 tablets at night and baclofen 10 mg 3/day (prescribed by PCP). This medication regimen decreases her pain by significant amount and allows her to remain physically active. ADLs by self. She denies any side effects.     Prescribed Xanax 2 mg TID by psychiatrist, Narcan prescription at home.      Not a good candidate for interventions.      Continues to work with Dr. Alvarado (orthopedics).     Patient remained masked during entire encounter. No cough present. I donned a mask and eye protection throughout entire visit. Prior to donning mask and eye protection, hand hygiene was performed, as well as when it was doffed.  I was closer than 6 feet, but not for an extended period of time. No obvious exposure to any bodily fluids.    Back Pain  This is a chronic problem. The current episode started more than 1 year ago. The problem occurs constantly. The problem has been waxing and waning since onset. The pain is present in the lumbar spine. The quality of the pain is described as aching. The pain radiates to the left foot and right foot. The pain is at a severity of 6/10. The pain is moderate. The symptoms are aggravated by stress (weather changes). Pertinent negatives include no abdominal pain, bladder incontinence, bowel incontinence, chest pain, dysuria, fever, headaches, numbness or weakness. She has tried analgesics, bed rest, home exercises and heat (OxyContin, aquatherapy) for the symptoms. The treatment provided moderate relief.   Joint Pain  This is a chronic (6/10VAS in severity) problem. The current episode started more than 1 year ago. The  problem occurs constantly. The problem has been waxing and waning. Associated symptoms include arthralgias (joint), myalgias and neck pain. Pertinent negatives include no abdominal pain, chest pain, chills, congestion, coughing, fatigue, fever, headaches, joint swelling, nausea, numbness, vomiting or weakness. She has tried oral narcotics for the symptoms. The treatment provided moderate relief.      PEG Assessment   What number best describes your pain on average in the past week?10  What number best describes how, during the past week, pain has interfered with your enjoyment of life?10  What number best describes how, during the past week, pain has interfered with your general activity?  10    The following portions of the patient's history were reviewed and updated as appropriate: allergies, current medications, past family history, past medical history, past social history, past surgical history and problem list.    Review of Systems   Constitutional: Positive for activity change. Negative for chills, fatigue and fever.   HENT: Negative for congestion.    Eyes: Negative for visual disturbance.   Respiratory: Negative for cough and chest tightness.    Cardiovascular: Negative for chest pain.   Gastrointestinal: Negative for abdominal pain, bowel incontinence, constipation, diarrhea, nausea and vomiting.   Genitourinary: Negative for bladder incontinence, difficulty urinating and dysuria.   Musculoskeletal: Positive for arthralgias (joint), back pain, myalgias and neck pain. Negative for joint swelling.   Neurological: Negative for dizziness, weakness, light-headedness, numbness and headaches.   Psychiatric/Behavioral: Negative for agitation, sleep disturbance and suicidal ideas. The patient is not nervous/anxious.      --  The aforementioned information the Chief Complaint section and above subjective data including any HPI data, and also the Review of Systems data, has been personally reviewed and  "affirmed.  --    Vitals:    07/26/22 1101   BP: 125/81   Pulse: 75   Resp: 12   Temp: 96.3 °F (35.7 °C)   SpO2: 96%   Weight: 88.9 kg (196 lb)   Height: 154.9 cm (61\")   PainSc:   6   PainLoc: Back  Comment: joint     Objective   Physical Exam  Vitals and nursing note reviewed.   Constitutional:       Appearance: Normal appearance. She is well-developed.   Eyes:      General: Lids are normal.   Cardiovascular:      Rate and Rhythm: Normal rate.   Pulmonary:      Effort: Pulmonary effort is normal.   Musculoskeletal:      Cervical back: Tenderness present. Decreased range of motion.      Lumbar back: Tenderness present. Decreased range of motion. Scoliosis present.      Comments: Diffuse joint pain   Neurological:      Mental Status: She is alert and oriented to person, place, and time.   Psychiatric:         Attention and Perception: Attention normal.         Mood and Affect: Mood normal.         Speech: Speech normal.         Behavior: Behavior normal.         Judgment: Judgment normal.       Assessment & Plan   Diagnoses and all orders for this visit:    1. Chronic pain syndrome (Primary)    2. Encounter for long-term (current) use of high-risk medication    3. Arthralgia, unspecified joint    4. Chronic low back pain, unspecified back pain laterality, unspecified whether sciatica present    5. Generalized osteoarthritis    6. Chronic radicular lumbar pain      --- The urine drug screen confirmation from 5/23/2022 has been reviewed and the result is appropriate based on patient history and VICENTA report  --- CSA updated 3/22/2022  --- Refill OxyContin. DNF 8/15/2022 applied. Patient appears stable with current regimen. No adverse effects. Regarding continuation of opioids, there is no evidence of aberrant behavior or any red flags.  The patient continues with appropriate response to opioid therapy. ADL's remain intact by self.   --- Follow-up 5 weeks or sooner if needed (to align refill dates).      VICENTA " REPORT  As part of the patient's treatment plan, I am prescribing controlled substances. The patient has been made aware of appropriate use of such medications, including potential risk of somnolence, limited ability to drive and/or work safely, and the potential for dependence or overdose. It has also been made clear that these medications are for use by this patient only, without concomitant use of alcohol or other substances unless prescribed.     Patient has completed prescribing agreement detailing terms of continued prescribing of controlled substances, including monitoring VICENTA reports, urine drug screening, and pill counts if necessary. The patient is aware that inappropriate use will results in cessation of prescribing such medications.    As the clinician, I personally reviewed the VICENTA from 7/26/2022 while the patient was in the office today.    History and physical exam exhibit continued safe and appropriate use of controlled substances.    Dictated utilizing Dragon dictation.     This document is intended for medical expert use only. Reading of this document by patients and/or patient's family without participating medical staff guidance may result in misinterpretation and unintended morbidity.   Any interpretation of such data is the responsibility of the patient and/or family member responsible for the patient in concert with their primary or specialist providers, not to be left for sources of online searches such as [employer], [employer] or similar queries. Relying on these approaches to knowledge may result in misinterpretation, misguided goals of care and even death should patients or family members try recommendations outside of the realm of professional medical care in a supervised way.

## 2022-07-27 ENCOUNTER — CLINICAL SUPPORT (OUTPATIENT)
Dept: FAMILY MEDICINE CLINIC | Age: 72
End: 2022-07-27

## 2022-07-27 DIAGNOSIS — J30.9 ALLERGIC RHINITIS, UNSPECIFIED SEASONALITY, UNSPECIFIED TRIGGER: Primary | ICD-10-CM

## 2022-07-27 PROCEDURE — 95115 IMMUNOTHERAPY ONE INJECTION: CPT | Performed by: FAMILY MEDICINE

## 2022-08-10 ENCOUNTER — CLINICAL SUPPORT (OUTPATIENT)
Dept: FAMILY MEDICINE CLINIC | Age: 72
End: 2022-08-10

## 2022-08-10 DIAGNOSIS — J30.9 ALLERGIC RHINITIS, UNSPECIFIED SEASONALITY, UNSPECIFIED TRIGGER: Primary | ICD-10-CM

## 2022-08-10 PROCEDURE — 95115 IMMUNOTHERAPY ONE INJECTION: CPT | Performed by: FAMILY MEDICINE

## 2022-08-15 RX ORDER — RIVAROXABAN 20 MG/1
TABLET, FILM COATED ORAL
Qty: 90 TABLET | Refills: 0 | Status: SHIPPED | OUTPATIENT
Start: 2022-08-15 | End: 2022-11-16 | Stop reason: SDUPTHER

## 2022-08-15 RX ORDER — POTASSIUM CHLORIDE 1500 MG/1
TABLET, EXTENDED RELEASE ORAL
Qty: 180 TABLET | Refills: 0 | Status: SHIPPED | OUTPATIENT
Start: 2022-08-15 | End: 2022-11-11 | Stop reason: SDUPTHER

## 2022-08-24 ENCOUNTER — OFFICE VISIT (OUTPATIENT)
Dept: FAMILY MEDICINE CLINIC | Age: 72
End: 2022-08-24

## 2022-08-24 VITALS
SYSTOLIC BLOOD PRESSURE: 145 MMHG | OXYGEN SATURATION: 97 % | TEMPERATURE: 98.9 F | HEIGHT: 61 IN | HEART RATE: 63 BPM | DIASTOLIC BLOOD PRESSURE: 70 MMHG | WEIGHT: 198.8 LBS | BODY MASS INDEX: 37.53 KG/M2

## 2022-08-24 DIAGNOSIS — J30.9 ALLERGIC RHINITIS, UNSPECIFIED SEASONALITY, UNSPECIFIED TRIGGER: ICD-10-CM

## 2022-08-24 DIAGNOSIS — R30.0 DYSURIA: Primary | ICD-10-CM

## 2022-08-24 PROCEDURE — 87086 URINE CULTURE/COLONY COUNT: CPT | Performed by: NURSE PRACTITIONER

## 2022-08-24 PROCEDURE — 95115 IMMUNOTHERAPY ONE INJECTION: CPT | Performed by: NURSE PRACTITIONER

## 2022-08-24 PROCEDURE — 99213 OFFICE O/P EST LOW 20 MIN: CPT | Performed by: NURSE PRACTITIONER

## 2022-08-24 NOTE — PROGRESS NOTES
Anne Jewell presents to [facility].    Chief Complaint:  Dysuria (Possible UTI, painful urination x a couple days )         History of Present Illness:  UTI:  Symptoms started:  Over 3 days ago    Associated symptoms: dysuria & urinary hesitancy   Treatments tried: nothing other than water   Wearing briefs for leakage and compression for her back  Goes to pain management    PAST MEDICAL HISTORY changes since :           + bilateral CTS     Positive for    Deep Venous Thrombosis: 2 DVT and a PE; ;     Positive for    Hypothyroidism: dx'd in ; ;     Positive for    Obesity: used to weigh over 400 pounds; ;         GYNECOLOGICAL HISTORY:             CURRENT MEDICAL PROVIDERS:    Allergist: Bill allergy: allergy rx / goes yearly     Orthopedist: Dr Alvarado    Psychiatrist: [facility], oz Gonzales, amando chavez   Chiropractor [facility]  GYN, samson Feldman, PCP, retiring     pain management Dr. Grajeda and JORGE LUIS Friedman         PREVENTIVE HEALTH MAINTENANCE         DEXA 3-31-22 at [facility] and mammogram same day     COLORECTAL CANCER SCREENING: Up to date (colonoscopy q10y; sigmoidoscopy q5y; Cologuard q3y) was last done 20/ dr Barker         Surgical History:     Teeth removed 2019     Cholecystectomy    Hysterectomy: TAHBSO; Other Surgeries    Hernia Repair: 7 different surgeries;     Joint Replacement: bilateral knees; 2002;     Gastric Bypass ;    bladder repair;    right hand surgery ;    breast reduction ;     Procedures:    Colonoscopy ( / [facility] )    EGD         Family History:     Father:  at age 89;     ; Positive for Prostate Cancer;     Mother:  at age 78; Cause of death was renal failure    ; Positive for Congestive Heart Failure;     ; Positive for Breast Cancer;     ; Positive for Type 2 Diabetes;     ; Positive for blind;     Brother(s): 1 brother(s) total     ; Positive for Type 2 Diabetes;     Son(s): 1 ;   in MVA 2016     Daughter(s): 1 daughter(s) total;  Migraines         Social History:     Occupation: Disabled (due to DJD)     Marital Status:  /spouse comes and goes     Children: 2 children and 2 step-children            Dysuria   This is a new problem. Associated symptoms include hesitancy and nausea (took zofran and it helped ). Pertinent negatives include no hematuria.   Review of Systems:  Review of Systems   Constitutional: Positive for fever (101.6 last week, Fri or Saturday ).   Eyes:        Dry eyes, takes drops and that helps    Gastrointestinal: Positive for nausea (took zofran and it helped ).   Genitourinary: Positive for dysuria and hesitancy. Negative for hematuria.   Neurological: Positive for dizziness (slight ).   Psychiatric/Behavioral: The patient is nervous/anxious (takes lexapro, sees psych, he said if she takes lexparo and zofran over a month, get EKG, only takne a few zofran ).           Current Outpatient Medications:   •  albuterol (PROVENTIL HFA;VENTOLIN HFA) 108 (90 BASE) MCG/ACT inhaler, Proventil  (90 Base) MCG/ACT Inhalation Aerosol Solution; Patient Sig: Proventil  (90 Base) MCG/ACT Inhalation Aerosol Solution Inhale 2 puff(s) every 6 to 8 hours as needed; 6.7; 0; 2013; Active, Disp: , Rfl:   •  ALPRAZolam (XANAX) 2 MG tablet, , Disp: , Rfl:   •  baclofen (LIORESAL) 10 MG tablet, Take 1 tablet by mouth 3 (Three) Times a Day., Disp: 270 tablet, Rfl: 1  •  cetirizine (zyrTEC) 10 MG tablet, Take 10 mg by mouth Daily., Disp: , Rfl:   •  chlorhexidine (PERIDEX) 0.12 % solution, , Disp: , Rfl:   •  Cholecalciferol (VITAMIN D3) 2000 units capsule, TAKE ONE CAPSULE BY MOUTH DAILY, Disp: 30 capsule, Rfl: 9  •  Cyanocobalamin (B-12) 1000 MCG sublingual tablet, PLACE 1 TABLET UNDER THE TONGUE AND LET DISSOLVE ONCE DAILY, Disp: 30 each, Rfl: 1  •  dicyclomine (BENTYL) 20 MG tablet, TAKE ONE TABLET BY  "MOUTH EVERY 6 HOURS AS NEEDED, Disp: 120 tablet, Rfl: 5  •  EPINEPHrine (EPIPEN) 0.3 MG/0.3ML solution auto-injector injection, , Disp: , Rfl:   •  escitalopram (LEXAPRO) 10 MG tablet, Take 1 tablet by mouth daily., Disp: , Rfl:   •  hydroCHLOROthiazide (HYDRODIURIL) 25 MG tablet, TAKE ONE TABLET BY MOUTH DAILY, Disp: 90 tablet, Rfl: 2  •  KLOR-CON 20 MEQ CR tablet, TAKE ONE TABLET BY MOUTH TWICE A DAY ** **MUST CALL MD FOR APPOINTMENT, Disp: 180 tablet, Rfl: 0  •  levothyroxine (SYNTHROID, LEVOTHROID) 100 MCG tablet, TAKE ONE TABLET BY MOUTH DAILY, Disp: 90 tablet, Rfl: 0  •  montelukast (SINGULAIR) 10 MG tablet, Take 1 tablet by mouth daily., Disp: , Rfl:   •  Multiple Vitamin tablet, Take 1 tablet by mouth daily., Disp: , Rfl:   •  naloxone (NARCAN) 4 MG/0.1ML nasal spray, 1 spray into the nostril(s) as directed by provider As Needed (sedation or accidental overdose of opioid)., Disp: 1 each, Rfl: 1  •  ondansetron (Zofran) 4 MG tablet, Take 1 tablet by mouth Every 8 (Eight) Hours As Needed for Nausea or Vomiting., Disp: 15 tablet, Rfl: 0  •  oxyCODONE ER (oxyCONTIN) 80 MG tablet extended-release 12 hour 12 hr tablet, Take 2 tablets by mouth Every 12 (Twelve) Hours., Disp: 120 tablet, Rfl: 0  •  promethazine (PHENERGAN) 25 MG tablet, Take 1 tablet by mouth Every 8 (Eight) Hours As Needed for Nausea or Vomiting., Disp: 90 tablet, Rfl: 2  •  Xarelto 20 MG tablet, TAKE ONE TABLET BY MOUTH DAILY, Disp: 90 tablet, Rfl: 0  •  doxycycline (VIBRAMYCIN) 100 MG capsule, Take 1 capsule by mouth 2 (Two) Times a Day., Disp: 20 capsule, Rfl: 0  •  Lactobacillus (ACIDOPHILUS PO), Take  by mouth., Disp: , Rfl:     Vital Signs:   Vitals:    08/24/22 1254   BP: 145/70   BP Location: Right arm   Patient Position: Sitting   Pulse: 63   Temp: 98.9 °F (37.2 °C)   TempSrc: Oral   SpO2: 97%  Comment: room air   Weight: 90.2 kg (198 lb 12.8 oz)   Height: 154.9 cm (60.98\")         Physical Exam:  Physical Exam  Constitutional:       " Appearance: Normal appearance.   Cardiovascular:      Rate and Rhythm: Normal rate and regular rhythm.      Heart sounds: No murmur heard.  Pulmonary:      Effort: Pulmonary effort is normal.      Breath sounds: Normal breath sounds.   Abdominal:      Tenderness: There is no right CVA tenderness or left CVA tenderness.   Neurological:      General: No focal deficit present.      Mental Status: She is alert.   Psychiatric:         Mood and Affect: Mood normal.         Behavior: Behavior normal.         Result Review      The following data was reviewed by: EUGENIO Rodriguez on 08/24/2022:    Results for orders placed or performed in visit on 07/13/22   POCT SARS-CoV-2 Antigen PIERRE + Flu    Specimen: Swab   Result Value Ref Range    SARS Antigen Not Detected Not Detected, Presumptive Negative    Influenza A Antigen PIERRE Not Detected Not Detected    Influenza B Antigen PIERRE Not Detected Not Detected    Internal Control Passed Passed    Lot Number [id number]     Expiration Date 1/21/23                Assessment and Plan:          Diagnoses and all orders for this visit:    1. Dysuria (Primary)  Assessment & Plan:  Will check a u/a and [facility] if [facility], UTI treatment:  Increase water intake, empty bladder frequently, call or return in 2 days if not improving.     Orders:  -     Urine Culture - Urine, Urine, Clean Catch; Future  -     Urine Culture - Urine, Urine, Clean Catch  -     Urinalysis With Culture If Indicated -        Follow Up   Return if symptoms worsen or fail to improve.  Patient was given instructions and counseling regarding her condition or for health maintenance advice. Please see specific information pulled into the AVS if appropriate.

## 2022-08-24 NOTE — ASSESSMENT & PLAN NOTE
[facility] a u[facility], UTI treatment:  Increase water intake, empty bladder frequently, call or return in 2 days if not improving.

## 2022-08-26 LAB — BACTERIA SPEC AEROBE CULT: NORMAL

## 2022-08-30 ENCOUNTER — OFFICE VISIT (OUTPATIENT)
Dept: PAIN MEDICINE | Facility: CLINIC | Age: 72
End: 2022-08-30

## 2022-08-30 VITALS
RESPIRATION RATE: 18 BRPM | HEIGHT: 61 IN | WEIGHT: 189.2 LBS | OXYGEN SATURATION: 96 % | HEART RATE: 67 BPM | TEMPERATURE: 96.9 F | DIASTOLIC BLOOD PRESSURE: 78 MMHG | SYSTOLIC BLOOD PRESSURE: 150 MMHG | BODY MASS INDEX: 35.72 KG/M2

## 2022-08-30 DIAGNOSIS — G89.4 CHRONIC PAIN SYNDROME: Primary | ICD-10-CM

## 2022-08-30 DIAGNOSIS — M54.50 CHRONIC LOW BACK PAIN, UNSPECIFIED BACK PAIN LATERALITY, UNSPECIFIED WHETHER SCIATICA PRESENT: ICD-10-CM

## 2022-08-30 DIAGNOSIS — G89.29 CHRONIC LOW BACK PAIN, UNSPECIFIED BACK PAIN LATERALITY, UNSPECIFIED WHETHER SCIATICA PRESENT: ICD-10-CM

## 2022-08-30 DIAGNOSIS — Z79.899 ENCOUNTER FOR LONG-TERM (CURRENT) USE OF HIGH-RISK MEDICATION: ICD-10-CM

## 2022-08-30 DIAGNOSIS — M16.0 PRIMARY OSTEOARTHRITIS OF BOTH HIPS: ICD-10-CM

## 2022-08-30 DIAGNOSIS — M15.9 GENERALIZED OSTEOARTHRITIS: ICD-10-CM

## 2022-08-30 DIAGNOSIS — M25.50 ARTHRALGIA, UNSPECIFIED JOINT: ICD-10-CM

## 2022-08-30 PROCEDURE — 99214 OFFICE O/P EST MOD 30 MIN: CPT | Performed by: NURSE PRACTITIONER

## 2022-08-30 RX ORDER — OXYCODONE HYDROCHLORIDE 80 MG/1
160 TABLET, FILM COATED, EXTENDED RELEASE ORAL EVERY 12 HOURS SCHEDULED
Qty: 120 TABLET | Refills: 0 | Status: SHIPPED | OUTPATIENT
Start: 2022-08-30 | End: 2022-09-12 | Stop reason: SDUPTHER

## 2022-08-30 RX ORDER — NALOXONE HYDROCHLORIDE 4 MG/.1ML
1 SPRAY NASAL AS NEEDED
Qty: 2 EACH | Refills: 0 | Status: SHIPPED | OUTPATIENT
Start: 2022-08-30

## 2022-08-30 NOTE — PROGRESS NOTES
CHIEF COMPLAINT  Follow-up for back and joint pain.    Subjective   Anne Jewell is a 71 y.o. female  who presents for follow-up.  She has a history of back and diffuse joint pain.    Today her pain is 6/10VAS in severity. OxyContin 80 mg 2 tablets in the morning and 2 tablets at night and baclofen 10 mg 2-3/day (prescribed by PCP). This medication regimen decreases her pain by a significant amount and allows her to maintain her ADLs. She denies any side effects including somnolence or constipation.     Prescribed Xanax 2 mg TID by psychiatrist, Narcan prescription at home.      Not a good candidate for interventions.      Continues to work with Dr. Alvarado (orthopedics).     She does have Narcan at home.     Back Pain  This is a chronic problem. The current episode started more than 1 year ago. The problem occurs constantly. The problem is unchanged. The pain is present in the lumbar spine. The quality of the pain is described as aching. The pain radiates to the left foot and right foot. The pain is at a severity of 6/10. The pain is moderate. The symptoms are aggravated by stress (weather changes). Associated symptoms include numbness and weakness. Pertinent negatives include no abdominal pain, bladder incontinence, bowel incontinence, chest pain, dysuria, fever or headaches. She has tried analgesics, bed rest, home exercises and heat (OxyContin, aquatherapy) for the symptoms. The treatment provided moderate relief.   Joint Pain  This is a chronic (6/10VAS in severity) problem. The current episode started more than 1 year ago. The problem occurs constantly. The problem has been unchanged. Associated symptoms include arthralgias, congestion, fatigue, myalgias, neck pain, numbness and weakness. Pertinent negatives include no abdominal pain, chest pain, chills, coughing, fever, headaches, joint swelling, nausea or vomiting. She has tried oral narcotics for the symptoms. The treatment provided moderate relief.     "  PEG Assessment   What number best describes your pain on average in the past week?7  What number best describes how, during the past week, pain has interfered with your enjoyment of life?10  What number best describes how, during the past week, pain has interfered with your general activity?  7    The following portions of the patient's history were reviewed and updated as appropriate: allergies, current medications, past family history, past medical history, past social history, past surgical history and problem list.    Review of Systems   Constitutional: Positive for fatigue. Negative for chills and fever.   HENT: Positive for congestion.    Eyes: Negative for visual disturbance.   Respiratory: Negative for cough and shortness of breath.    Cardiovascular: Negative for chest pain.   Gastrointestinal: Negative for abdominal pain, bowel incontinence, constipation, diarrhea, nausea and vomiting.   Genitourinary: Negative for bladder incontinence, difficulty urinating and dysuria.   Musculoskeletal: Positive for arthralgias, back pain, myalgias and neck pain. Negative for joint swelling.   Neurological: Positive for weakness and numbness. Negative for headaches.   Psychiatric/Behavioral: Negative for sleep disturbance and suicidal ideas. The patient is nervous/anxious.      --  The aforementioned information the Chief Complaint section and above subjective data including any HPI data, and also the Review of Systems data, has been personally reviewed and affirmed.  --    Vitals:    08/30/22 1050   BP: 150/78   Pulse: 67   Resp: 18   Temp: 96.9 °F (36.1 °C)   SpO2: 96%   Weight: 85.8 kg (189 lb 3.2 oz)   Height: 154.9 cm (61\")   PainSc:   6   PainLoc: Leg     Objective   Physical Exam  Vitals and nursing note reviewed.   Constitutional:       Appearance: Normal appearance. She is well-developed.   Eyes:      General: Lids are normal.   Cardiovascular:      Rate and Rhythm: Normal rate.   Pulmonary:      Effort: " Pulmonary effort is normal.   Musculoskeletal:      Cervical back: Tenderness present. Decreased range of motion.      Thoracic back: Scoliosis present.      Lumbar back: Tenderness present. Decreased range of motion. Scoliosis present.      Comments: Diffuse arthritic changes   Neurological:      Mental Status: She is alert and oriented to person, place, and time.   Psychiatric:         Attention and Perception: Attention normal.         Mood and Affect: Mood normal.         Speech: Speech normal.         Behavior: Behavior normal.         Judgment: Judgment normal.       Assessment & Plan   Diagnoses and all orders for this visit:    1. Chronic pain syndrome (Primary)    2. Encounter for long-term (current) use of high-risk medication    3. Arthralgia, unspecified joint    4. Chronic low back pain, unspecified back pain laterality, unspecified whether sciatica present    5. Generalized osteoarthritis    6. Primary osteoarthritis of both hips    Other orders  -     naloxone (NARCAN) 4 MG/0.1ML nasal spray; 1 spray into the nostril(s) as directed by provider As Needed (sedation or accidental overdose of opioid).  Dispense: 2 each; Refill: 0      --- New Narcan prescription sent today.   --- The urine drug screen confirmation from 5/23/2022 has been reviewed and the result is appropriate based on patient history and VICENTA report  --- CSA updated 3/22/2022  --- Refill OxyContin. Patient appears stable with current regimen. No adverse effects. Regarding continuation of opioids, there is no evidence of aberrant behavior or any red flags.  The patient continues with appropriate response to opioid therapy. ADL's remain intact by self.   --- Follow-up 1 month or sooner if needed.      VICENTA REPORT  As part of the patient's treatment plan, I am prescribing controlled substances. The patient has been made aware of appropriate use of such medications, including potential risk of somnolence, limited ability to drive and/or  work safely, and the potential for dependence or overdose. It has also been made clear that these medications are for use by this patient only, without concomitant use of alcohol or other substances unless prescribed.     Patient has completed prescribing agreement detailing terms of continued prescribing of controlled substances, including monitoring VICENTA reports, urine drug screening, and pill counts if necessary. The patient is aware that inappropriate use will results in cessation of prescribing such medications.    As the clinician, I personally reviewed the VICENTA from 8/30/2022 while the patient was in the office today.    History and physical exam exhibit continued safe and appropriate use of controlled substances.    Dictated utilizing Dragon dictation.     This document is intended for medical expert use only. Reading of this document by patients and/or patient's family without participating medical staff guidance may result in misinterpretation and unintended morbidity.   Any interpretation of such data is the responsibility of the patient and/or family member responsible for the patient in concert with their primary or specialist providers, not to be left for sources of online searches such as [employer], [employer] or similar queries. Relying on these approaches to knowledge may result in misinterpretation, misguided goals of care and even death should patients or family members try recommendations outside of the realm of professional medical care in a supervised way.    Patient remained masked during entire encounter. No cough present. I donned a mask and eye protection throughout entire visit. Prior to donning mask and eye protection, hand hygiene was performed, as well as when it was doffed.  I was closer than 6 feet, but not for an extended period of time. No obvious exposure to any bodily fluids.

## 2022-08-31 ENCOUNTER — TELEPHONE (OUTPATIENT)
Dept: FAMILY MEDICINE CLINIC | Age: 72
End: 2022-08-31

## 2022-08-31 DIAGNOSIS — R30.0 DYSURIA: Primary | ICD-10-CM

## 2022-08-31 NOTE — TELEPHONE ENCOUNTER
Urine culture done on 08/24/22 showed mixed cherry.     Jewell Oswald Stephens, APRN   8/26/2022 10:27 AM EDT         The culture was mixed cherry which does not suggest infection more contamination, see how she is doing may need her to repeat and get a CLEAN CATCH      Vaishnavi Keller, LPN   8/26/2022 12:24 PM EDT         Pt informed. She said she was having a slow urine flow with some occasional burning. That is a little better. She said she wears pull up underwear and she thinks that my be some on it.  If she continues to have issues she will call us back and let us put in a order for a repeat urine test.      Pt does want to repeat u/a.    not examined

## 2022-09-07 ENCOUNTER — LAB (OUTPATIENT)
Dept: LAB | Facility: HOSPITAL | Age: 72
End: 2022-09-07

## 2022-09-07 ENCOUNTER — CLINICAL SUPPORT (OUTPATIENT)
Dept: FAMILY MEDICINE CLINIC | Age: 72
End: 2022-09-07

## 2022-09-07 DIAGNOSIS — J30.9 ALLERGIC RHINITIS, UNSPECIFIED SEASONALITY, UNSPECIFIED TRIGGER: Primary | ICD-10-CM

## 2022-09-07 DIAGNOSIS — R30.0 DYSURIA: ICD-10-CM

## 2022-09-07 LAB
BACTERIA UR QL AUTO: ABNORMAL /HPF
BILIRUB UR QL STRIP: NEGATIVE
CLARITY UR: CLEAR
COLOR UR: YELLOW
GLUCOSE UR STRIP-MCNC: NEGATIVE MG/DL
HGB UR QL STRIP.AUTO: NEGATIVE
KETONES UR QL STRIP: NEGATIVE
LEUKOCYTE ESTERASE UR QL STRIP.AUTO: ABNORMAL
NITRITE UR QL STRIP: NEGATIVE
PH UR STRIP.AUTO: 6 [PH] (ref 5–8)
PROT UR QL STRIP: NEGATIVE
RBC # UR STRIP: ABNORMAL /HPF
REF LAB TEST METHOD: ABNORMAL
SP GR UR STRIP: 1.02 (ref 1–1.03)
SQUAMOUS #/AREA URNS HPF: ABNORMAL /HPF
UROBILINOGEN UR QL STRIP: ABNORMAL
WBC # UR STRIP: ABNORMAL /HPF

## 2022-09-07 PROCEDURE — 81001 URINALYSIS AUTO W/SCOPE: CPT

## 2022-09-07 PROCEDURE — 95115 IMMUNOTHERAPY ONE INJECTION: CPT | Performed by: FAMILY MEDICINE

## 2022-09-09 RX ORDER — LEVOTHYROXINE SODIUM 0.1 MG/1
TABLET ORAL
Qty: 90 TABLET | Refills: 0 | OUTPATIENT
Start: 2022-09-09

## 2022-09-12 RX ORDER — OXYCODONE HYDROCHLORIDE 80 MG/1
160 TABLET, FILM COATED, EXTENDED RELEASE ORAL EVERY 12 HOURS SCHEDULED
Qty: 120 TABLET | Refills: 0 | Status: SHIPPED | OUTPATIENT
Start: 2022-09-15 | End: 2022-09-27 | Stop reason: SDUPTHER

## 2022-09-12 NOTE — TELEPHONE ENCOUNTER
Medication Refill Request    Date of phone call: 9/12/2022    Medication being requested: Oxycodone ER 80 mg  sig: Take 2 tablets by mouth Every 12 (Twelve) Hours  Qty: 120    Date of last visit: 8/30/2022    Date of last refill:     VICENTA up to date?:     Next Follow up?: 9/27/2022    Any new pertinent information? (i.e, new medication allergies, new use of medications, change in patient's health or condition, non-compliance or inconsistency with prescribing agreement?):     Will you please refill for ? Thanks

## 2022-09-12 NOTE — TELEPHONE ENCOUNTER
VICENTA reviewed and appropriate.  Last drug screen 5/23/22 appropriate.     This patient is under the care of my colleague and I am covering patient care for him at this time.  I have reviewed pertinent information/documentation as necessary and will continue the plan of care as previously directed to the best of my ability.

## 2022-09-20 ENCOUNTER — TELEPHONE (OUTPATIENT)
Dept: FAMILY MEDICINE CLINIC | Age: 72
End: 2022-09-20

## 2022-09-20 NOTE — TELEPHONE ENCOUNTER
Tell her the guidelines re flu vaccine and her allergy shots    Price (Do Not Change): 0.00 Detail Level: Simple Instructions: This plan will send the code FBSE to the PM system.  DO NOT or CHANGE the price.

## 2022-09-20 NOTE — TELEPHONE ENCOUNTER
Caller: Anne Jewell    Relationship: Self    Best call back number:[phone]    What is the best time to reach you:ANY    Who are you requesting to speak with (clinical staff, provider,  specific staff member): CLINICAL STAFF    What was the call regarding: PATIENT IS CALLING TO INQUIRE ABOUT HOW SOON SHE HAS TO WAIT FOR HER FLU SHOT SINCE SHE IS CURRENTLY RECEIVING ALLERGY SHOTS.    Do you require a callback: YES

## 2022-09-21 ENCOUNTER — CLINICAL SUPPORT (OUTPATIENT)
Dept: FAMILY MEDICINE CLINIC | Age: 72
End: 2022-09-21

## 2022-09-21 DIAGNOSIS — Z23 NEED FOR INFLUENZA VACCINATION: Primary | ICD-10-CM

## 2022-09-21 PROCEDURE — 90662 IIV NO PRSV INCREASED AG IM: CPT | Performed by: FAMILY MEDICINE

## 2022-09-21 PROCEDURE — G0008 ADMIN INFLUENZA VIRUS VAC: HCPCS | Performed by: FAMILY MEDICINE

## 2022-09-21 NOTE — TELEPHONE ENCOUNTER
Left a detailed message on pt's cell that she should wait 24hr in between flu shot and allergy shot./pr

## 2022-09-27 ENCOUNTER — OFFICE VISIT (OUTPATIENT)
Dept: PAIN MEDICINE | Facility: CLINIC | Age: 72
End: 2022-09-27

## 2022-09-27 VITALS
OXYGEN SATURATION: 96 % | HEART RATE: 57 BPM | TEMPERATURE: 96.6 F | WEIGHT: 197.8 LBS | SYSTOLIC BLOOD PRESSURE: 144 MMHG | HEIGHT: 61 IN | DIASTOLIC BLOOD PRESSURE: 65 MMHG | BODY MASS INDEX: 37.34 KG/M2

## 2022-09-27 DIAGNOSIS — G89.29 CHRONIC LOW BACK PAIN, UNSPECIFIED BACK PAIN LATERALITY, UNSPECIFIED WHETHER SCIATICA PRESENT: ICD-10-CM

## 2022-09-27 DIAGNOSIS — Z79.899 ENCOUNTER FOR LONG-TERM (CURRENT) USE OF HIGH-RISK MEDICATION: ICD-10-CM

## 2022-09-27 DIAGNOSIS — M06.9 RHEUMATOID ARTHRITIS, INVOLVING UNSPECIFIED SITE, UNSPECIFIED WHETHER RHEUMATOID FACTOR PRESENT: ICD-10-CM

## 2022-09-27 DIAGNOSIS — G89.4 CHRONIC PAIN SYNDROME: Primary | ICD-10-CM

## 2022-09-27 DIAGNOSIS — M25.50 ARTHRALGIA, UNSPECIFIED JOINT: ICD-10-CM

## 2022-09-27 DIAGNOSIS — M54.50 CHRONIC LOW BACK PAIN, UNSPECIFIED BACK PAIN LATERALITY, UNSPECIFIED WHETHER SCIATICA PRESENT: ICD-10-CM

## 2022-09-27 DIAGNOSIS — M15.9 GENERALIZED OSTEOARTHRITIS: ICD-10-CM

## 2022-09-27 PROCEDURE — 99214 OFFICE O/P EST MOD 30 MIN: CPT | Performed by: NURSE PRACTITIONER

## 2022-09-27 RX ORDER — OXYCODONE HYDROCHLORIDE 80 MG/1
160 TABLET, FILM COATED, EXTENDED RELEASE ORAL EVERY 12 HOURS SCHEDULED
Qty: 120 TABLET | Refills: 0 | Status: SHIPPED | OUTPATIENT
Start: 2022-09-27 | End: 2022-11-01 | Stop reason: SDUPTHER

## 2022-09-27 NOTE — PROGRESS NOTES
CHIEF COMPLAINT  F/U back and joint pain- patient states that her pain has worsened since her last visit.     Subjective   Anne Jewell is a 72 y.o. female  who presents for follow-up.  She has a history of back and diffuse joint pain.  Her joint pain has worsened with the recent change in the weather. Today her pain is 7/10VAS in severity. She continues with OxyContin 80 mg 2 tablets in the morning and 2 tablets at night and baclofen 10 mg 2-3/day (prescribed by PCP). This medication regimen decreases her pain by a significant amount and allows her to remain mobile and meet her functional ADLs. She denies any side effects including somnolence or constipation.     Prescribed Xanax 2 mg TID by psychiatrist, Narcan prescription at home.      Not a good candidate for interventions.     Orthopedic surgeon is Dr. Alvarado.      She does have Narcan at home.     Back Pain  This is a chronic problem. The current episode started more than 1 year ago. The problem occurs constantly. The problem has been waxing and waning since onset. The pain is present in the lumbar spine. The quality of the pain is described as aching. The pain radiates to the left foot and right foot. The pain is at a severity of 7/10. The pain is moderate. The symptoms are aggravated by stress (weather changes). Associated symptoms include weakness. Pertinent negatives include no abdominal pain, bladder incontinence, bowel incontinence, chest pain, dysuria, fever, headaches or numbness. She has tried analgesics, bed rest, home exercises and heat (OxyContin, aquatherapy) for the symptoms. The treatment provided moderate relief.   Joint Pain  This is a chronic (7/10VAS in severity) problem. The current episode started more than 1 year ago. The problem occurs constantly. The problem has been waxing and waning. Associated symptoms include arthralgias, fatigue, myalgias, neck pain and weakness. Pertinent negatives include no abdominal pain, chest pain, chills,  "congestion, coughing, fever, headaches, joint swelling, nausea, numbness or vomiting. She has tried oral narcotics for the symptoms. The treatment provided moderate relief.      PEG Assessment   What number best describes your pain on average in the past week?6  What number best describes how, during the past week, pain has interfered with your enjoyment of life?7  What number best describes how, during the past week, pain has interfered with your general activity?  7    The following portions of the patient's history were reviewed and updated as appropriate: allergies, current medications, past family history, past medical history, past social history, past surgical history and problem list.    Review of Systems   Constitutional: Positive for fatigue. Negative for activity change, chills and fever.   HENT: Negative for congestion.    Eyes: Negative for visual disturbance.   Respiratory: Negative for cough, chest tightness and shortness of breath.    Cardiovascular: Negative for chest pain.   Gastrointestinal: Negative for abdominal pain, bowel incontinence, constipation, diarrhea, nausea and vomiting.   Genitourinary: Negative for bladder incontinence, difficulty urinating, dyspareunia and dysuria.   Musculoskeletal: Positive for arthralgias, back pain, myalgias and neck pain. Negative for joint swelling.   Neurological: Positive for weakness. Negative for dizziness, light-headedness, numbness and headaches.   Psychiatric/Behavioral: Negative for agitation and sleep disturbance. The patient is not nervous/anxious.      --  The aforementioned information the Chief Complaint section and above subjective data including any HPI data, and also the Review of Systems data, has been personally reviewed and affirmed.  --    Vitals:    09/27/22 1127   BP: 144/65   Pulse: 57   Temp: 96.6 °F (35.9 °C)   SpO2: 96%   Weight: 89.7 kg (197 lb 12.8 oz)   Height: 154.9 cm (61\")   PainSc:   7   PainLoc: Back     Objective   Physical " Exam  Vitals and nursing note reviewed.   Constitutional:       Appearance: Normal appearance. She is well-developed.   Eyes:      General: Lids are normal.   Cardiovascular:      Rate and Rhythm: Normal rate.   Pulmonary:      Effort: Pulmonary effort is normal.   Musculoskeletal:      Lumbar back: Tenderness and bony tenderness present. Decreased range of motion.      Comments:   Diffuse arthritic changes   Neurological:      Mental Status: She is alert and oriented to person, place, and time.   Psychiatric:         Attention and Perception: Attention normal.         Mood and Affect: Mood normal.         Speech: Speech normal.         Behavior: Behavior normal.         Judgment: Judgment normal.       Assessment & Plan   Diagnoses and all orders for this visit:    1. Chronic pain syndrome (Primary)    2. Encounter for long-term (current) use of high-risk medication    3. Arthralgia, unspecified joint    4. Chronic low back pain, unspecified back pain laterality, unspecified whether sciatica present    5. Generalized osteoarthritis    6. Rheumatoid arthritis, involving unspecified site, unspecified whether rheumatoid factor present ([facility])      --- High risk due to significant MEDD.   --- The urine drug screen confirmation from 5/23/2022 has been reviewed and the result is appropriate based on patient history and VICENTA report  --- CSA updated 3/22/2022  --- Refill OxyContin.  DNF 10/16/2022 applied. Patient appears stable with current regimen. No adverse effects. Regarding continuation of opioids, there is no evidence of aberrant behavior or any red flags.  The patient continues with appropriate response to opioid therapy. ADL's remain intact by self.   --- Follow-up 1 month or sooner if needed.      VICENTA REPORT  As part of the patient's treatment plan, I am prescribing controlled substances. The patient has been made aware of appropriate use of such medications, including potential risk of somnolence, limited  ability to drive and/or work safely, and the potential for dependence or overdose. It has also been made clear that these medications are for use by this patient only, without concomitant use of alcohol or other substances unless prescribed.     Patient has completed prescribing agreement detailing terms of continued prescribing of controlled substances, including monitoring VICENTA reports, urine drug screening, and pill counts if necessary. The patient is aware that inappropriate use will results in cessation of prescribing such medications.    As the clinician, I personally reviewed the VICENTA from 9/27/2022 while the patient was in the office today.    History and physical exam exhibit continued safe and appropriate use of controlled substances.    Dictated utilizing Dragon dictation.     This document is intended for medical expert use only. Reading of this document by patients and/or patient's family without participating medical staff guidance may result in misinterpretation and unintended morbidity.   Any interpretation of such data is the responsibility of the patient and/or family member responsible for the patient in concert with their primary or specialist providers, not to be left for sources of online searches such as [employer], [employer] or similar queries. Relying on these approaches to knowledge may result in misinterpretation, misguided goals of care and even death should patients or family members try recommendations outside of the realm of professional medical care in a supervised way.    Patient remained masked during entire encounter. No cough present. I donned a mask and eye protection throughout entire visit. Prior to donning mask and eye protection, hand hygiene was performed, as well as when it was doffed.  I was closer than 6 feet, but not for an extended period of time. No obvious exposure to any bodily fluids.

## 2022-09-29 ENCOUNTER — CLINICAL SUPPORT (OUTPATIENT)
Dept: FAMILY MEDICINE CLINIC | Age: 72
End: 2022-09-29

## 2022-09-29 DIAGNOSIS — J30.9 ALLERGIC RHINITIS, UNSPECIFIED SEASONALITY, UNSPECIFIED TRIGGER: Primary | ICD-10-CM

## 2022-09-29 PROCEDURE — 95115 IMMUNOTHERAPY ONE INJECTION: CPT | Performed by: FAMILY MEDICINE

## 2022-10-05 ENCOUNTER — IMMUNIZATION (OUTPATIENT)
Dept: FAMILY MEDICINE CLINIC | Age: 72
End: 2022-10-05

## 2022-10-05 DIAGNOSIS — Z23 NEED FOR VACCINATION: Primary | ICD-10-CM

## 2022-10-05 PROCEDURE — 0124A COVID-19 (PFIZER) BIVALENT BOOSTER 12+YRS: CPT | Performed by: FAMILY MEDICINE

## 2022-10-05 PROCEDURE — 91312 COVID-19 (PFIZER) BIVALENT BOOSTER 12+YRS: CPT | Performed by: FAMILY MEDICINE

## 2022-10-12 ENCOUNTER — CLINICAL SUPPORT (OUTPATIENT)
Dept: FAMILY MEDICINE CLINIC | Age: 72
End: 2022-10-12

## 2022-10-12 DIAGNOSIS — J30.9 ALLERGIC RHINITIS, UNSPECIFIED SEASONALITY, UNSPECIFIED TRIGGER: Primary | ICD-10-CM

## 2022-10-12 PROCEDURE — 95115 IMMUNOTHERAPY ONE INJECTION: CPT | Performed by: FAMILY MEDICINE

## 2022-10-15 DIAGNOSIS — R11.0 NAUSEA: ICD-10-CM

## 2022-10-15 DIAGNOSIS — I10 HYPERTENSION, UNSPECIFIED TYPE: ICD-10-CM

## 2022-10-15 DIAGNOSIS — G89.4 CHRONIC PAIN SYNDROME: Chronic | ICD-10-CM

## 2022-10-18 DIAGNOSIS — I10 HYPERTENSION, UNSPECIFIED TYPE: ICD-10-CM

## 2022-10-18 DIAGNOSIS — R11.0 NAUSEA: ICD-10-CM

## 2022-10-18 DIAGNOSIS — G89.4 CHRONIC PAIN SYNDROME: Chronic | ICD-10-CM

## 2022-10-18 RX ORDER — PROMETHAZINE HYDROCHLORIDE 25 MG/1
25 TABLET ORAL EVERY 8 HOURS PRN
Qty: 90 TABLET | Refills: 2 | Status: CANCELLED | OUTPATIENT
Start: 2022-10-18

## 2022-10-18 RX ORDER — HYDROCHLOROTHIAZIDE 25 MG/1
25 TABLET ORAL DAILY
Qty: 90 TABLET | Refills: 2 | Status: CANCELLED | OUTPATIENT
Start: 2022-10-18

## 2022-10-18 NOTE — TELEPHONE ENCOUNTER
Rx Refill Note  Requested Prescriptions     Pending Prescriptions Disp Refills   • promethazine (PHENERGAN) 25 MG tablet 90 tablet 2     Sig: Take 1 tablet by mouth Every 8 (Eight) Hours As Needed for Nausea or Vomiting.   • hydroCHLOROthiazide (HYDRODIURIL) 25 MG tablet 90 tablet 2     Sig: Take 1 tablet by mouth Daily.      Last office visit with prescribing clinician: 8/24/2022    Acute illness  Next office visit with prescribing clinician: 10/19/22 at 3:00  Pt said she has enough to last until her appt tomorrow.     Vaishnavi Keller LPN  10/18/22, 15:45 EDT

## 2022-10-19 ENCOUNTER — OFFICE VISIT (OUTPATIENT)
Dept: FAMILY MEDICINE CLINIC | Age: 72
End: 2022-10-19

## 2022-10-19 ENCOUNTER — LAB (OUTPATIENT)
Dept: LAB | Facility: HOSPITAL | Age: 72
End: 2022-10-19

## 2022-10-19 VITALS
BODY MASS INDEX: 35.38 KG/M2 | RESPIRATION RATE: 18 BRPM | DIASTOLIC BLOOD PRESSURE: 74 MMHG | HEIGHT: 61 IN | HEART RATE: 71 BPM | WEIGHT: 187.4 LBS | SYSTOLIC BLOOD PRESSURE: 130 MMHG

## 2022-10-19 DIAGNOSIS — I10 HYPERTENSION, UNSPECIFIED TYPE: ICD-10-CM

## 2022-10-19 DIAGNOSIS — G89.4 CHRONIC PAIN SYNDROME: Primary | Chronic | ICD-10-CM

## 2022-10-19 DIAGNOSIS — E03.9 ACQUIRED HYPOTHYROIDISM: ICD-10-CM

## 2022-10-19 DIAGNOSIS — J30.9 ALLERGIC RHINITIS, UNSPECIFIED SEASONALITY, UNSPECIFIED TRIGGER: ICD-10-CM

## 2022-10-19 DIAGNOSIS — R11.0 NAUSEA: ICD-10-CM

## 2022-10-19 DIAGNOSIS — Z86.718 HISTORY OF DVT (DEEP VEIN THROMBOSIS): ICD-10-CM

## 2022-10-19 PROCEDURE — 84439 ASSAY OF FREE THYROXINE: CPT | Performed by: NURSE PRACTITIONER

## 2022-10-19 PROCEDURE — 95115 IMMUNOTHERAPY ONE INJECTION: CPT | Performed by: NURSE PRACTITIONER

## 2022-10-19 PROCEDURE — 80053 COMPREHEN METABOLIC PANEL: CPT

## 2022-10-19 PROCEDURE — 84443 ASSAY THYROID STIM HORMONE: CPT | Performed by: NURSE PRACTITIONER

## 2022-10-19 PROCEDURE — 99214 OFFICE O/P EST MOD 30 MIN: CPT | Performed by: NURSE PRACTITIONER

## 2022-10-19 PROCEDURE — 36415 COLL VENOUS BLD VENIPUNCTURE: CPT

## 2022-10-19 RX ORDER — PROMETHAZINE HYDROCHLORIDE 25 MG/1
TABLET ORAL
Qty: 90 TABLET | Refills: 2 | OUTPATIENT
Start: 2022-10-19

## 2022-10-19 RX ORDER — HYDROCHLOROTHIAZIDE 25 MG/1
25 TABLET ORAL DAILY
Qty: 90 TABLET | Refills: 1 | Status: SHIPPED | OUTPATIENT
Start: 2022-10-19

## 2022-10-19 RX ORDER — POTASSIUM CHLORIDE 20 MEQ/1
20 TABLET, EXTENDED RELEASE ORAL 2 TIMES DAILY
Qty: 180 TABLET | Refills: 1 | Status: CANCELLED | OUTPATIENT
Start: 2022-10-19

## 2022-10-19 RX ORDER — PROMETHAZINE HYDROCHLORIDE 25 MG/1
25 TABLET ORAL EVERY 8 HOURS PRN
Qty: 90 TABLET | Refills: 1 | Status: SHIPPED | OUTPATIENT
Start: 2022-10-19 | End: 2022-12-27

## 2022-10-19 RX ORDER — HYDROCHLOROTHIAZIDE 25 MG/1
TABLET ORAL
Qty: 90 TABLET | Refills: 2 | OUTPATIENT
Start: 2022-10-19

## 2022-10-19 RX ORDER — DICYCLOMINE HCL 20 MG
20 TABLET ORAL EVERY 6 HOURS PRN
Qty: 120 TABLET | Refills: 1 | Status: CANCELLED | OUTPATIENT
Start: 2022-10-19

## 2022-10-19 RX ORDER — LEVOTHYROXINE SODIUM 0.1 MG/1
TABLET ORAL
Qty: 90 TABLET | Refills: 0 | OUTPATIENT
Start: 2022-10-19

## 2022-10-19 RX ORDER — LEVOTHYROXINE SODIUM 0.1 MG/1
100 TABLET ORAL DAILY
Qty: 90 TABLET | Refills: 1 | Status: SHIPPED | OUTPATIENT
Start: 2022-10-19

## 2022-10-19 NOTE — PROGRESS NOTES
Anne Jewell presents to [facility].    Chief Complaint:  Follow-up medication refills, her PCP has retired and she needs some rx's          History of Present Illness:  She has a PCP but he has just retired and wants me to refill some of the medications    Hx of DVT: on xarelto just got her rx for 90 days today but will need future refills    Hx chronic pain, she sees pain management, also takes baclofen, I have refilled this rx in past, not due yet     Will need K BID refilled but not today     Hypertension:  Current medication:  HCTZ   Tolerating Medication: Yes  Needs refills: Yes to [facility]   Labs:  Lab Results       Component                Value               Date                       GLUCOSE                  96                  04/14/2022                 BUN                      17                  04/14/2022                 CREATININE               0.93                04/14/2022                 EGFRIFNONA               52 (L)              12/16/2020                 EGFRIFAFRI               63                  12/16/2020                 BCR                      18                  04/14/2022                 K                        4.1                 04/14/2022                 CO2                      24                  04/14/2022                 CALCIUM                  9.2                 04/14/2022                 PROTENTOTREF             6.3                 04/14/2022                 ALBUMIN                  3.7                 04/14/2022                 LABIL2                   1.4                 04/14/2022                 AST                      22                  04/14/2022                 ALT                      10                  04/14/2022           Hypothyroidism  Current rx: levothyroxine 100 mcg   Tolerating rx: yes   Refills needed Yes to [facility]   Lab Results       Component                Value               Date                       TSH                       2.150               2022                 Wants rx phenergan for nausea to take with her pain rx's three times a day     PAST MEDICAL HISTORY changes since :           + bilateral CTS     Positive for    Deep Venous Thrombosis: 2 DVT and a PE; ;     Positive for    Hypothyroidism: dx'd in ; ;     Positive for    Obesity: used to weigh over 400 pounds; ;         GYNECOLOGICAL HISTORY:             CURRENT MEDICAL PROVIDERS:    Allergist: [facility]: allergy rx / goes yearly     Orthopedist: Dr Alvarado    Psychiatrist: [facility], oz Gonzales, amando chavez   Chiropractor [facility]  GYN, samson Feldman, PCP, retiring     pain management Dr. Grajeda and JORGE LUIS Friedman         PREVENTIVE HEALTH MAINTENANCE         DEXA 3-31-22 at [facility] and mammogram same day     COLORECTAL CANCER SCREENING: Up to date (colonoscopy q10y; sigmoidoscopy q5y; Cologuard q3y) was last done 20/ dr Barker         Surgical History:     Teeth removed      Cholecystectomy    Hysterectomy: TAHBSO; Other Surgeries    Hernia Repair: 7 different surgeries;     Joint Replacement: bilateral knees; , ;     Gastric Bypass ;    bladder repair;    right hand surgery ;    breast reduction ;     Procedures:    Colonoscopy ( / [facility] )    EGD         Family History:     Father:  at age 89;     ; Positive for Prostate Cancer;     Mother:  at age 78; Cause of death was renal failure    ; Positive for Congestive Heart Failure;     ; Positive for Breast Cancer;     ; Positive for Type 2 Diabetes;     ; Positive for blind;     Brother(s): 1 brother(s) total    ; Positive for Type 2 Diabetes;     Son(s): 1 ;   in 2016     Daughter(s): 1 daughter(s) total;  Migraines         Social History:     Occupation: Disabled (due to [employer])     Marital Status:  /spouse comes and goes, daughter has moved in with her      Children: 2 children and 2 step-children       Review of Systems:  Review of Systems   Constitutional: Negative for fatigue and fever.   Respiratory: Negative for cough and shortness of breath.    Cardiovascular: Negative for chest pain, palpitations and leg swelling.   Neurological: Negative for numbness.          Current Outpatient Medications:   •  albuterol (PROVENTIL HFA;VENTOLIN HFA) 108 (90 BASE) MCG/ACT inhaler, Proventil  (90 Base) MCG/ACT Inhalation Aerosol Solution; Patient Sig: Proventil  (90 Base) MCG/ACT Inhalation Aerosol Solution Inhale 2 puff(s) every 6 to 8 hours as needed; 6.7; 0; 05-Apr-2013; Active, Disp: , Rfl:   •  ALPRAZolam (XANAX) 2 MG tablet, , Disp: , Rfl:   •  baclofen (LIORESAL) 10 MG tablet, Take 1 tablet by mouth 3 (Three) Times a Day., Disp: 270 tablet, Rfl: 1  •  cetirizine (zyrTEC) 10 MG tablet, Take 10 mg by mouth Daily., Disp: , Rfl:   •  chlorhexidine (PERIDEX) 0.12 % solution, , Disp: , Rfl:   •  Cholecalciferol (VITAMIN D3) 2000 units capsule, TAKE ONE CAPSULE BY MOUTH DAILY, Disp: 30 capsule, Rfl: 9  •  Cyanocobalamin (B-12) 1000 MCG sublingual tablet, PLACE 1 TABLET UNDER THE TONGUE AND LET DISSOLVE ONCE DAILY, Disp: 30 each, Rfl: 1  •  dicyclomine (BENTYL) 20 MG tablet, TAKE ONE TABLET BY MOUTH EVERY 6 HOURS AS NEEDED, Disp: 120 tablet, Rfl: 5  •  EPINEPHrine (EPIPEN) 0.3 MG/0.3ML solution auto-injector injection, , Disp: , Rfl:   •  escitalopram (LEXAPRO) 10 MG tablet, Take 1 tablet by mouth daily., Disp: , Rfl:   •  hydroCHLOROthiazide (HYDRODIURIL) 25 MG tablet, Take 1 tablet by mouth Daily., Disp: 90 tablet, Rfl: 1  •  KLOR-CON 20 MEQ CR tablet, TAKE ONE TABLET BY MOUTH TWICE A DAY ** **MUST CALL MD FOR APPOINTMENT, Disp: 180 tablet, Rfl: 0  •  levothyroxine (SYNTHROID, LEVOTHROID) 100 MCG tablet, Take 1 tablet by mouth Daily., Disp: 90 tablet, Rfl: 1  •  montelukast (SINGULAIR) 10 MG tablet, Take 1 tablet by mouth daily., Disp: , Rfl:   •  Multiple  "Vitamin tablet, Take 1 tablet by mouth daily., Disp: , Rfl:   •  naloxone (NARCAN) 4 MG/0.1ML nasal spray, 1 spray into the nostril(s) as directed by provider As Needed (sedation or accidental overdose of opioid)., Disp: 2 each, Rfl: 0  •  ondansetron (Zofran) 4 MG tablet, Take 1 tablet by mouth Every 8 (Eight) Hours As Needed for Nausea or Vomiting., Disp: 15 tablet, Rfl: 0  •  oxyCODONE ER (oxyCONTIN) 80 MG tablet extended-release 12 hour 12 hr tablet, Take 2 tablets by mouth Every 12 (Twelve) Hours., Disp: 120 tablet, Rfl: 0  •  promethazine (PHENERGAN) 25 MG tablet, Take 1 tablet by mouth Every 8 (Eight) Hours As Needed for Nausea or Vomiting., Disp: 90 tablet, Rfl: 1  •  Xarelto 20 MG tablet, TAKE ONE TABLET BY MOUTH DAILY, Disp: 90 tablet, Rfl: 0  No current facility-administered medications for this visit.    Vital Signs:   Vitals:    10/19/22 1507   BP: 130/74   BP Location: Right arm   Patient Position: Sitting   Cuff Size: Adult   Pulse: 71   Resp: 18   Weight: 85 kg (187 lb 6.4 oz)   Height: 154.9 cm (61\")         Physical Exam:  Physical Exam  Vitals reviewed.   Constitutional:       General: She is not in acute distress.     Appearance: Normal appearance.   Neck:      Vascular: No carotid bruit.   Cardiovascular:      Rate and Rhythm: Normal rate and regular rhythm.      Heart sounds: Normal heart sounds. No murmur heard.  Pulmonary:      Effort: Pulmonary effort is normal. No respiratory distress.      Breath sounds: Normal breath sounds.   Musculoskeletal:      Right lower leg: No edema.      Left lower leg: No edema.   Neurological:      Mental Status: She is alert.   Psychiatric:         Mood and Affect: Mood normal.         Behavior: Behavior normal.         Result Review      The following data was reviewed by: EUGENIO Rodriguez on 10/19/2022:    Results for orders placed or performed in visit on 09/07/22   Urinalysis With Culture If Indicated - Urine, Clean Catch    Specimen: Urine, " Clean Catch   Result Value Ref Range    Color, UA Yellow Yellow, Straw    Appearance, UA Clear Clear    pH, UA 6.0 5.0 - 8.0    Specific Gravity, UA 1.020 1.005 - 1.030    Glucose, UA Negative Negative    Ketones, UA Negative Negative    Bilirubin, UA Negative Negative    Blood, UA Negative Negative    Protein, UA Negative Negative    Leuk Esterase, UA Trace (A) Negative    Nitrite, UA Negative Negative    Urobilinogen, UA 0.2 E.U./dL 0.2 - 1.0 E.U./dL   Urinalysis, Microscopic Only - Urine, Clean Catch    Specimen: Urine, Clean Catch   Result Value Ref Range    RBC, UA None Seen None Seen /HPF    WBC, UA 0-2 (A) None Seen /HPF    Bacteria, UA None Seen None Seen /HPF    Squamous Epithelial Cells, UA 0-2 None Seen, 0-2 /HPF    Methodology Manual Light Microscopy                Assessment and Plan:          Diagnoses and all orders for this visit:    1. Chronic pain syndrome (Primary)  Assessment & Plan:  Continue to follow up with pain management     Orders:  -     promethazine (PHENERGAN) 25 MG tablet; Take 1 tablet by mouth Every 8 (Eight) Hours As Needed for Nausea or Vomiting.  Dispense: 90 tablet; Refill: 1    2. Nausea  -     promethazine (PHENERGAN) 25 MG tablet; Take 1 tablet by mouth Every 8 (Eight) Hours As Needed for Nausea or Vomiting.  Dispense: 90 tablet; Refill: 1    3. Hypertension, unspecified type  -     hydroCHLOROthiazide (HYDRODIURIL) 25 MG tablet; Take 1 tablet by mouth Daily.  Dispense: 90 tablet; Refill: 1  -     Comprehensive metabolic panel; Future    4. History of DVT (deep vein thrombosis)  Assessment & Plan:  continues Xarelto       5. Acquired hypothyroidism  -     levothyroxine (SYNTHROID, LEVOTHROID) 100 MCG tablet; Take 1 tablet by mouth Daily.  Dispense: 90 tablet; Refill: 1  -     TSH Rfx On Abnormal To Free T4    6. Allergic rhinitis, unspecified seasonality, unspecified trigger  -     Allergy Serum Injection        Follow Up   Return for followup pending lab results.  Patient was  given instructions and counseling regarding her condition or for health maintenance advice. Please see specific information pulled into the AVS if appropriate.

## 2022-10-20 LAB
ALBUMIN SERPL-MCNC: 3.4 G/DL (ref 3.5–5.2)
ALBUMIN/GLOB SERPL: 1.2 G/DL
ALP SERPL-CCNC: 129 U/L (ref 39–117)
ALT SERPL W P-5'-P-CCNC: 10 U/L (ref 1–33)
ANION GAP SERPL CALCULATED.3IONS-SCNC: 9 MMOL/L (ref 5–15)
AST SERPL-CCNC: 26 U/L (ref 1–32)
BILIRUB SERPL-MCNC: 0.4 MG/DL (ref 0–1.2)
BUN SERPL-MCNC: 20 MG/DL (ref 8–23)
BUN/CREAT SERPL: 18.2 (ref 7–25)
CALCIUM SPEC-SCNC: 9.1 MG/DL (ref 8.6–10.5)
CHLORIDE SERPL-SCNC: 101 MMOL/L (ref 98–107)
CO2 SERPL-SCNC: 29 MMOL/L (ref 22–29)
CREAT SERPL-MCNC: 1.1 MG/DL (ref 0.57–1)
EGFRCR SERPLBLD CKD-EPI 2021: 53.5 ML/MIN/1.73
GLOBULIN UR ELPH-MCNC: 2.9 GM/DL
GLUCOSE SERPL-MCNC: 92 MG/DL (ref 65–99)
POTASSIUM SERPL-SCNC: 3.8 MMOL/L (ref 3.5–5.2)
PROT SERPL-MCNC: 6.3 G/DL (ref 6–8.5)
SODIUM SERPL-SCNC: 139 MMOL/L (ref 136–145)
T4 FREE SERPL-MCNC: 1.21 NG/DL (ref 0.93–1.7)
TSH SERPL DL<=0.05 MIU/L-ACNC: 0.22 UIU/ML (ref 0.27–4.2)

## 2022-10-21 DIAGNOSIS — N28.9 ABNORMAL KIDNEY FUNCTION: ICD-10-CM

## 2022-10-21 DIAGNOSIS — E03.9 HYPOTHYROIDISM, UNSPECIFIED TYPE: Primary | Chronic | ICD-10-CM

## 2022-11-01 ENCOUNTER — OFFICE VISIT (OUTPATIENT)
Dept: PAIN MEDICINE | Facility: CLINIC | Age: 72
End: 2022-11-01

## 2022-11-01 ENCOUNTER — CLINICAL SUPPORT (OUTPATIENT)
Dept: FAMILY MEDICINE CLINIC | Age: 72
End: 2022-11-01

## 2022-11-01 VITALS
BODY MASS INDEX: 35.42 KG/M2 | WEIGHT: 187.6 LBS | RESPIRATION RATE: 12 BRPM | HEIGHT: 61 IN | DIASTOLIC BLOOD PRESSURE: 70 MMHG | SYSTOLIC BLOOD PRESSURE: 100 MMHG | OXYGEN SATURATION: 95 % | TEMPERATURE: 97.3 F | HEART RATE: 73 BPM

## 2022-11-01 DIAGNOSIS — G89.4 CHRONIC PAIN SYNDROME: Primary | ICD-10-CM

## 2022-11-01 DIAGNOSIS — G89.29 CHRONIC LOW BACK PAIN, UNSPECIFIED BACK PAIN LATERALITY, UNSPECIFIED WHETHER SCIATICA PRESENT: ICD-10-CM

## 2022-11-01 DIAGNOSIS — M15.9 GENERALIZED OSTEOARTHRITIS: ICD-10-CM

## 2022-11-01 DIAGNOSIS — Z79.899 ENCOUNTER FOR LONG-TERM (CURRENT) USE OF HIGH-RISK MEDICATION: ICD-10-CM

## 2022-11-01 DIAGNOSIS — M06.9 RHEUMATOID ARTHRITIS, INVOLVING UNSPECIFIED SITE, UNSPECIFIED WHETHER RHEUMATOID FACTOR PRESENT: ICD-10-CM

## 2022-11-01 DIAGNOSIS — J30.9 ALLERGIC RHINITIS, UNSPECIFIED SEASONALITY, UNSPECIFIED TRIGGER: Primary | ICD-10-CM

## 2022-11-01 DIAGNOSIS — M54.50 CHRONIC LOW BACK PAIN, UNSPECIFIED BACK PAIN LATERALITY, UNSPECIFIED WHETHER SCIATICA PRESENT: ICD-10-CM

## 2022-11-01 DIAGNOSIS — M25.50 ARTHRALGIA, UNSPECIFIED JOINT: ICD-10-CM

## 2022-11-01 PROCEDURE — 99214 OFFICE O/P EST MOD 30 MIN: CPT | Performed by: NURSE PRACTITIONER

## 2022-11-01 PROCEDURE — 95115 IMMUNOTHERAPY ONE INJECTION: CPT | Performed by: FAMILY MEDICINE

## 2022-11-01 RX ORDER — OXYCODONE HYDROCHLORIDE 80 MG/1
160 TABLET, FILM COATED, EXTENDED RELEASE ORAL EVERY 12 HOURS SCHEDULED
Qty: 120 TABLET | Refills: 0 | Status: SHIPPED | OUTPATIENT
Start: 2022-11-01 | End: 2022-12-19 | Stop reason: SDUPTHER

## 2022-11-01 NOTE — PROGRESS NOTES
CHIEF COMPLAINT  1 month back pain follow up  Patient in office reports worsened pain over the last 4 weeks states her kidney levels are high     Subjective   Anne Jewell is a 72 y.o. female  who presents for follow-up.  She has a history of back pain.  Today her pain is 8/10VAS in severity. She continues with OxyContin 80 mg 2 tablets BID and Baclofen 10 mg 2-3/day (managed by PCP). This medication regimen decreases her pain by a significant amount and allows her to remain active. She denies any side effects including somnolence or constipation.     Prescribed Xanax 2 mg TID by psychiatrist, Narcan prescription at home.      Not a good candidate for interventions.      Orthopedic surgeon is Dr. Alvarado.       Back Pain  This is a chronic problem. The current episode started more than 1 year ago. The problem occurs constantly. The problem has been waxing and waning since onset. The pain is present in the lumbar spine. The quality of the pain is described as aching. The pain radiates to the left foot and right foot. The pain is at a severity of 8/10. The pain is moderate. The symptoms are aggravated by stress (weather changes). Associated symptoms include numbness (hands) and weakness (hands). Pertinent negatives include no abdominal pain, bladder incontinence, bowel incontinence, chest pain, dysuria, fever or headaches. She has tried analgesics, bed rest, home exercises and heat (OxyContin, aquatherapy) for the symptoms. The treatment provided moderate relief.   Joint Pain  This is a chronic (8/10VAS in severity) problem. The current episode started more than 1 year ago. The problem occurs constantly. The problem has been waxing and waning. Associated symptoms include arthralgias, myalgias, neck pain, numbness (hands) and weakness (hands). Pertinent negatives include no abdominal pain, chest pain, chills, congestion, coughing, fatigue, fever, headaches, joint swelling, nausea or vomiting. She has tried oral  "narcotics for the symptoms. The treatment provided moderate relief.      PEG Assessment   What number best describes your pain on average in the past week?8  What number best describes how, during the past week, pain has interfered with your enjoyment of life?0  What number best describes how, during the past week, pain has interfered with your general activity?  0    The following portions of the patient's history were reviewed and updated as appropriate: allergies, current medications, past family history, past medical history, past social history, past surgical history and problem list.    Review of Systems   Constitutional: Negative for activity change, chills, fatigue and fever.   HENT: Negative for congestion.    Eyes: Negative for visual disturbance.   Respiratory: Negative for cough and chest tightness.    Cardiovascular: Negative for chest pain.   Gastrointestinal: Negative for abdominal pain, bowel incontinence, constipation, diarrhea, nausea and vomiting.   Genitourinary: Negative for bladder incontinence, difficulty urinating and dysuria.   Musculoskeletal: Positive for arthralgias, back pain, myalgias and neck pain. Negative for joint swelling.   Neurological: Positive for weakness (hands) and numbness (hands). Negative for dizziness, light-headedness and headaches.   Psychiatric/Behavioral: Negative for agitation, sleep disturbance and suicidal ideas. The patient is not nervous/anxious.      --  The aforementioned information the Chief Complaint section and above subjective data including any HPI data, and also the Review of Systems data, has been personally reviewed and affirmed.  --    Vitals:    11/01/22 1410   BP: 100/70   BP Location: Right arm   Patient Position: Sitting   Cuff Size: Adult   Pulse: 73   Resp: 12   Temp: 97.3 °F (36.3 °C)   TempSrc: Temporal   SpO2: 95%   Weight: 85.1 kg (187 lb 9.6 oz)   Height: 154.9 cm (61\")   PainSc:   8   PainLoc: Comment: back     Objective   Physical " Exam  Vitals and nursing note reviewed.   Constitutional:       Appearance: Normal appearance. She is well-developed.   Eyes:      General: Lids are normal.   Cardiovascular:      Rate and Rhythm: Normal rate.   Pulmonary:      Effort: Pulmonary effort is normal.   Musculoskeletal:      Right shoulder: Tenderness present.      Left shoulder: Tenderness present.      Right wrist: Tenderness present.      Left wrist: Tenderness present.      Right hand: Tenderness present.      Left hand: Tenderness present.      Lumbar back: Tenderness present. Decreased range of motion.      Right hip: Tenderness present.      Left hip: Tenderness present.      Right knee: Tenderness present.      Left knee: Tenderness present.      Comments:   Diffuse arthritic changes   Neurological:      Mental Status: She is alert and oriented to person, place, and time.      Gait: Gait abnormal (rolator).   Psychiatric:         Attention and Perception: Attention normal.         Mood and Affect: Mood normal.         Speech: Speech normal.         Behavior: Behavior normal.         Judgment: Judgment normal.       Assessment & Plan   Diagnoses and all orders for this visit:    1. Chronic pain syndrome (Primary)    2. Encounter for long-term (current) use of high-risk medication    3. Arthralgia, unspecified joint    4. Chronic low back pain, unspecified back pain laterality, unspecified whether sciatica present    5. Generalized osteoarthritis    6. Rheumatoid arthritis, involving unspecified site, unspecified whether rheumatoid factor present (HCC)      --- The urine drug screen confirmation from 5/23/2022 has been reviewed and the result is appropriate based on patient history and VICENTA report  --- CSA updated 3/22/2022  --- Refill OxyContin 80 mg. Patient appears stable with current regimen. No adverse effects. Regarding continuation of opioids, there is no evidence of aberrant behavior or any red flags.  The patient continues with  appropriate response to opioid therapy. ADL's remain intact by self.   --- Narcan prescription current.   --- Discussed that we will have to keep an eye on her kidney function as this may affect the amount of medication that is prescribed. Also discussed that there is no morphine equivalent difference in the generic OxyCodone ER and OxyContin.  --- Follow-up 1 month or sooner if needed, follow-up in 2 months with Dr. Grajeda for yearly MD evaluation.      VICENTA REPORT  As part of the patient's treatment plan, I am prescribing controlled substances. The patient has been made aware of appropriate use of such medications, including potential risk of somnolence, limited ability to drive and/or work safely, and the potential for dependence or overdose. It has also been made clear that these medications are for use by this patient only, without concomitant use of alcohol or other substances unless prescribed.     Patient has completed prescribing agreement detailing terms of continued prescribing of controlled substances, including monitoring VICENTA reports, urine drug screening, and pill counts if necessary. The patient is aware that inappropriate use will results in cessation of prescribing such medications.    As the clinician, I personally reviewed the VICENTA from 11/1/2022 while the patient was in the office today.    History and physical exam exhibit continued safe and appropriate use of controlled substances.    Dictated utilizing Dragon dictation.     This document is intended for medical expert use only. Reading of this document by patients and/or patient's family without participating medical staff guidance may result in misinterpretation and unintended morbidity.   Any interpretation of such data is the responsibility of the patient and/or family member responsible for the patient in concert with their primary or specialist providers, not to be left for sources of online searches such as [employer], [employer] or  similar queries. Relying on these approaches to knowledge may result in misinterpretation, misguided goals of care and even death should patients or family members try recommendations outside of the realm of professional medical care in a supervised way.    Patient remained masked during entire encounter. No cough present. I donned a mask and eye protection throughout entire visit. Prior to donning mask and eye protection, hand hygiene was performed, as well as when it was doffed.  I was closer than 6 feet, but not for an extended period of time. No obvious exposure to any bodily fluids.

## 2022-11-10 RX ORDER — POTASSIUM CHLORIDE 1500 MG/1
TABLET, EXTENDED RELEASE ORAL
Qty: 180 TABLET | Refills: 0 | OUTPATIENT
Start: 2022-11-10

## 2022-11-11 RX ORDER — POTASSIUM CHLORIDE 20 MEQ/1
20 TABLET, EXTENDED RELEASE ORAL 2 TIMES DAILY
Qty: 180 TABLET | Refills: 0 | Status: SHIPPED | OUTPATIENT
Start: 2022-11-11 | End: 2023-02-08

## 2022-11-11 NOTE — TELEPHONE ENCOUNTER
Rx Refill Note  Requested Prescriptions     Pending Prescriptions Disp Refills   • potassium chloride (KLOR-CON) 20 MEQ CR tablet 180 tablet 0      Last office visit with prescribing clinician: 10/19/2022      Next office visit with prescribing clinician: 11/16/2022  Last refill was done by Dr Kenneth Feldman, he has retired now.   Comprehensive metabolic panel (10/19/2022 16:24)    Vaishnavi Keller LPN  11/11/22, 14:21 EST

## 2022-11-16 ENCOUNTER — LAB (OUTPATIENT)
Dept: LAB | Facility: HOSPITAL | Age: 72
End: 2022-11-16

## 2022-11-16 ENCOUNTER — OFFICE VISIT (OUTPATIENT)
Dept: FAMILY MEDICINE CLINIC | Age: 72
End: 2022-11-16

## 2022-11-16 VITALS
HEART RATE: 76 BPM | SYSTOLIC BLOOD PRESSURE: 115 MMHG | RESPIRATION RATE: 16 BRPM | BODY MASS INDEX: 35.3 KG/M2 | HEIGHT: 61 IN | DIASTOLIC BLOOD PRESSURE: 71 MMHG | WEIGHT: 187 LBS

## 2022-11-16 DIAGNOSIS — K58.0 IRRITABLE BOWEL SYNDROME WITH DIARRHEA: ICD-10-CM

## 2022-11-16 DIAGNOSIS — N18.30 STAGE 3 CHRONIC KIDNEY DISEASE, UNSPECIFIED WHETHER STAGE 3A OR 3B CKD: ICD-10-CM

## 2022-11-16 DIAGNOSIS — E03.9 HYPOTHYROIDISM, UNSPECIFIED TYPE: Chronic | ICD-10-CM

## 2022-11-16 DIAGNOSIS — Z86.718 HISTORY OF DVT (DEEP VEIN THROMBOSIS): ICD-10-CM

## 2022-11-16 DIAGNOSIS — E03.9 ACQUIRED HYPOTHYROIDISM: Chronic | ICD-10-CM

## 2022-11-16 DIAGNOSIS — J30.9 ALLERGIC RHINITIS, UNSPECIFIED SEASONALITY, UNSPECIFIED TRIGGER: Primary | ICD-10-CM

## 2022-11-16 LAB — TSH SERPL DL<=0.05 MIU/L-ACNC: 1.18 UIU/ML (ref 0.27–4.2)

## 2022-11-16 PROCEDURE — 36415 COLL VENOUS BLD VENIPUNCTURE: CPT

## 2022-11-16 PROCEDURE — 84443 ASSAY THYROID STIM HORMONE: CPT

## 2022-11-16 PROCEDURE — 99214 OFFICE O/P EST MOD 30 MIN: CPT | Performed by: NURSE PRACTITIONER

## 2022-11-16 RX ORDER — MONTELUKAST SODIUM 10 MG/1
10 TABLET ORAL DAILY
Qty: 90 TABLET | Refills: 1 | Status: SHIPPED | OUTPATIENT
Start: 2022-11-16

## 2022-11-16 RX ORDER — DICYCLOMINE HCL 20 MG
20 TABLET ORAL EVERY 6 HOURS PRN
Qty: 90 TABLET | Refills: 1 | Status: SHIPPED | OUTPATIENT
Start: 2022-11-16

## 2022-11-18 ENCOUNTER — PRIOR AUTHORIZATION (OUTPATIENT)
Dept: FAMILY MEDICINE CLINIC | Age: 72
End: 2022-11-18

## 2022-11-29 ENCOUNTER — OFFICE VISIT (OUTPATIENT)
Dept: PAIN MEDICINE | Facility: CLINIC | Age: 72
End: 2022-11-29

## 2022-11-29 VITALS
HEIGHT: 61 IN | SYSTOLIC BLOOD PRESSURE: 126 MMHG | BODY MASS INDEX: 35.19 KG/M2 | RESPIRATION RATE: 18 BRPM | TEMPERATURE: 97.3 F | WEIGHT: 186.4 LBS | DIASTOLIC BLOOD PRESSURE: 71 MMHG | OXYGEN SATURATION: 98 % | HEART RATE: 80 BPM

## 2022-11-29 DIAGNOSIS — M54.50 CHRONIC LOW BACK PAIN, UNSPECIFIED BACK PAIN LATERALITY, UNSPECIFIED WHETHER SCIATICA PRESENT: ICD-10-CM

## 2022-11-29 DIAGNOSIS — M06.9 RHEUMATOID ARTHRITIS, INVOLVING UNSPECIFIED SITE, UNSPECIFIED WHETHER RHEUMATOID FACTOR PRESENT: ICD-10-CM

## 2022-11-29 DIAGNOSIS — G89.4 CHRONIC PAIN SYNDROME: Primary | ICD-10-CM

## 2022-11-29 DIAGNOSIS — Z79.899 ENCOUNTER FOR LONG-TERM (CURRENT) USE OF HIGH-RISK MEDICATION: ICD-10-CM

## 2022-11-29 DIAGNOSIS — M25.50 ARTHRALGIA, UNSPECIFIED JOINT: ICD-10-CM

## 2022-11-29 DIAGNOSIS — G89.29 CHRONIC LOW BACK PAIN, UNSPECIFIED BACK PAIN LATERALITY, UNSPECIFIED WHETHER SCIATICA PRESENT: ICD-10-CM

## 2022-11-29 DIAGNOSIS — M15.9 GENERALIZED OSTEOARTHRITIS: ICD-10-CM

## 2022-11-29 PROCEDURE — 99214 OFFICE O/P EST MOD 30 MIN: CPT

## 2022-11-29 PROCEDURE — 80305 DRUG TEST PRSMV DIR OPT OBS: CPT

## 2022-11-29 NOTE — PROGRESS NOTES
CHIEF COMPLAINT  Back and joint pain    Subjective   Anne Jewell is a 72 y.o. female  who presents for follow-up.  She has a history of chronic back pain.     Today pain is 6/10VAS in severity. Pain is located in her low back and radiates to bilateral feet. Describes this pain as a nearly continuous aching. Pain is worsened by rising from sitting to standing position, stress, weather changes, prolonged position, and walking long distances. Pain improves with rest/reposition, HEP, aqua therapy, heat/ice, and medications.     Continues with OxyContin 80mg 2 tablets BID and Baclofen 10mg 2-3/day (managed by PCP). Denies any side effects from the regimen, including constipation and somnolence. The regimen helps decrease pain by a significant amount. Notes improvement in activity and function with regimen. ADL's by self. Denies any bowel or bladder changes.     Patient reports that she takes Xanax 2mg TID as needed. This is prescribed by psychiatrist.     Orthopedic surgeon is Dr. Alvarado    Back Pain  This is a chronic problem. The current episode started more than 1 year ago. The problem occurs constantly. The problem has been waxing and waning since onset. The pain is present in the lumbar spine. The quality of the pain is described as aching. The pain radiates to the right foot and left foot. The pain is at a severity of 6/10. The pain is moderate. The symptoms are aggravated by standing, position and sitting (rising from sitting to standing, prolonged position, walking long distances, stress, weather changes). Associated symptoms include numbness (hands) and weakness. Pertinent negatives include no abdominal pain or chest pain. She has tried analgesics, home exercises, heat and ice (aqua therapy) for the symptoms. The treatment provided moderate relief.   Joint Pain  This is a chronic (7/10 VAS in severity) problem. The current episode started more than 1 year ago. The problem occurs constantly. The problem has  "been waxing and waning. Associated symptoms include arthralgias, myalgias, neck pain, numbness (hands) and weakness. Pertinent negatives include no abdominal pain, chest pain, congestion, fatigue, vertigo or vomiting. The symptoms are aggravated by stress and exertion (weather changes, increased physical activity,). She has tried oral narcotics, position changes, rest, heat and ice for the symptoms. The treatment provided mild relief.      PEG Assessment   What number best describes your pain on average in the past week?7  What number best describes how, during the past week, pain has interfered with your enjoyment of life?7  What number best describes how, during the past week, pain has interfered with your general activity?  7    The following portions of the patient's history were reviewed and updated as appropriate: allergies, current medications, past family history, past medical history, past social history, past surgical history and problem list.    Review of Systems   Constitutional: Negative for fatigue.   HENT: Negative for congestion.    Eyes: Negative for visual disturbance.   Respiratory: Negative for shortness of breath.    Cardiovascular: Negative for chest pain.   Gastrointestinal: Negative for abdominal pain, constipation, diarrhea and vomiting.   Genitourinary: Negative for difficulty urinating.   Musculoskeletal: Positive for arthralgias, back pain, myalgias and neck pain.   Neurological: Positive for weakness and numbness (hands). Negative for vertigo.   Psychiatric/Behavioral: Negative for sleep disturbance and suicidal ideas. The patient is not nervous/anxious.      I have reviewed and confirmed the accuracy of the ROS as documented by the MA/LPN/RN EUGENIO Ayala    Vitals:    11/29/22 1316   BP: 126/71   Pulse: 80   Resp: 18   Temp: 97.3 °F (36.3 °C)   SpO2: 98%   Weight: 84.6 kg (186 lb 6.4 oz)   Height: 154.9 cm (61\")   PainSc:   6   PainLoc: Neck     Objective   Physical " Exam  Constitutional:       Appearance: Normal appearance.   HENT:      Head: Normocephalic.   Cardiovascular:      Rate and Rhythm: Normal rate and regular rhythm.   Pulmonary:      Effort: Pulmonary effort is normal.      Breath sounds: Normal breath sounds.   Musculoskeletal:      Right shoulder: Tenderness present.      Left shoulder: Tenderness present.      Right wrist: Tenderness present.      Left wrist: Tenderness present.      Right hand: Tenderness present.      Left hand: Tenderness present.      Cervical back: Normal range of motion.      Lumbar back: Tenderness present. Decreased range of motion.      Right hip: Tenderness present.      Left hip: Tenderness present.      Right knee: Tenderness present.      Left knee: Tenderness present.   Skin:     General: Skin is warm and dry.      Capillary Refill: Capillary refill takes less than 2 seconds.   Neurological:      General: No focal deficit present.      Mental Status: She is alert and oriented to person, place, and time.      Gait: Gait abnormal (slowed, ambulating with rolling walker).   Psychiatric:         Mood and Affect: Mood normal.         Behavior: Behavior normal.         Thought Content: Thought content normal.         Cognition and Memory: Cognition normal.       Assessment & Plan   Diagnoses and all orders for this visit:    1. Chronic pain syndrome (Primary)  -     Urine Drug Screen Confirmation - Urine, Clean Catch; Future  -     POC Urine Drug Screen, Triage    2. Arthralgia, unspecified joint  -     Urine Drug Screen Confirmation - Urine, Clean Catch; Future  -     POC Urine Drug Screen, Triage    3. Chronic low back pain, unspecified back pain laterality, unspecified whether sciatica present  -     Urine Drug Screen Confirmation - Urine, Clean Catch; Future  -     POC Urine Drug Screen, Triage    4. Generalized osteoarthritis  -     Urine Drug Screen Confirmation - Urine, Clean Catch; Future  -     POC Urine Drug Screen, Triage    5.  Rheumatoid arthritis, involving unspecified site, unspecified whether rheumatoid factor present (HCC)  -     Urine Drug Screen Confirmation - Urine, Clean Catch; Future  -     POC Urine Drug Screen, Triage    6. Encounter for long-term (current) use of high-risk medication  -     Urine Drug Screen Confirmation - Urine, Clean Catch; Future  -     POC Urine Drug Screen, Triage    --- Routine UDS in office today as part of monitoring requirements for controlled substances.  The specimen was viewed and the immunoassay result reviewed and is +OPI/OXY (appropriate for Oxy Contin). This specimen will be sent to [facility] for confirmation.     --- The patient signed an updated copy of the controlled substance agreement on 3/22/22  --- Narcan prescription is current  --- Continue OxyContin. This was picked up at pharmacy by patient on 11/28/22. This is not displayed on V[facility]A yet but patient brought medication to appointment. Patient appears stable with current regimen. No adverse effects. Regarding continuation of opioids, there is no evidence of aberrant behavior or any red flags.  The patient continues with appropriate response to opioid therapy. ADL's remain intact by self.   --- Follow-up 2 month     VICENTA REPORT  As part of the patient's treatment plan, I am prescribing controlled substances. The patient has been made aware of appropriate use of such medications, including potential risk of somnolence, limited ability to drive and/or work safely, and the potential for dependence or overdose. It has also been made clear that these medications are for use by this patient only, without concomitant use of alcohol or other substances unless prescribed.     Patient has completed prescribing agreement detailing terms of continued prescribing of controlled substances, including monitoring VICENTA reports, urine drug screening, and pill counts if necessary. The patient is aware that inappropriate use will results in  cessation of prescribing such medications.    As the clinician, I personally reviewed the VICENTA from 11/29/22 while the patient was in the office today.    History and physical exam exhibit continued safe and appropriate use of controlled substances.    Dictated utilizing Dragon dictation.     Patient remained masked during entire encounter. No cough present. I donned a mask and eye protection throughout entire visit. Prior to donning mask and eye protection, hand hygiene was performed, as well as when it was doffed.  I was closer than 6 feet, but not for an extended period of time. No obvious exposure to any bodily fluids.

## 2022-11-30 ENCOUNTER — CLINICAL SUPPORT (OUTPATIENT)
Dept: FAMILY MEDICINE CLINIC | Age: 72
End: 2022-11-30

## 2022-11-30 DIAGNOSIS — J30.9 ALLERGIC RHINITIS, UNSPECIFIED SEASONALITY, UNSPECIFIED TRIGGER: Primary | ICD-10-CM

## 2022-11-30 PROCEDURE — 95115 IMMUNOTHERAPY ONE INJECTION: CPT | Performed by: FAMILY MEDICINE

## 2022-12-14 ENCOUNTER — CLINICAL SUPPORT (OUTPATIENT)
Dept: FAMILY MEDICINE CLINIC | Age: 72
End: 2022-12-14

## 2022-12-14 DIAGNOSIS — J30.9 ALLERGIC RHINITIS, UNSPECIFIED SEASONALITY, UNSPECIFIED TRIGGER: Primary | ICD-10-CM

## 2022-12-14 PROCEDURE — 95115 IMMUNOTHERAPY ONE INJECTION: CPT | Performed by: FAMILY MEDICINE

## 2022-12-19 ENCOUNTER — OFFICE VISIT (OUTPATIENT)
Dept: PAIN MEDICINE | Facility: CLINIC | Age: 72
End: 2022-12-19

## 2022-12-19 VITALS
DIASTOLIC BLOOD PRESSURE: 85 MMHG | WEIGHT: 186 LBS | HEART RATE: 74 BPM | SYSTOLIC BLOOD PRESSURE: 117 MMHG | BODY MASS INDEX: 35.12 KG/M2 | OXYGEN SATURATION: 98 % | TEMPERATURE: 97.5 F | HEIGHT: 61 IN | RESPIRATION RATE: 18 BRPM

## 2022-12-19 DIAGNOSIS — M18.11 OSTEOARTHRITIS OF RIGHT THUMB: ICD-10-CM

## 2022-12-19 DIAGNOSIS — M54.16 LUMBAR RADICULOPATHY: Chronic | ICD-10-CM

## 2022-12-19 DIAGNOSIS — Z79.899 ENCOUNTER FOR LONG-TERM (CURRENT) USE OF HIGH-RISK MEDICATION: ICD-10-CM

## 2022-12-19 DIAGNOSIS — M15.9 GENERALIZED OSTEOARTHRITIS: Chronic | ICD-10-CM

## 2022-12-19 DIAGNOSIS — G89.4 CHRONIC PAIN SYNDROME: Primary | Chronic | ICD-10-CM

## 2022-12-19 DIAGNOSIS — M51.36 DEGENERATION OF INTERVERTEBRAL DISC OF LUMBAR REGION: Chronic | ICD-10-CM

## 2022-12-19 DIAGNOSIS — M06.9 RHEUMATOID ARTHRITIS, INVOLVING UNSPECIFIED SITE, UNSPECIFIED WHETHER RHEUMATOID FACTOR PRESENT: Chronic | ICD-10-CM

## 2022-12-19 DIAGNOSIS — M16.0 PRIMARY OSTEOARTHRITIS OF BOTH HIPS: ICD-10-CM

## 2022-12-19 PROCEDURE — 99214 OFFICE O/P EST MOD 30 MIN: CPT | Performed by: ANESTHESIOLOGY

## 2022-12-19 RX ORDER — OXYCODONE HYDROCHLORIDE 80 MG/1
160 TABLET, FILM COATED, EXTENDED RELEASE ORAL EVERY 12 HOURS SCHEDULED
Qty: 120 TABLET | Refills: 0 | Status: SHIPPED | OUTPATIENT
Start: 2022-12-19 | End: 2023-01-24 | Stop reason: SDUPTHER

## 2022-12-19 NOTE — PROGRESS NOTES
CHIEF COMPLAINT  Follow-up for back and joint pain.    Subjective   Anne Jewell is a 72 y.o. female  who presents for follow-up.  She has a history of rather advanced arthritis and also back pain secondary to degenerative disc disease and degenerative joint disease, radicular elements.      Back Pain  This is a chronic problem. The current episode started more than 1 year ago. The problem occurs constantly. The problem has been waxing and waning since onset. The pain is present in the lumbar spine. The quality of the pain is described as aching. The pain radiates to the right foot and left foot. The pain is moderate. The symptoms are aggravated by standing, position and sitting (rising from sitting to standing, prolonged position, walking long distances, stress, weather changes). Associated symptoms include numbness (bilateral hands) and weakness (bilateral hands). Pertinent negatives include no abdominal pain or chest pain. She has tried analgesics, home exercises, heat, ice and muscle relaxant (aqua therapy) for the symptoms. The treatment provided moderate relief.   Joint Pain  This is a chronic (7/10 VAS in severity) problem. The current episode started more than 1 year ago. The problem occurs constantly. The problem has been unchanged. Associated symptoms include arthralgias, myalgias, neck pain, numbness (bilateral hands) and weakness (bilateral hands). Pertinent negatives include no abdominal pain, chest pain, congestion, fatigue, vertigo or vomiting. The symptoms are aggravated by stress and exertion (weather changes, increased physical activity,). She has tried oral narcotics, position changes, rest, heat and ice for the symptoms. The treatment provided moderate relief.        PEG Assessment   What number best describes your pain on average in the past week?6  What number best describes how, during the past week, pain has interfered with your enjoyment of life?6  What number best describes how, during  the past week, pain has interfered with your general activity?  6    --  The aforementioned information the Chief Complaint section and above subjective data including any HPI data, and also the Review of Systems data, has been personally reviewed and affirmed.  --        Review of Pertinent Medical Data ---  Izabella report is reviewed:  I reviewed the document in the electronic form under the PDMP tab in the Epic EMR...  - In this function, the report is a current report in as close to real-time as possible.  - The report was available for immediate review.    - I did cesar the report as reviewed.  - There is not concern for aberrant behavior based on this ekasper review.      The following portions of the patient's history were reviewed and updated as appropriate: allergies, current medications, past family history, past medical history, past social history, past surgical history and problem list.    -------    The following portions of the patient's history were reviewed and updated as appropriate: allergies, current medications, past family history, past medical history, past social history, past surgical history and problem list.    Allergies   Allergen Reactions   • Penicillins Hives         Current Outpatient Medications:   •  albuterol (PROVENTIL HFA;VENTOLIN HFA) 108 (90 BASE) MCG/ACT inhaler, Proventil  (90 Base) MCG/ACT Inhalation Aerosol Solution; Patient Sig: Proventil  (90 Base) MCG/ACT Inhalation Aerosol Solution Inhale 2 puff(s) every 6 to 8 hours as needed; 6.7; 0; 05-Apr-2013; Active, Disp: , Rfl:   •  ALPRAZolam (XANAX) 2 MG tablet, , Disp: , Rfl:   •  baclofen (LIORESAL) 10 MG tablet, Take 1 tablet by mouth 3 (Three) Times a Day., Disp: 270 tablet, Rfl: 1  •  cetirizine (zyrTEC) 10 MG tablet, Take 10 mg by mouth Daily., Disp: , Rfl:   •  chlorhexidine (PERIDEX) 0.12 % solution, , Disp: , Rfl:   •  Cholecalciferol (VITAMIN D3) 2000 units capsule, TAKE ONE CAPSULE BY MOUTH DAILY,  Disp: 30 capsule, Rfl: 9  •  Cyanocobalamin (B-12) 1000 MCG sublingual tablet, PLACE 1 TABLET UNDER THE TONGUE AND LET DISSOLVE ONCE DAILY, Disp: 30 each, Rfl: 1  •  dicyclomine (BENTYL) 20 MG tablet, Take 1 tablet by mouth Every 6 (Six) Hours As Needed (diarrhea)., Disp: 90 tablet, Rfl: 1  •  EPINEPHrine (EPIPEN) 0.3 MG/0.3ML solution auto-injector injection, , Disp: , Rfl:   •  escitalopram (LEXAPRO) 10 MG tablet, Take 1 tablet by mouth daily., Disp: , Rfl:   •  hydroCHLOROthiazide (HYDRODIURIL) 25 MG tablet, Take 1 tablet by mouth Daily., Disp: 90 tablet, Rfl: 1  •  levothyroxine (SYNTHROID, LEVOTHROID) 100 MCG tablet, Take 1 tablet by mouth Daily., Disp: 90 tablet, Rfl: 1  •  montelukast (SINGULAIR) 10 MG tablet, Take 1 tablet by mouth Daily., Disp: 90 tablet, Rfl: 1  •  Multiple Vitamin tablet, Take 1 tablet by mouth daily., Disp: , Rfl:   •  naloxone (NARCAN) 4 MG/0.1ML nasal spray, 1 spray into the nostril(s) as directed by provider As Needed (sedation or accidental overdose of opioid)., Disp: 2 each, Rfl: 0  •  oxyCODONE ER (oxyCONTIN) 80 MG tablet extended-release 12 hour 12 hr tablet, Take 2 tablets by mouth Every 12 (Twelve) Hours., Disp: 120 tablet, Rfl: 0  •  potassium chloride (KLOR-CON) 20 MEQ CR tablet, Take 1 tablet by mouth 2 (Two) Times a Day., Disp: 180 tablet, Rfl: 0  •  promethazine (PHENERGAN) 25 MG tablet, Take 1 tablet by mouth Every 8 (Eight) Hours As Needed for Nausea or Vomiting., Disp: 90 tablet, Rfl: 1  •  rivaroxaban (Xarelto) 20 MG tablet, Take 1 tablet by mouth Daily., Disp: 90 tablet, Rfl: 1    Current Outpatient Medications on File Prior to Visit   Medication Sig Dispense Refill   • albuterol (PROVENTIL HFA;VENTOLIN HFA) 108 (90 BASE) MCG/ACT inhaler Proventil  (90 Base) MCG/ACT Inhalation Aerosol Solution; Patient Sig: Proventil  (90 Base) MCG/ACT Inhalation Aerosol Solution Inhale 2 puff(s) every 6 to 8 hours as needed; 6.7; 0; 05-Apr-2013; Active     • ALPRAZolam  (XANAX) 2 MG tablet      • baclofen (LIORESAL) 10 MG tablet Take 1 tablet by mouth 3 (Three) Times a Day. 270 tablet 1   • cetirizine (zyrTEC) 10 MG tablet Take 10 mg by mouth Daily.     • chlorhexidine (PERIDEX) 0.12 % solution      • Cholecalciferol (VITAMIN D3) 2000 units capsule TAKE ONE CAPSULE BY MOUTH DAILY 30 capsule 9   • Cyanocobalamin (B-12) 1000 MCG sublingual tablet PLACE 1 TABLET UNDER THE TONGUE AND LET DISSOLVE ONCE DAILY 30 each 1   • dicyclomine (BENTYL) 20 MG tablet Take 1 tablet by mouth Every 6 (Six) Hours As Needed (diarrhea). 90 tablet 1   • EPINEPHrine (EPIPEN) 0.3 MG/0.3ML solution auto-injector injection      • escitalopram (LEXAPRO) 10 MG tablet Take 1 tablet by mouth daily.     • hydroCHLOROthiazide (HYDRODIURIL) 25 MG tablet Take 1 tablet by mouth Daily. 90 tablet 1   • levothyroxine (SYNTHROID, LEVOTHROID) 100 MCG tablet Take 1 tablet by mouth Daily. 90 tablet 1   • montelukast (SINGULAIR) 10 MG tablet Take 1 tablet by mouth Daily. 90 tablet 1   • Multiple Vitamin tablet Take 1 tablet by mouth daily.     • naloxone (NARCAN) 4 MG/0.1ML nasal spray 1 spray into the nostril(s) as directed by provider As Needed (sedation or accidental overdose of opioid). 2 each 0   • potassium chloride (KLOR-CON) 20 MEQ CR tablet Take 1 tablet by mouth 2 (Two) Times a Day. 180 tablet 0   • promethazine (PHENERGAN) 25 MG tablet Take 1 tablet by mouth Every 8 (Eight) Hours As Needed for Nausea or Vomiting. 90 tablet 1   • rivaroxaban (Xarelto) 20 MG tablet Take 1 tablet by mouth Daily. 90 tablet 1   • [DISCONTINUED] oxyCODONE ER (oxyCONTIN) 80 MG tablet extended-release 12 hour 12 hr tablet Take 2 tablets by mouth Every 12 (Twelve) Hours. DNF 11/17/2022 120 tablet 0     No current facility-administered medications on file prior to visit.       Patient Active Problem List   Diagnosis   • Chronic pain syndrome   • Rheumatoid arthritis (HCC)   • Osteoarthritis of knee   • Generalized osteoarthritis   •  Degeneration of intervertebral disc of lumbar region   • Neck pain   • Lumbar radiculopathy   • Atopic rhinitis   • Anxiety   • Diarrhea   • Indigestion   • Dysthymic disorder   • Gastroesophageal reflux disease   • Hypothyroidism   • Insomnia   • Pernicious anemia   • Osteoporosis   • Scoliosis   • Vasovagal syncope   • Vitamin D deficiency   • Trigger middle finger of right hand   • Trigger ring finger of right hand   • Trigger little finger of right hand   • Encounter for long-term (current) use of high-risk medication   • Coagulation disorder (HCC)   • Hyperglycemia   • Joint pain   • Status post total knee replacement, left   • Left knee pain   • Status post total knee replacement, right   • Osteoarthritis of thumb   • Trigger finger of all digits of right hand   • Left hip pain   • Primary osteoarthritis of both hips   • Edema   • Nausea   • Irritable bowel syndrome with diarrhea   • Stage 3 chronic kidney disease (HCC)   • Cough   • Acute non-recurrent frontal sinusitis   • Dysuria   • History of DVT (deep vein thrombosis)       Past Medical History:   Diagnosis Date   • Allergic rhinitis    • Arthritis of back    • Arthritis of neck    • Asthma    • Bronchospasm    • Carpal tunnel syndrome, bilateral    • Cervical disc disorder    • Clotting disorder (HCC)    • Deep vein thrombosis (HCC)    • Disc degeneration, lumbar    • Edema    • Esophageal reflux    • Glaucoma    • Hip arthrosis    • Joint pain    • Kidney disease 2018    stage 3   • Liver disease 2018    stage 3   • Low back pain    • Osteoarthritis    • Osteopenia    • Osteoporosis    • Periarthritis of shoulder    • Scoliosis    • Status post total knee replacement, left 08/31/2017   • Status post total knee replacement, right 08/31/2017   • UTI (urinary tract infection)    • Venous thrombosis        Past Surgical History:   Procedure Laterality Date   • BILATERAL BREAST REDUCTION     • BREAST SURGERY     • COLONOSCOPY  08/05/2016   • GASTRIC BYPASS   "   • HAND SURGERY Right 01/14/2016   • HERNIA REPAIR      x7   • HYSTERECTOMY     • JOINT REPLACEMENT      Both knees   • OTHER SURGICAL HISTORY      vaginal sling operation for stress incontinence   • TOTAL KNEE ARTHROPLASTY Left    • TOTAL KNEE ARTHROPLASTY Bilateral        Family History   Problem Relation Age of Onset   • Prostate cancer Father    • Arthritis Other    • Hypertension Other         benign essential   • Cancer Other    • Diabetes Other    • Heart disease Other    • Nephrolithiasis Other        Social History     Socioeconomic History   • Marital status: Legally    Tobacco Use   • Smoking status: Former   • Smokeless tobacco: Never   Vaping Use   • Vaping Use: Never used   Substance and Sexual Activity   • Alcohol use: No   • Drug use: No   • Sexual activity: Defer       -------        Review of Systems   Constitutional: Negative for fatigue.   HENT: Negative for congestion.    Eyes: Negative for visual disturbance.   Respiratory: Negative for shortness of breath.    Cardiovascular: Negative for chest pain.   Gastrointestinal: Negative for abdominal pain, constipation, diarrhea and vomiting.   Genitourinary: Negative for difficulty urinating.   Musculoskeletal: Positive for arthralgias, back pain, myalgias and neck pain.   Neurological: Positive for weakness (bilateral hands) and numbness (bilateral hands). Negative for vertigo.   Psychiatric/Behavioral: Negative for sleep disturbance and suicidal ideas. The patient is nervous/anxious.        Vitals:    12/19/22 1350   BP: 117/85   Pulse: 74   Resp: 18   Temp: 97.5 °F (36.4 °C)   SpO2: 98%   Weight: 84.4 kg (186 lb)   Height: 154.9 cm (61\")   PainSc:   6   PainLoc: Generalized         Objective   Physical Exam  VSS, NNR, NCAT, NMNA, NRD, AAOx3.        Assessment & Plan   Diagnoses and all orders for this visit:    1. Chronic pain syndrome (Primary)    2. Rheumatoid arthritis, involving unspecified site, unspecified whether rheumatoid factor " present (HCC)    3. Generalized osteoarthritis    4. Degeneration of intervertebral disc of lumbar region    5. Encounter for long-term (current) use of high-risk medication    6. Lumbar radiculopathy    7. Primary osteoarthritis of both hips    8. Osteoarthritis of right thumb    Other orders  -     oxyCODONE ER (oxyCONTIN) 80 MG tablet extended-release 12 hour 12 hr tablet; Take 2 tablets by mouth Every 12 (Twelve) Hours.  Dispense: 120 tablet; Refill: 0        --- Follow-up next month           [facility]A REPORT  As part of the patient's treatment plan, I am prescribing controlled substances. The patient has been made aware of appropriate use of such medications, including potential risk of somnolence, limited ability to drive and/or work safely, and the potential for dependence or overdose. It has also been made clear that these medications are for use by this patient only, without concomitant use of alcohol or other substances unless prescribed.     Patient has completed prescribing agreement detailing terms of continued prescribing of controlled substances, including monitoring VICENTA reports, urine drug screening, and pill counts if necessary. The patient is aware that inappropriate use will results in cessation of prescribing such medications.    As the clinician, I personally reviewed the VICENTA from as above while the patient was in the office today.    History and physical exam exhibit continued safe and appropriate use of controlled substances.    --     Dictated utilizing Dragon dictation.       Patient remained masked during entire encounter. No cough present. I donned a mask and eye protection throughout entire visit. Prior to donning mask and eye protection, hand hygiene was performed, as well as when it was doffed.  I was closer than 6 feet, but not for an extended period of time. No obvious exposure to any bodily fluids.    Patient appears stable with current regimen. No adverse effects. Regarding  continuation of opioids, there is no evidence of aberrant behavior or any red flags.  The patient continues with appropriate response to opioid therapy. ADL's remain intact by self.     ---        Vitals:    12/19/22 1350   PainSc:   6   PainLoc: Generalized          Anne Jewell reports a pain score of 6.  Given her pain assessment as noted, treatment options were discussed and the following options were decided upon as a follow-up plan to address the patient's pain: continuation of current treatment plan for pain, prescription for non-opiod analgesics and prescription for opiod analgesics.

## 2022-12-24 DIAGNOSIS — R11.0 NAUSEA: ICD-10-CM

## 2022-12-24 DIAGNOSIS — G89.4 CHRONIC PAIN SYNDROME: Chronic | ICD-10-CM

## 2022-12-27 RX ORDER — PROMETHAZINE HYDROCHLORIDE 25 MG/1
TABLET ORAL
Qty: 90 TABLET | Refills: 1 | Status: SHIPPED | OUTPATIENT
Start: 2022-12-27 | End: 2023-03-22

## 2022-12-28 ENCOUNTER — CLINICAL SUPPORT (OUTPATIENT)
Dept: FAMILY MEDICINE CLINIC | Age: 72
End: 2022-12-28

## 2022-12-28 DIAGNOSIS — J30.9 ALLERGIC RHINITIS, UNSPECIFIED SEASONALITY, UNSPECIFIED TRIGGER: Primary | ICD-10-CM

## 2022-12-28 PROCEDURE — 95115 IMMUNOTHERAPY ONE INJECTION: CPT | Performed by: FAMILY MEDICINE

## 2023-01-13 ENCOUNTER — CLINICAL SUPPORT (OUTPATIENT)
Dept: FAMILY MEDICINE CLINIC | Age: 73
End: 2023-01-13
Payer: MEDICARE

## 2023-01-13 DIAGNOSIS — J30.9 ALLERGIC RHINITIS, UNSPECIFIED SEASONALITY, UNSPECIFIED TRIGGER: Primary | ICD-10-CM

## 2023-01-13 PROCEDURE — 95115 IMMUNOTHERAPY ONE INJECTION: CPT | Performed by: FAMILY MEDICINE

## 2023-01-24 ENCOUNTER — CLINICAL SUPPORT (OUTPATIENT)
Dept: FAMILY MEDICINE CLINIC | Age: 73
End: 2023-01-24
Payer: MEDICARE

## 2023-01-24 ENCOUNTER — OFFICE VISIT (OUTPATIENT)
Dept: PAIN MEDICINE | Facility: CLINIC | Age: 73
End: 2023-01-24
Payer: MEDICARE

## 2023-01-24 VITALS
DIASTOLIC BLOOD PRESSURE: 74 MMHG | SYSTOLIC BLOOD PRESSURE: 108 MMHG | BODY MASS INDEX: 36.55 KG/M2 | OXYGEN SATURATION: 97 % | TEMPERATURE: 97.4 F | HEIGHT: 61 IN | HEART RATE: 85 BPM | WEIGHT: 193.6 LBS

## 2023-01-24 DIAGNOSIS — Z79.899 ENCOUNTER FOR LONG-TERM (CURRENT) USE OF HIGH-RISK MEDICATION: ICD-10-CM

## 2023-01-24 DIAGNOSIS — G89.4 CHRONIC PAIN SYNDROME: Primary | ICD-10-CM

## 2023-01-24 DIAGNOSIS — M51.36 DEGENERATION OF INTERVERTEBRAL DISC OF LUMBAR REGION: ICD-10-CM

## 2023-01-24 DIAGNOSIS — M06.9 RHEUMATOID ARTHRITIS, INVOLVING UNSPECIFIED SITE, UNSPECIFIED WHETHER RHEUMATOID FACTOR PRESENT: ICD-10-CM

## 2023-01-24 DIAGNOSIS — M15.9 GENERALIZED OSTEOARTHRITIS: ICD-10-CM

## 2023-01-24 DIAGNOSIS — M54.16 LUMBAR RADICULOPATHY: ICD-10-CM

## 2023-01-24 DIAGNOSIS — J30.9 ALLERGIC RHINITIS, UNSPECIFIED SEASONALITY, UNSPECIFIED TRIGGER: Primary | ICD-10-CM

## 2023-01-24 PROCEDURE — 99214 OFFICE O/P EST MOD 30 MIN: CPT

## 2023-01-24 PROCEDURE — 95115 IMMUNOTHERAPY ONE INJECTION: CPT | Performed by: FAMILY MEDICINE

## 2023-01-24 RX ORDER — OXYCODONE HYDROCHLORIDE 80 MG/1
160 TABLET, FILM COATED, EXTENDED RELEASE ORAL EVERY 12 HOURS SCHEDULED
Qty: 120 TABLET | Refills: 0 | Status: SHIPPED | OUTPATIENT
Start: 2023-01-24 | End: 2023-02-27 | Stop reason: SDUPTHER

## 2023-01-24 NOTE — TELEPHONE ENCOUNTER
Patient seen in office today. Reviewed UDS and VICENTA. Both updated and appropriate. Refill appropriate.  Jose refill. DNF 1/26/23. Please refill. Thanks. GAYATHRI

## 2023-01-24 NOTE — PROGRESS NOTES
CHIEF COMPLAINT  Back and joint pain     Subjective   Anne Jewell is a 72 y.o. female  who presents for follow-up.  She has a history of chronic back pain. She reports that her back and joint pain have remained consistent since her last office visit.      Today pain is 7/10VAS in severity. Pain is located in her low back and radiates to bilateral feet. Describes this pain as a nearly continuous aching. Pain is worsened by rising from sitting to standing position, stress, weather changes, prolonged position, and walking long distances. Pain improves with rest/reposition, HEP, aqua therapy, heat/ice, and medications.      Continues with OxyContin 80mg 2 tablets BID and Baclofen 10mg 2-3/day (managed by PCP). Denies any side effects from the regimen, including constipation and somnolence. The regimen helps decrease pain by a significant amount. Notes improvement in activity and function with regimen. ADL's by self. Denies any bowel or bladder changes.      Patient reports that she takes Xanax 2mg TID as needed. This is prescribed by psychiatrist.      Orthopedic surgeon is Dr. Alvarado    Back Pain  This is a chronic problem. The current episode started more than 1 year ago. The problem occurs constantly. The problem is unchanged (unchanged since last office visit). The pain is present in the lumbar spine. The quality of the pain is described as aching. The pain radiates to the right foot and left foot. The pain is at a severity of 6/10. The pain is moderate. The symptoms are aggravated by standing, position and sitting (rising from sitting to standing, prolonged position, walking long distances, stress, weather changes). Associated symptoms include numbness and weakness. Pertinent negatives include no abdominal pain, chest pain, dysuria, fever or headaches. She has tried analgesics, home exercises, heat and ice (aqua therapy) for the symptoms. The treatment provided moderate relief.   Joint Pain  This is a chronic  (7/10 VAS in severity) problem. The current episode started more than 1 year ago. The problem occurs constantly. The problem has been unchanged (unchanged since last office visit). Associated symptoms include arthralgias, myalgias, neck pain, numbness and weakness. Pertinent negatives include no abdominal pain, chest pain, chills, congestion, fatigue, fever, headaches, vertigo or vomiting. The symptoms are aggravated by stress and exertion (weather changes, increased physical activity,). She has tried oral narcotics, position changes, rest, heat and ice for the symptoms. The treatment provided mild relief.      PEG Assessment   What number best describes your pain on average in the past week?7  What number best describes how, during the past week, pain has interfered with your enjoyment of life?7  What number best describes how, during the past week, pain has interfered with your general activity?  8    The following portions of the patient's history were reviewed and updated as appropriate: allergies, current medications, past family history, past medical history, past social history, past surgical history and problem list.    Review of Systems   Constitutional: Positive for activity change (decreased). Negative for chills, fatigue and fever.   HENT: Negative for congestion.    Eyes: Negative for visual disturbance.   Respiratory: Negative for chest tightness and shortness of breath.    Cardiovascular: Negative for chest pain.   Gastrointestinal: Negative for abdominal pain, constipation, diarrhea and vomiting.   Genitourinary: Negative for difficulty urinating, dyspareunia and dysuria.   Musculoskeletal: Positive for arthralgias, back pain, myalgias and neck pain.   Neurological: Positive for weakness and numbness. Negative for dizziness, vertigo, light-headedness and headaches.   Psychiatric/Behavioral: Positive for agitation. Negative for sleep disturbance. The patient is nervous/anxious.      I have reviewed  "and confirmed the accuracy of the ROS as documented by the MA/LPN/RN Sara N Simón, APRN    Vitals:    01/24/23 1256   BP: 108/74   Pulse: 85   Temp: 97.4 °F (36.3 °C)   SpO2: 97%   Weight: 87.8 kg (193 lb 9.6 oz)   Height: 154.9 cm (61\")   PainSc:   7   PainLoc: Back     Objective   Physical Exam  Constitutional:       Appearance: Normal appearance.   HENT:      Head: Normocephalic.   Cardiovascular:      Rate and Rhythm: Normal rate and regular rhythm.   Pulmonary:      Effort: Pulmonary effort is normal.      Breath sounds: Normal breath sounds.   Musculoskeletal:      Right shoulder: Tenderness present.      Left shoulder: Tenderness present.      Right wrist: Tenderness present.      Left wrist: Tenderness present.      Right hand: Tenderness present.      Left hand: Tenderness present.      Cervical back: Normal range of motion.      Lumbar back: Tenderness present. Decreased range of motion.      Right hip: Tenderness present.      Left hip: Tenderness present.      Right knee: Tenderness present.      Left knee: Tenderness present.      Comments: Back brace for support in place   Skin:     General: Skin is warm and dry.      Capillary Refill: Capillary refill takes less than 2 seconds.   Neurological:      General: No focal deficit present.      Mental Status: She is alert and oriented to person, place, and time.      Gait: Gait abnormal (slowed, ambulating with rolling walker).   Psychiatric:         Mood and Affect: Mood normal.         Behavior: Behavior normal.         Thought Content: Thought content normal.         Cognition and Memory: Cognition normal.       Assessment & Plan   Diagnoses and all orders for this visit:    1. Chronic pain syndrome (Primary)    2. Rheumatoid arthritis, involving unspecified site, unspecified whether rheumatoid factor present (HCC)    3. Generalized osteoarthritis    4. Degeneration of intervertebral disc of lumbar region    5. Lumbar radiculopathy    6. Encounter for " long-term (current) use of high-risk medication    --- The urine drug screen confirmation from 11/29/22 has been reviewed and the result is appropriate based on patient history and [facility] report  --- The patient signed an updated copy of the controlled substance agreement on 11/29/22  --- Refill OxyContin. DNF 1/26/23. Patient appears stable with current regimen. No adverse effects. Regarding continuation of opioids, there is no evidence of aberrant behavior or any red flags.  The patient continues with appropriate response to opioid therapy. ADL's remain intact by self.   --- Follow-up 1 month     [facility]A REPORT  As part of the patient's treatment plan, I am prescribing controlled substances. The patient has been made aware of appropriate use of such medications, including potential risk of somnolence, limited ability to drive and/or work safely, and the potential for dependence or overdose. It has also been made clear that these medications are for use by this patient only, without concomitant use of alcohol or other substances unless prescribed.     Patient has completed prescribing agreement detailing terms of continued prescribing of controlled substances, including monitoring V[facility]ENTA reports, urine drug screening, and pill counts if necessary. The patient is aware that inappropriate use will results in cessation of prescribing such medications.    As the clinician, I personally reviewed the VICENTA from 1/24/23 while the patient was in the office today.    History and physical exam exhibit continued safe and appropriate use of controlled substances.    Dictated utilizing Dragon dictation.     Patient remained masked during entire encounter. No cough present. I donned a mask and eye protection throughout entire visit. Prior to donning mask and eye protection, hand hygiene was performed, as well as when it was doffed.  I was closer than 6 feet, but not for an extended period of time. No obvious exposure to any  bodily fluids.

## 2023-02-08 ENCOUNTER — CLINICAL SUPPORT (OUTPATIENT)
Dept: FAMILY MEDICINE CLINIC | Age: 73
End: 2023-02-08
Payer: MEDICARE

## 2023-02-08 DIAGNOSIS — G89.4 CHRONIC PAIN SYNDROME: Chronic | ICD-10-CM

## 2023-02-08 DIAGNOSIS — J30.9 ALLERGIC RHINITIS, UNSPECIFIED SEASONALITY, UNSPECIFIED TRIGGER: Primary | ICD-10-CM

## 2023-02-08 PROCEDURE — 95115 IMMUNOTHERAPY ONE INJECTION: CPT | Performed by: FAMILY MEDICINE

## 2023-02-08 RX ORDER — BACLOFEN 10 MG/1
TABLET ORAL
Qty: 270 TABLET | Refills: 0 | Status: SHIPPED | OUTPATIENT
Start: 2023-02-08

## 2023-02-08 RX ORDER — POTASSIUM CHLORIDE 1500 MG/1
TABLET, EXTENDED RELEASE ORAL
Qty: 180 TABLET | Refills: 0 | Status: SHIPPED | OUTPATIENT
Start: 2023-02-08

## 2023-02-08 NOTE — TELEPHONE ENCOUNTER
Rx Refill Note  Requested Prescriptions     Pending Prescriptions Disp Refills   • KLOR-CON 20 MEQ CR tablet [Pharmacy Med Name: KLOR-CON M20 TABLET] 180 tablet 0     Sig: TAKE ONE TABLET BY MOUTH TWICE A DAY   • baclofen (LIORESAL) 10 MG tablet [Pharmacy Med Name: BACLOFEN 10 MG TABLET] 270 tablet 1     Sig: TAKE ONE TABLET BY MOUTH THREE TIMES A DAY      Last office visit with prescribing clinician: 11/16/2022       Next office visit with prescribing clinician: 4/19/2023       Aure Smith LPN  02/08/23, 09:28 EST

## 2023-02-22 ENCOUNTER — CLINICAL SUPPORT (OUTPATIENT)
Dept: FAMILY MEDICINE CLINIC | Age: 73
End: 2023-02-22
Payer: MEDICARE

## 2023-02-22 DIAGNOSIS — G89.4 CHRONIC PAIN SYNDROME: Primary | ICD-10-CM

## 2023-02-22 DIAGNOSIS — J30.9 ALLERGIC RHINITIS, UNSPECIFIED SEASONALITY, UNSPECIFIED TRIGGER: ICD-10-CM

## 2023-02-22 PROCEDURE — 95117 IMMUNOTHERAPY INJECTIONS: CPT | Performed by: FAMILY MEDICINE

## 2023-02-27 ENCOUNTER — OFFICE VISIT (OUTPATIENT)
Dept: PAIN MEDICINE | Facility: CLINIC | Age: 73
End: 2023-02-27
Payer: MEDICARE

## 2023-02-27 VITALS
SYSTOLIC BLOOD PRESSURE: 109 MMHG | HEART RATE: 81 BPM | OXYGEN SATURATION: 95 % | BODY MASS INDEX: 35.8 KG/M2 | HEIGHT: 61 IN | DIASTOLIC BLOOD PRESSURE: 68 MMHG | TEMPERATURE: 97.8 F | RESPIRATION RATE: 18 BRPM | WEIGHT: 189.6 LBS

## 2023-02-27 DIAGNOSIS — G89.4 CHRONIC PAIN SYNDROME: ICD-10-CM

## 2023-02-27 DIAGNOSIS — M15.9 GENERALIZED OSTEOARTHRITIS: ICD-10-CM

## 2023-02-27 DIAGNOSIS — G89.4 CHRONIC PAIN SYNDROME: Primary | ICD-10-CM

## 2023-02-27 DIAGNOSIS — M06.9 RHEUMATOID ARTHRITIS, INVOLVING UNSPECIFIED SITE, UNSPECIFIED WHETHER RHEUMATOID FACTOR PRESENT: ICD-10-CM

## 2023-02-27 DIAGNOSIS — M54.16 LUMBAR RADICULOPATHY: ICD-10-CM

## 2023-02-27 DIAGNOSIS — M51.36 DEGENERATION OF INTERVERTEBRAL DISC OF LUMBAR REGION: ICD-10-CM

## 2023-02-27 PROCEDURE — 99214 OFFICE O/P EST MOD 30 MIN: CPT

## 2023-02-27 NOTE — PROGRESS NOTES
CHIEF COMPLAINT  Back and joint pain    Subjective   Anne Jewell is a 72 y.o. female  who presents for follow-up.  She has a history of chronic back pain. She reports that her back and joint pain have remained consistent since her last office visit.      Today pain is 7/10VAS in severity. Pain is located in her low back and radiates to bilateral feet. Describes this pain as a nearly continuous aching. Pain is worsened by rising from sitting to standing position, stress, weather changes, prolonged position, and walking long distances. Pain improves with rest/reposition, HEP, aqua therapy, heat/ice, and medications.      Continues with OxyContin 80mg 2 tablets BID and Baclofen 10mg 2-3/day (managed by PCP). Denies any side effects from the regimen, including constipation and somnolence. The regimen helps decrease pain by a significant amount. Notes improvement in activity and function with regimen. ADL's by self. Denies any bowel or bladder changes.      Patient reports that she takes Xanax 2mg TID as needed. This is prescribed by psychiatrist.      Orthopedic surgeon is Dr. Alvarado    Back Pain  This is a chronic problem. The current episode started more than 1 year ago. The problem occurs constantly. The problem has been waxing and waning since onset. The pain is present in the lumbar spine. The quality of the pain is described as aching. The pain radiates to the right foot and left foot. The pain is at a severity of 7/10. The pain is moderate. The symptoms are aggravated by standing, position and sitting (rising from sitting to standing, prolonged position, walking long distances, stress, weather changes). Associated symptoms include weakness. Pertinent negatives include no abdominal pain, chest pain, dysuria, fever, headaches or numbness. She has tried analgesics, home exercises, heat and ice (aqua therapy) for the symptoms. The treatment provided moderate relief.   Joint Pain  This is a chronic (7/10 VAS in  severity) problem. The current episode started more than 1 year ago. The problem occurs constantly. The problem has been waxing and waning. Associated symptoms include arthralgias, myalgias, neck pain and weakness. Pertinent negatives include no abdominal pain, chest pain, chills, congestion, fatigue, fever, headaches, numbness, vertigo or vomiting. The symptoms are aggravated by stress and exertion (weather changes, increased physical activity,). She has tried oral narcotics, position changes, rest, heat and ice for the symptoms. The treatment provided mild relief.      PEG Assessment   What number best describes your pain on average in the past week?7  What number best describes how, during the past week, pain has interfered with your enjoyment of life?7  What number best describes how, during the past week, pain has interfered with your general activity?  6    The following portions of the patient's history were reviewed and updated as appropriate: allergies, current medications, past family history, past medical history, past social history, past surgical history and problem list.    Review of Systems   Constitutional: Negative for chills, fatigue and fever.   HENT: Negative for congestion.    Respiratory: Negative for shortness of breath.    Cardiovascular: Negative for chest pain.   Gastrointestinal: Negative for abdominal pain, constipation, diarrhea and vomiting.   Genitourinary: Negative for difficulty urinating and dysuria.   Musculoskeletal: Positive for arthralgias, back pain, myalgias and neck pain.   Neurological: Positive for weakness. Negative for vertigo, numbness and headaches.   Psychiatric/Behavioral: Negative for sleep disturbance and suicidal ideas. The patient is not nervous/anxious.      I have reviewed and confirmed the accuracy of the ROS as documented by the MA/LPN/RN EUGENIO Ayala    Vitals:    02/27/23 1236   BP: 109/68   Pulse: 81   Resp: 18   Temp: 97.8 °F (36.6 °C)   SpO2:  "95%   Weight: 86 kg (189 lb 9.6 oz)   Height: 154.9 cm (61\")   PainSc:   7   PainLoc: Generalized     Objective   Physical Exam  Constitutional:       Appearance: Normal appearance.   HENT:      Head: Normocephalic.   Cardiovascular:      Rate and Rhythm: Normal rate and regular rhythm.   Pulmonary:      Effort: Pulmonary effort is normal.      Breath sounds: Normal breath sounds.   Musculoskeletal:      Right shoulder: Tenderness present.      Left shoulder: Tenderness present.      Right wrist: Tenderness present.      Left wrist: Tenderness present.      Right hand: Tenderness present.      Left hand: Tenderness present.      Cervical back: Normal range of motion.      Lumbar back: Tenderness present. Decreased range of motion.      Right hip: Tenderness present.      Left hip: Tenderness present.      Right knee: Tenderness present.      Left knee: Tenderness present.      Comments: Back brace for support in place   Skin:     General: Skin is warm and dry.      Capillary Refill: Capillary refill takes less than 2 seconds.   Neurological:      General: No focal deficit present.      Mental Status: She is alert and oriented to person, place, and time.      Gait: Gait abnormal (slowed, ambulating with rolling walker).   Psychiatric:         Mood and Affect: Mood normal.         Behavior: Behavior normal.         Thought Content: Thought content normal.         Cognition and Memory: Cognition normal.       Assessment & Plan   Diagnoses and all orders for this visit:    1. Chronic pain syndrome (Primary)    2. Lumbar radiculopathy    3. Rheumatoid arthritis, involving unspecified site, unspecified whether rheumatoid factor present (HCC)    4. Generalized osteoarthritis    5. Degeneration of intervertebral disc of lumbar region    --- The urine drug screen confirmation from 11/29/22 has been reviewed and the result is appopriate based on patient history and [facility] report  --- The patient signed an updated copy of the " controlled substance agreement on 11/29/22  --- Narcan prescription current  --- Refill OxyContin. DNF 3/2/23. Patient appears stable with current regimen. No adverse effects. Regarding continuation of opioids, there is no evidence of aberrant behavior or any red flags.  The patient continues with appropriate response to opioid therapy. ADL's remain intact by self.   --- Follow-up 1 month     [facility] REPORT  As part of the patient's treatment plan, I am prescribing controlled substances. The patient has been made aware of appropriate use of such medications, including potential risk of somnolence, limited ability to drive and/or work safely, and the potential for dependence or overdose. It has also been made clear that these medications are for use by this patient only, without concomitant use of alcohol or other substances unless prescribed.     Patient has completed prescribing agreement detailing terms of continued prescribing of controlled substances, including monitoring VICENTA reports, urine drug screening, and pill counts if necessary. The patient is aware that inappropriate use will results in cessation of prescribing such medications.    As the clinician, I personally reviewed the VICENTA from 2/27/23 while the patient was in the office today.    History and physical exam exhibit continued safe and appropriate use of controlled substances.    Dictated utilizing Dragon dictation.     Patient remained masked during entire encounter. No cough present. I donned a mask and eye protection throughout entire visit. Prior to donning mask and eye protection, hand hygiene was performed, as well as when it was doffed.  I was closer than 6 feet, but not for an extended period of time. No obvious exposure to any bodily fluids.

## 2023-02-27 NOTE — TELEPHONE ENCOUNTER
Patient seen in office today. Reviewed UDS and VICENTA. Both updated and appropriate. Refill appropriate.  DNF 3/2/23 applied. Please refill. Thanks. GAYATHRI

## 2023-02-28 RX ORDER — OXYCODONE HYDROCHLORIDE 80 MG/1
160 TABLET, FILM COATED, EXTENDED RELEASE ORAL EVERY 12 HOURS SCHEDULED
Qty: 120 TABLET | Refills: 0 | Status: SHIPPED | OUTPATIENT
Start: 2023-02-28 | End: 2023-03-30 | Stop reason: SDUPTHER

## 2023-03-08 ENCOUNTER — CLINICAL SUPPORT (OUTPATIENT)
Dept: FAMILY MEDICINE CLINIC | Age: 73
End: 2023-03-08
Payer: MEDICARE

## 2023-03-08 DIAGNOSIS — J30.9 ALLERGIC RHINITIS, UNSPECIFIED SEASONALITY, UNSPECIFIED TRIGGER: Primary | ICD-10-CM

## 2023-03-08 PROCEDURE — 95115 IMMUNOTHERAPY ONE INJECTION: CPT | Performed by: FAMILY MEDICINE

## 2023-03-20 DIAGNOSIS — G89.4 CHRONIC PAIN SYNDROME: Chronic | ICD-10-CM

## 2023-03-20 DIAGNOSIS — R11.0 NAUSEA: ICD-10-CM

## 2023-03-21 NOTE — TELEPHONE ENCOUNTER
Rx Refill Note  Requested Prescriptions     Pending Prescriptions Disp Refills   • promethazine (PHENERGAN) 25 MG tablet [Pharmacy Med Name: PROMETHAZINE 25 MG TABLET] 90 tablet 1     Sig: TAKE ONE TABLET BY MOUTH EVERY 8 HOURS AS NEEDED FOR NAUSEA AND VOMITING      Last office visit with prescribing clinician: 11/16/2022   Last telemedicine visit with prescribing clinician: 4/19/2023   Next office visit with prescribing clinician: 4/19/2023  Last refill sent: 12/27/22, #90, 1 refill    Vaishnavi Keller LPN  03/21/23, 10:35 EDT

## 2023-03-22 ENCOUNTER — CLINICAL SUPPORT (OUTPATIENT)
Dept: FAMILY MEDICINE CLINIC | Age: 73
End: 2023-03-22
Payer: MEDICARE

## 2023-03-22 DIAGNOSIS — J30.9 ALLERGIC RHINITIS, UNSPECIFIED SEASONALITY, UNSPECIFIED TRIGGER: Primary | ICD-10-CM

## 2023-03-22 PROCEDURE — 95115 IMMUNOTHERAPY ONE INJECTION: CPT | Performed by: FAMILY MEDICINE

## 2023-03-22 RX ORDER — PROMETHAZINE HYDROCHLORIDE 25 MG/1
TABLET ORAL
Qty: 90 TABLET | Refills: 0 | Status: SHIPPED | OUTPATIENT
Start: 2023-03-22

## 2023-03-30 ENCOUNTER — OFFICE VISIT (OUTPATIENT)
Dept: PAIN MEDICINE | Facility: CLINIC | Age: 73
End: 2023-03-30
Payer: MEDICARE

## 2023-03-30 VITALS
BODY MASS INDEX: 36.7 KG/M2 | OXYGEN SATURATION: 98 % | DIASTOLIC BLOOD PRESSURE: 71 MMHG | HEIGHT: 61 IN | HEART RATE: 80 BPM | SYSTOLIC BLOOD PRESSURE: 116 MMHG | WEIGHT: 194.4 LBS | TEMPERATURE: 97.2 F

## 2023-03-30 DIAGNOSIS — Z79.899 ENCOUNTER FOR LONG-TERM (CURRENT) USE OF HIGH-RISK MEDICATION: ICD-10-CM

## 2023-03-30 DIAGNOSIS — M54.16 LUMBAR RADICULOPATHY: ICD-10-CM

## 2023-03-30 DIAGNOSIS — G89.4 CHRONIC PAIN SYNDROME: Primary | ICD-10-CM

## 2023-03-30 DIAGNOSIS — M51.36 DEGENERATION OF INTERVERTEBRAL DISC OF LUMBAR REGION: ICD-10-CM

## 2023-03-30 DIAGNOSIS — M15.9 GENERALIZED OSTEOARTHRITIS: ICD-10-CM

## 2023-03-30 DIAGNOSIS — G89.4 CHRONIC PAIN SYNDROME: ICD-10-CM

## 2023-03-30 DIAGNOSIS — M06.9 RHEUMATOID ARTHRITIS, INVOLVING UNSPECIFIED SITE, UNSPECIFIED WHETHER RHEUMATOID FACTOR PRESENT: ICD-10-CM

## 2023-03-30 PROCEDURE — 1159F MED LIST DOCD IN RCRD: CPT

## 2023-03-30 PROCEDURE — 1160F RVW MEDS BY RX/DR IN RCRD: CPT

## 2023-03-30 PROCEDURE — 1125F AMNT PAIN NOTED PAIN PRSNT: CPT

## 2023-03-30 PROCEDURE — 99214 OFFICE O/P EST MOD 30 MIN: CPT

## 2023-03-30 RX ORDER — OXYCODONE HYDROCHLORIDE 80 MG/1
160 TABLET, FILM COATED, EXTENDED RELEASE ORAL EVERY 12 HOURS SCHEDULED
Qty: 120 TABLET | Refills: 0 | Status: SHIPPED | OUTPATIENT
Start: 2023-03-30

## 2023-03-30 NOTE — PROGRESS NOTES
CHIEF COMPLAINT  Back and joint pain    Subjective   Anne Jewell is a 72 y.o. female  who presents for follow-up.  She has a history of chronic back pain. She reports that her back and joint pain have remained consistent since her last office visit.      Today pain is 8/10VAS in severity. Pain is located in her low back and radiates to bilateral feet. Describes this pain as a nearly continuous aching. Pain is worsened by rising from sitting to standing position, stress, weather changes, prolonged position, and walking long distances. Pain improves with rest/reposition, HEP, aqua therapy, heat/ice, and medications.      Continues with OxyContin 80mg 2 tablets BID and Baclofen 10mg 2-3/day (managed by PCP). Denies any side effects from the regimen, including constipation and somnolence. The regimen helps decrease pain by a significant amount. Notes improvement in activity and function with regimen. ADL's by self. Denies any bowel or bladder changes.      Patient reports that she takes Xanax 2mg TID as needed. This is prescribed by psychiatrist.      Orthopedic surgeon is Dr. Alvarado    Back Pain  This is a chronic problem. The current episode started more than 1 year ago. The problem occurs constantly. The problem has been waxing and waning since onset. The pain is present in the lumbar spine. The quality of the pain is described as aching. The pain radiates to the right foot and left foot. The pain is at a severity of 8/10. The pain is moderate. The symptoms are aggravated by standing, position and sitting (rising from sitting to standing, prolonged position, walking long distances, stress, weather changes). Associated symptoms include weakness. Pertinent negatives include no abdominal pain, chest pain, dysuria, fever, headaches or numbness. She has tried analgesics, home exercises, heat and ice (aqua therapy) for the symptoms. The treatment provided moderate relief.   Joint Pain  This is a chronic (7/10 VAS in  severity) problem. The current episode started more than 1 year ago. The problem occurs constantly. The problem has been waxing and waning. Associated symptoms include arthralgias, coughing (asthma/allergies), myalgias, neck pain and weakness. Pertinent negatives include no abdominal pain, chest pain, chills, congestion, fatigue, fever, headaches, numbness, vertigo or vomiting. The symptoms are aggravated by stress and exertion (weather changes, increased physical activity,). She has tried oral narcotics, position changes, rest, heat and ice for the symptoms. The treatment provided mild relief.      PEG Assessment   What number best describes your pain on average in the past week?6  What number best describes how, during the past week, pain has interfered with your enjoyment of life?6  What number best describes how, during the past week, pain has interfered with your general activity?  6    The following portions of the patient's history were reviewed and updated as appropriate: allergies, current medications, past family history, past medical history, past social history, past surgical history and problem list.    Review of Systems   Constitutional: Negative for chills, fatigue and fever.   HENT: Negative for congestion.    Respiratory: Positive for cough (asthma/allergies) and shortness of breath (allergies/asthma). Negative for stridor.    Cardiovascular: Negative for chest pain.   Gastrointestinal: Negative for abdominal pain, constipation, diarrhea and vomiting.   Genitourinary: Negative for difficulty urinating and dysuria.   Musculoskeletal: Positive for arthralgias, back pain, myalgias and neck pain.   Neurological: Positive for weakness. Negative for vertigo, speech difficulty, numbness and headaches.   Psychiatric/Behavioral: Negative for confusion.     I have reviewed and confirmed the accuracy of the ROS as documented by the MA/LPN/RN EUGENIO Ayala    Vitals:    03/30/23 1015   BP: 116/71   BP  "Location: Left arm   Patient Position: Sitting   Pulse: 80   Temp: 97.2 °F (36.2 °C)   TempSrc: Temporal   SpO2: 98%   Weight: 88.2 kg (194 lb 6.4 oz)   Height: 154.9 cm (61\")   PainSc:   8   PainLoc: Back     Objective   Physical Exam  Constitutional:       Appearance: Normal appearance.   HENT:      Head: Normocephalic.   Cardiovascular:      Rate and Rhythm: Normal rate and regular rhythm.   Pulmonary:      Effort: Pulmonary effort is normal.      Breath sounds: Normal breath sounds.   Musculoskeletal:      Right shoulder: Tenderness present.      Left shoulder: Tenderness present.      Right wrist: Tenderness present.      Left wrist: Tenderness present.      Right hand: Tenderness present.      Left hand: Tenderness present.      Cervical back: Normal range of motion.      Lumbar back: Tenderness present. Decreased range of motion.      Right hip: Tenderness present.      Left hip: Tenderness present.      Right knee: Tenderness present.      Left knee: Tenderness present.      Comments: Back brace for support in place   Skin:     General: Skin is warm and dry.      Capillary Refill: Capillary refill takes less than 2 seconds.   Neurological:      General: No focal deficit present.      Mental Status: She is alert and oriented to person, place, and time.      Gait: Gait abnormal (slowed, ambulating with rolling walker).   Psychiatric:         Mood and Affect: Mood normal.         Behavior: Behavior normal.         Thought Content: Thought content normal.         Cognition and Memory: Cognition normal.       Assessment & Plan   Diagnoses and all orders for this visit:    1. Chronic pain syndrome (Primary)    2. Lumbar radiculopathy    3. Rheumatoid arthritis, involving unspecified site, unspecified whether rheumatoid factor present (HCC)    4. Generalized osteoarthritis    5. Degeneration of intervertebral disc of lumbar region    6. Encounter for long-term (current) use of high-risk medication    --- The urine " drug screen confirmation from 11/29/22 has been reviewed and the result is appropriate based on patient history and VICENTA report  --- The patient signed an updated copy of the controlled substance agreement on 11/29/22  --- Narcan prescription current  --- Refill OxyContin. DNF 4/8/23. Patient appears stable with current regimen. No adverse effects. Regarding continuation of opioids, there is no evidence of aberrant behavior or any red flags.  The patient continues with appropriate response to opioid therapy. ADL's remain intact by self.   --- Ordering a custom fitted back brace to reduce pain by restricting mobility of the spine and support weak spinal muscles and/or deformed spine. Order form faxed to [facility].   --- Follow-up 1 month     [facility]A REPORT  As part of the patient's treatment plan, I am prescribing controlled substances. The patient has been made aware of appropriate use of such medications, including potential risk of somnolence, limited ability to drive and/or work safely, and the potential for dependence or overdose. It has also been made clear that these medications are for use by this patient only, without concomitant use of alcohol or other substances unless prescribed.     Patient has completed prescribing agreement detailing terms of continued prescribing of controlled substances, including monitoring VICENTA reports, urine drug screening, and pill counts if necessary. The patient is aware that inappropriate use will results in cessation of prescribing such medications.    As the clinician, I personally reviewed the VICENTA from 3/30/23 while the patient was in the office today.    History and physical exam exhibit continued safe and appropriate use of controlled substances.    Dictated utilizing Dragon dictation.

## 2023-03-30 NOTE — TELEPHONE ENCOUNTER
Patient seen in office today. Reviewed UDS and VICENTA. Both updated and appropriate. Refill appropriate.  DNF 4/8/23. Please refill. Thanks. GAYATHRI

## 2023-04-05 ENCOUNTER — CLINICAL SUPPORT (OUTPATIENT)
Dept: FAMILY MEDICINE CLINIC | Age: 73
End: 2023-04-05
Payer: MEDICARE

## 2023-04-05 DIAGNOSIS — J30.9 ALLERGIC RHINITIS, UNSPECIFIED SEASONALITY, UNSPECIFIED TRIGGER: Primary | ICD-10-CM

## 2023-04-05 PROCEDURE — 95115 IMMUNOTHERAPY ONE INJECTION: CPT | Performed by: FAMILY MEDICINE

## 2023-04-14 DIAGNOSIS — I10 HYPERTENSION, UNSPECIFIED TYPE: ICD-10-CM

## 2023-04-14 DIAGNOSIS — E03.9 ACQUIRED HYPOTHYROIDISM: Chronic | ICD-10-CM

## 2023-04-14 RX ORDER — LEVOTHYROXINE SODIUM 0.1 MG/1
TABLET ORAL
Qty: 90 TABLET | Refills: 0 | Status: SHIPPED | OUTPATIENT
Start: 2023-04-14

## 2023-04-14 RX ORDER — HYDROCHLOROTHIAZIDE 25 MG/1
TABLET ORAL
Qty: 90 TABLET | Refills: 0 | Status: SHIPPED | OUTPATIENT
Start: 2023-04-14

## 2023-04-14 NOTE — TELEPHONE ENCOUNTER
Rx Refill Note  Requested Prescriptions     Pending Prescriptions Disp Refills   • levothyroxine (SYNTHROID, LEVOTHROID) 100 MCG tablet [Pharmacy Med Name: LEVOTHYROXINE 100 MCG TABLET] 90 tablet 0     Sig: TAKE ONE TABLET BY MOUTH DAILY   • hydroCHLOROthiazide (HYDRODIURIL) 25 MG tablet [Pharmacy Med Name: hydroCHLOROthiazide 25 MG TABLET] 90 tablet 0     Sig: TAKE ONE TABLET BY MOUTH DAILY      Last office visit with prescribing clinician: 11/16/2022   Last telemedicine visit with prescribing clinician: 4/19/2023   Next office visit with prescribing clinician: 4/19/2023   TSH Rfx On Abnormal To Free T4 (11/16/2022 14:25)      Vaishnavi Keller LPN  04/14/23, 09:04 EDT

## 2023-04-19 ENCOUNTER — OFFICE VISIT (OUTPATIENT)
Dept: FAMILY MEDICINE CLINIC | Age: 73
End: 2023-04-19
Payer: MEDICARE

## 2023-04-19 ENCOUNTER — LAB (OUTPATIENT)
Dept: LAB | Facility: HOSPITAL | Age: 73
End: 2023-04-19
Payer: MEDICARE

## 2023-04-19 VITALS
SYSTOLIC BLOOD PRESSURE: 134 MMHG | HEIGHT: 61 IN | HEART RATE: 76 BPM | DIASTOLIC BLOOD PRESSURE: 84 MMHG | WEIGHT: 191.8 LBS | OXYGEN SATURATION: 96 % | TEMPERATURE: 98.1 F | BODY MASS INDEX: 36.21 KG/M2

## 2023-04-19 DIAGNOSIS — Z12.31 BREAST CANCER SCREENING BY MAMMOGRAM: ICD-10-CM

## 2023-04-19 DIAGNOSIS — G89.4 CHRONIC PAIN SYNDROME: Chronic | ICD-10-CM

## 2023-04-19 DIAGNOSIS — J30.9 ALLERGIC RHINITIS, UNSPECIFIED SEASONALITY, UNSPECIFIED TRIGGER: Primary | ICD-10-CM

## 2023-04-19 DIAGNOSIS — E03.9 ACQUIRED HYPOTHYROIDISM: ICD-10-CM

## 2023-04-19 DIAGNOSIS — Z00.00 ROUTINE GENERAL MEDICAL EXAMINATION AT A HEALTH CARE FACILITY: ICD-10-CM

## 2023-04-19 DIAGNOSIS — R30.0 DYSURIA: ICD-10-CM

## 2023-04-19 DIAGNOSIS — R11.0 NAUSEA: ICD-10-CM

## 2023-04-19 LAB
ALBUMIN SERPL-MCNC: 2.7 G/DL (ref 3.5–5.2)
ALBUMIN/GLOB SERPL: 0.9 G/DL
ALP SERPL-CCNC: 126 U/L (ref 39–117)
ALT SERPL W P-5'-P-CCNC: 16 U/L (ref 1–33)
ANION GAP SERPL CALCULATED.3IONS-SCNC: 9.5 MMOL/L (ref 5–15)
AST SERPL-CCNC: 25 U/L (ref 1–32)
BASOPHILS # BLD AUTO: 0.02 10*3/MM3 (ref 0–0.2)
BASOPHILS NFR BLD AUTO: 0.3 % (ref 0–1.5)
BILIRUB SERPL-MCNC: 0.4 MG/DL (ref 0–1.2)
BILIRUB UR QL STRIP: NEGATIVE
BUN SERPL-MCNC: 20 MG/DL (ref 8–23)
BUN/CREAT SERPL: 22.5 (ref 7–25)
CALCIUM SPEC-SCNC: 8.7 MG/DL (ref 8.6–10.5)
CHLORIDE SERPL-SCNC: 104 MMOL/L (ref 98–107)
CLARITY UR: CLEAR
CO2 SERPL-SCNC: 28.5 MMOL/L (ref 22–29)
COLOR UR: YELLOW
CREAT SERPL-MCNC: 0.89 MG/DL (ref 0.57–1)
DEPRECATED RDW RBC AUTO: 49.9 FL (ref 37–54)
EGFRCR SERPLBLD CKD-EPI 2021: 69 ML/MIN/1.73
EOSINOPHIL # BLD AUTO: 0.3 10*3/MM3 (ref 0–0.4)
EOSINOPHIL NFR BLD AUTO: 4.5 % (ref 0.3–6.2)
ERYTHROCYTE [DISTWIDTH] IN BLOOD BY AUTOMATED COUNT: 14.5 % (ref 12.3–15.4)
GLOBULIN UR ELPH-MCNC: 3.1 GM/DL
GLUCOSE SERPL-MCNC: 100 MG/DL (ref 65–99)
GLUCOSE UR STRIP-MCNC: NEGATIVE MG/DL
HCT VFR BLD AUTO: 37 % (ref 34–46.6)
HGB BLD-MCNC: 11.7 G/DL (ref 12–15.9)
HGB UR QL STRIP.AUTO: NEGATIVE
IMM GRANULOCYTES # BLD AUTO: 0.01 10*3/MM3 (ref 0–0.05)
IMM GRANULOCYTES NFR BLD AUTO: 0.2 % (ref 0–0.5)
KETONES UR QL STRIP: NEGATIVE
LEUKOCYTE ESTERASE UR QL STRIP.AUTO: NEGATIVE
LYMPHOCYTES # BLD AUTO: 1.64 10*3/MM3 (ref 0.7–3.1)
LYMPHOCYTES NFR BLD AUTO: 24.8 % (ref 19.6–45.3)
MCH RBC QN AUTO: 29.8 PG (ref 26.6–33)
MCHC RBC AUTO-ENTMCNC: 31.6 G/DL (ref 31.5–35.7)
MCV RBC AUTO: 94.4 FL (ref 79–97)
MONOCYTES # BLD AUTO: 0.51 10*3/MM3 (ref 0.1–0.9)
MONOCYTES NFR BLD AUTO: 7.7 % (ref 5–12)
NEUTROPHILS NFR BLD AUTO: 4.12 10*3/MM3 (ref 1.7–7)
NEUTROPHILS NFR BLD AUTO: 62.5 % (ref 42.7–76)
NITRITE UR QL STRIP: NEGATIVE
PH UR STRIP.AUTO: 6 [PH] (ref 5–8)
PLATELET # BLD AUTO: 231 10*3/MM3 (ref 140–450)
PMV BLD AUTO: 9.9 FL (ref 6–12)
POTASSIUM SERPL-SCNC: 3.7 MMOL/L (ref 3.5–5.2)
PROT SERPL-MCNC: 5.8 G/DL (ref 6–8.5)
PROT UR QL STRIP: NEGATIVE
RBC # BLD AUTO: 3.92 10*6/MM3 (ref 3.77–5.28)
SODIUM SERPL-SCNC: 142 MMOL/L (ref 136–145)
SP GR UR STRIP: 1.02 (ref 1–1.03)
TSH SERPL DL<=0.05 MIU/L-ACNC: 1.02 UIU/ML (ref 0.27–4.2)
UROBILINOGEN UR QL STRIP: NORMAL
WBC NRBC COR # BLD: 6.6 10*3/MM3 (ref 3.4–10.8)

## 2023-04-19 PROCEDURE — 36415 COLL VENOUS BLD VENIPUNCTURE: CPT

## 2023-04-19 PROCEDURE — 84443 ASSAY THYROID STIM HORMONE: CPT | Performed by: NURSE PRACTITIONER

## 2023-04-19 PROCEDURE — 85025 COMPLETE CBC W/AUTO DIFF WBC: CPT

## 2023-04-19 PROCEDURE — 80053 COMPREHEN METABOLIC PANEL: CPT

## 2023-04-19 PROCEDURE — 81003 URINALYSIS AUTO W/O SCOPE: CPT | Performed by: NURSE PRACTITIONER

## 2023-04-19 RX ORDER — PROMETHAZINE HYDROCHLORIDE 25 MG/1
25 TABLET ORAL EVERY 8 HOURS PRN
Qty: 90 TABLET | Refills: 0 | Status: SHIPPED | OUTPATIENT
Start: 2023-04-19

## 2023-04-19 RX ORDER — ALBUTEROL SULFATE 90 UG/1
2 AEROSOL, METERED RESPIRATORY (INHALATION) EVERY 6 HOURS PRN
Qty: 8 G | Refills: 0 | Status: SHIPPED | OUTPATIENT
Start: 2023-04-19

## 2023-04-19 NOTE — ASSESSMENT & PLAN NOTE
Advise regular exercise, healthy eating, always wear seat belts. Living will discussed, booklet given, fall prevention discussed.  Immunizations discussed. Advised to check on shingrex vaccines with insurance/pharmacy   To continue yearly optometry and dental exams.    Will send for last mammogram and dexa, set up mammogram

## 2023-04-19 NOTE — PROGRESS NOTES
The ABCs of the Annual Wellness Visit  Subsequent Medicare Wellness Visit    Subjective    Anne Jewell is a 72 y.o. female who presents for a Subsequent Medicare Wellness Visit.    Medicare wellness HPI  Exercises regularly: tries to be mobile as much as she can, uses a walker   Eats healthy: tries   Last mammogram:3-2022 [facility]/ gyn   Last DEXA: 3-2022 gyn   Last pap smear:n/a   BSE: yes   Wears seatbelts: yes   Living will: had one at [facility]   Optometrist: Wayne   Dentist: jessica, dr baca   Tobacco:quit 2005   Alcohol intake: none   Drugs: none   Falls: none   Colonoscopy: 2020  Immunizations: UTD covid, flu, pneum, & Tetanus     The following portions of the patient's history were reviewed and   updated as appropriate: allergies, current medications, past family history, past medical history, past social history, past surgical history and problem list.    Compared to one year ago, the patient feels her physical   health is the same.    Compared to one year ago, the patient feels her mental   health is the same.    Recent Hospitalizations:  She was not admitted to the hospital during the last year.       Current Medical Providers:  Patient Care Team:  Jewell Stephens APRN as PCP - General (Family Medicine)  Edilberto Lemus MD as Consulting Physician (Nephrology)  Rashaun Alvarado MD as Surgeon (Orthopedic Surgery)  Delma Montoya MD as Consulting Physician (Obstetrics and Gynecology)  Stuart Borges MD (Hematology and Oncology)  Isael Barker MD as Consulting Physician (Gastroenterology)    Outpatient Medications Prior to Visit   Medication Sig Dispense Refill   • ALPRAZolam (XANAX) 2 MG tablet      • baclofen (LIORESAL) 10 MG tablet TAKE ONE TABLET BY MOUTH THREE TIMES A  tablet 0   • cetirizine (zyrTEC) 10 MG tablet Take 1 tablet by mouth Daily.     • chlorhexidine (PERIDEX) 0.12 % solution      • Cholecalciferol (VITAMIN D3) 2000 units capsule TAKE ONE CAPSULE BY MOUTH DAILY  30 capsule 9   • Cyanocobalamin (B-12) 1000 MCG sublingual tablet PLACE 1 TABLET UNDER THE TONGUE AND LET DISSOLVE ONCE DAILY 30 each 1   • dicyclomine (BENTYL) 20 MG tablet Take 1 tablet by mouth Every 6 (Six) Hours As Needed (diarrhea). 90 tablet 1   • EPINEPHrine (EPIPEN) 0.3 MG/0.3ML solution auto-injector injection      • escitalopram (LEXAPRO) 10 MG tablet Take 1 tablet by mouth Daily.     • hydroCHLOROthiazide (HYDRODIURIL) 25 MG tablet TAKE ONE TABLET BY MOUTH DAILY 90 tablet 0   • KLOR-CON 20 MEQ CR tablet TAKE ONE TABLET BY MOUTH TWICE A  tablet 0   • levothyroxine (SYNTHROID, LEVOTHROID) 100 MCG tablet TAKE ONE TABLET BY MOUTH DAILY 90 tablet 0   • montelukast (SINGULAIR) 10 MG tablet Take 1 tablet by mouth Daily. 90 tablet 1   • Multiple Vitamin tablet Take 1 tablet by mouth Daily.     • naloxone (NARCAN) 4 MG/0.1ML nasal spray 1 spray into the nostril(s) as directed by provider As Needed (sedation or accidental overdose of opioid). 2 each 0   • oxyCODONE ER (oxyCONTIN) 80 MG tablet extended-release 12 hour 12 hr tablet Take 2 tablets by mouth Every 12 (Twelve) Hours. 120 tablet 0   • rivaroxaban (Xarelto) 20 MG tablet Take 1 tablet by mouth Daily. 90 tablet 1   • promethazine (PHENERGAN) 25 MG tablet TAKE ONE TABLET BY MOUTH EVERY 8 HOURS AS NEEDED FOR NAUSEA AND VOMITING 90 tablet 0   • albuterol (PROVENTIL HFA;VENTOLIN HFA) 108 (90 BASE) MCG/ACT inhaler Proventil  (90 Base) MCG/ACT Inhalation Aerosol Solution; Patient Sig: Proventil  (90 Base) MCG/ACT Inhalation Aerosol Solution Inhale 2 puff(s) every 6 to 8 hours as needed; 6.7; 0; 05-Apr-2013; Active (Patient not taking: Reported on 4/19/2023)       No facility-administered medications prior to visit.       Opioid medication/s are on active medication list.  and I have evaluated her active treatment plan and pain score trends (see table).  There were no vitals filed for this visit.  I have reviewed the chart for potential of  high risk medication and harmful drug interactions in the elderly.            Aspirin is not on active medication list.  Aspirin use is not indicated based on review of current medical condition/s. Risk of harm outweighs potential benefits.  .    Patient Active Problem List   Diagnosis   • Chronic pain syndrome   • Rheumatoid arthritis   • Osteoarthritis of knee   • Generalized osteoarthritis   • Degeneration of intervertebral disc of lumbar region   • Neck pain   • Lumbar radiculopathy   • Atopic rhinitis   • Anxiety   • Diarrhea   • Indigestion   • Dysthymic disorder   • Gastroesophageal reflux disease   • Acquired hypothyroidism   • Insomnia   • Pernicious anemia   • Osteoporosis   • Scoliosis   • Vasovagal syncope   • Vitamin D deficiency   • Trigger middle finger of right hand   • Trigger ring finger of right hand   • Trigger little finger of right hand   • Encounter for long-term (current) use of high-risk medication   • Coagulation disorder   • Hyperglycemia   • Joint pain   • Status post total knee replacement, left   • Left knee pain   • Status post total knee replacement, right   • Osteoarthritis of thumb   • Trigger finger of all digits of right hand   • Left hip pain   • Primary osteoarthritis of both hips   • Edema   • Nausea   • Irritable bowel syndrome with diarrhea   • Stage 3 chronic kidney disease   • Cough   • Acute non-recurrent frontal sinusitis   • Dysuria   • History of DVT (deep vein thrombosis)   • Routine general medical examination at a health care facility     Advance Care Planning   Advance Care Planning     Advance Directive is not on file.  ACP discussion was held with the patient during this visit. Patient does not have an advance directive, information provided.     Objective    Vitals:    04/19/23 1443   BP: 134/84   BP Location: Right arm   Patient Position: Sitting   Cuff Size: Adult   Pulse: 76   Temp: 98.1 °F (36.7 °C)   TempSrc: Oral   SpO2: 96%  Comment: room air   Weight: 87  "kg (191 lb 12.8 oz)   Height: 154.9 cm (60.98\")     Estimated body mass index is 36.26 kg/m² as calculated from the following:    Height as of this encounter: 154.9 cm (60.98\").    Weight as of this encounter: 87 kg (191 lb 12.8 oz).    Class 2 Severe Obesity (BMI >=35 and <=39.9). Obesity-related health conditions include the following: hypertension. Obesity is unchanged. BMI is is above average; BMI management plan is completed. We discussed portion control and increasing exercise.      Does the patient have evidence of cognitive impairment? No          HEALTH RISK ASSESSMENT    Smoking Status:  Social History     Tobacco Use   Smoking Status Former   Smokeless Tobacco Never     Alcohol Consumption:  Social History     Substance and Sexual Activity   Alcohol Use No     Fall Risk Screen:    FILEMONADI Fall Risk Assessment was completed, and patient is at LOW risk for falls.Assessment completed on:2023    Depression Screenin/19/2023     2:48 PM   PHQ-2/PHQ-9 Depression Screening   Little Interest or Pleasure in Doing Things 0-->not at all   Feeling Down, Depressed or Hopeless 0-->not at all   PHQ-9: Brief Depression Severity Measure Score 0       Health Habits and Functional and Cognitive Screenin/19/2023     2:00 PM   Functional & Cognitive Status   Do you have difficulty preparing food and eating? No   Do you have difficulty bathing yourself, getting dressed or grooming yourself? No   Do you have difficulty using the toilet? No   Do you have difficulty moving around from place to place? No   Do you have trouble with steps or getting out of a bed or a chair? No   Current Diet Well Balanced Diet   Dental Exam Up to date   Eye Exam Up to date   Exercise (times per week) 2 times per week   Current Exercises Include No Regular Exercise   Do you need help using the phone?  No   Are you deaf or do you have serious difficulty hearing?  No   Do you need help with transportation? No   Do you need help " shopping? No   Do you need help preparing meals?  No   Do you need help with housework?  Yes   Do you need help with laundry? No   Do you need help taking your medications? No   Do you need help managing money? No   Do you ever drive or ride in a car without wearing a seat belt? No   Have you felt unusual stress, anger or loneliness in the last month? Yes   Who do you live with? Child   If you need help, do you have trouble finding someone available to you? No   Have you been bothered in the last four weeks by sexual problems? No   Do you have difficulty concentrating, remembering or making decisions? No       Age-appropriate Screening Schedule:  Refer to the list below for future screening recommendations based on patient's age, sex and/or medical conditions. Orders for these recommended tests are listed in the plan section. The patient has been provided with a written plan.    Health Maintenance   Topic Date Due   • ZOSTER VACCINE (1 of 2) Never done   • PAP SMEAR  Never done   • INFLUENZA VACCINE  08/01/2023   • MAMMOGRAM  03/31/2024   • DXA SCAN  03/31/2024   • ANNUAL WELLNESS VISIT  04/19/2024   • TDAP/TD VACCINES (2 - Td or Tdap) 08/08/2027   • COLORECTAL CANCER SCREENING  08/12/2030   • HEPATITIS C SCREENING  Completed   • COVID-19 Vaccine  Completed   • Pneumococcal Vaccine 65+  Completed                  CMS Preventative Services Quick Reference  Risk Factors Identified During Encounter  Immunizations Discussed/Encouraged: Shingrix  The above risks/problems have been discussed with the patient.  Pertinent information has been shared with the patient in the After Visit Summary.  An After Visit Summary and PPPS were made available to the patient.    Follow Up:   Next Medicare Wellness visit to be scheduled in 1 year.       Additional E&M Note during same encounter follows:  Patient has multiple medical problems which are significant and separately identifiable that require additional work above and beyond the  Medicare Wellness Visit.      Chief Complaint  Medicare Wellness-subsequent and Fatigue (Pt would like blood work dome. Pt states she feels more lethargic than usual. )    Subjective        Hypothyroidism  Current rx: levothyroxine 100   Tolerating rx: yes   Refills needed Y to [facility]   Lab Results       Component                Value               Date                       TSH                      1.180               2022              Uses walker with chair    PAST MEDICAL HISTORY changes since :           + bilateral CTS     Positive for    Deep Venous Thrombosis: 2 DVT and a PE; ;     Positive for    Hypothyroidism: dx'd in ; ;     Positive for    Hx Obesity: used to weigh over 400 pounds; ;         GYNECOLOGICAL HISTORY:             CURRENT MEDICAL PROVIDERS:    Allergist: [facility]: allergy rx / goes yearly     Orthopedist: Dr Alvarado    Psychiatrist: [facility], oz Gonzales, amando chavez   Chiropractor [facility]  GYN, samson Feldman, PCP, retiring     pain management Dr. Grajeda and JORGE LUIS Friedman         PREVENTIVE HEALTH MAINTENANCE         DEXA 3-31-22 at [facility] and mammogram same day     COLORECTAL CANCER SCREENING: Up to date (colonoscopy q10y; sigmoidoscopy q5y; Cologuard q3y) was last done 20/ dr Barker         Surgical History:     Teeth removed 2019     Cholecystectomy    Hysterectomy: [facility]BSO; Other Surgeries    Hernia Repair: 7 different surgeries;     Joint Replacement: bilateral knees; 2002;     Gastric Bypass ;    bladder repair;    right hand surgery ;    breast reduction ;     Procedures:    Colonoscopy ( / [facility] )    EGD         Family History:     Father:  at age 89;     ; Positive for Prostate Cancer;     Mother:  at age 78; Cause of death was renal failure    ; Positive for Congestive Heart Failure;     ; Positive for Breast Cancer;     ; Positive for Type 2  "Diabetes;     ; Positive for blind;     Brother(s): 1 brother(s) total    ; Positive for Type 2 Diabetes;     Son(s): 1 ;   in MVA      Daughter(s): 1 daughter(s) total;  Migraines / gastroparesis, not working, is a nurse,  due to health issues         Social History:     Occupation: Disabled (due to [employer])     Marital Status:  since  /spouse comes and goes, daughter has moved in with her     Children: 2 children and 2 step-children        Anne Jewell is also being seen today for wellness and follow up     Review of Systems   Constitutional: Negative for fatigue and fever.   HENT: Negative for sore throat.    Eyes: Positive for itching (flared seasonally ).   Respiratory: Positive for cough (intermittent, takes  mucinex ) and wheezing (needs her albuterol inhaler refilled ). Negative for shortness of breath.    Cardiovascular: Positive for leg swelling (some lower ext swelling, sits quite a bit ). Negative for chest pain and palpitations.   Gastrointestinal: Positive for nausea (takes phenergan prn / takes with her pain rx ). Negative for abdominal pain.   Endocrine: Positive for cold intolerance.   Genitourinary:        Occ bladder spasms    Musculoskeletal: Positive for back pain (chronic ).   Skin: Negative for rash.   Allergic/Immunologic:        Hx allergies / flared    Hematological: Negative for adenopathy.   Psychiatric/Behavioral: Negative for sleep disturbance.       Objective   Vital Signs:  /84 (BP Location: Right arm, Patient Position: Sitting, Cuff Size: Adult)   Pulse 76   Temp 98.1 °F (36.7 °C) (Oral)   Ht 154.9 cm (60.98\")   Wt 87 kg (191 lb 12.8 oz)   SpO2 96% Comment: room air  BMI 36.26 kg/m²     Physical Exam  Vitals reviewed.   Constitutional:       General: She is not in acute distress.     Appearance: Normal appearance. She is well-developed.   HENT:      Head: Normocephalic. Hair is normal.      Right Ear: Hearing, tympanic membrane, ear canal and " external ear normal. No decreased hearing noted. No drainage.      Left Ear: Hearing, tympanic membrane, ear canal and external ear normal. No decreased hearing noted.      Nose: Nose normal. No nasal deformity.      Mouth/Throat:      Mouth: Mucous membranes are moist.   Eyes:      General: Lids are normal.      Extraocular Movements: Extraocular movements intact.      Conjunctiva/sclera: Conjunctivae normal.      Pupils: Pupils are equal, round, and reactive to light.   Neck:      Thyroid: No thyromegaly.      Vascular: No carotid bruit or JVD.   Cardiovascular:      Rate and Rhythm: Normal rate and regular rhythm.      Pulses: Normal pulses.      Heart sounds: Normal heart sounds. No murmur heard.    No friction rub. No gallop.   Pulmonary:      Effort: Pulmonary effort is normal. No respiratory distress.      Breath sounds: Normal breath sounds. No wheezing.   Chest:   Breasts:     Right: Normal. No mass, nipple discharge or skin change.      Left: Normal. No mass, nipple discharge or skin change.   Abdominal:      General: Bowel sounds are normal.      Palpations: Abdomen is soft. There is no mass.      Tenderness: There is no abdominal tenderness.   Musculoskeletal:         General: No tenderness or deformity. Normal range of motion.      Cervical back: Normal range of motion and neck supple.      Comments: Using a rolling walker    Lymphadenopathy:      Cervical: No cervical adenopathy.      Upper Body:      Right upper body: No axillary adenopathy.      Left upper body: No axillary adenopathy.   Skin:     General: Skin is warm and dry.      Findings: No erythema or rash.   Neurological:      Mental Status: She is alert and oriented to person, place, and time.      Motor: No abnormal muscle tone.      Gait: Gait normal.      Deep Tendon Reflexes: Reflexes are normal and symmetric.   Psychiatric:         Mood and Affect: Mood normal.         Behavior: Behavior normal.         Thought Content: Thought content  normal.         Judgment: Judgment normal.                         Assessment and Plan   Diagnoses and all orders for this visit:    1. Allergic rhinitis, unspecified seasonality, unspecified trigger (Primary)  -     Allergy Serum Injection  -     Allergy Serum Injection  -     albuterol sulfate  (90 Base) MCG/ACT inhaler; Inhale 2 puffs Every 6 (Six) Hours As Needed for Wheezing. Proventil  (90 Base) MCG/ACT Inhalation Aerosol Solution; Patient Sig: Proventil  (90 Base) MCG/ACT Inhalation Aerosol Solution Inhale 2 puff(s) every 6 to 8 hours as needed; 6.7; 0; 05-Apr-2013; Active  Dispense: 8 g; Refill: 0    2. Routine general medical examination at a health care facility  Assessment & Plan:  Advise regular exercise, healthy eating, always wear seat belts. Living will discussed, booklet given, fall prevention discussed.  Immunizations discussed. Advised to check on shingrex vaccines with insurance/pharmacy   To continue yearly optometry and dental exams.    Will send for last mammogram and dexa, set up mammogram     Orders:  -     CBC w AUTO Differential; Future    3. Acquired hypothyroidism  Assessment & Plan:  Rechecking TSH today     Orders:  -     TSH Rfx On Abnormal To Free T4    4. Breast cancer screening by mammogram  -     Mammo Screening Digital Tomosynthesis Bilateral With CAD; Future    5. Chronic pain syndrome  Assessment & Plan:  Follow up with her pain specialist     Orders:  -     promethazine (PHENERGAN) 25 MG tablet; Take 1 tablet by mouth Every 8 (Eight) Hours As Needed for Nausea or Vomiting.  Dispense: 90 tablet; Refill: 0    6. Nausea  -     promethazine (PHENERGAN) 25 MG tablet; Take 1 tablet by mouth Every 8 (Eight) Hours As Needed for Nausea or Vomiting.  Dispense: 90 tablet; Refill: 0  -     Comprehensive metabolic panel; Future    7. Dysuria  Assessment & Plan:  Describes quivering bladder at times, discussed spasms, will check a u/ a today     Orders:  -     Urinalysis  With Culture If Indicated - Urine, Clean Catch           Follow Up   Return for followup pending lab results.  Patient was given instructions and counseling regarding her condition or for health maintenance advice. Please see specific information pulled into the AVS if appropriate.

## 2023-04-20 ENCOUNTER — TELEPHONE (OUTPATIENT)
Dept: FAMILY MEDICINE CLINIC | Age: 73
End: 2023-04-20
Payer: MEDICARE

## 2023-04-20 ENCOUNTER — PRIOR AUTHORIZATION (OUTPATIENT)
Dept: FAMILY MEDICINE CLINIC | Age: 73
End: 2023-04-20
Payer: MEDICARE

## 2023-04-20 NOTE — TELEPHONE ENCOUNTER
----- Message from Vaishnavi Keller LPN sent at 4/20/2023  8:39 AM EDT -----    ----- Message -----  From: Jewell Stephens APRN  Sent: 4/19/2023   5:28 PM EDT  To: Vaishnavi Keller LPN, #          ----- Message -----  From: Vaishnavi Keller LPN  Sent: 4/19/2023   5:00 PM EDT  To: EUGENIO Rodriguez, #    I looked in her epic chart and in our archive. I do not see one.     ----- Message -----  From: Jewell Stephens APRN  Sent: 4/19/2023   4:33 PM EDT  To: [facility]    A note somewhere said dexa and mammo 3-2022, at [facility], I need , you might ask tif if she found

## 2023-04-20 NOTE — PROGRESS NOTES
A note was made, when I typed in mammogram under find:  DEXA 3-31-22 at [facility] and mammogram same day

## 2023-04-20 NOTE — PROGRESS NOTES
Spoke with Kelly in medical records at [facility]. She will fax the mammo / dexa from her 3/31/22 visit

## 2023-05-01 ENCOUNTER — TELEMEDICINE (OUTPATIENT)
Dept: PAIN MEDICINE | Facility: CLINIC | Age: 73
End: 2023-05-01
Payer: MEDICARE

## 2023-05-01 DIAGNOSIS — M54.16 LUMBAR RADICULOPATHY: ICD-10-CM

## 2023-05-01 DIAGNOSIS — M15.9 GENERALIZED OSTEOARTHRITIS: ICD-10-CM

## 2023-05-01 DIAGNOSIS — G89.4 CHRONIC PAIN SYNDROME: Primary | ICD-10-CM

## 2023-05-01 DIAGNOSIS — M51.36 DEGENERATION OF INTERVERTEBRAL DISC OF LUMBAR REGION: ICD-10-CM

## 2023-05-01 DIAGNOSIS — M06.9 RHEUMATOID ARTHRITIS, INVOLVING UNSPECIFIED SITE, UNSPECIFIED WHETHER RHEUMATOID FACTOR PRESENT: ICD-10-CM

## 2023-05-01 DIAGNOSIS — Z79.899 ENCOUNTER FOR LONG-TERM (CURRENT) USE OF HIGH-RISK MEDICATION: ICD-10-CM

## 2023-05-01 DIAGNOSIS — G89.4 CHRONIC PAIN SYNDROME: ICD-10-CM

## 2023-05-01 RX ORDER — OXYCODONE HYDROCHLORIDE 80 MG/1
160 TABLET, FILM COATED, EXTENDED RELEASE ORAL EVERY 12 HOURS SCHEDULED
Qty: 120 TABLET | Refills: 0 | Status: SHIPPED | OUTPATIENT
Start: 2023-05-01

## 2023-05-01 NOTE — TELEPHONE ENCOUNTER
Patient called and stated that her  was admitted tot [facility] last week and they found that he has cancer throughout his body. they stated that he will be passing soon so she doesn't want to leave him. She wanted to know if you would do a video visit today instead of her having to come in tomorrow for her appointment. Please advise.

## 2023-05-01 NOTE — TELEPHONE ENCOUNTER
Tele-health visit today. Reviewed LOLIS and VICENTA. Both updated and appropriate. Refill appropriate.  DNF 5/8/23. Please refill. Thanks. GAYATHRI

## 2023-05-01 NOTE — PROGRESS NOTES
TELEMEDICINE - VIDEO VISIT  You have chosen to receive care through a telehealth visit.  Do you consent to use a video/audio connection for your medical care today? Yes    Location of patient: Yes  Location of Provider-[facility]  Anyone else present- No  Type of Technology used- ZOOM through use of Epic/Arimazhart visit    CHIEF COMPLAINT  Back and joint pain    Subjective   Anne Jewell is a 72 y.o. female  who presents for a video visit follow-up.She has a history of chronic back and joint pain. She reports that her pain has remained consistent since her last office visit.     Today pain is 6/10VAS in severity. Pain is located in her low back and radiates to bilateral feet. Describes this pain as a nearly continuous aching. Pain is worsened by rising from sitting to standing position, stress, weather changes, prolonged position, and walking long distances. Pain improves with rest/reposition, HEP, aqua therapy, heat/ice, and medications.      Continues with OxyContin 80mg 2 tablets BID and Baclofen 10mg 2-3/day (managed by PCP). Denies any side effects from the regimen, including constipation and somnolence. The regimen helps decrease pain by a significant amount. Notes improvement in activity and function with regimen. ADL's by self. Denies any bowel or bladder changes.      Patient reports that she takes Xanax 2mg TID as needed. This is prescribed by psychiatrist.      Orthopedic surgeon is Dr. Alvarado    Recently found out her  has cancer that has spread throughout his spine. He is currently hospitalized but she plans on helping to care for him once he returns home.     Back Pain  This is a chronic problem. The current episode started more than 1 year ago. The problem occurs constantly. The problem is unchanged (unchanged since last office visit). The pain is present in the lumbar spine. The quality of the pain is described as aching. The pain radiates to the right foot and left  foot. The pain is at a severity of 6/10. The pain is moderate. The symptoms are aggravated by standing, position and sitting (rising from sitting to standing, prolonged position, walking long distances, stress, weather changes). Associated symptoms include weakness. Pertinent negatives include no abdominal pain, chest pain, dysuria, fever, headaches or numbness. She has tried analgesics, home exercises, heat and ice (aqua therapy) for the symptoms. The treatment provided moderate relief.   Joint Pain  This is a chronic (6/10 VAS in severity) problem. The current episode started more than 1 year ago. The problem occurs constantly. The problem has been unchanged (unchanged since last office visit). Associated symptoms include arthralgias, coughing (asthma/allergies), myalgias, neck pain and weakness. Pertinent negatives include no abdominal pain, chest pain, chills, congestion, fatigue, fever, headaches, numbness, vertigo or vomiting. The symptoms are aggravated by stress and exertion (weather changes, increased physical activity,). She has tried oral narcotics, position changes, rest, heat and ice for the symptoms. The treatment provided mild relief.      The following portions of the patient's history were reviewed and updated as appropriate: allergies, current medications, past family history, past medical history, past social history, past surgical history and problem list.    Review of Systems   Constitutional: Negative for chills, fatigue and fever.   HENT: Negative for congestion.    Respiratory: Positive for cough (asthma/allergies).    Cardiovascular: Negative for chest pain.   Gastrointestinal: Negative for abdominal pain and vomiting.   Genitourinary: Negative for dysuria.   Musculoskeletal: Positive for arthralgias, back pain, myalgias and neck pain.   Neurological: Positive for weakness. Negative for vertigo, numbness and headaches.     I have reviewed and confirmed the accuracy of the ROS as documented  by the MA/VALENTÍNN/RN Sara Gr, APRN    Vitals:    05/01/23 1257   PainSc:   6   PainLoc: Back     Objective   Physical Exam  Constitutional:       Appearance: Normal appearance.   HENT:      Head: Normocephalic.   Skin:     Capillary Refill: Capillary refill takes less than 2 seconds.   Neurological:      General: No focal deficit present.      Mental Status: She is alert and oriented to person, place, and time.   Psychiatric:         Mood and Affect: Mood normal.         Behavior: Behavior normal.         Thought Content: Thought content normal.         Cognition and Memory: Cognition normal.       Assessment & Plan   Diagnoses and all orders for this visit:    1. Chronic pain syndrome (Primary)    2. Lumbar radiculopathy    3. Rheumatoid arthritis, involving unspecified site, unspecified whether rheumatoid factor present    4. Generalized osteoarthritis    5. Degeneration of intervertebral disc of lumbar region    6. Encounter for long-term (current) use of high-risk medication    ----------------  Our practice is offering alternative &/or electronic methods to continue to follow our patients while at the same time further the efforts toward social distancing, in accordance with our organizational policies, professional societies' guidance, and gubernatorial mandates.  I support the Healthy at Home campaign and in this visit I have counseled the patient on our needs to limit in-person office visits and physical encounters with medical facilities whenever possible.  I have also educated the patient on the medical necessities of maintaining social distancing while we continue to function during this crisis period.      The patient agreed to a Video Visit.  ----------------  --- The urine drug screen confirmation from 11/29/22 has been reviewed and the result is appropriate based on patient history and VICENTA report  --- The patient signed an updated copy of the controlled substance agreement on 11/29/22  ---  Refill OxyContin. DNF 5/8/23 Patient appears stable with current regimen. No adverse effects. Regarding continuation of opioids, there is no evidence of aberrant behavior or any red flags.  The patient continues with appropriate response to opioid therapy. ADL's remain intact by self.   --- Follow-up 1 month     VICENTA REPORT  As part of the patient's treatment plan, I am prescribing controlled substances. The patient has been made aware of appropriate use of such medications, including potential risk of somnolence, limited ability to drive and/or work safely, and the potential for dependence or overdose. It has also been made clear that these medications are for use by this patient only, without concomitant use of alcohol or other substances unless prescribed.     Patient has completed prescribing agreement detailing terms of continued prescribing of controlled substances, including monitoring VICENTA reports, urine drug screening, and pill counts if necessary. The patient is aware that inappropriate use will results in cessation of prescribing such medications.    As the clinician, I personally reviewed the VICENTA from 5/1/23 while the patient was in the office today (scanned into chart).    History and physical exam exhibit continued safe and appropriate use of controlled substances.  -------  EMR Dragon/Transcription disclaimer:   Much of this encounter note is an electronic transcription/translation of spoken language to printed text. The electronic translation of spoken language may permit erroneous, or at times, nonsensical words or phrases to be inadvertently transcribed; Although I have reviewed the note for such errors, some may still exist.      This document is intended for medical expert use only. Reading of this document by patients and/or patient's family without participating medical staff guidance may result in misinterpretation and unintended morbidity.   Any interpretation of such data is the  responsibility of the patient and/or family member responsible for the patient in concert with their primary or specialist providers, not to be left for sources of online searches such as [employer], [employer] or similar queries. Relying on these approaches to knowledge may result in misinterpretation, misguided goals of care and even death should patients or family members try recommendations outside of the realm of professional medical care in a supervised way.

## 2023-05-08 ENCOUNTER — CLINICAL SUPPORT (OUTPATIENT)
Dept: FAMILY MEDICINE CLINIC | Age: 73
End: 2023-05-08
Payer: MEDICARE

## 2023-05-08 DIAGNOSIS — J30.9 ALLERGIC RHINITIS, UNSPECIFIED SEASONALITY, UNSPECIFIED TRIGGER: Primary | ICD-10-CM

## 2023-05-08 PROCEDURE — 95115 IMMUNOTHERAPY ONE INJECTION: CPT | Performed by: FAMILY MEDICINE

## 2023-05-09 RX ORDER — POTASSIUM CHLORIDE 1500 MG/1
TABLET, EXTENDED RELEASE ORAL
Qty: 180 TABLET | Refills: 0 | Status: SHIPPED | OUTPATIENT
Start: 2023-05-09

## 2023-05-16 DIAGNOSIS — J30.9 ALLERGIC RHINITIS, UNSPECIFIED SEASONALITY, UNSPECIFIED TRIGGER: ICD-10-CM

## 2023-05-16 RX ORDER — MONTELUKAST SODIUM 10 MG/1
TABLET ORAL
Qty: 90 TABLET | Refills: 1 | Status: SHIPPED | OUTPATIENT
Start: 2023-05-16

## 2023-05-18 ENCOUNTER — CLINICAL SUPPORT (OUTPATIENT)
Dept: FAMILY MEDICINE CLINIC | Age: 73
End: 2023-05-18
Payer: MEDICARE

## 2023-05-18 DIAGNOSIS — J30.9 ALLERGIC RHINITIS, UNSPECIFIED SEASONALITY, UNSPECIFIED TRIGGER: Primary | ICD-10-CM

## 2023-05-18 DIAGNOSIS — R11.0 NAUSEA: ICD-10-CM

## 2023-05-18 DIAGNOSIS — G89.4 CHRONIC PAIN SYNDROME: Chronic | ICD-10-CM

## 2023-05-18 PROCEDURE — 95115 IMMUNOTHERAPY ONE INJECTION: CPT | Performed by: FAMILY MEDICINE

## 2023-05-18 NOTE — TELEPHONE ENCOUNTER
Rx Refill Note  Requested Prescriptions     Pending Prescriptions Disp Refills   • promethazine (PHENERGAN) 25 MG tablet [Pharmacy Med Name: PROMETHAZINE 25 MG TABLET] 90 tablet 0     Sig: TAKE ONE TABLET BY MOUTH EVERY 8 HOURS AS NEEDED FOR NAUSEA AND VOMITING      Last office visit with prescribing clinician: 4/19/2023   Last telemedicine visit with prescribing clinician: 5/18/2023   Next office visit with prescribing clinician: 10/18/2023     Peri Malin LPN  05/18/23, 16:14 EDT     -4/19/23#30

## 2023-05-19 RX ORDER — PROMETHAZINE HYDROCHLORIDE 25 MG/1
TABLET ORAL
Qty: 90 TABLET | Refills: 0 | Status: SHIPPED | OUTPATIENT
Start: 2023-05-19

## 2023-05-23 ENCOUNTER — TELEPHONE (OUTPATIENT)
Dept: ORTHOPEDIC SURGERY | Facility: CLINIC | Age: 73
End: 2023-05-23

## 2023-05-23 DIAGNOSIS — Z96.652 STATUS POST TOTAL KNEE REPLACEMENT, LEFT: ICD-10-CM

## 2023-05-23 DIAGNOSIS — M16.0 PRIMARY OSTEOARTHRITIS OF BOTH HIPS: ICD-10-CM

## 2023-05-23 DIAGNOSIS — M79.641 BILATERAL HAND PAIN: ICD-10-CM

## 2023-05-23 DIAGNOSIS — M79.642 BILATERAL HAND PAIN: ICD-10-CM

## 2023-05-23 DIAGNOSIS — M18.11 OSTEOARTHRITIS OF RIGHT THUMB: ICD-10-CM

## 2023-05-23 DIAGNOSIS — Z96.651 STATUS POST TOTAL KNEE REPLACEMENT, RIGHT: Primary | ICD-10-CM

## 2023-05-23 NOTE — TELEPHONE ENCOUNTER
"  Caller: Anne Jewell \"Anne Jewell\"    Relationship: Self    Best call back number: [phone]    What orders are you requesting (i.e. lab or imaging): XR ORDERS FOR HIPS, KNEES, & HANDS     In what timeframe would the patient need to come in: PATIENT STATED SHE WAS TOLD SHE COULD COME GET XRs ABOUT A WEEK BEFORE HER UPCOMING APPT (CURRENTLY SCHEDULED 06-08-23)     Where will you receive your lab/imaging services: AT [facility] [address]  [facility]     Additional notes: PLEASE CALL / LEAVE VMAIL NOTIFYING PATIENT WHEN XR ORDERS FOR HIPS, KNEES & HANDS HAVE BEEN ORDERED     THANKS   "

## 2023-05-30 ENCOUNTER — OFFICE VISIT (OUTPATIENT)
Dept: PAIN MEDICINE | Facility: CLINIC | Age: 73
End: 2023-05-30

## 2023-05-30 VITALS
HEART RATE: 72 BPM | HEIGHT: 61 IN | RESPIRATION RATE: 18 BRPM | SYSTOLIC BLOOD PRESSURE: 133 MMHG | DIASTOLIC BLOOD PRESSURE: 75 MMHG | BODY MASS INDEX: 34.85 KG/M2 | WEIGHT: 184.6 LBS | OXYGEN SATURATION: 96 % | TEMPERATURE: 97.8 F

## 2023-05-30 DIAGNOSIS — M51.36 DEGENERATION OF INTERVERTEBRAL DISC OF LUMBAR REGION: ICD-10-CM

## 2023-05-30 DIAGNOSIS — M54.16 LUMBAR RADICULOPATHY: ICD-10-CM

## 2023-05-30 DIAGNOSIS — G89.4 CHRONIC PAIN SYNDROME: Primary | ICD-10-CM

## 2023-05-30 DIAGNOSIS — Z79.899 ENCOUNTER FOR LONG-TERM (CURRENT) USE OF HIGH-RISK MEDICATION: ICD-10-CM

## 2023-05-30 DIAGNOSIS — M15.9 GENERALIZED OSTEOARTHRITIS: ICD-10-CM

## 2023-05-30 DIAGNOSIS — G89.4 CHRONIC PAIN SYNDROME: ICD-10-CM

## 2023-05-30 DIAGNOSIS — M06.9 RHEUMATOID ARTHRITIS, INVOLVING UNSPECIFIED SITE, UNSPECIFIED WHETHER RHEUMATOID FACTOR PRESENT: ICD-10-CM

## 2023-05-30 PROCEDURE — 1160F RVW MEDS BY RX/DR IN RCRD: CPT

## 2023-05-30 PROCEDURE — 99214 OFFICE O/P EST MOD 30 MIN: CPT

## 2023-05-30 PROCEDURE — 1125F AMNT PAIN NOTED PAIN PRSNT: CPT

## 2023-05-30 PROCEDURE — 1159F MED LIST DOCD IN RCRD: CPT

## 2023-05-30 RX ORDER — OXYCODONE HYDROCHLORIDE 80 MG/1
160 TABLET, FILM COATED, EXTENDED RELEASE ORAL EVERY 12 HOURS SCHEDULED
Qty: 120 TABLET | Refills: 0 | Status: SHIPPED | OUTPATIENT
Start: 2023-05-30

## 2023-05-30 NOTE — PROGRESS NOTES
CHIEF COMPLAINT  Back and joint pain    Subjective   Anne Jewell is a 72 y.o. female  who presents for follow-up.  She has a history of chronic back and joint pain. She reports that her back and joint pain have remained consistent since her last office visit.      Today pain is 6/10VAS in severity. Pain is located in her low back and radiates to bilateral feet. Describes this pain as a nearly continuous aching. Pain is worsened by rising from sitting to standing position, stress, weather changes, prolonged position, and walking long distances. Pain improves with rest/reposition, HEP, aqua therapy, heat/ice, and medications.      Continues with OxyContin 80mg 2 tablets BID and Baclofen 10mg 2-3/day (managed by PCP). Denies any side effects from the regimen, including constipation and somnolence. The regimen helps decrease pain by a significant amount. Notes improvement in activity and function with regimen. ADL's by self. Denies any bowel or bladder changes.      Patient reports that she takes Xanax 2mg TID as needed. This is prescribed by psychiatrist.     Her  has recently been diagnosed with cancer and she is helping care for him.     Orthopedic surgeon is Dr. Alvarado    Back Pain  This is a chronic problem. The current episode started more than 1 year ago. The problem occurs constantly. The problem has been waxing and waning since onset. The pain is present in the lumbar spine. The quality of the pain is described as aching. The pain radiates to the right foot and left foot. The pain is at a severity of 6/10. The pain is moderate. The symptoms are aggravated by standing, position and sitting (rising from sitting to standing, prolonged position, walking long distances, stress, weather changes). Pertinent negatives include no abdominal pain, chest pain, dysuria, fever, headaches, numbness or weakness. She has tried analgesics, home exercises, heat and ice (aqua therapy) for the symptoms. The treatment  provided moderate relief.   Joint Pain  This is a chronic (6/10 VAS in severity) problem. The current episode started more than 1 year ago. The problem occurs constantly. The problem has been waxing and waning. Associated symptoms include arthralgias, coughing (asthma/allergies), myalgias and neck pain. Pertinent negatives include no abdominal pain, chest pain, chills, congestion, fatigue, fever, headaches, numbness, vertigo, vomiting or weakness. The symptoms are aggravated by stress and exertion (weather changes, increased physical activity,). She has tried oral narcotics, position changes, rest, heat and ice for the symptoms. The treatment provided mild relief.      PEG Assessment   What number best describes your pain on average in the past week?6  What number best describes how, during the past week, pain has interfered with your enjoyment of life?7  What number best describes how, during the past week, pain has interfered with your general activity?  6    The following portions of the patient's history were reviewed and updated as appropriate: allergies, current medications, past family history, past medical history, past social history, past surgical history and problem list.    Review of Systems   Constitutional: Negative for chills, fatigue and fever.   HENT: Negative for congestion.    Respiratory: Positive for cough (asthma/allergies).    Cardiovascular: Negative for chest pain.   Gastrointestinal: Negative for abdominal pain, constipation, diarrhea and vomiting.   Genitourinary: Negative for difficulty urinating and dysuria.   Musculoskeletal: Positive for arthralgias, back pain, myalgias and neck pain.   Neurological: Negative for vertigo, weakness, numbness and headaches.   Psychiatric/Behavioral: Negative for sleep disturbance and suicidal ideas. The patient is not nervous/anxious.      I have reviewed and confirmed the accuracy of the ROS as documented by the MA/LPN/RN Sara Gr,  "APRN    Vitals:    05/30/23 1244   BP: 133/75   Pulse: 72   Resp: 18   Temp: 97.8 °F (36.6 °C)   SpO2: 96%   Weight: 83.7 kg (184 lb 9.6 oz)   Height: 154.9 cm (60.98\")   PainSc:   6   PainLoc: Back     Objective   Physical Exam  Constitutional:       Appearance: Normal appearance.   HENT:      Head: Normocephalic.   Cardiovascular:      Rate and Rhythm: Normal rate and regular rhythm.   Pulmonary:      Effort: Pulmonary effort is normal.      Breath sounds: Normal breath sounds.   Musculoskeletal:      Right shoulder: Tenderness present.      Left shoulder: Tenderness present.      Right wrist: Tenderness present.      Left wrist: Tenderness present.      Right hand: Tenderness present.      Left hand: Tenderness present.      Cervical back: Normal range of motion.      Lumbar back: Tenderness present. Decreased range of motion.      Right hip: Tenderness present.      Left hip: Tenderness present.      Right knee: Tenderness present.      Left knee: Tenderness present.      Comments: Back brace for support in place   Skin:     General: Skin is warm and dry.      Capillary Refill: Capillary refill takes less than 2 seconds.   Neurological:      General: No focal deficit present.      Mental Status: She is alert and oriented to person, place, and time.      Gait: Gait abnormal (slowed, ambulating with rolling walker).   Psychiatric:         Mood and Affect: Mood normal.         Behavior: Behavior normal.         Thought Content: Thought content normal.         Cognition and Memory: Cognition normal.       Assessment & Plan   Diagnoses and all orders for this visit:    1. Chronic pain syndrome (Primary)    2. Lumbar radiculopathy    3. Rheumatoid arthritis, involving unspecified site, unspecified whether rheumatoid factor present    4. Generalized osteoarthritis    5. Degeneration of intervertebral disc of lumbar region    6. Encounter for long-term (current) use of high-risk medication    --- The urine drug screen " confirmation from 11/29/22 has been reviewed and the result is appropriate based on patient history and VICENTA report  --- The patient signed an updated copy of the controlled substance agreement on 11/29/22  --- Refill OxyContin. DNF 6/7/23. Patient appears stable with current regimen. No adverse effects. Regarding continuation of opioids, there is no evidence of aberrant behavior or any red flags.  The patient continues with appropriate response to opioid therapy. ADL's remain intact by self.   --- Follow-up 1 month - scheduled for 6/30/23     VICENTA REPORT  As part of the patient's treatment plan, I am prescribing controlled substances. The patient has been made aware of appropriate use of such medications, including potential risk of somnolence, limited ability to drive and/or work safely, and the potential for dependence or overdose. It has also been made clear that these medications are for use by this patient only, without concomitant use of alcohol or other substances unless prescribed.     Patient has completed prescribing agreement detailing terms of continued prescribing of controlled substances, including monitoring VICENTA reports, urine drug screening, and pill counts if necessary. The patient is aware that inappropriate use will results in cessation of prescribing such medications.    As the clinician, I personally reviewed the VICENTA from 5/30/23 while the patient was in the office today.    History and physical exam exhibit continued safe and appropriate use of controlled substances.    Dictated utilizing Dragon dictation.

## 2023-05-30 NOTE — TELEPHONE ENCOUNTER
Patient seen in office today. Reviewed UDS and VICENTA. Both updated and appropriate. Refill appropriate. DNF 6/7/23. Please refill. Thanks. GAYATHRI

## 2023-05-31 ENCOUNTER — HOSPITAL ENCOUNTER (OUTPATIENT)
Dept: GENERAL RADIOLOGY | Facility: HOSPITAL | Age: 73
Discharge: HOME | End: 2023-05-31
Admitting: ORTHOPAEDIC SURGERY

## 2023-05-31 ENCOUNTER — CLINICAL SUPPORT (OUTPATIENT)
Dept: FAMILY MEDICINE CLINIC | Age: 73
End: 2023-05-31

## 2023-05-31 DIAGNOSIS — M79.642 BILATERAL HAND PAIN: ICD-10-CM

## 2023-05-31 DIAGNOSIS — J30.9 ALLERGIC RHINITIS, UNSPECIFIED SEASONALITY, UNSPECIFIED TRIGGER: Primary | ICD-10-CM

## 2023-05-31 DIAGNOSIS — Z96.651 STATUS POST TOTAL KNEE REPLACEMENT, RIGHT: ICD-10-CM

## 2023-05-31 DIAGNOSIS — M18.11 OSTEOARTHRITIS OF RIGHT THUMB: ICD-10-CM

## 2023-05-31 DIAGNOSIS — M25.551 RIGHT HIP PAIN: ICD-10-CM

## 2023-05-31 DIAGNOSIS — M79.641 BILATERAL HAND PAIN: ICD-10-CM

## 2023-05-31 DIAGNOSIS — M16.0 PRIMARY OSTEOARTHRITIS OF BOTH HIPS: ICD-10-CM

## 2023-05-31 DIAGNOSIS — Z96.652 STATUS POST TOTAL KNEE REPLACEMENT, LEFT: ICD-10-CM

## 2023-05-31 DIAGNOSIS — M25.551 RIGHT HIP PAIN: Primary | ICD-10-CM

## 2023-05-31 PROCEDURE — 73560 X-RAY EXAM OF KNEE 1 OR 2: CPT

## 2023-05-31 PROCEDURE — 73130 X-RAY EXAM OF HAND: CPT

## 2023-05-31 PROCEDURE — 95115 IMMUNOTHERAPY ONE INJECTION: CPT | Performed by: FAMILY MEDICINE

## 2023-05-31 PROCEDURE — 73502 X-RAY EXAM HIP UNI 2-3 VIEWS: CPT

## 2023-05-31 PROCEDURE — 73521 X-RAY EXAM HIPS BI 2 VIEWS: CPT

## 2023-06-01 DIAGNOSIS — G89.4 CHRONIC PAIN SYNDROME: Chronic | ICD-10-CM

## 2023-06-01 RX ORDER — BACLOFEN 10 MG/1
TABLET ORAL
Qty: 270 TABLET | Refills: 0 | Status: SHIPPED | OUTPATIENT
Start: 2023-06-01

## 2023-06-01 NOTE — TELEPHONE ENCOUNTER
Rx Refill Note  Requested Prescriptions     Pending Prescriptions Disp Refills   • baclofen (LIORESAL) 10 MG tablet [Pharmacy Med Name: BACLOFEN 10 MG TABLET] 270 tablet 0     Sig: TAKE ONE TABLET BY MOUTH THREE TIMES A DAY      Last office visit with prescribing clinician: 4/19/2023   Last telemedicine visit with prescribing clinician: Visit date not found   Next office visit with prescribing clinician: 10/18/2023  Last refill sent: 02/08/23, #270      Vaishnavi Keller LPN  06/01/23, 10:58 EDT

## 2023-06-08 ENCOUNTER — CLINICAL SUPPORT (OUTPATIENT)
Dept: FAMILY MEDICINE CLINIC | Age: 73
End: 2023-06-08
Payer: MEDICARE

## 2023-06-08 DIAGNOSIS — J30.9 ALLERGIC RHINITIS, UNSPECIFIED SEASONALITY, UNSPECIFIED TRIGGER: Primary | ICD-10-CM

## 2023-06-08 PROCEDURE — 95115 IMMUNOTHERAPY ONE INJECTION: CPT | Performed by: FAMILY MEDICINE

## 2023-06-14 ENCOUNTER — CLINICAL SUPPORT (OUTPATIENT)
Dept: FAMILY MEDICINE CLINIC | Age: 73
End: 2023-06-14
Payer: MEDICARE

## 2023-06-14 DIAGNOSIS — J30.9 ALLERGIC RHINITIS, UNSPECIFIED SEASONALITY, UNSPECIFIED TRIGGER: Primary | ICD-10-CM

## 2023-06-14 PROCEDURE — 95115 IMMUNOTHERAPY ONE INJECTION: CPT | Performed by: FAMILY MEDICINE

## 2023-07-19 PROBLEM — R35.0 FREQUENCY OF URINATION: Status: ACTIVE | Noted: 2023-07-19

## 2023-07-19 PROBLEM — R41.3 MEMORY LOSS: Status: ACTIVE | Noted: 2023-07-19

## 2023-07-19 PROBLEM — H53.9 VISUAL CHANGES: Status: ACTIVE | Noted: 2023-07-19

## 2023-07-26 ENCOUNTER — CLINICAL SUPPORT (OUTPATIENT)
Dept: FAMILY MEDICINE CLINIC | Age: 73
End: 2023-07-26
Payer: MEDICARE

## 2023-07-26 DIAGNOSIS — J30.9 ALLERGIC RHINITIS, UNSPECIFIED SEASONALITY, UNSPECIFIED TRIGGER: Primary | ICD-10-CM

## 2023-07-26 PROCEDURE — 95115 IMMUNOTHERAPY ONE INJECTION: CPT | Performed by: FAMILY MEDICINE

## 2023-07-28 ENCOUNTER — OFFICE VISIT (OUTPATIENT)
Dept: GASTROENTEROLOGY | Facility: CLINIC | Age: 73
End: 2023-07-28
Payer: MEDICARE

## 2023-07-28 ENCOUNTER — HOSPITAL ENCOUNTER (OUTPATIENT)
Dept: ULTRASOUND IMAGING | Facility: HOSPITAL | Age: 73
Discharge: HOME | End: 2023-07-28
Payer: MEDICARE

## 2023-07-28 ENCOUNTER — OFFICE VISIT (OUTPATIENT)
Dept: PAIN MEDICINE | Facility: CLINIC | Age: 73
End: 2023-07-28
Payer: MEDICARE

## 2023-07-28 ENCOUNTER — LAB (OUTPATIENT)
Dept: LAB | Facility: HOSPITAL | Age: 73
End: 2023-07-28
Payer: MEDICARE

## 2023-07-28 VITALS
BODY MASS INDEX: 34.4 KG/M2 | WEIGHT: 182.2 LBS | OXYGEN SATURATION: 97 % | HEART RATE: 68 BPM | DIASTOLIC BLOOD PRESSURE: 60 MMHG | TEMPERATURE: 97.1 F | HEIGHT: 61 IN | RESPIRATION RATE: 12 BRPM | SYSTOLIC BLOOD PRESSURE: 110 MMHG

## 2023-07-28 VITALS
BODY MASS INDEX: 34.4 KG/M2 | HEIGHT: 61 IN | HEART RATE: 80 BPM | DIASTOLIC BLOOD PRESSURE: 70 MMHG | OXYGEN SATURATION: 97 % | SYSTOLIC BLOOD PRESSURE: 110 MMHG | WEIGHT: 182.2 LBS | TEMPERATURE: 96.6 F

## 2023-07-28 DIAGNOSIS — G89.4 CHRONIC PAIN SYNDROME: ICD-10-CM

## 2023-07-28 DIAGNOSIS — G89.4 CHRONIC PAIN SYNDROME: Primary | ICD-10-CM

## 2023-07-28 DIAGNOSIS — M51.36 DEGENERATION OF INTERVERTEBRAL DISC OF LUMBAR REGION: ICD-10-CM

## 2023-07-28 DIAGNOSIS — M15.9 GENERALIZED OSTEOARTHRITIS: ICD-10-CM

## 2023-07-28 DIAGNOSIS — K83.8 DILATED BILE DUCT: ICD-10-CM

## 2023-07-28 DIAGNOSIS — M54.16 LUMBAR RADICULOPATHY: ICD-10-CM

## 2023-07-28 DIAGNOSIS — Z79.899 ENCOUNTER FOR LONG-TERM (CURRENT) USE OF HIGH-RISK MEDICATION: ICD-10-CM

## 2023-07-28 DIAGNOSIS — R74.8 ELEVATED LIVER ENZYMES: Primary | ICD-10-CM

## 2023-07-28 DIAGNOSIS — M06.9 RHEUMATOID ARTHRITIS, INVOLVING UNSPECIFIED SITE, UNSPECIFIED WHETHER RHEUMATOID FACTOR PRESENT: ICD-10-CM

## 2023-07-28 DIAGNOSIS — R74.8 ELEVATED ALKALINE PHOSPHATASE LEVEL: ICD-10-CM

## 2023-07-28 DIAGNOSIS — R94.5 ABNORMAL RESULTS OF LIVER FUNCTION STUDIES: ICD-10-CM

## 2023-07-28 DIAGNOSIS — R74.8 ELEVATED LIVER ENZYMES: ICD-10-CM

## 2023-07-28 LAB
ALBUMIN SERPL-MCNC: 2.6 G/DL (ref 3.5–5.2)
ALP SERPL-CCNC: 436 U/L (ref 39–117)
ALPHA1 GLOB MFR UR ELPH: 196 MG/DL (ref 90–200)
ALT SERPL W P-5'-P-CCNC: 63 U/L (ref 1–33)
AST SERPL-CCNC: 143 U/L (ref 1–32)
BILIRUB CONJ SERPL-MCNC: 0.5 MG/DL (ref 0–0.3)
BILIRUB INDIRECT SERPL-MCNC: 0.2 MG/DL
BILIRUB SERPL-MCNC: 0.7 MG/DL (ref 0–1.2)
CERULOPLASMIN SERPL-MCNC: 26 MG/DL (ref 19–39)
FERRITIN SERPL-MCNC: 56.6 NG/ML (ref 13–150)
GGT SERPL-CCNC: 386 U/L (ref 5–36)
HAV IGM SERPL QL IA: NORMAL
HBV CORE IGM SERPL QL IA: NORMAL
HBV SURFACE AG SERPL QL IA: NORMAL
HCV AB SER DONR QL: NORMAL
IGA1 MFR SER: 520 MG/DL (ref 70–400)
IGG1 SER-MCNC: 947 MG/DL (ref 700–1600)
IGM SERPL-MCNC: 43 MG/DL (ref 40–230)
IRON 24H UR-MRATE: 41 MCG/DL (ref 37–145)
IRON SATN MFR SERPL: 16 % (ref 20–50)
POC AMPHETAMINES: NEGATIVE
POC BARBITURATES: NEGATIVE
POC BENZODIAZEPHINES: POSITIVE
POC COCAINE: NEGATIVE
POC METHADONE: NEGATIVE
POC METHAMPHETAMINE SCREEN URINE: NEGATIVE
POC OPIATES: NEGATIVE
POC OXYCODONE: POSITIVE
POC PHENCYCLIDINE: NEGATIVE
POC PROPOXYPHENE: NEGATIVE
POC THC: NEGATIVE
POC TRICYCLIC ANTIDEPRESSANTS: NEGATIVE
PROT SERPL-MCNC: 6 G/DL (ref 6–8.5)
TIBC SERPL-MCNC: 249 MCG/DL (ref 298–536)
TRANSFERRIN SERPL-MCNC: 167 MG/DL (ref 200–360)

## 2023-07-28 PROCEDURE — 86364 TISS TRNSGLTMNASE EA IG CLAS: CPT | Performed by: NURSE PRACTITIONER

## 2023-07-28 PROCEDURE — 82784 ASSAY IGA/IGD/IGG/IGM EACH: CPT | Performed by: NURSE PRACTITIONER

## 2023-07-28 PROCEDURE — 1160F RVW MEDS BY RX/DR IN RCRD: CPT

## 2023-07-28 PROCEDURE — 1125F AMNT PAIN NOTED PAIN PRSNT: CPT

## 2023-07-28 PROCEDURE — 86225 DNA ANTIBODY NATIVE: CPT | Performed by: NURSE PRACTITIONER

## 2023-07-28 PROCEDURE — 1159F MED LIST DOCD IN RCRD: CPT | Performed by: NURSE PRACTITIONER

## 2023-07-28 PROCEDURE — 76705 ECHO EXAM OF ABDOMEN: CPT

## 2023-07-28 PROCEDURE — 80074 ACUTE HEPATITIS PANEL: CPT | Performed by: NURSE PRACTITIONER

## 2023-07-28 PROCEDURE — 1159F MED LIST DOCD IN RCRD: CPT

## 2023-07-28 PROCEDURE — 86038 ANTINUCLEAR ANTIBODIES: CPT | Performed by: NURSE PRACTITIONER

## 2023-07-28 PROCEDURE — 82728 ASSAY OF FERRITIN: CPT | Performed by: NURSE PRACTITIONER

## 2023-07-28 PROCEDURE — 99214 OFFICE O/P EST MOD 30 MIN: CPT | Performed by: NURSE PRACTITIONER

## 2023-07-28 PROCEDURE — 86015 ACTIN ANTIBODY EACH: CPT | Performed by: NURSE PRACTITIONER

## 2023-07-28 PROCEDURE — 99214 OFFICE O/P EST MOD 30 MIN: CPT

## 2023-07-28 PROCEDURE — 1160F RVW MEDS BY RX/DR IN RCRD: CPT | Performed by: NURSE PRACTITIONER

## 2023-07-28 PROCEDURE — 82977 ASSAY OF GGT: CPT | Performed by: NURSE PRACTITIONER

## 2023-07-28 PROCEDURE — 80076 HEPATIC FUNCTION PANEL: CPT | Performed by: NURSE PRACTITIONER

## 2023-07-28 PROCEDURE — 80305 DRUG TEST PRSMV DIR OPT OBS: CPT

## 2023-07-28 PROCEDURE — 36415 COLL VENOUS BLD VENIPUNCTURE: CPT | Performed by: NURSE PRACTITIONER

## 2023-07-28 PROCEDURE — 83540 ASSAY OF IRON: CPT | Performed by: NURSE PRACTITIONER

## 2023-07-28 PROCEDURE — 86381 MITOCHONDRIAL ANTIBODY EACH: CPT | Performed by: NURSE PRACTITIONER

## 2023-07-28 PROCEDURE — 86231 EMA EACH IG CLASS: CPT | Performed by: NURSE PRACTITIONER

## 2023-07-28 PROCEDURE — 82390 ASSAY OF CERULOPLASMIN: CPT | Performed by: NURSE PRACTITIONER

## 2023-07-28 PROCEDURE — 84466 ASSAY OF TRANSFERRIN: CPT | Performed by: NURSE PRACTITIONER

## 2023-07-28 PROCEDURE — 82103 ALPHA-1-ANTITRYPSIN TOTAL: CPT | Performed by: NURSE PRACTITIONER

## 2023-07-28 RX ORDER — OXYCODONE HYDROCHLORIDE 80 MG/1
160 TABLET, FILM COATED, EXTENDED RELEASE ORAL EVERY 12 HOURS SCHEDULED
Qty: 120 TABLET | Refills: 0 | Status: SHIPPED | OUTPATIENT
Start: 2023-07-28

## 2023-07-28 NOTE — TELEPHONE ENCOUNTER
Patient seen in office today. Reviewed UDS and VICENTA. Both updated and appropriate. Refill appropriate.  DNF 8/6/23.

## 2023-07-28 NOTE — PATIENT INSTRUCTIONS
Check lab work today to evaluate cause of liver enzyme elevation.    Schedule right upper quadrant ultrasound for further evaluation of elevated liver enzymes.

## 2023-07-28 NOTE — PROGRESS NOTES
CHIEF COMPLAINT  Back and joint pain     URINE POC:BZO,OXY    Subjective   Anne Jewell is a 72 y.o. female  who presents for follow-up.  She has a history of chronic back and joint pain. She reports that her back and joint pain have remained consistent since her last office visit. Her pain level varies based on activity level.      Today pain is 7/10VAS in severity. Pain is located in her low back and radiates to bilateral feet. Describes this pain as a nearly continuous aching. Pain is worsened by rising from sitting to standing position, stress, weather changes, prolonged position, and walking long distances. Pain improves with rest/reposition, HEP, aqua therapy, heat/ice, and medications.      Continues with OxyContin 80mg 2 tablets BID and Baclofen 10mg 2-3/day (managed by PCP). Denies any side effects from the regimen, including constipation and somnolence. The regimen helps decrease pain by a significant amount. Notes improvement in activity and function with regimen. ADL's by self. Denies any bowel or bladder changes. Patient reports that she takes Xanax 2mg TID as needed. This is prescribed by psychiatrist.      Orthopedic surgeon is Dr. Alvarado    She continues to care for her  that has recently been diagnosed with cancer     She is scheduled to see EUGENIO Hickey with GI due to evaluated liver enzymes     Back Pain  This is a chronic problem. The current episode started more than 1 year ago. The problem occurs constantly. The problem has been waxing and waning since onset. The pain is present in the lumbar spine. The quality of the pain is described as aching. The pain radiates to the right foot and left foot. The pain is at a severity of 7/10. The pain is moderate. The symptoms are aggravated by standing, position and sitting (rising from sitting to standing, prolonged position, walking long distances, stress, weather changes). Associated symptoms include dysuria (UTI) and weakness. Pertinent  negatives include no abdominal pain, chest pain, fever, headaches or numbness. She has tried analgesics, home exercises, heat and ice (aqua therapy) for the symptoms. The treatment provided moderate relief.   Joint Pain  This is a chronic (6/10 VAS in severity) problem. The current episode started more than 1 year ago. The problem occurs constantly. The problem has been waxing and waning. Associated symptoms include arthralgias, myalgias, neck pain and weakness. Pertinent negatives include no abdominal pain, chest pain, chills, congestion, coughing, fatigue, fever, headaches, numbness, vertigo or vomiting. The symptoms are aggravated by stress and exertion (weather changes, increased physical activity,). She has tried oral narcotics, position changes, rest, heat and ice for the symptoms. The treatment provided mild relief.      PEG Assessment   What number best describes your pain on average in the past week?8  What number best describes how, during the past week, pain has interfered with your enjoyment of life?9  What number best describes how, during the past week, pain has interfered with your general activity?  9    Review of Pertinent Medial Data---    The following portions of the patient's history were reviewed and updated as appropriate: allergies, current medications, past family history, past medical history, past social history, past surgical history, and problem list.    Review of Systems   Constitutional:  Negative for activity change, chills, fatigue and fever.   HENT:  Negative for congestion.    Respiratory:  Negative for cough and chest tightness.    Cardiovascular:  Negative for chest pain.   Gastrointestinal:  Negative for abdominal pain, constipation, diarrhea and vomiting.   Genitourinary:  Positive for difficulty urinating (UTI) and dysuria (UTI).   Musculoskeletal:  Positive for arthralgias, back pain, myalgias and neck pain.   Neurological:  Positive for weakness. Negative for dizziness,  "vertigo, light-headedness, numbness and headaches.   Psychiatric/Behavioral:  Negative for agitation, sleep disturbance and suicidal ideas. The patient is not nervous/anxious.      I have reviewed and confirmed the accuracy of the ROS as documented by the MA/LPN/RN EUGENIO Ayala    Vitals:    07/28/23 1051   BP: 110/60   BP Location: Right arm   Patient Position: Sitting   Cuff Size: Large Adult   Pulse: 68   Resp: 12   Temp: 97.1 °F (36.2 °C)   TempSrc: Temporal   SpO2: 97%   Weight: 82.6 kg (182 lb 3.2 oz)   Height: 154.9 cm (61\")   PainSc:   7     Objective   Physical Exam  Constitutional:       Appearance: Normal appearance.   HENT:      Head: Normocephalic.   Cardiovascular:      Rate and Rhythm: Normal rate and regular rhythm.   Pulmonary:      Effort: Pulmonary effort is normal.      Breath sounds: Normal breath sounds.   Musculoskeletal:      Right shoulder: Tenderness present.      Left shoulder: Tenderness present.      Right wrist: Tenderness present.      Left wrist: Tenderness present.      Right hand: Tenderness present.      Left hand: Tenderness present.      Cervical back: Normal range of motion.      Lumbar back: Tenderness present. Decreased range of motion.      Right hip: Tenderness present.      Left hip: Tenderness present.      Right knee: Tenderness present.      Left knee: Tenderness present.      Comments: Back brace for support in place   Skin:     General: Skin is warm and dry.      Capillary Refill: Capillary refill takes less than 2 seconds.   Neurological:      General: No focal deficit present.      Mental Status: She is alert and oriented to person, place, and time.      Gait: Gait abnormal (slowed, ambulating with rolling walker).   Psychiatric:         Mood and Affect: Mood normal.         Behavior: Behavior normal.         Thought Content: Thought content normal.         Cognition and Memory: Cognition normal.     Assessment & Plan   Diagnoses and all orders for this " visit:    1. Chronic pain syndrome (Primary)    2. Lumbar radiculopathy    3. Rheumatoid arthritis, involving unspecified site, unspecified whether rheumatoid factor present    4. Generalized osteoarthritis    5. Degeneration of intervertebral disc of lumbar region    6. Encounter for long-term (current) use of high-risk medication    --- Routine UDS in office today as part of monitoring requirements for controlled substances.  The specimen was viewed and the immunoassay result reviewed and is + OXY/BZO. This specimen will be sent to [facility] laboratory for confirmation.     --- The patient signed an updated copy of the controlled substance agreement on 11/29/22  --- Narcan prescription current - will need new prescription next month   --- Continue OxyContin. DNF 8/6/23. Patient appears stable with current regimen. No adverse effects. Regarding continuation of opioids, there is no evidence of aberrant behavior or any red flags.  The patient continues with appropriate response to opioid therapy. ADL's remain intact by self.   --- Follow up 1 month     Anne Jewell reports a pain score of 7.  Given her pain assessment as noted, treatment options were discussed and the following options were decided upon as a follow-up plan to address the patient's pain: continuation of current treatment plan for pain (see above pain).       VICENTA REPORT  As part of the patient's treatment plan, I am prescribing controlled substances. The patient has been made aware of appropriate use of such medications, including potential risk of somnolence, limited ability to drive and/or work safely, and the potential for dependence or overdose. It has also been made clear that these medications are for use by this patient only, without concomitant use of alcohol or other substances unless prescribed.     Patient has completed prescribing agreement detailing terms of continued prescribing of controlled substances, including monitoring VICENTA  reports, urine drug screening, and pill counts if necessary. The patient is aware that inappropriate use will results in cessation of prescribing such medications.    As the clinician, I personally reviewed the VICENTA from 7/28/23 while the patient was in the office today.    History and physical exam exhibit continued safe and appropriate use of controlled substances.    Dictated utilizing Dragon dictation.

## 2023-07-28 NOTE — PROGRESS NOTES
"Chief Complaint   Patient presents with    Elevated Hepatic Enzymes         History of Present Illness  Patient is a 72-year-old female who presents today for follow-up.  She has a history of elevated liver enzymes this was evaluated with serologic work-up in 2020 that was negative with the exception of a positive SHAKA.  She has had a cholecystectomy.    She was referred back for increased liver function test.  Lab work from July 19 showed AST to 221, ALT 94, alkaline phosphatase 336 and bilirubin of 1.5.    Patient presents today for follow-up.  She denies any abdominal pain, nausea, or vomiting.  Denies any jaundice.    Reports she was recently on a nitrofurantoin for urinary tract infection but otherwise no new medications.  Reports she has been experiencing some allergy symptoms.    Denies alcohol use or use of acetaminophen.    Denies any family history of liver disease.     Result Review :       Comprehensive metabolic panel (07/19/2023 14:52)    Office Visit with Jewell Stephens APRN (07/19/2023)    CT Abdomen With Contrast (09/17/2020 12:10)    Office Visit with Isael Barker MD (07/27/2020)    Vital Signs:   /70   Pulse 80   Temp 96.6 °F (35.9 °C)   Ht 154.9 cm (60.98\")   Wt 82.6 kg (182 lb 3.2 oz)   SpO2 97%   BMI 34.44 kg/m²     Body mass index is 34.44 kg/m².     Physical Exam  Vitals reviewed.   Constitutional:       General: She is not in acute distress.     Appearance: She is well-developed.   HENT:      Head: Normocephalic and atraumatic.   Pulmonary:      Effort: Pulmonary effort is normal. No respiratory distress.   Musculoskeletal:      Comments: Back brace in place   Skin:     General: Skin is dry.      Coloration: Skin is not pale.   Neurological:      Mental Status: She is alert and oriented to person, place, and time.   Psychiatric:         Thought Content: Thought content normal.         Assessment and Plan    Diagnoses and all orders for this visit:    1. Elevated " liver enzymes (Primary)  -     US Abdomen Limited; Future  -     Alpha - 1 - Antitrypsin  -     SHAKA  -     Anti-Smooth Muscle Antibody Titer  -     Ferritin  -     Celiac Disease Panel  -     Ceruloplasmin  -     Gamma GT  -     Hepatitis Panel, Acute  -     IgG, IgA, IgM  -     Iron Profile  -     Mitochondrial Antibodies, M2  -     Hepatic Function Panel    2. Elevated alkaline phosphatase level  -     US Abdomen Limited; Future  -     Alpha - 1 - Antitrypsin  -     SHAKA  -     Anti-Smooth Muscle Antibody Titer  -     Ferritin  -     Celiac Disease Panel  -     Ceruloplasmin  -     Gamma GT  -     Hepatitis Panel, Acute  -     IgG, IgA, IgM  -     Iron Profile  -     Mitochondrial Antibodies, M2  -     Hepatic Function Panel    3. Abnormal results of liver function studies  -     Hepatitis Panel, Acute         Discussion  Presents today for evaluation of elevated liver function tests.  Recommended lab work to evaluate potential cause of this.  Due to positive SHAKA previously question if could have potential autoimmune cause of liver enzyme elevation which we will evaluate for today.  We will also obtain a right upper quadrant ultrasound for further evaluation.  Dependent upon test findings and clinical course, may need to consider liver biopsy or consider MRCP.  Will provide further treatment recommendations dependent upon test findings and clinical course.          Follow Up   Return for Follow up to review results after testing complete.    Patient Instructions   Check lab work today to evaluate cause of liver enzyme elevation.    Schedule right upper quadrant ultrasound for further evaluation of elevated liver enzymes.

## 2023-07-29 LAB
ANA SER QL: POSITIVE
DSDNA AB SER-ACNC: <1 IU/ML (ref 0–9)
Lab: NORMAL
MITOCHONDRIA M2 IGG SER-ACNC: <20 UNITS (ref 0–20)
SMA IGG SER-ACNC: 4 UNITS (ref 0–19)

## 2023-07-31 LAB
ENDOMYSIUM IGA SER QL: NEGATIVE
IGA SERPL-MCNC: 540 MG/DL (ref 64–422)
TTG IGA SER-ACNC: <2 U/ML (ref 0–3)

## 2023-08-02 ENCOUNTER — CLINICAL SUPPORT (OUTPATIENT)
Dept: FAMILY MEDICINE CLINIC | Age: 73
End: 2023-08-02
Payer: MEDICARE

## 2023-08-02 ENCOUNTER — TELEPHONE (OUTPATIENT)
Dept: FAMILY MEDICINE CLINIC | Age: 73
End: 2023-08-02

## 2023-08-02 DIAGNOSIS — R11.0 NAUSEA: ICD-10-CM

## 2023-08-02 DIAGNOSIS — G89.4 CHRONIC PAIN SYNDROME: Chronic | ICD-10-CM

## 2023-08-02 DIAGNOSIS — J30.9 ALLERGIC RHINITIS, UNSPECIFIED SEASONALITY, UNSPECIFIED TRIGGER: Primary | ICD-10-CM

## 2023-08-02 PROCEDURE — 95115 IMMUNOTHERAPY ONE INJECTION: CPT | Performed by: FAMILY MEDICINE

## 2023-08-02 NOTE — TELEPHONE ENCOUNTER
Caller: Anne Jewell    Relationship: Self    Best call back number: [phone]    What medication are you requesting: SEDATIVE FOR MRI PATIENT CLAUSTROPHOBIC    What are your current symptoms: N/A    How long have you been experiencing symptoms: N/A    Have you had these symptoms before:    [] Yes  [] No    Have you been treated for these symptoms before:   [] Yes  [] No    If a prescription is needed, what is your preferred pharmacy and phone number: [facility] [id number] - [place], [place] - [address] [facility] [phone] [facility] [phone] [place]     Additional notes:PATIENT IS HAVING AN MRI 08/24/2023 AT 10:30 AT [facility] AND SHE WILL NEED THIS MEDICATION BEFORE HAVING MRI MILD SEDATION THEY TOLD HER 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 PLEASE CALL PATIENT BACK AND ADVISE BEFORE SENDING PRESCRIPTION TO PHARMACY. SHE WANTED TO KNOW IF YOU WOULD PROVIDE THIS MEDICATION THE DAY OF THE MRI OR WOULD PREFER SENDING SCRIPT TO [facility].

## 2023-08-02 NOTE — TELEPHONE ENCOUNTER
Pt has had a MRI in the past and she has taken something but doesn't remember what it was.  She said it just helped to relax her.

## 2023-08-03 RX ORDER — PROMETHAZINE HYDROCHLORIDE 25 MG/1
TABLET ORAL
Qty: 90 TABLET | Refills: 0 | Status: SHIPPED | OUTPATIENT
Start: 2023-08-03

## 2023-08-03 NOTE — TELEPHONE ENCOUNTER
Pt said yes she does have the xanax, she said she usually takes it at night for sleep.  She will take one of them prior to her appt.

## 2023-08-03 NOTE — TELEPHONE ENCOUNTER
She has xanax on her rx list and is given to her by Héctor Freeman, that is used to help with anxiety and she could use it for her MRI, on her shan she got #270 in June and she was + on her UDS for this in June, check with her

## 2023-08-07 RX ORDER — POTASSIUM CHLORIDE 1500 MG/1
TABLET, EXTENDED RELEASE ORAL
Qty: 180 TABLET | Refills: 0 | Status: SHIPPED | OUTPATIENT
Start: 2023-08-07

## 2023-08-09 ENCOUNTER — CLINICAL SUPPORT (OUTPATIENT)
Dept: FAMILY MEDICINE CLINIC | Age: 73
End: 2023-08-09
Payer: MEDICARE

## 2023-08-09 DIAGNOSIS — J30.9 ALLERGIC RHINITIS, UNSPECIFIED SEASONALITY, UNSPECIFIED TRIGGER: Primary | ICD-10-CM

## 2023-08-09 PROCEDURE — 95115 IMMUNOTHERAPY ONE INJECTION: CPT | Performed by: FAMILY MEDICINE

## 2023-08-15 ENCOUNTER — CLINICAL SUPPORT (OUTPATIENT)
Dept: FAMILY MEDICINE CLINIC | Age: 73
End: 2023-08-15
Payer: MEDICARE

## 2023-08-15 DIAGNOSIS — J30.9 ALLERGIC RHINITIS, UNSPECIFIED SEASONALITY, UNSPECIFIED TRIGGER: Primary | ICD-10-CM

## 2023-08-15 PROCEDURE — 95115 IMMUNOTHERAPY ONE INJECTION: CPT | Performed by: FAMILY MEDICINE

## 2023-08-24 ENCOUNTER — HOSPITAL ENCOUNTER (OUTPATIENT)
Dept: MRI IMAGING | Facility: HOSPITAL | Age: 73
Discharge: HOME | End: 2023-08-24
Admitting: NURSE PRACTITIONER
Payer: MEDICARE

## 2023-08-24 ENCOUNTER — CLINICAL SUPPORT (OUTPATIENT)
Dept: FAMILY MEDICINE CLINIC | Age: 73
End: 2023-08-24
Payer: MEDICARE

## 2023-08-24 DIAGNOSIS — K83.8 DILATED BILE DUCT: ICD-10-CM

## 2023-08-24 DIAGNOSIS — R74.8 ELEVATED LIVER ENZYMES: ICD-10-CM

## 2023-08-24 DIAGNOSIS — J30.9 ALLERGIC RHINITIS, UNSPECIFIED SEASONALITY, UNSPECIFIED TRIGGER: Primary | ICD-10-CM

## 2023-08-24 DIAGNOSIS — R74.8 ELEVATED ALKALINE PHOSPHATASE LEVEL: ICD-10-CM

## 2023-08-24 LAB
CREAT BLDA-MCNC: 1.1 MG/DL
EGFRCR SERPLBLD CKD-EPI 2021: 53.5 ML/MIN/1.73

## 2023-08-24 PROCEDURE — 74183 MRI ABD W/O CNTR FLWD CNTR: CPT

## 2023-08-24 PROCEDURE — 82565 ASSAY OF CREATININE: CPT

## 2023-08-24 PROCEDURE — 0 GADOBENATE DIMEGLUMINE 529 MG/ML SOLUTION: Performed by: NURSE PRACTITIONER

## 2023-08-24 PROCEDURE — 95115 IMMUNOTHERAPY ONE INJECTION: CPT | Performed by: FAMILY MEDICINE

## 2023-08-24 PROCEDURE — A9577 INJ MULTIHANCE: HCPCS | Performed by: NURSE PRACTITIONER

## 2023-08-24 RX ADMIN — GADOBENATE DIMEGLUMINE 20 ML: 529 INJECTION, SOLUTION INTRAVENOUS at 12:19

## 2023-08-25 ENCOUNTER — TELEPHONE (OUTPATIENT)
Dept: GASTROENTEROLOGY | Facility: CLINIC | Age: 73
End: 2023-08-25
Payer: MEDICARE

## 2023-08-25 ENCOUNTER — HOSPITAL ENCOUNTER (INPATIENT)
Facility: HOSPITAL | Age: 73
LOS: 21 days | Discharge: HOME | DRG: 423 | End: 2023-09-15
Attending: STUDENT IN AN ORGANIZED HEALTH CARE EDUCATION/TRAINING PROGRAM | Admitting: INTERNAL MEDICINE
Payer: MEDICARE

## 2023-08-25 DIAGNOSIS — G47.34 NOCTURNAL HYPOXIA: ICD-10-CM

## 2023-08-25 DIAGNOSIS — C16.9 GASTRIC ADENOCARCINOMA: Primary | ICD-10-CM

## 2023-08-25 DIAGNOSIS — K80.50 CHOLEDOCHOLITHIASIS: ICD-10-CM

## 2023-08-25 DIAGNOSIS — C16.3 MALIGNANT NEOPLASM OF PYLORIC ANTRUM: ICD-10-CM

## 2023-08-25 DIAGNOSIS — Z86.711 HISTORY OF PULMONARY EMBOLISM: ICD-10-CM

## 2023-08-25 LAB
ALBUMIN SERPL-MCNC: 2.3 G/DL (ref 3.5–5.2)
ALBUMIN/GLOB SERPL: 0.7 G/DL
ALP SERPL-CCNC: 538 U/L (ref 39–117)
ALT SERPL W P-5'-P-CCNC: 33 U/L (ref 1–33)
ANION GAP SERPL CALCULATED.3IONS-SCNC: 8.7 MMOL/L (ref 5–15)
AST SERPL-CCNC: 70 U/L (ref 1–32)
BILIRUB SERPL-MCNC: 1.6 MG/DL (ref 0–1.2)
BUN SERPL-MCNC: 16 MG/DL (ref 8–23)
BUN/CREAT SERPL: 15.5 (ref 7–25)
CALCIUM SPEC-SCNC: 8.2 MG/DL (ref 8.6–10.5)
CHLORIDE SERPL-SCNC: 101 MMOL/L (ref 98–107)
CO2 SERPL-SCNC: 28.3 MMOL/L (ref 22–29)
CREAT SERPL-MCNC: 1.03 MG/DL (ref 0.57–1)
DEPRECATED RDW RBC AUTO: 48 FL (ref 37–54)
EGFRCR SERPLBLD CKD-EPI 2021: 57.9 ML/MIN/1.73
ERYTHROCYTE [DISTWIDTH] IN BLOOD BY AUTOMATED COUNT: 13.6 % (ref 12.3–15.4)
GLOBULIN UR ELPH-MCNC: 3.4 GM/DL
GLUCOSE SERPL-MCNC: 89 MG/DL (ref 65–99)
HCT VFR BLD AUTO: 32.5 % (ref 34–46.6)
HGB BLD-MCNC: 10.8 G/DL (ref 12–15.9)
INR PPP: 1.19 (ref 0.9–1.1)
MAGNESIUM SERPL-MCNC: 1.6 MG/DL (ref 1.6–2.4)
MCH RBC QN AUTO: 32.4 PG (ref 26.6–33)
MCHC RBC AUTO-ENTMCNC: 33.2 G/DL (ref 31.5–35.7)
MCV RBC AUTO: 97.6 FL (ref 79–97)
PHOSPHATE SERPL-MCNC: 3.3 MG/DL (ref 2.5–4.5)
PLATELET # BLD AUTO: 256 10*3/MM3 (ref 140–450)
PMV BLD AUTO: 9.6 FL (ref 6–12)
POTASSIUM SERPL-SCNC: 3.6 MMOL/L (ref 3.5–5.2)
PROT SERPL-MCNC: 5.7 G/DL (ref 6–8.5)
PROTHROMBIN TIME: 15.2 SECONDS (ref 11.7–14.2)
RBC # BLD AUTO: 3.33 10*6/MM3 (ref 3.77–5.28)
SODIUM SERPL-SCNC: 138 MMOL/L (ref 136–145)
WBC NRBC COR # BLD: 5.27 10*3/MM3 (ref 3.4–10.8)

## 2023-08-25 PROCEDURE — 63710000001 PROMETHAZINE PER 12.5 MG: Performed by: STUDENT IN AN ORGANIZED HEALTH CARE EDUCATION/TRAINING PROGRAM

## 2023-08-25 PROCEDURE — 25010000002 CEFTRIAXONE PER 250 MG: Performed by: STUDENT IN AN ORGANIZED HEALTH CARE EDUCATION/TRAINING PROGRAM

## 2023-08-25 PROCEDURE — 84100 ASSAY OF PHOSPHORUS: CPT | Performed by: STUDENT IN AN ORGANIZED HEALTH CARE EDUCATION/TRAINING PROGRAM

## 2023-08-25 PROCEDURE — 80053 COMPREHEN METABOLIC PANEL: CPT | Performed by: STUDENT IN AN ORGANIZED HEALTH CARE EDUCATION/TRAINING PROGRAM

## 2023-08-25 PROCEDURE — 83735 ASSAY OF MAGNESIUM: CPT | Performed by: STUDENT IN AN ORGANIZED HEALTH CARE EDUCATION/TRAINING PROGRAM

## 2023-08-25 PROCEDURE — 85610 PROTHROMBIN TIME: CPT | Performed by: STUDENT IN AN ORGANIZED HEALTH CARE EDUCATION/TRAINING PROGRAM

## 2023-08-25 PROCEDURE — 85027 COMPLETE CBC AUTOMATED: CPT | Performed by: STUDENT IN AN ORGANIZED HEALTH CARE EDUCATION/TRAINING PROGRAM

## 2023-08-25 RX ORDER — DIPHENOXYLATE HYDROCHLORIDE AND ATROPINE SULFATE 2.5; .025 MG/1; MG/1
1 TABLET ORAL DAILY
Status: DISCONTINUED | OUTPATIENT
Start: 2023-08-26 | End: 2023-09-15 | Stop reason: HOSPADM

## 2023-08-25 RX ORDER — HYDROCHLOROTHIAZIDE 25 MG/1
25 TABLET ORAL DAILY
Status: DISCONTINUED | OUTPATIENT
Start: 2023-08-26 | End: 2023-09-10

## 2023-08-25 RX ORDER — ESCITALOPRAM OXALATE 10 MG/1
10 TABLET ORAL DAILY
Status: DISCONTINUED | OUTPATIENT
Start: 2023-08-26 | End: 2023-09-15 | Stop reason: HOSPADM

## 2023-08-25 RX ORDER — NITROGLYCERIN 0.4 MG/1
0.4 TABLET SUBLINGUAL
Status: DISCONTINUED | OUTPATIENT
Start: 2023-08-25 | End: 2023-09-15 | Stop reason: HOSPADM

## 2023-08-25 RX ORDER — AMOXICILLIN 250 MG
2 CAPSULE ORAL 2 TIMES DAILY
Status: DISCONTINUED | OUTPATIENT
Start: 2023-08-25 | End: 2023-08-29

## 2023-08-25 RX ORDER — SODIUM CHLORIDE 0.9 % (FLUSH) 0.9 %
10 SYRINGE (ML) INJECTION EVERY 12 HOURS SCHEDULED
Status: DISCONTINUED | OUTPATIENT
Start: 2023-08-25 | End: 2023-09-15 | Stop reason: HOSPADM

## 2023-08-25 RX ORDER — PERPHENAZINE 2 MG
TABLET ORAL
COMMUNITY

## 2023-08-25 RX ORDER — BACLOFEN 10 MG/1
5 TABLET ORAL 3 TIMES DAILY
Status: DISCONTINUED | OUTPATIENT
Start: 2023-08-25 | End: 2023-09-15 | Stop reason: HOSPADM

## 2023-08-25 RX ORDER — SACCHAROMYCES BOULARDII 250 MG
250 CAPSULE ORAL 2 TIMES DAILY
Status: DISCONTINUED | OUTPATIENT
Start: 2023-08-25 | End: 2023-09-15 | Stop reason: HOSPADM

## 2023-08-25 RX ORDER — GUAIFENESIN 600 MG/1
600 TABLET, EXTENDED RELEASE ORAL NIGHTLY
Status: DISCONTINUED | OUTPATIENT
Start: 2023-08-25 | End: 2023-09-15 | Stop reason: HOSPADM

## 2023-08-25 RX ORDER — ALPRAZOLAM 1 MG/1
1 TABLET ORAL NIGHTLY PRN
Status: DISCONTINUED | OUTPATIENT
Start: 2023-08-25 | End: 2023-08-26

## 2023-08-25 RX ORDER — CHOLECALCIFEROL (VITAMIN D3) 125 MCG
1000 CAPSULE ORAL DAILY
Status: DISCONTINUED | OUTPATIENT
Start: 2023-08-25 | End: 2023-09-15 | Stop reason: HOSPADM

## 2023-08-25 RX ORDER — POLYETHYLENE GLYCOL 3350 17 G/17G
17 POWDER, FOR SOLUTION ORAL DAILY PRN
Status: DISCONTINUED | OUTPATIENT
Start: 2023-08-25 | End: 2023-08-29

## 2023-08-25 RX ORDER — SODIUM CHLORIDE 0.9 % (FLUSH) 0.9 %
10 SYRINGE (ML) INJECTION AS NEEDED
Status: DISCONTINUED | OUTPATIENT
Start: 2023-08-25 | End: 2023-09-15 | Stop reason: HOSPADM

## 2023-08-25 RX ORDER — MONTELUKAST SODIUM 10 MG/1
10 TABLET ORAL DAILY
Status: DISCONTINUED | OUTPATIENT
Start: 2023-08-26 | End: 2023-09-15 | Stop reason: HOSPADM

## 2023-08-25 RX ORDER — SODIUM CHLORIDE 9 MG/ML
40 INJECTION, SOLUTION INTRAVENOUS AS NEEDED
Status: DISCONTINUED | OUTPATIENT
Start: 2023-08-25 | End: 2023-09-15 | Stop reason: HOSPADM

## 2023-08-25 RX ORDER — METRONIDAZOLE 500 MG/100ML
500 INJECTION, SOLUTION INTRAVENOUS EVERY 8 HOURS
Status: DISPENSED | OUTPATIENT
Start: 2023-08-25 | End: 2023-09-07

## 2023-08-25 RX ORDER — LEVOTHYROXINE SODIUM 0.1 MG/1
100 TABLET ORAL
Status: DISCONTINUED | OUTPATIENT
Start: 2023-08-26 | End: 2023-09-15 | Stop reason: HOSPADM

## 2023-08-25 RX ORDER — SACCHAROMYCES BOULARDII 250 MG
250 CAPSULE ORAL 2 TIMES DAILY
COMMUNITY

## 2023-08-25 RX ORDER — OXYCODONE HCL 20 MG/1
80 TABLET, FILM COATED, EXTENDED RELEASE ORAL EVERY 12 HOURS SCHEDULED
Status: DISCONTINUED | OUTPATIENT
Start: 2023-08-25 | End: 2023-08-26

## 2023-08-25 RX ORDER — THIAMINE HCL 100 MG
1 TABLET ORAL DAILY
COMMUNITY

## 2023-08-25 RX ORDER — BISACODYL 10 MG
10 SUPPOSITORY, RECTAL RECTAL DAILY PRN
Status: DISCONTINUED | OUTPATIENT
Start: 2023-08-25 | End: 2023-08-29

## 2023-08-25 RX ORDER — BISACODYL 5 MG/1
5 TABLET, DELAYED RELEASE ORAL DAILY PRN
Status: DISCONTINUED | OUTPATIENT
Start: 2023-08-25 | End: 2023-08-29

## 2023-08-25 RX ORDER — PROMETHAZINE HYDROCHLORIDE 12.5 MG/1
25 TABLET ORAL EVERY 8 HOURS PRN
Status: DISCONTINUED | OUTPATIENT
Start: 2023-08-25 | End: 2023-08-26

## 2023-08-25 RX ORDER — ALBUTEROL SULFATE 2.5 MG/3ML
2.5 SOLUTION RESPIRATORY (INHALATION) EVERY 6 HOURS PRN
Status: DISCONTINUED | OUTPATIENT
Start: 2023-08-25 | End: 2023-09-15 | Stop reason: HOSPADM

## 2023-08-25 RX ORDER — MELATONIN
2000 DAILY
Status: DISCONTINUED | OUTPATIENT
Start: 2023-08-26 | End: 2023-09-15 | Stop reason: HOSPADM

## 2023-08-25 RX ORDER — MELATONIN
2000 DAILY
Status: DISCONTINUED | OUTPATIENT
Start: 2023-08-25 | End: 2023-08-25

## 2023-08-25 RX ADMIN — CEFTRIAXONE 2000 MG: 2 INJECTION, POWDER, FOR SOLUTION INTRAMUSCULAR; INTRAVENOUS at 18:30

## 2023-08-25 RX ADMIN — BACLOFEN 5 MG: 10 TABLET ORAL at 18:29

## 2023-08-25 RX ADMIN — PROMETHAZINE HYDROCHLORIDE 25 MG: 12.5 TABLET ORAL at 20:41

## 2023-08-25 RX ADMIN — METRONIDAZOLE 500 MG: 500 INJECTION, SOLUTION INTRAVENOUS at 18:31

## 2023-08-25 RX ADMIN — Medication 1000 MCG: at 18:31

## 2023-08-25 RX ADMIN — GUAIFENESIN 600 MG: 600 TABLET, EXTENDED RELEASE ORAL at 20:40

## 2023-08-25 RX ADMIN — Medication 10 ML: at 20:42

## 2023-08-25 RX ADMIN — Medication 250 MG: at 20:41

## 2023-08-25 RX ADMIN — OXYCODONE HYDROCHLORIDE 80 MG: 20 TABLET, FILM COATED, EXTENDED RELEASE ORAL at 20:42

## 2023-08-25 RX ADMIN — ALPRAZOLAM 1 MG: 1 TABLET ORAL at 20:40

## 2023-08-25 NOTE — PLAN OF CARE
Goal Outcome Evaluation: Patient was DA from [place].  Alert and oriented x4.  Stand by assist with rolling walker from home.  Clear liquid diet and takes pills whole.  Daughter at bedside.  Room air.  2-3+ edema BLE.  IV abt infusing.  Denies pain and call light in reach.

## 2023-08-25 NOTE — H&P
Patient Name:  Anne Jewell  YOB: 1950  MRN:  [id number]  Admit Date:  8/25/2023  Patient Care Team:  Jewell Stephens APRN as PCP - General (Family Medicine)  Edilberto Lemus MD as Consulting Physician (Nephrology)  Rashaun Alvarado MD as Surgeon (Orthopedic Surgery)  Delma Montoya MD as Consulting Physician (Obstetrics and Gynecology)  Stuart Borges MD (Hematology and Oncology)  Isael Barker MD as Consulting Physician (Gastroenterology)  Haven Gr APRN as Nurse Practitioner (Nurse Practitioner)      Subjective   History Present Illness     No chief complaint on file.          History of Present Illness  Patient is a 72-year-old female with a history of PE on Xarelto, CKD stage III, hypothyroidism, chronic pain syndrome,RA, generalized osteoarthritis, memory loss, was direct admitted per GI request for further evaluation and management of choledocholithiasis.  Patient is not a good historian, and history was assisted by daughter at bedside.  Daughter also noted that PCP sent referral to outpatient neurology for evaluation of possible dementia.  On outpatient labs, patient was found to have elevated liver enzymes and alkaline phosphatase, was referred to gastroenterology for further evaluation.  Outpatient MRCP was obtained for work-up per GI, which showed stone in common bile duct and patient was direct admitted for further evaluation and possible ERCP.  Patient was laying in bed, describes vague symptoms, but reports she has been feeling abdominal pain, located in right upper quadrant.  Worse with movement.  Denies vomiting, but does have nausea but reports having chronic nausea at baseline.  Has had intermittent chills lately as well.  No constipation or diarrhea.  No chest pain, shortness of breath, palpitations.        Review of Systems     GENERAL: No change in appetite or weight;   +fevers, chills, sweats.    SKIN: No nonhealing lesions.   No  rashes.  HEMATOLOGIC/LYMPH: No easy bruising, bleeding.   No swollen nodes.   EYES: No vision changes or diplopia.   ENT: No tinnitus, hearing loss, gum bleeding, epistaxis, hoarseness or dysphagia.   RESPIRATORY: No cough, shortness of breath, hemoptysis or wheezing.   CVS: No chest pain, palpitations, orthopnea, dyspnea on exertion or PND.   GI: No melena or hematochezia.   + abdominal pain.  + nausea, vomiting, constipation, diarrhea  : No lower tract obstructive symptoms, dysuria or hematuria.   MUSCULOSKELETAL: No bone pain.  No joint stiffness.   NEUROLOGICAL: No global weakness, loss of consciousness or seizures.   PSYCHIATRIC: No increased nervousness, mood changes or depression.       Personal History     Past Medical History:   Diagnosis Date    Allergic rhinitis     Arthritis of back     Arthritis of neck     Asthma     Bronchospasm     Carpal tunnel syndrome, bilateral     Cervical disc disorder     Clotting disorder     Deep vein thrombosis     Disc degeneration, lumbar     Edema     Esophageal reflux     Glaucoma     Hip arthrosis     Joint pain     Kidney disease 2018    stage 3    Liver disease 2018    stage 3    Low back pain     Osteoarthritis     Osteopenia     Osteoporosis     Periarthritis of shoulder     Scoliosis     Status post total knee replacement, left 08/31/2017    Status post total knee replacement, right 08/31/2017    UTI (urinary tract infection)     Venous thrombosis      Past Surgical History:   Procedure Laterality Date    BILATERAL BREAST REDUCTION      BREAST SURGERY      COLONOSCOPY  08/05/2016    GASTRIC BYPASS      HAND SURGERY Right 01/14/2016    HERNIA REPAIR      x7    HYSTERECTOMY      JOINT REPLACEMENT      Both knees    OTHER SURGICAL HISTORY      vaginal sling operation for stress incontinence    TOTAL KNEE ARTHROPLASTY Left     TOTAL KNEE ARTHROPLASTY Bilateral      Family History   Problem Relation Age of Onset    Prostate cancer Father     Arthritis Other      Hypertension Other         benign essential    Cancer Other     Diabetes Other     Heart disease Other     Nephrolithiasis Other      Social History     Tobacco Use    Smoking status: Former     Passive exposure: Past    Smokeless tobacco: Never   Vaping Use    Vaping Use: Never used   Substance Use Topics    Alcohol use: No    Drug use: No     No current facility-administered medications on file prior to encounter.     Current Outpatient Medications on File Prior to Encounter   Medication Sig Dispense Refill    albuterol sulfate  (90 Base) MCG/ACT inhaler Inhale 2 puffs Every 6 (Six) Hours As Needed for Wheezing. Proventil  (90 Base) MCG/ACT Inhalation Aerosol Solution; Patient Sig: Proventil  (90 Base) MCG/ACT Inhalation Aerosol Solution Inhale 2 puff(s) every 6 to 8 hours as needed; 6.7; 0; 05-Apr-2013; Active 8 g 0    ALPRAZolam (XANAX) 2 MG tablet As Needed.      baclofen (LIORESAL) 10 MG tablet TAKE ONE TABLET BY MOUTH THREE TIMES A  tablet 0    Calcium Citrate-Vitamin D3 (CITRACAL) 315-6.25 MG-MCG tablet tablet Take 1 tablet by mouth Daily.      chlorhexidine (PERIDEX) 0.12 % solution       Cholecalciferol (VITAMIN D3) 2000 units capsule TAKE ONE CAPSULE BY MOUTH DAILY 30 capsule 9    Cyanocobalamin (B-12) 1000 MCG sublingual tablet PLACE 1 TABLET UNDER THE TONGUE AND LET DISSOLVE ONCE DAILY 30 each 1    dicyclomine (BENTYL) 20 MG tablet Take 1 tablet by mouth Every 6 (Six) Hours As Needed (diarrhea). 90 tablet 1    escitalopram (LEXAPRO) 10 MG tablet Take 1 tablet by mouth Daily.      hydroCHLOROthiazide (HYDRODIURIL) 25 MG tablet TAKE ONE TABLET BY MOUTH DAILY 90 tablet 0    KLOR-CON 20 MEQ CR tablet TAKE ONE TABLET BY MOUTH TWICE A  tablet 0    levothyroxine (SYNTHROID, LEVOTHROID) 100 MCG tablet TAKE ONE TABLET BY MOUTH DAILY 90 tablet 0    montelukast (SINGULAIR) 10 MG tablet TAKE ONE TABLET BY MOUTH DAILY 90 tablet 1    Multiple Vitamin tablet Take 1 tablet by mouth  Daily.      Omega 3-6-9 capsule Take  by mouth.      oxyCODONE ER (oxyCONTIN) 80 MG tablet extended-release 12 hour 12 hr tablet Take 2 tablets by mouth Every 12 (Twelve) Hours. 120 tablet 0    promethazine (PHENERGAN) 25 MG tablet TAKE 1 TABLET BY MOUTH EVERY 8 HOURS AS NEEDED FOR NAUSEA AND VOMITING 90 tablet 0    Xarelto 20 MG tablet TAKE ONE TABLET BY MOUTH DAILY 90 tablet 1    EPINEPHrine (EPIPEN) 0.3 MG/0.3ML solution auto-injector injection       naloxone (NARCAN) 4 MG/0.1ML nasal spray 1 spray into the nostril(s) as directed by provider As Needed (sedation or accidental overdose of opioid). 2 each 0    saccharomyces boulardii (FLORASTOR) 250 MG capsule Take 1 capsule by mouth 2 (Two) Times a Day.      [DISCONTINUED] cetirizine (zyrTEC) 10 MG tablet Take 1 tablet by mouth Daily.      [DISCONTINUED] nitrofurantoin, macrocrystal-monohydrate, (Macrobid) 100 MG capsule Take 1 capsule by mouth 2 (Two) Times a Day. 14 capsule 0     Allergies   Allergen Reactions    Penicillins Hives       Objective    Objective     Vital Signs  Temp:  [97.9 °F (36.6 °C)] 97.9 °F (36.6 °C)  Heart Rate:  [84] 84  Resp:  [18] 18  BP: (117)/(75) 117/75  SpO2:  [97 %] 97 %  on   ;      Body mass index is 34.01 kg/m².    Physical Exam    General: Alert and oriented x3, no acute distress, chronically ill-appearing,  HEENT: Normocephalic, atraumatic  Eyes: PERRL, EOMI, anicteric sclerae  Lungs: Clear to auscultation bilaterally, no crackles or wheezes  CV: Regular rate and rhythm, no murmurs rubs or gallops  Abdomen: Soft, nontender, nondistended.  Normoactive bowel sounds  Extremities: Trace-1+ bilateral lower extremity edema,  Skin: Clean/dry/intact, no rashes  Neuro: Cranial nerves II through XII intact, no gross focal neurological deficits appreciated  Psych: Anxious, flat affect      Results Review:  I reviewed the patient's new clinical results.  I reviewed the patient's new imaging results and agree with the interpretation.  I  reviewed the patient's other test results and agree with the interpretation  I personally viewed and interpreted the patient's EKG/Telemetry data  Discussed with ED provider.    Lab Results (last 24 hours)       Procedure Component Value Units Date/Time    Comprehensive Metabolic Panel [[id number]]  (Abnormal) Collected: 08/25/23 1511    Specimen: Blood Updated: 08/25/23 1550     Glucose 89 mg/dL      BUN 16 mg/dL      Creatinine 1.03 mg/dL      Sodium 138 mmol/L      Potassium 3.6 mmol/L      Chloride 101 mmol/L      CO2 28.3 mmol/L      Calcium 8.2 mg/dL      Total Protein 5.7 g/dL      Albumin 2.3 g/dL      ALT (SGPT) 33 U/L      AST (SGOT) 70 U/L      Alkaline Phosphatase 538 U/L      Total Bilirubin 1.6 mg/dL      Globulin 3.4 gm/dL      A/G Ratio 0.7 g/dL      BUN/Creatinine Ratio 15.5     Anion Gap 8.7 mmol/L      eGFR 57.9 mL/min/1.73     Narrative:      GFR Normal >60  Chronic Kidney Disease <60  Kidney Failure <15    The GFR formula is only valid for adults with stable renal function between ages 18 and 70.    Protime-INR [[id number]]  (Abnormal) Collected: 08/25/23 1511    Specimen: Blood Updated: 08/25/23 1537     Protime 15.2 Seconds      INR 1.19    Magnesium [[id number]]  (Normal) Collected: 08/25/23 1511    Specimen: Blood Updated: 08/25/23 1550     Magnesium 1.6 mg/dL     Phosphorus [[id number]]  (Normal) Collected: 08/25/23 1511    Specimen: Blood Updated: 08/25/23 1550     Phosphorus 3.3 mg/dL     CBC (No Diff) [[id number]]  (Abnormal) Collected: 08/25/23 1512    Specimen: Blood Updated: 08/25/23 1527     WBC 5.27 10*3/mm3      RBC 3.33 10*6/mm3      Hemoglobin 10.8 g/dL      Hematocrit 32.5 %      MCV 97.6 fL      MCH 32.4 pg      MCHC 33.2 g/dL      RDW 13.6 %      RDW-SD 48.0 fl      MPV 9.6 fL      Platelets 256 10*3/mm3             Imaging Results (Last 24 Hours)       ** No results found for the last 24 hours. **                SCANNED - TELEMETRY     Final Result           Assessment/Plan      Active Hospital Problems    Diagnosis  POA    **Choledocholithiasis [K80.50]  Yes    History of pulmonary embolism [Z86.711]  Unknown    Stage 3 chronic kidney disease [N18.30]  Yes    Gastroesophageal reflux disease [K21.9]  Yes    Acquired hypothyroidism [E03.9]  Yes    Chronic pain syndrome [G89.4]  Yes    Rheumatoid arthritis [M06.9]  Yes    Generalized osteoarthritis [M15.9]  Yes      Resolved Hospital Problems   No resolved problems to display.       Patient is a 72-year-old female with a history of PE on Xarelto, CKD stage III, hypothyroidism, chronic pain syndrome,RA, generalized osteoarthritis, memory loss, was direct admitted per GI request for further evaluation and management of choledocholithiasis.      Choledocholithiasis.  Seen on outpatient MRCP, unavailable for review in epic.  History of cholecystectomy.  CMP obtained, alkaline phosphatase increased compared to prior, however AST improved and ALT normalized.  -Clear liquid diet, antiemetics.-  -WBC normal, afebrile, no signs of current infection, however GI wanted to initiate on antibiotics prior to procedure.  Initiated on IV ceftriaxone, metronidazole, patient with history of allergies to penicillin.  -GI consult      History of pulmonary pulmonary embolism.  Diagnosed in 2007, has been on anticoagulation since.  At home takes Xarelto currently, last dose was on 08/24/2023.  Hold anticoagulation secondary to planned procedure.        Hypothyroidism: Resume home levothyroxine, recent TSH normal        CKD stage III: Creatinine stable, monitor.  Avoid nephrotoxic drugs.          Chronic pain syndrome/chronic arthritis: Resume home OxyContin 80 mg every 12 hours.      History of RA: Not on treatment for RA per chart review, reports she was diagnosed with URI but was never on specific treatment.      Memory loss: Daughter states she has noticed memory changes for the last several months.  Was referred to neurology outpatient follow-up patient  with possible dementia.  Currently alert, oriented x3. delirium precautions.              I discussed the patient's findings and my recommendations with patient and family.    VTE Prophylaxis - SCDs.  Code Status - Full code.       Jocelyne Feliz MD  [facility], [facility]  08/25/23  16:26 EDT

## 2023-08-25 NOTE — CASE MANAGEMENT/SOCIAL WORK
Discharge Planning Assessment  [facility]     Patient Name: Anne Jeewll  MRN: [id number]  Today's Date: 8/25/2023    Admit Date: 8/25/2023    Plan: Home with family   Discharge Needs Assessment       Row Name 08/25/23 1511       Living Environment    People in Home child(yaima), adult;spouse    Current Living Arrangements home    Potentially Unsafe Housing Conditions none    Primary Care Provided by self    Family Caregiver if Needed child(yaima), adult    Quality of Family Relationships helpful;involved    Able to Return to Prior Arrangements yes       Resource/Environmental Concerns    Resource/Environmental Concerns none       Transition Planning    Patient/Family Anticipates Transition to home with family    Patient/Family Anticipated Services at Transition none    Transportation Anticipated family or friend will provide       Discharge Needs Assessment    Readmission Within the Last 30 Days no previous admission in last 30 days    Equipment Currently Used at Home rollator;shower chair;pulse ox;bp cuff    Concerns to be Addressed no discharge needs identified    Anticipated Changes Related to Illness none    Equipment Needed After Discharge none                   Discharge Plan       Row Name 08/25/23 1512       Plan    Plan Home with family    Patient/Family in Agreement with Plan yes    Plan Comments Spoke with pt at bedside, CCP role explained, face sheet and pharmacy information verified. Pt lives with her  and daughter Ayanna who assists as needed, home is one level with ramp to enter. She is IADL's, she uses rollator, s/c, g, bp cuff, pulse ox and has braces for mobility assistance.  She has used HH in the past but cannot recall company used. No SNF history, pt plans home with family to transport, no anticipated needs CCP will follow -Leonor OCONNOR                  Continued Care and Services - Admitted Since 8/25/2023    Coordination has not been started for this encounter.       Expected Discharge Date  and Time       Expected Discharge Date Expected Discharge Time    Aug 28, 2023            Demographic Summary       Row Name 08/25/23 1511       General Information    Admission Type inpatient                   Functional Status       Row Name 08/25/23 1511       Functional Status    Usual Activity Tolerance excellent    Current Activity Tolerance good       Assessment of Health Literacy    Health Literacy Good       Functional Status, IADL    Medications independent    Meal Preparation independent    Housekeeping independent    Laundry independent    Shopping independent       Mental Status    General Appearance WDL WDL       Mental Status Summary    Recent Changes in Mental Status/Cognitive Functioning no changes                   Psychosocial    No documentation.                  Abuse/Neglect    No documentation.                  Legal       Row Name 08/25/23 1511       Financial/Legal    Who Manages Finances if Patient Unable dtr                   Substance Abuse    No documentation.                  Patient Forms    No documentation.                     Leonor Basilio RN

## 2023-08-26 LAB
ANION GAP SERPL CALCULATED.3IONS-SCNC: 7.2 MMOL/L (ref 5–15)
BUN SERPL-MCNC: 12 MG/DL (ref 8–23)
BUN/CREAT SERPL: 15.8 (ref 7–25)
CALCIUM SPEC-SCNC: 7.5 MG/DL (ref 8.6–10.5)
CHLORIDE SERPL-SCNC: 103 MMOL/L (ref 98–107)
CO2 SERPL-SCNC: 28.8 MMOL/L (ref 22–29)
CREAT SERPL-MCNC: 0.76 MG/DL (ref 0.57–1)
DEPRECATED RDW RBC AUTO: 47.7 FL (ref 37–54)
EGFRCR SERPLBLD CKD-EPI 2021: 83.4 ML/MIN/1.73
ERYTHROCYTE [DISTWIDTH] IN BLOOD BY AUTOMATED COUNT: 13.6 % (ref 12.3–15.4)
GLUCOSE SERPL-MCNC: 76 MG/DL (ref 65–99)
HCT VFR BLD AUTO: 27 % (ref 34–46.6)
HGB BLD-MCNC: 9 G/DL (ref 12–15.9)
MAGNESIUM SERPL-MCNC: 1.6 MG/DL (ref 1.6–2.4)
MCH RBC QN AUTO: 32.1 PG (ref 26.6–33)
MCHC RBC AUTO-ENTMCNC: 33.3 G/DL (ref 31.5–35.7)
MCV RBC AUTO: 96.4 FL (ref 79–97)
PHOSPHATE SERPL-MCNC: 3.1 MG/DL (ref 2.5–4.5)
PLATELET # BLD AUTO: 220 10*3/MM3 (ref 140–450)
PMV BLD AUTO: 10.1 FL (ref 6–12)
POTASSIUM SERPL-SCNC: 3.3 MMOL/L (ref 3.5–5.2)
RBC # BLD AUTO: 2.8 10*6/MM3 (ref 3.77–5.28)
SODIUM SERPL-SCNC: 139 MMOL/L (ref 136–145)
WBC NRBC COR # BLD: 4.24 10*3/MM3 (ref 3.4–10.8)

## 2023-08-26 PROCEDURE — 84100 ASSAY OF PHOSPHORUS: CPT | Performed by: STUDENT IN AN ORGANIZED HEALTH CARE EDUCATION/TRAINING PROGRAM

## 2023-08-26 PROCEDURE — 85027 COMPLETE CBC AUTOMATED: CPT | Performed by: STUDENT IN AN ORGANIZED HEALTH CARE EDUCATION/TRAINING PROGRAM

## 2023-08-26 PROCEDURE — 63710000001 PROMETHAZINE PER 12.5 MG: Performed by: STUDENT IN AN ORGANIZED HEALTH CARE EDUCATION/TRAINING PROGRAM

## 2023-08-26 PROCEDURE — 80048 BASIC METABOLIC PNL TOTAL CA: CPT | Performed by: STUDENT IN AN ORGANIZED HEALTH CARE EDUCATION/TRAINING PROGRAM

## 2023-08-26 PROCEDURE — 25010000002 METRONIDAZOLE 500 MG/100ML SOLUTION: Performed by: STUDENT IN AN ORGANIZED HEALTH CARE EDUCATION/TRAINING PROGRAM

## 2023-08-26 PROCEDURE — 83735 ASSAY OF MAGNESIUM: CPT | Performed by: STUDENT IN AN ORGANIZED HEALTH CARE EDUCATION/TRAINING PROGRAM

## 2023-08-26 PROCEDURE — 99222 1ST HOSP IP/OBS MODERATE 55: CPT | Performed by: INTERNAL MEDICINE

## 2023-08-26 PROCEDURE — 25010000002 CEFTRIAXONE PER 250 MG: Performed by: STUDENT IN AN ORGANIZED HEALTH CARE EDUCATION/TRAINING PROGRAM

## 2023-08-26 RX ORDER — PROMETHAZINE HYDROCHLORIDE 25 MG/1
25 TABLET ORAL EVERY 6 HOURS PRN
Status: DISCONTINUED | OUTPATIENT
Start: 2023-08-26 | End: 2023-09-15 | Stop reason: HOSPADM

## 2023-08-26 RX ORDER — OXYCODONE HCL 40 MG/1
80 TABLET, FILM COATED, EXTENDED RELEASE ORAL 2 TIMES DAILY PRN
Status: DISPENSED | OUTPATIENT
Start: 2023-08-26 | End: 2023-09-14

## 2023-08-26 RX ORDER — PROMETHAZINE HYDROCHLORIDE 12.5 MG/1
12.5 SUPPOSITORY RECTAL EVERY 6 HOURS PRN
Status: DISCONTINUED | OUTPATIENT
Start: 2023-08-26 | End: 2023-09-15 | Stop reason: HOSPADM

## 2023-08-26 RX ORDER — ALPRAZOLAM 1 MG/1
3 TABLET ORAL NIGHTLY PRN
Status: DISCONTINUED | OUTPATIENT
Start: 2023-08-26 | End: 2023-09-15 | Stop reason: HOSPADM

## 2023-08-26 RX ORDER — OXYCODONE HCL 40 MG/1
160 TABLET, FILM COATED, EXTENDED RELEASE ORAL EVERY 12 HOURS SCHEDULED
Status: DISCONTINUED | OUTPATIENT
Start: 2023-08-26 | End: 2023-08-26

## 2023-08-26 RX ORDER — OXYCODONE HCL 20 MG/1
80 TABLET, FILM COATED, EXTENDED RELEASE ORAL EVERY 12 HOURS SCHEDULED
Status: COMPLETED | OUTPATIENT
Start: 2023-08-26 | End: 2023-09-06

## 2023-08-26 RX ORDER — POTASSIUM CHLORIDE 750 MG/1
20 TABLET, FILM COATED, EXTENDED RELEASE ORAL 2 TIMES DAILY
Status: DISCONTINUED | OUTPATIENT
Start: 2023-08-26 | End: 2023-09-09

## 2023-08-26 RX ORDER — PROMETHAZINE HYDROCHLORIDE 12.5 MG/1
12.5 TABLET ORAL EVERY 6 HOURS PRN
Status: DISCONTINUED | OUTPATIENT
Start: 2023-08-26 | End: 2023-08-26

## 2023-08-26 RX ORDER — PROMETHAZINE HYDROCHLORIDE 12.5 MG/1
12.5 SUPPOSITORY RECTAL EVERY 6 HOURS PRN
Status: DISCONTINUED | OUTPATIENT
Start: 2023-08-26 | End: 2023-08-26

## 2023-08-26 RX ORDER — PROMETHAZINE HYDROCHLORIDE 25 MG/1
25 TABLET ORAL 2 TIMES DAILY
Status: DISCONTINUED | OUTPATIENT
Start: 2023-08-26 | End: 2023-09-15 | Stop reason: HOSPADM

## 2023-08-26 RX ADMIN — ALPRAZOLAM 3 MG: 1 TABLET ORAL at 20:13

## 2023-08-26 RX ADMIN — BACLOFEN 5 MG: 10 TABLET ORAL at 09:35

## 2023-08-26 RX ADMIN — Medication 1 TABLET: at 09:38

## 2023-08-26 RX ADMIN — PROMETHAZINE HYDROCHLORIDE 25 MG: 12.5 TABLET ORAL at 20:12

## 2023-08-26 RX ADMIN — HYDROCHLOROTHIAZIDE 25 MG: 25 TABLET ORAL at 09:38

## 2023-08-26 RX ADMIN — ESCITALOPRAM OXALATE 10 MG: 10 TABLET, FILM COATED ORAL at 09:40

## 2023-08-26 RX ADMIN — BACLOFEN 5 MG: 10 TABLET ORAL at 16:02

## 2023-08-26 RX ADMIN — CEFTRIAXONE 2000 MG: 2 INJECTION, POWDER, FOR SOLUTION INTRAMUSCULAR; INTRAVENOUS at 15:31

## 2023-08-26 RX ADMIN — MONTELUKAST SODIUM 10 MG: 10 TABLET, FILM COATED ORAL at 09:38

## 2023-08-26 RX ADMIN — Medication 250 MG: at 20:13

## 2023-08-26 RX ADMIN — Medication 2000 UNITS: at 09:39

## 2023-08-26 RX ADMIN — OXYCODONE HYDROCHLORIDE 80 MG: 20 TABLET, FILM COATED, EXTENDED RELEASE ORAL at 20:12

## 2023-08-26 RX ADMIN — OXYCODONE HYDROCHLORIDE 80 MG: 20 TABLET, FILM COATED, EXTENDED RELEASE ORAL at 09:36

## 2023-08-26 RX ADMIN — METRONIDAZOLE 500 MG: 500 INJECTION, SOLUTION INTRAVENOUS at 09:45

## 2023-08-26 RX ADMIN — Medication 1000 MCG: at 09:39

## 2023-08-26 RX ADMIN — METRONIDAZOLE 500 MG: 500 INJECTION, SOLUTION INTRAVENOUS at 00:00

## 2023-08-26 RX ADMIN — METRONIDAZOLE 500 MG: 500 INJECTION, SOLUTION INTRAVENOUS at 23:19

## 2023-08-26 RX ADMIN — Medication 10 ML: at 10:08

## 2023-08-26 RX ADMIN — LEVOTHYROXINE SODIUM 100 MCG: 0.1 TABLET ORAL at 06:49

## 2023-08-26 RX ADMIN — PROMETHAZINE HYDROCHLORIDE 25 MG: 12.5 TABLET ORAL at 09:35

## 2023-08-26 RX ADMIN — Medication 250 MG: at 09:38

## 2023-08-26 RX ADMIN — GUAIFENESIN 600 MG: 600 TABLET, EXTENDED RELEASE ORAL at 20:12

## 2023-08-26 RX ADMIN — POTASSIUM CHLORIDE 20 MEQ: 750 TABLET, EXTENDED RELEASE ORAL at 18:52

## 2023-08-26 RX ADMIN — POTASSIUM CHLORIDE 20 MEQ: 750 TABLET, EXTENDED RELEASE ORAL at 11:19

## 2023-08-26 RX ADMIN — METRONIDAZOLE 500 MG: 500 INJECTION, SOLUTION INTRAVENOUS at 16:03

## 2023-08-26 RX ADMIN — BACLOFEN 5 MG: 10 TABLET ORAL at 20:12

## 2023-08-26 NOTE — CONSULTS
[facility], [facility]  Initial Inpatient Consult Note    Referring Provider: Jocelyne Feliz MD     Reason for Consultation: Common bile duct stones    Subjective     History of present illness:    72 y.o. female with history of renal insufficiency, osteoporosis, degenerative disc disease, recurrent UTIs, osteoarthritis presenting with long history of elevated LFTs.  Previous work-up was unremarkable but patient returns with continued elevation of abnormal liver tests patient was sent for MRCP.  MRCP was positive for common duct stone and patient was referred for evaluation.  Patient was scheduled for outpatient ERCP as long as patient remained asymptomatic but apparently family reported patient with history of dyspepsia with chills concerned that this may have been going on for some time related to intermittent obstructions patient was recommended for admission.  Patient noted with no white count but due to recent change in mental status and episodes of chills patient was started on antibiotics to good effect.  Patient's mentation seems to have improved since admission per patient's family.  Unfortunately patient's procedure was being held due to Xarelto being on board last dose on Thursday and recommendations unless patient was emergent would be to at least wait 3 days off of the medication which puts us back on Monday.  Patient today feeling well but hungry and no acute distress.  Patient does mention for the first time that I am hearing that she has a history of very old gastric bypass.    Past Medical History:  Past Medical History:   Diagnosis Date    Allergic rhinitis     Arthritis of back     Arthritis of neck     Asthma     Bronchospasm     Carpal tunnel syndrome, bilateral     Cervical disc disorder     Clotting disorder     Deep vein thrombosis     Disc degeneration, lumbar     Edema     Esophageal reflux     Glaucoma     Hip arthrosis     Joint pain     Kidney disease 2018    stage 3     Liver disease 2018    stage 3    Low back pain     Osteoarthritis     Osteopenia     Osteoporosis     Periarthritis of shoulder     Scoliosis     Status post total knee replacement, left 08/31/2017    Status post total knee replacement, right 08/31/2017    UTI (urinary tract infection)     Venous thrombosis      Past Surgical History:  Past Surgical History:   Procedure Laterality Date    BILATERAL BREAST REDUCTION      BREAST SURGERY      COLONOSCOPY  08/05/2016    GASTRIC BYPASS      HAND SURGERY Right 01/14/2016    HERNIA REPAIR      x7    HYSTERECTOMY      JOINT REPLACEMENT      Both knees    OTHER SURGICAL HISTORY      vaginal sling operation for stress incontinence    TOTAL KNEE ARTHROPLASTY Left     TOTAL KNEE ARTHROPLASTY Bilateral       Social History:   Social History     Tobacco Use    Smoking status: Former     Passive exposure: Past    Smokeless tobacco: Never   Substance Use Topics    Alcohol use: No      Family History:  Family History   Problem Relation Age of Onset    Prostate cancer Father     Arthritis Other     Hypertension Other         benign essential    Cancer Other     Diabetes Other     Heart disease Other     Nephrolithiasis Other        Home Meds:  Medications Prior to Admission   Medication Sig Dispense Refill Last Dose    albuterol sulfate  (90 Base) MCG/ACT inhaler Inhale 2 puffs Every 6 (Six) Hours As Needed for Wheezing. Proventil  (90 Base) MCG/ACT Inhalation Aerosol Solution; Patient Sig: Proventil  (90 Base) MCG/ACT Inhalation Aerosol Solution Inhale 2 puff(s) every 6 to 8 hours as needed; 6.7; 0; 05-Apr-2013; Active 8 g 0 Past Month    ALPRAZolam (XANAX) 2 MG tablet As Needed.   8/25/2023    baclofen (LIORESAL) 10 MG tablet TAKE ONE TABLET BY MOUTH THREE TIMES A  tablet 0 8/25/2023    Calcium Citrate-Vitamin D3 (CITRACAL) 315-6.25 MG-MCG tablet tablet Take 1 tablet by mouth Daily.   8/25/2023    chlorhexidine (PERIDEX) 0.12 % solution    Past Week     Cholecalciferol (VITAMIN D3) 2000 units capsule TAKE ONE CAPSULE BY MOUTH DAILY 30 capsule 9 8/25/2023    Cyanocobalamin (B-12) 1000 MCG sublingual tablet PLACE 1 TABLET UNDER THE TONGUE AND LET DISSOLVE ONCE DAILY 30 each 1 8/25/2023    dicyclomine (BENTYL) 20 MG tablet Take 1 tablet by mouth Every 6 (Six) Hours As Needed (diarrhea). 90 tablet 1 Past Month    escitalopram (LEXAPRO) 10 MG tablet Take 1 tablet by mouth Daily.   8/25/2023    hydroCHLOROthiazide (HYDRODIURIL) 25 MG tablet TAKE ONE TABLET BY MOUTH DAILY 90 tablet 0 8/25/2023    KLOR-CON 20 MEQ CR tablet TAKE ONE TABLET BY MOUTH TWICE A  tablet 0 8/25/2023    levothyroxine (SYNTHROID, LEVOTHROID) 100 MCG tablet TAKE ONE TABLET BY MOUTH DAILY 90 tablet 0 8/25/2023    montelukast (SINGULAIR) 10 MG tablet TAKE ONE TABLET BY MOUTH DAILY 90 tablet 1 8/25/2023    Multiple Vitamin tablet Take 1 tablet by mouth Daily.   8/25/2023    Omega 3-6-9 capsule Take  by mouth.   8/25/2023    oxyCODONE ER (oxyCONTIN) 80 MG tablet extended-release 12 hour 12 hr tablet Take 2 tablets by mouth Every 12 (Twelve) Hours. 120 tablet 0 8/25/2023    promethazine (PHENERGAN) 25 MG tablet TAKE 1 TABLET BY MOUTH EVERY 8 HOURS AS NEEDED FOR NAUSEA AND VOMITING 90 tablet 0 8/25/2023    Xarelto 20 MG tablet TAKE ONE TABLET BY MOUTH DAILY 90 tablet 1 Past Week    EPINEPHrine (EPIPEN) 0.3 MG/0.3ML solution auto-injector injection    More than a month    naloxone (NARCAN) 4 MG/0.1ML nasal spray 1 spray into the nostril(s) as directed by provider As Needed (sedation or accidental overdose of opioid). 2 each 0 More than a month    saccharomyces boulardii (FLORASTOR) 250 MG capsule Take 1 capsule by mouth 2 (Two) Times a Day.        Current Meds:   baclofen, 5 mg, Oral, TID  cefTRIAXone, 2,000 mg, Intravenous, Q24H  cholecalciferol, 2,000 Units, Oral, Daily  escitalopram, 10 mg, Oral, Daily  guaiFENesin, 600 mg, Oral, Nightly  hydroCHLOROthiazide, 25 mg, Oral, Daily  levothyroxine,  100 mcg, Oral, Q AM  metroNIDAZOLE, 500 mg, Intravenous, Q8H  montelukast, 10 mg, Oral, Daily  multivitamin, 1 tablet, Oral, Daily  oxyCODONE, 80 mg, Oral, Q12H  saccharomyces boulardii, 250 mg, Oral, BID  senna-docusate sodium, 2 tablet, Oral, BID  sodium chloride, 10 mL, Intravenous, Q12H  vitamin B-12, 1,000 mcg, Oral, Daily      Allergies:  Allergies   Allergen Reactions    Penicillins Hives     Review of Systems  All systems were reviewed and negative except for:  Constitution:  positive for chills and weight loss  Gastrointestinal: positive for  early satiety and nausea     Objective     Vital Signs  Temp:  [97.7 °F (36.5 °C)-98.6 °F (37 °C)] 98.6 °F (37 °C)  Heart Rate:  [64-84] 64  Resp:  [18-20] 20  BP: (102-121)/(58-75) 111/58  Physical Exam:  General Appearance:    Alert, cooperative, in no acute distress   Head:    Normocephalic, without obvious abnormality, atraumatic   Eyes:          conjunctivae and sclerae normal, no   icterus   Throat:   no thrush, oral mucosa moist   Neck:   Supple, no adenopathy   Lungs:     Clear to auscultation bilaterally    Heart:    Regular rhythm and normal rate    Chest Wall:    No abnormalities observed   Abdomen:     Soft, nondistended, nontender; normal bowel sounds   Extremities:   no edema, no redness   Skin:   No bruising or rash   Psychiatric:  normal mood and insight     Results Review:   I reviewed the patient's new clinical results.  I reviewed the patient's new imaging results and agree with the interpretation.    Results from last 7 days   Lab Units 08/26/23  0610 08/25/23  1512   WBC 10*3/mm3 4.24 5.27   HEMOGLOBIN g/dL 9.0* 10.8*   HEMATOCRIT % 27.0* 32.5*   PLATELETS 10*3/mm3 220 256     Results from last 7 days   Lab Units 08/26/23  0610 08/25/23  1511 08/24/23  1120   SODIUM mmol/L 139 138  --    POTASSIUM mmol/L 3.3* 3.6  --    CHLORIDE mmol/L 103 101  --    CO2 mmol/L 28.8 28.3  --    BUN mg/dL 12 16  --    CREATININE mg/dL 0.76 1.03* 1.10   CALCIUM mg/dL  7.5* 8.2*  --    BILIRUBIN mg/dL  --  1.6*  --    ALK PHOS U/L  --  538*  --    ALT (SGPT) U/L  --  33  --    AST (SGOT) U/L  --  70*  --    GLUCOSE mg/dL 76 89  --      Results from last 7 days   Lab Units 08/25/23  1511   INR  1.19*     No results found for: LIPASE    Radiology:  No orders to display       Assessment & Plan   Active Hospital Problems    Diagnosis     **Choledocholithiasis     History of pulmonary embolism     Stage 3 chronic kidney disease     Gastroesophageal reflux disease     Acquired hypothyroidism     Chronic pain syndrome     Rheumatoid arthritis     Generalized osteoarthritis        Assessment:  Common bile duct stone on MRCP, reviewed imaging in epic.  Elevated LFTs  Recurrent fevers and chills now afebrile  Elevated LFTs  Status post gastric bypass    Plan:  Okay to continue low-fat diet for the time being, written for cardiac.  Discussed risk and benefits of the ERCP procedure with the family but also let patient and family know that due to the previous gastric bypass standard ERCP may not be successful in reaching the biliary tree due to the surgical anatomy.  Await Monday procedure to evaluate anatomy for further recommendations concerning future stone removal.      I discussed the patients findings and my recommendations with patient and family.    Elijah Simon MD

## 2023-08-26 NOTE — PROGRESS NOTES
Name: Anne Jewell ADMIT: 2023   : 1950  PCP: Jewell Stephens APRN    MRN: [id number] LOS: 1 days   AGE/SEX: 72 y.o. female  ROOM: [facility]     Subjective   Subjective   Patient sitting on edge of bed eating breakfast.  Daughter at bedside.  Discussed her pain regimen and need of updating her oxycodone dosing and the positive dosing to her home doses.  Patient without any further fevers.  Never had documented 1 here, but did have 1 of 101 at home with chills and rigors.  No nausea or vomiting at this time.    Review of Systems   Constitutional:  Negative for chills and fever.   Respiratory:  Negative for cough and shortness of breath.    Cardiovascular:  Negative for chest pain and leg swelling.   Gastrointestinal:  Negative for abdominal pain, diarrhea and nausea.   As above     Objective   Objective   Vital Signs  Temp:  [97.7 °F (36.5 °C)-98.6 °F (37 °C)] 98.6 °F (37 °C)  Heart Rate:  [64-84] 64  Resp:  [18-20] 20  BP: (102-121)/(58-75) 111/58  SpO2:  [94 %-98 %] 94 %  on   ;   Device (Oxygen Therapy): room air  Body mass index is 33.37 kg/m².  Physical Exam  Constitutional:       General: She is not in acute distress.     Appearance: She is not ill-appearing.   Cardiovascular:      Rate and Rhythm: Normal rate and regular rhythm.   Pulmonary:      Effort: Pulmonary effort is normal. No respiratory distress.   Abdominal:      General: Abdomen is flat. There is no distension.      Tenderness: There is no abdominal tenderness.   Musculoskeletal:         General: No swelling or deformity. Normal range of motion.   Skin:     General: Skin is warm and dry.   Neurological:      General: No focal deficit present.      Mental Status: She is alert. Mental status is at baseline.       Results Review     I reviewed the patient's new clinical results.  Results from last 7 days   Lab Units 23  0610 23  1512   WBC 10*3/mm3 4.24 5.27   HEMOGLOBIN g/dL 9.0* 10.8*   PLATELETS 10*3/mm3 220 256      Results from last 7 days   Lab Units 08/26/23  0610 08/25/23  1511 08/24/23  1120   SODIUM mmol/L 139 138  --    POTASSIUM mmol/L 3.3* 3.6  --    CHLORIDE mmol/L 103 101  --    CO2 mmol/L 28.8 28.3  --    BUN mg/dL 12 16  --    CREATININE mg/dL 0.76 1.03* 1.10   GLUCOSE mg/dL 76 89  --    Estimated Creatinine Clearance: 64.1 mL/min (by C-G formula based on SCr of 0.76 mg/dL).  Results from last 7 days   Lab Units 08/25/23  1511   ALBUMIN g/dL 2.3*   BILIRUBIN mg/dL 1.6*   ALK PHOS U/L 538*   AST (SGOT) U/L 70*   ALT (SGPT) U/L 33     Results from last 7 days   Lab Units 08/26/23  0610 08/25/23  1511   CALCIUM mg/dL 7.5* 8.2*   ALBUMIN g/dL  --  2.3*   MAGNESIUM mg/dL 1.6 1.6   PHOSPHORUS mg/dL 3.1 3.3       COVID19   Date Value Ref Range Status   08/10/2020 Not Detected Not Detected - Ref. Range Final     No results found for: HGBA1C, POCGLU    MRI abdomen w wo contrast mrcp  Narrative: PROCEDURE: MRI ABDOMEN W WO CONTRAST MRCP     COMPARISON: None     INDICATIONS: ELEVATED LIVER FUNCTION, BILE DUCT DILATION ON US, ELEVATED ENZYME             TECHNIQUE: A comprehensive examination was performed utilizing a variety of imaging planes and   imaging parameters to optimize visualization of suspected pathology.  Images were obtained both   before and after intravenous gadolinium injection. Magnetic resonance cholangiopancreatography was   also performed.     FINDINGS:   MRI demonstrates significant dextrocurvature measuring about 35 degrees in thoracolumbar junction.    There is levocurvature in the lower lumbar spine measuring about 33 degrees.     Liver measures about 19 centimeters in length.  Spleen measures 12.4 centimeters.  The patient is   status post cholecystectomy.  The common bile duct is markedly dilated measuring 21 millimeters.    There is a large stone in the distal common bile duct measuring about 1.8 by 1 centimeter.  There   is intrahepatic biliary dilation.     There is no significant hepatic  steatosis.  There is motion artifact present.     Several left renal lesions appear to be cysts.  No adenopathy is seen.  Small splenic lesion likely   a cyst.     There is heterogeneous geographic T2 hyperintensity and early enhancement of the liver with areas   of increased perfusion greater in the right lobe.  This becomes more homogeneous on later phases.    No well-defined lesion is seen.     Impression:    1. Marked biliary dilation with the common bile duct measures up to about 21 millimeters.  There is   a large stone in the distal common bile duct measuring up to about 18 millimeters.  Intrahepatic   biliary dilation also noted.  Recommend ERCP.  Discussed with referring provider at time of   dictation.  2. Geographic areas of increased T2 signal and increased perfusion/early enhancement in the liver   that becomes more isointense on later post-contrast images.  No well-defined lesion is seen.    Consider follow-up.  3. Other findings as above.            KAITY DE GUZMAN MD         Electronically Signed and Approved By: KAITY DE GUZMAN MD on 8/24/2023 at 15:48                           I reviewed the patient's daily medications.  Scheduled Medications  baclofen, 5 mg, Oral, TID  cefTRIAXone, 2,000 mg, Intravenous, Q24H  cholecalciferol, 2,000 Units, Oral, Daily  escitalopram, 10 mg, Oral, Daily  guaiFENesin, 600 mg, Oral, Nightly  hydroCHLOROthiazide, 25 mg, Oral, Daily  levothyroxine, 100 mcg, Oral, Q AM  metroNIDAZOLE, 500 mg, Intravenous, Q8H  montelukast, 10 mg, Oral, Daily  multivitamin, 1 tablet, Oral, Daily  oxyCODONE, 80 mg, Oral, Q12H  promethazine, 25 mg, Oral, BID  saccharomyces boulardii, 250 mg, Oral, BID  senna-docusate sodium, 2 tablet, Oral, BID  sodium chloride, 10 mL, Intravenous, Q12H  vitamin B-12, 1,000 mcg, Oral, Daily    Infusions   Diet  Diet: Cardiac Diets; Healthy Heart (2-3 Na+); Texture: Regular Texture (IDDSI 7); Fluid Consistency: Thin (IDDSI 0)  Diet: Liquid Diets; Clear  Liquid; Texture: Regular Texture (IDDSI 7); Fluid Consistency: Thin (IDDSI 0)  NPO Diet NPO Type: Sips with Meds         I have personally reviewed:  []  Laboratory   []  Microbiology   []  Radiology   []  EKG/Telemetry   []  Cardiology/Vascular   []  Pathology   []  Records     Assessment/Plan     Active Hospital Problems    Diagnosis  POA    **Choledocholithiasis [K80.50]  Yes    History of pulmonary embolism [Z86.711]  Unknown    Stage 3 chronic kidney disease [N18.30]  Yes    Gastroesophageal reflux disease [K21.9]  Yes    Acquired hypothyroidism [E03.9]  Yes    Chronic pain syndrome [G89.4]  Yes    Rheumatoid arthritis [M06.9]  Yes    Generalized osteoarthritis [M15.9]  Yes      Resolved Hospital Problems   No resolved problems to display.       72 y.o. female admitted with Choledocholithiasis.    Choledocholithiasis  History of gastric bypass  Elevated LFTs  -MRCP showing large stone in the distal common bile duct, 1.8 cm.  Intrahepatic biliary dilatation.  Marked biliary dilatation with CBD measuring 2.1 cm  -IV ceftriaxone, metronidazole, patient with history of allergies to penicillin.  -GI consulted-plan for ERCP procedure, but may not be successful because of patient's history of gastric bypass.  Holding Xarelto.     History of pulmonary pulmonary embolism.  Diagnosed in 2007, has been on anticoagulation since.  At home takes Xarelto currently, last dose was on 08/24/2023.  Hold anticoagulation secondary to planned procedure.     Hypothyroidism: Resume home levothyroxine, recent TSH normal     CKD stage IIIa: Creatinine stable, monitor.  Avoid nephrotoxic drugs.     Chronic pain syndrome/chronic arthritis: Bob reviewed.  Oxycodone extended release to 80 mg twice daily scheduled.  Oxycodone extended release to 80 mg twice a day as needed as well.  Resume home alprazolam 3 mg nightly     History of RA: Not on treatment for RA per chart review, reports she was diagnosed with URI but was never on specific  treatment.     Memory loss: Daughter states she has noticed memory changes for the last several months.  Was referred to neurology outpatient follow-up patient with possible dementia.  Follow-up as outpatient    SCDs for DVT prophylaxis.  Full code.  Discussed with patient, family, and nursing staff.  Anticipate discharge home with family next week.    Expected Discharge Date: 8/28/2023; Expected Discharge Time:       Talha Montano MD  [facility]  08/26/23  10:02 EDT

## 2023-08-27 ENCOUNTER — APPOINTMENT (OUTPATIENT)
Dept: GENERAL RADIOLOGY | Facility: HOSPITAL | Age: 73
DRG: 423 | End: 2023-08-27
Payer: MEDICARE

## 2023-08-27 DIAGNOSIS — G89.4 CHRONIC PAIN SYNDROME: Chronic | ICD-10-CM

## 2023-08-27 LAB
ALBUMIN SERPL-MCNC: 2 G/DL (ref 3.5–5.2)
ALBUMIN/GLOB SERPL: 0.6 G/DL
ALP SERPL-CCNC: 529 U/L (ref 39–117)
ALT SERPL W P-5'-P-CCNC: 29 U/L (ref 1–33)
ANION GAP SERPL CALCULATED.3IONS-SCNC: 8 MMOL/L (ref 5–15)
AST SERPL-CCNC: 64 U/L (ref 1–32)
BILIRUB SERPL-MCNC: 1.1 MG/DL (ref 0–1.2)
BUN SERPL-MCNC: 9 MG/DL (ref 8–23)
BUN/CREAT SERPL: 8.4 (ref 7–25)
CALCIUM SPEC-SCNC: 7.6 MG/DL (ref 8.6–10.5)
CHLORIDE SERPL-SCNC: 98 MMOL/L (ref 98–107)
CO2 SERPL-SCNC: 28 MMOL/L (ref 22–29)
CREAT SERPL-MCNC: 1.07 MG/DL (ref 0.57–1)
DEPRECATED RDW RBC AUTO: 49.3 FL (ref 37–54)
EGFRCR SERPLBLD CKD-EPI 2021: 55.3 ML/MIN/1.73
ERYTHROCYTE [DISTWIDTH] IN BLOOD BY AUTOMATED COUNT: 13.7 % (ref 12.3–15.4)
GLOBULIN UR ELPH-MCNC: 3.4 GM/DL
GLUCOSE BLDC GLUCOMTR-MCNC: 153 MG/DL (ref 70–130)
GLUCOSE SERPL-MCNC: 87 MG/DL (ref 65–99)
HCT VFR BLD AUTO: 31.8 % (ref 34–46.6)
HGB BLD-MCNC: 10.3 G/DL (ref 12–15.9)
MAGNESIUM SERPL-MCNC: 2 MG/DL (ref 1.6–2.4)
MCH RBC QN AUTO: 31.8 PG (ref 26.6–33)
MCHC RBC AUTO-ENTMCNC: 32.4 G/DL (ref 31.5–35.7)
MCV RBC AUTO: 98.1 FL (ref 79–97)
PLATELET # BLD AUTO: 252 10*3/MM3 (ref 140–450)
PMV BLD AUTO: 9.9 FL (ref 6–12)
POTASSIUM SERPL-SCNC: 3.9 MMOL/L (ref 3.5–5.2)
PROT SERPL-MCNC: 5.4 G/DL (ref 6–8.5)
RBC # BLD AUTO: 3.24 10*6/MM3 (ref 3.77–5.28)
SARS-COV-2 RNA RESP QL NAA+PROBE: DETECTED
SODIUM SERPL-SCNC: 134 MMOL/L (ref 136–145)
WBC NRBC COR # BLD: 5.7 10*3/MM3 (ref 3.4–10.8)

## 2023-08-27 PROCEDURE — 83735 ASSAY OF MAGNESIUM: CPT | Performed by: STUDENT IN AN ORGANIZED HEALTH CARE EDUCATION/TRAINING PROGRAM

## 2023-08-27 PROCEDURE — 85027 COMPLETE CBC AUTOMATED: CPT | Performed by: STUDENT IN AN ORGANIZED HEALTH CARE EDUCATION/TRAINING PROGRAM

## 2023-08-27 PROCEDURE — 25010000002 CEFTRIAXONE PER 250 MG: Performed by: STUDENT IN AN ORGANIZED HEALTH CARE EDUCATION/TRAINING PROGRAM

## 2023-08-27 PROCEDURE — 63710000001 PROMETHAZINE PER 12.5 MG: Performed by: STUDENT IN AN ORGANIZED HEALTH CARE EDUCATION/TRAINING PROGRAM

## 2023-08-27 PROCEDURE — 25010000002 REMDESIVIR 100 MG/20ML SOLUTION 1 EACH VIAL: Performed by: STUDENT IN AN ORGANIZED HEALTH CARE EDUCATION/TRAINING PROGRAM

## 2023-08-27 PROCEDURE — XW033E5 INTRODUCTION OF REMDESIVIR ANTI-INFECTIVE INTO PERIPHERAL VEIN, PERCUTANEOUS APPROACH, NEW TECHNOLOGY GROUP 5: ICD-10-PCS | Performed by: STUDENT IN AN ORGANIZED HEALTH CARE EDUCATION/TRAINING PROGRAM

## 2023-08-27 PROCEDURE — 25010000002 METRONIDAZOLE 500 MG/100ML SOLUTION: Performed by: STUDENT IN AN ORGANIZED HEALTH CARE EDUCATION/TRAINING PROGRAM

## 2023-08-27 PROCEDURE — 63710000001 PROMETHAZINE PER 25 MG: Performed by: STUDENT IN AN ORGANIZED HEALTH CARE EDUCATION/TRAINING PROGRAM

## 2023-08-27 PROCEDURE — 71045 X-RAY EXAM CHEST 1 VIEW: CPT

## 2023-08-27 PROCEDURE — 87040 BLOOD CULTURE FOR BACTERIA: CPT | Performed by: STUDENT IN AN ORGANIZED HEALTH CARE EDUCATION/TRAINING PROGRAM

## 2023-08-27 PROCEDURE — 80053 COMPREHEN METABOLIC PANEL: CPT | Performed by: STUDENT IN AN ORGANIZED HEALTH CARE EDUCATION/TRAINING PROGRAM

## 2023-08-27 PROCEDURE — 87635 SARS-COV-2 COVID-19 AMP PRB: CPT | Performed by: STUDENT IN AN ORGANIZED HEALTH CARE EDUCATION/TRAINING PROGRAM

## 2023-08-27 PROCEDURE — 82948 REAGENT STRIP/BLOOD GLUCOSE: CPT

## 2023-08-27 RX ORDER — ACETAMINOPHEN 325 MG/1
650 TABLET ORAL ONCE
Status: COMPLETED | OUTPATIENT
Start: 2023-08-27 | End: 2023-08-27

## 2023-08-27 RX ORDER — ACETAMINOPHEN 325 MG/1
650 TABLET ORAL EVERY 6 HOURS PRN
Status: DISCONTINUED | OUTPATIENT
Start: 2023-08-27 | End: 2023-09-15 | Stop reason: HOSPADM

## 2023-08-27 RX ADMIN — CEFTRIAXONE 2000 MG: 2 INJECTION, POWDER, FOR SOLUTION INTRAMUSCULAR; INTRAVENOUS at 16:38

## 2023-08-27 RX ADMIN — Medication 250 MG: at 21:30

## 2023-08-27 RX ADMIN — POTASSIUM CHLORIDE 20 MEQ: 750 TABLET, EXTENDED RELEASE ORAL at 08:33

## 2023-08-27 RX ADMIN — BACLOFEN 5 MG: 10 TABLET ORAL at 08:34

## 2023-08-27 RX ADMIN — LEVOTHYROXINE SODIUM 100 MCG: 0.1 TABLET ORAL at 06:03

## 2023-08-27 RX ADMIN — HYDROCHLOROTHIAZIDE 25 MG: 25 TABLET ORAL at 08:34

## 2023-08-27 RX ADMIN — Medication 10 ML: at 21:30

## 2023-08-27 RX ADMIN — OXYCODONE HYDROCHLORIDE 80 MG: 20 TABLET, FILM COATED, EXTENDED RELEASE ORAL at 08:32

## 2023-08-27 RX ADMIN — POTASSIUM CHLORIDE 20 MEQ: 750 TABLET, EXTENDED RELEASE ORAL at 20:35

## 2023-08-27 RX ADMIN — PROMETHAZINE HYDROCHLORIDE 25 MG: 12.5 TABLET ORAL at 20:47

## 2023-08-27 RX ADMIN — ACETAMINOPHEN 650 MG: 325 TABLET ORAL at 16:38

## 2023-08-27 RX ADMIN — METRONIDAZOLE 500 MG: 500 INJECTION, SOLUTION INTRAVENOUS at 08:10

## 2023-08-27 RX ADMIN — ACETAMINOPHEN 650 MG: 325 TABLET, FILM COATED ORAL at 08:10

## 2023-08-27 RX ADMIN — PROMETHAZINE HYDROCHLORIDE 25 MG: 12.5 TABLET ORAL at 08:32

## 2023-08-27 RX ADMIN — ALPRAZOLAM 3 MG: 1 TABLET ORAL at 20:46

## 2023-08-27 RX ADMIN — GUAIFENESIN 600 MG: 600 TABLET, EXTENDED RELEASE ORAL at 20:36

## 2023-08-27 RX ADMIN — Medication 1000 MCG: at 08:33

## 2023-08-27 RX ADMIN — BACLOFEN 5 MG: 10 TABLET ORAL at 20:36

## 2023-08-27 RX ADMIN — REMDESIVIR 200 MG: 100 INJECTION, POWDER, LYOPHILIZED, FOR SOLUTION INTRAVENOUS at 15:06

## 2023-08-27 RX ADMIN — ESCITALOPRAM OXALATE 10 MG: 10 TABLET, FILM COATED ORAL at 08:34

## 2023-08-27 RX ADMIN — OXYCODONE HYDROCHLORIDE 80 MG: 20 TABLET, FILM COATED, EXTENDED RELEASE ORAL at 20:35

## 2023-08-27 RX ADMIN — METRONIDAZOLE 500 MG: 500 INJECTION, SOLUTION INTRAVENOUS at 16:38

## 2023-08-27 RX ADMIN — BACLOFEN 5 MG: 10 TABLET ORAL at 16:38

## 2023-08-27 RX ADMIN — PROMETHAZINE HYDROCHLORIDE 25 MG: 12.5 TABLET ORAL at 20:45

## 2023-08-27 RX ADMIN — Medication 10 ML: at 08:34

## 2023-08-27 RX ADMIN — MONTELUKAST SODIUM 10 MG: 10 TABLET, FILM COATED ORAL at 08:34

## 2023-08-27 RX ADMIN — SENNOSIDES AND DOCUSATE SODIUM 2 TABLET: 50; 8.6 TABLET ORAL at 08:33

## 2023-08-27 RX ADMIN — Medication 1 TABLET: at 08:34

## 2023-08-27 RX ADMIN — Medication 2000 UNITS: at 08:33

## 2023-08-27 RX ADMIN — Medication 250 MG: at 08:33

## 2023-08-27 NOTE — PROGRESS NOTES
"[facility]  Clinical Pharmacy Department     Remdesivir Review Note    Anne Jewell is a 72 y.o. female with confirmed COVID-19 infection on day 3 of hospitalization.     Consulting Provider:  Dr. Montano  Date of Confirmed SARS-CoV-2: 8/27/23  Date of Symptom Onset: 8/27/23  Other Antimicrobials: None  Hydroxychloroquine or chloroquine prior to arrival: None    Allergies  Allergies as of 08/25/2023 - Reviewed 08/25/2023   Allergen Reaction Noted    Penicillins Hives 01/21/2016       Microbiology:  Microbiology Results (last 10 days)       Procedure Component Value - Date/Time    COVID PRE-OP / PRE-PROCEDURE SCREENING ORDER (NO ISOLATION) - Swab, Nasopharynx [[id number]]  (Abnormal) Collected: 08/27/23 1024    Lab Status: Final result Specimen: Swab from Nasopharynx Updated: 08/27/23 1138    Narrative:      The following orders were created for panel order COVID PRE-OP / PRE-PROCEDURE SCREENING ORDER (NO ISOLATION) - Swab, Nasopharynx.  Procedure                               Abnormality         Status                     ---------                               -----------         ------                     COVID-19,[facility] IN-HOUSE...[[id number]]  Abnormal            Final result                 Please view results for these tests on the individual orders.    COVID-19,[facility] IN-HOUSE CEPHEID/MORGAN NP SWAB IN TRANSPORT MEDIA 8-12 HR TAT - Swab, Nasopharynx [[id number]]  (Abnormal) Collected: 08/27/23 1024    Lab Status: Final result Specimen: Swab from Nasopharynx Updated: 08/27/23 1138     COVID19 Detected    Narrative:      Fact sheet for providers: https://www.fda.gov/media/017893/download     Fact sheet for patients: https://www.fda.gov/media/650396/download            Vitals/Labs/I&O  Visit Vitals  /61 (BP Location: Left arm, Patient Position: Lying)   Pulse 95   Temp (!) 100.6 °F (38.1 °C) (Oral)   Resp 16   Ht 154.9 cm (61\")   Wt 85.1 kg (187 lb 9.8 oz)   SpO2 95%   BMI 35.45 kg/m²       Results from " last 7 days   Lab Units 08/27/23  0737 08/26/23  0610 08/25/23  1512   WBC 10*3/mm3 5.70 4.24 5.27         Results from last 7 days   Lab Units 08/27/23  0737 08/25/23  1511   AST (SGOT) U/L 64* 70*      Results from last 7 days   Lab Units 08/27/23  0737 08/25/23  1511   ALT (SGPT) U/L 29 33       Estimated Creatinine Clearance: 47 mL/min (A) (by C-G formula based on SCr of 1.07 mg/dL (H)).  Results from last 7 days   Lab Units 08/27/23  0737 08/26/23  0610 08/25/23  1511   BUN mg/dL 9 12 16   CREATININE mg/dL 1.07* 0.76 1.03*     Intake & Output (last 3 days)         08/24 0701 08/25 0700 08/25 0701 08/26 0700 08/26 0701 08/27 0700 08/27 0701 08/28 0700    P.O.   480 240    Total Intake(mL/kg)   480 (5.6) 240 (2.8)    Net   +480 +240            Urine Unmeasured Occurrence  1 x 2 x             Assessment/Plan:    Patient meets the following inclusion/exclusion criteria:  Patient is hospitalized with confirmed COVID-19 infection (and accompanying symptoms)  Patient meets one of the two below criteria:  Patient is requiring an increase in baseline supplemental oxygen requirements OR SpO2 </= 94% on room air secondary to COVID-19 infection OR  Patient is symptomatic but not requiring supplemental oxygen or an increase in baseline oxygen AND has at least one of the below high risk criteria for progression to severe illness. High risk criteria:   Age >/= 65  Cancer  Cardiovascular diseases (HF, CAD, or cardiomyopathies)  CKD  Chronic lung disease (COPD, CF, interstitial lung disease, PE, pulmonary hypertension, bronchopulmonary dysplasia, bronchiectasis)  Diabetes mellitis (insulin dependent or poorly controlled)  Immunocompromising conditions or receipt of immunosuppressive medications  Obesity (BMI > 35 kg/m2)  Pregnancy and recent pregnancy (within 7 days of delivery)  Sickle cell disease  Baseline and daily LFTs and Scr have been ordered prior to remdesivir initiation  ALT is not ? 10 times the upper limit of  normal  Patient is not on concomitant hydroxychloroquine or chloroquine  Patient does not require invasive mechanical ventilation (IMV) or ECMO  Patient is within 10 days from symptom onset (for criteria 2a) or within 7 days of symptom onset (for criteria 2b)    Remdesivir 200 mg IV x 1 dose, followed by 100 mg IV q24h for 2 days or until hospital discharge (whichever comes first) has been ordered.    Contraindicated for paxlovid due to DDI with scheduled alprazolam.    Thank you for involving pharmacy in this patient's care. Please contact pharmacy with any questions or concerns.                           Casi Friedman, PharmD  Clinical Pharmacist

## 2023-08-27 NOTE — PROGRESS NOTES
Name: Anne Jewell ADMIT: 2023   : 1950  PCP: Jewell Stephens APRN    MRN: [id number] LOS: 2 days   AGE/SEX: 72 y.o. female  ROOM: [facility]     Subjective   Subjective   Patient with fever 100.6 this morning.  Daughter is at bedside.  LFTs are improved.  Patient had a headache overnight that improved with Tylenol.    Review of Systems   Constitutional:  Negative for chills and fever.   Respiratory:  Negative for cough and shortness of breath.    Cardiovascular:  Negative for chest pain and leg swelling.   Gastrointestinal:  Negative for abdominal pain, diarrhea and nausea.   As above     Objective   Objective   Vital Signs  Temp:  [98.6 °F (37 °C)-100.6 °F (38.1 °C)] 100.6 °F (38.1 °C)  Heart Rate:  [75-95] 95  Resp:  [16] 16  BP: (113-132)/(61-74) 124/61  SpO2:  [95 %-97 %] 95 %  on   ;   Device (Oxygen Therapy): room air  Body mass index is 35.45 kg/m².  Physical Exam  Constitutional:       General: She is not in acute distress.     Appearance: She is not ill-appearing.   Cardiovascular:      Rate and Rhythm: Normal rate and regular rhythm.   Pulmonary:      Effort: Pulmonary effort is normal. No respiratory distress.   Abdominal:      General: Abdomen is flat. There is no distension.      Tenderness: There is no abdominal tenderness.   Musculoskeletal:         General: No swelling or deformity. Normal range of motion.   Skin:     General: Skin is warm and dry.   Neurological:      General: No focal deficit present.      Mental Status: She is alert. Mental status is at baseline.       Results Review     I reviewed the patient's new clinical results.  Results from last 7 days   Lab Units 23  0737 23  0610 23  1512   WBC 10*3/mm3 5.70 4.24 5.27   HEMOGLOBIN g/dL 10.3* 9.0* 10.8*   PLATELETS 10*3/mm3 252 220 256       Results from last 7 days   Lab Units 23  0737 23  0610 23  1511 23  1120   SODIUM mmol/L 134* 139 138  --    POTASSIUM mmol/L 3.9 3.3*  3.6  --    CHLORIDE mmol/L 98 103 101  --    CO2 mmol/L 28.0 28.8 28.3  --    BUN mg/dL 9 12 16  --    CREATININE mg/dL 1.07* 0.76 1.03* 1.10   GLUCOSE mg/dL 87 76 89  --      Estimated Creatinine Clearance: 47 mL/min (A) (by C-G formula based on SCr of 1.07 mg/dL (H)).  Results from last 7 days   Lab Units 08/27/23  0737 08/25/23  1511   ALBUMIN g/dL 2.0* 2.3*   BILIRUBIN mg/dL 1.1 1.6*   ALK PHOS U/L 529* 538*   AST (SGOT) U/L 64* 70*   ALT (SGPT) U/L 29 33       Results from last 7 days   Lab Units 08/27/23  0737 08/26/23  0610 08/25/23  1511   CALCIUM mg/dL 7.6* 7.5* 8.2*   ALBUMIN g/dL 2.0*  --  2.3*   MAGNESIUM mg/dL 2.0 1.6 1.6   PHOSPHORUS mg/dL  --  3.1 3.3         COVID19   Date Value Ref Range Status   08/10/2020 Not Detected Not Detected - Ref. Range Final     No results found for: HGBA1C, POCGLU    XR Chest 1 View  Narrative: XR CHEST 1 VW-     HISTORY: Female who is 72 years-old, cough     TECHNIQUE: Frontal view of the chest     COMPARISON: None available     FINDINGS: Heart, mediastinum and pulmonary vasculature are unremarkable.  No focal pulmonary consolidation, pleural effusion, or pneumothorax. Old  granulomatous disease is apparent. No acute osseous process.     Impression: No focal pulmonary consolidation. Follow-up as clinical  indications persist.     This report was finalized on 8/27/2023 10:32 AM by Dr. Eze Enriquez M.D.       I reviewed the patient's daily medications.  Scheduled Medications  baclofen, 5 mg, Oral, TID  cefTRIAXone, 2,000 mg, Intravenous, Q24H  cholecalciferol, 2,000 Units, Oral, Daily  escitalopram, 10 mg, Oral, Daily  guaiFENesin, 600 mg, Oral, Nightly  hydroCHLOROthiazide, 25 mg, Oral, Daily  levothyroxine, 100 mcg, Oral, Q AM  metroNIDAZOLE, 500 mg, Intravenous, Q8H  montelukast, 10 mg, Oral, Daily  multivitamin, 1 tablet, Oral, Daily  oxyCODONE, 80 mg, Oral, Q12H  potassium chloride, 20 mEq, Oral, BID  promethazine, 25 mg, Oral, BID  saccharomyces boulardii, 250  mg, Oral, BID  senna-docusate sodium, 2 tablet, Oral, BID  sodium chloride, 10 mL, Intravenous, Q12H  vitamin B-12, 1,000 mcg, Oral, Daily    Infusions   Diet  Diet: Cardiac Diets; Healthy Heart (2-3 Na+); Texture: Regular Texture (IDDSI 7); Fluid Consistency: Thin (IDDSI 0)  Diet: Liquid Diets; Clear Liquid; Texture: Regular Texture (IDDSI 7); Fluid Consistency: Thin (IDDSI 0)  NPO Diet NPO Type: Sips with Meds         I have personally reviewed:  [x]  Laboratory   []  Microbiology   [x]  Radiology   []  EKG/Telemetry   []  Cardiology/Vascular   []  Pathology   [x]  Records     Assessment/Plan     Active Hospital Problems    Diagnosis  POA    **Choledocholithiasis [K80.50]  Yes    History of pulmonary embolism [Z86.711]  Unknown    Stage 3 chronic kidney disease [N18.30]  Yes    Gastroesophageal reflux disease [K21.9]  Yes    Acquired hypothyroidism [E03.9]  Yes    Chronic pain syndrome [G89.4]  Yes    Rheumatoid arthritis [M06.9]  Yes    Generalized osteoarthritis [M15.9]  Yes      Resolved Hospital Problems   No resolved problems to display.       72 y.o. female admitted with Choledocholithiasis.    Choledocholithiasis  History of gastric bypass  Elevated LFTs  -MRCP showing large stone in the distal common bile duct, 1.8 cm.  Intrahepatic biliary dilatation.  Marked biliary dilatation with CBD measuring 2.1 cm  -IV ceftriaxone, metronidazole, patient with history of allergies to penicillin.  -GI consulted-plan for ERCP procedure, but may not be successful because of patient's history of gastric bypass.  Holding Xarelto.    COVID-19  Fever  -Likely related to above  -Blood cultures drawn, but patient is already on antibiotics  -Patient with cough, and sore throat this morning.  Chest x-ray without any abnormalities.    -Start remdesivir x 3 days    History of pulmonary pulmonary embolism.  Diagnosed in 2007, has been on anticoagulation since.  At home takes Xarelto currently, last dose was on 08/24/2023.  Hold  anticoagulation secondary to planned procedure.     Hypothyroidism: Resume home levothyroxine, recent TSH normal     CKD stage IIIa: Creatinine stable, monitor.  Avoid nephrotoxic drugs.     Chronic pain syndrome/chronic arthritis: Bob reviewed.  Oxycodone extended release to 80 mg twice daily scheduled.  Oxycodone extended release to 80 mg twice a day as needed as well.  Continue home alprazolam 3 mg nightly     History of RA: Not on treatment for RA per chart review, reports she was diagnosed with URI but was never on specific treatment.     Memory loss: Daughter states she has noticed memory changes for the last several months.  Was referred to neurology outpatient follow-up patient with possible dementia.  Follow-up as outpatient    Abnormal liver on MRI: Repeat CT abdomen with liver protocol in 2 to 3 months.  Daughter concerned about left renal cyst and small splenic lesion that looks like a cyst per radiology report.  Can further be evaluated with a repeat CT abdomen.      SCDs for DVT prophylaxis.  Full code.  Discussed with patient, family, and nursing staff.  Anticipate discharge home with family next week.    Expected Discharge Date: 8/28/2023; Expected Discharge Time:       Talha Montano MD  [facility]  08/27/23  10:38 EDT

## 2023-08-28 ENCOUNTER — APPOINTMENT (OUTPATIENT)
Dept: GENERAL RADIOLOGY | Facility: HOSPITAL | Age: 73
DRG: 423 | End: 2023-08-28
Payer: MEDICARE

## 2023-08-28 ENCOUNTER — TELEPHONE (OUTPATIENT)
Dept: PAIN MEDICINE | Facility: CLINIC | Age: 73
End: 2023-08-28
Payer: MEDICARE

## 2023-08-28 LAB
ALBUMIN SERPL-MCNC: 1.9 G/DL (ref 3.5–5.2)
ALBUMIN/GLOB SERPL: 0.6 G/DL
ALP SERPL-CCNC: 463 U/L (ref 39–117)
ALT SERPL W P-5'-P-CCNC: 26 U/L (ref 1–33)
ANION GAP SERPL CALCULATED.3IONS-SCNC: 5.9 MMOL/L (ref 5–15)
AST SERPL-CCNC: 60 U/L (ref 1–32)
BILIRUB SERPL-MCNC: 0.9 MG/DL (ref 0–1.2)
BUN SERPL-MCNC: 8 MG/DL (ref 8–23)
BUN/CREAT SERPL: 8.7 (ref 7–25)
CALCIUM SPEC-SCNC: 7.4 MG/DL (ref 8.6–10.5)
CHLORIDE SERPL-SCNC: 100 MMOL/L (ref 98–107)
CO2 SERPL-SCNC: 28.1 MMOL/L (ref 22–29)
CREAT SERPL-MCNC: 0.92 MG/DL (ref 0.57–1)
DEPRECATED RDW RBC AUTO: 47.7 FL (ref 37–54)
EGFRCR SERPLBLD CKD-EPI 2021: 66.3 ML/MIN/1.73
ERYTHROCYTE [DISTWIDTH] IN BLOOD BY AUTOMATED COUNT: 13.8 % (ref 12.3–15.4)
GLOBULIN UR ELPH-MCNC: 3.3 GM/DL
GLUCOSE SERPL-MCNC: 90 MG/DL (ref 65–99)
HCT VFR BLD AUTO: 29.3 % (ref 34–46.6)
HGB BLD-MCNC: 9.9 G/DL (ref 12–15.9)
MCH RBC QN AUTO: 31.6 PG (ref 26.6–33)
MCHC RBC AUTO-ENTMCNC: 33.8 G/DL (ref 31.5–35.7)
MCV RBC AUTO: 93.6 FL (ref 79–97)
PLATELET # BLD AUTO: 228 10*3/MM3 (ref 140–450)
PMV BLD AUTO: 9.7 FL (ref 6–12)
POTASSIUM SERPL-SCNC: 4 MMOL/L (ref 3.5–5.2)
PROT SERPL-MCNC: 5.2 G/DL (ref 6–8.5)
RBC # BLD AUTO: 3.13 10*6/MM3 (ref 3.77–5.28)
SODIUM SERPL-SCNC: 134 MMOL/L (ref 136–145)
WBC NRBC COR # BLD: 4.22 10*3/MM3 (ref 3.4–10.8)

## 2023-08-28 PROCEDURE — 97161 PT EVAL LOW COMPLEX 20 MIN: CPT

## 2023-08-28 PROCEDURE — 25010000002 REMDESIVIR 100 MG/20ML SOLUTION 1 EACH VIAL: Performed by: STUDENT IN AN ORGANIZED HEALTH CARE EDUCATION/TRAINING PROGRAM

## 2023-08-28 PROCEDURE — 25010000002 METRONIDAZOLE 500 MG/100ML SOLUTION: Performed by: STUDENT IN AN ORGANIZED HEALTH CARE EDUCATION/TRAINING PROGRAM

## 2023-08-28 PROCEDURE — 63710000001 PROMETHAZINE PER 25 MG: Performed by: STUDENT IN AN ORGANIZED HEALTH CARE EDUCATION/TRAINING PROGRAM

## 2023-08-28 PROCEDURE — 80053 COMPREHEN METABOLIC PANEL: CPT | Performed by: STUDENT IN AN ORGANIZED HEALTH CARE EDUCATION/TRAINING PROGRAM

## 2023-08-28 PROCEDURE — 25010000002 CEFTRIAXONE PER 250 MG: Performed by: STUDENT IN AN ORGANIZED HEALTH CARE EDUCATION/TRAINING PROGRAM

## 2023-08-28 PROCEDURE — 99232 SBSQ HOSP IP/OBS MODERATE 35: CPT | Performed by: INTERNAL MEDICINE

## 2023-08-28 PROCEDURE — 85027 COMPLETE CBC AUTOMATED: CPT | Performed by: STUDENT IN AN ORGANIZED HEALTH CARE EDUCATION/TRAINING PROGRAM

## 2023-08-28 RX ADMIN — PROMETHAZINE HYDROCHLORIDE 25 MG: 12.5 TABLET ORAL at 08:27

## 2023-08-28 RX ADMIN — OXYCODONE HYDROCHLORIDE 80 MG: 20 TABLET, FILM COATED, EXTENDED RELEASE ORAL at 21:07

## 2023-08-28 RX ADMIN — METRONIDAZOLE 500 MG: 500 INJECTION, SOLUTION INTRAVENOUS at 16:01

## 2023-08-28 RX ADMIN — Medication 250 MG: at 08:26

## 2023-08-28 RX ADMIN — HYDROCHLOROTHIAZIDE 25 MG: 25 TABLET ORAL at 08:27

## 2023-08-28 RX ADMIN — POTASSIUM CHLORIDE 20 MEQ: 750 TABLET, EXTENDED RELEASE ORAL at 21:07

## 2023-08-28 RX ADMIN — BACLOFEN 5 MG: 10 TABLET ORAL at 21:07

## 2023-08-28 RX ADMIN — METRONIDAZOLE 500 MG: 500 INJECTION, SOLUTION INTRAVENOUS at 23:56

## 2023-08-28 RX ADMIN — ALPRAZOLAM 3 MG: 1 TABLET ORAL at 21:11

## 2023-08-28 RX ADMIN — PROMETHAZINE HYDROCHLORIDE 25 MG: 12.5 TABLET ORAL at 21:06

## 2023-08-28 RX ADMIN — MONTELUKAST SODIUM 10 MG: 10 TABLET, FILM COATED ORAL at 08:27

## 2023-08-28 RX ADMIN — OXYCODONE HYDROCHLORIDE 80 MG: 20 TABLET, FILM COATED, EXTENDED RELEASE ORAL at 08:26

## 2023-08-28 RX ADMIN — Medication 10 ML: at 21:12

## 2023-08-28 RX ADMIN — Medication 2000 UNITS: at 11:41

## 2023-08-28 RX ADMIN — METRONIDAZOLE 500 MG: 500 INJECTION, SOLUTION INTRAVENOUS at 01:22

## 2023-08-28 RX ADMIN — Medication 1000 MCG: at 08:26

## 2023-08-28 RX ADMIN — ACETAMINOPHEN 650 MG: 325 TABLET ORAL at 11:41

## 2023-08-28 RX ADMIN — METRONIDAZOLE 500 MG: 500 INJECTION, SOLUTION INTRAVENOUS at 08:26

## 2023-08-28 RX ADMIN — CEFTRIAXONE 2000 MG: 2 INJECTION, POWDER, FOR SOLUTION INTRAMUSCULAR; INTRAVENOUS at 16:01

## 2023-08-28 RX ADMIN — Medication 10 ML: at 08:31

## 2023-08-28 RX ADMIN — POTASSIUM CHLORIDE 20 MEQ: 750 TABLET, EXTENDED RELEASE ORAL at 08:28

## 2023-08-28 RX ADMIN — ESCITALOPRAM OXALATE 10 MG: 10 TABLET, FILM COATED ORAL at 08:27

## 2023-08-28 RX ADMIN — Medication 1 TABLET: at 08:27

## 2023-08-28 RX ADMIN — BACLOFEN 5 MG: 10 TABLET ORAL at 08:27

## 2023-08-28 RX ADMIN — REMDESIVIR 100 MG: 100 INJECTION, POWDER, LYOPHILIZED, FOR SOLUTION INTRAVENOUS at 12:44

## 2023-08-28 RX ADMIN — Medication 250 MG: at 21:06

## 2023-08-28 RX ADMIN — BACLOFEN 5 MG: 10 TABLET ORAL at 16:01

## 2023-08-28 RX ADMIN — GUAIFENESIN 600 MG: 600 TABLET, EXTENDED RELEASE ORAL at 21:07

## 2023-08-28 RX ADMIN — LEVOTHYROXINE SODIUM 100 MCG: 0.1 TABLET ORAL at 06:19

## 2023-08-28 NOTE — CASE MANAGEMENT/SOCIAL WORK
Continued Stay Note  [facility]     Patient Name: Anne eJwell  MRN: [id number]  Today's Date: 8/28/2023    Admit Date: 8/25/2023    Plan: Home, family will transport   Discharge Plan       Row Name 08/28/23 1248       Plan    Plan Home, family will transport    Plan Comments Met with pt briefly in room. CCP wore appropriate PPE. Permission obtained to speak to pt in front of daughter. Introduced self and explained role. Confirmed with pt her discharge plans are to return home, and that daughter will transport. informed pt that CCP is available should any needs arise.                   Discharge Codes    No documentation.                 Expected Discharge Date and Time       Expected Discharge Date Expected Discharge Time    Aug 28, 2023               Jordyn Barnes RN

## 2023-08-28 NOTE — PROGRESS NOTES
Name: Anne Jewell ADMIT: 2023   : 1950  PCP: Jewell Stephens APRN    MRN: [id number] LOS: 3 days   AGE/SEX: 72 y.o. female  ROOM: [facility]     Subjective   Subjective   Patient sitting up in chair.  Daughter in hospital bed.  Patient feeling much normal, no sore throat or cough today.    Review of Systems   Constitutional:  Negative for chills and fever.   Respiratory:  Negative for cough and shortness of breath.    Cardiovascular:  Negative for chest pain and leg swelling.   Gastrointestinal:  Negative for abdominal pain, diarrhea and nausea.   As above     Objective   Objective   Vital Signs  Temp:  [98.2 °F (36.8 °C)-99.3 °F (37.4 °C)] 98.6 °F (37 °C)  Heart Rate:  [80-87] 83  Resp:  [18-20] 20  BP: (111-116)/(68-73) 115/69  SpO2:  [95 %-98 %] 95 %  on   ;   Device (Oxygen Therapy): room air  Body mass index is 35.45 kg/m².  Physical Exam  Constitutional:       General: She is not in acute distress.     Appearance: She is not ill-appearing.   Cardiovascular:      Rate and Rhythm: Normal rate and regular rhythm.   Pulmonary:      Effort: Pulmonary effort is normal. No respiratory distress.   Abdominal:      General: Abdomen is flat. There is no distension.      Tenderness: There is no abdominal tenderness.   Musculoskeletal:         General: No swelling or deformity. Normal range of motion.   Skin:     General: Skin is warm and dry.   Neurological:      General: No focal deficit present.      Mental Status: She is alert. Mental status is at baseline.       Results Review     I reviewed the patient's new clinical results.  Results from last 7 days   Lab Units 23  0430 23  0737 23  0610 23  1512   WBC 10*3/mm3 4.22 5.70 4.24 5.27   HEMOGLOBIN g/dL 9.9* 10.3* 9.0* 10.8*   PLATELETS 10*3/mm3 228 252 220 256       Results from last 7 days   Lab Units 23  0430 23  0737 23  0610 23  1511   SODIUM mmol/L 134* 134* 139 138   POTASSIUM mmol/L 4.0 3.9  3.3* 3.6   CHLORIDE mmol/L 100 98 103 101   CO2 mmol/L 28.1 28.0 28.8 28.3   BUN mg/dL 8 9 12 16   CREATININE mg/dL 0.92 1.07* 0.76 1.03*   GLUCOSE mg/dL 90 87 76 89     Estimated Creatinine Clearance: 54.7 mL/min (by C-G formula based on SCr of 0.92 mg/dL).  Results from last 7 days   Lab Units 08/28/23  0430 08/27/23  0737 08/25/23  1511   ALBUMIN g/dL 1.9* 2.0* 2.3*   BILIRUBIN mg/dL 0.9 1.1 1.6*   ALK PHOS U/L 463* 529* 538*   AST (SGOT) U/L 60* 64* 70*   ALT (SGPT) U/L 26 29 33       Results from last 7 days   Lab Units 08/28/23  0430 08/27/23  0737 08/26/23  0610 08/25/23  1511   CALCIUM mg/dL 7.4* 7.6* 7.5* 8.2*   ALBUMIN g/dL 1.9* 2.0*  --  2.3*   MAGNESIUM mg/dL  --  2.0 1.6 1.6   PHOSPHORUS mg/dL  --   --  3.1 3.3         COVID19   Date Value Ref Range Status   08/27/2023 Detected (C) Not Detected - Ref. Range Final   08/10/2020 Not Detected Not Detected - Ref. Range Final     Glucose   Date/Time Value Ref Range Status   08/27/2023 1113 153 (H) 70 - 130 mg/dL Final       XR Chest 1 View  Narrative: XR CHEST 1 VW-     HISTORY: Female who is 72 years-old, cough     TECHNIQUE: Frontal view of the chest     COMPARISON: None available     FINDINGS: Heart, mediastinum and pulmonary vasculature are unremarkable.  No focal pulmonary consolidation, pleural effusion, or pneumothorax. Old  granulomatous disease is apparent. No acute osseous process.     Impression: No focal pulmonary consolidation. Follow-up as clinical  indications persist.     This report was finalized on 8/27/2023 10:32 AM by Dr. Eze Enriquez M.D.       I reviewed the patient's daily medications.  Scheduled Medications  baclofen, 5 mg, Oral, TID  cefTRIAXone, 2,000 mg, Intravenous, Q24H  cholecalciferol, 2,000 Units, Oral, Daily  escitalopram, 10 mg, Oral, Daily  guaiFENesin, 600 mg, Oral, Nightly  hydroCHLOROthiazide, 25 mg, Oral, Daily  levothyroxine, 100 mcg, Oral, Q AM  metroNIDAZOLE, 500 mg, Intravenous, Q8H  montelukast, 10 mg, Oral,  Daily  multivitamin, 1 tablet, Oral, Daily  oxyCODONE, 80 mg, Oral, Q12H  potassium chloride, 20 mEq, Oral, BID  promethazine, 25 mg, Oral, BID  remdesivir, 100 mg, Intravenous, Q24H  saccharomyces boulardii, 250 mg, Oral, BID  senna-docusate sodium, 2 tablet, Oral, BID  sodium chloride, 10 mL, Intravenous, Q12H  vitamin B-12, 1,000 mcg, Oral, Daily    Infusions   Diet  NPO Diet NPO Type: Sips with Meds         I have personally reviewed:  [x]  Laboratory   [x]  Microbiology   [x]  Radiology   []  EKG/Telemetry   []  Cardiology/Vascular   []  Pathology   []  Records     Assessment/Plan     Active Hospital Problems    Diagnosis  POA    **Choledocholithiasis [K80.50]  Yes    History of pulmonary embolism [Z86.711]  Unknown    Stage 3 chronic kidney disease [N18.30]  Yes    Gastroesophageal reflux disease [K21.9]  Yes    Acquired hypothyroidism [E03.9]  Yes    Chronic pain syndrome [G89.4]  Yes    Rheumatoid arthritis [M06.9]  Yes    Generalized osteoarthritis [M15.9]  Yes      Resolved Hospital Problems   No resolved problems to display.       72 y.o. female admitted with Choledocholithiasis.    Choledocholithiasis  History of gastric bypass  Elevated LFTs  -MRCP showing large stone in the distal common bile duct, 1.8 cm.  Intrahepatic biliary dilatation.  Marked biliary dilatation with CBD measuring 2.1 cm  -IV ceftriaxone, metronidazole, patient with history of allergies to penicillin.  -GI consulted-plan for ERCP procedure today, but may not be successful because of patient's history of gastric bypass.  Holding Xarelto.    COVID-19  Fever  -Blood cultures with no growth for 24 hours  -Patient with cough, and sore throat this morning.  Chest x-ray without any abnormalities.    -Start remdesivir x 3 days, last day 8/29    History of pulmonary pulmonary embolism.  Diagnosed in 2007, has been on anticoagulation since.  At home takes Xarelto currently, last dose was on 08/24/2023.  Hold anticoagulation secondary to  planned procedure.     Hypothyroidism: Resume home levothyroxine, recent TSH normal     CKD stage IIIa: Creatinine stable, monitor.  Avoid nephrotoxic drugs.     Chronic pain syndrome/chronic arthritis: Bob reviewed.  Oxycodone extended release to 80 mg twice daily scheduled.  Oxycodone extended release to 80 mg twice a day as needed as well.  Continue home alprazolam 3 mg nightly     History of RA: Not on treatment for RA per chart review, reports she was diagnosed with URI but was never on specific treatment.     Memory loss: Daughter states she has noticed memory changes for the last several months.  Was referred to neurology outpatient follow-up patient with possible dementia.  Follow-up as outpatient    Abnormal liver on MRI: Repeat CT abdomen with liver protocol in 2 to 3 months.  Daughter concerned about left renal cyst and small splenic lesion that looks like a cyst per radiology report.  Can further be evaluated with a repeat CT abdomen.      SCDs for DVT prophylaxis.  Full code.  Discussed with patient, family, and nursing staff.  Anticipate discharge home with family in 1-2 days.  Pending ERCP results    Expected Discharge Date: 8/28/2023; Expected Discharge Time:       Talha Montano MD  [facility]  08/28/23  12:52 EDT

## 2023-08-28 NOTE — TELEPHONE ENCOUNTER
LV:7/19/23  Next visit: 10/18/23    Lab Results   Component Value Date    GLUCOSE 90 08/28/2023    BUN 8 08/28/2023    CREATININE 0.92 08/28/2023    EGFRRESULT 66 04/14/2022    EGFR 66.3 08/28/2023    BCR 8.7 08/28/2023    K 4.0 08/28/2023    CO2 28.1 08/28/2023    CALCIUM 7.4 (L) 08/28/2023    PROTENTOTREF 6.3 04/14/2022    ALBUMIN 1.9 (L) 08/28/2023    BILITOT 0.9 08/28/2023    AST 60 (H) 08/28/2023    ALT 26 08/28/2023

## 2023-08-28 NOTE — PROGRESS NOTES
[facility]  Inpatient Progress Note    Reason for Follow Up: Bile duct calculus    Subjective     Interval History:   Patient with low-grade fever yesterday ended up getting tested and tested positive for COVID.  Patient reported some cough and sore throat yesterday started on treatment and actually feels improved today.  Reports no further cough no sore throat.  Patient with no nausea vomiting fever or chills since admission.    Current Facility-Administered Medications:     acetaminophen (TYLENOL) tablet 650 mg, 650 mg, Oral, Q6H PRN, Talha Montano MD, 650 mg at 08/28/23 1141    albuterol (PROVENTIL) nebulizer solution 0.083% 2.5 mg/3mL, 2.5 mg, Nebulization, Q6H PRN, Jocelyne Feliz MD    ALPRAZolam (XANAX) tablet 3 mg, 3 mg, Oral, Nightly PRN, Talha Montano MD, 3 mg at 08/27/23 2046    baclofen (LIORESAL) tablet 5 mg, 5 mg, Oral, TID, Jocelyne Feliz MD, 5 mg at 08/28/23 1601    sennosides-docusate (PERICOLACE) 8.6-50 MG per tablet 2 tablet, 2 tablet, Oral, BID, 2 tablet at 08/27/23 0833 **AND** polyethylene glycol (MIRALAX) packet 17 g, 17 g, Oral, Daily PRN **AND** bisacodyl (DULCOLAX) EC tablet 5 mg, 5 mg, Oral, Daily PRN **AND** bisacodyl (DULCOLAX) suppository 10 mg, 10 mg, Rectal, Daily PRN, Jocelyne Feliz MD    cefTRIAXone (ROCEPHIN) 2,000 mg in sodium chloride 0.9 % 100 mL IVPB-VTB, 2,000 mg, Intravenous, Q24H, Jocelyne Feliz MD, Last Rate: 200 mL/hr at 08/28/23 1601, 2,000 mg at 08/28/23 1601    cholecalciferol (VITAMIN D3) tablet 2,000 Units, 2,000 Units, Oral, Daily, Jocelyne Feliz MD, 2,000 Units at 08/28/23 1141    escitalopram (LEXAPRO) tablet 10 mg, 10 mg, Oral, Daily, Jocelyne Feliz MD, 10 mg at 08/28/23 0827    guaiFENesin (MUCINEX) 12 hr tablet 600 mg, 600 mg, Oral, Nightly, Jocelyne Feliz MD, 600 mg at 08/27/23 2036    hydroCHLOROthiazide (HYDRODIURIL) tablet 25 mg, 25 mg, Oral, Daily, Jocelyne Feliz MD, 25 mg at 08/28/23  0827    levothyroxine (SYNTHROID, LEVOTHROID) tablet 100 mcg, 100 mcg, Oral, Q AM, Jocelyne Feliz MD, 100 mcg at 08/28/23 0619    Magnesium Standard Dose Replacement - Follow Nurse / BPA Driven Protocol, , Does not apply, Dusty AVALOS Cody W, MD    metroNIDAZOLE (FLAGYL) IVPB 500 mg, 500 mg, Intravenous, Q8H, Jocelyne Feliz MD, Last Rate: 100 mL/hr at 08/28/23 1601, 500 mg at 08/28/23 1601    montelukast (SINGULAIR) tablet 10 mg, 10 mg, Oral, Daily, Jocelyne Feliz MD, 10 mg at 08/28/23 0827    multivitamin (THERAGRAN) tablet 1 tablet, 1 tablet, Oral, Daily, Jocelyne Feliz MD, 1 tablet at 08/28/23 0827    nitroglycerin (NITROSTAT) SL tablet 0.4 mg, 0.4 mg, Sublingual, Q5 Min PRN, Jocelyne Feliz MD    oxyCODONE ER (oxyCONTIN) 12 hr tablet 80 mg, 80 mg, Oral, Q12H, Talha Montano MD, 80 mg at 08/28/23 0826    oxyCODONE ER (oxyCONTIN) 12 hr tablet 80 mg, 80 mg, Oral, BID PRN, Talha Montano MD    potassium chloride (K-DUR,KLOR-CON) ER tablet 20 mEq, 20 mEq, Oral, BID, Talha Montano MD, 20 mEq at 08/28/23 0828    Potassium Replacement - Follow Nurse / BPA Driven Protocol, , Does not apply, PRNDusty Cody W, MD    promethazine (PHENERGAN) tablet 25 mg, 25 mg, Oral, Q6H PRN, 25 mg at 08/27/23 2045 **OR** promethazine (PHENERGAN) suppository 12.5 mg, 12.5 mg, Rectal, Q6H PRN, Talha Montano MD    promethazine (PHENERGAN) tablet 25 mg, 25 mg, Oral, BID, Talha Montano MD, 25 mg at 08/28/23 0827    [COMPLETED] remdesivir 200 mg in sodium chloride 0.9 % 290 mL IVPB (powder vial), 200 mg, Intravenous, Q24H, 200 mg at 08/27/23 1506 **FOLLOWED BY** remdesivir 100 mg in sodium chloride 0.9 % 250 mL IVPB (powder vial), 100 mg, Intravenous, Q24H, Talha Montano MD, 100 mg at 08/28/23 1244    saccharomyces boulardii (FLORASTOR) capsule 250 mg, 250 mg, Oral, BID, Jocelyne Feliz MD, 250 mg at 08/28/23 0826    sodium chloride 0.9 % flush 10 mL, 10 mL, Intravenous, Q12H, Jocelyne Feliz MD, 10  mL at 08/28/23 0831    sodium chloride 0.9 % flush 10 mL, 10 mL, Intravenous, PRN, Jocelyne Feliz MD    sodium chloride 0.9 % infusion 40 mL, 40 mL, Intravenous, PRN, Jocelyne Feliz MD    vitamin B-12 (CYANOCOBALAMIN) tablet 1,000 mcg, 1,000 mcg, Oral, Daily, Jocelyne Feliz MD, 1,000 mcg at 08/28/23 0826  Review of Systems:    The following systems were reviewed and negative;  constitution, respiratory, cardiovascular, gastrointestinal, integument, musculoskeletal, and neurological    Objective     Vital Signs  Temp:  [97.9 °F (36.6 °C)-99.1 °F (37.3 °C)] 98.2 °F (36.8 °C)  Heart Rate:  [64-85] 68  Resp:  [18-20] 20  BP: ()/(67-73) 92/67  Body mass index is 35.45 kg/m².    Intake/Output Summary (Last 24 hours) at 8/28/2023 1740  Last data filed at 8/28/2023 1403  Gross per 24 hour   Intake 220 ml   Output --   Net 220 ml     I/O this shift:  In: 220 [P.O.:220]  Out: -      Physical Exam:   General: patient awake, alert and cooperative   Eyes: Normal lids and lashes, no scleral icterus   Neck: supple, normal ROM   Skin: warm and dry, not jaundiced   Cardiovascular: regular rhythm and rate, no murmurs auscultated   Pulm: clear to auscultation bilaterally, regular and unlabored   Abdomen: soft, nontender, nondistended; normal bowel sounds   Extremities: no rash or edema   Psychiatric: Normal mood and behavior; memory intact     Results Review:     I reviewed the patient's new clinical results.    Results from last 7 days   Lab Units 08/28/23  0430 08/27/23  0737 08/26/23  0610   WBC 10*3/mm3 4.22 5.70 4.24   HEMOGLOBIN g/dL 9.9* 10.3* 9.0*   HEMATOCRIT % 29.3* 31.8* 27.0*   PLATELETS 10*3/mm3 228 252 220     Results from last 7 days   Lab Units 08/28/23  0430 08/27/23  0737 08/26/23  0610 08/25/23  1511   SODIUM mmol/L 134* 134* 139 138   POTASSIUM mmol/L 4.0 3.9 3.3* 3.6   CHLORIDE mmol/L 100 98 103 101   CO2 mmol/L 28.1 28.0 28.8 28.3   BUN mg/dL 8 9 12 16   CREATININE mg/dL 0.92 1.07* 0.76  1.03*   CALCIUM mg/dL 7.4* 7.6* 7.5* 8.2*   BILIRUBIN mg/dL 0.9 1.1  --  1.6*   ALK PHOS U/L 463* 529*  --  538*   ALT (SGPT) U/L 26 29  --  33   AST (SGOT) U/L 60* 64*  --  70*   GLUCOSE mg/dL 90 87 76 89     Results from last 7 days   Lab Units 08/25/23  1511   INR  1.19*     No results found for: LIPASE    Radiology:  XR Chest 1 View   Final Result   No focal pulmonary consolidation. Follow-up as clinical   indications persist.       This report was finalized on 8/27/2023 10:32 AM by Dr. Eze Enriquez M.D.              Assessment & Plan     Active Hospital Problems    Diagnosis     **Choledocholithiasis     History of pulmonary embolism     Stage 3 chronic kidney disease     Gastroesophageal reflux disease     Acquired hypothyroidism     Chronic pain syndrome     Rheumatoid arthritis     Generalized osteoarthritis        Assessment:  COVID-19 positive  Choledocholithiasis  Renal insufficiency  History of pulmonary embolism      Plan:  We will hold ERCP today due to high risk for respiratory issue based on the acute COVID infection.  Okay to feed diet, will give cardiac diet.  If patient remains stable and pulmonary symptoms do not return or advance we will plan on ERCP attempt Wednesday.  This will allow the complete COVID therapy to be given, last dose tomorrow and will follow-up based on response.  I discussed the patients findings and my recommendations with patient and nursing staff.    Elijah Simon MD

## 2023-08-28 NOTE — TELEPHONE ENCOUNTER
Patient called and stated that she cancelled her appt because she is admitted to [facility]. She is supposed to have a procedure performed but now she was diagnosed with COVID since she has been in the hospital. Now she doesn't know when they will perform the procedure. She wanted to know if she can have a virtual visit while in the hospital. Please advise.

## 2023-08-28 NOTE — THERAPY EVALUATION
Patient Name: Anne Jewell  : 1950    MRN: [id number]                              Today's Date: 2023       Admit Date: 2023    Visit Dx:     ICD-10-CM ICD-9-CM   1. Choledocholithiasis  K80.50 574.50     Patient Active Problem List   Diagnosis    Chronic pain syndrome    Rheumatoid arthritis    Osteoarthritis of knee    Generalized osteoarthritis    Degeneration of intervertebral disc of lumbar region    Neck pain    Lumbar radiculopathy    Atopic rhinitis    Anxiety    Diarrhea    Indigestion    Dysthymic disorder    Gastroesophageal reflux disease    Acquired hypothyroidism    Insomnia    Pernicious anemia    Osteoporosis    Scoliosis    Vasovagal syncope    Vitamin D deficiency    Trigger middle finger of right hand    Trigger ring finger of right hand    Trigger little finger of right hand    Encounter for long-term (current) use of high-risk medication    Coagulation disorder    Hyperglycemia    Joint pain    Status post total knee replacement, left    Left knee pain    Status post total knee replacement, right    Osteoarthritis of thumb    Trigger finger of all digits of right hand    Left hip pain    Primary osteoarthritis of both hips    Edema    Nausea    Irritable bowel syndrome with diarrhea    Stage 3 chronic kidney disease    Cough    Acute non-recurrent frontal sinusitis    Dysuria    History of DVT (deep vein thrombosis)    Routine general medical examination at a health care facility    Frequency of urination    Memory loss    Visual changes    Choledocholithiasis    History of pulmonary embolism     Past Medical History:   Diagnosis Date    Allergic rhinitis     Arthritis of back     Arthritis of neck     Asthma     Bronchospasm     Carpal tunnel syndrome, bilateral     Cervical disc disorder     Clotting disorder     Deep vein thrombosis     Disc degeneration, lumbar     Edema     Esophageal reflux     Glaucoma     Hip arthrosis     Joint pain     Kidney disease 2018    stage 3     Liver disease 2018    stage 3    Low back pain     Osteoarthritis     Osteopenia     Osteoporosis     Periarthritis of shoulder     Scoliosis     Status post total knee replacement, left 08/31/2017    Status post total knee replacement, right 08/31/2017    UTI (urinary tract infection)     Venous thrombosis      Past Surgical History:   Procedure Laterality Date    BILATERAL BREAST REDUCTION      BREAST SURGERY      COLONOSCOPY  08/05/2016    GASTRIC BYPASS      HAND SURGERY Right 01/14/2016    HERNIA REPAIR      x7    HYSTERECTOMY      JOINT REPLACEMENT      Both knees    OTHER SURGICAL HISTORY      vaginal sling operation for stress incontinence    TOTAL KNEE ARTHROPLASTY Left     TOTAL KNEE ARTHROPLASTY Bilateral       General Information       Row Name 08/28/23 1007          Physical Therapy Time and Intention    Document Type evaluation;discharge evaluation/summary  -     Mode of Treatment individual therapy;physical therapy  -       Row Name 08/28/23 1007          General Information    Patient Profile Reviewed yes  -SM     Prior Level of Function independent:  -       Row Name 08/28/23 1007          Living Environment    People in Home child(yaima), adult;spouse  -[facility] Name 08/28/23 1007          Home Main Entrance    Number of Stairs, Main Entrance other (see comments)  ramp  -       Row Name 08/28/23 1007          Cognition    Orientation Status (Cognition) oriented x 4  -SM               User Key  (r) = Recorded By, (t) = Taken By, (c) = Cosigned By      Initials Name Provider Type     Dixie Butler, PT Physical Therapist                   Mobility       Row Name 08/28/23 1008          Bed Mobility    Bed Mobility supine-sit;sit-supine  -     Supine-Sit [place] (Bed Mobility) modified independence  -SM     Sit-Supine [place] (Bed Mobility) modified independence  -     Assistive Device (Bed Mobility) head of bed elevated  -       Row Name 08/28/23 1008           Sit-Stand Transfer    Sit-Stand [place] (Transfers) supervision  -SM       Row Name 08/28/23 1008          Gait/Stairs (Locomotion)    [place] Level (Gait) supervision  -     Distance in Feet (Gait) 30ft  -     Comment, (Gait/Stairs) Patient ambulated in room while pushing pole with no overt LOB noted.  -               User Key  (r) = Recorded By, (t) = Taken By, (c) = Cosigned By      Initials Name Provider Type     Dixie Butler PT Physical Therapist                   Obj/Interventions       Row Name 08/28/23 1008          Range of Motion Comprehensive    General Range of Motion bilateral lower extremity ROM WFL  -       Row Name 08/28/23 1008          Strength Comprehensive (MMT)    Comment, General Manual Muscle Testing (MMT) Assessment B LEs WFL  -[facility] Name 08/28/23 1008          Balance    Balance Assessment sitting static balance;sitting dynamic balance;sit to stand dynamic balance;standing static balance;standing dynamic balance  -     Static Sitting Balance independent  -SM     Dynamic Sitting Balance modified independence  -     Position, Sitting Balance sitting edge of bed  -     Sit to Stand Dynamic Balance supervision  -SM     Static Standing Balance supervision  -     Dynamic Standing Balance supervision  -SM     Balance Interventions sitting;standing;sit to stand;static;dynamic  -SM               User Key  (r) = Recorded By, (t) = Taken By, (c) = Cosigned By      Initials Name Provider Type     Dixie Butler PT Physical Therapist                   Goals/Plan    No documentation.                  Clinical Impression       Row Name 08/28/23 1009          Pain    Pretreatment Pain Rating 0/10 - no pain  -     Posttreatment Pain Rating 0/10 - no pain  -       Row Name 08/28/23 1009          Plan of Care Review    Plan of Care Reviewed With patient  -     Outcome Evaluation Patient is a 72 y.o female who presents to [facility] for management of  choledocholithisasis. Patient is also COVID (+). Patient AOx4 supine in bed upon arrival. Patient very tearful throughout session while speaking with therapist. Asked patient if she would like to speak to someone about ongoing stressors. Patient agreeable. RN notified. Patient lives at home with her daughter and spouse with a ramp to enter. Patient has a ramp to enter home and uses a rollator for ambulation. Today patient completed all bed mobility mod(I). Patient performed STS from EOB with SV. Patient ambulated 30ft in room with SV. Patient ambulated in room while pushing pole with no overt LOB noted. Patient returned to bed at end of session. Patient reports she has been getting up in room with support of her daughter. Encouraged patient to continue.  No further acute PT needs identified at this time. Acute PT will sign off.  -       Row Name 08/28/23 1009          Therapy Assessment/Plan (PT)    Criteria for Skilled Interventions Met (PT) no problems identified which require skilled intervention  -     Therapy Frequency (PT) evaluation only  -       Row Name 08/28/23 1009          Vital Signs    O2 Delivery Pre Treatment room air  -SM     O2 Delivery Intra Treatment room air  -SM     O2 Delivery Post Treatment room air  -SM     Pre Patient Position Supine  -     Intra Patient Position Standing  -SM     Post Patient Position Supine  -SM       Row Name 08/28/23 1009          Positioning and Restraints    Pre-Treatment Position in bed  -SM     Post Treatment Position bed  -SM     In Bed notified nsg;encouraged to call for assist;call light within reach  -               User Key  (r) = Recorded By, (t) = Taken By, (c) = Cosigned By      Initials Name Provider Type     Dixie Butler, PT Physical Therapist                   Outcome Measures       Row Name 08/28/23 1014          How much help from another person do you currently need...    Turning from your back to your side while in flat bed without  using bedrails? 4  -SM     Moving from lying on back to sitting on the side of a flat bed without bedrails? 4  -SM     Moving to and from a bed to a chair (including a wheelchair)? 4  -SM     Standing up from a chair using your arms (e.g., wheelchair, bedside chair)? 4  -SM     Climbing 3-5 steps with a railing? 3  -SM     To walk in hospital room? 4  -SM     AM-PAC 6 Clicks Score (PT) 23  -     Highest level of mobility 7 --> Walked 25 feet or more  -       Row Name 08/28/23 1014          Functional Assessment    Outcome Measure Options AM-PAC 6 Clicks Basic Mobility (PT)  -               User Key  (r) = Recorded By, (t) = Taken By, (c) = Cosigned By      Initials Name Provider Type     Dixie Butler PT Physical Therapist                                 Physical Therapy Education       Title: PT OT SLP Therapies (Done)       Topic: Physical Therapy (Done)       Point: Mobility training (Done)       Learning Progress Summary             Patient Acceptance, E, VU by  at 8/28/2023 1014                         Point: Home exercise program (Done)       Learning Progress Summary             Patient Acceptance, E, VU by  at 8/28/2023 1014                         Point: Body mechanics (Done)       Learning Progress Summary             Patient Acceptance, E, VU by  at 8/28/2023 1014                         Point: Precautions (Done)       Learning Progress Summary             Patient Acceptance, E, VU by  at 8/28/2023 1014                                         User Key       Initials Effective Dates Name Provider Type Discipline     05/02/22 -  Dixie Butler PT Physical Therapist PT                  PT Recommendation and Plan     Plan of Care Reviewed With: patient  Outcome Evaluation: Patient is a 72 y.o female who presents to [facility] for management of choledocholithisasis. Patient is also COVID (+). Patient AOx4 supine in bed upon arrival. Patient very tearful throughout session while speaking  with therapist. Asked patient if she would like to speak to someone about ongoing stressors. Patient agreeable. RN notified. Patient lives at home with her daughter and spouse with a ramp to enter. Patient has a ramp to enter home and uses a rollator for ambulation. Today patient completed all bed mobility mod(I). Patient performed STS from EOB with SV. Patient ambulated 30ft in room with SV. Patient ambulated in room while pushing pole with no overt LOB noted. Patient returned to bed at end of session. Patient reports she has been getting up in room with support of her daughter. Encouraged patient to continue.  No further acute PT needs identified at this time. Acute PT will sign off.     Time Calculation:         PT Charges       Row Name 08/28/23 1014             Time Calculation    Start Time 0915  -      Stop Time 0931  -SM      Time Calculation (min) 16 min  -SM      PT Received On 08/28/23  -                User Key  (r) = Recorded By, (t) = Taken By, (c) = Cosigned By      Initials Name Provider Type     Dixie Butler, NATHAN Physical Therapist                  Therapy Charges for Today       Code Description Service Date Service Provider Modifiers Qty    [id number]  PT EVAL LOW COMPLEXITY 3 8/28/2023 Dixie Butler, PT GP 1            PT G-Codes  Outcome Measure Options: AM-PAC 6 Clicks Basic Mobility (PT)  AM-PAC 6 Clicks Score (PT): 23  PT Discharge Summary  Anticipated Discharge Disposition (PT): home with assist    Dixie Butler PT  8/28/2023

## 2023-08-28 NOTE — TELEPHONE ENCOUNTER
I hate hear this and hope she feels better. She will need to schedule an office visit once she is discharged from the hospital. They should be managing all her medication needs while there.

## 2023-08-28 NOTE — PLAN OF CARE
Goal Outcome Evaluation:                      VSS. Pt up adlib; daughter at bedside. Pt advanced to [facility]. ERCP cancelled today.

## 2023-08-29 LAB
ALBUMIN SERPL-MCNC: 1.9 G/DL (ref 3.5–5.2)
ALBUMIN/GLOB SERPL: 0.6 G/DL
ALP SERPL-CCNC: 458 U/L (ref 39–117)
ALT SERPL W P-5'-P-CCNC: 22 U/L (ref 1–33)
ANION GAP SERPL CALCULATED.3IONS-SCNC: 7.7 MMOL/L (ref 5–15)
AST SERPL-CCNC: 55 U/L (ref 1–32)
BILIRUB SERPL-MCNC: 0.7 MG/DL (ref 0–1.2)
BUN SERPL-MCNC: 9 MG/DL (ref 8–23)
BUN/CREAT SERPL: 8.6 (ref 7–25)
CALCIUM SPEC-SCNC: 7.3 MG/DL (ref 8.6–10.5)
CHLORIDE SERPL-SCNC: 107 MMOL/L (ref 98–107)
CO2 SERPL-SCNC: 26.3 MMOL/L (ref 22–29)
CREAT SERPL-MCNC: 1.05 MG/DL (ref 0.57–1)
DEPRECATED RDW RBC AUTO: 46.5 FL (ref 37–54)
EGFRCR SERPLBLD CKD-EPI 2021: 56.6 ML/MIN/1.73
ERYTHROCYTE [DISTWIDTH] IN BLOOD BY AUTOMATED COUNT: 13.6 % (ref 12.3–15.4)
GLOBULIN UR ELPH-MCNC: 3 GM/DL
GLUCOSE SERPL-MCNC: 83 MG/DL (ref 65–99)
HCT VFR BLD AUTO: 28.5 % (ref 34–46.6)
HGB BLD-MCNC: 9.7 G/DL (ref 12–15.9)
MCH RBC QN AUTO: 32.2 PG (ref 26.6–33)
MCHC RBC AUTO-ENTMCNC: 34 G/DL (ref 31.5–35.7)
MCV RBC AUTO: 94.7 FL (ref 79–97)
PLATELET # BLD AUTO: 209 10*3/MM3 (ref 140–450)
PMV BLD AUTO: 10.2 FL (ref 6–12)
POTASSIUM SERPL-SCNC: 3.3 MMOL/L (ref 3.5–5.2)
POTASSIUM SERPL-SCNC: 3.8 MMOL/L (ref 3.5–5.2)
PROT SERPL-MCNC: 4.9 G/DL (ref 6–8.5)
RBC # BLD AUTO: 3.01 10*6/MM3 (ref 3.77–5.28)
SODIUM SERPL-SCNC: 141 MMOL/L (ref 136–145)
WBC NRBC COR # BLD: 3.27 10*3/MM3 (ref 3.4–10.8)

## 2023-08-29 PROCEDURE — 25010000002 CEFTRIAXONE PER 250 MG: Performed by: STUDENT IN AN ORGANIZED HEALTH CARE EDUCATION/TRAINING PROGRAM

## 2023-08-29 PROCEDURE — 80053 COMPREHEN METABOLIC PANEL: CPT | Performed by: STUDENT IN AN ORGANIZED HEALTH CARE EDUCATION/TRAINING PROGRAM

## 2023-08-29 PROCEDURE — 85027 COMPLETE CBC AUTOMATED: CPT | Performed by: STUDENT IN AN ORGANIZED HEALTH CARE EDUCATION/TRAINING PROGRAM

## 2023-08-29 PROCEDURE — 25010000002 METRONIDAZOLE 500 MG/100ML SOLUTION: Performed by: STUDENT IN AN ORGANIZED HEALTH CARE EDUCATION/TRAINING PROGRAM

## 2023-08-29 PROCEDURE — 84132 ASSAY OF SERUM POTASSIUM: CPT | Performed by: STUDENT IN AN ORGANIZED HEALTH CARE EDUCATION/TRAINING PROGRAM

## 2023-08-29 PROCEDURE — 25010000002 REMDESIVIR 100 MG/20ML SOLUTION 1 EACH VIAL: Performed by: STUDENT IN AN ORGANIZED HEALTH CARE EDUCATION/TRAINING PROGRAM

## 2023-08-29 PROCEDURE — 63710000001 PROMETHAZINE PER 25 MG: Performed by: STUDENT IN AN ORGANIZED HEALTH CARE EDUCATION/TRAINING PROGRAM

## 2023-08-29 RX ORDER — BISACODYL 5 MG/1
5 TABLET, DELAYED RELEASE ORAL DAILY PRN
Status: DISCONTINUED | OUTPATIENT
Start: 2023-08-29 | End: 2023-09-15 | Stop reason: HOSPADM

## 2023-08-29 RX ORDER — POLYETHYLENE GLYCOL 3350 17 G/17G
17 POWDER, FOR SOLUTION ORAL DAILY PRN
Status: DISCONTINUED | OUTPATIENT
Start: 2023-08-29 | End: 2023-09-15 | Stop reason: HOSPADM

## 2023-08-29 RX ORDER — AMOXICILLIN 250 MG
2 CAPSULE ORAL 2 TIMES DAILY PRN
Status: DISCONTINUED | OUTPATIENT
Start: 2023-08-29 | End: 2023-09-15 | Stop reason: HOSPADM

## 2023-08-29 RX ORDER — POTASSIUM CHLORIDE 750 MG/1
40 TABLET, FILM COATED, EXTENDED RELEASE ORAL EVERY 4 HOURS
Status: COMPLETED | OUTPATIENT
Start: 2023-08-29 | End: 2023-08-29

## 2023-08-29 RX ORDER — BISACODYL 10 MG
10 SUPPOSITORY, RECTAL RECTAL DAILY PRN
Status: DISCONTINUED | OUTPATIENT
Start: 2023-08-29 | End: 2023-09-15 | Stop reason: HOSPADM

## 2023-08-29 RX ADMIN — BACLOFEN 5 MG: 10 TABLET ORAL at 21:41

## 2023-08-29 RX ADMIN — ALPRAZOLAM 3 MG: 1 TABLET ORAL at 22:46

## 2023-08-29 RX ADMIN — OXYCODONE HYDROCHLORIDE 80 MG: 20 TABLET, FILM COATED, EXTENDED RELEASE ORAL at 10:02

## 2023-08-29 RX ADMIN — BACLOFEN 5 MG: 10 TABLET ORAL at 16:39

## 2023-08-29 RX ADMIN — POTASSIUM CHLORIDE 40 MEQ: 750 TABLET, EXTENDED RELEASE ORAL at 12:54

## 2023-08-29 RX ADMIN — Medication 10 ML: at 21:40

## 2023-08-29 RX ADMIN — POTASSIUM CHLORIDE 20 MEQ: 750 TABLET, EXTENDED RELEASE ORAL at 21:41

## 2023-08-29 RX ADMIN — Medication 250 MG: at 10:03

## 2023-08-29 RX ADMIN — POTASSIUM CHLORIDE 20 MEQ: 750 TABLET, EXTENDED RELEASE ORAL at 10:03

## 2023-08-29 RX ADMIN — BACLOFEN 5 MG: 10 TABLET ORAL at 10:04

## 2023-08-29 RX ADMIN — Medication 250 MG: at 21:41

## 2023-08-29 RX ADMIN — LEVOTHYROXINE SODIUM 100 MCG: 0.1 TABLET ORAL at 06:48

## 2023-08-29 RX ADMIN — POTASSIUM CHLORIDE 40 MEQ: 750 TABLET, EXTENDED RELEASE ORAL at 16:40

## 2023-08-29 RX ADMIN — PROMETHAZINE HYDROCHLORIDE 25 MG: 12.5 TABLET ORAL at 10:04

## 2023-08-29 RX ADMIN — MONTELUKAST SODIUM 10 MG: 10 TABLET, FILM COATED ORAL at 10:04

## 2023-08-29 RX ADMIN — ESCITALOPRAM OXALATE 10 MG: 10 TABLET, FILM COATED ORAL at 10:05

## 2023-08-29 RX ADMIN — HYDROCHLOROTHIAZIDE 25 MG: 25 TABLET ORAL at 10:04

## 2023-08-29 RX ADMIN — Medication 1000 MCG: at 10:03

## 2023-08-29 RX ADMIN — METRONIDAZOLE 500 MG: 500 INJECTION, SOLUTION INTRAVENOUS at 16:40

## 2023-08-29 RX ADMIN — OXYCODONE HYDROCHLORIDE 80 MG: 20 TABLET, FILM COATED, EXTENDED RELEASE ORAL at 21:40

## 2023-08-29 RX ADMIN — CEFTRIAXONE 2000 MG: 2 INJECTION, POWDER, FOR SOLUTION INTRAMUSCULAR; INTRAVENOUS at 16:41

## 2023-08-29 RX ADMIN — Medication 2000 UNITS: at 10:03

## 2023-08-29 RX ADMIN — GUAIFENESIN 600 MG: 600 TABLET, EXTENDED RELEASE ORAL at 21:41

## 2023-08-29 RX ADMIN — PROMETHAZINE HYDROCHLORIDE 25 MG: 12.5 TABLET ORAL at 21:41

## 2023-08-29 RX ADMIN — Medication 1 TABLET: at 10:04

## 2023-08-29 RX ADMIN — Medication 10 ML: at 10:05

## 2023-08-29 RX ADMIN — METRONIDAZOLE 500 MG: 500 INJECTION, SOLUTION INTRAVENOUS at 10:06

## 2023-08-29 RX ADMIN — REMDESIVIR 100 MG: 100 INJECTION, POWDER, LYOPHILIZED, FOR SOLUTION INTRAVENOUS at 12:55

## 2023-08-29 NOTE — CONSULTS
I was requested to see patient to provide spiritual support.  Patient shared that she was affiliated with the [employer].  She shared a little frustration regarding having COVID.  She has a good jesús.  Her daughter was with here providing support.  We concluded with prayer.

## 2023-08-29 NOTE — PROGRESS NOTES
Name: Anne Jewell ADMIT: 2023   : 1950  PCP: Jewell Stephens APRN    MRN: [id number] LOS: 4 days   AGE/SEX: 72 y.o. female  ROOM: [facility]     Subjective   Subjective   Patient is laying in bed comfortably.  Daughter at bedside.  Patient upset that her pain meds are late, discussed with RN and the Pyxis unfortunately did not have her complete 80 mg dose of oxycodone.    Review of Systems   Constitutional:  Negative for chills and fever.   Respiratory:  Negative for cough and shortness of breath.    Cardiovascular:  Negative for chest pain and leg swelling.   Gastrointestinal:  Negative for abdominal pain, diarrhea and nausea.   As above     Objective   Objective   Vital Signs  Temp:  [97.7 °F (36.5 °C)-98.6 °F (37 °C)] 97.7 °F (36.5 °C)  Heart Rate:  [64-89] 81  Resp:  [18-20] 18  BP: ()/(55-70) 117/68  SpO2:  [93 %-99 %] 97 %  on   ;   Device (Oxygen Therapy): room air  Body mass index is 33.6 kg/m².  Physical Exam  Constitutional:       General: She is not in acute distress.     Appearance: She is not ill-appearing.   Cardiovascular:      Rate and Rhythm: Normal rate and regular rhythm.   Pulmonary:      Effort: Pulmonary effort is normal. No respiratory distress.   Abdominal:      General: Abdomen is flat. There is no distension.      Tenderness: There is no abdominal tenderness.   Musculoskeletal:         General: No swelling or deformity. Normal range of motion.   Skin:     General: Skin is warm and dry.   Neurological:      General: No focal deficit present.      Mental Status: She is alert. Mental status is at baseline.       Results Review     I reviewed the patient's new clinical results.  Results from last 7 days   Lab Units 23  0409 23  0430 23  0737 23  0610   WBC 10*3/mm3 3.27* 4.22 5.70 4.24   HEMOGLOBIN g/dL 9.7* 9.9* 10.3* 9.0*   PLATELETS 10*3/mm3 209 228 252 220       Results from last 7 days   Lab Units 23  0409 23  0430  08/27/23  0737 08/26/23  0610   SODIUM mmol/L 141 134* 134* 139   POTASSIUM mmol/L 3.3* 4.0 3.9 3.3*   CHLORIDE mmol/L 107 100 98 103   CO2 mmol/L 26.3 28.1 28.0 28.8   BUN mg/dL 9 8 9 12   CREATININE mg/dL 1.05* 0.92 1.07* 0.76   GLUCOSE mg/dL 83 90 87 76     Estimated Creatinine Clearance: 46.6 mL/min (A) (by C-G formula based on SCr of 1.05 mg/dL (H)).  Results from last 7 days   Lab Units 08/29/23  0409 08/28/23  0430 08/27/23  0737 08/25/23  1511   ALBUMIN g/dL 1.9* 1.9* 2.0* 2.3*   BILIRUBIN mg/dL 0.7 0.9 1.1 1.6*   ALK PHOS U/L 458* 463* 529* 538*   AST (SGOT) U/L 55* 60* 64* 70*   ALT (SGPT) U/L 22 26 29 33       Results from last 7 days   Lab Units 08/29/23  0409 08/28/23  0430 08/27/23  0737 08/26/23  0610 08/25/23  1511   CALCIUM mg/dL 7.3* 7.4* 7.6* 7.5* 8.2*   ALBUMIN g/dL 1.9* 1.9* 2.0*  --  2.3*   MAGNESIUM mg/dL  --   --  2.0 1.6 1.6   PHOSPHORUS mg/dL  --   --   --  3.1 3.3         COVID19   Date Value Ref Range Status   08/27/2023 Detected (C) Not Detected - Ref. Range Final   08/10/2020 Not Detected Not Detected - Ref. Range Final     Glucose   Date/Time Value Ref Range Status   08/27/2023 1113 153 (H) 70 - 130 mg/dL Final       XR Chest 1 View  Narrative: XR CHEST 1 VW-     HISTORY: Female who is 72 years-old, cough     TECHNIQUE: Frontal view of the chest     COMPARISON: None available     FINDINGS: Heart, mediastinum and pulmonary vasculature are unremarkable.  No focal pulmonary consolidation, pleural effusion, or pneumothorax. Old  granulomatous disease is apparent. No acute osseous process.     Impression: No focal pulmonary consolidation. Follow-up as clinical  indications persist.     This report was finalized on 8/27/2023 10:32 AM by Dr. Eze Enriquez M.D.       I reviewed the patient's daily medications.  Scheduled Medications  baclofen, 5 mg, Oral, TID  cefTRIAXone, 2,000 mg, Intravenous, Q24H  cholecalciferol, 2,000 Units, Oral, Daily  escitalopram, 10 mg, Oral,  Daily  guaiFENesin, 600 mg, Oral, Nightly  hydroCHLOROthiazide, 25 mg, Oral, Daily  levothyroxine, 100 mcg, Oral, Q AM  metroNIDAZOLE, 500 mg, Intravenous, Q8H  montelukast, 10 mg, Oral, Daily  multivitamin, 1 tablet, Oral, Daily  oxyCODONE, 80 mg, Oral, Q12H  potassium chloride, 20 mEq, Oral, BID  potassium chloride, 40 mEq, Oral, Q4H  promethazine, 25 mg, Oral, BID  remdesivir, 100 mg, Intravenous, Q24H  saccharomyces boulardii, 250 mg, Oral, BID  sodium chloride, 10 mL, Intravenous, Q12H  vitamin B-12, 1,000 mcg, Oral, Daily    Infusions   Diet  Diet: Regular/House Diet, Cardiac Diets; No Salt Packet; Texture: Regular Texture (IDDSI 7); Fluid Consistency: Thin (IDDSI 0)         I have personally reviewed:  [x]  Laboratory   [x]  Microbiology   [x]  Radiology   []  EKG/Telemetry   []  Cardiology/Vascular   []  Pathology   []  Records     Assessment/Plan     Active Hospital Problems    Diagnosis  POA    **Choledocholithiasis [K80.50]  Yes    History of pulmonary embolism [Z86.711]  Unknown    Stage 3 chronic kidney disease [N18.30]  Yes    Gastroesophageal reflux disease [K21.9]  Yes    Acquired hypothyroidism [E03.9]  Yes    Chronic pain syndrome [G89.4]  Yes    Rheumatoid arthritis [M06.9]  Yes    Generalized osteoarthritis [M15.9]  Yes      Resolved Hospital Problems   No resolved problems to display.       72 y.o. female admitted with Choledocholithiasis.    Choledocholithiasis  History of gastric bypass  Elevated LFTs  -MRCP showing large stone in the distal common bile duct, 1.8 cm.  Intrahepatic biliary dilatation.  Marked biliary dilatation with CBD measuring 2.1 cm  -IV ceftriaxone, metronidazole, patient with history of allergies to penicillin.  -GI consulted-plan for ERCP procedure Wednesday, delayed because of COVID, but may not be successful because of patient's history of gastric bypass.  Holding Xarelto.    COVID-19  Fever, resolved  -Blood cultures with no growth for 24 hours  -Patient with cough,  and sore throat this morning.  Chest x-ray without any abnormalities.    -Start remdesivir x 3 days, last day 8/29    History of pulmonary pulmonary embolism.  Diagnosed in 2007, has been on anticoagulation since.  At home takes Xarelto currently, last dose was on 08/24/2023.  Hold anticoagulation due to planned procedure.     Hypothyroidism: Resume home levothyroxine, recent TSH normal     CKD stage IIIa: Creatinine stable, monitor.  Avoid nephrotoxic drugs.     Chronic pain syndrome/chronic arthritis: Bob reviewed.  Oxycodone extended release to 80 mg twice daily scheduled.  Oxycodone extended release to 80 mg twice a day as needed as well.  Continue home alprazolam 3 mg nightly     History of RA: Not on treatment for RA per chart review, reports she was diagnosed with URI but was never on specific treatment.     Memory loss: Daughter states she has noticed memory changes for the last several months.  Was referred to neurology outpatient follow-up patient with possible dementia.  Follow-up as outpatient    Abnormal liver on MRI: Repeat CT abdomen with liver protocol in 2 to 3 months.  Daughter concerned about left renal cyst and small splenic lesion that looks like a cyst per radiology report.  Can further be evaluated with a repeat CT abdomen.      SCDs for DVT prophylaxis.  Full code.  Discussed with patient, family, and nursing staff.  Anticipate discharge home with family in 1-2 days.  Pending ERCP results    Expected Discharge Date: 8/31/2023; Expected Discharge Time:       Talha Montano MD  [facility]  08/29/23  13:53 EDT

## 2023-08-30 ENCOUNTER — ANESTHESIA EVENT (OUTPATIENT)
Dept: GASTROENTEROLOGY | Facility: HOSPITAL | Age: 73
DRG: 423 | End: 2023-08-30
Payer: MEDICARE

## 2023-08-30 ENCOUNTER — ANESTHESIA (OUTPATIENT)
Dept: GASTROENTEROLOGY | Facility: HOSPITAL | Age: 73
DRG: 423 | End: 2023-08-30
Payer: MEDICARE

## 2023-08-30 ENCOUNTER — APPOINTMENT (OUTPATIENT)
Dept: GENERAL RADIOLOGY | Facility: HOSPITAL | Age: 73
DRG: 423 | End: 2023-08-30
Payer: MEDICARE

## 2023-08-30 LAB
ALBUMIN SERPL-MCNC: 1.5 G/DL (ref 3.5–5.2)
ALBUMIN/GLOB SERPL: 0.5 G/DL
ALP SERPL-CCNC: 473 U/L (ref 39–117)
ALT SERPL W P-5'-P-CCNC: 18 U/L (ref 1–33)
ANION GAP SERPL CALCULATED.3IONS-SCNC: 6.2 MMOL/L (ref 5–15)
AST SERPL-CCNC: 49 U/L (ref 1–32)
BILIRUB SERPL-MCNC: 0.8 MG/DL (ref 0–1.2)
BUN SERPL-MCNC: 8 MG/DL (ref 8–23)
BUN/CREAT SERPL: 9.2 (ref 7–25)
CALCIUM SPEC-SCNC: 7.5 MG/DL (ref 8.6–10.5)
CHLORIDE SERPL-SCNC: 110 MMOL/L (ref 98–107)
CO2 SERPL-SCNC: 24.8 MMOL/L (ref 22–29)
CREAT SERPL-MCNC: 0.87 MG/DL (ref 0.57–1)
DEPRECATED RDW RBC AUTO: 45.2 FL (ref 37–54)
EGFRCR SERPLBLD CKD-EPI 2021: 70.9 ML/MIN/1.73
ERYTHROCYTE [DISTWIDTH] IN BLOOD BY AUTOMATED COUNT: 13.5 % (ref 12.3–15.4)
GLOBULIN UR ELPH-MCNC: 3.1 GM/DL
GLUCOSE SERPL-MCNC: 80 MG/DL (ref 65–99)
HCT VFR BLD AUTO: 26.6 % (ref 34–46.6)
HGB BLD-MCNC: 9 G/DL (ref 12–15.9)
MCH RBC QN AUTO: 31.4 PG (ref 26.6–33)
MCHC RBC AUTO-ENTMCNC: 33.8 G/DL (ref 31.5–35.7)
MCV RBC AUTO: 92.7 FL (ref 79–97)
PLATELET # BLD AUTO: 197 10*3/MM3 (ref 140–450)
PMV BLD AUTO: 9.9 FL (ref 6–12)
POTASSIUM SERPL-SCNC: 3.7 MMOL/L (ref 3.5–5.2)
PROT SERPL-MCNC: 4.6 G/DL (ref 6–8.5)
RBC # BLD AUTO: 2.87 10*6/MM3 (ref 3.77–5.28)
SODIUM SERPL-SCNC: 141 MMOL/L (ref 136–145)
WBC NRBC COR # BLD: 3.52 10*3/MM3 (ref 3.4–10.8)

## 2023-08-30 PROCEDURE — 85027 COMPLETE CBC AUTOMATED: CPT | Performed by: STUDENT IN AN ORGANIZED HEALTH CARE EDUCATION/TRAINING PROGRAM

## 2023-08-30 PROCEDURE — 63710000001 PROMETHAZINE PER 25 MG: Performed by: INTERNAL MEDICINE

## 2023-08-30 PROCEDURE — 25010000002 METRONIDAZOLE 500 MG/100ML SOLUTION: Performed by: STUDENT IN AN ORGANIZED HEALTH CARE EDUCATION/TRAINING PROGRAM

## 2023-08-30 PROCEDURE — 25010000002 PROPOFOL 10 MG/ML EMULSION: Performed by: ANESTHESIOLOGY

## 2023-08-30 PROCEDURE — 76000 FLUOROSCOPY <1 HR PHYS/QHP: CPT

## 2023-08-30 PROCEDURE — C1769 GUIDE WIRE: HCPCS | Performed by: INTERNAL MEDICINE

## 2023-08-30 PROCEDURE — 63710000001 PROMETHAZINE PER 25 MG: Performed by: STUDENT IN AN ORGANIZED HEALTH CARE EDUCATION/TRAINING PROGRAM

## 2023-08-30 PROCEDURE — 25010000002 CEFTRIAXONE PER 250 MG: Performed by: STUDENT IN AN ORGANIZED HEALTH CARE EDUCATION/TRAINING PROGRAM

## 2023-08-30 PROCEDURE — 43235 EGD DIAGNOSTIC BRUSH WASH: CPT | Performed by: INTERNAL MEDICINE

## 2023-08-30 PROCEDURE — 25010000002 METRONIDAZOLE 500 MG/100ML SOLUTION: Performed by: INTERNAL MEDICINE

## 2023-08-30 PROCEDURE — 0FJB8ZZ INSPECTION OF HEPATOBILIARY DUCT, VIA NATURAL OR ARTIFICIAL OPENING ENDOSCOPIC: ICD-10-PCS | Performed by: INTERNAL MEDICINE

## 2023-08-30 PROCEDURE — 74018 RADEX ABDOMEN 1 VIEW: CPT

## 2023-08-30 PROCEDURE — 80053 COMPREHEN METABOLIC PANEL: CPT | Performed by: STUDENT IN AN ORGANIZED HEALTH CARE EDUCATION/TRAINING PROGRAM

## 2023-08-30 RX ORDER — DROPERIDOL 2.5 MG/ML
0.62 INJECTION, SOLUTION INTRAMUSCULAR; INTRAVENOUS
Status: DISCONTINUED | OUTPATIENT
Start: 2023-08-30 | End: 2023-08-30 | Stop reason: HOSPADM

## 2023-08-30 RX ORDER — SODIUM CHLORIDE 9 MG/ML
30 INJECTION, SOLUTION INTRAVENOUS CONTINUOUS PRN
Status: DISCONTINUED | OUTPATIENT
Start: 2023-08-30 | End: 2023-09-15 | Stop reason: HOSPADM

## 2023-08-30 RX ORDER — FENTANYL CITRATE 50 UG/ML
50 INJECTION, SOLUTION INTRAMUSCULAR; INTRAVENOUS
Status: DISCONTINUED | OUTPATIENT
Start: 2023-08-30 | End: 2023-08-30 | Stop reason: HOSPADM

## 2023-08-30 RX ORDER — FLUMAZENIL 0.1 MG/ML
0.2 INJECTION INTRAVENOUS AS NEEDED
Status: DISCONTINUED | OUTPATIENT
Start: 2023-08-30 | End: 2023-08-30 | Stop reason: HOSPADM

## 2023-08-30 RX ORDER — PROMETHAZINE HYDROCHLORIDE 25 MG/1
25 SUPPOSITORY RECTAL ONCE AS NEEDED
Status: DISCONTINUED | OUTPATIENT
Start: 2023-08-30 | End: 2023-08-30 | Stop reason: HOSPADM

## 2023-08-30 RX ORDER — IPRATROPIUM BROMIDE AND ALBUTEROL SULFATE 2.5; .5 MG/3ML; MG/3ML
3 SOLUTION RESPIRATORY (INHALATION) ONCE AS NEEDED
Status: DISCONTINUED | OUTPATIENT
Start: 2023-08-30 | End: 2023-08-30 | Stop reason: HOSPADM

## 2023-08-30 RX ORDER — LABETALOL HYDROCHLORIDE 5 MG/ML
5 INJECTION, SOLUTION INTRAVENOUS
Status: DISCONTINUED | OUTPATIENT
Start: 2023-08-30 | End: 2023-08-30 | Stop reason: HOSPADM

## 2023-08-30 RX ORDER — NALOXONE HCL 0.4 MG/ML
0.2 VIAL (ML) INJECTION AS NEEDED
Status: DISCONTINUED | OUTPATIENT
Start: 2023-08-30 | End: 2023-08-30 | Stop reason: HOSPADM

## 2023-08-30 RX ORDER — PROMETHAZINE HYDROCHLORIDE 25 MG/1
25 TABLET ORAL ONCE AS NEEDED
Status: DISCONTINUED | OUTPATIENT
Start: 2023-08-30 | End: 2023-08-30 | Stop reason: HOSPADM

## 2023-08-30 RX ORDER — PROPOFOL 10 MG/ML
VIAL (ML) INTRAVENOUS CONTINUOUS PRN
Status: DISCONTINUED | OUTPATIENT
Start: 2023-08-30 | End: 2023-08-30 | Stop reason: SURG

## 2023-08-30 RX ORDER — ONDANSETRON 2 MG/ML
4 INJECTION INTRAMUSCULAR; INTRAVENOUS ONCE AS NEEDED
Status: DISCONTINUED | OUTPATIENT
Start: 2023-08-30 | End: 2023-08-30 | Stop reason: HOSPADM

## 2023-08-30 RX ORDER — EPHEDRINE SULFATE 50 MG/ML
5 INJECTION, SOLUTION INTRAVENOUS ONCE AS NEEDED
Status: DISCONTINUED | OUTPATIENT
Start: 2023-08-30 | End: 2023-08-30 | Stop reason: HOSPADM

## 2023-08-30 RX ORDER — DIPHENHYDRAMINE HYDROCHLORIDE 50 MG/ML
12.5 INJECTION INTRAMUSCULAR; INTRAVENOUS
Status: DISCONTINUED | OUTPATIENT
Start: 2023-08-30 | End: 2023-08-30 | Stop reason: HOSPADM

## 2023-08-30 RX ORDER — SODIUM CHLORIDE 9 MG/ML
INJECTION, SOLUTION INTRAVENOUS CONTINUOUS PRN
Status: DISCONTINUED | OUTPATIENT
Start: 2023-08-30 | End: 2023-08-30 | Stop reason: SURG

## 2023-08-30 RX ORDER — HYDRALAZINE HYDROCHLORIDE 20 MG/ML
5 INJECTION INTRAMUSCULAR; INTRAVENOUS
Status: DISCONTINUED | OUTPATIENT
Start: 2023-08-30 | End: 2023-08-30 | Stop reason: HOSPADM

## 2023-08-30 RX ORDER — OXYCODONE AND ACETAMINOPHEN 7.5; 325 MG/1; MG/1
1 TABLET ORAL EVERY 4 HOURS PRN
Status: DISCONTINUED | OUTPATIENT
Start: 2023-08-30 | End: 2023-08-30 | Stop reason: HOSPADM

## 2023-08-30 RX ORDER — HYDROMORPHONE HYDROCHLORIDE 2 MG/ML
0.5 INJECTION, SOLUTION INTRAMUSCULAR; INTRAVENOUS; SUBCUTANEOUS
Status: DISCONTINUED | OUTPATIENT
Start: 2023-08-30 | End: 2023-08-30 | Stop reason: HOSPADM

## 2023-08-30 RX ORDER — HYDROCODONE BITARTRATE AND ACETAMINOPHEN 7.5; 325 MG/1; MG/1
1 TABLET ORAL ONCE AS NEEDED
Status: DISCONTINUED | OUTPATIENT
Start: 2023-08-30 | End: 2023-08-30 | Stop reason: HOSPADM

## 2023-08-30 RX ADMIN — PROPOFOL 200 MCG/KG/MIN: 10 INJECTION, EMULSION INTRAVENOUS at 17:01

## 2023-08-30 RX ADMIN — POTASSIUM CHLORIDE 20 MEQ: 750 TABLET, EXTENDED RELEASE ORAL at 09:54

## 2023-08-30 RX ADMIN — SODIUM CHLORIDE: 9 INJECTION, SOLUTION INTRAVENOUS at 16:57

## 2023-08-30 RX ADMIN — SODIUM CHLORIDE 30 ML/HR: 9 INJECTION, SOLUTION INTRAVENOUS at 14:50

## 2023-08-30 RX ADMIN — Medication 10 ML: at 22:09

## 2023-08-30 RX ADMIN — METRONIDAZOLE 500 MG: 500 INJECTION, SOLUTION INTRAVENOUS at 21:02

## 2023-08-30 RX ADMIN — METRONIDAZOLE 500 MG: 500 INJECTION, SOLUTION INTRAVENOUS at 09:54

## 2023-08-30 RX ADMIN — Medication 1 TABLET: at 09:55

## 2023-08-30 RX ADMIN — PROMETHAZINE HYDROCHLORIDE 25 MG: 12.5 TABLET ORAL at 09:55

## 2023-08-30 RX ADMIN — LEVOTHYROXINE SODIUM 100 MCG: 0.1 TABLET ORAL at 09:55

## 2023-08-30 RX ADMIN — ESCITALOPRAM OXALATE 10 MG: 10 TABLET, FILM COATED ORAL at 09:54

## 2023-08-30 RX ADMIN — BACLOFEN 5 MG: 10 TABLET ORAL at 20:54

## 2023-08-30 RX ADMIN — POTASSIUM CHLORIDE 20 MEQ: 750 TABLET, EXTENDED RELEASE ORAL at 22:09

## 2023-08-30 RX ADMIN — OXYCODONE HYDROCHLORIDE 80 MG: 20 TABLET, FILM COATED, EXTENDED RELEASE ORAL at 20:53

## 2023-08-30 RX ADMIN — METRONIDAZOLE 500 MG: 500 INJECTION, SOLUTION INTRAVENOUS at 00:25

## 2023-08-30 RX ADMIN — MONTELUKAST SODIUM 10 MG: 10 TABLET, FILM COATED ORAL at 09:55

## 2023-08-30 RX ADMIN — Medication 2000 UNITS: at 09:54

## 2023-08-30 RX ADMIN — Medication 10 ML: at 09:55

## 2023-08-30 RX ADMIN — ALPRAZOLAM 3 MG: 1 TABLET ORAL at 22:09

## 2023-08-30 RX ADMIN — OXYCODONE HYDROCHLORIDE 80 MG: 20 TABLET, FILM COATED, EXTENDED RELEASE ORAL at 09:54

## 2023-08-30 RX ADMIN — GUAIFENESIN 600 MG: 600 TABLET, EXTENDED RELEASE ORAL at 20:54

## 2023-08-30 RX ADMIN — Medication 250 MG: at 20:54

## 2023-08-30 RX ADMIN — PROMETHAZINE HYDROCHLORIDE 25 MG: 12.5 TABLET ORAL at 20:54

## 2023-08-30 RX ADMIN — HYDROCHLOROTHIAZIDE 25 MG: 25 TABLET ORAL at 09:54

## 2023-08-30 RX ADMIN — BACLOFEN 5 MG: 10 TABLET ORAL at 19:08

## 2023-08-30 RX ADMIN — Medication 1000 MCG: at 09:54

## 2023-08-30 RX ADMIN — BACLOFEN 5 MG: 10 TABLET ORAL at 09:54

## 2023-08-30 RX ADMIN — CEFTRIAXONE 2000 MG: 2 INJECTION, POWDER, FOR SOLUTION INTRAMUSCULAR; INTRAVENOUS at 19:09

## 2023-08-30 RX ADMIN — Medication 250 MG: at 09:54

## 2023-08-30 NOTE — BRIEF OP NOTE
ENDOSCOPIC RETROGRADE CHOLANGIOPANCREATOGRAPHY  Progress Note    Anne Jewell  8/30/2023    Pre-op Diagnosis:   Choledocholithiasis [K80.50]       Post-Op Diagnosis Codes:     * Choledocholithiasis [K80.50]    Procedure/CPT® Codes:        Procedure(s):  ENDOSCOPIC RETROGRADE CHOLANGIOPANCREATOGRAPHY              Surgeon(s):  Elijah Simon MD    Anesthesia: Monitored Anesthesia Care    Staff:   Endo Technician: Lillie Andrews  Endo Nurse: Cathy Key RN         Estimated Blood Loss: minimal    Urine Voided: * No values recorded between 8/30/2023  4:57 PM and 8/30/2023  5:13 PM *    Specimens:                None          Drains: * No LDAs found *    Findings: Attempted ERCP performed for common duct stone patient's status post gastric bypass.  Scope advanced through the residual gastric pouch through the small bowel reaching to the edge of the pelvic brim but still had not reached the anastomosis with the Buster limb.  Scope was withdrawn patient sent to recovery.        Complications: None          Elijah Simon MD     Date: 8/30/2023  Time: 17:17 EDT

## 2023-08-30 NOTE — ANESTHESIA PREPROCEDURE EVALUATION
Anesthesia Evaluation     Patient summary reviewed and Nursing notes reviewed   NPO Solid Status: > 2 hours  NPO Liquid Status: > 8 hours           Airway   Mallampati: I  TM distance: >3 FB  Neck ROM: full  No difficulty expected  Dental    (+) edentulous    Pulmonary    (+) asthma,    ROS comment: COVID +, asymptomatic  Cardiovascular   Exercise tolerance: poor (<4 METS)    ECG reviewed  PT is on anticoagulation therapy    (+) DVT resolved      Neuro/Psych  (+) psychiatric history Anxiety  GI/Hepatic/Renal/Endo    (+) GERD, liver disease, renal disease CRI, thyroid problem hypothyroidism    ROS Comment: S/p gastric bypass    Musculoskeletal     (+) back pain, neck pain  Abdominal    Substance History      OB/GYN          Other   arthritis,                     Anesthesia Plan    ASA 3     MAC     (I have reviewed the patient's history and chart with the patient, including all pertinent laboratory results and imaging. I have explained the risks of monitored anesthesia care including but not limited to respiratory depression, possible need for airway intervention, or possible intra-op recall. )  intravenous induction     Anesthetic plan, risks, benefits, and alternatives have been provided, discussed and informed consent has been obtained with: patient.  Pre-procedure education provided    CODE STATUS:    Level Of Support Discussed With: Patient  Code Status (Patient has no pulse and is not breathing): CPR (Attempt to Resuscitate)  Medical Interventions (Patient has pulse or is breathing): Full Support

## 2023-08-30 NOTE — PROGRESS NOTES
Name: Anne Jewell ADMIT: 2023   : 1950  PCP: Jewell Stephens APRN    MRN: [id number] LOS: 5 days   AGE/SEX: 72 y.o. female  ROOM: [facility]     Subjective   Subjective   Patient is laying in bed comfortably.  Daughter at bedside.  Patient upset that her pain meds are late, but other than that is doing well.    Review of Systems   Constitutional:  Negative for chills and fever.   Respiratory:  Negative for cough and shortness of breath.    Cardiovascular:  Negative for chest pain and leg swelling.   Gastrointestinal:  Negative for abdominal pain, diarrhea and nausea.   As above     Objective   Objective   Vital Signs  Temp:  [97.9 °F (36.6 °C)-98.8 °F (37.1 °C)] 97.9 °F (36.6 °C)  Heart Rate:  [70-84] 71  Resp:  [18] 18  BP: (100-123)/(58-72) 123/72  SpO2:  [95 %-98 %] 98 %  on   ;   Device (Oxygen Therapy): room air  Body mass index is 34.01 kg/m².  Physical Exam  Constitutional:       General: She is not in acute distress.     Appearance: She is not ill-appearing.   Cardiovascular:      Rate and Rhythm: Normal rate and regular rhythm.   Pulmonary:      Effort: Pulmonary effort is normal. No respiratory distress.   Abdominal:      General: Abdomen is flat. There is no distension.      Tenderness: There is no abdominal tenderness.   Musculoskeletal:         General: No swelling or deformity. Normal range of motion.   Skin:     General: Skin is warm and dry.   Neurological:      General: No focal deficit present.      Mental Status: She is alert. Mental status is at baseline.       Results Review     I reviewed the patient's new clinical results.  Results from last 7 days   Lab Units 23  0450 23  0737   WBC 10*3/mm3 3.52 3.27* 4.22 5.70   HEMOGLOBIN g/dL 9.0* 9.7* 9.9* 10.3*   PLATELETS 10*3/mm3 197 209 228 252       Results from last 7 days   Lab Units 23  0450 23  0409 23  0430 23  0737   SODIUM mmol/L 141  --   141 134* 134*   POTASSIUM mmol/L 3.7 3.8 3.3* 4.0 3.9   CHLORIDE mmol/L 110*  --  107 100 98   CO2 mmol/L 24.8  --  26.3 28.1 28.0   BUN mg/dL 8  --  9 8 9   CREATININE mg/dL 0.87  --  1.05* 0.92 1.07*   GLUCOSE mg/dL 80  --  83 90 87     Estimated Creatinine Clearance: 56.6 mL/min (by C-G formula based on SCr of 0.87 mg/dL).  Results from last 7 days   Lab Units 08/30/23  0450 08/29/23  0409 08/28/23  0430 08/27/23  0737   ALBUMIN g/dL 1.5* 1.9* 1.9* 2.0*   BILIRUBIN mg/dL 0.8 0.7 0.9 1.1   ALK PHOS U/L 473* 458* 463* 529*   AST (SGOT) U/L 49* 55* 60* 64*   ALT (SGPT) U/L 18 22 26 29       Results from last 7 days   Lab Units 08/30/23 0450 08/29/23 0409 08/28/23  0430 08/27/23  0737 08/26/23  0610 08/25/23  1511   CALCIUM mg/dL 7.5* 7.3* 7.4* 7.6* 7.5* 8.2*   ALBUMIN g/dL 1.5* 1.9* 1.9* 2.0*  --  2.3*   MAGNESIUM mg/dL  --   --   --  2.0 1.6 1.6   PHOSPHORUS mg/dL  --   --   --   --  3.1 3.3         COVID19   Date Value Ref Range Status   08/27/2023 Detected (C) Not Detected - Ref. Range Final   08/10/2020 Not Detected Not Detected - Ref. Range Final     No results found for: HGBA1C, POCGLU      XR Chest 1 View  Narrative: XR CHEST 1 VW-     HISTORY: Female who is 72 years-old, cough     TECHNIQUE: Frontal view of the chest     COMPARISON: None available     FINDINGS: Heart, mediastinum and pulmonary vasculature are unremarkable.  No focal pulmonary consolidation, pleural effusion, or pneumothorax. Old  granulomatous disease is apparent. No acute osseous process.     Impression: No focal pulmonary consolidation. Follow-up as clinical  indications persist.     This report was finalized on 8/27/2023 10:32 AM by Dr. Eze Enriquez M.D.       I reviewed the patient's daily medications.  Scheduled Medications  baclofen, 5 mg, Oral, TID  cefTRIAXone, 2,000 mg, Intravenous, Q24H  cholecalciferol, 2,000 Units, Oral, Daily  escitalopram, 10 mg, Oral, Daily  guaiFENesin, 600 mg, Oral, Nightly  hydroCHLOROthiazide, 25  mg, Oral, Daily  levothyroxine, 100 mcg, Oral, Q AM  metroNIDAZOLE, 500 mg, Intravenous, Q8H  montelukast, 10 mg, Oral, Daily  multivitamin, 1 tablet, Oral, Daily  oxyCODONE, 80 mg, Oral, Q12H  potassium chloride, 20 mEq, Oral, BID  promethazine, 25 mg, Oral, BID  saccharomyces boulardii, 250 mg, Oral, BID  sodium chloride, 10 mL, Intravenous, Q12H  vitamin B-12, 1,000 mcg, Oral, Daily    Infusions   Diet  NPO Diet NPO Type: Strict NPO         I have personally reviewed:  [x]  Laboratory   [x]  Microbiology   [x]  Radiology   []  EKG/Telemetry   []  Cardiology/Vascular   []  Pathology   []  Records     Assessment/Plan     Active Hospital Problems    Diagnosis  POA    **Choledocholithiasis [K80.50]  Yes    History of pulmonary embolism [Z86.711]  Unknown    Stage 3 chronic kidney disease [N18.30]  Yes    Gastroesophageal reflux disease [K21.9]  Yes    Acquired hypothyroidism [E03.9]  Yes    Chronic pain syndrome [G89.4]  Yes    Rheumatoid arthritis [M06.9]  Yes    Generalized osteoarthritis [M15.9]  Yes      Resolved Hospital Problems   No resolved problems to display.       72 y.o. female admitted with Choledocholithiasis.    Choledocholithiasis  History of gastric bypass  Elevated LFTs  -MRCP showing large stone in the distal common bile duct, 1.8 cm.  Intrahepatic biliary dilatation.  Marked biliary dilatation with CBD measuring 2.1 cm  -IV ceftriaxone, metronidazole, patient with history of allergies to penicillin.  -GI consulted-plan for ERCP procedure today, delayed because of COVID, but may not be successful because of patient's history of gastric bypass.  Holding Xarelto.    COVID-19  Fever, resolved  -Blood cultures with no growth for 24 hours  -Patient with cough, and sore throat this morning.  Chest x-ray without any abnormalities.    -Start remdesivir x 3 days, last day 8/29    History of pulmonary pulmonary embolism.  Diagnosed in 2007, has been on anticoagulation since.  At home takes Xarelto currently,  last dose was on 08/24/2023.  Hold anticoagulation due to planned procedure.     Hypothyroidism: Resume home levothyroxine, recent TSH normal     CKD stage IIIa: Creatinine stable, monitor.  Avoid nephrotoxic drugs.     Chronic pain syndrome/chronic arthritis: Bob reviewed.  Oxycodone extended release to 80 mg twice daily scheduled.  Oxycodone extended release to 80 mg twice a day as needed as well.  Continue home alprazolam 3 mg nightly     History of RA: Not on treatment for RA per chart review, reports she was diagnosed with URI but was never on specific treatment.     Memory loss: Daughter states she has noticed memory changes for the last several months.  Was referred to neurology outpatient follow-up patient with possible dementia.  Follow-up as outpatient    Abnormal liver on MRI: Repeat CT abdomen with liver protocol in 2 to 3 months.  Daughter concerned about left renal cyst and small splenic lesion that looks like a cyst per radiology report.  Can further be evaluated with a repeat CT abdomen.      SCDs for DVT prophylaxis.  Full code.  Discussed with patient, family, and nursing staff.  Anticipate discharge home with family tomorrow.  Pending ERCP results    Expected Discharge Date: 8/31/2023; Expected Discharge Time:       Talha Montano MD  [facility]  08/30/23  12:47 EDT

## 2023-08-30 NOTE — PLAN OF CARE
Goal Outcome Evaluation:  Plan of Care Reviewed With: patient, daughter        Progress: improving  Outcome Evaluation: pt still had productive cough today. Pt tolerated room air without distress. Up amb in room without falls or injury. Pain med helped chronic pain. No abdominal pain complaints.

## 2023-08-30 NOTE — PLAN OF CARE
Goal Outcome Evaluation:  Plan of Care Reviewed With: patient           Outcome Evaluation: VSS, scheduled pain meds given with relief noted. Potassium recheck WNL. Pt NPO at [place] for possible ERCP today. Pt hopeful for D/C following procedure. IV flagyl given. Pt appeared to sleep some between care. Will CTM, safety maintained.

## 2023-08-31 LAB
ALBUMIN SERPL-MCNC: 2 G/DL (ref 3.5–5.2)
ALBUMIN/GLOB SERPL: 0.6 G/DL
ALP SERPL-CCNC: 630 U/L (ref 39–117)
ALT SERPL W P-5'-P-CCNC: 15 U/L (ref 1–33)
ANION GAP SERPL CALCULATED.3IONS-SCNC: 9 MMOL/L (ref 5–15)
AST SERPL-CCNC: 59 U/L (ref 1–32)
BILIRUB SERPL-MCNC: 1.2 MG/DL (ref 0–1.2)
BUN SERPL-MCNC: 9 MG/DL (ref 8–23)
BUN/CREAT SERPL: 9.2 (ref 7–25)
CALCIUM SPEC-SCNC: 7.9 MG/DL (ref 8.6–10.5)
CHLORIDE SERPL-SCNC: 106 MMOL/L (ref 98–107)
CO2 SERPL-SCNC: 27 MMOL/L (ref 22–29)
CREAT SERPL-MCNC: 0.98 MG/DL (ref 0.57–1)
DEPRECATED RDW RBC AUTO: 47.4 FL (ref 37–54)
EGFRCR SERPLBLD CKD-EPI 2021: 61.5 ML/MIN/1.73
ERYTHROCYTE [DISTWIDTH] IN BLOOD BY AUTOMATED COUNT: 13.5 % (ref 12.3–15.4)
GLOBULIN UR ELPH-MCNC: 3.3 GM/DL
GLUCOSE SERPL-MCNC: 82 MG/DL (ref 65–99)
HCT VFR BLD AUTO: 31.4 % (ref 34–46.6)
HGB BLD-MCNC: 10.5 G/DL (ref 12–15.9)
MCH RBC QN AUTO: 32 PG (ref 26.6–33)
MCHC RBC AUTO-ENTMCNC: 33.4 G/DL (ref 31.5–35.7)
MCV RBC AUTO: 95.7 FL (ref 79–97)
PLATELET # BLD AUTO: 210 10*3/MM3 (ref 140–450)
PMV BLD AUTO: 10.2 FL (ref 6–12)
POTASSIUM SERPL-SCNC: 3.4 MMOL/L (ref 3.5–5.2)
POTASSIUM SERPL-SCNC: 3.9 MMOL/L (ref 3.5–5.2)
PROT SERPL-MCNC: 5.3 G/DL (ref 6–8.5)
RBC # BLD AUTO: 3.28 10*6/MM3 (ref 3.77–5.28)
SODIUM SERPL-SCNC: 142 MMOL/L (ref 136–145)
WBC NRBC COR # BLD: 4.1 10*3/MM3 (ref 3.4–10.8)

## 2023-08-31 PROCEDURE — 25010000002 METRONIDAZOLE 500 MG/100ML SOLUTION: Performed by: STUDENT IN AN ORGANIZED HEALTH CARE EDUCATION/TRAINING PROGRAM

## 2023-08-31 PROCEDURE — 80053 COMPREHEN METABOLIC PANEL: CPT | Performed by: INTERNAL MEDICINE

## 2023-08-31 PROCEDURE — 63710000001 PROMETHAZINE PER 25 MG: Performed by: INTERNAL MEDICINE

## 2023-08-31 PROCEDURE — 84132 ASSAY OF SERUM POTASSIUM: CPT | Performed by: STUDENT IN AN ORGANIZED HEALTH CARE EDUCATION/TRAINING PROGRAM

## 2023-08-31 PROCEDURE — 25010000002 CEFTRIAXONE PER 250 MG: Performed by: STUDENT IN AN ORGANIZED HEALTH CARE EDUCATION/TRAINING PROGRAM

## 2023-08-31 PROCEDURE — 85027 COMPLETE CBC AUTOMATED: CPT | Performed by: INTERNAL MEDICINE

## 2023-08-31 PROCEDURE — 99222 1ST HOSP IP/OBS MODERATE 55: CPT | Performed by: SURGERY

## 2023-08-31 PROCEDURE — 99232 SBSQ HOSP IP/OBS MODERATE 35: CPT | Performed by: NURSE PRACTITIONER

## 2023-08-31 PROCEDURE — 25010000002 METRONIDAZOLE 500 MG/100ML SOLUTION: Performed by: INTERNAL MEDICINE

## 2023-08-31 RX ORDER — POTASSIUM CHLORIDE 750 MG/1
40 TABLET, FILM COATED, EXTENDED RELEASE ORAL EVERY 4 HOURS
Status: DISPENSED | OUTPATIENT
Start: 2023-08-31 | End: 2023-08-31

## 2023-08-31 RX ORDER — FLUCONAZOLE 150 MG/1
150 TABLET ORAL EVERY 24 HOURS
Status: DISPENSED | OUTPATIENT
Start: 2023-08-31 | End: 2023-09-02

## 2023-08-31 RX ADMIN — OXYCODONE HYDROCHLORIDE 80 MG: 20 TABLET, FILM COATED, EXTENDED RELEASE ORAL at 09:07

## 2023-08-31 RX ADMIN — MONTELUKAST SODIUM 10 MG: 10 TABLET, FILM COATED ORAL at 09:07

## 2023-08-31 RX ADMIN — PROMETHAZINE HYDROCHLORIDE 25 MG: 12.5 TABLET ORAL at 09:07

## 2023-08-31 RX ADMIN — ESCITALOPRAM OXALATE 10 MG: 10 TABLET, FILM COATED ORAL at 09:08

## 2023-08-31 RX ADMIN — PROMETHAZINE HYDROCHLORIDE 25 MG: 12.5 TABLET ORAL at 20:16

## 2023-08-31 RX ADMIN — Medication 10 ML: at 20:19

## 2023-08-31 RX ADMIN — Medication 250 MG: at 09:07

## 2023-08-31 RX ADMIN — LEVOTHYROXINE SODIUM 100 MCG: 0.1 TABLET ORAL at 07:04

## 2023-08-31 RX ADMIN — BACLOFEN 5 MG: 10 TABLET ORAL at 15:22

## 2023-08-31 RX ADMIN — CEFTRIAXONE 2000 MG: 2 INJECTION, POWDER, FOR SOLUTION INTRAMUSCULAR; INTRAVENOUS at 15:24

## 2023-08-31 RX ADMIN — OXYCODONE HYDROCHLORIDE 80 MG: 20 TABLET, FILM COATED, EXTENDED RELEASE ORAL at 20:17

## 2023-08-31 RX ADMIN — GUAIFENESIN 600 MG: 600 TABLET, EXTENDED RELEASE ORAL at 20:16

## 2023-08-31 RX ADMIN — METRONIDAZOLE 500 MG: 500 INJECTION, SOLUTION INTRAVENOUS at 15:26

## 2023-08-31 RX ADMIN — METRONIDAZOLE 500 MG: 500 INJECTION, SOLUTION INTRAVENOUS at 23:00

## 2023-08-31 RX ADMIN — Medication 2000 UNITS: at 09:09

## 2023-08-31 RX ADMIN — BACLOFEN 5 MG: 10 TABLET ORAL at 20:17

## 2023-08-31 RX ADMIN — POTASSIUM CHLORIDE 20 MEQ: 750 TABLET, EXTENDED RELEASE ORAL at 09:07

## 2023-08-31 RX ADMIN — ALPRAZOLAM 3 MG: 1 TABLET ORAL at 22:19

## 2023-08-31 RX ADMIN — POTASSIUM CHLORIDE 40 MEQ: 750 TABLET, EXTENDED RELEASE ORAL at 15:23

## 2023-08-31 RX ADMIN — ACETAMINOPHEN 650 MG: 325 TABLET ORAL at 18:21

## 2023-08-31 RX ADMIN — Medication 1 TABLET: at 09:08

## 2023-08-31 RX ADMIN — Medication 10 ML: at 09:06

## 2023-08-31 RX ADMIN — FLUCONAZOLE 150 MG: 150 TABLET ORAL at 15:22

## 2023-08-31 RX ADMIN — Medication 1000 MCG: at 09:07

## 2023-08-31 RX ADMIN — Medication 250 MG: at 20:16

## 2023-08-31 RX ADMIN — HYDROCHLOROTHIAZIDE 25 MG: 25 TABLET ORAL at 09:08

## 2023-08-31 RX ADMIN — BACLOFEN 5 MG: 10 TABLET ORAL at 09:08

## 2023-08-31 RX ADMIN — METRONIDAZOLE 500 MG: 500 INJECTION, SOLUTION INTRAVENOUS at 09:06

## 2023-08-31 RX ADMIN — POTASSIUM CHLORIDE 40 MEQ: 750 TABLET, EXTENDED RELEASE ORAL at 12:33

## 2023-08-31 RX ADMIN — POTASSIUM CHLORIDE 20 MEQ: 750 TABLET, EXTENDED RELEASE ORAL at 20:17

## 2023-08-31 NOTE — PROGRESS NOTES
[facility]  Inpatient Progress Note    Reason for Follow Up: Bile duct calculus    Subjective     Interval History:     Patient underwent ERCP yesterday for bile duct calculus with findings of gastric bypass precluding ERCP with plans for surgical consultation.  Patient may require hybrid procedure in the OR with gastrostomy and ERCP.    White blood cell count this morning normal.  Hemoglobin 10.5. Liver function tests remain elevated however total bilirubin is normal.    Today she reports feeling quite well.  Denies abdominal pain, nausea or vomiting.  Ate cardiac diet without difficulty.      Current Facility-Administered Medications:     acetaminophen (TYLENOL) tablet 650 mg, 650 mg, Oral, Q6H PRN, Elijah Simon MD, 650 mg at 08/28/23 1141    albuterol (PROVENTIL) nebulizer solution 0.083% 2.5 mg/3mL, 2.5 mg, Nebulization, Q6H PRN, Elijah Simon MD    ALPRAZolam (XANAX) tablet 3 mg, 3 mg, Oral, Nightly PRN, Elijah Simon MD, 3 mg at 08/30/23 2209    baclofen (LIORESAL) tablet 5 mg, 5 mg, Oral, TID, Elijah Simon MD, 5 mg at 08/30/23 2054    sennosides-docusate (PERICOLACE) 8.6-50 MG per tablet 2 tablet, 2 tablet, Oral, BID PRN **AND** polyethylene glycol (MIRALAX) packet 17 g, 17 g, Oral, Daily PRN **AND** bisacodyl (DULCOLAX) EC tablet 5 mg, 5 mg, Oral, Daily PRN **AND** bisacodyl (DULCOLAX) suppository 10 mg, 10 mg, Rectal, Daily PRN, Elijah Simon MD    cefTRIAXone (ROCEPHIN) 2,000 mg in sodium chloride 0.9 % 100 mL IVPB-VTB, 2,000 mg, Intravenous, Q24H, Jocelyne Feliz MD, Last Rate: 200 mL/hr at 08/30/23 1909, 2,000 mg at 08/30/23 1909    cholecalciferol (VITAMIN D3) tablet 2,000 Units, 2,000 Units, Oral, Daily, Elijah Simon MD, 2,000 Units at 08/30/23 0954    escitalopram (LEXAPRO) tablet 10 mg, 10 mg, Oral, Daily, Elijah Simon MD, 10 mg at 08/30/23 0954    guaiFENesin (MUCINEX) 12 hr tablet 600 mg, 600 mg, Oral, Nightly,  Elijah Simon MD, 600 mg at 08/30/23 2054    hydroCHLOROthiazide (HYDRODIURIL) tablet 25 mg, 25 mg, Oral, Daily, Elijah Simon MD, 25 mg at 08/30/23 0954    levothyroxine (SYNTHROID, LEVOTHROID) tablet 100 mcg, 100 mcg, Oral, Q AM, Elijah Simon MD, 100 mcg at 08/31/23 0704    Magnesium Standard Dose Replacement - Follow Nurse / BPA Driven Protocol, , Does not apply, PRN, Elijah Simon MD    metroNIDAZOLE (FLAGYL) IVPB 500 mg, 500 mg, Intravenous, Q8H, Elijah Simon MD, Last Rate: 100 mL/hr at 08/30/23 2102, 500 mg at 08/30/23 2102    montelukast (SINGULAIR) tablet 10 mg, 10 mg, Oral, Daily, Elijah Simon MD, 10 mg at 08/30/23 0955    multivitamin (THERAGRAN) tablet 1 tablet, 1 tablet, Oral, Daily, Elijah Simon MD, 1 tablet at 08/30/23 0955    nitroglycerin (NITROSTAT) SL tablet 0.4 mg, 0.4 mg, Sublingual, Q5 Min PRN, Elijah Simon MD    oxyCODONE ER (oxyCONTIN) 12 hr tablet 80 mg, 80 mg, Oral, Q12H, Talha Montano MD, 80 mg at 08/30/23 2053    oxyCODONE ER (oxyCONTIN) 12 hr tablet 80 mg, 80 mg, Oral, BID PRN, Elijah Simon MD    potassium chloride (K-DUR,KLOR-CON) ER tablet 20 mEq, 20 mEq, Oral, BID, Elijah Simon MD, 20 mEq at 08/30/23 2209    Potassium Replacement - Follow Nurse / BPA Driven Protocol, , Does not apply, Alfred AVALOS Mitchell C., MD    promethazine (PHENERGAN) tablet 25 mg, 25 mg, Oral, Q6H PRN, 25 mg at 08/27/23 2045 **OR** promethazine (PHENERGAN) suppository 12.5 mg, 12.5 mg, Rectal, Q6H PRN, Elijah Simon MD    promethazine (PHENERGAN) tablet 25 mg, 25 mg, Oral, BID, Elijah Simon MD, 25 mg at 08/30/23 2054    saccharomyces boulardii (FLORASTOR) capsule 250 mg, 250 mg, Oral, BID, Elijah Simon MD, 250 mg at 08/30/23 2054    sodium chloride 0.9 % flush 10 mL, 10 mL, Intravenous, Q12H, Elijah Simon MD, 10 mL at 08/30/23 2209    sodium chloride 0.9 % flush 10 mL, 10 mL, Intravenous, PRN, Alfred  Elijah COATS MD    sodium chloride 0.9 % infusion 40 mL, 40 mL, Intravenous, PRN, Elijah Simon MD    sodium chloride 0.9 % infusion, 30 mL/hr, Intravenous, Continuous PRN, Elijah Simon MD, Last Rate: 30 mL/hr at 08/30/23 1450, 30 mL/hr at 08/30/23 1450    vitamin B-12 (CYANOCOBALAMIN) tablet 1,000 mcg, 1,000 mcg, Oral, Daily, Elijah Simon MD, 1,000 mcg at 08/30/23 0954  Review of Systems:    The following systems were reviewed and negative;  gastrointestinal    Objective     Vital Signs  Temp:  [97.9 °F (36.6 °C)-99.1 °F (37.3 °C)] 97.9 °F (36.6 °C)  Heart Rate:  [71-79] 74  Resp:  [16-18] 18  BP: (110-130)/(61-74) 110/68  Body mass index is 35.56 kg/m².    Intake/Output Summary (Last 24 hours) at 8/31/2023 0745  Last data filed at 8/30/2023 2102  Gross per 24 hour   Intake 520 ml   Output --   Net 520 ml     No intake/output data recorded.     Physical Exam:   General: patient awake, alert and cooperative   Abdomen: soft, nontender, nondistended; normal bowel sounds     Results Review:     I reviewed the patient's new clinical results.    Results from last 7 days   Lab Units 08/31/23 0439 08/30/23 0450 08/29/23  0409   WBC 10*3/mm3 4.10 3.52 3.27*   HEMOGLOBIN g/dL 10.5* 9.0* 9.7*   HEMATOCRIT % 31.4* 26.6* 28.5*   PLATELETS 10*3/mm3 210 197 209     Results from last 7 days   Lab Units 08/31/23  0439 08/30/23 0450 08/29/23 2052 08/29/23  0409   SODIUM mmol/L 142 141  --  141   POTASSIUM mmol/L 3.4* 3.7 3.8 3.3*   CHLORIDE mmol/L 106 110*  --  107   CO2 mmol/L 27.0 24.8  --  26.3   BUN mg/dL 9 8  --  9   CREATININE mg/dL 0.98 0.87  --  1.05*   CALCIUM mg/dL 7.9* 7.5*  --  7.3*   BILIRUBIN mg/dL 1.2 0.8  --  0.7   ALK PHOS U/L 630* 473*  --  458*   ALT (SGPT) U/L 15 18  --  22   AST (SGOT) U/L 59* 49*  --  55*   GLUCOSE mg/dL 82 80  --  83     Results from last 7 days   Lab Units 08/25/23  1511   INR  1.19*     No results found for: LIPASE    Radiology:  XR Abdomen KUB   Final Result       FL C Arm During Surgery   Final Result      XR Chest 1 View   Final Result   No focal pulmonary consolidation. Follow-up as clinical   indications persist.       This report was finalized on 8/27/2023 10:32 AM by Dr. Eze Enriquez M.D.              Assessment & Plan     Active Hospital Problems    Diagnosis     **Choledocholithiasis     History of pulmonary embolism     Stage 3 chronic kidney disease     Gastroesophageal reflux disease     Acquired hypothyroidism     Chronic pain syndrome     Rheumatoid arthritis     Generalized osteoarthritis        Assessment:  COVID-19 positive  Choledocholithiasis  Renal insufficiency  History of pulmonary embolism    Plan:  Reviewed procedural findings and discuss consultation with surgical services for potential hybrid procedure in the OR given her altered anatomy with gastric bypass.  All questions were answered  Continue cardiac diet in the interim  Await surgical services opinion  CBC and CMP in the morning    I discussed the patients findings and my recommendations with patient and family.    Valentina Fuller, APRN

## 2023-08-31 NOTE — PLAN OF CARE
Goal Outcome Evaluation:           Progress: no change  Outcome Evaluation: Dr. Simon attempted ERCP, unable to pass. Surgery consulted and Dr. Loo will see in am. Tolerating diet well. No c/o;s of abd pain or discomfort. No nausea or emesis. Safety maintained, daughter at bedside.

## 2023-08-31 NOTE — PROGRESS NOTES
Name: Anne Jewell ADMIT: 2023   : 1950  PCP: Jewell Stephens APRN    MRN: [id number] LOS: 6 days   AGE/SEX: 72 y.o. female  ROOM: [facility]     Subjective   Subjective   Patient is laying in bed comfortably.  Daughter at bedside.  Daughter expressed concerns about albumin, discussed that the treatment for low albumin is a good diet and increase protein intake.  Patient has protein supplements at bedside that she is taking.  Chronic pain at baseline    Review of Systems   Constitutional:  Negative for chills and fever.   Respiratory:  Negative for cough and shortness of breath.    Cardiovascular:  Negative for chest pain and leg swelling.   Gastrointestinal:  Negative for abdominal pain, diarrhea and nausea.   As above     Objective   Objective   Vital Signs  Temp:  [97.9 °F (36.6 °C)-99.1 °F (37.3 °C)] 98.6 °F (37 °C)  Heart Rate:  [72-79] 75  Resp:  [16-18] 18  BP: ()/(61-74) 94/68  SpO2:  [95 %-99 %] 99 %  on   ;   Device (Oxygen Therapy): room air  Body mass index is 35.56 kg/m².  Physical Exam  Constitutional:       General: She is not in acute distress.     Appearance: She is not ill-appearing.   Cardiovascular:      Rate and Rhythm: Normal rate and regular rhythm.   Pulmonary:      Effort: Pulmonary effort is normal. No respiratory distress.   Abdominal:      General: Abdomen is flat. There is no distension.      Tenderness: There is no abdominal tenderness.   Musculoskeletal:         General: No swelling or deformity. Normal range of motion.   Skin:     General: Skin is warm and dry.   Neurological:      General: No focal deficit present.      Mental Status: She is alert. Mental status is at baseline.       Results Review     I reviewed the patient's new clinical results.  Results from last 7 days   Lab Units 23  0439 23  0450 23  0409 23  0430   WBC 10*3/mm3 4.10 3.52 3.27* 4.22   HEMOGLOBIN g/dL 10.5* 9.0* 9.7* 9.9*   PLATELETS 10*3/mm3 210 197 209 228        Results from last 7 days   Lab Units 08/31/23 0439 08/30/23 0450 08/29/23 2052 08/29/23 0409 08/28/23 0430   SODIUM mmol/L 142 141  --  141 134*   POTASSIUM mmol/L 3.4* 3.7 3.8 3.3* 4.0   CHLORIDE mmol/L 106 110*  --  107 100   CO2 mmol/L 27.0 24.8  --  26.3 28.1   BUN mg/dL 9 8  --  9 8   CREATININE mg/dL 0.98 0.87  --  1.05* 0.92   GLUCOSE mg/dL 82 80  --  83 90     Estimated Creatinine Clearance: 51.4 mL/min (by C-G formula based on SCr of 0.98 mg/dL).  Results from last 7 days   Lab Units 08/31/23 0439 08/30/23 0450 08/29/23 0409 08/28/23 0430   ALBUMIN g/dL 2.0* 1.5* 1.9* 1.9*   BILIRUBIN mg/dL 1.2 0.8 0.7 0.9   ALK PHOS U/L 630* 473* 458* 463*   AST (SGOT) U/L 59* 49* 55* 60*   ALT (SGPT) U/L 15 18 22 26       Results from last 7 days   Lab Units 08/31/23 0439 08/30/23 0450 08/29/23 0409 08/28/23 0430 08/27/23  0737 08/26/23  0610 08/25/23  1511   CALCIUM mg/dL 7.9* 7.5* 7.3* 7.4* 7.6* 7.5* 8.2*   ALBUMIN g/dL 2.0* 1.5* 1.9* 1.9* 2.0*  --  2.3*   MAGNESIUM mg/dL  --   --   --   --  2.0 1.6 1.6   PHOSPHORUS mg/dL  --   --   --   --   --  3.1 3.3         COVID19   Date Value Ref Range Status   08/27/2023 Detected (C) Not Detected - Ref. Range Final   08/10/2020 Not Detected Not Detected - Ref. Range Final     No results found for: HGBA1C, POCGLU      XR Abdomen KUB  KUB     Clinical: Attempted ERCP     Fluoroscopy time: 35 seconds     Number of images: 3     FINDINGS: C-arm imaging of the abdomen performed, endoscope in the  field-of-view on the second C-arm image submitted.     This report was finalized on 8/30/2023 6:56 PM by Dr. Guy Lott M.D.     FL C Arm During Surgery  This procedure was auto-finalized with no dictation required.    I reviewed the patient's daily medications.  Scheduled Medications  baclofen, 5 mg, Oral, TID  cefTRIAXone, 2,000 mg, Intravenous, Q24H  cholecalciferol, 2,000 Units, Oral, Daily  escitalopram, 10 mg, Oral, Daily  fluconazole, 150 mg, Oral,  Q24H  guaiFENesin, 600 mg, Oral, Nightly  hydroCHLOROthiazide, 25 mg, Oral, Daily  levothyroxine, 100 mcg, Oral, Q AM  metroNIDAZOLE, 500 mg, Intravenous, Q8H  montelukast, 10 mg, Oral, Daily  multivitamin, 1 tablet, Oral, Daily  oxyCODONE, 80 mg, Oral, Q12H  potassium chloride, 20 mEq, Oral, BID  potassium chloride ER, 40 mEq, Oral, Q4H  promethazine, 25 mg, Oral, BID  saccharomyces boulardii, 250 mg, Oral, BID  sodium chloride, 10 mL, Intravenous, Q12H  vitamin B-12, 1,000 mcg, Oral, Daily    Infusions  sodium chloride, 30 mL/hr, Last Rate: 30 mL/hr (08/30/23 1450)    Diet  Diet: Cardiac Diets; Healthy Heart (2-3 Na+); Texture: Regular Texture (IDDSI 7); Fluid Consistency: Thin (IDDSI 0)         I have personally reviewed:  [x]  Laboratory   [x]  Microbiology   [x]  Radiology   []  EKG/Telemetry   []  Cardiology/Vascular   []  Pathology   []  Records     Assessment/Plan     Active Hospital Problems    Diagnosis  POA    **Choledocholithiasis [K80.50]  Yes    History of pulmonary embolism [Z86.711]  Unknown    Stage 3 chronic kidney disease [N18.30]  Yes    Gastroesophageal reflux disease [K21.9]  Yes    Acquired hypothyroidism [E03.9]  Yes    Chronic pain syndrome [G89.4]  Yes    Rheumatoid arthritis [M06.9]  Yes    Generalized osteoarthritis [M15.9]  Yes      Resolved Hospital Problems   No resolved problems to display.       72 y.o. female admitted with Choledocholithiasis.    Choledocholithiasis  History of gastric bypass  Elevated LFTs  -MRCP showing large stone in the distal common bile duct, 1.8 cm.  Intrahepatic biliary dilatation.  Marked biliary dilatation with CBD measuring 2.1 cm  -IV ceftriaxone, metronidazole, patient with history of allergies to penicillin.  -GI consulted-because of patient's anatomy ERCP cannot be completed.  Surgery was consulted for gastric access for ERCP which is planned for 9/1.    Vaginal yeast infection  -Diflucan x2 doses    COVID-19  Fever, resolved  -Blood cultures with no  growth for 24 hours  -Patient with cough, and sore throat this morning.  Chest x-ray without any abnormalities.    -Completed 3 days of remdesivir    History of pulmonary pulmonary embolism.  Diagnosed in 2007, has been on anticoagulation since.  At home takes Xarelto currently, last dose was on 08/24/2023.  Hold anticoagulation due to planned procedure.     Hypothyroidism: Resume home levothyroxine, recent TSH normal     CKD stage IIIa: Creatinine stable, monitor.  Avoid nephrotoxic drugs.     Chronic pain syndrome/chronic arthritis: Bob reviewed.  Oxycodone extended release to 80 mg twice daily scheduled.  Oxycodone extended release to 80 mg twice a day as needed as well.  Continue home alprazolam 3 mg nightly     History of RA: Not on treatment for RA per chart review, reports she was diagnosed with URI but was never on specific treatment.     Memory loss: Daughter states she has noticed memory changes for the last several months.  Was referred to neurology outpatient follow-up patient with possible dementia.  Follow-up as outpatient    Abnormal liver on MRI: Repeat CT abdomen with liver protocol in 2 to 3 months.  Daughter concerned about left renal cyst and small splenic lesion that looks like a cyst per radiology report.  Can further be evaluated with a repeat CT abdomen.      SCDs for DVT prophylaxis.  Full code.  Discussed with patient, family, and nursing staff.  Anticipate discharge home with family tomorrow.  Pending ERCP results    Expected Discharge Date: 8/31/2023; Expected Discharge Time:       Talha Montano MD  [facility]  08/31/23  13:49 EDT

## 2023-08-31 NOTE — CONSULTS
[facility]   Consult Note    Patient Name: Anne Jewell  : 1950  MRN: [id number]  Primary Care Physician:  Jewell Stephens APRN  Referring Physician: EUGENIO Donato  Date of admission: 2023    Inpatient General Surgery Consult  Consult performed by: Slava Loo Jr., MD  Consult ordered by: Elijah Simon MD      Subjective   Subjective     Reason for Consult/ Chief Complaint: Choledocholithiasis    History of Present Illness  Anne Jewell is a very pleasant 72 y.o. female with multiple medical problems including a previous pulmonary embolus routinely on Xarelto, chronic kidney disease, hypothyroidism, chronic pain syndrome, rheumatoid arthritis, and evidence of developing dementia who was noted to have elevated liver function test.  She underwent an MRCP that showed choledocholithiasis with CBD obstruction.  She has had a previous gastric bypass and a previous cholecystectomy.  An attempt was made to perform an ERCP but it could not be completed due to the altered anatomy related to the gastric bypass.  Surgical consultation was requested to provide access to the remnant stomach so that the ERCP can be completed.    Her surgical history, as stated above, includes a previous gastric bypass that was performed open.  She had more than 1 hernia repair following that with mesh placement.  She has had a cholecystectomy.  She has also had a hysterectomy.    Review of Systems   Constitutional:  Negative for chills and fever.   Respiratory:  Negative for chest tightness and shortness of breath.    Cardiovascular:  Negative for chest pain and palpitations.   Gastrointestinal:  Negative for abdominal pain and nausea.      Personal History     Past Medical History:   Diagnosis Date    Allergic rhinitis     Arthritis of back     Arthritis of neck     Asthma     Bronchospasm     Carpal tunnel syndrome, bilateral     Cervical disc disorder     Clotting disorder     Deep vein thrombosis      Disc degeneration, lumbar     Edema     Esophageal reflux     Glaucoma     Hip arthrosis     Joint pain     Kidney disease 2018    stage 3    Liver disease 2018    stage 3    Low back pain     Osteoarthritis     Osteopenia     Osteoporosis     Periarthritis of shoulder     Scoliosis     Status post total knee replacement, left 08/31/2017    Status post total knee replacement, right 08/31/2017    UTI (urinary tract infection)     Venous thrombosis        Past Surgical History:   Procedure Laterality Date    BILATERAL BREAST REDUCTION      BREAST SURGERY      COLONOSCOPY  08/05/2016    ERCP N/A 8/30/2023    Procedure: ENDOSCOPIC RETROGRADE CHOLANGIOPANCREATOGRAPHY;  Surgeon: Elijah Simon MD;  Location: [facility] ENDOSCOPY;  Service: Gastroenterology;  Laterality: N/A;  cbd stone    GASTRIC BYPASS      HAND SURGERY Right 01/14/2016    HERNIA REPAIR      x7    HYSTERECTOMY      JOINT REPLACEMENT      Both knees    OTHER SURGICAL HISTORY      vaginal sling operation for stress incontinence    TOTAL KNEE ARTHROPLASTY Left     TOTAL KNEE ARTHROPLASTY Bilateral        Family History: family history includes Arthritis in an other family member; Cancer in an other family member; Diabetes in an other family member; Heart disease in an other family member; Hypertension in an other family member; Nephrolithiasis in an other family member; Prostate cancer in her father. Otherwise pertinent FHx was reviewed and not pertinent to current issue.    Social History:  reports that she has quit smoking. She has been exposed to tobacco smoke. She has never used smokeless tobacco. She reports that she does not drink alcohol and does not use drugs.    Home Medications:   ALPRAZolam, B-12, Calcium Citrate-Vitamin D3, EPINEPHrine, Omega 3-6-9, Vitamin D3, albuterol sulfate HFA, baclofen, chlorhexidine, dicyclomine, escitalopram, hydroCHLOROthiazide, levothyroxine, montelukast, multivitamin, naloxone, oxyCODONE ER, potassium chloride,  promethazine, rivaroxaban, and saccharomyces boulardii    Allergies:  Allergies   Allergen Reactions    Penicillins Hives       Objective    Objective     Vitals:  Temp:  [97.9 °F (36.6 °C)-99.1 °F (37.3 °C)] 97.9 °F (36.6 °C)  Heart Rate:  [72-79] 73  Resp:  [16-18] 18  BP: (104-130)/(61-74) 104/74    Physical Exam  Constitutional:       Appearance: She is not ill-appearing or toxic-appearing.   Pulmonary:      Effort: Pulmonary effort is normal. No respiratory distress.   Abdominal:      Palpations: Abdomen is soft.      Tenderness: There is no abdominal tenderness.   Neurological:      Mental Status: She is alert.   Psychiatric:         Behavior: Behavior is cooperative.       Result Review    Result Review:  I have personally reviewed the results from the time of this admission to 8/31/2023 08:39 EDT and agree with these findings:  [x]  Laboratory list / accordion  []  Microbiology  [x]  Radiology  []  EKG/Telemetry   []  Cardiology/Vascular   []  Pathology  [x]  Old records  []  Other:      Assessment & Plan   Assessment / Plan     Brief Patient Summary with assessment and plan:  Anne Jewell is a 72 y.o. female who has had a previous open gastric bypass and ventral hernia repairs and has developed choledocholithiasis.    1.  Cholelithiasis: The patient has choledocholithiasis with altered anatomy related to a previous gastric bypass.  She has also had a previous cholecystectomy.  An attempt at an ERCP was unsuccessful due to the altered anatomy.  She will require gastric access for ERCP.  She has been noted in the past to have multiple adhesions and review of her MRI and previous CT scan of the abdomen and pelvis shows the stomach in a relatively posterior position deep to the transverse colon as well as small bowel loops.  I suspect there are numerous adhesions between the structures and her only real option is an open approach to access the stomach.  This was discussed with the patient and her daughter.   They understand the nature of the surgery and potential complications as well as a potentially extended recovery that may require a stay in rehab.  Unfortunately, it is her best option to address the choledocholithiasis and so proceeding with surgery is appropriate.  They are willing to proceed.  I will contact Dr. Simon for the purposes of timing the combined procedure.      Slava Loo Jr., MD

## 2023-09-01 ENCOUNTER — ANESTHESIA (OUTPATIENT)
Dept: PERIOP | Facility: HOSPITAL | Age: 73
DRG: 423 | End: 2023-09-01
Payer: MEDICARE

## 2023-09-01 ENCOUNTER — APPOINTMENT (OUTPATIENT)
Dept: GENERAL RADIOLOGY | Facility: HOSPITAL | Age: 73
DRG: 423 | End: 2023-09-01
Payer: MEDICARE

## 2023-09-01 ENCOUNTER — ANESTHESIA EVENT (OUTPATIENT)
Dept: PERIOP | Facility: HOSPITAL | Age: 73
DRG: 423 | End: 2023-09-01
Payer: MEDICARE

## 2023-09-01 LAB
ABO GROUP BLD: NORMAL
ALBUMIN SERPL-MCNC: 2.4 G/DL (ref 3.5–5.2)
ALBUMIN/GLOB SERPL: 0.9 G/DL
ALP SERPL-CCNC: 519 U/L (ref 39–117)
ALT SERPL W P-5'-P-CCNC: 18 U/L (ref 1–33)
ANION GAP SERPL CALCULATED.3IONS-SCNC: 8 MMOL/L (ref 5–15)
AST SERPL-CCNC: 59 U/L (ref 1–32)
BACTERIA SPEC AEROBE CULT: NORMAL
BACTERIA SPEC AEROBE CULT: NORMAL
BASOPHILS # BLD AUTO: 0.01 10*3/MM3 (ref 0–0.2)
BASOPHILS NFR BLD AUTO: 0.3 % (ref 0–1.5)
BILIRUB SERPL-MCNC: 0.7 MG/DL (ref 0–1.2)
BLD GP AB SCN SERPL QL: NEGATIVE
BUN SERPL-MCNC: 8 MG/DL (ref 8–23)
BUN/CREAT SERPL: 10 (ref 7–25)
C DIFF TOX GENS STL QL NAA+PROBE: NEGATIVE
CALCIUM SPEC-SCNC: 7.7 MG/DL (ref 8.6–10.5)
CHLORIDE SERPL-SCNC: 109 MMOL/L (ref 98–107)
CO2 SERPL-SCNC: 26 MMOL/L (ref 22–29)
CREAT SERPL-MCNC: 0.8 MG/DL (ref 0.57–1)
DEPRECATED RDW RBC AUTO: 48.5 FL (ref 37–54)
EGFRCR SERPLBLD CKD-EPI 2021: 78.4 ML/MIN/1.73
EOSINOPHIL # BLD AUTO: 0.09 10*3/MM3 (ref 0–0.4)
EOSINOPHIL NFR BLD AUTO: 2.8 % (ref 0.3–6.2)
ERYTHROCYTE [DISTWIDTH] IN BLOOD BY AUTOMATED COUNT: 13.7 % (ref 12.3–15.4)
GLOBULIN UR ELPH-MCNC: 2.6 GM/DL
GLUCOSE SERPL-MCNC: 81 MG/DL (ref 65–99)
HCT VFR BLD AUTO: 27.9 % (ref 34–46.6)
HCT VFR BLD AUTO: 34.6 % (ref 34–46.6)
HGB BLD-MCNC: 11.3 G/DL (ref 12–15.9)
HGB BLD-MCNC: 9.2 G/DL (ref 12–15.9)
IMM GRANULOCYTES # BLD AUTO: 0.01 10*3/MM3 (ref 0–0.05)
IMM GRANULOCYTES NFR BLD AUTO: 0.3 % (ref 0–0.5)
LYMPHOCYTES # BLD AUTO: 1.17 10*3/MM3 (ref 0.7–3.1)
LYMPHOCYTES NFR BLD AUTO: 37 % (ref 19.6–45.3)
MCH RBC QN AUTO: 32.2 PG (ref 26.6–33)
MCHC RBC AUTO-ENTMCNC: 33 G/DL (ref 31.5–35.7)
MCV RBC AUTO: 97.6 FL (ref 79–97)
MONOCYTES # BLD AUTO: 0.17 10*3/MM3 (ref 0.1–0.9)
MONOCYTES NFR BLD AUTO: 5.4 % (ref 5–12)
NEUTROPHILS NFR BLD AUTO: 1.71 10*3/MM3 (ref 1.7–7)
NEUTROPHILS NFR BLD AUTO: 54.2 % (ref 42.7–76)
NRBC BLD AUTO-RTO: 0 /100 WBC (ref 0–0.2)
PLATELET # BLD AUTO: 180 10*3/MM3 (ref 140–450)
PMV BLD AUTO: 10.2 FL (ref 6–12)
POTASSIUM SERPL-SCNC: 3.6 MMOL/L (ref 3.5–5.2)
PROT SERPL-MCNC: 5 G/DL (ref 6–8.5)
RBC # BLD AUTO: 2.86 10*6/MM3 (ref 3.77–5.28)
RH BLD: POSITIVE
SODIUM SERPL-SCNC: 143 MMOL/L (ref 136–145)
T&S EXPIRATION DATE: NORMAL
WBC NRBC COR # BLD: 3.16 10*3/MM3 (ref 3.4–10.8)

## 2023-09-01 PROCEDURE — 87493 C DIFF AMPLIFIED PROBE: CPT | Performed by: NURSE PRACTITIONER

## 2023-09-01 PROCEDURE — C1769 GUIDE WIRE: HCPCS | Performed by: SURGERY

## 2023-09-01 PROCEDURE — 85014 HEMATOCRIT: CPT | Performed by: SURGERY

## 2023-09-01 PROCEDURE — 25010000002 ONDANSETRON PER 1 MG: Performed by: NURSE ANESTHETIST, CERTIFIED REGISTERED

## 2023-09-01 PROCEDURE — 74328 X-RAY BILE DUCT ENDOSCOPY: CPT

## 2023-09-01 PROCEDURE — 25010000002 DEXAMETHASONE SODIUM PHOSPHATE 20 MG/5ML SOLUTION: Performed by: NURSE ANESTHETIST, CERTIFIED REGISTERED

## 2023-09-01 PROCEDURE — 25010000002 FENTANYL CITRATE (PF) 50 MCG/ML SOLUTION: Performed by: NURSE ANESTHETIST, CERTIFIED REGISTERED

## 2023-09-01 PROCEDURE — 25510000001 IOPAMIDOL 61 % SOLUTION: Performed by: INTERNAL MEDICINE

## 2023-09-01 PROCEDURE — 0DB60ZZ EXCISION OF STOMACH, OPEN APPROACH: ICD-10-PCS | Performed by: SURGERY

## 2023-09-01 PROCEDURE — 25010000002 HYDROMORPHONE 1 MG/ML SOLUTION: Performed by: NURSE ANESTHETIST, CERTIFIED REGISTERED

## 2023-09-01 PROCEDURE — 88309 TISSUE EXAM BY PATHOLOGIST: CPT | Performed by: SURGERY

## 2023-09-01 PROCEDURE — 25010000002 HYDROMORPHONE 1 MG/ML SOLUTION: Performed by: SURGERY

## 2023-09-01 PROCEDURE — 85018 HEMOGLOBIN: CPT | Performed by: SURGERY

## 2023-09-01 PROCEDURE — 43634 REMOVAL OF STOMACH PARTIAL: CPT | Performed by: SURGERY

## 2023-09-01 PROCEDURE — 63710000001 PROMETHAZINE PER 25 MG: Performed by: SURGERY

## 2023-09-01 PROCEDURE — 85025 COMPLETE CBC W/AUTO DIFF WBC: CPT | Performed by: NURSE PRACTITIONER

## 2023-09-01 PROCEDURE — 86850 RBC ANTIBODY SCREEN: CPT | Performed by: STUDENT IN AN ORGANIZED HEALTH CARE EDUCATION/TRAINING PROGRAM

## 2023-09-01 PROCEDURE — 25010000002 PROPOFOL 10 MG/ML EMULSION: Performed by: NURSE ANESTHETIST, CERTIFIED REGISTERED

## 2023-09-01 PROCEDURE — C9290 INJ, BUPIVACAINE LIPOSOME: HCPCS | Performed by: SURGERY

## 2023-09-01 PROCEDURE — 80053 COMPREHEN METABOLIC PANEL: CPT | Performed by: INTERNAL MEDICINE

## 2023-09-01 PROCEDURE — 43500 SURGICAL OPENING OF STOMACH: CPT | Performed by: SURGERY

## 2023-09-01 PROCEDURE — 0FC98ZZ EXTIRPATION OF MATTER FROM COMMON BILE DUCT, VIA NATURAL OR ARTIFICIAL OPENING ENDOSCOPIC: ICD-10-PCS | Performed by: INTERNAL MEDICINE

## 2023-09-01 PROCEDURE — 25010000002 HYDROMORPHONE PER 4 MG: Performed by: NURSE ANESTHETIST, CERTIFIED REGISTERED

## 2023-09-01 PROCEDURE — 0FN00ZZ RELEASE LIVER, OPEN APPROACH: ICD-10-PCS | Performed by: SURGERY

## 2023-09-01 PROCEDURE — 25010000002 SUGAMMADEX 200 MG/2ML SOLUTION: Performed by: NURSE ANESTHETIST, CERTIFIED REGISTERED

## 2023-09-01 PROCEDURE — 86901 BLOOD TYPING SEROLOGIC RH(D): CPT | Performed by: STUDENT IN AN ORGANIZED HEALTH CARE EDUCATION/TRAINING PROGRAM

## 2023-09-01 PROCEDURE — 0 BUPIVACAINE LIPOSOME 1.3 % SUSPENSION: Performed by: SURGERY

## 2023-09-01 PROCEDURE — 0DNL0ZZ RELEASE TRANSVERSE COLON, OPEN APPROACH: ICD-10-PCS | Performed by: SURGERY

## 2023-09-01 PROCEDURE — 86900 BLOOD TYPING SEROLOGIC ABO: CPT | Performed by: STUDENT IN AN ORGANIZED HEALTH CARE EDUCATION/TRAINING PROGRAM

## 2023-09-01 PROCEDURE — 0DNU0ZZ RELEASE OMENTUM, OPEN APPROACH: ICD-10-PCS | Performed by: SURGERY

## 2023-09-01 PROCEDURE — 25010000002 METRONIDAZOLE 500 MG/100ML SOLUTION: Performed by: SURGERY

## 2023-09-01 PROCEDURE — 25010000002 CEFTRIAXONE PER 250 MG: Performed by: SURGERY

## 2023-09-01 DEVICE — PROXIMATE LINEAR CUTTER RELOAD, BLUE, 75MM
Type: IMPLANTABLE DEVICE | Site: ABDOMEN | Status: FUNCTIONAL
Brand: PROXIMATE

## 2023-09-01 RX ORDER — HYDROCODONE BITARTRATE AND ACETAMINOPHEN 7.5; 325 MG/1; MG/1
1 TABLET ORAL EVERY 4 HOURS PRN
Status: DISCONTINUED | OUTPATIENT
Start: 2023-09-01 | End: 2023-09-01 | Stop reason: HOSPADM

## 2023-09-01 RX ORDER — NALOXONE HCL 0.4 MG/ML
0.2 VIAL (ML) INJECTION AS NEEDED
Status: DISCONTINUED | OUTPATIENT
Start: 2023-09-01 | End: 2023-09-01 | Stop reason: HOSPADM

## 2023-09-01 RX ORDER — PROMETHAZINE HYDROCHLORIDE 25 MG/1
25 TABLET ORAL ONCE AS NEEDED
Status: DISCONTINUED | OUTPATIENT
Start: 2023-09-01 | End: 2023-09-01 | Stop reason: HOSPADM

## 2023-09-01 RX ORDER — ROCURONIUM BROMIDE 10 MG/ML
INJECTION, SOLUTION INTRAVENOUS AS NEEDED
Status: DISCONTINUED | OUTPATIENT
Start: 2023-09-01 | End: 2023-09-01 | Stop reason: SURG

## 2023-09-01 RX ORDER — DEXAMETHASONE SODIUM PHOSPHATE 4 MG/ML
INJECTION, SOLUTION INTRA-ARTICULAR; INTRALESIONAL; INTRAMUSCULAR; INTRAVENOUS; SOFT TISSUE AS NEEDED
Status: DISCONTINUED | OUTPATIENT
Start: 2023-09-01 | End: 2023-09-01 | Stop reason: SURG

## 2023-09-01 RX ORDER — FENTANYL CITRATE 50 UG/ML
INJECTION, SOLUTION INTRAMUSCULAR; INTRAVENOUS AS NEEDED
Status: DISCONTINUED | OUTPATIENT
Start: 2023-09-01 | End: 2023-09-01 | Stop reason: SURG

## 2023-09-01 RX ORDER — PROMETHAZINE HYDROCHLORIDE 25 MG/1
25 SUPPOSITORY RECTAL ONCE AS NEEDED
Status: DISCONTINUED | OUTPATIENT
Start: 2023-09-01 | End: 2023-09-01 | Stop reason: HOSPADM

## 2023-09-01 RX ORDER — LOPERAMIDE HYDROCHLORIDE 2 MG/1
2 CAPSULE ORAL 4 TIMES DAILY PRN
Status: DISCONTINUED | OUTPATIENT
Start: 2023-09-01 | End: 2023-09-15 | Stop reason: HOSPADM

## 2023-09-01 RX ORDER — FLUMAZENIL 0.1 MG/ML
0.2 INJECTION INTRAVENOUS AS NEEDED
Status: DISCONTINUED | OUTPATIENT
Start: 2023-09-01 | End: 2023-09-01 | Stop reason: HOSPADM

## 2023-09-01 RX ORDER — HYDROCODONE BITARTRATE AND ACETAMINOPHEN 5; 325 MG/1; MG/1
1 TABLET ORAL ONCE AS NEEDED
Status: DISCONTINUED | OUTPATIENT
Start: 2023-09-01 | End: 2023-09-01 | Stop reason: HOSPADM

## 2023-09-01 RX ORDER — IPRATROPIUM BROMIDE AND ALBUTEROL SULFATE 2.5; .5 MG/3ML; MG/3ML
3 SOLUTION RESPIRATORY (INHALATION) ONCE AS NEEDED
Status: DISCONTINUED | OUTPATIENT
Start: 2023-09-01 | End: 2023-09-01 | Stop reason: HOSPADM

## 2023-09-01 RX ORDER — BUPIVACAINE HYDROCHLORIDE AND EPINEPHRINE 5; 5 MG/ML; UG/ML
INJECTION, SOLUTION EPIDURAL; INTRACAUDAL; PERINEURAL AS NEEDED
Status: DISCONTINUED | OUTPATIENT
Start: 2023-09-01 | End: 2023-09-01 | Stop reason: HOSPADM

## 2023-09-01 RX ORDER — SODIUM CHLORIDE, SODIUM LACTATE, POTASSIUM CHLORIDE, CALCIUM CHLORIDE 600; 310; 30; 20 MG/100ML; MG/100ML; MG/100ML; MG/100ML
INJECTION, SOLUTION INTRAVENOUS CONTINUOUS PRN
Status: DISCONTINUED | OUTPATIENT
Start: 2023-09-01 | End: 2023-09-01 | Stop reason: SURG

## 2023-09-01 RX ORDER — HYDROMORPHONE HYDROCHLORIDE 1 MG/ML
0.25 INJECTION, SOLUTION INTRAMUSCULAR; INTRAVENOUS; SUBCUTANEOUS
Status: DISCONTINUED | OUTPATIENT
Start: 2023-09-01 | End: 2023-09-01 | Stop reason: HOSPADM

## 2023-09-01 RX ORDER — FENTANYL CITRATE 50 UG/ML
25 INJECTION, SOLUTION INTRAMUSCULAR; INTRAVENOUS
Status: DISCONTINUED | OUTPATIENT
Start: 2023-09-01 | End: 2023-09-01 | Stop reason: HOSPADM

## 2023-09-01 RX ORDER — ONDANSETRON 2 MG/ML
4 INJECTION INTRAMUSCULAR; INTRAVENOUS ONCE AS NEEDED
Status: DISCONTINUED | OUTPATIENT
Start: 2023-09-01 | End: 2023-09-01 | Stop reason: HOSPADM

## 2023-09-01 RX ORDER — DIPHENHYDRAMINE HYDROCHLORIDE 50 MG/ML
12.5 INJECTION INTRAMUSCULAR; INTRAVENOUS
Status: DISCONTINUED | OUTPATIENT
Start: 2023-09-01 | End: 2023-09-01 | Stop reason: HOSPADM

## 2023-09-01 RX ORDER — EPHEDRINE SULFATE 50 MG/ML
5 INJECTION, SOLUTION INTRAVENOUS ONCE AS NEEDED
Status: DISCONTINUED | OUTPATIENT
Start: 2023-09-01 | End: 2023-09-01 | Stop reason: HOSPADM

## 2023-09-01 RX ORDER — PROPOFOL 10 MG/ML
VIAL (ML) INTRAVENOUS AS NEEDED
Status: DISCONTINUED | OUTPATIENT
Start: 2023-09-01 | End: 2023-09-01 | Stop reason: SURG

## 2023-09-01 RX ORDER — KETAMINE HCL IN NACL, ISO-OSM 100MG/10ML
SYRINGE (ML) INJECTION AS NEEDED
Status: DISCONTINUED | OUTPATIENT
Start: 2023-09-01 | End: 2023-09-01 | Stop reason: SURG

## 2023-09-01 RX ORDER — DROPERIDOL 2.5 MG/ML
0.62 INJECTION, SOLUTION INTRAMUSCULAR; INTRAVENOUS
Status: DISCONTINUED | OUTPATIENT
Start: 2023-09-01 | End: 2023-09-01 | Stop reason: HOSPADM

## 2023-09-01 RX ORDER — ONDANSETRON 2 MG/ML
INJECTION INTRAMUSCULAR; INTRAVENOUS AS NEEDED
Status: DISCONTINUED | OUTPATIENT
Start: 2023-09-01 | End: 2023-09-01 | Stop reason: SURG

## 2023-09-01 RX ORDER — LIDOCAINE HYDROCHLORIDE 20 MG/ML
INJECTION, SOLUTION INFILTRATION; PERINEURAL AS NEEDED
Status: DISCONTINUED | OUTPATIENT
Start: 2023-09-01 | End: 2023-09-01 | Stop reason: SURG

## 2023-09-01 RX ORDER — MAGNESIUM HYDROXIDE 1200 MG/15ML
LIQUID ORAL AS NEEDED
Status: DISCONTINUED | OUTPATIENT
Start: 2023-09-01 | End: 2023-09-01 | Stop reason: HOSPADM

## 2023-09-01 RX ORDER — HYDRALAZINE HYDROCHLORIDE 20 MG/ML
5 INJECTION INTRAMUSCULAR; INTRAVENOUS
Status: DISCONTINUED | OUTPATIENT
Start: 2023-09-01 | End: 2023-09-01 | Stop reason: HOSPADM

## 2023-09-01 RX ORDER — LABETALOL HYDROCHLORIDE 5 MG/ML
5 INJECTION, SOLUTION INTRAVENOUS
Status: DISCONTINUED | OUTPATIENT
Start: 2023-09-01 | End: 2023-09-01 | Stop reason: HOSPADM

## 2023-09-01 RX ADMIN — GUAIFENESIN 600 MG: 600 TABLET, EXTENDED RELEASE ORAL at 21:10

## 2023-09-01 RX ADMIN — SODIUM CHLORIDE, POTASSIUM CHLORIDE, SODIUM LACTATE AND CALCIUM CHLORIDE: 600; 310; 30; 20 INJECTION, SOLUTION INTRAVENOUS at 10:13

## 2023-09-01 RX ADMIN — CEFTRIAXONE 2000 MG: 2 INJECTION, POWDER, FOR SOLUTION INTRAMUSCULAR; INTRAVENOUS at 17:55

## 2023-09-01 RX ADMIN — OXYCODONE HYDROCHLORIDE 80 MG: 20 TABLET, FILM COATED, EXTENDED RELEASE ORAL at 21:09

## 2023-09-01 RX ADMIN — Medication 30 MG: at 08:38

## 2023-09-01 RX ADMIN — HYDROMORPHONE HYDROCHLORIDE 1 MG: 1 INJECTION, SOLUTION INTRAMUSCULAR; INTRAVENOUS; SUBCUTANEOUS at 17:55

## 2023-09-01 RX ADMIN — ROCURONIUM BROMIDE 10 MG: 10 INJECTION, SOLUTION INTRAVENOUS at 09:03

## 2023-09-01 RX ADMIN — PROMETHAZINE HYDROCHLORIDE 25 MG: 12.5 TABLET ORAL at 21:09

## 2023-09-01 RX ADMIN — SODIUM CHLORIDE, POTASSIUM CHLORIDE, SODIUM LACTATE AND CALCIUM CHLORIDE: 600; 310; 30; 20 INJECTION, SOLUTION INTRAVENOUS at 08:19

## 2023-09-01 RX ADMIN — ROCURONIUM BROMIDE 50 MG: 10 INJECTION, SOLUTION INTRAVENOUS at 08:27

## 2023-09-01 RX ADMIN — ALPRAZOLAM 3 MG: 1 TABLET ORAL at 21:26

## 2023-09-01 RX ADMIN — FENTANYL CITRATE 25 MCG: 50 INJECTION, SOLUTION INTRAMUSCULAR; INTRAVENOUS at 09:06

## 2023-09-01 RX ADMIN — DEXAMETHASONE SODIUM PHOSPHATE 8 MG: 4 INJECTION, SOLUTION INTRAMUSCULAR; INTRAVENOUS at 08:27

## 2023-09-01 RX ADMIN — ONDANSETRON 4 MG: 2 INJECTION INTRAMUSCULAR; INTRAVENOUS at 08:27

## 2023-09-01 RX ADMIN — METRONIDAZOLE 500 MG: 500 INJECTION, SOLUTION INTRAVENOUS at 21:28

## 2023-09-01 RX ADMIN — FENTANYL CITRATE 25 MCG: 50 INJECTION, SOLUTION INTRAMUSCULAR; INTRAVENOUS at 08:55

## 2023-09-01 RX ADMIN — Medication 10 ML: at 21:10

## 2023-09-01 RX ADMIN — ROCURONIUM BROMIDE 10 MG: 10 INJECTION, SOLUTION INTRAVENOUS at 10:44

## 2023-09-01 RX ADMIN — FENTANYL CITRATE 50 MCG: 50 INJECTION, SOLUTION INTRAMUSCULAR; INTRAVENOUS at 08:27

## 2023-09-01 RX ADMIN — LIDOCAINE HYDROCHLORIDE 100 MG: 20 INJECTION, SOLUTION INFILTRATION; PERINEURAL at 08:27

## 2023-09-01 RX ADMIN — PROPOFOL 150 MG: 10 INJECTION, EMULSION INTRAVENOUS at 08:27

## 2023-09-01 RX ADMIN — ROCURONIUM BROMIDE 10 MG: 10 INJECTION, SOLUTION INTRAVENOUS at 09:51

## 2023-09-01 RX ADMIN — FENTANYL CITRATE 25 MCG: 50 INJECTION, SOLUTION INTRAMUSCULAR; INTRAVENOUS at 12:35

## 2023-09-01 RX ADMIN — BACLOFEN 5 MG: 10 TABLET ORAL at 21:10

## 2023-09-01 RX ADMIN — Medication 10 MG: at 09:40

## 2023-09-01 RX ADMIN — Medication 10 MG: at 10:34

## 2023-09-01 RX ADMIN — HYDROMORPHONE HYDROCHLORIDE 0.25 MG: 1 INJECTION, SOLUTION INTRAMUSCULAR; INTRAVENOUS; SUBCUTANEOUS at 13:06

## 2023-09-01 RX ADMIN — POTASSIUM CHLORIDE 20 MEQ: 750 TABLET, EXTENDED RELEASE ORAL at 21:10

## 2023-09-01 RX ADMIN — SUGAMMADEX 200 MG: 100 INJECTION, SOLUTION INTRAVENOUS at 11:15

## 2023-09-01 RX ADMIN — Medication 250 MG: at 21:10

## 2023-09-01 RX ADMIN — HYDROMORPHONE HYDROCHLORIDE 1 MG: 1 INJECTION, SOLUTION INTRAMUSCULAR; INTRAVENOUS; SUBCUTANEOUS at 14:34

## 2023-09-01 RX ADMIN — HYDROMORPHONE HYDROCHLORIDE 0.5 MG: 1 INJECTION, SOLUTION INTRAMUSCULAR; INTRAVENOUS; SUBCUTANEOUS at 09:55

## 2023-09-01 RX ADMIN — SODIUM CHLORIDE, POTASSIUM CHLORIDE, SODIUM LACTATE AND CALCIUM CHLORIDE: 600; 310; 30; 20 INJECTION, SOLUTION INTRAVENOUS at 10:44

## 2023-09-01 NOTE — PROGRESS NOTES
Name: Anne Jewell ADMIT: 2023   : 1950  PCP: Jewell Stephens APRN    MRN: [id number] LOS: 7 days   AGE/SEX: 72 y.o. female  ROOM: [facility]     Subjective   Subjective   Patient seen after surgery. Drowsy from sedation.    Review of Systems  UTO secondary to drowsiness     Objective   Objective   Vital Signs  Temp:  [96.8 °F (36 °C)-99 °F (37.2 °C)] 97.7 °F (36.5 °C)  Heart Rate:  [68-90] 77  Resp:  [12-20] 20  BP: (104-123)/(59-87) 107/62  SpO2:  [94 %-100 %] 100 %  on  Flow (L/min):  [2-4] 3;   Device (Oxygen Therapy): nasal cannula  Body mass index is 36.87 kg/m².  Physical Exam  Constitutional:       General: She is not in acute distress.     Appearance: She is not ill-appearing.   Cardiovascular:      Rate and Rhythm: Normal rate and regular rhythm.   Pulmonary:      Effort: Pulmonary effort is normal. No respiratory distress.   Abdominal:      General: Abdomen is flat. There is no distension.      Tenderness: There is no abdominal tenderness.      Comments: Incision bandaged.   Musculoskeletal:         General: No swelling or deformity. Normal range of motion.   Skin:     General: Skin is warm and dry.   Neurological:      General: No focal deficit present.      Mental Status: She is alert. Mental status is at baseline.       Results Review     I reviewed the patient's new clinical results.  Results from last 7 days   Lab Units 23  1252 23  0523  0409   WBC 10*3/mm3  --  3.16* 4.10 3.52 3.27*   HEMOGLOBIN g/dL 11.3* 9.2* 10.5* 9.0* 9.7*   PLATELETS 10*3/mm3  --  180 210 197 209       Results from last 7 days   Lab Units 23  0513 239 23  0450 23  0409   SODIUM mmol/L 143  --  142 141  --  141   POTASSIUM mmol/L 3.6 3.9 3.4* 3.7   < > 3.3*   CHLORIDE mmol/L 109*  --  106 110*  --  107   CO2 mmol/L 26.0  --  27.0 24.8  --  26.3   BUN mg/dL 8  --  9 8  --  9   CREATININE mg/dL 0.80   --  0.98 0.87  --  1.05*   GLUCOSE mg/dL 81  --  82 80  --  83    < > = values in this interval not displayed.     Estimated Creatinine Clearance: 64.3 mL/min (by C-G formula based on SCr of 0.8 mg/dL).  Results from last 7 days   Lab Units 09/01/23 0513 08/31/23 0439 08/30/23  0450 08/29/23  0409   ALBUMIN g/dL 2.4* 2.0* 1.5* 1.9*   BILIRUBIN mg/dL 0.7 1.2 0.8 0.7   ALK PHOS U/L 519* 630* 473* 458*   AST (SGOT) U/L 59* 59* 49* 55*   ALT (SGPT) U/L 18 15 18 22       Results from last 7 days   Lab Units 09/01/23 0513 08/31/23 0439 08/30/23 0450 08/29/23  0409 08/28/23  0430 08/27/23  0737 08/26/23  0610 08/25/23  1511   CALCIUM mg/dL 7.7* 7.9* 7.5* 7.3*   < > 7.6* 7.5* 8.2*   ALBUMIN g/dL 2.4* 2.0* 1.5* 1.9*   < > 2.0*  --  2.3*   MAGNESIUM mg/dL  --   --   --   --   --  2.0 1.6 1.6   PHOSPHORUS mg/dL  --   --   --   --   --   --  3.1 3.3    < > = values in this interval not displayed.         COVID19   Date Value Ref Range Status   08/27/2023 Detected (C) Not Detected - Ref. Range Final   08/10/2020 Not Detected Not Detected - Ref. Range Final     No results found for: HGBA1C, POCGLU      FL C Arm During Surgery, FL ercp biliary duct only  Narrative: FL ERCP BILIARY DUCT ONLY-, FL C ARM DURING SURGERY-     INDICATIONS: ERCP, sphincterotomy, balloon sweep     TECHNIQUE: FLUOROSCOPIC ASSISTANCE IN THE OPERATING ROOM.     FINDINGS:     5 intraoperative fluoroscopic spot views were obtained and recorded the  PACS for review, revealing ERCP, opacification of extrahepatic, central  intrahepatic biliary ducts, balloon sweep. Please see operative report  for full details.     Fluoroscopy time: 315 seconds     Radiation exposure: Not provided        Impression:    As described.     This report was finalized on 9/1/2023 11:40 AM by Dr. Eze Enriquez M.D.       I reviewed the patient's daily medications.  Scheduled Medications  baclofen, 5 mg, Oral, TID  cefTRIAXone, 2,000 mg, Intravenous, Q24H  cholecalciferol,  2,000 Units, Oral, Daily  escitalopram, 10 mg, Oral, Daily  fluconazole, 150 mg, Oral, Q24H  guaiFENesin, 600 mg, Oral, Nightly  hydroCHLOROthiazide, 25 mg, Oral, Daily  levothyroxine, 100 mcg, Oral, Q AM  metroNIDAZOLE, 500 mg, Intravenous, Q8H  montelukast, 10 mg, Oral, Daily  multivitamin, 1 tablet, Oral, Daily  oxyCODONE, 80 mg, Oral, Q12H  potassium chloride, 20 mEq, Oral, BID  promethazine, 25 mg, Oral, BID  saccharomyces boulardii, 250 mg, Oral, BID  sodium chloride, 10 mL, Intravenous, Q12H  vitamin B-12, 1,000 mcg, Oral, Daily    Infusions  sodium chloride, 30 mL/hr, Last Rate: 30 mL/hr (08/30/23 1450)    Diet  Diet: Cardiac Diets, Liquid Diets; Clear Liquid; Healthy Heart (2-3 Na+); Texture: Regular Texture (IDDSI 7); Fluid Consistency: Thin (IDDSI 0)         I have personally reviewed:  [x]  Laboratory   [x]  Microbiology   [x]  Radiology   []  EKG/Telemetry   []  Cardiology/Vascular   []  Pathology   []  Records     Assessment/Plan     Active Hospital Problems    Diagnosis  POA    **Choledocholithiasis [K80.50]  Yes    History of pulmonary embolism [Z86.711]  Unknown    Stage 3 chronic kidney disease [N18.30]  Yes    Gastroesophageal reflux disease [K21.9]  Yes    Acquired hypothyroidism [E03.9]  Yes    Chronic pain syndrome [G89.4]  Yes    Rheumatoid arthritis [M06.9]  Yes    Generalized osteoarthritis [M15.9]  Yes      Resolved Hospital Problems   No resolved problems to display.       72 y.o. female admitted with Choledocholithiasis.    Choledocholithiasis  History of gastric bypass  Elevated LFTs  Fungating stomach mass  -MRCP showing large stone in the distal common bile duct, 1.8 cm.  Intrahepatic biliary dilatation.  Marked biliary dilatation with CBD measuring 2.1 cm  -IV ceftriaxone, metronidazole, patient with history of allergies to penicillin.  -GI consulted-because of patient's anatomy ERCP cannot be completed.    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  Chest x-ray without any abnormalities.    -Completed 3 days of remdesivir    History of pulmonary pulmonary embolism.  Diagnosed in 2007, has been on anticoagulation since.  At home takes Xarelto currently, last dose was on 08/24/2023.  Hold anticoagulation due to planned procedure.     Hypothyroidism: Resume home levothyroxine, recent TSH normal     CKD stage IIIa: Creatinine stable, monitor.  Avoid nephrotoxic drugs.     Chronic pain syndrome/chronic arthritis: Bob reviewed.  Oxycodone extended release to 80 mg twice daily scheduled.  Oxycodone extended release to 80 mg twice a day as needed as well.  Continue home alprazolam 3 mg nightly     History of RA: Not on treatment for RA per chart review, reports she was diagnosed with URI but was never on specific treatment.     Memory loss: Daughter states she has noticed memory changes for the last several months.  Was referred to neurology outpatient follow-up patient with possible dementia.  Follow-up as outpatient    Abnormal liver on MRI: Repeat CT abdomen with liver protocol in 2 to 3 months.  Daughter concerned about left renal cyst and small splenic lesion that looks like a cyst per radiology report.  Can further be evaluated with a repeat CT abdomen.      SCDs for DVT prophylaxis.  Full code.  Discussed with patient, family, and nursing staff.  Anticipate discharge home with HH vs SNU facility timing yet to be determined.      Expected Discharge Date: 8/31/2023; Expected Discharge Time:       Talha Montano MD  [facility]  09/01/23  14:56 EDT

## 2023-09-01 NOTE — CASE MANAGEMENT/SOCIAL WORK
Continued Stay Note  [facility]     Patient Name: Anne Jewell  MRN: [id number]  Today's Date: 9/1/2023    Admit Date: 8/25/2023    Plan: Pt plans to go home, family to transport home   Discharge Plan       Row Name 09/01/23 1508       Plan    Plan Pt plans to go home, family to transport home    Plan Comments Pt returned from surgery, daughter in [place], stated that the plan was still for her mom to return home and that she will take her home. Informed daughter that will need to see how she does with activity after surgery. Explained that CCP will be available should any further needs arise.                   Discharge Codes    No documentation.                 Expected Discharge Date and Time       Expected Discharge Date Expected Discharge Time    Sep 4, 2023               Jordyn Barnes RN

## 2023-09-01 NOTE — PLAN OF CARE
Goal Outcome Evaluation:           Progress: no change  Outcome Evaluation: NPO after midnight for am OR, CHG bath completed. Daughter reports pt having increased freq of loose stools. C diff ordered, no stool since. Pt denies abd pain or discomfort. No nausea or emesis. Reviewed importance of NPO for OR due to large amount of food and drink in room from outside. Pt and daugher verbalize understanding. Safety maintained.

## 2023-09-01 NOTE — OP NOTE
Surgeon: Slava Loo Jr., M.D.    Assistant: Sylvia Esparza CSA    An assistant was necessary and provided valuable retraction and exposure, as well as bowel manipulation for lysis of adhesions, assistance with gastric mobilization, suction and irrigation, bowel stabilization for suturing, and wound closure.    Pre-Operative Diagnosis:     Choledocholithiasis [K80.50]    Post-Operative Diagnosis:    Choledocholithiasis and fungating gastric mass    Procedure Performed:     Exploratory laparotomy with gastric access for ERCP and distal gastrectomy    Indications:     The patient is a pleasant 72-year-old female who has had numerous abdominal surgeries including a previous gastric bypass.  She developed elevated liver function test and MRCP showed choledocholithiasis.  An attempt was made for an ERCP but the altered anatomy related to the gastric bypass made it impossible.  She now presents for exploratory laparotomy with gastric access for ERCP.  The patient understands the indications, alternatives, risks, and benefits of the procedure and wishes to proceed.     Procedure:     The patient was identified and taken to the operating room where she was placed in the supine position on the operative table.  She underwent general endotracheal anesthesia and was appropriate monitored throughout the case by the anesthesia personnel.  Her abdomen was prepped and draped in the standard surgical fashion.  An upper midline incision was made with a scalpel carried through the skin into the subcutaneous tissue.  The subcutaneous tissue was divided down to the level of the fascia and significant scarring due to previously placed mesh which was all incised using Bovie electrocautery.  Once this was traversed the abdomen was entered and there were dense adhesions encountered.  Retraction of the abdominal wall was quite difficult due to the previous mesh and the scarring and a Chele retractor system was placed to assist in  exposure.  Lysis of adhesions was performed to remove the omentum and the transverse colon off the anterior abdominal wall.  The stomach was not easily identified.  The transverse colon was mobilized by removing adhesions from the liver using the electrocautery and reflected inferiorly.  This mobilization was carried toward the midline and the loop of jejunum heading up toward the stomach for the gastric bypass was encountered and a retrocolic position.  This was followed up and allowed identification of the stomach which was in a particularly posterior position.  The stomach was unroofed along the distal aspect and the antrum, pylorus, and duodenum were identified and freed from dense anterior adhesions.  The stomach was then grasped and felt to have contents that were initially suspicious for inspissated gastric contents present.  2-0 silk sutures were placed on the anterior surface of the antrum in a superior and inferior location and elevated to allow a gastrotomy for ERCP access.  A gastrotomy was created using Bovie electrocautery and upon entering the stomach the contents were attempted to be removed but it became apparent that it was a fungating gastric mass.  It had an adenomatous appearance and was somewhat soft.  It was not an ulcerated lesion and palpably was suspicious for a benign process.  Nonetheless, it was felt that a gastrectomy was necessary.  Just proximal to the gastrotomy the stomach was mobilized.  The gastroepiploic vessels were divided using the Enseal device and then a posterior window was created.  There were few adhesions posterior to the stomach and this allowed surrounding to the superior aspect.  The superior aspect was unroofed by dividing soft tissue with the Enseal device.  This allowed division of the stomach in the midportion which was distal to the previous gastric bypass but proximal to the fungating lesion.  It was divided with a MIKKI stapling device using a blue load.  The  stomach was then elevated and mobilized along the superior and inferior margin using the Enseal device.  It was mobilized all the way past the pylorus to the duodenal bulb.  This was a position distal to the fungating lesion.  Silk sutures were then placed in the region of the pylorus into 3 areas to allow elevation of the stomach.  A gastrotomy was created in this area which allowed immediate access into the duodenum and beyond the fungating mass.  The area was draped off and Dr. Simon performed an ERCP with CBD stone removal.  He will dictate that portion of the case.  Once he completed the ERCP, the stomach was elevated and the first portion of the duodenum was divided with a MIKKI stapling device using a blue load.  The specimen was passed off for pathology.  The staple line of the duodenal stump was oversewn with a 2-0 silk suture in an interrupted seromuscular fashion.  There is mild oozing noted at the staple line on the stomach and this was oversewn with a 2-0 silk suture in a running seromuscular fashion.  The resected stomach between the gastric pouch from the gastric bypass and the resection line is a potential blind pouch.  It was elected not to try to establish a connection with the GI tract due to the small size of the residual pouch and the fact that a nonsterile ERCP scope had been used making the entire field potentially contaminated, increasing the risk of leak if an anastomosis was created.  The abdomen was copiously irrigated and hemostasis was noted be adequate.  A 19 round Yaniv drain was placed in the region of the resected stomach with proximity to the duodenal stump and the resected gastric edge.  It was brought out through a right abdominal wall stab wound.  There is no need for reconstruction due to the previous gastric bypass as this was excluded stomach.  The fascia was reapproximated with #1 PDS sutures in a running fashion.  The fascia and subtenons tissue was infiltrated with 0.5%  Marcaine with epinephrine and Exparel.  The skin was closed with skin staples followed by sterile dressing.  The sponge, needle, and instrument counts were correct at the end of the case.  The patient tolerated procedure well and was transferred to the recovery room in stable condition.    Estimated Blood Loss:      minimal    Specimens:     Order Name Source Comment Collection Info Order Time   TYPE AND SCREEN   Collected By: Beharic, Silvana 9/1/2023  6:54 AM     Release to patient   Routine Release        TISSUE PATHOLOGY EXAM Stomach  Collected By: lSava Loo Jr., MD 9/1/2023 10:50 AM     Release to patient   Routine Release            Slava Loo Jr., M.D.

## 2023-09-01 NOTE — ANESTHESIA PREPROCEDURE EVALUATION
Anesthesia Evaluation     Patient summary reviewed and Nursing notes reviewed   NPO Solid Status: > 2 hours  NPO Liquid Status: > 8 hours           Airway   Mallampati: I  TM distance: >3 FB  Neck ROM: full  No difficulty expected  Dental    (+) edentulous    Pulmonary    (+) asthma,    ROS comment: COVID +, asymptomatic  Cardiovascular   Exercise tolerance: poor (<4 METS)    ECG reviewed  PT is on anticoagulation therapy    (+) DVT resolved      Neuro/Psych  (+) psychiatric history Anxiety  GI/Hepatic/Renal/Endo    (+) GERD, liver disease, renal disease CRI, thyroid problem hypothyroidism    ROS Comment: S/p gastric bypass    Musculoskeletal     (+) back pain, neck pain  Abdominal    Substance History      OB/GYN          Other   arthritis,                     Anesthesia Plan    ASA 3     general     intravenous induction     Anesthetic plan, risks, benefits, and alternatives have been provided, discussed and informed consent has been obtained with: patient.      CODE STATUS:    Level Of Support Discussed With: Patient  Code Status (Patient has no pulse and is not breathing): CPR (Attempt to Resuscitate)  Medical Interventions (Patient has pulse or is breathing): Full Support

## 2023-09-01 NOTE — ANESTHESIA PROCEDURE NOTES
Airway  Urgency: elective    Date/Time: 9/1/2023 8:30 AM  Airway not difficult    General Information and Staff    Patient location during procedure: OR  CRNA/CAA: Savannah Lord CRNA    Indications and Patient Condition  Indications for airway management: airway protection    Preoxygenated: yes  Mask difficulty assessment: 1 - vent by mask    Final Airway Details  Final airway type: endotracheal airway      Successful airway: ETT  Cuffed: yes   Successful intubation technique: direct laryngoscopy  Facilitating devices/methods: intubating stylet  Endotracheal tube insertion site: oral  Blade: Paul  Blade size: 3  ETT size (mm): 7.0  Cormack-Lehane Classification: grade I - full view of glottis  Placement verified by: chest auscultation and capnometry   Cuff volume (mL): 8  Measured from: lips  Number of attempts at approach: 1  Assessment: lips, teeth, and gum same as pre-op and atraumatic intubation

## 2023-09-01 NOTE — ANESTHESIA POSTPROCEDURE EVALUATION
"Patient: Anne Jewell    Procedure Summary       Date: 09/01/23 Room / Location: [facility] OR  / [facility] MAIN OR    Anesthesia Start: 0819 Anesthesia Stop: 1158    Procedures:       Exploratory laparotomy with Distal Gastrectomy (Abdomen)      ENDOSCOPIC RETROGRADE CHOLANGIOPANCREATOGRAPHY WITH CHOLANGIOGRAM, SPHINCTEROTOMY, BALLOON SWEEP Diagnosis:       Choledocholithiasis      (Choledocholithiasis [K80.50])    Surgeons: Slava Loo Jr., MD; Elijah Simon MD Provider: Luther Navarro MD    Anesthesia Type: general ASA Status: 3            Anesthesia Type: general    Vitals  Vitals Value Taken Time   /59 09/01/23 1315   Temp 36.6 øC (97.9 øF) 09/01/23 1245   Pulse 72 09/01/23 1317   Resp 20 09/01/23 1245   SpO2 100 % 09/01/23 1317   Vitals shown include unvalidated device data.        Post Anesthesia Care and Evaluation    Patient location during evaluation: PACU  Patient participation: complete - patient participated  Level of consciousness: awake  Pain management: adequate    Airway patency: patent  Anesthetic complications: No anesthetic complications    Cardiovascular status: acceptable  Respiratory status: acceptable  Hydration status: acceptable    Comments: /62   Pulse 73   Temp 36.6 øC (97.9 øF) (Oral)   Resp 20   Ht 154.9 cm (61\")   Wt 88.5 kg (195 lb 1.7 oz)   SpO2 99%   BMI 36.87 kg/mý     "

## 2023-09-02 LAB
ALBUMIN SERPL-MCNC: 1.7 G/DL (ref 3.5–5.2)
ALBUMIN/GLOB SERPL: 0.6 G/DL
ALP SERPL-CCNC: 393 U/L (ref 39–117)
ALT SERPL W P-5'-P-CCNC: 12 U/L (ref 1–33)
ANION GAP SERPL CALCULATED.3IONS-SCNC: 8 MMOL/L (ref 5–15)
AST SERPL-CCNC: 38 U/L (ref 1–32)
BILIRUB SERPL-MCNC: 0.5 MG/DL (ref 0–1.2)
BUN SERPL-MCNC: 8 MG/DL (ref 8–23)
BUN/CREAT SERPL: 11.3 (ref 7–25)
CALCIUM SPEC-SCNC: 7.4 MG/DL (ref 8.6–10.5)
CHLORIDE SERPL-SCNC: 108 MMOL/L (ref 98–107)
CO2 SERPL-SCNC: 26 MMOL/L (ref 22–29)
CREAT SERPL-MCNC: 0.71 MG/DL (ref 0.57–1)
DEPRECATED RDW RBC AUTO: 47.2 FL (ref 37–54)
EGFRCR SERPLBLD CKD-EPI 2021: 90.5 ML/MIN/1.73
ERYTHROCYTE [DISTWIDTH] IN BLOOD BY AUTOMATED COUNT: 13.6 % (ref 12.3–15.4)
GLOBULIN UR ELPH-MCNC: 2.9 GM/DL
GLUCOSE SERPL-MCNC: 164 MG/DL (ref 65–99)
HCT VFR BLD AUTO: 28.8 % (ref 34–46.6)
HGB BLD-MCNC: 9.6 G/DL (ref 12–15.9)
MCH RBC QN AUTO: 32.1 PG (ref 26.6–33)
MCHC RBC AUTO-ENTMCNC: 33.3 G/DL (ref 31.5–35.7)
MCV RBC AUTO: 96.3 FL (ref 79–97)
PLATELET # BLD AUTO: 188 10*3/MM3 (ref 140–450)
PMV BLD AUTO: 10.4 FL (ref 6–12)
POTASSIUM SERPL-SCNC: 4.3 MMOL/L (ref 3.5–5.2)
PROT SERPL-MCNC: 4.6 G/DL (ref 6–8.5)
RBC # BLD AUTO: 2.99 10*6/MM3 (ref 3.77–5.28)
SODIUM SERPL-SCNC: 142 MMOL/L (ref 136–145)
WBC NRBC COR # BLD: 8.53 10*3/MM3 (ref 3.4–10.8)

## 2023-09-02 PROCEDURE — 99024 POSTOP FOLLOW-UP VISIT: CPT | Performed by: SURGERY

## 2023-09-02 PROCEDURE — 80053 COMPREHEN METABOLIC PANEL: CPT | Performed by: SURGERY

## 2023-09-02 PROCEDURE — 63710000001 PROMETHAZINE PER 12.5 MG: Performed by: SURGERY

## 2023-09-02 PROCEDURE — 25010000002 CEFTRIAXONE PER 250 MG: Performed by: SURGERY

## 2023-09-02 PROCEDURE — 25010000002 HYDROMORPHONE 1 MG/ML SOLUTION: Performed by: SURGERY

## 2023-09-02 PROCEDURE — 63710000001 PROMETHAZINE PER 25 MG: Performed by: SURGERY

## 2023-09-02 PROCEDURE — 99232 SBSQ HOSP IP/OBS MODERATE 35: CPT | Performed by: INTERNAL MEDICINE

## 2023-09-02 PROCEDURE — 25010000002 METRONIDAZOLE 500 MG/100ML SOLUTION: Performed by: SURGERY

## 2023-09-02 PROCEDURE — 85027 COMPLETE CBC AUTOMATED: CPT | Performed by: SURGERY

## 2023-09-02 RX ADMIN — Medication 250 MG: at 21:42

## 2023-09-02 RX ADMIN — Medication 250 MG: at 09:06

## 2023-09-02 RX ADMIN — POTASSIUM CHLORIDE 20 MEQ: 750 TABLET, EXTENDED RELEASE ORAL at 09:06

## 2023-09-02 RX ADMIN — MONTELUKAST SODIUM 10 MG: 10 TABLET, FILM COATED ORAL at 09:06

## 2023-09-02 RX ADMIN — Medication 1000 MCG: at 09:06

## 2023-09-02 RX ADMIN — ESCITALOPRAM OXALATE 10 MG: 10 TABLET, FILM COATED ORAL at 09:06

## 2023-09-02 RX ADMIN — METRONIDAZOLE 500 MG: 500 INJECTION, SOLUTION INTRAVENOUS at 21:48

## 2023-09-02 RX ADMIN — OXYCODONE HYDROCHLORIDE 80 MG: 40 TABLET, FILM COATED, EXTENDED RELEASE ORAL at 13:20

## 2023-09-02 RX ADMIN — LEVOTHYROXINE SODIUM 100 MCG: 0.1 TABLET ORAL at 05:16

## 2023-09-02 RX ADMIN — BACLOFEN 5 MG: 10 TABLET ORAL at 21:39

## 2023-09-02 RX ADMIN — BACLOFEN 5 MG: 10 TABLET ORAL at 16:53

## 2023-09-02 RX ADMIN — HYDROCHLOROTHIAZIDE 25 MG: 25 TABLET ORAL at 09:06

## 2023-09-02 RX ADMIN — METRONIDAZOLE 500 MG: 500 INJECTION, SOLUTION INTRAVENOUS at 13:20

## 2023-09-02 RX ADMIN — Medication 10 ML: at 21:41

## 2023-09-02 RX ADMIN — OXYCODONE HYDROCHLORIDE 80 MG: 20 TABLET, FILM COATED, EXTENDED RELEASE ORAL at 09:05

## 2023-09-02 RX ADMIN — Medication 10 ML: at 09:07

## 2023-09-02 RX ADMIN — BACLOFEN 5 MG: 10 TABLET ORAL at 09:07

## 2023-09-02 RX ADMIN — CEFTRIAXONE 2000 MG: 2 INJECTION, POWDER, FOR SOLUTION INTRAMUSCULAR; INTRAVENOUS at 16:54

## 2023-09-02 RX ADMIN — OXYCODONE HYDROCHLORIDE 80 MG: 20 TABLET, FILM COATED, EXTENDED RELEASE ORAL at 21:34

## 2023-09-02 RX ADMIN — PROMETHAZINE HYDROCHLORIDE 25 MG: 12.5 TABLET ORAL at 13:20

## 2023-09-02 RX ADMIN — ALPRAZOLAM 3 MG: 1 TABLET ORAL at 21:30

## 2023-09-02 RX ADMIN — GUAIFENESIN 600 MG: 600 TABLET, EXTENDED RELEASE ORAL at 21:42

## 2023-09-02 RX ADMIN — HYDROMORPHONE HYDROCHLORIDE 1 MG: 1 INJECTION, SOLUTION INTRAMUSCULAR; INTRAVENOUS; SUBCUTANEOUS at 02:32

## 2023-09-02 RX ADMIN — PROMETHAZINE HYDROCHLORIDE 25 MG: 12.5 TABLET ORAL at 21:35

## 2023-09-02 RX ADMIN — PROMETHAZINE HYDROCHLORIDE 25 MG: 12.5 TABLET ORAL at 09:07

## 2023-09-02 RX ADMIN — RIVAROXABAN 20 MG: 20 TABLET, FILM COATED ORAL at 17:02

## 2023-09-02 RX ADMIN — POTASSIUM CHLORIDE 20 MEQ: 750 TABLET, EXTENDED RELEASE ORAL at 21:41

## 2023-09-02 RX ADMIN — METRONIDAZOLE 500 MG: 500 INJECTION, SOLUTION INTRAVENOUS at 05:16

## 2023-09-02 RX ADMIN — Medication 2000 UNITS: at 09:06

## 2023-09-02 RX ADMIN — Medication 1 TABLET: at 09:06

## 2023-09-02 NOTE — PROGRESS NOTES
[facility]  Inpatient Progress Note    Reason for Follow Up: Bile duct calculus, S/P Exploratory laparotomy with gastric access for ERCP and distal gastrectomy, POD 1     Subjective     Interval History:   Doing well postop, up in a chair    Current Facility-Administered Medications:     acetaminophen (TYLENOL) tablet 650 mg, 650 mg, Oral, Q6H PRN, Slava Loo Jr., MD, 650 mg at 08/31/23 1821    albuterol (PROVENTIL) nebulizer solution 0.083% 2.5 mg/3mL, 2.5 mg, Nebulization, Q6H PRN, Slava Loo Jr., MD    ALPRAZolam (XANAX) tablet 3 mg, 3 mg, Oral, Nightly PRN, Slava Loo Jr., MD, 3 mg at 09/01/23 2126    baclofen (LIORESAL) tablet 5 mg, 5 mg, Oral, TID, Slava Loo Jr., MD, 5 mg at 09/02/23 0907    sennosides-docusate (PERICOLACE) 8.6-50 MG per tablet 2 tablet, 2 tablet, Oral, BID PRN **AND** polyethylene glycol (MIRALAX) packet 17 g, 17 g, Oral, Daily PRN **AND** bisacodyl (DULCOLAX) EC tablet 5 mg, 5 mg, Oral, Daily PRN **AND** bisacodyl (DULCOLAX) suppository 10 mg, 10 mg, Rectal, Daily PRN, Slava Loo Jr., MD    cefTRIAXone (ROCEPHIN) 2,000 mg in sodium chloride 0.9 % 100 mL IVPB-VTB, 2,000 mg, Intravenous, Q24H, Slava Loo Jr., MD, Last Rate: 200 mL/hr at 09/01/23 1755, 2,000 mg at 09/01/23 1755    cholecalciferol (VITAMIN D3) tablet 2,000 Units, 2,000 Units, Oral, Daily, Slava Loo Jr., MD, 2,000 Units at 09/02/23 0906    escitalopram (LEXAPRO) tablet 10 mg, 10 mg, Oral, Daily, Slava Loo Jr., MD, 10 mg at 09/02/23 0906    guaiFENesin (MUCINEX) 12 hr tablet 600 mg, 600 mg, Oral, Nightly, Slava Loo Jr., MD, 600 mg at 09/01/23 2110    hydroCHLOROthiazide (HYDRODIURIL) tablet 25 mg, 25 mg, Oral, Daily, Slava Loo Jr., MD, 25 mg at 09/02/23 0906    HYDROmorphone (DILAUDID) injection 1 mg, 1 mg, Intravenous, Q2H PRN, Slava Loo Jr., MD, 1 mg at 09/02/23 0232    levothyroxine (SYNTHROID, LEVOTHROID) tablet 100 mcg, 100 mcg, Oral, Q AM,  Slava Loo Jr., MD, 100 mcg at 09/02/23 0516    loperamide (IMODIUM) capsule 2 mg, 2 mg, Oral, 4x Daily PRN, Talha Montano MD    Magnesium Standard Dose Replacement - Follow Nurse / BPA Driven Protocol, , Does not apply, PRN, Slava Loo Jr., MD    metroNIDAZOLE (FLAGYL) IVPB 500 mg, 500 mg, Intravenous, Q8H, Slava Loo Jr., MD, Last Rate: 100 mL/hr at 09/02/23 1320, 500 mg at 09/02/23 1320    montelukast (SINGULAIR) tablet 10 mg, 10 mg, Oral, Daily, Slava Loo Jr., MD, 10 mg at 09/02/23 0906    multivitamin (THERAGRAN) tablet 1 tablet, 1 tablet, Oral, Daily, Slava Loo Jr., MD, 1 tablet at 09/02/23 0906    nitroglycerin (NITROSTAT) SL tablet 0.4 mg, 0.4 mg, Sublingual, Q5 Min PRN, Slava Loo Jr., MD    oxyCODONE ER (oxyCONTIN) 12 hr tablet 80 mg, 80 mg, Oral, Q12H, Slava Loo Jr., MD, 80 mg at 09/02/23 0905    oxyCODONE ER (oxyCONTIN) 12 hr tablet 80 mg, 80 mg, Oral, BID PRN, Slava Loo Jr., MD, 80 mg at 09/02/23 1320    potassium chloride (K-DUR,KLOR-CON) ER tablet 20 mEq, 20 mEq, Oral, BID, Slava Loo Jr., MD, 20 mEq at 09/02/23 0906    Potassium Replacement - Follow Nurse / BPA Driven Protocol, , Does not apply, PRN, Slava Loo Jr., MD    promethazine (PHENERGAN) tablet 25 mg, 25 mg, Oral, Q6H PRN, 25 mg at 09/02/23 1320 **OR** promethazine (PHENERGAN) suppository 12.5 mg, 12.5 mg, Rectal, Q6H PRN, Slava Loo Jr., MD    promethazine (PHENERGAN) tablet 25 mg, 25 mg, Oral, BID, Slava Loo Jr., MD, 25 mg at 09/02/23 0907    rivaroxaban (XARELTO) tablet 20 mg, 20 mg, Oral, Daily With Dinner, Abeba Shearer MD    saccharomyces boulardii (FLORASTOR) capsule 250 mg, 250 mg, Oral, BID, Slava Loo Jr., MD, 250 mg at 09/02/23 0906    sodium chloride 0.9 % flush 10 mL, 10 mL, Intravenous, Q12H, Slava Loo Jr., MD, 10 mL at 09/02/23 0907    sodium chloride 0.9 % flush 10 mL, 10 mL, Intravenous, PRN, Slava Loo Jr., MD    sodium chloride 0.9 %  infusion 40 mL, 40 mL, Intravenous, PRN, Slava Loo Jr., MD    sodium chloride 0.9 % infusion, 30 mL/hr, Intravenous, Continuous PRN, Slava Loo Jr., MD, Last Rate: 30 mL/hr at 08/30/23 1450, 30 mL/hr at 08/30/23 1450    vitamin B-12 (CYANOCOBALAMIN) tablet 1,000 mcg, 1,000 mcg, Oral, Daily, Slava Loo Jr., MD, 1,000 mcg at 09/02/23 0906  Review of Systems:    There is weakness fatigue all other systems reviewed and    Objective     Vital Signs  Temp:  [97.5 °F (36.4 °C)-99.5 °F (37.5 °C)] 97.7 °F (36.5 °C)  Heart Rate:  [69-99] 69  Resp:  [16-20] 16  BP: ()/(67-81) 128/72  Body mass index is 36.87 kg/m².    Intake/Output Summary (Last 24 hours) at 9/2/2023 1503  Last data filed at 9/2/2023 1359  Gross per 24 hour   Intake 720 ml   Output 700 ml   Net 20 ml     I/O this shift:  In: 480 [P.O.:480]  Out: -      Physical Exam:   General: patient awake, alert and cooperative   Eyes: Normal lids and lashes, no scleral icterus   Neck: supple, normal ROM   Skin: warm and dry, not jaundiced   Cardiovascular: regular rhythm and rate, no murmurs auscultated   Pulm: clear to auscultation bilaterally, regular and unlabored   Abdomen: soft, nontender, nondistended; normal bowel sounds   Extremities: no rash or edema   Psychiatric: Normal mood and behavior; memory intact     Results Review:     I reviewed the patient's new clinical results.    Results from last 7 days   Lab Units 09/02/23  0524 09/01/23  1252 09/01/23  0513 08/31/23  0439   WBC 10*3/mm3 8.53  --  3.16* 4.10   HEMOGLOBIN g/dL 9.6* 11.3* 9.2* 10.5*   HEMATOCRIT % 28.8* 34.6 27.9* 31.4*   PLATELETS 10*3/mm3 188  --  180 210     Results from last 7 days   Lab Units 09/02/23  0524 09/01/23  0513 08/31/23  1958 08/31/23  0439   SODIUM mmol/L 142 143  --  142   POTASSIUM mmol/L 4.3 3.6 3.9 3.4*   CHLORIDE mmol/L 108* 109*  --  106   CO2 mmol/L 26.0 26.0  --  27.0   BUN mg/dL 8 8  --  9   CREATININE mg/dL 0.71 0.80  --  0.98   CALCIUM mg/dL 7.4* 7.7*   --  7.9*   BILIRUBIN mg/dL 0.5 0.7  --  1.2   ALK PHOS U/L 393* 519*  --  630*   ALT (SGPT) U/L 12 18  --  15   AST (SGOT) U/L 38* 59*  --  59*   GLUCOSE mg/dL 164* 81  --  82         No results found for: LIPASE    Radiology:  FL ercp biliary duct only   Final Result       As described.       This report was finalized on 9/1/2023 11:40 AM by Dr. Eze Enriquez M.D.          XR Abdomen KUB   Final Result      FL C Arm During Surgery   Final Result       As described.       This report was finalized on 9/1/2023 11:40 AM by Dr. Eze Enriquez M.D.          XR Chest 1 View   Final Result   No focal pulmonary consolidation. Follow-up as clinical   indications persist.       This report was finalized on 8/27/2023 10:32 AM by Dr. Eze Enriquez M.D.              Assessment & Plan     Active Hospital Problems    Diagnosis     **Choledocholithiasis     History of pulmonary embolism     Stage 3 chronic kidney disease     Gastroesophageal reflux disease     Acquired hypothyroidism     Chronic pain syndrome     Rheumatoid arthritis     Generalized osteoarthritis        Assessment:  Postop day #1 from ex lap with gastric access for ERCP and distal gastrectomy for gastric mass      Plan:  Await biopsies  Clear liquid diet  Postop management per Dr. Loo  To resume anticoagulation  I discussed the patients findings and my recommendations with patient and nursing staff.    Murphy Carlisle MD

## 2023-09-02 NOTE — PLAN OF CARE
Goal Outcome Evaluation:  Plan of Care Reviewed With: patient           Outcome Evaluation: VSS, scheduled pain meds and dilaudid given once for pain. IV antibiotics given. Pt remained on 1.5LNC overnight. Output from F/C and AFIA drain recorded. Fluids from AIFA snaguineous with clots. Pt refused weight this morning--wants to be weighed later today. Dressing on abdomen reinforced at base. SCDs refused overnight. Pt hopeful for D/C soon. Will CTM, safety maintained.

## 2023-09-02 NOTE — PLAN OF CARE
Goal Outcome Evaluation:  Plan of Care Reviewed With: patient, daughter        Progress: improving  Outcome Evaluation: VSS. O2 @ 1.5 L. AFIA drain with serosanguinous drainage. Pt received PRN dose of Oxycontin and Phenergan this afternoon. Midline dsg with moderate amt of dried drainage. Dsg was reinforced at the bottom last night. IV atb continue. Pt up in chair for short time today. Also, sat at bedside for brief time. Xarelto resumed. Daughter at bedside assisting patient. Safety measures in place.

## 2023-09-02 NOTE — PROGRESS NOTES
Name: Anne Jewell ADMIT: 2023   : 1950  PCP: Jewell Stephens APRN    MRN: [id number] LOS: 8 days   AGE/SEX: 72 y.o. female  ROOM: [facility]     Subjective   Subjective   Patient awake, resting in bed, dtr at bedside. Patient states pain is tolerable with current pain meds and is trying to stick with PO pain meds. Was up in the chair earlier in the day. Diet per surgery.       Objective   Objective   Vital Signs  Temp:  [97.5 °F (36.4 °C)-99.5 °F (37.5 °C)] 97.7 °F (36.5 °C)  Heart Rate:  [69-99] 69  Resp:  [16-20] 16  BP: ()/(67-81) 128/72  SpO2:  [96 %-97 %] 97 %  on  Flow (L/min):  [1.5] 1.5;   Device (Oxygen Therapy): nasal cannula  Body mass index is 36.87 kg/m².  Physical Exam  Constitutional:       General: She is not in acute distress.     Appearance: She is not ill-appearing.   Cardiovascular:      Rate and Rhythm: Normal rate and regular rhythm.   Pulmonary:      Effort: Pulmonary effort is normal. No respiratory distress.   Abdominal:      General: Abdomen is flat. There is no distension.      Tenderness: There is no abdominal tenderness.      Comments: Incision bandaged. AFIA drain with serosanguinous drainage.   Musculoskeletal:         General: No swelling or deformity. Normal range of motion.   Skin:     General: Skin is warm and dry.   Neurological:      General: No focal deficit present.      Mental Status: She is alert. Mental status is at baseline.       Results Review     I reviewed the patient's new clinical results.  Results from last 7 days   Lab Units 23  0524 23  1252 23  0513 23  0439 23  0450   WBC 10*3/mm3 8.53  --  3.16* 4.10 3.52   HEMOGLOBIN g/dL 9.6* 11.3* 9.2* 10.5* 9.0*   PLATELETS 10*3/mm3 188  --  180 210 197       Results from last 7 days   Lab Units 23  0524 23  0513 23  0439 23  0450   SODIUM mmol/L 142 143  --  142 141   POTASSIUM mmol/L 4.3 3.6 3.9 3.4* 3.7   CHLORIDE mmol/L 108*  109*  --  106 110*   CO2 mmol/L 26.0 26.0  --  27.0 24.8   BUN mg/dL 8 8  --  9 8   CREATININE mg/dL 0.71 0.80  --  0.98 0.87   GLUCOSE mg/dL 164* 81  --  82 80     Estimated Creatinine Clearance: 72.5 mL/min (by C-G formula based on SCr of 0.71 mg/dL).  Results from last 7 days   Lab Units 09/02/23 0524 09/01/23 0513 08/31/23 0439 08/30/23  0450   ALBUMIN g/dL 1.7* 2.4* 2.0* 1.5*   BILIRUBIN mg/dL 0.5 0.7 1.2 0.8   ALK PHOS U/L 393* 519* 630* 473*   AST (SGOT) U/L 38* 59* 59* 49*   ALT (SGPT) U/L 12 18 15 18       Results from last 7 days   Lab Units 09/02/23 0524 09/01/23 0513 08/31/23 0439 08/30/23 0450 08/28/23  0430 08/27/23  0737   CALCIUM mg/dL 7.4* 7.7* 7.9* 7.5*   < > 7.6*   ALBUMIN g/dL 1.7* 2.4* 2.0* 1.5*   < > 2.0*   MAGNESIUM mg/dL  --   --   --   --   --  2.0    < > = values in this interval not displayed.         COVID19   Date Value Ref Range Status   08/27/2023 Detected (C) Not Detected - Ref. Range Final   08/10/2020 Not Detected Not Detected - Ref. Range Final     No results found for: HGBA1C, POCGLU      FL C Arm During Surgery, FL ercp biliary duct only  Narrative: FL ERCP BILIARY DUCT ONLY-, FL C ARM DURING SURGERY-     INDICATIONS: ERCP, sphincterotomy, balloon sweep     TECHNIQUE: FLUOROSCOPIC ASSISTANCE IN THE OPERATING ROOM.     FINDINGS:     5 intraoperative fluoroscopic spot views were obtained and recorded the  PACS for review, revealing ERCP, opacification of extrahepatic, central  intrahepatic biliary ducts, balloon sweep. Please see operative report  for full details.     Fluoroscopy time: 315 seconds     Radiation exposure: Not provided        Impression:    As described.     This report was finalized on 9/1/2023 11:40 AM by Dr. Eze Enriquez M.D.       I reviewed the patient's daily medications.  Scheduled Medications  baclofen, 5 mg, Oral, TID  cefTRIAXone, 2,000 mg, Intravenous, Q24H  cholecalciferol, 2,000 Units, Oral, Daily  escitalopram, 10 mg, Oral,  Daily  guaiFENesin, 600 mg, Oral, Nightly  hydroCHLOROthiazide, 25 mg, Oral, Daily  levothyroxine, 100 mcg, Oral, Q AM  metroNIDAZOLE, 500 mg, Intravenous, Q8H  montelukast, 10 mg, Oral, Daily  multivitamin, 1 tablet, Oral, Daily  oxyCODONE, 80 mg, Oral, Q12H  potassium chloride, 20 mEq, Oral, BID  promethazine, 25 mg, Oral, BID  rivaroxaban, 20 mg, Oral, Daily With Dinner  saccharomyces boulardii, 250 mg, Oral, BID  sodium chloride, 10 mL, Intravenous, Q12H  vitamin B-12, 1,000 mcg, Oral, Daily    Infusions  sodium chloride, 30 mL/hr, Last Rate: 30 mL/hr (08/30/23 1450)    Diet  Diet: Cardiac Diets, Liquid Diets; Clear Liquid; Healthy Heart (2-3 Na+); Texture: Regular Texture (IDDSI 7); Fluid Consistency: Thin (IDDSI 0)         I have personally reviewed:  [x]  Laboratory   [x]  Microbiology   [x]  Radiology   []  EKG/Telemetry   []  Cardiology/Vascular   []  Pathology   [x]  Records     Assessment/Plan     Active Hospital Problems    Diagnosis  POA    **Choledocholithiasis [K80.50]  Yes    History of pulmonary embolism [Z86.711]  Unknown    Stage 3 chronic kidney disease [N18.30]  Yes    Gastroesophageal reflux disease [K21.9]  Yes    Acquired hypothyroidism [E03.9]  Yes    Chronic pain syndrome [G89.4]  Yes    Rheumatoid arthritis [M06.9]  Yes    Generalized osteoarthritis [M15.9]  Yes      Resolved Hospital Problems   No resolved problems to display.       72 y.o. female admitted with Choledocholithiasis.    Choledocholithiasis  History of gastric bypass  Elevated LFTs  Fungating stomach mass  -MRCP showing large stone in the distal common bile duct, 1.8 cm.  Intrahepatic biliary dilatation.  Marked biliary dilatation with CBD measuring 2.1 cm  -IV ceftriaxone, metronidazole, patient with history of allergies to penicillin.  -GI consulted-because of patient's anatomy ERCP cannot be completed.    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    -Completed 3 days of remdesivir    History of pulmonary pulmonary embolism.  Diagnosed in 2007, has been on anticoagulation since.  At home takes Xarelto currently, last dose was on 08/24/2023.   - ok to resume xarelto per surgery     Hypothyroidism: Resume home levothyroxine, recent TSH normal     CKD stage IIIa: Creatinine stable, monitor.  Avoid nephrotoxic drugs.     Chronic pain syndrome/chronic arthritis: Bob reviewed.  Oxycodone extended release to 80 mg twice daily scheduled.  Oxycodone extended release to 80 mg twice a day as needed as well.  Continue home alprazolam 3 mg nightly     History of RA: Not on treatment for RA per chart review, reports she was diagnosed but was never on specific treatment.     Memory loss: Daughter states she has noticed memory changes for the last several months.  Was referred to neurology outpatient follow-up patient with possible dementia.  Follow-up as outpatient    Abnormal liver on MRI: Repeat CT abdomen with liver protocol in 2 to 3 months.  Daughter concerned about left renal cyst and small splenic lesion that looks like a cyst per radiology report.  Can further be evaluated with a repeat CT abdomen.      Xarelto (home med) for DVT prophylaxis.  Full code.  Discussed with patient, family, and nursing staff.  Anticipate discharge home with HH vs SNU facility timing yet to be determined.      Expected Discharge Date: 9/4/2023; Expected Discharge Time:       Abeba Shearer MD  [facility]  09/02/23  15:28 EDT

## 2023-09-02 NOTE — PROGRESS NOTES
Chief Complaint:    S/P Exploratory laparotomy with gastric access for ERCP and distal gastrectomy, POD 1    Subjective:    The patient is feeling well with only expected postop abdominal pain.  She is up to a chair.  She is not having any nausea or vomiting and tolerating a clear liquid diet.    Objective:    Temp:  [96.8 °F (36 °C)-99.5 °F (37.5 °C)] 97.5 °F (36.4 °C)  Heart Rate:  [69-99] 69  Resp:  [12-20] 16  BP: ()/(59-81) 114/78    Physical Exam  Constitutional:       Appearance: She is not ill-appearing or toxic-appearing.   Pulmonary:      Effort: Pulmonary effort is normal. No respiratory distress.   Abdominal:      Palpations: Abdomen is soft.      Tenderness: There is generalized abdominal tenderness (Appropriate postop tenderness).      Comments: AFIA drain: Serosanguineous output.   Neurological:      Mental Status: She is alert.   Psychiatric:         Behavior: Behavior is cooperative.       Results:    WBC is 8.53.  H/H is 9.6/28.8.  BUN is 18 creatinine 0.71.    Impression/Plan:    The patient is POD 1 from an exploratory laparotomy with gastric access for ERCP and distal gastrectomy for a fungating gastric mass.  She is recovering well with no clear evidence of an ileus.  I will slowly advance her diet.    She can start anticoagulation from a surgical standpoint.    Slava Loo Jr., M.D.

## 2023-09-03 LAB
ALBUMIN SERPL-MCNC: 2 G/DL (ref 3.5–5.2)
ALBUMIN/GLOB SERPL: 0.7 G/DL
ALP SERPL-CCNC: 316 U/L (ref 39–117)
ALT SERPL W P-5'-P-CCNC: 15 U/L (ref 1–33)
ANION GAP SERPL CALCULATED.3IONS-SCNC: 6.3 MMOL/L (ref 5–15)
AST SERPL-CCNC: 31 U/L (ref 1–32)
BILIRUB SERPL-MCNC: 0.5 MG/DL (ref 0–1.2)
BUN SERPL-MCNC: 9 MG/DL (ref 8–23)
BUN/CREAT SERPL: 10.6 (ref 7–25)
CALCIUM SPEC-SCNC: 7.3 MG/DL (ref 8.6–10.5)
CHLORIDE SERPL-SCNC: 105 MMOL/L (ref 98–107)
CO2 SERPL-SCNC: 28.7 MMOL/L (ref 22–29)
CREAT SERPL-MCNC: 0.85 MG/DL (ref 0.57–1)
DEPRECATED RDW RBC AUTO: 47.5 FL (ref 37–54)
EGFRCR SERPLBLD CKD-EPI 2021: 72.9 ML/MIN/1.73
ERYTHROCYTE [DISTWIDTH] IN BLOOD BY AUTOMATED COUNT: 13.6 % (ref 12.3–15.4)
GLOBULIN UR ELPH-MCNC: 3 GM/DL
GLUCOSE SERPL-MCNC: 80 MG/DL (ref 65–99)
HCT VFR BLD AUTO: 27.7 % (ref 34–46.6)
HGB BLD-MCNC: 9.1 G/DL (ref 12–15.9)
MCH RBC QN AUTO: 31.6 PG (ref 26.6–33)
MCHC RBC AUTO-ENTMCNC: 32.9 G/DL (ref 31.5–35.7)
MCV RBC AUTO: 96.2 FL (ref 79–97)
PLATELET # BLD AUTO: 201 10*3/MM3 (ref 140–450)
PMV BLD AUTO: 10.4 FL (ref 6–12)
POTASSIUM SERPL-SCNC: 3.8 MMOL/L (ref 3.5–5.2)
PROT SERPL-MCNC: 5 G/DL (ref 6–8.5)
RBC # BLD AUTO: 2.88 10*6/MM3 (ref 3.77–5.28)
SODIUM SERPL-SCNC: 140 MMOL/L (ref 136–145)
WBC NRBC COR # BLD: 7.71 10*3/MM3 (ref 3.4–10.8)

## 2023-09-03 PROCEDURE — 97116 GAIT TRAINING THERAPY: CPT

## 2023-09-03 PROCEDURE — 97530 THERAPEUTIC ACTIVITIES: CPT

## 2023-09-03 PROCEDURE — 99232 SBSQ HOSP IP/OBS MODERATE 35: CPT | Performed by: INTERNAL MEDICINE

## 2023-09-03 PROCEDURE — 25010000002 CEFTRIAXONE PER 250 MG: Performed by: SURGERY

## 2023-09-03 PROCEDURE — 63710000001 PROMETHAZINE PER 12.5 MG: Performed by: SURGERY

## 2023-09-03 PROCEDURE — 63710000001 PROMETHAZINE PER 25 MG: Performed by: SURGERY

## 2023-09-03 PROCEDURE — 85027 COMPLETE CBC AUTOMATED: CPT | Performed by: SURGERY

## 2023-09-03 PROCEDURE — 99024 POSTOP FOLLOW-UP VISIT: CPT | Performed by: SURGERY

## 2023-09-03 PROCEDURE — 80053 COMPREHEN METABOLIC PANEL: CPT | Performed by: SURGERY

## 2023-09-03 PROCEDURE — 25010000002 METRONIDAZOLE 500 MG/100ML SOLUTION: Performed by: SURGERY

## 2023-09-03 PROCEDURE — 97164 PT RE-EVAL EST PLAN CARE: CPT

## 2023-09-03 RX ORDER — LUBIPROSTONE 24 UG/1
24 CAPSULE ORAL 2 TIMES DAILY WITH MEALS
Status: DISCONTINUED | OUTPATIENT
Start: 2023-09-03 | End: 2023-09-05

## 2023-09-03 RX ADMIN — METRONIDAZOLE 500 MG: 500 INJECTION, SOLUTION INTRAVENOUS at 21:39

## 2023-09-03 RX ADMIN — ESCITALOPRAM OXALATE 10 MG: 10 TABLET, FILM COATED ORAL at 09:08

## 2023-09-03 RX ADMIN — LEVOTHYROXINE SODIUM 100 MCG: 0.1 TABLET ORAL at 06:09

## 2023-09-03 RX ADMIN — GUAIFENESIN 600 MG: 600 TABLET, EXTENDED RELEASE ORAL at 21:38

## 2023-09-03 RX ADMIN — OXYCODONE HYDROCHLORIDE 80 MG: 20 TABLET, FILM COATED, EXTENDED RELEASE ORAL at 09:08

## 2023-09-03 RX ADMIN — OXYCODONE HYDROCHLORIDE 80 MG: 40 TABLET, FILM COATED, EXTENDED RELEASE ORAL at 13:52

## 2023-09-03 RX ADMIN — BACLOFEN 5 MG: 10 TABLET ORAL at 17:11

## 2023-09-03 RX ADMIN — ALPRAZOLAM 3 MG: 1 TABLET ORAL at 21:39

## 2023-09-03 RX ADMIN — Medication 1000 MCG: at 09:09

## 2023-09-03 RX ADMIN — Medication 250 MG: at 09:07

## 2023-09-03 RX ADMIN — CEFTRIAXONE 2000 MG: 2 INJECTION, POWDER, FOR SOLUTION INTRAMUSCULAR; INTRAVENOUS at 17:11

## 2023-09-03 RX ADMIN — PROMETHAZINE HYDROCHLORIDE 25 MG: 12.5 TABLET ORAL at 13:52

## 2023-09-03 RX ADMIN — METRONIDAZOLE 500 MG: 500 INJECTION, SOLUTION INTRAVENOUS at 06:09

## 2023-09-03 RX ADMIN — POTASSIUM CHLORIDE 20 MEQ: 750 TABLET, EXTENDED RELEASE ORAL at 21:39

## 2023-09-03 RX ADMIN — Medication 1 TABLET: at 09:08

## 2023-09-03 RX ADMIN — Medication 10 ML: at 21:40

## 2023-09-03 RX ADMIN — PROMETHAZINE HYDROCHLORIDE 25 MG: 12.5 TABLET ORAL at 09:09

## 2023-09-03 RX ADMIN — BACLOFEN 5 MG: 10 TABLET ORAL at 21:38

## 2023-09-03 RX ADMIN — MONTELUKAST SODIUM 10 MG: 10 TABLET, FILM COATED ORAL at 09:08

## 2023-09-03 RX ADMIN — RIVAROXABAN 20 MG: 20 TABLET, FILM COATED ORAL at 17:11

## 2023-09-03 RX ADMIN — LUBIPROSTONE 24 MCG: 24 CAPSULE, GELATIN COATED ORAL at 13:51

## 2023-09-03 RX ADMIN — Medication 2000 UNITS: at 09:10

## 2023-09-03 RX ADMIN — BACLOFEN 5 MG: 10 TABLET ORAL at 09:11

## 2023-09-03 RX ADMIN — PROMETHAZINE HYDROCHLORIDE 25 MG: 12.5 TABLET ORAL at 21:38

## 2023-09-03 RX ADMIN — METRONIDAZOLE 500 MG: 500 INJECTION, SOLUTION INTRAVENOUS at 13:52

## 2023-09-03 RX ADMIN — HYDROCHLOROTHIAZIDE 25 MG: 25 TABLET ORAL at 09:09

## 2023-09-03 RX ADMIN — Medication 10 ML: at 09:11

## 2023-09-03 RX ADMIN — Medication 250 MG: at 21:39

## 2023-09-03 RX ADMIN — POTASSIUM CHLORIDE 20 MEQ: 750 TABLET, EXTENDED RELEASE ORAL at 09:09

## 2023-09-03 RX ADMIN — OXYCODONE HYDROCHLORIDE 80 MG: 20 TABLET, FILM COATED, EXTENDED RELEASE ORAL at 21:38

## 2023-09-03 RX ADMIN — LUBIPROSTONE 24 MCG: 24 CAPSULE, GELATIN COATED ORAL at 17:11

## 2023-09-03 NOTE — PROGRESS NOTES
[facility]  Inpatient Progress Note    Reason for Follow Up: Postop day #2 for gastric tumor, distal gastrectomy    Subjective     Interval History:   No significant overnight events, general surgery is following and managing postop plan    Current Facility-Administered Medications:     acetaminophen (TYLENOL) tablet 650 mg, 650 mg, Oral, Q6H PRN, Slava Loo Jr., MD, 650 mg at 08/31/23 1821    albuterol (PROVENTIL) nebulizer solution 0.083% 2.5 mg/3mL, 2.5 mg, Nebulization, Q6H PRN, Slava Loo Jr., MD    ALPRAZolam (XANAX) tablet 3 mg, 3 mg, Oral, Nightly PRN, Slava oLo Jr., MD, 3 mg at 09/02/23 2130    baclofen (LIORESAL) tablet 5 mg, 5 mg, Oral, TID, Slava Loo Jr., MD, 5 mg at 09/03/23 0911    sennosides-docusate (PERICOLACE) 8.6-50 MG per tablet 2 tablet, 2 tablet, Oral, BID PRN **AND** polyethylene glycol (MIRALAX) packet 17 g, 17 g, Oral, Daily PRN **AND** bisacodyl (DULCOLAX) EC tablet 5 mg, 5 mg, Oral, Daily PRN **AND** bisacodyl (DULCOLAX) suppository 10 mg, 10 mg, Rectal, Daily PRN, Slava Loo Jr., MD    cefTRIAXone (ROCEPHIN) 2,000 mg in sodium chloride 0.9 % 100 mL IVPB-VTB, 2,000 mg, Intravenous, Q24H, Slava Loo Jr., MD, Last Rate: 200 mL/hr at 09/02/23 1654, 2,000 mg at 09/02/23 1654    cholecalciferol (VITAMIN D3) tablet 2,000 Units, 2,000 Units, Oral, Daily, Slava Loo Jr., MD, 2,000 Units at 09/03/23 0910    escitalopram (LEXAPRO) tablet 10 mg, 10 mg, Oral, Daily, Slava Loo Jr., MD, 10 mg at 09/03/23 0908    guaiFENesin (MUCINEX) 12 hr tablet 600 mg, 600 mg, Oral, Nightly, Slava Loo Jr., MD, 600 mg at 09/02/23 2142    hydroCHLOROthiazide (HYDRODIURIL) tablet 25 mg, 25 mg, Oral, Daily, Slava Loo Jr., MD, 25 mg at 09/03/23 0909    HYDROmorphone (DILAUDID) injection 1 mg, 1 mg, Intravenous, Q2H PRN, Slava Loo Jr., MD, 1 mg at 09/02/23 0232    levothyroxine (SYNTHROID, LEVOTHROID) tablet 100 mcg, 100 mcg, Oral, Q AM,  Slava Loo Jr., MD, 100 mcg at 09/03/23 0609    loperamide (IMODIUM) capsule 2 mg, 2 mg, Oral, 4x Daily PRN, Talha Montano MD    lubiprostone (AMITIZA) capsule 24 mcg, 24 mcg, Oral, BID With Meals, Abeba Shearer MD, 24 mcg at 09/03/23 1351    Magnesium Standard Dose Replacement - Follow Nurse / BPA Driven Protocol, , Does not apply, PRN, Slava Loo Jr., MD    metroNIDAZOLE (FLAGYL) IVPB 500 mg, 500 mg, Intravenous, Q8H, Slava Loo Jr., MD, Last Rate: 100 mL/hr at 09/03/23 1352, 500 mg at 09/03/23 1352    montelukast (SINGULAIR) tablet 10 mg, 10 mg, Oral, Daily, Slava Loo Jr., MD, 10 mg at 09/03/23 0908    multivitamin (THERAGRAN) tablet 1 tablet, 1 tablet, Oral, Daily, Slava Loo Jr., MD, 1 tablet at 09/03/23 0908    nitroglycerin (NITROSTAT) SL tablet 0.4 mg, 0.4 mg, Sublingual, Q5 Min PRN, Slava Loo Jr., MD    oxyCODONE ER (oxyCONTIN) 12 hr tablet 80 mg, 80 mg, Oral, Q12H, Slava Loo Jr., MD, 80 mg at 09/03/23 0908    oxyCODONE ER (oxyCONTIN) 12 hr tablet 80 mg, 80 mg, Oral, BID PRN, Slava Loo Jr., MD, 80 mg at 09/03/23 1352    potassium chloride (K-DUR,KLOR-CON) ER tablet 20 mEq, 20 mEq, Oral, BID, Slava Loo Jr., MD, 20 mEq at 09/03/23 0909    Potassium Replacement - Follow Nurse / BPA Driven Protocol, , Does not apply, PRN, Slava Loo Jr., MD    promethazine (PHENERGAN) tablet 25 mg, 25 mg, Oral, Q6H PRN, 25 mg at 09/03/23 1352 **OR** promethazine (PHENERGAN) suppository 12.5 mg, 12.5 mg, Rectal, Q6H PRN, Slava Loo Jr., MD    promethazine (PHENERGAN) tablet 25 mg, 25 mg, Oral, BID, Slava Loo Jr., MD, 25 mg at 09/03/23 0909    rivaroxaban (XARELTO) tablet 20 mg, 20 mg, Oral, Daily With Dinner, Abeba Shearer MD, 20 mg at 09/02/23 1702    saccharomyces boulardii (FLORASTOR) capsule 250 mg, 250 mg, Oral, BID, Slava Loo Jr., MD, 250 mg at 09/03/23 0907    sodium chloride 0.9 % flush 10 mL, 10 mL, Intravenous, Q12H, Slava Loo  MD Micheal, 10 mL at 09/03/23 0911    sodium chloride 0.9 % flush 10 mL, 10 mL, Intravenous, PRN, Slava Loo Jr., MD    sodium chloride 0.9 % infusion 40 mL, 40 mL, Intravenous, PRN, Slava Loo Jr., MD    sodium chloride 0.9 % infusion, 30 mL/hr, Intravenous, Continuous PRN, Slava Loo Jr., MD, Last Rate: 30 mL/hr at 08/30/23 1450, 30 mL/hr at 08/30/23 1450    vitamin B-12 (CYANOCOBALAMIN) tablet 1,000 mcg, 1,000 mcg, Oral, Daily, lSava Loo Jr., MD, 1,000 mcg at 09/03/23 0909  Review of Systems:    There is weakness and fatigue all other systems reviewed and negative    Objective     Vital Signs  Temp:  [98.1 °F (36.7 °C)-98.6 °F (37 °C)] 98.4 °F (36.9 °C)  Heart Rate:  [76-80] 76  Resp:  [18] 18  BP: (113-124)/(67-87) 124/87  Body mass index is 36.87 kg/m².    Intake/Output Summary (Last 24 hours) at 9/3/2023 1445  Last data filed at 9/3/2023 1412  Gross per 24 hour   Intake 720 ml   Output 3550 ml   Net -2830 ml     I/O this shift:  In: 480 [P.O.:480]  Out: 2570 [Urine:2425; Drains:145]     Physical Exam:   General: patient awake, alert and cooperative   Eyes: Normal lids and lashes, no scleral icterus   Neck: supple, normal ROM   Skin: warm and dry, not jaundiced   Cardiovascular: regular rhythm and rate, no murmurs auscultated   Pulm: clear to auscultation bilaterally, regular and unlabored   Abdomen: soft, nontender, nondistended; normal bowel sounds   Extremities: no rash or edema   Psychiatric: Normal mood and behavior; memory intact     Results Review:     I reviewed the patient's new clinical results.    Results from last 7 days   Lab Units 09/03/23  0407 09/02/23  0524 09/01/23  1252 09/01/23  0513   WBC 10*3/mm3 7.71 8.53  --  3.16*   HEMOGLOBIN g/dL 9.1* 9.6* 11.3* 9.2*   HEMATOCRIT % 27.7* 28.8* 34.6 27.9*   PLATELETS 10*3/mm3 201 188  --  180     Results from last 7 days   Lab Units 09/03/23  0407 09/02/23  0524 09/01/23  0513   SODIUM mmol/L 140 142 143   POTASSIUM mmol/L 3.8 4.3 3.6    CHLORIDE mmol/L 105 108* 109*   CO2 mmol/L 28.7 26.0 26.0   BUN mg/dL 9 8 8   CREATININE mg/dL 0.85 0.71 0.80   CALCIUM mg/dL 7.3* 7.4* 7.7*   BILIRUBIN mg/dL 0.5 0.5 0.7   ALK PHOS U/L 316* 393* 519*   ALT (SGPT) U/L 15 12 18   AST (SGOT) U/L 31 38* 59*   GLUCOSE mg/dL 80 164* 81         No results found for: LIPASE    Radiology:  FL ercp biliary duct only   Final Result       As described.       This report was finalized on 9/1/2023 11:40 AM by Dr. Eze Enriquez M.D.          XR Abdomen KUB   Final Result      FL C Arm During Surgery   Final Result       As described.       This report was finalized on 9/1/2023 11:40 AM by Dr. Eze Enriquez M.D.          XR Chest 1 View   Final Result   No focal pulmonary consolidation. Follow-up as clinical   indications persist.       This report was finalized on 8/27/2023 10:32 AM by Dr. Eze Enriquez M.D.              Assessment & Plan     Active Hospital Problems    Diagnosis     **Choledocholithiasis     History of pulmonary embolism     Stage 3 chronic kidney disease     Gastroesophageal reflux disease     Acquired hypothyroidism     Chronic pain syndrome     Rheumatoid arthritis     Generalized osteoarthritis          Assessment:  Postop day #2 rom ex lap with gastric access for ERCP and distal gastrectomy for gastric mass        Plan:  Await pathology  Advance diet as tolerated  Postop management per Dr. Loo  To resume anticoagulation  We will follow  I discussed the patients findings and my recommendations with patient and nursing staff.    Murphy Carlisle MD

## 2023-09-03 NOTE — PLAN OF CARE
Goal Outcome Evaluation:  Plan of Care Reviewed With: patient, daughter           Outcome Evaluation: Pt seen for PT re-eval this PM, POD 2 exploratory laparotomy with distal gastrectomy, ERCP with cholangiogram and sphincterotomy. Pt reports abdominal pain with mobility and generalized weakness post-op. Daughter present and states she got pt up to chair yesterday, but requiring more assist post-op. Pt transferred to EOB with CGA and stood with min A x2 and rollator - pt felt safer with daughter assisting to stand as well. Attempted ambulation away from bed but pt c/o increased abdominal pain and nausea. Pt tolerated taking ~5 side steps to HOB and then assisted back to sitting. Pt transferred to supine with CGA and left with needs met. PT will continue to follow to progress mobility as tolerated. Anticipate DC to SNF.      Anticipated Discharge Disposition (PT): skilled nursing facility

## 2023-09-03 NOTE — THERAPY RE-EVALUATION
Patient Name: Anne Jewell  : 1950    MRN: [id number]                              Today's Date: 9/3/2023       Admit Date: 2023    Visit Dx:     ICD-10-CM ICD-9-CM   1. Choledocholithiasis  K80.50 574.50     Patient Active Problem List   Diagnosis    Chronic pain syndrome    Rheumatoid arthritis    Osteoarthritis of knee    Generalized osteoarthritis    Degeneration of intervertebral disc of lumbar region    Neck pain    Lumbar radiculopathy    Atopic rhinitis    Anxiety    Diarrhea    Indigestion    Dysthymic disorder    Gastroesophageal reflux disease    Acquired hypothyroidism    Insomnia    Pernicious anemia    Osteoporosis    Scoliosis    Vasovagal syncope    Vitamin D deficiency    Trigger middle finger of right hand    Trigger ring finger of right hand    Trigger little finger of right hand    Encounter for long-term (current) use of high-risk medication    Coagulation disorder    Hyperglycemia    Joint pain    Status post total knee replacement, left    Left knee pain    Status post total knee replacement, right    Osteoarthritis of thumb    Trigger finger of all digits of right hand    Left hip pain    Primary osteoarthritis of both hips    Edema    Nausea    Irritable bowel syndrome with diarrhea    Stage 3 chronic kidney disease    Cough    Acute non-recurrent frontal sinusitis    Dysuria    History of DVT (deep vein thrombosis)    Routine general medical examination at a health care facility    Frequency of urination    Memory loss    Visual changes    Choledocholithiasis    History of pulmonary embolism     Past Medical History:   Diagnosis Date    Allergic rhinitis     Arthritis of back     Arthritis of neck     Asthma     Bronchospasm     Carpal tunnel syndrome, bilateral     Cervical disc disorder     Clotting disorder     Deep vein thrombosis     Disc degeneration, lumbar     Edema     Esophageal reflux     Glaucoma     Hip arthrosis     Joint pain     Kidney disease 2018    stage 3     Liver disease 2018    stage 3    Low back pain     Osteoarthritis     Osteopenia     Osteoporosis     Periarthritis of shoulder     Scoliosis     Status post total knee replacement, left 08/31/2017    Status post total knee replacement, right 08/31/2017    UTI (urinary tract infection)     Venous thrombosis      Past Surgical History:   Procedure Laterality Date    BILATERAL BREAST REDUCTION      BREAST SURGERY      COLONOSCOPY  08/05/2016    ERCP N/A 8/30/2023    Procedure: ENDOSCOPIC RETROGRADE CHOLANGIOPANCREATOGRAPHY;  Surgeon: Elijah Simon MD;  Location: [facility] ENDOSCOPY;  Service: Gastroenterology;  Laterality: N/A;  cbd stone    GASTRIC BYPASS      HAND SURGERY Right 01/14/2016    HERNIA REPAIR      x7    HYSTERECTOMY      JOINT REPLACEMENT      Both knees    OTHER SURGICAL HISTORY      vaginal sling operation for stress incontinence    TOTAL KNEE ARTHROPLASTY Left     TOTAL KNEE ARTHROPLASTY Bilateral       General Information       [facility] Name 09/03/23 1509          Physical Therapy Time and Intention    Document Type re-evaluation  -     Mode of Treatment individual therapy;physical therapy  -[facility] Name 09/03/23 1509          General Information    Patient Profile Reviewed yes  -     Prior Level of Function independent:;gait;transfer;bed mobility  -     Existing Precautions/Restrictions other (see comments);fall  AFIA drain x1  -     Barriers to Rehab none identified  -       Row Name 09/03/23 1509          Living Environment    People in Home child(yaima), adult;spouse  -[facility] Name 09/03/23 1509          Cognition    Orientation Status (Cognition) oriented x 4  -[facility] Name 09/03/23 1509          Safety Issues, Functional Mobility    Impairments Affecting Function (Mobility) balance;endurance/activity tolerance;strength;pain  -               User Key  (r) = Recorded By, (t) = Taken By, (c) = Cosigned By      Initials Name Provider Type     Margie Main, PT  Physical Therapist                   Mobility       [facility] Name 09/03/23 1509          Bed Mobility    Bed Mobility supine-sit;sit-supine  -     Supine-Sit [place] (Bed Mobility) contact guard;verbal cues  -     Sit-Supine [place] (Bed Mobility) contact guard;verbal cues  -     Assistive Device (Bed Mobility) head of bed elevated;bed rails  -[facility] Name 09/03/23 1509          Sit-Stand Transfer    Sit-Stand Wagner (Transfers) minimum assist (75% patient effort);2 person assist;verbal cues  -     Assistive Device (Sit-Stand Transfers) walker, 4-wheeled  -     Comment, (Sit-Stand Transfer) Pt felt safer with daughter assisting  -[facility] Name 09/03/23 1509          Gait/Stairs (Locomotion)    Wagner Level (Gait) verbal cues;minimum assist (75% patient effort);1 person assist  -     Assistive Device (Gait) walker, 4-wheeled  -     Distance in Feet (Gait) 5 side steps to [facility]  -     Deviations/Abnormal Patterns (Gait) antalgic;kristi decreased;gait speed decreased;festinating/shuffling;stride length decreased;weight shifting decreased  -     Bilateral Gait Deviations forward flexed posture  -               User Key  (r) = Recorded By, (t) = Taken By, (c) = Cosigned By      Initials Name Provider Type     Margie Main, PT Physical Therapist                   Obj/Interventions       [facility] Name 09/03/23 1510          Range of Motion Comprehensive    General Range of Motion bilateral lower extremity ROM WFL  -BH       Row Name 09/03/23 1510          Strength Comprehensive (MMT)    General Manual Muscle Testing (MMT) Assessment lower extremity strength deficits identified  -     Comment, General Manual Muscle Testing (MMT) Assessment Generalized weakness, BLE grossly 4/5  -[facility] Name 09/03/23 1510          Balance    Balance Assessment sitting static balance;sitting dynamic balance;standing static balance;standing dynamic balance  -     Static Sitting Balance  standby assist  -     Dynamic Sitting Balance standby assist  -     Position, Sitting Balance unsupported;sitting edge of bed  -     Static Standing Balance contact guard;verbal cues  -     Dynamic Standing Balance contact guard;verbal cues  -     Position/Device Used, Standing Balance supported;walker, 4-wheeled  -     Balance Interventions sitting;standing;sit to stand;static;supported;dynamic  -[facility] Name 09/03/23 1510          Sensory Assessment (Somatosensory)    Sensory Assessment (Somatosensory) LE sensation intact  -               User Key  (r) = Recorded By, (t) = Taken By, (c) = Cosigned By      Initials Name Provider Type     Margie Main, PT Physical Therapist                   Goals/Plan       [facility] Name 09/03/23 1515          Bed Mobility Goal 1 (PT)    Activity/Assistive Device (Bed Mobility Goal 1, PT) bed mobility activities, all  -     [place] Level/Cues Needed (Bed Mobility Goal 1, PT) standby assist  -     Time Frame (Bed Mobility Goal 1, PT) 1 week  -[facility] Name 09/03/23 1515          Transfer Goal 1 (PT)    Activity/Assistive Device (Transfer Goal 1, PT) transfers, all  -     [place] Level/Cues Needed (Transfer Goal 1, PT) standby assist  -     Time Frame (Transfer Goal 1, PT) 1 week  -[facility] Name 09/03/23 1515          Gait Training Goal 1 (PT)    Activity/Assistive Device (Gait Training Goal 1, PT) gait (walking locomotion)  -     [place] Level (Gait Training Goal 1, PT) contact guard required  -     Distance (Gait Training Goal 1, PT) 80ft  -     Time Frame (Gait Training Goal 1, PT) 1 week  -[facility] Name 09/03/23 1515          Therapy Assessment/Plan (PT)    Planned Therapy Interventions (PT) balance training;bed mobility training;gait training;home exercise program;patient/family education;strengthening;transfer training  -               User Key  (r) = Recorded By, (t) = Taken By, (c) = Cosigned By      Initials Name  Provider Type     Margie Main, PT Physical Therapist                   Clinical Impression       Row Name 09/03/23 1511          Pain    Pre/Posttreatment Pain Comment C/o incisional abdominal pain with mobility, did not rate  -       Row Name 09/03/23 1511          Plan of Care Review    Plan of Care Reviewed With patient;daughter  -     Outcome Evaluation Pt seen for PT re-eval this PM, POD 2 exploratory laparotomy with distal gastrectomy, ERCP with cholangiogram and sphincterotomy. Pt reports abdominal pain with mobility and generalized weakness post-op. Daughter present and states she got pt up to chair yesterday, but requiring more assist post-op. Pt transferred to EOB with CGA and stood with min A x2 and rollator - pt felt safer with daughter assisting to stand as well. Attempted ambulation away from bed but pt c/o increased abdominal pain and nausea. Pt tolerated taking ~5 side steps to HOB and then assisted back to sitting. Pt transferred to supine with CGA and left with needs met. PT will continue to follow to progress mobility as tolerated. Anticipate DC to SNF.  -       Row Name 09/03/23 1511          Therapy Assessment/Plan (PT)    Patient/Family Therapy Goals Statement (PT) Return to [facility]     Rehab Potential (PT) good, to achieve stated therapy goals  [facility]     Criteria for Skilled Interventions Met (PT) yes  -     Therapy Frequency (PT) 3 times/wk  -       Row Name 09/03/23 1511          Vital Signs    O2 Delivery Pre Treatment room air  -     O2 Delivery Intra Treatment room air  -     O2 Delivery Post Treatment room air  -       Row Name 09/03/23 1511          Positioning and Restraints    Pre-Treatment Position in bed  -     Post Treatment Position bed  -     In Bed fowlers;call light within reach;encouraged to call for assist;exit alarm on  -               User Key  (r) = Recorded By, (t) = Taken By, (c) = Cosigned By      Initials Name Provider Type     Jose David  Margie CROWE PT Physical Therapist                   Outcome Measures       Row Name 09/03/23 1516          How much help from another person do you currently need...    Turning from your back to your side while in flat bed without using bedrails? 3  -BH     Moving from lying on back to sitting on the side of a flat bed without bedrails? 3  -BH     Moving to and from a bed to a chair (including a wheelchair)? 3  -BH     Standing up from a chair using your arms (e.g., wheelchair, bedside chair)? 3  -BH     Climbing 3-5 steps with a railing? 1  -BH     To walk in hospital room? 2  -     AM-PAC 6 Clicks Score (PT) 15  -     Highest level of mobility 4 --> Transferred to chair/commode  -       Row Name 09/03/23 1516          Functional Assessment    Outcome Measure Options AM-PAC 6 Clicks Basic Mobility (PT)  -               User Key  (r) = Recorded By, (t) = Taken By, (c) = Cosigned By      Initials Name Provider Type     Margie Main PT Physical Therapist                                 Physical Therapy Education       Title: PT OT SLP Therapies (Done)       Topic: Physical Therapy (Done)       Point: Mobility training (Done)       Learning Progress Summary             Patient Acceptance, E,TB,D, VU,NR by  at 9/3/2023 1516    Acceptance, E, VU by  at 8/28/2023 1014                         Point: Home exercise program (Done)       Learning Progress Summary             Patient Acceptance, E,TB,D, VU,NR by  at 9/3/2023 1516    Acceptance, E, VU by  at 8/28/2023 1014                         Point: Body mechanics (Done)       Learning Progress Summary             Patient Acceptance, E,TB,D, VU,NR by  at 9/3/2023 1516    Acceptance, E, VU by  at 8/28/2023 1014                         Point: Precautions (Done)       Learning Progress Summary             Patient Acceptance, E,TB,D, VU,NR by  at 9/3/2023 1516    Acceptance, E, VU by  at 8/28/2023 1014                                         User  Key       Initials Effective Dates Name Provider Type Discipline     04/08/22 -  Margie Main, PT Physical Therapist PT     05/02/22 -  Dixie Butler, NATHAN Physical Therapist PT                  PT Recommendation and Plan  Planned Therapy Interventions (PT): balance training, bed mobility training, gait training, home exercise program, patient/family education, strengthening, transfer training  Plan of Care Reviewed With: patient, daughter  Outcome Evaluation: Pt seen for PT re-eval this PM, POD 2 exploratory laparotomy with distal gastrectomy, ERCP with cholangiogram and sphincterotomy. Pt reports abdominal pain with mobility and generalized weakness post-op. Daughter present and states she got pt up to chair yesterday, but requiring more assist post-op. Pt transferred to EOB with CGA and stood with min A x2 and rollator - pt felt safer with daughter assisting to stand as well. Attempted ambulation away from bed but pt c/o increased abdominal pain and nausea. Pt tolerated taking ~5 side steps to HOB and then assisted back to sitting. Pt transferred to supine with CGA and left with needs met. PT will continue to follow to progress mobility as tolerated. Anticipate DC to SNF.     Time Calculation:         PT Charges       Row Name 09/03/23 1516             Time Calculation    Start Time 1411  -      Stop Time 1437  -      Time Calculation (min) 26 min  -      PT Received On 09/03/23  -      PT - Next Appointment 09/05/23  -      PT Goal Re-Cert Due Date 09/10/23  -         Time Calculation- PT    Total Timed Code Minutes- PT 23 minute(s)  -         Timed Charges    85893 - Gait Training Minutes  10  -      70439 - PT Therapeutic Activity Minutes 13  -         Untimed Charges    PT Eval/Re-eval Minutes 3  -         Total Minutes    Timed Charges Total Minutes 23  -      Untimed Charges Total Minutes 3  -       Total Minutes 26  -                User Key  (r) = Recorded By, (t) =  Taken By, (c) = Cosigned By      Initials Name Provider Type     Margie Main, PT Physical Therapist                  Therapy Charges for Today       Code Description Service Date Service Provider Modifiers Qty    77083554127  GAIT TRAINING EA 15 MIN 9/3/2023 Margie Main, PT GP 1    90514432159 HC PT THERAPEUTIC ACT EA 15 MIN 9/3/2023 Margie Main, PT GP 1    [id number]  PT RE-EVAL ESTABLISHED PLAN 2 9/3/2023 Margie Main, PT GP 1            PT G-Codes  Outcome Measure Options: AM-PAC 6 Clicks Basic Mobility (PT)  AM-PAC 6 Clicks Score (PT): 15  PT Discharge Summary  Anticipated Discharge Disposition (PT): skilled nursing facility    Margie Main PT  9/3/2023

## 2023-09-03 NOTE — PROGRESS NOTES
Chief Complaint:    S/P Exploratory laparotomy with gastric access for ERCP and distal gastrectomy, POD 2    Subjective:    The patient is overall feeling well but having increased expected postop abdominal pain as the Exparel wears off.  She is not having any nausea or vomiting and has been tolerating a clear liquid diet.    Objective:    Temp:  [97.7 °F (36.5 °C)-98.6 °F (37 °C)] 98.4 °F (36.9 °C)  Heart Rate:  [76-80] 76  Resp:  [16-18] 18  BP: (113-128)/(67-87) 124/87    Physical Exam  Constitutional:       Appearance: She is not ill-appearing or toxic-appearing.   Abdominal:      Palpations: Abdomen is soft.      Tenderness: There is generalized abdominal tenderness (Appropriate postop tenderness).      Comments: Incision: Intact with no evidence of infection.  AFIA drain: Serosanguineous output.   Neurological:      Mental Status: She is alert.   Psychiatric:         Behavior: Behavior is cooperative.       Results:    WBC is 7.71.  H/H is 9.1/27.7.  BUN is 9 creatinine 0.85.  Albumin is 2.0.    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  She is recovering well and I will advance her diet to a full liquid diet.    Slava Loo Jr., M.D.

## 2023-09-03 NOTE — PLAN OF CARE
Goal Outcome Evaluation:  Plan of Care Reviewed With: patient, daughter        Progress: improving  Outcome Evaluation: Pt alert and oriented. Pt cont with 1.5 L of O2. AFIA drain with serosanguinous. Pt all her scheduled meds given per MAR. Daughter  at bedside this shift. Pt did not call out only for ice and meds. CTM, safety maintained.

## 2023-09-03 NOTE — PROGRESS NOTES
Name: Anne Jewell ADMIT: 2023   : 1950  PCP: Jewell Stephens APRN    MRN: [id number] LOS: 9 days   AGE/SEX: 72 y.o. female  ROOM: [facility]     Subjective   Subjective   Patient awake, resting in bed, dtr at bedside. Patient without BM since surgery. Dtr concerned it may be related to opioid induced constipation. We discussed adding amitiza.        Objective   Objective   Vital Signs  Temp:  [98.1 °F (36.7 °C)-98.6 °F (37 °C)] 98.4 °F (36.9 °C)  Heart Rate:  [76-80] 76  Resp:  [18] 18  BP: (113-124)/(67-87) 124/87  SpO2:  [93 %-95 %] 93 %  on  Flow (L/min):  [1.5] 1.5;   Device (Oxygen Therapy): nasal cannula  Body mass index is 36.87 kg/m².  Physical Exam  Constitutional:       General: She is not in acute distress.     Appearance: She is not ill-appearing.   Cardiovascular:      Rate and Rhythm: Normal rate and regular rhythm.   Pulmonary:      Effort: Pulmonary effort is normal. No respiratory distress.   Abdominal:      General: Abdomen is flat. There is no distension.      Tenderness: There is no abdominal tenderness.      Comments: Incision bandaged. AFIA drain with serosanguinous drainage.   Musculoskeletal:         General: No swelling or deformity. Normal range of motion.   Skin:     General: Skin is warm and dry.   Neurological:      General: No focal deficit present.      Mental Status: She is alert. Mental status is at baseline.       Results Review     I reviewed the patient's new clinical results.  Results from last 7 days   Lab Units 23  [id number] 23  1252 23  0439   WBC 10*3/mm3 7.71 8.53  --  3.16* 4.10   HEMOGLOBIN g/dL 9.1* 9.6* 11.3* 9.2* 10.5*   PLATELETS 10*3/mm3 201 188  --  180 210       Results from last 7 days   Lab Units 23  0407 23  0524 23  0513 23  0439   SODIUM mmol/L 140 142 143  --  142   POTASSIUM mmol/L 3.8 4.3 3.6 3.9 3.4*   CHLORIDE mmol/L 105 108* 109*  --  106   CO2 mmol/L  28.7 26.0 26.0  --  27.0   BUN mg/dL 9 8 8  --  9   CREATININE mg/dL 0.85 0.71 0.80  --  0.98   GLUCOSE mg/dL 80 164* 81  --  82     Estimated Creatinine Clearance: 60.5 mL/min (by C-G formula based on SCr of 0.85 mg/dL).  Results from last 7 days   Lab Units 09/03/23 0407 09/02/23 0524 09/01/23 0513 08/31/23  0439   ALBUMIN g/dL 2.0* 1.7* 2.4* 2.0*   BILIRUBIN mg/dL 0.5 0.5 0.7 1.2   ALK PHOS U/L 316* 393* 519* 630*   AST (SGOT) U/L 31 38* 59* 59*   ALT (SGPT) U/L 15 12 18 15       Results from last 7 days   Lab Units 09/03/23 0407 09/02/23 0524 09/01/23 0513 08/31/23  0439   CALCIUM mg/dL 7.3* 7.4* 7.7* 7.9*   ALBUMIN g/dL 2.0* 1.7* 2.4* 2.0*         COVID19   Date Value Ref Range Status   08/27/2023 Detected (C) Not Detected - Ref. Range Final   08/10/2020 Not Detected Not Detected - Ref. Range Final     No results found for: HGBA1C, POCGLU      FL C Arm During Surgery, FL ercp biliary duct only  Narrative: FL ERCP BILIARY DUCT ONLY-, FL C ARM DURING SURGERY-     INDICATIONS: ERCP, sphincterotomy, balloon sweep     TECHNIQUE: FLUOROSCOPIC ASSISTANCE IN THE OPERATING ROOM.     FINDINGS:     5 intraoperative fluoroscopic spot views were obtained and recorded the  PACS for review, revealing ERCP, opacification of extrahepatic, central  intrahepatic biliary ducts, balloon sweep. Please see operative report  for full details.     Fluoroscopy time: 315 seconds     Radiation exposure: Not provided        Impression:    As described.     This report was finalized on 9/1/2023 11:40 AM by Dr. Eze Enriquez M.D.       I reviewed the patient's daily medications.  Scheduled Medications  baclofen, 5 mg, Oral, TID  cefTRIAXone, 2,000 mg, Intravenous, Q24H  cholecalciferol, 2,000 Units, Oral, Daily  escitalopram, 10 mg, Oral, Daily  guaiFENesin, 600 mg, Oral, Nightly  hydroCHLOROthiazide, 25 mg, Oral, Daily  levothyroxine, 100 mcg, Oral, Q AM  lubiprostone, 24 mcg, Oral, BID With Meals  metroNIDAZOLE, 500 mg,  Intravenous, Q8H  montelukast, 10 mg, Oral, Daily  multivitamin, 1 tablet, Oral, Daily  oxyCODONE, 80 mg, Oral, Q12H  potassium chloride, 20 mEq, Oral, BID  promethazine, 25 mg, Oral, BID  rivaroxaban, 20 mg, Oral, Daily With Dinner  saccharomyces boulardii, 250 mg, Oral, BID  sodium chloride, 10 mL, Intravenous, Q12H  vitamin B-12, 1,000 mcg, Oral, Daily    Infusions  sodium chloride, 30 mL/hr, Last Rate: 30 mL/hr (08/30/23 1450)    Diet  Diet: Cardiac Diets, Liquid Diets; Full Liquid; Healthy Heart (2-3 Na+); Texture: Regular Texture (IDDSI 7); Fluid Consistency: Thin (IDDSI 0)         I have personally reviewed:  [x]  Laboratory   [x]  Microbiology   [x]  Radiology   []  EKG/Telemetry   []  Cardiology/Vascular   []  Pathology   []  Records     Assessment/Plan     Active Hospital Problems    Diagnosis  POA    **Choledocholithiasis [K80.50]  Yes    History of pulmonary embolism [Z86.711]  Unknown    Stage 3 chronic kidney disease [N18.30]  Yes    Gastroesophageal reflux disease [K21.9]  Yes    Acquired hypothyroidism [E03.9]  Yes    Chronic pain syndrome [G89.4]  Yes    Rheumatoid arthritis [M06.9]  Yes    Generalized osteoarthritis [M15.9]  Yes      Resolved Hospital Problems   No resolved problems to display.       72 y.o. female admitted with Choledocholithiasis.    Choledocholithiasis  History of gastric bypass  Elevated LFTs  Fungating stomach mass  -MRCP showing large stone in the distal common bile duct, 1.8 cm.  Intrahepatic biliary dilatation.  Marked biliary dilatation with CBD measuring 2.1 cm  -IV ceftriaxone, metronidazole, patient with history of allergies to penicillin.  -GI consulted-because of patient's anatomy ERCP cannot be completed.    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    -Completed 3 days of remdesivir    History of pulmonary pulmonary embolism.  Diagnosed in 2007, has been on anticoagulation since.  At home takes Xarelto currently, last dose was on 08/24/2023.   - ok to resume xarelto per surgery- restarted 9/2     Hypothyroidism: Resume home levothyroxine, recent TSH normal     CKD stage IIIa: Creatinine stable, monitor.  Avoid nephrotoxic drugs.     Chronic pain syndrome/chronic arthritis: Bob reviewed.  Oxycodone extended release to 80 mg twice daily scheduled.  Oxycodone extended release to 80 mg twice a day as needed as well.  Continue home alprazolam 3 mg nightly     History of RA: Not on treatment for RA per chart review, reports she was diagnosed but was never on specific treatment.     Memory loss: Daughter states she has noticed memory changes for the last several months.  Was referred to neurology outpatient follow-up patient with possible dementia.  Follow-up as outpatient    Abnormal liver on MRI: Repeat CT abdomen with liver protocol in 2 to 3 months.  Daughter concerned about left renal cyst and small splenic lesion that looks like a cyst per radiology report.  Can further be evaluated with a repeat CT abdomen.      Xarelto (home med) for DVT prophylaxis.  Full code.  Discussed with patient, family, and nursing staff.  Anticipate discharge home with HH vs SNU facility timing yet to be determined.      Expected Discharge Date: 9/4/2023; Expected Discharge Time:       Abeba Shearer MD  [facility]  09/03/23  14:26 EDT

## 2023-09-04 LAB
ALBUMIN SERPL-MCNC: 2.1 G/DL (ref 3.5–5.2)
ALBUMIN/GLOB SERPL: 0.8 G/DL
ALP SERPL-CCNC: 270 U/L (ref 39–117)
ALT SERPL W P-5'-P-CCNC: 13 U/L (ref 1–33)
ANION GAP SERPL CALCULATED.3IONS-SCNC: 5.5 MMOL/L (ref 5–15)
AST SERPL-CCNC: 26 U/L (ref 1–32)
BILIRUB SERPL-MCNC: 0.4 MG/DL (ref 0–1.2)
BUN SERPL-MCNC: 9 MG/DL (ref 8–23)
BUN/CREAT SERPL: 11.8 (ref 7–25)
CALCIUM SPEC-SCNC: 7.4 MG/DL (ref 8.6–10.5)
CHLORIDE SERPL-SCNC: 107 MMOL/L (ref 98–107)
CO2 SERPL-SCNC: 29.5 MMOL/L (ref 22–29)
CREAT SERPL-MCNC: 0.76 MG/DL (ref 0.57–1)
DEPRECATED RDW RBC AUTO: 48.7 FL (ref 37–54)
EGFRCR SERPLBLD CKD-EPI 2021: 83.4 ML/MIN/1.73
ERYTHROCYTE [DISTWIDTH] IN BLOOD BY AUTOMATED COUNT: 13.7 % (ref 12.3–15.4)
FERRITIN SERPL-MCNC: 51.1 NG/ML (ref 13–150)
GLOBULIN UR ELPH-MCNC: 2.6 GM/DL
GLUCOSE SERPL-MCNC: 79 MG/DL (ref 65–99)
HCT VFR BLD AUTO: 26.1 % (ref 34–46.6)
HGB BLD-MCNC: 8.5 G/DL (ref 12–15.9)
IRON 24H UR-MRATE: 25 MCG/DL (ref 37–145)
IRON SATN MFR SERPL: 18 % (ref 20–50)
MCH RBC QN AUTO: 31.6 PG (ref 26.6–33)
MCHC RBC AUTO-ENTMCNC: 32.6 G/DL (ref 31.5–35.7)
MCV RBC AUTO: 97 FL (ref 79–97)
PLATELET # BLD AUTO: 173 10*3/MM3 (ref 140–450)
PMV BLD AUTO: 10.5 FL (ref 6–12)
POTASSIUM SERPL-SCNC: 3.7 MMOL/L (ref 3.5–5.2)
PROT SERPL-MCNC: 4.7 G/DL (ref 6–8.5)
RBC # BLD AUTO: 2.69 10*6/MM3 (ref 3.77–5.28)
SODIUM SERPL-SCNC: 142 MMOL/L (ref 136–145)
TIBC SERPL-MCNC: 137 MCG/DL (ref 298–536)
TRANSFERRIN SERPL-MCNC: 92 MG/DL (ref 200–360)
WBC NRBC COR # BLD: 5.43 10*3/MM3 (ref 3.4–10.8)

## 2023-09-04 PROCEDURE — 99024 POSTOP FOLLOW-UP VISIT: CPT | Performed by: SURGERY

## 2023-09-04 PROCEDURE — 63710000001 PROMETHAZINE PER 25 MG: Performed by: SURGERY

## 2023-09-04 PROCEDURE — 80053 COMPREHEN METABOLIC PANEL: CPT | Performed by: SURGERY

## 2023-09-04 PROCEDURE — 63710000001 PROMETHAZINE PER 12.5 MG: Performed by: SURGERY

## 2023-09-04 PROCEDURE — 25010000002 CEFTRIAXONE PER 250 MG: Performed by: SURGERY

## 2023-09-04 PROCEDURE — 84466 ASSAY OF TRANSFERRIN: CPT | Performed by: NURSE PRACTITIONER

## 2023-09-04 PROCEDURE — 83540 ASSAY OF IRON: CPT | Performed by: NURSE PRACTITIONER

## 2023-09-04 PROCEDURE — 94761 N-INVAS EAR/PLS OXIMETRY MLT: CPT

## 2023-09-04 PROCEDURE — 82728 ASSAY OF FERRITIN: CPT | Performed by: NURSE PRACTITIONER

## 2023-09-04 PROCEDURE — 94799 UNLISTED PULMONARY SVC/PX: CPT

## 2023-09-04 PROCEDURE — 85027 COMPLETE CBC AUTOMATED: CPT | Performed by: SURGERY

## 2023-09-04 PROCEDURE — 25010000002 METRONIDAZOLE 500 MG/100ML SOLUTION: Performed by: SURGERY

## 2023-09-04 PROCEDURE — 94760 N-INVAS EAR/PLS OXIMETRY 1: CPT

## 2023-09-04 RX ADMIN — OXYCODONE HYDROCHLORIDE 80 MG: 20 TABLET, FILM COATED, EXTENDED RELEASE ORAL at 21:53

## 2023-09-04 RX ADMIN — GUAIFENESIN 600 MG: 600 TABLET, EXTENDED RELEASE ORAL at 22:00

## 2023-09-04 RX ADMIN — Medication 250 MG: at 21:59

## 2023-09-04 RX ADMIN — OXYCODONE HYDROCHLORIDE 80 MG: 20 TABLET, FILM COATED, EXTENDED RELEASE ORAL at 08:40

## 2023-09-04 RX ADMIN — PROMETHAZINE HYDROCHLORIDE 25 MG: 12.5 TABLET ORAL at 22:40

## 2023-09-04 RX ADMIN — Medication 2000 UNITS: at 08:42

## 2023-09-04 RX ADMIN — MONTELUKAST SODIUM 10 MG: 10 TABLET, FILM COATED ORAL at 08:41

## 2023-09-04 RX ADMIN — ESCITALOPRAM OXALATE 10 MG: 10 TABLET, FILM COATED ORAL at 08:41

## 2023-09-04 RX ADMIN — LUBIPROSTONE 24 MCG: 24 CAPSULE, GELATIN COATED ORAL at 21:59

## 2023-09-04 RX ADMIN — METRONIDAZOLE 500 MG: 500 INJECTION, SOLUTION INTRAVENOUS at 12:58

## 2023-09-04 RX ADMIN — CEFTRIAXONE 2000 MG: 2 INJECTION, POWDER, FOR SOLUTION INTRAMUSCULAR; INTRAVENOUS at 17:31

## 2023-09-04 RX ADMIN — OXYCODONE HYDROCHLORIDE 80 MG: 40 TABLET, FILM COATED, EXTENDED RELEASE ORAL at 12:59

## 2023-09-04 RX ADMIN — BACLOFEN 5 MG: 10 TABLET ORAL at 14:51

## 2023-09-04 RX ADMIN — Medication 1000 MCG: at 08:40

## 2023-09-04 RX ADMIN — LEVOTHYROXINE SODIUM 100 MCG: 0.1 TABLET ORAL at 06:33

## 2023-09-04 RX ADMIN — Medication 250 MG: at 08:41

## 2023-09-04 RX ADMIN — BACLOFEN 5 MG: 10 TABLET ORAL at 08:37

## 2023-09-04 RX ADMIN — METRONIDAZOLE 500 MG: 500 INJECTION, SOLUTION INTRAVENOUS at 21:53

## 2023-09-04 RX ADMIN — POTASSIUM CHLORIDE 20 MEQ: 750 TABLET, EXTENDED RELEASE ORAL at 08:40

## 2023-09-04 RX ADMIN — METRONIDAZOLE 500 MG: 500 INJECTION, SOLUTION INTRAVENOUS at 06:33

## 2023-09-04 RX ADMIN — Medication 10 ML: at 08:51

## 2023-09-04 RX ADMIN — POTASSIUM CHLORIDE 20 MEQ: 750 TABLET, EXTENDED RELEASE ORAL at 21:55

## 2023-09-04 RX ADMIN — BACLOFEN 5 MG: 10 TABLET ORAL at 21:53

## 2023-09-04 RX ADMIN — ALPRAZOLAM 3 MG: 1 TABLET ORAL at 21:55

## 2023-09-04 RX ADMIN — Medication 1 TABLET: at 08:42

## 2023-09-04 RX ADMIN — RIVAROXABAN 20 MG: 20 TABLET, FILM COATED ORAL at 22:01

## 2023-09-04 RX ADMIN — HYDROCHLOROTHIAZIDE 25 MG: 25 TABLET ORAL at 08:41

## 2023-09-04 RX ADMIN — LUBIPROSTONE 24 MCG: 24 CAPSULE, GELATIN COATED ORAL at 08:41

## 2023-09-04 RX ADMIN — Medication 10 ML: at 22:01

## 2023-09-04 RX ADMIN — PROMETHAZINE HYDROCHLORIDE 25 MG: 12.5 TABLET ORAL at 08:41

## 2023-09-04 RX ADMIN — PROMETHAZINE HYDROCHLORIDE 25 MG: 12.5 TABLET ORAL at 12:59

## 2023-09-04 NOTE — PROGRESS NOTES
Chief Complaint:    S/P Exploratory laparotomy with gastric access for ERCP and distal gastrectomy, POD 3    Subjective:    The patient is feeling well with no complaints except for back pain.  She is tolerating a clear liquid diet with no nausea or vomiting.    Objective:    Temp:  [97.9 °F (36.6 °C)-99 °F (37.2 °C)] 97.9 °F (36.6 °C)  Heart Rate:  [74-85] 77  Resp:  [16-18] 16  BP: (110-142)/(72-85) 134/72    Physical Exam  Constitutional:       Appearance: She is not ill-appearing or toxic-appearing.   Neurological:      Mental Status: She is alert.   Psychiatric:         Behavior: Behavior is cooperative.       Results:    WBC is 5.43.  H/H is 8.5/26.1.    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  The patient is recovering well but we will continue a full liquid diet at the present moment.    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  This small pouch is not in continuity with the GI tract and can be a potential problem if it is not vented in someway.  There is no good option intraoperatively so it was left in place.  I will check an upper GI to see if it communicates with the gastric pouch.  If it does not, she will need an endoscopic approach to traverse the staple line and allow these 2 aspects of the stomach to communicate.  This procedure is performed at [facility].    Slava Loo Jr., M.D.

## 2023-09-04 NOTE — PLAN OF CARE
Goal Outcome Evaluation:  VSS. Remains in enhanced droplet isolation.  Daughter at bedside throughout this shift.      Daughter involved in patient care.  Standing scale weight taken and recorded this am.  Client up to sit on side of bed several times today per client and daughter's report.  Taking full liquid diet, appetite fair.  Remains on IV Rocephin.  Tubbs catheter dc'd at 1500, client is due to void.  Safety maintained.  Continue to monitor.

## 2023-09-04 NOTE — PLAN OF CARE
Problem: Adult Inpatient Plan of Care  Goal: Plan of Care Review  Outcome: Ongoing, Progressing  Flowsheets (Taken 9/4/2023 4561)  Plan of Care Reviewed With:   patient   daughter  Outcome Evaluation: Patient rested well overnight. A&Ox4. Remains on RA. VSS. Midline C/D/I with staples. Scheduled pain medication, no PRN required overnight. IV antiboitcs continue. Patient passing gas. Bowel sounds normoactive. Tolerating full liquid diet, no complaints of nausea. all needs met.

## 2023-09-04 NOTE — PROGRESS NOTES
Name: Anne Jewell ADMIT: 2023   : 1950  PCP: Jewell Stephens APRN    MRN: [id number] LOS: 10 days   AGE/SEX: 72 y.o. female  ROOM: [facility]     Subjective   Subjective   POD 3  on CLD, no nausea vomiting fever chills. No BM but small amount of flatus. Appropriate post op pain. Chronic back pain. No overt bleeding.        Objective   Objective   Vital Signs  Temp:  [97.9 °F (36.6 °C)-99 °F (37.2 °C)] 97.9 °F (36.6 °C)  Heart Rate:  [74-85] 77  Resp:  [16-18] 16  BP: (110-142)/(72-85) 134/72  SpO2:  [95 %-96 %] 96 %  on   ;   Device (Oxygen Therapy): room air  Body mass index is 35.01 kg/m².  Physical Exam  Constitutional:       General: She is not in acute distress.     Appearance: She is not ill-appearing.   Cardiovascular:      Rate and Rhythm: Normal rate and regular rhythm.   Pulmonary:      Effort: Pulmonary effort is normal. No respiratory distress.   Abdominal:      General: Abdomen is flat. There is no distension.      Tenderness: There is no abdominal tenderness.      Comments: Incision with staples. CDI.     Musculoskeletal:         General: No swelling or deformity. Normal range of motion.      Right lower leg: Edema present.      Left lower leg: Edema present.      Comments: BLE +1   Skin:     General: Skin is warm and dry.   Neurological:      General: No focal deficit present.      Mental Status: She is alert. Mental status is at baseline.       Results Review     I reviewed the patient's new clinical results.  Results from last 7 days   Lab Units 23  0405 23  0407 23  0524 23  1252 23  0513   WBC 10*3/mm3 5.43 7.71 8.53  --  3.16*   HEMOGLOBIN g/dL 8.5* 9.1* 9.6* 11.3* 9.2*   PLATELETS 10*3/mm3 173 201 188  --  180       Results from last 7 days   Lab Units 23  0405 23  0407 23  0524 23  0513   SODIUM mmol/L 142 140 142 143   POTASSIUM mmol/L 3.7 3.8 4.3 3.6   CHLORIDE mmol/L 107 105 108* 109*   CO2 mmol/L 29.5* 28.7 26.0  26.0   BUN mg/dL 9 9 8 8   CREATININE mg/dL 0.76 0.85 0.71 0.80   GLUCOSE mg/dL 79 80 164* 81     Estimated Creatinine Clearance: 65.8 mL/min (by C-G formula based on SCr of 0.76 mg/dL).  Results from last 7 days   Lab Units 09/04/23  0405 09/03/23  0407 09/02/23  0524 09/01/23  0513   ALBUMIN g/dL 2.1* 2.0* 1.7* 2.4*   BILIRUBIN mg/dL 0.4 0.5 0.5 0.7   ALK PHOS U/L 270* 316* 393* 519*   AST (SGOT) U/L 26 31 38* 59*   ALT (SGPT) U/L 13 15 12 18       Results from last 7 days   Lab Units 09/04/23 0405 09/03/23 0407 09/02/23  0524 09/01/23  0513   CALCIUM mg/dL 7.4* 7.3* 7.4* 7.7*   ALBUMIN g/dL 2.1* 2.0* 1.7* 2.4*         COVID19   Date Value Ref Range Status   08/27/2023 Detected (C) Not Detected - Ref. Range Final   08/10/2020 Not Detected Not Detected - Ref. Range Final     No results found for: HGBA1C, POCGLU      FL C Arm During Surgery, FL ercp biliary duct only  Narrative: FL ERCP BILIARY DUCT ONLY-, FL C ARM DURING SURGERY-     INDICATIONS: ERCP, sphincterotomy, balloon sweep     TECHNIQUE: FLUOROSCOPIC ASSISTANCE IN THE OPERATING ROOM.     FINDINGS:     5 intraoperative fluoroscopic spot views were obtained and recorded the  PACS for review, revealing ERCP, opacification of extrahepatic, central  intrahepatic biliary ducts, balloon sweep. Please see operative report  for full details.     Fluoroscopy time: 315 seconds     Radiation exposure: Not provided        Impression:    As described.     This report was finalized on 9/1/2023 11:40 AM by Dr. Eze Enriquez M.D.       I reviewed the patient's daily medications.  Scheduled Medications  baclofen, 5 mg, Oral, TID  cefTRIAXone, 2,000 mg, Intravenous, Q24H  cholecalciferol, 2,000 Units, Oral, Daily  escitalopram, 10 mg, Oral, Daily  guaiFENesin, 600 mg, Oral, Nightly  hydroCHLOROthiazide, 25 mg, Oral, Daily  levothyroxine, 100 mcg, Oral, Q AM  lubiprostone, 24 mcg, Oral, BID With Meals  metroNIDAZOLE, 500 mg, Intravenous, Q8H  montelukast, 10 mg, Oral,  Daily  multivitamin, 1 tablet, Oral, Daily  oxyCODONE, 80 mg, Oral, Q12H  potassium chloride, 20 mEq, Oral, BID  promethazine, 25 mg, Oral, BID  rivaroxaban, 20 mg, Oral, Daily With Dinner  saccharomyces boulardii, 250 mg, Oral, BID  sodium chloride, 10 mL, Intravenous, Q12H  vitamin B-12, 1,000 mcg, Oral, Daily    Infusions  sodium chloride, 30 mL/hr, Last Rate: 9 mL/hr (09/04/23 0900)    Diet  Diet: Cardiac Diets, Liquid Diets; Full Liquid; Healthy Heart (2-3 Na+); Texture: Regular Texture (IDDSI 7); Fluid Consistency: Thin (IDDSI 0)         I have personally reviewed:  [x]  Laboratory   [x]  Microbiology   [x]  Radiology   []  EKG/Telemetry   []  Cardiology/Vascular   []  Pathology   []  Records     Assessment/Plan     Active Hospital Problems    Diagnosis  POA    **Choledocholithiasis [K80.50]  Yes    History of pulmonary embolism [Z86.711]  Unknown    Stage 3 chronic kidney disease [N18.30]  Yes    Gastroesophageal reflux disease [K21.9]  Yes    Acquired hypothyroidism [E03.9]  Yes    Chronic pain syndrome [G89.4]  Yes    Rheumatoid arthritis [M06.9]  Yes    Generalized osteoarthritis [M15.9]  Yes      Resolved Hospital Problems   No resolved problems to display.       72 y.o. female admitted with Choledocholithiasis.    Choledocholithiasis  History of gastric bypass  Elevated LFTs  Fungating stomach mass  -MRCP showing large stone in the distal common bile duct, 1.8 cm.  Intrahepatic biliary dilatation.  Marked biliary dilatation with CBD measuring 2.1 cm  -IV ceftriaxone, metronidazole, patient with history of allergies to penicillin.  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   - Diet per surgery- now on fulls.     Opioid induced  constipation  - add amitiza, continue bowel regimen. No BM but now on fulls. She is passing gas    Vaginal yeast infection  -Diflucan x2 doses    COVID-19  Fever, resolved  -Blood cultures with no growth for 24 hours  -Chest x-ray without any abnormalities.    -Completed 3 days of remdesivir    History of pulmonary pulmonary embolism.  Diagnosed in 2007, has been on anticoagulation since.  At home takes Xarelto currently, last dose was on 08/24/2023.   - ok to resume xarelto per surgery- restarted 9/2     Hypothyroidism: Resume home levothyroxine, recent TSH normal     CKD stage IIIa: Creatinine stable, monitor.  Avoid nephrotoxic drugs.     Chronic pain syndrome/chronic arthritis: Bob reviewed.  Oxycodone extended release to 80 mg twice daily scheduled.  Oxycodone extended release to 80 mg twice a day as needed as well.  Continue home alprazolam 3 mg nightly     History of RA: Not on treatment for RA per chart review, reports she was diagnosed but was never on specific treatment.     Memory loss: Daughter states she has noticed memory changes for the last several months.  Was referred to neurology outpatient follow-up patient with possible dementia.  Follow-up as outpatient    Abnormal liver on MRI: Repeat CT abdomen with liver protocol in 2 to 3 months.  Daughter concerned about left renal cyst and small splenic lesion that looks like a cyst per radiology report.  Can further be evaluated with a repeat CT abdomen.    Acute on chronic anemia-   On iron at home. Holding given recent surgery. Check iron studies.     Xarelto (home med) for DVT prophylaxis.  Full code.  Discussed with patient, family, and nursing staff.  Anticipate discharge with   Expected DC date 9/7      EUGENIO Noble  [facility]  09/04/23  10:36 EDT

## 2023-09-05 ENCOUNTER — APPOINTMENT (OUTPATIENT)
Dept: GENERAL RADIOLOGY | Facility: HOSPITAL | Age: 73
DRG: 423 | End: 2023-09-05
Payer: MEDICARE

## 2023-09-05 LAB
ALBUMIN SERPL-MCNC: 2 G/DL (ref 3.5–5.2)
ALBUMIN SERPL-MCNC: 2.1 G/DL (ref 3.5–5.2)
ALBUMIN/GLOB SERPL: 0.6 G/DL
ALBUMIN/GLOB SERPL: 0.7 G/DL
ALP SERPL-CCNC: 257 U/L (ref 39–117)
ALP SERPL-CCNC: 259 U/L (ref 39–117)
ALT SERPL W P-5'-P-CCNC: 11 U/L (ref 1–33)
ALT SERPL W P-5'-P-CCNC: 14 U/L (ref 1–33)
ANION GAP SERPL CALCULATED.3IONS-SCNC: 14 MMOL/L (ref 5–15)
ANION GAP SERPL CALCULATED.3IONS-SCNC: 8 MMOL/L (ref 5–15)
AST SERPL-CCNC: 27 U/L (ref 1–32)
AST SERPL-CCNC: 28 U/L (ref 1–32)
BILIRUB SERPL-MCNC: 0.4 MG/DL (ref 0–1.2)
BILIRUB SERPL-MCNC: 0.5 MG/DL (ref 0–1.2)
BUN SERPL-MCNC: 7 MG/DL (ref 8–23)
BUN SERPL-MCNC: 7 MG/DL (ref 8–23)
BUN/CREAT SERPL: 9.2 (ref 7–25)
BUN/CREAT SERPL: 9.3 (ref 7–25)
CALCIUM SPEC-SCNC: 7.6 MG/DL (ref 8.6–10.5)
CALCIUM SPEC-SCNC: 7.7 MG/DL (ref 8.6–10.5)
CHLORIDE SERPL-SCNC: 101 MMOL/L (ref 98–107)
CHLORIDE SERPL-SCNC: 104 MMOL/L (ref 98–107)
CO2 SERPL-SCNC: 24 MMOL/L (ref 22–29)
CO2 SERPL-SCNC: 27 MMOL/L (ref 22–29)
CREAT SERPL-MCNC: 0.75 MG/DL (ref 0.57–1)
CREAT SERPL-MCNC: 0.76 MG/DL (ref 0.57–1)
DEPRECATED RDW RBC AUTO: 47.3 FL (ref 37–54)
EGFRCR SERPLBLD CKD-EPI 2021: 83.4 ML/MIN/1.73
EGFRCR SERPLBLD CKD-EPI 2021: 84.7 ML/MIN/1.73
ERYTHROCYTE [DISTWIDTH] IN BLOOD BY AUTOMATED COUNT: 13.6 % (ref 12.3–15.4)
GLOBULIN UR ELPH-MCNC: 3.1 GM/DL
GLOBULIN UR ELPH-MCNC: 3.3 GM/DL
GLUCOSE SERPL-MCNC: 77 MG/DL (ref 65–99)
GLUCOSE SERPL-MCNC: 90 MG/DL (ref 65–99)
HCT VFR BLD AUTO: 31.6 % (ref 34–46.6)
HGB BLD-MCNC: 10.3 G/DL (ref 12–15.9)
MAGNESIUM SERPL-MCNC: 1.1 MG/DL (ref 1.6–2.4)
MCH RBC QN AUTO: 31.1 PG (ref 26.6–33)
MCHC RBC AUTO-ENTMCNC: 32.6 G/DL (ref 31.5–35.7)
MCV RBC AUTO: 95.5 FL (ref 79–97)
PLATELET # BLD AUTO: 228 10*3/MM3 (ref 140–450)
PMV BLD AUTO: 10.3 FL (ref 6–12)
POTASSIUM SERPL-SCNC: 3.6 MMOL/L (ref 3.5–5.2)
POTASSIUM SERPL-SCNC: 3.7 MMOL/L (ref 3.5–5.2)
PROT SERPL-MCNC: 5.2 G/DL (ref 6–8.5)
PROT SERPL-MCNC: 5.3 G/DL (ref 6–8.5)
RBC # BLD AUTO: 3.31 10*6/MM3 (ref 3.77–5.28)
SODIUM SERPL-SCNC: 139 MMOL/L (ref 136–145)
SODIUM SERPL-SCNC: 139 MMOL/L (ref 136–145)
WBC NRBC COR # BLD: 6.09 10*3/MM3 (ref 3.4–10.8)

## 2023-09-05 PROCEDURE — 25010000002 METRONIDAZOLE 500 MG/100ML SOLUTION: Performed by: SURGERY

## 2023-09-05 PROCEDURE — 93010 ELECTROCARDIOGRAM REPORT: CPT | Performed by: INTERNAL MEDICINE

## 2023-09-05 PROCEDURE — 97530 THERAPEUTIC ACTIVITIES: CPT

## 2023-09-05 PROCEDURE — 93005 ELECTROCARDIOGRAM TRACING: CPT | Performed by: STUDENT IN AN ORGANIZED HEALTH CARE EDUCATION/TRAINING PROGRAM

## 2023-09-05 PROCEDURE — 74240 X-RAY XM UPR GI TRC 1CNTRST: CPT

## 2023-09-05 PROCEDURE — 85027 COMPLETE CBC AUTOMATED: CPT | Performed by: SURGERY

## 2023-09-05 PROCEDURE — 99232 SBSQ HOSP IP/OBS MODERATE 35: CPT | Performed by: NURSE PRACTITIONER

## 2023-09-05 PROCEDURE — 0 DIATRIZOATE MEGLUMINE & SODIUM PER 1 ML: Performed by: STUDENT IN AN ORGANIZED HEALTH CARE EDUCATION/TRAINING PROGRAM

## 2023-09-05 PROCEDURE — 83735 ASSAY OF MAGNESIUM: CPT | Performed by: STUDENT IN AN ORGANIZED HEALTH CARE EDUCATION/TRAINING PROGRAM

## 2023-09-05 PROCEDURE — 25010000002 CEFTRIAXONE PER 250 MG: Performed by: SURGERY

## 2023-09-05 PROCEDURE — 80053 COMPREHEN METABOLIC PANEL: CPT | Performed by: STUDENT IN AN ORGANIZED HEALTH CARE EDUCATION/TRAINING PROGRAM

## 2023-09-05 PROCEDURE — 99024 POSTOP FOLLOW-UP VISIT: CPT | Performed by: SURGERY

## 2023-09-05 PROCEDURE — 63710000001 PROMETHAZINE PER 12.5 MG: Performed by: SURGERY

## 2023-09-05 PROCEDURE — 63710000001 PROMETHAZINE PER 25 MG: Performed by: SURGERY

## 2023-09-05 PROCEDURE — 80053 COMPREHEN METABOLIC PANEL: CPT | Performed by: SURGERY

## 2023-09-05 PROCEDURE — 25010000002 MAGNESIUM SULFATE 2 GM/50ML SOLUTION: Performed by: STUDENT IN AN ORGANIZED HEALTH CARE EDUCATION/TRAINING PROGRAM

## 2023-09-05 RX ORDER — MAGNESIUM SULFATE HEPTAHYDRATE 40 MG/ML
2 INJECTION, SOLUTION INTRAVENOUS
Status: COMPLETED | OUTPATIENT
Start: 2023-09-05 | End: 2023-09-06

## 2023-09-05 RX ORDER — LUBIPROSTONE 8 UG/1
8 CAPSULE ORAL 2 TIMES DAILY WITH MEALS
Status: DISCONTINUED | OUTPATIENT
Start: 2023-09-05 | End: 2023-09-07

## 2023-09-05 RX ADMIN — LEVOTHYROXINE SODIUM 100 MCG: 0.1 TABLET ORAL at 07:55

## 2023-09-05 RX ADMIN — OXYCODONE HYDROCHLORIDE 80 MG: 40 TABLET, FILM COATED, EXTENDED RELEASE ORAL at 15:19

## 2023-09-05 RX ADMIN — PROMETHAZINE HYDROCHLORIDE 25 MG: 12.5 TABLET ORAL at 21:48

## 2023-09-05 RX ADMIN — ESCITALOPRAM OXALATE 10 MG: 10 TABLET, FILM COATED ORAL at 15:19

## 2023-09-05 RX ADMIN — CEFTRIAXONE 2000 MG: 2 INJECTION, POWDER, FOR SOLUTION INTRAMUSCULAR; INTRAVENOUS at 18:54

## 2023-09-05 RX ADMIN — METRONIDAZOLE 500 MG: 500 INJECTION, SOLUTION INTRAVENOUS at 21:51

## 2023-09-05 RX ADMIN — ALPRAZOLAM 3 MG: 1 TABLET ORAL at 21:53

## 2023-09-05 RX ADMIN — Medication 1 TABLET: at 15:20

## 2023-09-05 RX ADMIN — MAGNESIUM SULFATE 2 G: 2 INJECTION INTRAVENOUS at 23:10

## 2023-09-05 RX ADMIN — METRONIDAZOLE 500 MG: 500 INJECTION, SOLUTION INTRAVENOUS at 06:30

## 2023-09-05 RX ADMIN — DIATRIZOATE MEGLUMINE AND DIATRIZOATE SODIUM 240 ML: 660; 100 LIQUID ORAL; RECTAL at 13:54

## 2023-09-05 RX ADMIN — MONTELUKAST SODIUM 10 MG: 10 TABLET, FILM COATED ORAL at 15:19

## 2023-09-05 RX ADMIN — HYDROCHLOROTHIAZIDE 25 MG: 25 TABLET ORAL at 15:20

## 2023-09-05 RX ADMIN — LOPERAMIDE HYDROCHLORIDE 2 MG: 2 CAPSULE ORAL at 18:54

## 2023-09-05 RX ADMIN — METRONIDAZOLE 500 MG: 500 INJECTION, SOLUTION INTRAVENOUS at 13:15

## 2023-09-05 RX ADMIN — BACLOFEN 5 MG: 10 TABLET ORAL at 21:47

## 2023-09-05 RX ADMIN — BACLOFEN 5 MG: 10 TABLET ORAL at 15:20

## 2023-09-05 RX ADMIN — PROMETHAZINE HYDROCHLORIDE 25 MG: 12.5 TABLET ORAL at 07:56

## 2023-09-05 RX ADMIN — OXYCODONE HYDROCHLORIDE 80 MG: 20 TABLET, FILM COATED, EXTENDED RELEASE ORAL at 21:48

## 2023-09-05 RX ADMIN — POTASSIUM CHLORIDE 20 MEQ: 750 TABLET, EXTENDED RELEASE ORAL at 21:49

## 2023-09-05 RX ADMIN — OXYCODONE HYDROCHLORIDE 80 MG: 20 TABLET, FILM COATED, EXTENDED RELEASE ORAL at 07:53

## 2023-09-05 RX ADMIN — PROMETHAZINE HYDROCHLORIDE 25 MG: 12.5 TABLET ORAL at 15:19

## 2023-09-05 RX ADMIN — Medication 250 MG: at 21:51

## 2023-09-05 RX ADMIN — Medication 1000 MCG: at 15:20

## 2023-09-05 RX ADMIN — GUAIFENESIN 600 MG: 600 TABLET, EXTENDED RELEASE ORAL at 21:48

## 2023-09-05 RX ADMIN — RIVAROXABAN 20 MG: 20 TABLET, FILM COATED ORAL at 21:47

## 2023-09-05 RX ADMIN — Medication 2000 UNITS: at 15:18

## 2023-09-05 NOTE — PROGRESS NOTES
Nutrition Services    Patient Name:  Anne Jewell  YOB: 1950  MRN: [id number]  Admit Date:  8/25/2023    Assessment Date:  09/05/23    Comment:    Pt is a 72 y.o. female adm for choledocholithiasis. Nutrition assessment completed for LOS. C/o incisional pain and chronic back pain per RN documentation. Previously NPO, diet advanced to full liquids. Eating well. % PO x 2 days.     REC:  Advance diet as tolerated and once medically appropriate per MD.  Will continue to encourage and monitor PO intake.     RD to follow per protocol.    CLINICAL NUTRITION ASSESSMENT      Reason for Assessment Length of Stay 11 days      Diagnosis/Problem   Choledocholithiasis    Medical/Surgical History Past Medical History:   Diagnosis Date    Allergic rhinitis     Arthritis of back     Arthritis of neck     Asthma     Bronchospasm     Carpal tunnel syndrome, bilateral     Cervical disc disorder     Clotting disorder     Deep vein thrombosis     Disc degeneration, lumbar     Edema     Esophageal reflux     Glaucoma     Hip arthrosis     Joint pain     Kidney disease 2018    stage 3    Liver disease 2018    stage 3    Low back pain     Osteoarthritis     Osteopenia     Osteoporosis     Periarthritis of shoulder     Scoliosis     Status post total knee replacement, left 08/31/2017    Status post total knee replacement, right 08/31/2017    UTI (urinary tract infection)     Venous thrombosis        Past Surgical History:   Procedure Laterality Date    BILATERAL BREAST REDUCTION      BREAST SURGERY      COLONOSCOPY  08/05/2016    ERCP N/A 8/30/2023    Procedure: ENDOSCOPIC RETROGRADE CHOLANGIOPANCREATOGRAPHY;  Surgeon: Elijah Simon MD;  Location: [facility];  Service: Gastroenterology;  Laterality: N/A;  cbd stone    ERCP N/A 9/1/2023    Procedure: ENDOSCOPIC RETROGRADE CHOLANGIOPANCREATOGRAPHY WITH CHOLANGIOGRAM, SPHINCTEROTOMY, BALLOON SWEEP;  Surgeon: Elijah Simon MD;  Location: [facility]  "OR;  Service: Gastroenterology;  Laterality: N/A;    EXPLORATORY LAPAROTOMY N/A 9/1/2023    Procedure: Exploratory laparotomy with Distal Gastrectomy;  Surgeon: Slava Loo Jr., MD;  Location: [facility] MAIN OR;  Service: General;  Laterality: N/A;    GASTRIC BYPASS      HAND SURGERY Right 01/14/2016    HERNIA REPAIR      x7    HYSTERECTOMY      JOINT REPLACEMENT      Both knees    OTHER SURGICAL HISTORY      vaginal sling operation for stress incontinence    TOTAL KNEE ARTHROPLASTY Left     TOTAL KNEE ARTHROPLASTY Bilateral         Encounter Information        Nutrition History:     Food Preferences:    Supplements:    Factors Affecting Intake: abdominal pain, pain issues     Anthropometrics        Current Height  Current Weight  BMI kg/m2 Height: 154.9 cm (61\")  Weight: 81.9 kg (180 lb 9.6 oz) (09/05/23 0657)  Body mass index is 34.12 kg/m².   Adjusted BMI (if applicable)    BMI Category Obese, Class I (30 - 34.9)       Admission Weight 81.6 kg       Ideal Body Weight (IBW) 47.8 kg   Adjusted IBW (if applicable)        Usual Body Weight (UBW) 190 lb per wt hx   Weight Trend Loss, Amount/Timeframe: 11 lb (5.8%) wt loss x 5 mo        Weight History Wt Readings from Last 30 Encounters:   09/05/23 0657 81.9 kg (180 lb 9.6 oz)   09/04/23 0900 84.1 kg (185 lb 4.8 oz)   09/01/23 0357 88.5 kg (195 lb 1.7 oz)   08/31/23 0515 85.4 kg (188 lb 3.2 oz)   08/30/23 0518 81.6 kg (180 lb)   08/29/23 0310 80.6 kg (177 lb 12.8 oz)   08/27/23 0647 85.1 kg (187 lb 9.8 oz)   08/26/23 0655 80.1 kg (176 lb 9.6 oz)   08/25/23 1314 81.6 kg (180 lb)   08/24/23 1122 82.6 kg (182 lb)   07/28/23 1159 82.6 kg (182 lb 3.2 oz)   07/28/23 1051 82.6 kg (182 lb 3.2 oz)   07/19/23 1343 81.8 kg (180 lb 6.4 oz)   06/30/23 1114 83.4 kg (183 lb 12.8 oz)   06/08/23 1441 83.5 kg (184 lb)   05/30/23 1244 83.7 kg (184 lb 9.6 oz)   04/19/23 1443 87 kg (191 lb 12.8 oz)   03/30/23 1015 88.2 kg (194 lb 6.4 oz)   02/27/23 1236 86 kg (189 lb 9.6 oz)   01/24/23 " 1256 87.8 kg (193 lb 9.6 oz)   12/19/22 1350 84.4 kg (186 lb)   11/29/22 1316 84.6 kg (186 lb 6.4 oz)   11/16/22 1321 84.8 kg (187 lb)   11/01/22 1410 85.1 kg (187 lb 9.6 oz)   10/19/22 1507 85 kg (187 lb 6.4 oz)   09/27/22 1127 89.7 kg (197 lb 12.8 oz)   08/30/22 1050 85.8 kg (189 lb 3.2 oz)   08/24/22 1254 90.2 kg (198 lb 12.8 oz)   07/26/22 1101 88.9 kg (196 lb)   07/13/22 1047 91.6 kg (202 lb)   06/21/22 1047 87.5 kg (193 lb)   06/09/22 1422 86.2 kg (190 lb)   05/23/22 1100 90.1 kg (198 lb 9.6 oz)   04/26/22 1133 88.9 kg (196 lb)   04/14/22 0840 87.3 kg (192 lb 6.4 oz)   04/11/22 1405 86.5 kg (190 lb 12.8 oz)   03/22/22 1145 87.3 kg (192 lb 6.4 oz)   02/18/22 0842 89.7 kg (197 lb 12.8 oz)      --  Tests/Procedures        Tests/Procedures No new tests/procedures     Labs       Pertinent Labs    Results from last 7 days   Lab Units 09/05/23  0429 09/04/23  0405 09/03/23  0407   SODIUM mmol/L 139 142 140   POTASSIUM mmol/L 3.7 3.7 3.8   CHLORIDE mmol/L 104 107 105   CO2 mmol/L 27.0 29.5* 28.7   BUN mg/dL 7* 9 9   CREATININE mg/dL 0.76 0.76 0.85   CALCIUM mg/dL 7.6* 7.4* 7.3*   BILIRUBIN mg/dL 0.5 0.4 0.5   ALK PHOS U/L 257* 270* 316*   ALT (SGPT) U/L 14 13 15   AST (SGOT) U/L 28 26 31   GLUCOSE mg/dL 77 79 80     Results from last 7 days   Lab Units 09/05/23  0429   HEMOGLOBIN g/dL 10.3*   HEMATOCRIT % 31.6*   WBC 10*3/mm3 6.09   ALBUMIN g/dL 2.1*     Results from last 7 days   Lab Units 09/05/23  0429 09/04/23  0405 09/03/23  0407 09/02/23  0524 09/01/23  0513   PLATELETS 10*3/mm3 228 173 201 188 180     COVID19   Date Value Ref Range Status   08/27/2023 Detected (C) Not Detected - Ref. Range Final     Lab Results   Component Value Date    HGBA1C 5.7 (H) 04/14/2022          Medications           Scheduled Medications baclofen, 5 mg, Oral, TID  cefTRIAXone, 2,000 mg, Intravenous, Q24H  cholecalciferol, 2,000 Units, Oral, Daily  escitalopram, 10 mg, Oral, Daily  guaiFENesin, 600 mg, Oral,  Nightly  hydroCHLOROthiazide, 25 mg, Oral, Daily  levothyroxine, 100 mcg, Oral, Q AM  lubiprostone, 24 mcg, Oral, BID With Meals  metroNIDAZOLE, 500 mg, Intravenous, Q8H  montelukast, 10 mg, Oral, Daily  multivitamin, 1 tablet, Oral, Daily  oxyCODONE, 80 mg, Oral, Q12H  potassium chloride, 20 mEq, Oral, BID  promethazine, 25 mg, Oral, BID  rivaroxaban, 20 mg, Oral, Daily With Dinner  saccharomyces boulardii, 250 mg, Oral, BID  sodium chloride, 10 mL, Intravenous, Q12H  vitamin B-12, 1,000 mcg, Oral, Daily       Infusions sodium chloride, 30 mL/hr, Last Rate: 9 mL/hr (09/04/23 0900)       PRN Medications   acetaminophen    albuterol    ALPRAZolam    senna-docusate sodium **AND** polyethylene glycol **AND** bisacodyl **AND** bisacodyl    HYDROmorphone    loperamide    Magnesium Standard Dose Replacement - Follow Nurse / BPA Driven Protocol    nitroglycerin    oxyCODONE    Potassium Replacement - Follow Nurse / BPA Driven Protocol    promethazine **OR** promethazine    sodium chloride    sodium chloride    sodium chloride     Physical Findings          Physical Appearance alert, obese, oriented, room air   Oral/Mouth Cavity tooth or teeth missing   Edema  1+ (trace), 2+ (mild)   Gastrointestinal non-distended , passing flatus, last bowel movement: 9/1   Skin  surgical incision: abdomen incision    Tubes/Drains/Lines none   NFPE Not indicated at this time   --  Current Nutrition Orders & Evaluation of Intake       Oral Nutrition     Food Allergies NKFA   Current PO Diet Diet: Cardiac Diets, Liquid Diets; Full Liquid; Healthy Heart (2-3 Na+); Texture: Regular Texture (IDDSI 7); Fluid Consistency: Thin (IDDSI 0)   Supplement n/a   PO Evaluation     % PO Intake/# of Days % x 2 days    --  PES STATEMENT / NUTRITION DIAGNOSIS      Nutrition Dx Problem  Problem: No Nutrition Diagnosis at this Time   --  NUTRITION INTERVENTION / PLAN OF CARE      Intervention Goal(s) Maintain nutrition status, Reduce/improve symptoms,  Disease management/therapy, Maintain intake, and Appropriate weight loss         RD Intervention/Action Encourage intake, Follow Tx Progress, and Care plan reviewed         Prescription/Orders:       PO Diet       Supplements       Snacks       Enteral Nutrition       Parenteral Nutrition    New Prescription Ordered? No changes at this time   --      Monitor/Evaluation Per protocol, PO intake, Weight, Skin status, GI status, Symptoms, POC/GOC   Discharge Plan/Needs Pending clinical course   Education Will instruct as appropriate   --    RD to follow per protocol.      Electronically signed by:  Kellen Wallace Dietitian Intern   09/05/23 09:40 EDT

## 2023-09-05 NOTE — PROGRESS NOTES
[facility]  Inpatient Progress Note    Reason for Follow Up: Postop day 4 for gastric tumor, distal gastrectomy    Subjective     Interval History:     No acute events overnight.  Upper GI series has been completed results pending.  Patient anxious for home.        Current Facility-Administered Medications:     acetaminophen (TYLENOL) tablet 650 mg, 650 mg, Oral, Q6H PRN, Slava Loo Jr., MD, 650 mg at 08/31/23 1821    albuterol (PROVENTIL) nebulizer solution 0.083% 2.5 mg/3mL, 2.5 mg, Nebulization, Q6H PRN, Slava Loo Jr., MD    ALPRAZolam (XANAX) tablet 3 mg, 3 mg, Oral, Nightly PRN, Slava Loo Jr., MD, 3 mg at 09/04/23 2155    baclofen (LIORESAL) tablet 5 mg, 5 mg, Oral, TID, Slava Loo Jr., MD, 5 mg at 09/04/23 2153    sennosides-docusate (PERICOLACE) 8.6-50 MG per tablet 2 tablet, 2 tablet, Oral, BID PRN **AND** polyethylene glycol (MIRALAX) packet 17 g, 17 g, Oral, Daily PRN **AND** bisacodyl (DULCOLAX) EC tablet 5 mg, 5 mg, Oral, Daily PRN **AND** bisacodyl (DULCOLAX) suppository 10 mg, 10 mg, Rectal, Daily PRN, Slava Loo Jr., MD    cefTRIAXone (ROCEPHIN) 2,000 mg in sodium chloride 0.9 % 100 mL IVPB-VTB, 2,000 mg, Intravenous, Q24H, Slava Loo Jr., MD, Last Rate: 200 mL/hr at 09/04/23 1731, 2,000 mg at 09/04/23 1731    cholecalciferol (VITAMIN D3) tablet 2,000 Units, 2,000 Units, Oral, Daily, Slava Loo Jr., MD, 2,000 Units at 09/04/23 0842    escitalopram (LEXAPRO) tablet 10 mg, 10 mg, Oral, Daily, Slava Loo Jr., MD, 10 mg at 09/04/23 0841    guaiFENesin (MUCINEX) 12 hr tablet 600 mg, 600 mg, Oral, Nightly, Slava Loo Jr., MD, 600 mg at 09/04/23 2200    hydroCHLOROthiazide (HYDRODIURIL) tablet 25 mg, 25 mg, Oral, Daily, Slava Loo Jr., MD, 25 mg at 09/04/23 0841    HYDROmorphone (DILAUDID) injection 1 mg, 1 mg, Intravenous, Q2H PRN, Slava Loo Jr., MD, 1 mg at 09/02/23 0232    levothyroxine (SYNTHROID, LEVOTHROID) tablet 100  mcg, 100 mcg, Oral, Q AM, Slava Loo Jr., MD, 100 mcg at 09/05/23 0755    loperamide (IMODIUM) capsule 2 mg, 2 mg, Oral, 4x Daily PRN, Talha Montano MD    lubiprostone (AMITIZA) capsule 8 mcg, 8 mcg, Oral, BID With Meals, Zuleyma Graf, APRN    Magnesium Standard Dose Replacement - Follow Nurse / BPA Driven Protocol, , Does not apply, PRN, Slava Loo Jr., MD    metroNIDAZOLE (FLAGYL) IVPB 500 mg, 500 mg, Intravenous, Q8H, Slava Loo Jr., MD, Last Rate: 100 mL/hr at 09/05/23 0630, 500 mg at 09/05/23 0630    montelukast (SINGULAIR) tablet 10 mg, 10 mg, Oral, Daily, Slava Loo Jr., MD, 10 mg at 09/04/23 0841    multivitamin (THERAGRAN) tablet 1 tablet, 1 tablet, Oral, Daily, Slava Loo Jr., MD, 1 tablet at 09/04/23 0842    nitroglycerin (NITROSTAT) SL tablet 0.4 mg, 0.4 mg, Sublingual, Q5 Min PRN, Slava Loo Jr., MD    oxyCODONE ER (oxyCONTIN) 12 hr tablet 80 mg, 80 mg, Oral, Q12H, Slava Loo Jr., MD, 80 mg at 09/05/23 0753    oxyCODONE ER (oxyCONTIN) 12 hr tablet 80 mg, 80 mg, Oral, BID PRN, Slava Loo Jr., MD, 80 mg at 09/04/23 1259    potassium chloride (K-DUR,KLOR-CON) ER tablet 20 mEq, 20 mEq, Oral, BID, Slava Loo Jr., MD, 20 mEq at 09/04/23 2155    Potassium Replacement - Follow Nurse / BPA Driven Protocol, , Does not apply, PRN, Slava Loo Jr., MD    promethazine (PHENERGAN) tablet 25 mg, 25 mg, Oral, Q6H PRN, 25 mg at 09/04/23 1259 **OR** promethazine (PHENERGAN) suppository 12.5 mg, 12.5 mg, Rectal, Q6H PRN, Slava Loo Jr., MD    promethazine (PHENERGAN) tablet 25 mg, 25 mg, Oral, BID, Slava Loo Jr., MD, 25 mg at 09/05/23 0756    rivaroxaban (XARELTO) tablet 20 mg, 20 mg, Oral, Daily With Dinner, Abeba Shearer MD, 20 mg at 09/04/23 2201    saccharomyces boulardii (FLORASTOR) capsule 250 mg, 250 mg, Oral, BID, Slava Loo Jr., MD, 250 mg at 09/04/23 2159    sodium chloride 0.9 % flush 10 mL, 10 mL, Intravenous, Q12H, Slava Loo  MD Micheal, 10 mL at 09/04/23 2201    sodium chloride 0.9 % flush 10 mL, 10 mL, Intravenous, PRN, Slava Loo Jr., MD    sodium chloride 0.9 % infusion 40 mL, 40 mL, Intravenous, PRN, Slava Loo Jr., MD    sodium chloride 0.9 % infusion, 30 mL/hr, Intravenous, Continuous PRN, Slava Loo Jr., MD, Last Rate: 9 mL/hr at 09/04/23 0900, 9 mL/hr at 09/04/23 0900    vitamin B-12 (CYANOCOBALAMIN) tablet 1,000 mcg, 1,000 mcg, Oral, Daily, Slava Loo Jr., MD, 1,000 mcg at 09/04/23 0840  Review of Systems:    The following systems were reviewed and negative;  gastrointestinal    Objective     Vital Signs  Temp:  [97.7 °F (36.5 °C)-98.6 °F (37 °C)] 97.7 °F (36.5 °C)  Heart Rate:  [] 107  Resp:  [18-20] 20  BP: (124-146)/(74-94) 146/74  Body mass index is 34.12 kg/m².    Intake/Output Summary (Last 24 hours) at 9/5/2023 1507  Last data filed at 9/5/2023 1000  Gross per 24 hour   Intake --   Output 1395 ml   Net -1395 ml     I/O this shift:  In: -   Out: 1000 [Urine:900; Drains:100]     Physical Exam:   General: patient awake, alert and cooperative   Abdomen: soft, nontender, nondistended; normal bowel sounds     Results Review:     I reviewed the patient's new clinical results.    Results from last 7 days   Lab Units 09/05/23 0429 09/04/23 0405 09/03/23  0407   WBC 10*3/mm3 6.09 5.43 7.71   HEMOGLOBIN g/dL 10.3* 8.5* 9.1*   HEMATOCRIT % 31.6* 26.1* 27.7*   PLATELETS 10*3/mm3 228 173 201     Results from last 7 days   Lab Units 09/05/23  0429 09/04/23  0405 09/03/23  0407   SODIUM mmol/L 139 142 140   POTASSIUM mmol/L 3.7 3.7 3.8   CHLORIDE mmol/L 104 107 105   CO2 mmol/L 27.0 29.5* 28.7   BUN mg/dL 7* 9 9   CREATININE mg/dL 0.76 0.76 0.85   CALCIUM mg/dL 7.6* 7.4* 7.3*   BILIRUBIN mg/dL 0.5 0.4 0.5   ALK PHOS U/L 257* 270* 316*   ALT (SGPT) U/L 14 13 15   AST (SGOT) U/L 28 26 31   GLUCOSE mg/dL 77 79 80         No results found for: LIPASE    Radiology:  FL Upper GI Single Contrast With KUB   Final Result       FL ercp biliary duct only   Final Result       As described.       This report was finalized on 9/1/2023 11:40 AM by Dr. Eze Enriquez M.D.          XR Abdomen KUB   Final Result      FL C Arm During Surgery   Final Result       As described.       This report was finalized on 9/1/2023 11:40 AM by Dr. Eze Enriquez M.D.          XR Chest 1 View   Final Result   No focal pulmonary consolidation. Follow-up as clinical   indications persist.       This report was finalized on 8/27/2023 10:32 AM by Dr. Eze Enriquez M.D.              Assessment & Plan     Active Hospital Problems    Diagnosis     **Choledocholithiasis     History of pulmonary embolism     Stage 3 chronic kidney disease     Gastroesophageal reflux disease     Acquired hypothyroidism     Chronic pain syndrome     Rheumatoid arthritis     Generalized osteoarthritis        Assessment:  Postop day 4 exploratory lap with gastric access for ERCP and distal gastrectomy for gastric mass    Plan:  Await pathology  Diet as tolerated per surgical services  Postop management per Dr. Loo  Upper GI series pending     I discussed the patients findings and my recommendations with patient and nursing staff.    Valentina Fuller, APRN

## 2023-09-05 NOTE — THERAPY TREATMENT NOTE
Patient Name: Anne Jewell  : 1950    MRN: [id number]                              Today's Date: 2023       Admit Date: 2023    Visit Dx:     ICD-10-CM ICD-9-CM   1. Choledocholithiasis  K80.50 574.50     Patient Active Problem List   Diagnosis    Chronic pain syndrome    Rheumatoid arthritis    Osteoarthritis of knee    Generalized osteoarthritis    Degeneration of intervertebral disc of lumbar region    Neck pain    Lumbar radiculopathy    Atopic rhinitis    Anxiety    Diarrhea    Indigestion    Dysthymic disorder    Gastroesophageal reflux disease    Acquired hypothyroidism    Insomnia    Pernicious anemia    Osteoporosis    Scoliosis    Vasovagal syncope    Vitamin D deficiency    Trigger middle finger of right hand    Trigger ring finger of right hand    Trigger little finger of right hand    Encounter for long-term (current) use of high-risk medication    Coagulation disorder    Hyperglycemia    Joint pain    Status post total knee replacement, left    Left knee pain    Status post total knee replacement, right    Osteoarthritis of thumb    Trigger finger of all digits of right hand    Left hip pain    Primary osteoarthritis of both hips    Edema    Nausea    Irritable bowel syndrome with diarrhea    Stage 3 chronic kidney disease    Cough    Acute non-recurrent frontal sinusitis    Dysuria    History of DVT (deep vein thrombosis)    Routine general medical examination at a health care facility    Frequency of urination    Memory loss    Visual changes    Choledocholithiasis    History of pulmonary embolism     Past Medical History:   Diagnosis Date    Allergic rhinitis     Arthritis of back     Arthritis of neck     Asthma     Bronchospasm     Carpal tunnel syndrome, bilateral     Cervical disc disorder     Clotting disorder     Deep vein thrombosis     Disc degeneration, lumbar     Edema     Esophageal reflux     Glaucoma     Hip arthrosis     Joint pain     Kidney disease 2018    stage 3     Liver disease 2018    stage 3    Low back pain     Osteoarthritis     Osteopenia     Osteoporosis     Periarthritis of shoulder     Scoliosis     Status post total knee replacement, left 08/31/2017    Status post total knee replacement, right 08/31/2017    UTI (urinary tract infection)     Venous thrombosis      Past Surgical History:   Procedure Laterality Date    BILATERAL BREAST REDUCTION      BREAST SURGERY      COLONOSCOPY  08/05/2016    ERCP N/A 8/30/2023    Procedure: ENDOSCOPIC RETROGRADE CHOLANGIOPANCREATOGRAPHY;  Surgeon: Elijah Simon MD;  Location: [facility] ENDOSCOPY;  Service: Gastroenterology;  Laterality: N/A;  cbd stone    ERCP N/A 9/1/2023    Procedure: ENDOSCOPIC RETROGRADE CHOLANGIOPANCREATOGRAPHY WITH CHOLANGIOGRAM, SPHINCTEROTOMY, BALLOON SWEEP;  Surgeon: Elijah Simon MD;  Location: [facility] OR;  Service: Gastroenterology;  Laterality: N/A;    EXPLORATORY LAPAROTOMY N/A 9/1/2023    Procedure: Exploratory laparotomy with Distal Gastrectomy;  Surgeon: Slava Loo Jr., MD;  Location: [facility] OR;  Service: General;  Laterality: N/A;    GASTRIC BYPASS      HAND SURGERY Right 01/14/2016    HERNIA REPAIR      x7    HYSTERECTOMY      JOINT REPLACEMENT      Both knees    OTHER SURGICAL HISTORY      vaginal sling operation for stress incontinence    TOTAL KNEE ARTHROPLASTY Left     TOTAL KNEE ARTHROPLASTY Bilateral       General Information       Row Name 09/05/23 1442          Physical Therapy Time and Intention    Document Type therapy note (daily note)  -CS     Mode of Treatment individual therapy;physical therapy  -CS       Row Name 09/05/23 1442          General Information    Patient Profile Reviewed yes  -CS     Existing Precautions/Restrictions fall;other (see comments)  AFIA drain  -CS       Row Name 09/05/23 1442          Cognition    Orientation Status (Cognition) oriented x 4  -CS       Row Name 09/05/23 1442          Safety Issues, Functional Mobility    Impairments  Affecting Function (Mobility) balance;endurance/activity tolerance;strength;pain  -CS               User Key  (r) = Recorded By, (t) = Taken By, (c) = Cosigned By      Initials Name Provider Type    CS Anahy Johnson, PT Physical Therapist                   Mobility       Row Name 09/05/23 1443          Bed Mobility    Bed Mobility sit-supine  -     Sit-Supine [place] (Bed Mobility) supervision  -CS     Comment, (Bed Mobility) sitting EOB upon arrival  -       Row Name 09/05/23 1443          Sit-Stand Transfer    Sit-Stand [place] (Transfers) standby assist  -     Assistive Device (Sit-Stand Transfers) walker, front-wheeled  -       Row Name 09/05/23 1443          Gait/Stairs (Locomotion)    [place] Level (Gait) standby assist  -     Assistive Device (Gait) walker, 4-wheeled  -     Distance in Feet (Gait) 15'  -     Deviations/Abnormal Patterns (Gait) kristi decreased;gait speed decreased;stride length decreased  -CS     Comment, (Gait/Stairs) slow pace but no LOB; distance limited as pt very nauseous  -               User Key  (r) = Recorded By, (t) = Taken By, (c) = Cosigned By      Initials Name Provider Type    Anahy Ballard, NATHAN Physical Therapist                   Obj/Interventions       Row Name 09/05/23 1445          Balance    Balance Assessment sitting static balance;sitting dynamic balance;standing static balance;standing dynamic balance  -     Static Sitting Balance independent  -CS     Dynamic Sitting Balance modified independence  -     Position, Sitting Balance unsupported;sitting edge of bed  -     Static Standing Balance standby assist  -     Dynamic Standing Balance standby assist  -     Position/Device Used, Standing Balance supported;walker, front-wheeled  -CS               User Key  (r) = Recorded By, (t) = Taken By, (c) = Cosigned By      Initials Name Provider Type    Anahy Ballard, PT Physical Therapist                   Goals/Plan    No  documentation.                  Clinical Impression       Row Name 09/05/23 1445          Pain    Pretreatment Pain Rating 0/10 - no pain  -CS       Row Name 09/05/23 1445          Plan of Care Review    Plan of Care Reviewed With patient  -CS     Progress improving  -CS     Outcome Evaluation Pt received sitting EOB upon arrival and agreeable to PT. Pt stood and ambulated 15' c RW requiring SBA. Pt demo's a slow pace but no LOB. Distance limited as pt with c/o nausea. RN notified. Pt returned to supine with SPV. PT cleared pt to txf to [facility] independently. Pt wants to go home soon with assist from daughter. Pt will continue to benefit from skilled PT to address strength and endurance.  -CS       Row Name 09/05/23 1445          Therapy Assessment/Plan (PT)    Criteria for Skilled Interventions Met (PT) yes;meets criteria  -CS     Therapy Frequency (PT) 3 times/wk  -CS       Row Name 09/05/23 1445          Positioning and Restraints    Pre-Treatment Position in bed  -CS     Post Treatment Position bed  -CS     In Bed notified nsg;supine;call light within reach;encouraged to call for assist  -CS               User Key  (r) = Recorded By, (t) = Taken By, (c) = Cosigned By      Initials Name Provider Type    CS Anahy Johnson, PT Physical Therapist                   Outcome Measures       Row Name 09/05/23 1452 09/05/23 1000       How much help from another person do you currently need...    Turning from your back to your side while in flat bed without using bedrails? 4  -CS 3  -GP    Moving from lying on back to sitting on the side of a flat bed without bedrails? 4  -CS 3  -GP    Moving to and from a bed to a chair (including a wheelchair)? 4  -CS 3  -GP    Standing up from a chair using your arms (e.g., wheelchair, bedside chair)? 4  -CS 3  -GP    Climbing 3-5 steps with a railing? 3  -CS 1  -GP    To walk in hospital room? 4  -CS 3  -GP    AM-PAC 6 Clicks Score (PT) 23  -CS 16  -GP    Highest level of mobility 7 -->  Walked 25 feet or more  - 5 --> Static standing  -GP      Row Name 09/05/23 1452          Functional Assessment    Outcome Measure Options AM-PAC 6 Clicks Basic Mobility (PT)  -               User Key  (r) = Recorded By, (t) = Taken By, (c) = Cosigned By      Initials Name Provider Type    GP Kaleigh Capps, RN Registered Nurse    Anahy Ballard, PT Physical Therapist                                 Physical Therapy Education       Title: PT OT SLP Therapies (Done)       Topic: Physical Therapy (Done)       Point: Mobility training (Done)       Learning Progress Summary             Patient Acceptance, E,TB, VU,DU by  at 9/5/2023 1452    Acceptance, E,TB,D, VU,NR by  at 9/3/2023 1516    Acceptance, E, VU by  at 8/28/2023 1014                         Point: Home exercise program (Done)       Learning Progress Summary             Patient Acceptance, E,TB, VU,DU by  at 9/5/2023 1452    Acceptance, E,TB,D, VU,NR by  at 9/3/2023 1516    Acceptance, E, VU by  at 8/28/2023 1014                         Point: Body mechanics (Done)       Learning Progress Summary             Patient Acceptance, E,TB, VU,DU by  at 9/5/2023 1452    Acceptance, E,TB,D, VU,NR by  at 9/3/2023 1516    Acceptance, E, VU by  at 8/28/2023 1014                         Point: Precautions (Done)       Learning Progress Summary             Patient Acceptance, E,TB, VU,DU by  at 9/5/2023 1452    Acceptance, E,TB,D, VU,NR by  at 9/3/2023 1516    Acceptance, E, VU by  at 8/28/2023 1014                                         User Key       Initials Effective Dates Name Provider Type Discipline     04/08/22 -  Margie Main PT Physical Therapist PT     05/02/22 -  Dixie Butler PT Physical Therapist PT     09/22/22 -  Anahy Johnson PT Physical Therapist PT                  PT Recommendation and Plan     Plan of Care Reviewed With: patient  Progress: improving  Outcome Evaluation: Pt received sitting EOB upon  arrival and agreeable to PT. Pt stood and ambulated 15' c RW requiring SBA. Pt demo's a slow pace but no LOB. Distance limited as pt with c/o nausea. RN notified. Pt returned to supine with SPV. PT cleared pt to txf to [facility] independently. Pt wants to go home soon with assist from daughter. Pt will continue to benefit from skilled PT to address strength and endurance.     Time Calculation:         PT Charges       Row Name 09/05/23 1452             Time Calculation    Start Time 1420  -CS      Stop Time 1428  -CS      Time Calculation (min) 8 min  -CS      PT Received On 09/05/23  -CS      PT - Next Appointment 09/06/23  -CS         Time Calculation- PT    Total Timed Code Minutes- PT 8 minute(s)  -CS         Timed Charges    76516 - PT Therapeutic Activity Minutes 8  -CS         Total Minutes    Timed Charges Total Minutes 8  -CS       Total Minutes 8  -CS                User Key  (r) = Recorded By, (t) = Taken By, (c) = Cosigned By      Initials Name Provider Type    CS Anahy Johnson, PT Physical Therapist                  Therapy Charges for Today       Code Description Service Date Service Provider Modifiers Qty    [id number]  PT THERAPEUTIC ACT EA 15 MIN 9/5/2023 Anahy Johnson, PT GP 1            PT G-Codes  Outcome Measure Options: AM-PAC 6 Clicks Basic Mobility (PT)  AM-PAC 6 Clicks Score (PT): 23  PT Discharge Summary  Anticipated Discharge Disposition (PT): home with 24/7 care, home with home health    Anahy Johnson PT  9/5/2023

## 2023-09-05 NOTE — PLAN OF CARE
Goal Outcome Evaluation:  Plan of Care Reviewed With: patient        Progress: improving  Outcome Evaluation: Pt received sitting EOB upon arrival and agreeable to PT. Pt stood and ambulated 15' c RW requiring SBA. Pt demo's a slow pace but no LOB. Distance limited as pt with c/o nausea. RN notified. Pt returned to supine with SPV. PT cleared pt to txf to [facility] independently. Pt wants to go home soon with assist from daughter. Pt will continue to benefit from skilled PT to address strength and endurance.      Anticipated Discharge Disposition (PT): home with 24/7 care, home with home health

## 2023-09-05 NOTE — PROGRESS NOTES
Name: Anne Jewell ADMIT: 2023   : 1950  PCP: Jewell Stephens APRN    MRN: [id number] LOS: 11 days   AGE/SEX: 72 y.o. female  ROOM: [facility]     Subjective   Subjective   POD 4  on , no nausea vomiting fever chills. She is not eating today due to GI study today. 3 BMs.  Appropriate post op pain. Chronic back pain. No overt bleeding.        Objective   Objective   Vital Signs  Temp:  [97.7 °F (36.5 °C)-98.6 °F (37 °C)] 97.7 °F (36.5 °C)  Heart Rate:  [] 107  Resp:  [16-20] 20  BP: (124-146)/(74-94) 146/74  SpO2:  [95 %-100 %] 100 %  on   ;   Device (Oxygen Therapy): room air  Body mass index is 34.12 kg/m².  Physical Exam  Constitutional:       General: She is not in acute distress.     Appearance: She is not ill-appearing.   Cardiovascular:      Rate and Rhythm: Normal rate and regular rhythm.   Pulmonary:      Effort: Pulmonary effort is normal. No respiratory distress.   Abdominal:      General: Abdomen is flat. There is no distension.      Tenderness: There is no abdominal tenderness.      Comments: Incision with staples. CDI.     Musculoskeletal:         General: No swelling or deformity. Normal range of motion.      Right lower leg: Edema present.      Left lower leg: Edema present.      Comments: BLE +1   Skin:     General: Skin is warm and dry.   Neurological:      General: No focal deficit present.      Mental Status: She is alert. Mental status is at baseline.       Results Review     I reviewed the patient's new clinical results.  Results from last 7 days   Lab Units 23  04023  04023  0524   WBC 10*3/mm3 6.09 5.43 7.71 8.53   HEMOGLOBIN g/dL 10.3* 8.5* 9.1* 9.6*   PLATELETS 10*3/mm3 228 173 201 188       Results from last 7 days   Lab Units 23  0405 23  04023  0524   SODIUM mmol/L 139 142 140 142   POTASSIUM mmol/L 3.7 3.7 3.8 4.3   CHLORIDE mmol/L 104 107 105 108*   CO2 mmol/L 27.0 29.5* 28.7 26.0   BUN  mg/dL 7* 9 9 8   CREATININE mg/dL 0.76 0.76 0.85 0.71   GLUCOSE mg/dL 77 79 80 164*     Estimated Creatinine Clearance: 64.9 mL/min (by C-G formula based on SCr of 0.76 mg/dL).  Results from last 7 days   Lab Units 09/05/23  0429 09/04/23 0405 09/03/23  0407 09/02/23  0524   ALBUMIN g/dL 2.1* 2.1* 2.0* 1.7*   BILIRUBIN mg/dL 0.5 0.4 0.5 0.5   ALK PHOS U/L 257* 270* 316* 393*   AST (SGOT) U/L 28 26 31 38*   ALT (SGPT) U/L 14 13 15 12       Results from last 7 days   Lab Units 09/05/23 0429 09/04/23 0405 09/03/23  0407 09/02/23  0524   CALCIUM mg/dL 7.6* 7.4* 7.3* 7.4*   ALBUMIN g/dL 2.1* 2.1* 2.0* 1.7*         COVID19   Date Value Ref Range Status   08/27/2023 Detected (C) Not Detected - Ref. Range Final   08/10/2020 Not Detected Not Detected - Ref. Range Final     No results found for: HGBA1C, POCGLU      FL C Arm During Surgery, FL ercp biliary duct only  Narrative: FL ERCP BILIARY DUCT ONLY-, FL C ARM DURING SURGERY-     INDICATIONS: ERCP, sphincterotomy, balloon sweep     TECHNIQUE: FLUOROSCOPIC ASSISTANCE IN THE OPERATING ROOM.     FINDINGS:     5 intraoperative fluoroscopic spot views were obtained and recorded the  PACS for review, revealing ERCP, opacification of extrahepatic, central  intrahepatic biliary ducts, balloon sweep. Please see operative report  for full details.     Fluoroscopy time: 315 seconds     Radiation exposure: Not provided        Impression:    As described.     This report was finalized on 9/1/2023 11:40 AM by Dr. Eze Enriquez M.D.       I reviewed the patient's daily medications.  Scheduled Medications  baclofen, 5 mg, Oral, TID  cefTRIAXone, 2,000 mg, Intravenous, Q24H  cholecalciferol, 2,000 Units, Oral, Daily  escitalopram, 10 mg, Oral, Daily  guaiFENesin, 600 mg, Oral, Nightly  hydroCHLOROthiazide, 25 mg, Oral, Daily  levothyroxine, 100 mcg, Oral, Q AM  lubiprostone, 24 mcg, Oral, BID With Meals  metroNIDAZOLE, 500 mg, Intravenous, Q8H  montelukast, 10 mg, Oral,  Daily  multivitamin, 1 tablet, Oral, Daily  oxyCODONE, 80 mg, Oral, Q12H  potassium chloride, 20 mEq, Oral, BID  promethazine, 25 mg, Oral, BID  rivaroxaban, 20 mg, Oral, Daily With Dinner  saccharomyces boulardii, 250 mg, Oral, BID  sodium chloride, 10 mL, Intravenous, Q12H  vitamin B-12, 1,000 mcg, Oral, Daily    Infusions  sodium chloride, 30 mL/hr, Last Rate: 9 mL/hr (09/04/23 0900)    Diet  Diet: Cardiac Diets, Liquid Diets; Full Liquid; Healthy Heart (2-3 Na+); Texture: Regular Texture (IDDSI 7); Fluid Consistency: Thin (IDDSI 0)         I have personally reviewed:  [x]  Laboratory   [x]  Microbiology   [x]  Radiology   []  EKG/Telemetry   []  Cardiology/Vascular   []  Pathology   []  Records     Assessment/Plan     Active Hospital Problems    Diagnosis  POA    **Choledocholithiasis [K80.50]  Yes    History of pulmonary embolism [Z86.711]  Unknown    Stage 3 chronic kidney disease [N18.30]  Yes    Gastroesophageal reflux disease [K21.9]  Yes    Acquired hypothyroidism [E03.9]  Yes    Chronic pain syndrome [G89.4]  Yes    Rheumatoid arthritis [M06.9]  Yes    Generalized osteoarthritis [M15.9]  Yes      Resolved Hospital Problems   No resolved problems to display.       72 y.o. female admitted with Choledocholithiasis.    Choledocholithiasis  History of gastric bypass  Elevated LFTs  Fungating stomach mass  -MRCP showing large stone in the distal common bile duct, 1.8 cm.  Intrahepatic biliary dilatation.  Marked biliary dilatation with CBD measuring 2.1 cm  -IV ceftriaxone, metronidazole, patient with history of allergies to penicillin.  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   - Diet per surgery- now on fulls.    Opioid induced  constipation  - added amitiza, now complaining of excessive stools. Reduce dose to 8mcg but continue     Vaginal yeast infection  -Diflucan x2 doses    COVID-19  Fever, resolved  -Blood cultures with no growth for 24 hours  -Chest x-ray without any abnormalities.    -Completed 3 days of remdesivir  - asymptomatic. Encourage OPAP and IS    History of pulmonary pulmonary embolism.  Diagnosed in 2007, has been on anticoagulation since.  At home takes Xarelto currently, last dose was on 08/24/2023.   - ok to resume xarelto per surgery- restarted 9/2     Hypothyroidism: Resume home levothyroxine, recent TSH normal     CKD stage IIIa: Creatinine stable, monitor.  Avoid nephrotoxic drugs.     Chronic pain syndrome/chronic arthritis: Bob reviewed.  Oxycodone extended release to 80 mg twice daily scheduled.  Oxycodone extended release to 80 mg twice a day as needed as well.  Continue home alprazolam 3 mg nightly     History of RA: Not on treatment for RA per chart review, reports she was diagnosed but was never on specific treatment.     Memory loss: Daughter states she has noticed memory changes for the last several months.  Was referred to neurology outpatient follow-up patient with possible dementia.  Follow-up as outpatient    Abnormal liver on MRI: Repeat CT abdomen with liver protocol in 2 to 3 months.  Daughter concerned about left renal cyst and small splenic lesion that looks like a cyst per radiology report.  Can further be evaluated with a repeat CT abdomen.    Acute on chronic anemia-   On iron at home, iron stores low. Also mixed anemia. Holding po iron given recent surgery. Hgb increased today 8.5-10.3    Xarelto (home med) for DVT prophylaxis.  Full code.  Discussed with patient, family, and nursing staff.  Anticipate discharge with HH  Expected DC date 9/7      EUGENIO Noble  [facility]  09/05/23  09:07 EDT

## 2023-09-05 NOTE — PLAN OF CARE
Goal Outcome Evaluation:  Plan of Care Reviewed With: patient, daughter        Progress: improving  Outcome Evaluation: Pt rested well with daughter at bedside. Pt daughter did leave this morning d/t being tired. Pt incision with staples healing well, pt c/o incisional pain and chronic back pain. Pt to have upper scope today, has been NPO except for meds. Pt had bowel movement and hutchins taken out yesterday and patient has urinated. Cont to monitor, safety maintained.

## 2023-09-05 NOTE — PROGRESS NOTES
Chief Complaint:    S/P Exploratory laparotomy with gastric access for ERCP and distal gastrectomy, POD 4    Subjective:    The patient is feeling well but having increased pain which is primarily in her back.  She has been tolerating a full liquid diet and having bowel function.  She is scheduled for an upper GI today.    Objective:    Temp:  [97.7 °F (36.5 °C)-98.6 °F (37 °C)] 97.7 °F (36.5 °C)  Heart Rate:  [] 107  Resp:  [16-20] 20  BP: (124-146)/(74-94) 146/74    Physical Exam  Constitutional:       Appearance: She is not ill-appearing or toxic-appearing.   Abdominal:      Palpations: Abdomen is soft.      Tenderness: There is no abdominal tenderness.      Comments: Incision: Intact with no evidence of infection.  AFIA drain with serosanguineous output.     Neurological:      Mental Status: She is alert.   Psychiatric:         Behavior: Behavior is cooperative.       Results:    WBC is 6.09.  H/H is 10.3/31.6.  BUN is 7 creatinine 0.76.  Albumin is 2.1.    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  She will undergo an upper GI today to evaluate for communication between the gastric pouch and remnant stomach.  If there is no communication, something will need to be performed to establish venting of the resected stomach, likely an endoscopic procedure through [facility].    Slava Loo Jr., M.D.

## 2023-09-05 NOTE — NURSING NOTE
NA came to Nurses station with a yellow bar pill with R 039 imprinted and bars between the letter and numbers. DIONNE told this RN that it was found in the patients bed. NA was told by family at bedside that they did not know where the pill came from that they had not seen this pill before they did not know anything about the pill. This RN talked to daughter and patient about the pill being in the patients bed and asked daughter if the pill was hers that she had with her here in the hospital and patients daughter said no she does not take that pill at all. Explained to patient and daughter that patient is getting all her medication from us and that she does not need to take anything from home and in fact this could cause major problems with her respiration and even death and not to take any medication from home. Patient and daughter repeated back to me that they understood and that they had no idea where that pill came from.Charge nurse was notified and pill was disposed in waste medication by DIONNE.

## 2023-09-06 ENCOUNTER — APPOINTMENT (OUTPATIENT)
Dept: CT IMAGING | Facility: HOSPITAL | Age: 73
DRG: 423 | End: 2023-09-06
Payer: MEDICARE

## 2023-09-06 ENCOUNTER — HOME HEALTH ADMISSION (OUTPATIENT)
Dept: HOME HEALTH SERVICES | Facility: HOME HEALTHCARE | Age: 73
End: 2023-09-06
Payer: MEDICARE

## 2023-09-06 LAB
ALBUMIN SERPL-MCNC: 1.7 G/DL (ref 3.5–5.2)
ALBUMIN/GLOB SERPL: 0.5 G/DL
ALP SERPL-CCNC: 244 U/L (ref 39–117)
ALT SERPL W P-5'-P-CCNC: 11 U/L (ref 1–33)
ANION GAP SERPL CALCULATED.3IONS-SCNC: 12 MMOL/L (ref 5–15)
AST SERPL-CCNC: 39 U/L (ref 1–32)
BILIRUB SERPL-MCNC: 0.4 MG/DL (ref 0–1.2)
BUN SERPL-MCNC: 7 MG/DL (ref 8–23)
BUN/CREAT SERPL: 10 (ref 7–25)
CALCIUM SPEC-SCNC: 7.9 MG/DL (ref 8.6–10.5)
CHLORIDE SERPL-SCNC: 101 MMOL/L (ref 98–107)
CO2 SERPL-SCNC: 22 MMOL/L (ref 22–29)
CREAT SERPL-MCNC: 0.7 MG/DL (ref 0.57–1)
DEPRECATED RDW RBC AUTO: 49.5 FL (ref 37–54)
EGFRCR SERPLBLD CKD-EPI 2021: 92 ML/MIN/1.73
ERYTHROCYTE [DISTWIDTH] IN BLOOD BY AUTOMATED COUNT: 13.9 % (ref 12.3–15.4)
GLOBULIN UR ELPH-MCNC: 3.6 GM/DL
GLUCOSE SERPL-MCNC: 76 MG/DL (ref 65–99)
HCT VFR BLD AUTO: 32.5 % (ref 34–46.6)
HGB BLD-MCNC: 10.6 G/DL (ref 12–15.9)
MAGNESIUM SERPL-MCNC: 2.4 MG/DL (ref 1.6–2.4)
MCH RBC QN AUTO: 32.1 PG (ref 26.6–33)
MCHC RBC AUTO-ENTMCNC: 32.6 G/DL (ref 31.5–35.7)
MCV RBC AUTO: 98.5 FL (ref 79–97)
PLATELET # BLD AUTO: 270 10*3/MM3 (ref 140–450)
PMV BLD AUTO: 10.2 FL (ref 6–12)
POTASSIUM SERPL-SCNC: 4.3 MMOL/L (ref 3.5–5.2)
PROT SERPL-MCNC: 5.3 G/DL (ref 6–8.5)
QT INTERVAL: 381 MS
QTC INTERVAL: 453 MS
RBC # BLD AUTO: 3.3 10*6/MM3 (ref 3.77–5.28)
SODIUM SERPL-SCNC: 135 MMOL/L (ref 136–145)
WBC NRBC COR # BLD: 5.93 10*3/MM3 (ref 3.4–10.8)

## 2023-09-06 PROCEDURE — 25010000002 CEFTRIAXONE PER 250 MG: Performed by: SURGERY

## 2023-09-06 PROCEDURE — 25010000002 MAGNESIUM SULFATE 2 GM/50ML SOLUTION: Performed by: STUDENT IN AN ORGANIZED HEALTH CARE EDUCATION/TRAINING PROGRAM

## 2023-09-06 PROCEDURE — 99024 POSTOP FOLLOW-UP VISIT: CPT | Performed by: SURGERY

## 2023-09-06 PROCEDURE — 74177 CT ABD & PELVIS W/CONTRAST: CPT

## 2023-09-06 PROCEDURE — 25510000001 IOPAMIDOL 61 % SOLUTION: Performed by: STUDENT IN AN ORGANIZED HEALTH CARE EDUCATION/TRAINING PROGRAM

## 2023-09-06 PROCEDURE — 85027 COMPLETE CBC AUTOMATED: CPT | Performed by: SURGERY

## 2023-09-06 PROCEDURE — 25010000002 METRONIDAZOLE 500 MG/100ML SOLUTION: Performed by: SURGERY

## 2023-09-06 PROCEDURE — 63710000001 PROMETHAZINE PER 12.5 MG: Performed by: SURGERY

## 2023-09-06 PROCEDURE — 80053 COMPREHEN METABOLIC PANEL: CPT | Performed by: SURGERY

## 2023-09-06 PROCEDURE — 83735 ASSAY OF MAGNESIUM: CPT | Performed by: STUDENT IN AN ORGANIZED HEALTH CARE EDUCATION/TRAINING PROGRAM

## 2023-09-06 PROCEDURE — 63710000001 PROMETHAZINE PER 25 MG: Performed by: SURGERY

## 2023-09-06 RX ORDER — OXYCODONE HCL 40 MG/1
80 TABLET, FILM COATED, EXTENDED RELEASE ORAL EVERY 12 HOURS SCHEDULED
Status: DISCONTINUED | OUTPATIENT
Start: 2023-09-06 | End: 2023-09-15 | Stop reason: HOSPADM

## 2023-09-06 RX ORDER — SIMETHICONE 80 MG
80 TABLET,CHEWABLE ORAL 4 TIMES DAILY PRN
Status: DISCONTINUED | OUTPATIENT
Start: 2023-09-06 | End: 2023-09-15 | Stop reason: HOSPADM

## 2023-09-06 RX ORDER — CALCIUM CARBONATE 500 MG/1
2 TABLET, CHEWABLE ORAL 3 TIMES DAILY PRN
Status: DISCONTINUED | OUTPATIENT
Start: 2023-09-06 | End: 2023-09-15 | Stop reason: HOSPADM

## 2023-09-06 RX ADMIN — OXYCODONE HYDROCHLORIDE 80 MG: 20 TABLET, FILM COATED, EXTENDED RELEASE ORAL at 09:00

## 2023-09-06 RX ADMIN — HYDROCHLOROTHIAZIDE 25 MG: 25 TABLET ORAL at 08:59

## 2023-09-06 RX ADMIN — IOPAMIDOL 85 ML: 612 INJECTION, SOLUTION INTRAVENOUS at 21:08

## 2023-09-06 RX ADMIN — ALPRAZOLAM 3 MG: 1 TABLET ORAL at 21:17

## 2023-09-06 RX ADMIN — Medication 2000 UNITS: at 09:00

## 2023-09-06 RX ADMIN — METRONIDAZOLE 500 MG: 500 INJECTION, SOLUTION INTRAVENOUS at 21:35

## 2023-09-06 RX ADMIN — LEVOTHYROXINE SODIUM 100 MCG: 0.1 TABLET ORAL at 08:59

## 2023-09-06 RX ADMIN — Medication 1 TABLET: at 08:59

## 2023-09-06 RX ADMIN — SIMETHICONE 80 MG: 80 TABLET, CHEWABLE ORAL at 21:17

## 2023-09-06 RX ADMIN — PROMETHAZINE HYDROCHLORIDE 25 MG: 12.5 TABLET ORAL at 08:58

## 2023-09-06 RX ADMIN — PROMETHAZINE HYDROCHLORIDE 25 MG: 12.5 TABLET ORAL at 13:30

## 2023-09-06 RX ADMIN — ANTACID TABLETS 2 TABLET: 500 TABLET, CHEWABLE ORAL at 13:30

## 2023-09-06 RX ADMIN — MAGNESIUM SULFATE 2 G: 2 INJECTION INTRAVENOUS at 03:00

## 2023-09-06 RX ADMIN — MAGNESIUM SULFATE 2 G: 2 INJECTION INTRAVENOUS at 01:14

## 2023-09-06 RX ADMIN — POTASSIUM CHLORIDE 20 MEQ: 750 TABLET, EXTENDED RELEASE ORAL at 19:55

## 2023-09-06 RX ADMIN — METRONIDAZOLE 500 MG: 500 INJECTION, SOLUTION INTRAVENOUS at 13:30

## 2023-09-06 RX ADMIN — MONTELUKAST SODIUM 10 MG: 10 TABLET, FILM COATED ORAL at 08:59

## 2023-09-06 RX ADMIN — RIVAROXABAN 20 MG: 20 TABLET, FILM COATED ORAL at 19:55

## 2023-09-06 RX ADMIN — OXYCODONE HYDROCHLORIDE 80 MG: 20 TABLET, FILM COATED, EXTENDED RELEASE ORAL at 19:56

## 2023-09-06 RX ADMIN — GUAIFENESIN 600 MG: 600 TABLET, EXTENDED RELEASE ORAL at 19:57

## 2023-09-06 RX ADMIN — OXYCODONE HYDROCHLORIDE 80 MG: 40 TABLET, FILM COATED, EXTENDED RELEASE ORAL at 13:29

## 2023-09-06 RX ADMIN — POTASSIUM CHLORIDE 20 MEQ: 750 TABLET, EXTENDED RELEASE ORAL at 09:00

## 2023-09-06 RX ADMIN — Medication 10 ML: at 09:02

## 2023-09-06 RX ADMIN — BACLOFEN 5 MG: 10 TABLET ORAL at 18:13

## 2023-09-06 RX ADMIN — OXYCODONE HYDROCHLORIDE 80 MG: 40 TABLET, FILM COATED, EXTENDED RELEASE ORAL at 20:00

## 2023-09-06 RX ADMIN — PROMETHAZINE HYDROCHLORIDE 25 MG: 12.5 TABLET ORAL at 20:06

## 2023-09-06 RX ADMIN — Medication 1000 MCG: at 08:59

## 2023-09-06 RX ADMIN — BACLOFEN 5 MG: 10 TABLET ORAL at 08:59

## 2023-09-06 RX ADMIN — CEFTRIAXONE 2000 MG: 2 INJECTION, POWDER, FOR SOLUTION INTRAMUSCULAR; INTRAVENOUS at 18:13

## 2023-09-06 RX ADMIN — Medication 250 MG: at 19:55

## 2023-09-06 RX ADMIN — Medication 10 ML: at 20:06

## 2023-09-06 RX ADMIN — ESCITALOPRAM OXALATE 10 MG: 10 TABLET, FILM COATED ORAL at 09:00

## 2023-09-06 RX ADMIN — METRONIDAZOLE 500 MG: 500 INJECTION, SOLUTION INTRAVENOUS at 05:11

## 2023-09-06 RX ADMIN — Medication 250 MG: at 09:00

## 2023-09-06 NOTE — PROGRESS NOTES
Name: Anne Jewell ADMIT: 2023   : 1950  PCP: Jewell Stephens APRN    MRN: [id number] LOS: 12 days   AGE/SEX: 72 y.o. female  ROOM: [facility]     Subjective   Subjective   In bed when seen. Indigestion w/breakfast. No n/v. Has had BM. Urinating without issues. Afebrile.        Objective   Objective   Vital Signs  Temp:  [97.7 °F (36.5 °C)-98.2 °F (36.8 °C)] 97.7 °F (36.5 °C)  Heart Rate:  [72-84] 72  Resp:  [18] 18  BP: (121-147)/(72-84) 147/84  SpO2:  [96 %-99 %] 99 %  on   ;   Device (Oxygen Therapy): room air  Body mass index is 33.7 kg/m².  Physical Exam  Vitals and nursing note reviewed.   Constitutional:       General: She is not in acute distress.     Appearance: She is ill-appearing. She is not toxic-appearing.   HENT:      Head: Normocephalic.      Mouth/Throat:      Mouth: Mucous membranes are moist.   Eyes:      Conjunctiva/sclera: Conjunctivae normal.   Cardiovascular:      Rate and Rhythm: Normal rate and regular rhythm.   Pulmonary:      Effort: Pulmonary effort is normal. No respiratory distress.      Breath sounds: No wheezing or rales.   Abdominal:      General: Bowel sounds are normal. There is no distension.      Palpations: Abdomen is soft.      Comments: Mid abd staple line approximated; no erythema  AFIA drain in place   Musculoskeletal:      Cervical back: Neck supple.      Right lower leg: No edema.      Left lower leg: No edema.   Skin:     General: Skin is warm and dry.   Neurological:      Mental Status: She is alert and oriented to person, place, and time.   Psychiatric:         Behavior: Behavior normal.      Comments: Mild anxiety     Results Review     I reviewed the patient's new clinical results.  Results from last 7 days   Lab Units 23  0600 23  0429 23  0405 23  0407   WBC 10*3/mm3 5.93 6.09 5.43 7.71   HEMOGLOBIN g/dL 10.6* 10.3* 8.5* 9.1*   PLATELETS 10*3/mm3 270 228 173 201     Results from last 7 days   Lab Units 23  0601  09/05/23 2038 09/05/23 0429 09/04/23  0405   SODIUM mmol/L 135* 139 139 142   POTASSIUM mmol/L 4.3 3.6 3.7 3.7   CHLORIDE mmol/L 101 101 104 107   CO2 mmol/L 22.0 24.0 27.0 29.5*   BUN mg/dL 7* 7* 7* 9   CREATININE mg/dL 0.70 0.75 0.76 0.76   GLUCOSE mg/dL 76 90 77 79   EGFR mL/min/1.73 92.0 84.7 83.4 83.4     Results from last 7 days   Lab Units 09/06/23 0601 09/05/23 2038 09/05/23 0429 09/04/23  0405   ALBUMIN g/dL 1.7* 2.0* 2.1* 2.1*   BILIRUBIN mg/dL 0.4 0.4 0.5 0.4   ALK PHOS U/L 244* 259* 257* 270*   AST (SGOT) U/L 39* 27 28 26   ALT (SGPT) U/L 11 11 14 13     Results from last 7 days   Lab Units 09/06/23 0601 09/05/23 2038 09/05/23 0429 09/04/23  0405   CALCIUM mg/dL 7.9* 7.7* 7.6* 7.4*   ALBUMIN g/dL 1.7* 2.0* 2.1* 2.1*   MAGNESIUM mg/dL 2.4 1.1*  --   --        No results found for: HGBA1C, POCGLU    No radiology results for the last day    I have personally reviewed all medications:  Scheduled Medications  baclofen, 5 mg, Oral, TID  cefTRIAXone, 2,000 mg, Intravenous, Q24H  cholecalciferol, 2,000 Units, Oral, Daily  escitalopram, 10 mg, Oral, Daily  guaiFENesin, 600 mg, Oral, Nightly  hydroCHLOROthiazide, 25 mg, Oral, Daily  levothyroxine, 100 mcg, Oral, Q AM  lubiprostone, 8 mcg, Oral, BID With Meals  metroNIDAZOLE, 500 mg, Intravenous, Q8H  montelukast, 10 mg, Oral, Daily  multivitamin, 1 tablet, Oral, Daily  oxyCODONE, 80 mg, Oral, Q12H  potassium chloride, 20 mEq, Oral, BID  promethazine, 25 mg, Oral, BID  rivaroxaban, 20 mg, Oral, Daily With Dinner  saccharomyces boulardii, 250 mg, Oral, BID  sodium chloride, 10 mL, Intravenous, Q12H  vitamin B-12, 1,000 mcg, Oral, Daily    Infusions  sodium chloride, 30 mL/hr, Last Rate: 9 mL/hr (09/04/23 0900)    Diet  Diet: Cardiac Diets, Liquid Diets; Full Liquid; Healthy Heart (2-3 Na+); Texture: Soft to Chew (NDD 3); Soft to Chew: Whole Meat; Fluid Consistency: Thin (IDDSI 0)    I have personally reviewed:  [x]  Laboratory   [x]  Microbiology   [x]   Radiology   [x]  EKG/Telemetry  [x]  Cardiology/Vascular   []  Pathology    []  Records       Assessment/Plan     Active Hospital Problems    Diagnosis  POA    **Choledocholithiasis [K80.50]  Yes    History of pulmonary embolism [Z86.711]  Unknown    Stage 3 chronic kidney disease [N18.30]  Yes    Gastroesophageal reflux disease [K21.9]  Yes    Acquired hypothyroidism [E03.9]  Yes    Chronic pain syndrome [G89.4]  Yes    Rheumatoid arthritis [M06.9]  Yes    Generalized osteoarthritis [M15.9]  Yes      Resolved Hospital Problems   No resolved problems to display.       72 y.o. female admitted with Choledocholithiasis.    Choledocholithiasis  History of gastric bypass  Elevated LFTs  Fungating stomach mass  -MRCP showing large stone in the distal common bile duct, 1.8 cm.  Intrahepatic biliary dilatation.  Marked biliary dilatation with CBD measuring 2.1 cm  -IV ceftriaxone, metronidazole, patient with history of allergies to penicillin.  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 Plan CT A/P today to check for dilation of partially resected remnant stomach  - Diet per surgery- back on full liquids due to indigestion w/advanced diet     Opioid induced constipation  - added amitiza, now complaining of excessive stools. Reduce dose to 8mcg but continue      Vaginal yeast infection  -Diflucan x2 doses,  completed     COVID-19  Fever, resolved  -Blood cultures with no growth   -Chest x-ray without any abnormalities.    -Completed 3 days of remdesivir  - asymptomatic. Encourage OPAP and IS. On room air  -D/W RN re: isolation, tested positive on 8/27     History of pulmonary pulmonary embolism.  Diagnosed in 2007, has been on anticoagulation since.  At  home takes Xarelto currently, last dose was on 08/24/2023.   - ok to resume xarelto per surgery- restarted 9/2     Hypothyroidism: Resume home levothyroxine, recent TSH normal     CKD stage IIIa: Creatinine stable, monitor.  Avoid nephrotoxic drugs.     Chronic pain syndrome/chronic arthritis: Bob reviewed.  Oxycodone extended release to 80 mg twice daily scheduled.  Oxycodone extended release to 80 mg twice a day as needed as well.  Continue home alprazolam 3 mg nightly     History of RA: Not on treatment for RA per chart review, reports she was diagnosed but was never on specific treatment.     Memory loss: Daughter states she has noticed memory changes for the last several months.  Was referred to neurology outpatient follow-up patient with possible dementia.  Follow-up as outpatient     Abnormal liver on MRI: Repeat CT abdomen with liver protocol in 2 to 3 months.  Daughter concerned about left renal cyst and small splenic lesion that looks like a cyst per radiology report.  Can further be evaluated with a repeat CT abdomen.     Acute on chronic anemia-   On iron at home, iron stores low. Also mixed anemia. Holding po iron given recent surgery. Hgb better, continue to monitor         Xarelto (home med) for DVT prophylaxis.  Full code.  Discussed with patient and nursing staff.  Anticipate discharge home with home health timing yet to be determined..      EUGENIO Gonzalez  [facility]  09/06/23  11:45 EDT

## 2023-09-06 NOTE — CASE MANAGEMENT/SOCIAL WORK
Continued Stay Note  Psychiatric     Patient Name: Anne Jewell  MRN: [id number]  Today's Date: 9/6/2023    Admit Date: 8/25/2023    Plan: Home w/ HH (pending)   Discharge Plan       Row Name 09/06/23 1148       Plan    Plan Home w/ HH (pending)    Patient/Family in Agreement with Plan yes    Plan Comments Due to patient being in covid-19 isolation, [facility] spoke with her over the bedside phone. Patient states plan is home. She denies any SNF at this time. Patient is agreeable to  for PT. She has no preference on an agency. Patient has a rolling walker & a ramp to enter her home. She denies any further dc needs at this time. DANIEL TRINH                   Discharge Codes    No documentation.                 Expected Discharge Date and Time       Expected Discharge Date Expected Discharge Time    Sep 7, 2023               DANIEL Leary

## 2023-09-06 NOTE — PROGRESS NOTES
Chief Complaint:    S/P Exploratory laparotomy with gastric access for ERCP and distal gastrectomy, POD 5    Subjective:    The patient is feeling well but having indigestion with the advancement of her diet.  She usually eats soups at home.  She is not having any nausea or vomiting.  She is having bowel function.    Objective:    Temp:  [97.7 °F (36.5 °C)-98.2 °F (36.8 °C)] 97.7 °F (36.5 °C)  Heart Rate:  [72-84] 72  Resp:  [18] 18  BP: (121-147)/(72-84) 147/84    Physical Exam  Constitutional:       Appearance: She is not ill-appearing or toxic-appearing.   Abdominal:      Palpations: Abdomen is soft.      Tenderness: There is abdominal tenderness (Mild but appropriate postop tenderness) in the right upper quadrant.      Comments: Incision: Intact with no evidence of infection.  AFIA drain: Serosanguineous output.   Neurological:      Mental Status: She is alert.   Psychiatric:         Behavior: Behavior is cooperative.       Results:    WBC is 5.93.  H/H is 10.6/32.5.  BUN is 7 and creatinine 0.70.    Impression/Plan:    The patient is POD 5 from an exploratory laparotomy with gastric access for ERCP and distal gastrectomy for fungating mass of the stomach.  The pathology is still pending.    She is having increased indigestion with her advanced diet and I will decrease her to a full liquid diet.    The upper GI shows that the gastric pouch created with the gastric bypass does not communicate with the remnant stomach.  I will order a CT scan of the abdomen and pelvis to see if the partially resected remnant stomach is dilated.  If it is dilated, it will need to be drained for decompression.  Eventually she will need an EGD with a partial opening of the staple line for the gastric pouch to allow decompression of the small remnant stomach.  This is performed at [facility].    Slava Loo Jr., M.D.

## 2023-09-06 NOTE — PROGRESS NOTES
Continued Stay Note  [facility]     Patient Name: Anne Jewell  MRN: [id number]  Today's Date: 9/6/2023    Admit Date: 8/25/2023    Plan: Home with HH (pending)   Discharge Plan       Row Name 09/06/23 1350       Plan    Plan Home with HH (pending)    Plan Comments Call recived from Amy with [facility] who stated that they  would not be able to staff pt for PT untill next week but could staff if HH RN  is needed.  HH referral also placed for [facility] to assess.      Row Name 09/06/23 9477       Plan    Plan Home w/ HH (pending)    Patient/Family in Agreement with Plan yes    Plan Comments Due to patient being in covid-19 isolation, [facility] spoke with her over the bedside phone. Patient states plan is home. She denies any SNF at this time. Patient is agreeable to HH for PT. She has no preference on an agency. Patient has a rolling walker & a ramp to enter her home. She denies any further dc needs at this time. GAYATHRI, CSW                   Discharge Codes    No documentation.                 Expected Discharge Date and Time       Expected Discharge Date Expected Discharge Time    Sep 7, 2023               BARBARA Rizzo

## 2023-09-06 NOTE — DISCHARGE PLACEMENT REQUEST
"Theresa Khan (72 y.o. Female)       Date of Birth   1950    Social Security Number       Address   [address] [place] [address]    Home Phone   [phone]    MRN   [id number]       Buddhism   Non-Restorationism    Marital Status   Legally                             Admission Date   8/25/23    Admission Type   Urgent    Admitting Provider   Jocelyne Feliz MD    Attending Provider   Abeba Shearer MD    Department, Room/Bed   [address], [address]       Discharge Date       Discharge Disposition       Discharge Destination                                 Attending Provider: Abeba Shearer MD    Allergies: Penicillins    Isolation: Enh Drop/Con   Infection: COVID (confirmed) (08/27/23)   Code Status: CPR    Ht: 154.9 cm (61\")   Wt: 80.9 kg (178 lb 5.6 oz)    Admission Cmt: None   Principal Problem: Choledocholithiasis [K80.50]                   Active Insurance as of 8/25/2023       Primary Coverage       Payor Plan Insurance Group Employer/Plan Group    MEDICARE MEDICARE A & B        Payor Plan Address Payor Plan Phone Number Payor Plan Fax Number Effective Dates    [address] [phone]  5/1/2004 - None Entered    [facility] [id number]         Subscriber Name Subscriber Birth Date Member ID       THERESA KHAN 1950 [id number]               Secondary Coverage       Payor Plan Insurance Group Employer/Plan Group    [facility] SUPP [id number]       Payor Plan Address Payor Plan Phone Number Payor Plan Fax Number Effective Dates    [address]   12/1/2016 - None Entered    [facility] [id number]         Subscriber Name Subscriber Birth Date Member ID       THERESA KHAN 1950 [id number]                     Emergency Contacts        (Rel.) Home Phone Work Phone Mobile Phone    JERRELL CORTES (Daughter) [phone] -- --                "

## 2023-09-07 PROBLEM — C16.3 MALIGNANT NEOPLASM OF PYLORIC ANTRUM: Status: ACTIVE | Noted: 2023-09-07

## 2023-09-07 LAB
ALBUMIN SERPL-MCNC: 2.2 G/DL (ref 3.5–5.2)
ALBUMIN/GLOB SERPL: 0.6 G/DL
ALP SERPL-CCNC: 232 U/L (ref 39–117)
ALT SERPL W P-5'-P-CCNC: 10 U/L (ref 1–33)
ANION GAP SERPL CALCULATED.3IONS-SCNC: 7.1 MMOL/L (ref 5–15)
AST SERPL-CCNC: 27 U/L (ref 1–32)
BILIRUB SERPL-MCNC: 0.6 MG/DL (ref 0–1.2)
BUN SERPL-MCNC: 5 MG/DL (ref 8–23)
BUN/CREAT SERPL: 6.2 (ref 7–25)
CALCIUM SPEC-SCNC: 8.2 MG/DL (ref 8.6–10.5)
CHLORIDE SERPL-SCNC: 102 MMOL/L (ref 98–107)
CO2 SERPL-SCNC: 27.9 MMOL/L (ref 22–29)
CREAT SERPL-MCNC: 0.81 MG/DL (ref 0.57–1)
DEPRECATED RDW RBC AUTO: 48.5 FL (ref 37–54)
EGFRCR SERPLBLD CKD-EPI 2021: 77.2 ML/MIN/1.73
ERYTHROCYTE [DISTWIDTH] IN BLOOD BY AUTOMATED COUNT: 13.9 % (ref 12.3–15.4)
GLOBULIN UR ELPH-MCNC: 3.9 GM/DL
GLUCOSE SERPL-MCNC: 77 MG/DL (ref 65–99)
HCT VFR BLD AUTO: 33.8 % (ref 34–46.6)
HGB BLD-MCNC: 11 G/DL (ref 12–15.9)
MCH RBC QN AUTO: 31.3 PG (ref 26.6–33)
MCHC RBC AUTO-ENTMCNC: 32.5 G/DL (ref 31.5–35.7)
MCV RBC AUTO: 96 FL (ref 79–97)
PLATELET # BLD AUTO: 360 10*3/MM3 (ref 140–450)
PMV BLD AUTO: 10.3 FL (ref 6–12)
POTASSIUM SERPL-SCNC: 3.6 MMOL/L (ref 3.5–5.2)
POTASSIUM SERPL-SCNC: 4.3 MMOL/L (ref 3.5–5.2)
PROT SERPL-MCNC: 6.1 G/DL (ref 6–8.5)
RBC # BLD AUTO: 3.52 10*6/MM3 (ref 3.77–5.28)
SODIUM SERPL-SCNC: 137 MMOL/L (ref 136–145)
WBC NRBC COR # BLD: 8.36 10*3/MM3 (ref 3.4–10.8)

## 2023-09-07 PROCEDURE — 25010000002 METRONIDAZOLE 500 MG/100ML SOLUTION: Performed by: SURGERY

## 2023-09-07 PROCEDURE — 97110 THERAPEUTIC EXERCISES: CPT

## 2023-09-07 PROCEDURE — 84132 ASSAY OF SERUM POTASSIUM: CPT | Performed by: HOSPITALIST

## 2023-09-07 PROCEDURE — 85027 COMPLETE CBC AUTOMATED: CPT | Performed by: SURGERY

## 2023-09-07 PROCEDURE — 80053 COMPREHEN METABOLIC PANEL: CPT | Performed by: SURGERY

## 2023-09-07 PROCEDURE — 63710000001 PROMETHAZINE PER 12.5 MG: Performed by: SURGERY

## 2023-09-07 PROCEDURE — 63710000001 PROMETHAZINE PER 25 MG: Performed by: SURGERY

## 2023-09-07 PROCEDURE — 99024 POSTOP FOLLOW-UP VISIT: CPT | Performed by: SURGERY

## 2023-09-07 RX ORDER — FAMOTIDINE 10 MG/ML
20 INJECTION, SOLUTION INTRAVENOUS EVERY 12 HOURS SCHEDULED
Status: DISCONTINUED | OUTPATIENT
Start: 2023-09-07 | End: 2023-09-15 | Stop reason: HOSPADM

## 2023-09-07 RX ORDER — ALUMINA, MAGNESIA, AND SIMETHICONE 2400; 2400; 240 MG/30ML; MG/30ML; MG/30ML
15 SUSPENSION ORAL EVERY 6 HOURS PRN
Status: DISCONTINUED | OUTPATIENT
Start: 2023-09-07 | End: 2023-09-15 | Stop reason: HOSPADM

## 2023-09-07 RX ORDER — ONDANSETRON 2 MG/ML
4 INJECTION INTRAMUSCULAR; INTRAVENOUS EVERY 6 HOURS PRN
Status: DISCONTINUED | OUTPATIENT
Start: 2023-09-07 | End: 2023-09-15 | Stop reason: HOSPADM

## 2023-09-07 RX ORDER — POTASSIUM CHLORIDE 750 MG/1
40 TABLET, FILM COATED, EXTENDED RELEASE ORAL EVERY 4 HOURS
Status: COMPLETED | OUTPATIENT
Start: 2023-09-07 | End: 2023-09-07

## 2023-09-07 RX ADMIN — BACLOFEN 5 MG: 10 TABLET ORAL at 16:31

## 2023-09-07 RX ADMIN — OXYCODONE HYDROCHLORIDE 80 MG: 40 TABLET, FILM COATED, EXTENDED RELEASE ORAL at 20:55

## 2023-09-07 RX ADMIN — ESCITALOPRAM OXALATE 10 MG: 10 TABLET, FILM COATED ORAL at 09:44

## 2023-09-07 RX ADMIN — HYDROCHLOROTHIAZIDE 25 MG: 25 TABLET ORAL at 09:44

## 2023-09-07 RX ADMIN — Medication 1000 MCG: at 09:43

## 2023-09-07 RX ADMIN — PROMETHAZINE HYDROCHLORIDE 25 MG: 12.5 TABLET ORAL at 20:55

## 2023-09-07 RX ADMIN — PROMETHAZINE HYDROCHLORIDE 25 MG: 12.5 TABLET ORAL at 13:29

## 2023-09-07 RX ADMIN — POTASSIUM CHLORIDE 20 MEQ: 750 TABLET, EXTENDED RELEASE ORAL at 09:44

## 2023-09-07 RX ADMIN — LEVOTHYROXINE SODIUM 100 MCG: 0.1 TABLET ORAL at 09:44

## 2023-09-07 RX ADMIN — ALPRAZOLAM 3 MG: 1 TABLET ORAL at 21:58

## 2023-09-07 RX ADMIN — BACLOFEN 5 MG: 10 TABLET ORAL at 09:44

## 2023-09-07 RX ADMIN — MONTELUKAST SODIUM 10 MG: 10 TABLET, FILM COATED ORAL at 09:44

## 2023-09-07 RX ADMIN — FAMOTIDINE 20 MG: 10 INJECTION INTRAVENOUS at 13:17

## 2023-09-07 RX ADMIN — METRONIDAZOLE 500 MG: 500 INJECTION, SOLUTION INTRAVENOUS at 04:57

## 2023-09-07 RX ADMIN — POTASSIUM CHLORIDE 40 MEQ: 750 TABLET, EXTENDED RELEASE ORAL at 16:31

## 2023-09-07 RX ADMIN — OXYCODONE HYDROCHLORIDE 80 MG: 40 TABLET, FILM COATED, EXTENDED RELEASE ORAL at 13:28

## 2023-09-07 RX ADMIN — POTASSIUM CHLORIDE 20 MEQ: 750 TABLET, EXTENDED RELEASE ORAL at 21:02

## 2023-09-07 RX ADMIN — GUAIFENESIN 600 MG: 600 TABLET, EXTENDED RELEASE ORAL at 20:57

## 2023-09-07 RX ADMIN — Medication 10 ML: at 20:58

## 2023-09-07 RX ADMIN — Medication 1 TABLET: at 09:44

## 2023-09-07 RX ADMIN — FAMOTIDINE 20 MG: 10 INJECTION INTRAVENOUS at 21:58

## 2023-09-07 RX ADMIN — Medication 2000 UNITS: at 09:45

## 2023-09-07 RX ADMIN — BACLOFEN 5 MG: 10 TABLET ORAL at 20:57

## 2023-09-07 RX ADMIN — Medication 10 ML: at 09:47

## 2023-09-07 RX ADMIN — ANTACID TABLETS 2 TABLET: 500 TABLET, CHEWABLE ORAL at 16:31

## 2023-09-07 RX ADMIN — METRONIDAZOLE 500 MG: 500 INJECTION, SOLUTION INTRAVENOUS at 13:18

## 2023-09-07 RX ADMIN — POTASSIUM CHLORIDE 40 MEQ: 750 TABLET, EXTENDED RELEASE ORAL at 13:17

## 2023-09-07 RX ADMIN — Medication 250 MG: at 09:45

## 2023-09-07 RX ADMIN — LUBIPROSTONE 8 MCG: 8 CAPSULE, GELATIN COATED ORAL at 09:43

## 2023-09-07 RX ADMIN — OXYCODONE HYDROCHLORIDE 80 MG: 40 TABLET, FILM COATED, EXTENDED RELEASE ORAL at 09:44

## 2023-09-07 RX ADMIN — Medication 250 MG: at 21:02

## 2023-09-07 RX ADMIN — PROMETHAZINE HYDROCHLORIDE 25 MG: 12.5 TABLET ORAL at 09:43

## 2023-09-07 NOTE — PROGRESS NOTES
Name: Anne Jewell ADMIT: 2023   : 1950  PCP: Jewell Stephens APRN    MRN: [id number] LOS: 13 days   AGE/SEX: 72 y.o. female  ROOM: [facility]     Subjective   Subjective   On BSC when seen. Daughter present. Aware of diagnosis of adenocarcinoma. Having frequent soft Bms.  Continues to be nauseated.       Objective   Objective   Vital Signs  Temp:  [97.5 °F (36.4 °C)-98.2 °F (36.8 °C)] 97.9 °F (36.6 °C)  Heart Rate:  [75-83] 78  Resp:  [18] 18  BP: (136-147)/(69-82) 145/82  SpO2:  [97 %-98 %] 97 %  on   ;   Device (Oxygen Therapy): room air  Body mass index is 33.5 kg/m².  Physical Exam  Vitals and nursing note reviewed.   Constitutional:       General: She is not in acute distress.     Appearance: She is ill-appearing. She is not toxic-appearing.   HENT:      Head: Normocephalic.      Mouth/Throat:      Mouth: Mucous membranes are moist.   Eyes:      Conjunctiva/sclera: Conjunctivae normal.   Cardiovascular:      Rate and Rhythm: Normal rate and regular rhythm.   Pulmonary:      Effort: Pulmonary effort is normal. No respiratory distress.      Breath sounds: No wheezing or rales.   Abdominal:      General: Bowel sounds are normal.      Palpations: Abdomen is soft.   Musculoskeletal:      Cervical back: Neck supple.      Right lower leg: No edema.      Left lower leg: No edema.   Skin:     General: Skin is warm and dry.   Neurological:      Mental Status: She is alert and oriented to person, place, and time.   Psychiatric:         Mood and Affect: Mood normal.         Behavior: Behavior normal.     Results Review     I reviewed the patient's new clinical results.  Results from last 7 days   Lab Units 23  0414 23  0600 23  0429 23  0405   WBC 10*3/mm3 8.36 5.93 6.09 5.43   HEMOGLOBIN g/dL 11.0* 10.6* 10.3* 8.5*   PLATELETS 10*3/mm3 360 270 228 173     Results from last 7 days   Lab Units 23  0415 23  0601 09/23  0429   SODIUM mmol/L 137 135*  139 139   POTASSIUM mmol/L 3.6 4.3 3.6 3.7   CHLORIDE mmol/L 102 101 101 104   CO2 mmol/L 27.9 22.0 24.0 27.0   BUN mg/dL 5* 7* 7* 7*   CREATININE mg/dL 0.81 0.70 0.75 0.76   GLUCOSE mg/dL 77 76 90 77   EGFR mL/min/1.73 77.2 92.0 84.7 83.4     Results from last 7 days   Lab Units 09/07/23 0415 09/06/23 0601 09/05/23 2038 09/05/23 0429   ALBUMIN g/dL 2.2* 1.7* 2.0* 2.1*   BILIRUBIN mg/dL 0.6 0.4 0.4 0.5   ALK PHOS U/L 232* 244* 259* 257*   AST (SGOT) U/L 27 39* 27 28   ALT (SGPT) U/L 10 11 11 14     Results from last 7 days   Lab Units 09/07/23 0415 09/06/23 0601 09/05/23 2038 09/05/23 0429   CALCIUM mg/dL 8.2* 7.9* 7.7* 7.6*   ALBUMIN g/dL 2.2* 1.7* 2.0* 2.1*   MAGNESIUM mg/dL  --  2.4 1.1*  --        No results found for: HGBA1C, POCGLU    CT Abdomen Pelvis With Contrast    Result Date: 9/6/2023  No contrast is identified within the excluded portion of the stomach.   Radiation dose reduction techniques were utilized, including automated exposure control and exposure modulation based on body size.   This report was finalized on 9/6/2023 9:49 PM by Dr. Rachel Burgos M.D.       I have personally reviewed all medications:  Scheduled Medications  baclofen, 5 mg, Oral, TID  cholecalciferol, 2,000 Units, Oral, Daily  escitalopram, 10 mg, Oral, Daily  famotidine, 20 mg, Intravenous, Q12H  guaiFENesin, 600 mg, Oral, Nightly  hydroCHLOROthiazide, 25 mg, Oral, Daily  levothyroxine, 100 mcg, Oral, Q AM  lubiprostone, 8 mcg, Oral, BID With Meals  metroNIDAZOLE, 500 mg, Intravenous, Q8H  montelukast, 10 mg, Oral, Daily  multivitamin, 1 tablet, Oral, Daily  oxyCODONE, 80 mg, Oral, Q12H  potassium chloride, 20 mEq, Oral, BID  potassium chloride ER, 40 mEq, Oral, Q4H  promethazine, 25 mg, Oral, BID  saccharomyces boulardii, 250 mg, Oral, BID  sodium chloride, 10 mL, Intravenous, Q12H  vitamin B-12, 1,000 mcg, Oral, Daily    Infusions  sodium chloride, 30 mL/hr, Last Rate: 9 mL/hr (09/04/23 0900)    Diet  Diet: Cardiac  Diets, Liquid Diets; Full Liquid; Healthy Heart (2-3 Na+); Fluid Consistency: Thin (IDDSI 0)  NPO Diet NPO Type: Strict NPO    I have personally reviewed:  [x]  Laboratory   [x]  Microbiology   [x]  Radiology   [x]  EKG/Telemetry  [x]  Cardiology/Vascular   []  Pathology    []  Records       Assessment/Plan     Active Hospital Problems    Diagnosis  POA    **Choledocholithiasis [K80.50]  Yes    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       72 y.o. female admitted with Choledocholithiasis.    Choledocholithiasis  History of gastric bypass  Elevated LFTs  Fungating stomach mass  -MRCP showing large stone in the distal common bile duct, 1.8 cm.  Intrahepatic biliary dilatation.  Marked biliary dilatation with CBD measuring 2.1 cm  -IV ceftriaxone, metronidazole w/stop date in place, patient with history of allergies to penicillin.  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 S/P CT A/P 9/6. Surgical plan now to return to OR for more of a gastrectomy, sample lymph nodes and poss takedown of gastric bypass  - Diet per surgery- back on full liquids due to indigestion w/advanced diet     Opioid induced constipation  - added amitiza, now complaining of excessive stools, will stop. PRN bowel regimen in place     Vaginal yeast infection  -Diflucan x2  doses,  completed     COVID-19  Fever, resolved  -Blood cultures with no growth   -Chest x-ray without any abnormalities.    -Completed 3 days of remdesivir  - asymptomatic. Encourage OPAP and IS. On room air  -D/W RN re: isolation, tested positive on 8/27     History of pulmonary pulmonary embolism.  Diagnosed in 2007, has been on anticoagulation since.  At home takes Xarelto   -Xarelto held for upcoming return to OR     Hypothyroidism: Resume home levothyroxine, recent TSH normal     CKD stage IIIa: Creatinine stable, monitor.  Avoid nephrotoxic drugs.     Chronic pain syndrome/chronic arthritis: Bob reviewed.  Oxycodone extended release to 80 mg twice daily scheduled.  Oxycodone extended release to 80 mg twice a day as needed as well.  Continue home alprazolam 3 mg nightly     History of RA: Not on treatment for RA per chart review, reports she was diagnosed but was never on specific treatment.     Memory loss: Daughter states she has noticed memory changes for the last several months.  Was referred to neurology outpatient follow-up patient with possible dementia.  Follow-up as outpatient     Abnormal liver on MRI: Repeat CT abdomen with liver protocol in 2 to 3 months.  Daughter concerned about left renal cyst and small splenic lesion that looks like a cyst per radiology report.  Can further be evaluated with a repeat CT abdomen.     Acute on chronic anemia-   On iron at home, iron stores low. Also mixed anemia. Holding po iron given recent surgery. Hgb better, continue to monitor         Xarelto (home med) for DVT prophylaxis; held for OR  Full code.  Discussed with patient and family.  Anticipate discharge  TBD  .      EUGENIO Gonzalez  [facility]  09/07/23  12:13 EDT

## 2023-09-07 NOTE — THERAPY TREATMENT NOTE
Patient Name: Anne Jewell  : 1950    MRN: [id number]                              Today's Date: 2023       Admit Date: 2023    Visit Dx:     ICD-10-CM ICD-9-CM   1. Choledocholithiasis  K80.50 574.50   2. Malignant neoplasm of pyloric antrum  C16.3 151.2     Patient Active Problem List   Diagnosis    Chronic pain syndrome    Rheumatoid arthritis    Osteoarthritis of knee    Generalized osteoarthritis    Degeneration of intervertebral disc of lumbar region    Neck pain    Lumbar radiculopathy    Atopic rhinitis    Anxiety    Diarrhea    Indigestion    Dysthymic disorder    Gastroesophageal reflux disease    Acquired hypothyroidism    Insomnia    Pernicious anemia    Osteoporosis    Scoliosis    Vasovagal syncope    Vitamin D deficiency    Trigger middle finger of right hand    Trigger ring finger of right hand    Trigger little finger of right hand    Encounter for long-term (current) use of high-risk medication    Coagulation disorder    Hyperglycemia    Joint pain    Status post total knee replacement, left    Left knee pain    Status post total knee replacement, right    Osteoarthritis of thumb    Trigger finger of all digits of right hand    Left hip pain    Primary osteoarthritis of both hips    Edema    Nausea    Irritable bowel syndrome with diarrhea    Stage 3 chronic kidney disease    Cough    Acute non-recurrent frontal sinusitis    Dysuria    History of DVT (deep vein thrombosis)    Routine general medical examination at a health care facility    Frequency of urination    Memory loss    Visual changes    Choledocholithiasis    History of pulmonary embolism    Malignant neoplasm of pyloric antrum     Past Medical History:   Diagnosis Date    Allergic rhinitis     Arthritis of back     Arthritis of neck     Asthma     Bronchospasm     Carpal tunnel syndrome, bilateral     Cervical disc disorder     Clotting disorder     Deep vein thrombosis     Disc degeneration, lumbar     Edema      Esophageal reflux     Glaucoma     Hip arthrosis     Joint pain     Kidney disease 2018    stage 3    Liver disease 2018    stage 3    Low back pain     Osteoarthritis     Osteopenia     Osteoporosis     Periarthritis of shoulder     Scoliosis     Status post total knee replacement, left 08/31/2017    Status post total knee replacement, right 08/31/2017    UTI (urinary tract infection)     Venous thrombosis      Past Surgical History:   Procedure Laterality Date    BILATERAL BREAST REDUCTION      BREAST SURGERY      COLONOSCOPY  08/05/2016    ERCP N/A 8/30/2023    Procedure: ENDOSCOPIC RETROGRADE CHOLANGIOPANCREATOGRAPHY;  Surgeon: Elijah Simon MD;  Location: [facility] ENDOSCOPY;  Service: Gastroenterology;  Laterality: N/A;  cbd stone    ERCP N/A 9/1/2023    Procedure: ENDOSCOPIC RETROGRADE CHOLANGIOPANCREATOGRAPHY WITH CHOLANGIOGRAM, SPHINCTEROTOMY, BALLOON SWEEP;  Surgeon: Elijah Simon MD;  Location: [facility] OR;  Service: Gastroenterology;  Laterality: N/A;    EXPLORATORY LAPAROTOMY N/A 9/1/2023    Procedure: Exploratory laparotomy with Distal Gastrectomy;  Surgeon: Slava Loo Jr., MD;  Location: [facility] OR;  Service: General;  Laterality: N/A;    GASTRIC BYPASS      HAND SURGERY Right 01/14/2016    HERNIA REPAIR      x7    HYSTERECTOMY      JOINT REPLACEMENT      Both knees    OTHER SURGICAL HISTORY      vaginal sling operation for stress incontinence    TOTAL KNEE ARTHROPLASTY Left     TOTAL KNEE ARTHROPLASTY Bilateral       General Information       Row Name 09/07/23 1825          Physical Therapy Time and Intention    Document Type therapy note (daily note)  -MARK     Mode of Treatment individual therapy;physical therapy  -       Row Name 09/07/23 1825          General Information    Patient Profile Reviewed yes  -MARK     Existing Precautions/Restrictions fall  AFIA drain  -       Row Name 09/07/23 1821          Cognition    Orientation Status (Cognition) oriented x 4  possibly [place],  sometimes needs extra time to respond  -       Row Name 09/07/23 1825          Safety Issues, Functional Mobility    Safety Issues Affecting Function (Mobility) impulsivity;insight into deficits/self-awareness;safety precaution awareness  -     Impairments Affecting Function (Mobility) balance;coordination;endurance/activity tolerance;pain;strength  -               User Key  (r) = Recorded By, (t) = Taken By, (c) = Cosigned By      Initials Name Provider Type    Sara Antonio PTA Physical Therapist Assistant                   Mobility       Row Name 09/07/23 1826          Bed Mobility    Comment, (Bed Mobility) sitting EOB -pt did report incr back pain so educ offered on having support behind back-like in chair would be best for pain control  -       Row Name 09/07/23 1826          Sit-Stand Transfer    Sit-Stand [place] (Transfers) contact guard;minimum assist (75% patient effort);verbal cues;nonverbal cues (demo/gesture)  -     Assistive Device (Sit-Stand Transfers) walker, front-wheeled  -       Row Name 09/07/23 1826          Gait/Stairs (Locomotion)    [place] Level (Gait) contact guard;verbal cues  -     Assistive Device (Gait) walker, front-wheeled  she has her rwx and rollator in room  -JM     Distance in Feet (Gait) 20ft  -     Deviations/Abnormal Patterns (Gait) kristi decreased;festinating/shuffling;stride length decreased  -     Bilateral Gait Deviations forward flexed posture  -               User Key  (r) = Recorded By, (t) = Taken By, (c) = Cosigned By      Initials Name Provider Type    Sara Antonio PTA Physical Therapist Assistant                   Obj/Interventions       Row Name 09/07/23 1829          Motor Skills    Therapeutic Exercise --  issued theraband and LE exer for bedside; perf LE exer x10 reps per HEP, educ offered on perf slowly to benefit her strength  -               User Key  (r) = Recorded By, (t) = Taken By, (c) = Cosigned By       Initials Name Provider Type    Sara Antonio PTA Physical Therapist Assistant                   Goals/Plan    No documentation.                  Clinical Impression       Row Name 09/07/23 1830          Pain    Pretreatment Pain Rating 7/10  -     Posttreatment Pain Rating 8/10  -     Pain Location - Side/Orientation Right  -     Pain Location - abdomen;back  -     Pain Intervention(s) Repositioned;Rest  -       Row Name 09/07/23 1830          Plan of Care Review    Plan of Care Reviewed With patient;daughter  -     Progress improving  -     Outcome Evaluation Pt agreed to PT session, tolerated amb ~20ft w/rwx CGA 1, pt tolerated LE exer EOB and educ offered on slowing pace during exer to benefit her strength more effectively; pt given LE HEP and theraband to perf bedside UE, LE exer ad tanya, dtr in room, RN , very supportive, plans home w/HH at [place], OR planned for 9/8  -[facility] Name 09/07/23 1830          Therapy Assessment/Plan (PT)    Rehab Potential (PT) good, to achieve stated therapy goals  -     Criteria for Skilled Interventions Met (PT) yes  -[facility] Name 09/07/23 1830          Vital Signs    O2 Delivery Pre Treatment room air  -JM       Row Name 09/07/23 1830          Positioning and Restraints    Pre-Treatment Position in bed  -     Post Treatment Position bed  -     In Bed sitting EOB;call light within reach;encouraged to call for assist;with family/caregiver;notified nsg  dtr stays and assists with all needs , no alarm upon entry  -               User Key  (r) = Recorded By, (t) = Taken By, (c) = Cosigned By      Initials Name Provider Type    Sara Antonio PTA Physical Therapist Assistant                   Outcome Measures       Row Name 09/07/23 1834          How much help from another person do you currently need...    Turning from your back to your side while in flat bed without using bedrails? 3  -     Moving from lying on back to sitting on the side of  a flat bed without bedrails? 3  -JM     Moving to and from a bed to a chair (including a wheelchair)? 3  -JM     Standing up from a chair using your arms (e.g., wheelchair, bedside chair)? 3  -JM     Climbing 3-5 steps with a railing? 1  -JM     To walk in hospital room? 3  -JM     AM-PAC 6 Clicks Score (PT) 16  -JM     Highest level of mobility 5 --> Static standing  -               User Key  (r) = Recorded By, (t) = Taken By, (c) = Cosigned By      Initials Name Provider Type    Sara Antonio PTA Physical Therapist Assistant                                 Physical Therapy Education       Title: PT OT SLP Therapies (Done)       Topic: Physical Therapy (Done)       Point: Mobility training (Done)       Learning Progress Summary             Patient Acceptance, E,TB,D, VU,NR by MARK at 9/7/2023 1835    Acceptance, E,TB, VU,DU by  at 9/5/2023 1452    Acceptance, E,TB,D, VU,NR by  at 9/3/2023 1516    Acceptance, E, VU by  at 8/28/2023 1014   Family Acceptance, E,TB,D, VU,NR by  at 9/7/2023 1835                         Point: Home exercise program (Done)       Learning Progress Summary             Patient Acceptance, E,TB,D, VU,NR by MARK at 9/7/2023 1835    Acceptance, E,TB, VU,DU by  at 9/5/2023 1452    Acceptance, E,TB,D, VU,NR by  at 9/3/2023 1516    Acceptance, E, VU by  at 8/28/2023 1014   Family Acceptance, E,TB,D, VU,NR by  at 9/7/2023 1835                         Point: Body mechanics (Done)       Learning Progress Summary             Patient Acceptance, E,TB,D, VU,NR by MARK at 9/7/2023 1835    Acceptance, E,TB, VU,DU by  at 9/5/2023 1452    Acceptance, E,TB,D, VU,NR by  at 9/3/2023 1516    Acceptance, E, VU by  at 8/28/2023 1014   Family Acceptance, E,TB,D, VU,NR by MARK at 9/7/2023 1835                         Point: Precautions (Done)       Learning Progress Summary             Patient Acceptance, E,KIARRA,LUCIO, KACI,NR by MARK at 9/7/2023 1835    Acceptance, ENID,KACI PLUNKETT,DU by JASON at 9/5/2023 1458     Acceptance, E,TB,D, VU,NR by  at 9/3/2023 1516    Acceptance, E, VU by  at 8/28/2023 1014   Family Acceptance, E,TB,D, VU,NR by  at 9/7/2023 1835                                         User Key       Initials Effective Dates Name Provider Type Discipline     03/07/18 -  Sara Graf PTA Physical Therapist Assistant PT     04/08/22 -  Margie Main, PT Physical Therapist PT     05/02/22 -  Dixie Butler, PT Physical Therapist PT     09/22/22 -  Anahy Johnson, PT Physical Therapist PT                  PT Recommendation and Plan     Plan of Care Reviewed With: patient, daughter  Progress: improving  Outcome Evaluation: Pt agreed to PT session, tolerated amb ~20ft w/rwx CGA 1, pt tolerated LE exer EOB and educ offered on slowing pace during exer to benefit her strength more effectively; pt given LE HEP and theraband to perf bedside UE, LE exer ad tanya, dtr in room, RN , very supportive, plans home w/HH at [place], OR planned for 9/8     Time Calculation:         PT Charges       Row Name 09/07/23 1824             Time Calculation    Start Time 1630  -      Stop Time 1710  -      Time Calculation (min) 40 min  -      PT Received On 09/07/23  -      PT - Next Appointment 09/11/23  -                User Key  (r) = Recorded By, (t) = Taken By, (c) = Cosigned By      Initials Name Provider Type     Sara Graf PTA Physical Therapist Assistant                  Therapy Charges for Today       Code Description Service Date Service Provider Modifiers Qty    42173752811 HC PT THER PROC EA 15 MIN 9/7/2023 Sara Graf PTA GP 3            PT G-Codes  Outcome Measure Options: AM-PAC 6 Clicks Basic Mobility (PT)  AM-PAC 6 Clicks Score (PT): 16  PT Discharge Summary  Anticipated Discharge Disposition (PT): home with 24/7 care, home with home health (pending progress at [place] , OR planned for 9/8 so will assess after that)    Sara Graf PTA  9/7/2023

## 2023-09-07 NOTE — PLAN OF CARE
Goal Outcome Evaluation:  Plan of Care Reviewed With: patient, daughter        Progress: no change  Outcome Evaluation: Patient has had no concerns since assuming care this afternoon. Worked with therapy in room. AFIA emptied with 75ml noted. Consent signed for surgery tomorrow. Vital signs stable. Call light in reach. Safety maintained.

## 2023-09-07 NOTE — PROGRESS NOTES
Chief Complaint:    S/P Exploratory laparotomy with gastric access for ERCP and distal gastrectomy, POD 6    Subjective:    The patient is feeling well except for indigestion and nausea.  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    Objective:    Temp:  [97.5 °F (36.4 °C)-98.2 °F (36.8 °C)] 97.9 °F (36.6 °C)  Heart Rate:  [75-83] 78  Resp:  [18] 18  BP: (136-147)/(69-82) 145/82    Physical Exam  Constitutional:       Appearance: She is not ill-appearing or toxic-appearing.   Neurological:      Mental Status: She is alert.   Psychiatric:         Behavior: Behavior is cooperative.       Results:    WBC is 8.36.  H/H is 11.0/33.8.  BUN is 5 and creatinine 0.81.    Impression/Plan:    The patient is POD 6 from an exploratory laparotomy with gastric access for ERCP and distal gastrectomy for fungating mass of the stomach.  The pathology returned as adenocarcinoma.  She will need to be returned to the operating room for more of a gastrectomy with an attempt to sample lymph nodes with possible takedown of the gastric bypass.    Slava Loo Jr., M.D.

## 2023-09-07 NOTE — PLAN OF CARE
Goal Outcome Evaluation:  Plan of Care Reviewed With: patient, daughter        Progress: improving  Outcome Evaluation: Pt agreed to PT session, tolerated amb ~20ft w/rwx CGA 1, pt tolerated LE exer EOB and educ offered on slowing pace during exer to benefit her strength more effectively; pt given LE HEP and theraband to perf bedside UE, LE exer ad tanya, dtr in room, RN , very supportive, plans home w/HH at [place], OR planned for 9/8      Anticipated Discharge Disposition (PT): home with 24/7 care, home with home health (pending progress at [place] , OR planned for 9/8 so will assess after that)

## 2023-09-07 NOTE — CONSULTS
I was requested to provided spiritual and emotional support to patient.  She shared about her oncoming surgery.  She is worried, but she also wants to have jesús that all will be well.  We talked about God's presence and it was still ok to be nervous.  She receives a lot of support from her daughter.  We concluded our time with prayer and that God's presence will be felt as she has her surgery.

## 2023-09-08 ENCOUNTER — APPOINTMENT (OUTPATIENT)
Dept: GENERAL RADIOLOGY | Facility: HOSPITAL | Age: 73
DRG: 423 | End: 2023-09-08
Payer: MEDICARE

## 2023-09-08 ENCOUNTER — ANESTHESIA (OUTPATIENT)
Dept: PERIOP | Facility: HOSPITAL | Age: 73
DRG: 423 | End: 2023-09-08
Payer: MEDICARE

## 2023-09-08 ENCOUNTER — ANESTHESIA EVENT (OUTPATIENT)
Dept: PERIOP | Facility: HOSPITAL | Age: 73
DRG: 423 | End: 2023-09-08
Payer: MEDICARE

## 2023-09-08 LAB
ALBUMIN SERPL-MCNC: 1.9 G/DL (ref 3.5–5.2)
ALBUMIN/GLOB SERPL: 0.6 G/DL
ALP SERPL-CCNC: 184 U/L (ref 39–117)
ALT SERPL W P-5'-P-CCNC: 9 U/L (ref 1–33)
ANION GAP SERPL CALCULATED.3IONS-SCNC: 7.2 MMOL/L (ref 5–15)
AST SERPL-CCNC: 21 U/L (ref 1–32)
BILIRUB SERPL-MCNC: 0.5 MG/DL (ref 0–1.2)
BUN SERPL-MCNC: 4 MG/DL (ref 8–23)
BUN/CREAT SERPL: 4.7 (ref 7–25)
CALCIUM SPEC-SCNC: 7.7 MG/DL (ref 8.6–10.5)
CHLORIDE SERPL-SCNC: 106 MMOL/L (ref 98–107)
CO2 SERPL-SCNC: 24.8 MMOL/L (ref 22–29)
CREAT SERPL-MCNC: 0.86 MG/DL (ref 0.57–1)
DEPRECATED RDW RBC AUTO: 46.7 FL (ref 37–54)
EGFRCR SERPLBLD CKD-EPI 2021: 71.9 ML/MIN/1.73
ERYTHROCYTE [DISTWIDTH] IN BLOOD BY AUTOMATED COUNT: 13.6 % (ref 12.3–15.4)
GLOBULIN UR ELPH-MCNC: 3.3 GM/DL
GLUCOSE SERPL-MCNC: 74 MG/DL (ref 65–99)
HCT VFR BLD AUTO: 29.9 % (ref 34–46.6)
HGB BLD-MCNC: 9.9 G/DL (ref 12–15.9)
MCH RBC QN AUTO: 31.2 PG (ref 26.6–33)
MCHC RBC AUTO-ENTMCNC: 33.1 G/DL (ref 31.5–35.7)
MCV RBC AUTO: 94.3 FL (ref 79–97)
PLATELET # BLD AUTO: 258 10*3/MM3 (ref 140–450)
PMV BLD AUTO: 9.9 FL (ref 6–12)
POTASSIUM SERPL-SCNC: 4.2 MMOL/L (ref 3.5–5.2)
PROT SERPL-MCNC: 5.2 G/DL (ref 6–8.5)
RBC # BLD AUTO: 3.17 10*6/MM3 (ref 3.77–5.28)
SARS-COV-2 AG RESP QL IA.RAPID: DETECTED
SODIUM SERPL-SCNC: 138 MMOL/L (ref 136–145)
WBC NRBC COR # BLD: 5.16 10*3/MM3 (ref 3.4–10.8)

## 2023-09-08 PROCEDURE — 25010000002 CEFTRIAXONE PER 250 MG: Performed by: ANESTHESIOLOGY

## 2023-09-08 PROCEDURE — 25010000002 DEXAMETHASONE PER 1 MG

## 2023-09-08 PROCEDURE — 85027 COMPLETE CBC AUTOMATED: CPT | Performed by: SURGERY

## 2023-09-08 PROCEDURE — 25010000002 PROPOFOL 10 MG/ML EMULSION

## 2023-09-08 PROCEDURE — 87426 SARSCOV CORONAVIRUS AG IA: CPT | Performed by: NURSE PRACTITIONER

## 2023-09-08 PROCEDURE — 25010000002 MAGNESIUM SULFATE PER 500 MG OF MAGNESIUM: Performed by: ANESTHESIOLOGY

## 2023-09-08 PROCEDURE — 25010000002 ONDANSETRON PER 1 MG

## 2023-09-08 PROCEDURE — 25010000002 MIDAZOLAM PER 1 MG: Performed by: ANESTHESIOLOGY

## 2023-09-08 PROCEDURE — 63710000001 PROMETHAZINE PER 25 MG: Performed by: SURGERY

## 2023-09-08 PROCEDURE — 0 MEPERIDINE PER 100 MG: Performed by: ANESTHESIOLOGY

## 2023-09-08 PROCEDURE — 25010000002 HYDROMORPHONE PER 4 MG: Performed by: ANESTHESIOLOGY

## 2023-09-08 PROCEDURE — 25010000002 FENTANYL CITRATE (PF) 50 MCG/ML SOLUTION: Performed by: ANESTHESIOLOGY

## 2023-09-08 PROCEDURE — 25010000002 SUGAMMADEX 200 MG/2ML SOLUTION: Performed by: ANESTHESIOLOGY

## 2023-09-08 PROCEDURE — 43632 REMOVAL OF STOMACH PARTIAL: CPT | Performed by: SURGERY

## 2023-09-08 PROCEDURE — 0D160ZA BYPASS STOMACH TO JEJUNUM, OPEN APPROACH: ICD-10-PCS | Performed by: SURGERY

## 2023-09-08 PROCEDURE — 25010000002 FENTANYL CITRATE (PF) 50 MCG/ML SOLUTION

## 2023-09-08 PROCEDURE — 25010000002 HYDROMORPHONE 1 MG/ML SOLUTION: Performed by: ANESTHESIOLOGY

## 2023-09-08 PROCEDURE — 80053 COMPREHEN METABOLIC PANEL: CPT | Performed by: SURGERY

## 2023-09-08 DEVICE — PROXIMATE RELOADABLE LINEAR CUTTER WITH SAFETY LOCK-OUT, 75MM
Type: IMPLANTABLE DEVICE | Site: ABDOMEN | Status: FUNCTIONAL
Brand: PROXIMATE

## 2023-09-08 RX ORDER — HYDRALAZINE HYDROCHLORIDE 20 MG/ML
5 INJECTION INTRAMUSCULAR; INTRAVENOUS
Status: DISCONTINUED | OUTPATIENT
Start: 2023-09-08 | End: 2023-09-08 | Stop reason: HOSPADM

## 2023-09-08 RX ORDER — SODIUM CHLORIDE 0.9 % (FLUSH) 0.9 %
3-10 SYRINGE (ML) INJECTION AS NEEDED
Status: CANCELLED | OUTPATIENT
Start: 2023-09-08

## 2023-09-08 RX ORDER — SODIUM CHLORIDE 0.9 % (FLUSH) 0.9 %
3 SYRINGE (ML) INJECTION EVERY 12 HOURS SCHEDULED
Status: CANCELLED | OUTPATIENT
Start: 2023-09-08

## 2023-09-08 RX ORDER — HYDROCODONE BITARTRATE AND ACETAMINOPHEN 7.5; 325 MG/1; MG/1
1 TABLET ORAL EVERY 4 HOURS PRN
Status: DISCONTINUED | OUTPATIENT
Start: 2023-09-08 | End: 2023-09-08 | Stop reason: HOSPADM

## 2023-09-08 RX ORDER — IPRATROPIUM BROMIDE AND ALBUTEROL SULFATE 2.5; .5 MG/3ML; MG/3ML
3 SOLUTION RESPIRATORY (INHALATION) ONCE AS NEEDED
Status: DISCONTINUED | OUTPATIENT
Start: 2023-09-08 | End: 2023-09-08 | Stop reason: HOSPADM

## 2023-09-08 RX ORDER — MAGNESIUM HYDROXIDE 1200 MG/15ML
LIQUID ORAL AS NEEDED
Status: DISCONTINUED | OUTPATIENT
Start: 2023-09-08 | End: 2023-09-08 | Stop reason: HOSPADM

## 2023-09-08 RX ORDER — PROPOFOL 10 MG/ML
VIAL (ML) INTRAVENOUS AS NEEDED
Status: DISCONTINUED | OUTPATIENT
Start: 2023-09-08 | End: 2023-09-08 | Stop reason: SURG

## 2023-09-08 RX ORDER — EPHEDRINE SULFATE 50 MG/ML
5 INJECTION, SOLUTION INTRAVENOUS ONCE AS NEEDED
Status: DISCONTINUED | OUTPATIENT
Start: 2023-09-08 | End: 2023-09-08 | Stop reason: HOSPADM

## 2023-09-08 RX ORDER — ONDANSETRON 2 MG/ML
INJECTION INTRAMUSCULAR; INTRAVENOUS AS NEEDED
Status: DISCONTINUED | OUTPATIENT
Start: 2023-09-08 | End: 2023-09-08 | Stop reason: SURG

## 2023-09-08 RX ORDER — LIDOCAINE HYDROCHLORIDE 10 MG/ML
0.5 INJECTION, SOLUTION INFILTRATION; PERINEURAL ONCE AS NEEDED
Status: CANCELLED | OUTPATIENT
Start: 2023-09-08

## 2023-09-08 RX ORDER — SODIUM CHLORIDE, SODIUM LACTATE, POTASSIUM CHLORIDE, CALCIUM CHLORIDE 600; 310; 30; 20 MG/100ML; MG/100ML; MG/100ML; MG/100ML
9 INJECTION, SOLUTION INTRAVENOUS CONTINUOUS
Status: CANCELLED | OUTPATIENT
Start: 2023-09-08

## 2023-09-08 RX ORDER — NALOXONE HCL 0.4 MG/ML
0.2 VIAL (ML) INJECTION AS NEEDED
Status: DISCONTINUED | OUTPATIENT
Start: 2023-09-08 | End: 2023-09-08 | Stop reason: HOSPADM

## 2023-09-08 RX ORDER — PROMETHAZINE HYDROCHLORIDE 25 MG/1
25 TABLET ORAL ONCE AS NEEDED
Status: DISCONTINUED | OUTPATIENT
Start: 2023-09-08 | End: 2023-09-08 | Stop reason: HOSPADM

## 2023-09-08 RX ORDER — FENTANYL CITRATE 50 UG/ML
25 INJECTION, SOLUTION INTRAMUSCULAR; INTRAVENOUS
Status: DISCONTINUED | OUTPATIENT
Start: 2023-09-08 | End: 2023-09-08 | Stop reason: HOSPADM

## 2023-09-08 RX ORDER — SODIUM CHLORIDE 9 MG/ML
30 INJECTION, SOLUTION INTRAVENOUS CONTINUOUS PRN
Status: DISCONTINUED | OUTPATIENT
Start: 2023-09-08 | End: 2023-09-15 | Stop reason: HOSPADM

## 2023-09-08 RX ORDER — MEPERIDINE HYDROCHLORIDE 25 MG/ML
25 INJECTION INTRAMUSCULAR; INTRAVENOUS; SUBCUTANEOUS ONCE
Status: COMPLETED | OUTPATIENT
Start: 2023-09-08 | End: 2023-09-08

## 2023-09-08 RX ORDER — CEFTRIAXONE 1 G/1
INJECTION, POWDER, FOR SOLUTION INTRAMUSCULAR; INTRAVENOUS AS NEEDED
Status: DISCONTINUED | OUTPATIENT
Start: 2023-09-08 | End: 2023-09-08 | Stop reason: SURG

## 2023-09-08 RX ORDER — DIPHENHYDRAMINE HYDROCHLORIDE 50 MG/ML
12.5 INJECTION INTRAMUSCULAR; INTRAVENOUS
Status: DISCONTINUED | OUTPATIENT
Start: 2023-09-08 | End: 2023-09-08 | Stop reason: HOSPADM

## 2023-09-08 RX ORDER — LABETALOL HYDROCHLORIDE 5 MG/ML
5 INJECTION, SOLUTION INTRAVENOUS
Status: DISCONTINUED | OUTPATIENT
Start: 2023-09-08 | End: 2023-09-08 | Stop reason: HOSPADM

## 2023-09-08 RX ORDER — MIDAZOLAM HYDROCHLORIDE 1 MG/ML
0.5 INJECTION INTRAMUSCULAR; INTRAVENOUS
Status: CANCELLED | OUTPATIENT
Start: 2023-09-08

## 2023-09-08 RX ORDER — DEXAMETHASONE SODIUM PHOSPHATE 4 MG/ML
INJECTION, SOLUTION INTRA-ARTICULAR; INTRALESIONAL; INTRAMUSCULAR; INTRAVENOUS; SOFT TISSUE AS NEEDED
Status: DISCONTINUED | OUTPATIENT
Start: 2023-09-08 | End: 2023-09-08 | Stop reason: SURG

## 2023-09-08 RX ORDER — KETAMINE HCL IN NACL, ISO-OSM 100MG/10ML
10 SYRINGE (ML) INJECTION
Status: COMPLETED | OUTPATIENT
Start: 2023-09-08 | End: 2023-09-08

## 2023-09-08 RX ORDER — DROPERIDOL 2.5 MG/ML
0.62 INJECTION, SOLUTION INTRAMUSCULAR; INTRAVENOUS
Status: DISCONTINUED | OUTPATIENT
Start: 2023-09-08 | End: 2023-09-08 | Stop reason: HOSPADM

## 2023-09-08 RX ORDER — MIDAZOLAM HYDROCHLORIDE 1 MG/ML
2 INJECTION INTRAMUSCULAR; INTRAVENOUS ONCE
Status: COMPLETED | OUTPATIENT
Start: 2023-09-08 | End: 2023-09-08

## 2023-09-08 RX ORDER — ONDANSETRON 2 MG/ML
4 INJECTION INTRAMUSCULAR; INTRAVENOUS ONCE AS NEEDED
Status: DISCONTINUED | OUTPATIENT
Start: 2023-09-08 | End: 2023-09-08 | Stop reason: HOSPADM

## 2023-09-08 RX ORDER — KETAMINE HCL IN NACL, ISO-OSM 100MG/10ML
SYRINGE (ML) INJECTION AS NEEDED
Status: DISCONTINUED | OUTPATIENT
Start: 2023-09-08 | End: 2023-09-08 | Stop reason: SURG

## 2023-09-08 RX ORDER — HYDROCODONE BITARTRATE AND ACETAMINOPHEN 5; 325 MG/1; MG/1
1 TABLET ORAL ONCE AS NEEDED
Status: DISCONTINUED | OUTPATIENT
Start: 2023-09-08 | End: 2023-09-08 | Stop reason: HOSPADM

## 2023-09-08 RX ORDER — MAGNESIUM SULFATE HEPTAHYDRATE 500 MG/ML
INJECTION, SOLUTION INTRAMUSCULAR; INTRAVENOUS AS NEEDED
Status: DISCONTINUED | OUTPATIENT
Start: 2023-09-08 | End: 2023-09-08 | Stop reason: SURG

## 2023-09-08 RX ORDER — FENTANYL CITRATE 50 UG/ML
50 INJECTION, SOLUTION INTRAMUSCULAR; INTRAVENOUS ONCE AS NEEDED
Status: CANCELLED | OUTPATIENT
Start: 2023-09-08

## 2023-09-08 RX ORDER — FAMOTIDINE 10 MG/ML
20 INJECTION, SOLUTION INTRAVENOUS ONCE
Status: CANCELLED | OUTPATIENT
Start: 2023-09-08 | End: 2023-09-08

## 2023-09-08 RX ORDER — FENTANYL CITRATE 50 UG/ML
INJECTION, SOLUTION INTRAMUSCULAR; INTRAVENOUS AS NEEDED
Status: DISCONTINUED | OUTPATIENT
Start: 2023-09-08 | End: 2023-09-08 | Stop reason: SURG

## 2023-09-08 RX ORDER — LIDOCAINE HYDROCHLORIDE 20 MG/ML
INJECTION, SOLUTION INFILTRATION; PERINEURAL AS NEEDED
Status: DISCONTINUED | OUTPATIENT
Start: 2023-09-08 | End: 2023-09-08 | Stop reason: SURG

## 2023-09-08 RX ORDER — HYDROMORPHONE HYDROCHLORIDE 1 MG/ML
0.25 INJECTION, SOLUTION INTRAMUSCULAR; INTRAVENOUS; SUBCUTANEOUS
Status: DISCONTINUED | OUTPATIENT
Start: 2023-09-08 | End: 2023-09-08 | Stop reason: HOSPADM

## 2023-09-08 RX ORDER — NALOXONE HCL 0.4 MG/ML
0.1 VIAL (ML) INJECTION
Status: DISCONTINUED | OUTPATIENT
Start: 2023-09-08 | End: 2023-09-15 | Stop reason: HOSPADM

## 2023-09-08 RX ORDER — SODIUM CHLORIDE, SODIUM LACTATE, POTASSIUM CHLORIDE, CALCIUM CHLORIDE 600; 310; 30; 20 MG/100ML; MG/100ML; MG/100ML; MG/100ML
INJECTION, SOLUTION INTRAVENOUS CONTINUOUS PRN
Status: DISCONTINUED | OUTPATIENT
Start: 2023-09-08 | End: 2023-09-08 | Stop reason: SURG

## 2023-09-08 RX ORDER — PROMETHAZINE HYDROCHLORIDE 25 MG/1
25 SUPPOSITORY RECTAL ONCE AS NEEDED
Status: DISCONTINUED | OUTPATIENT
Start: 2023-09-08 | End: 2023-09-08 | Stop reason: HOSPADM

## 2023-09-08 RX ORDER — HYDROMORPHONE HYDROCHLORIDE 1 MG/ML
0.5 INJECTION, SOLUTION INTRAMUSCULAR; INTRAVENOUS; SUBCUTANEOUS
Status: CANCELLED | OUTPATIENT
Start: 2023-09-08 | End: 2023-09-18

## 2023-09-08 RX ORDER — HYDROMORPHONE HCL IN 0.9% NACL 10 MG/50ML
PATIENT CONTROLLED ANALGESIA SYRINGE INTRAVENOUS CONTINUOUS
Status: DISCONTINUED | OUTPATIENT
Start: 2023-09-08 | End: 2023-09-09

## 2023-09-08 RX ORDER — ROCURONIUM BROMIDE 10 MG/ML
INJECTION, SOLUTION INTRAVENOUS AS NEEDED
Status: DISCONTINUED | OUTPATIENT
Start: 2023-09-08 | End: 2023-09-08 | Stop reason: SURG

## 2023-09-08 RX ORDER — FLUMAZENIL 0.1 MG/ML
0.2 INJECTION INTRAVENOUS AS NEEDED
Status: DISCONTINUED | OUTPATIENT
Start: 2023-09-08 | End: 2023-09-08 | Stop reason: HOSPADM

## 2023-09-08 RX ADMIN — FENTANYL CITRATE 25 MCG: 50 INJECTION, SOLUTION INTRAMUSCULAR; INTRAVENOUS at 17:12

## 2023-09-08 RX ADMIN — FENTANYL CITRATE 50 MCG: 50 INJECTION, SOLUTION INTRAMUSCULAR; INTRAVENOUS at 15:31

## 2023-09-08 RX ADMIN — BACLOFEN 5 MG: 10 TABLET ORAL at 10:05

## 2023-09-08 RX ADMIN — FENTANYL CITRATE 25 MCG: 50 INJECTION, SOLUTION INTRAMUSCULAR; INTRAVENOUS at 17:25

## 2023-09-08 RX ADMIN — FENTANYL CITRATE 25 MCG: 50 INJECTION, SOLUTION INTRAMUSCULAR; INTRAVENOUS at 18:12

## 2023-09-08 RX ADMIN — CEFTRIAXONE 2 G: 1 INJECTION, POWDER, FOR SOLUTION INTRAMUSCULAR; INTRAVENOUS at 15:21

## 2023-09-08 RX ADMIN — Medication 10 ML: at 21:56

## 2023-09-08 RX ADMIN — FAMOTIDINE 20 MG: 10 INJECTION INTRAVENOUS at 10:07

## 2023-09-08 RX ADMIN — OXYCODONE HYDROCHLORIDE 80 MG: 40 TABLET, FILM COATED, EXTENDED RELEASE ORAL at 10:04

## 2023-09-08 RX ADMIN — Medication 1 TABLET: at 10:06

## 2023-09-08 RX ADMIN — DEXAMETHASONE SODIUM PHOSPHATE 8 MG: 4 INJECTION, SOLUTION INTRA-ARTICULAR; INTRALESIONAL; INTRAMUSCULAR; INTRAVENOUS; SOFT TISSUE at 14:58

## 2023-09-08 RX ADMIN — LEVOTHYROXINE SODIUM 100 MCG: 0.1 TABLET ORAL at 10:05

## 2023-09-08 RX ADMIN — HYDROMORPHONE HYDROCHLORIDE 0.25 MG: 1 INJECTION, SOLUTION INTRAMUSCULAR; INTRAVENOUS; SUBCUTANEOUS at 17:45

## 2023-09-08 RX ADMIN — Medication 2000 UNITS: at 10:06

## 2023-09-08 RX ADMIN — PROMETHAZINE HYDROCHLORIDE 25 MG: 12.5 TABLET ORAL at 10:05

## 2023-09-08 RX ADMIN — ESCITALOPRAM OXALATE 10 MG: 10 TABLET, FILM COATED ORAL at 10:06

## 2023-09-08 RX ADMIN — HYDROCHLOROTHIAZIDE 25 MG: 25 TABLET ORAL at 10:07

## 2023-09-08 RX ADMIN — HYDROMORPHONE HYDROCHLORIDE 0.25 MG: 1 INJECTION, SOLUTION INTRAMUSCULAR; INTRAVENOUS; SUBCUTANEOUS at 17:33

## 2023-09-08 RX ADMIN — SUGAMMADEX 200 MG: 100 INJECTION, SOLUTION INTRAVENOUS at 16:28

## 2023-09-08 RX ADMIN — POTASSIUM CHLORIDE 20 MEQ: 750 TABLET, EXTENDED RELEASE ORAL at 21:55

## 2023-09-08 RX ADMIN — HYDROMORPHONE HYDROCHLORIDE 0.25 MG: 1 INJECTION, SOLUTION INTRAMUSCULAR; INTRAVENOUS; SUBCUTANEOUS at 17:19

## 2023-09-08 RX ADMIN — OXYCODONE HYDROCHLORIDE 80 MG: 40 TABLET, FILM COATED, EXTENDED RELEASE ORAL at 21:55

## 2023-09-08 RX ADMIN — Medication 10 ML: at 10:08

## 2023-09-08 RX ADMIN — MAGNESIUM SULFATE HEPTAHYDRATE 1 G: 500 INJECTION, SOLUTION INTRAMUSCULAR; INTRAVENOUS at 15:26

## 2023-09-08 RX ADMIN — ONDANSETRON 4 MG: 2 INJECTION INTRAMUSCULAR; INTRAVENOUS at 14:58

## 2023-09-08 RX ADMIN — Medication 10 MG: at 18:00

## 2023-09-08 RX ADMIN — FENTANYL CITRATE 25 MCG: 50 INJECTION, SOLUTION INTRAMUSCULAR; INTRAVENOUS at 17:33

## 2023-09-08 RX ADMIN — FENTANYL CITRATE 50 MCG: 50 INJECTION, SOLUTION INTRAMUSCULAR; INTRAVENOUS at 15:03

## 2023-09-08 RX ADMIN — PROMETHAZINE HYDROCHLORIDE 25 MG: 12.5 TABLET ORAL at 21:54

## 2023-09-08 RX ADMIN — HYDROMORPHONE HYDROCHLORIDE 0.25 MG: 1 INJECTION, SOLUTION INTRAMUSCULAR; INTRAVENOUS; SUBCUTANEOUS at 17:13

## 2023-09-08 RX ADMIN — MONTELUKAST SODIUM 10 MG: 10 TABLET, FILM COATED ORAL at 10:04

## 2023-09-08 RX ADMIN — Medication 25 MG: at 15:31

## 2023-09-08 RX ADMIN — Medication 10 MG: at 18:20

## 2023-09-08 RX ADMIN — HYDROMORPHONE HYDROCHLORIDE 0.25 MG: 1 INJECTION, SOLUTION INTRAMUSCULAR; INTRAVENOUS; SUBCUTANEOUS at 17:08

## 2023-09-08 RX ADMIN — Medication 10 MG: at 17:46

## 2023-09-08 RX ADMIN — FAMOTIDINE 20 MG: 10 INJECTION INTRAVENOUS at 21:54

## 2023-09-08 RX ADMIN — ROCURONIUM BROMIDE 50 MG: 10 INJECTION INTRAVENOUS at 14:53

## 2023-09-08 RX ADMIN — HYDROMORPHONE HYDROCHLORIDE 0.25 MG: 1 INJECTION, SOLUTION INTRAMUSCULAR; INTRAVENOUS; SUBCUTANEOUS at 17:40

## 2023-09-08 RX ADMIN — Medication: at 19:39

## 2023-09-08 RX ADMIN — FENTANYL CITRATE 25 MCG: 50 INJECTION, SOLUTION INTRAMUSCULAR; INTRAVENOUS at 18:07

## 2023-09-08 RX ADMIN — FENTANYL CITRATE 25 MCG: 50 INJECTION, SOLUTION INTRAMUSCULAR; INTRAVENOUS at 17:18

## 2023-09-08 RX ADMIN — Medication 10 MG: at 18:10

## 2023-09-08 RX ADMIN — HYDROMORPHONE HYDROCHLORIDE 1 MG: 1 INJECTION, SOLUTION INTRAMUSCULAR; INTRAVENOUS; SUBCUTANEOUS at 18:13

## 2023-09-08 RX ADMIN — FENTANYL CITRATE 25 MCG: 50 INJECTION, SOLUTION INTRAMUSCULAR; INTRAVENOUS at 17:01

## 2023-09-08 RX ADMIN — ALPRAZOLAM 3 MG: 1 TABLET ORAL at 23:41

## 2023-09-08 RX ADMIN — ROCURONIUM BROMIDE 20 MG: 10 INJECTION INTRAVENOUS at 15:28

## 2023-09-08 RX ADMIN — GUAIFENESIN 600 MG: 600 TABLET, EXTENDED RELEASE ORAL at 21:55

## 2023-09-08 RX ADMIN — PROPOFOL 150 MG: 10 INJECTION, EMULSION INTRAVENOUS at 14:53

## 2023-09-08 RX ADMIN — MEPERIDINE HYDROCHLORIDE 25 MG: 25 INJECTION INTRAMUSCULAR; INTRAVENOUS; SUBCUTANEOUS at 18:36

## 2023-09-08 RX ADMIN — Medication 250 MG: at 10:06

## 2023-09-08 RX ADMIN — Medication 10 MG: at 17:36

## 2023-09-08 RX ADMIN — HYDROMORPHONE HYDROCHLORIDE 0.25 MG: 1 INJECTION, SOLUTION INTRAMUSCULAR; INTRAVENOUS; SUBCUTANEOUS at 18:07

## 2023-09-08 RX ADMIN — FENTANYL CITRATE 25 MCG: 50 INJECTION, SOLUTION INTRAMUSCULAR; INTRAVENOUS at 17:07

## 2023-09-08 RX ADMIN — LIDOCAINE HYDROCHLORIDE 80 MG: 20 INJECTION, SOLUTION INFILTRATION; PERINEURAL at 14:53

## 2023-09-08 RX ADMIN — POTASSIUM CHLORIDE 20 MEQ: 750 TABLET, EXTENDED RELEASE ORAL at 10:07

## 2023-09-08 RX ADMIN — HYDROMORPHONE HYDROCHLORIDE 0.25 MG: 1 INJECTION, SOLUTION INTRAMUSCULAR; INTRAVENOUS; SUBCUTANEOUS at 18:02

## 2023-09-08 RX ADMIN — MAGNESIUM SULFATE HEPTAHYDRATE 1 G: 500 INJECTION, SOLUTION INTRAMUSCULAR; INTRAVENOUS at 15:29

## 2023-09-08 RX ADMIN — Medication 1000 MCG: at 10:07

## 2023-09-08 RX ADMIN — MIDAZOLAM 2 MG: 1 INJECTION INTRAMUSCULAR; INTRAVENOUS at 18:38

## 2023-09-08 RX ADMIN — SODIUM CHLORIDE, POTASSIUM CHLORIDE, SODIUM LACTATE AND CALCIUM CHLORIDE: 600; 310; 30; 20 INJECTION, SOLUTION INTRAVENOUS at 14:40

## 2023-09-08 NOTE — OP NOTE
Surgeon: Slava Loo Jr., M.D.    Co-surgeon: Jg Og Jr., M.D.    Assistant: Justyn Mcguire CSA    An assistant was necessary and provided valuable retraction and exposure, as well as suction irrigation, assistance with bowel manipulation, and wound closure.    Pre-Operative Diagnosis:     Malignant neoplasm of pyloric antrum [C16.3]    Post-Operative Diagnosis:    Superficial wound infection and distended remnant stomach    Procedure Performed:     Exploratory laparotomy with gastrojejunostomy    Indications:     The patient is a pleasant 72-year-old female who has had a previous gastric bypass.  She developed primary choledocholithiasis which required an ERCP.  Based on the gastric bypass, she underwent an exploratory laparotomy with gastric access for ERCP using the remnant stomach.  In the process of providing gastric access, a mass was found in the remnant stomach in the region of the antrum.  She underwent a distal gastrectomy resecting the antrum, pylorus, and into the duodenal bulb.  The remnant stomach was not anastomosed to any bowel at that primary operation.  Her pathology returned as gastric adenocarcinoma.  An upper GI showed that the gastric pouch did not communicate with the remnant stomach and she presents now for exploratory laparotomy with possible additional gastrectomy.  The patient understands the indications, alternatives, risks, and benefits of the procedure and wishes to proceed.     Procedure:     The patient was identified and taken to the operating room where she was placed in the supine position on the operative table.  She underwent general endotracheal anesthesia and was appropriate monitored throughout the case by the anesthesia personnel.  A AFIA drain was removed to provide a sterile field.  At the time of AFIA drain removal, drainage was noted from the midline incision and this was opened with purulent material encountered.  The wound infection was completely drained.  Her  abdomen was then prepped and draped in the standard surgical fashion using Betadine.  The previous upper midline incision was opened by removing PDS sutures with scissors.  Upon entering the abdomen there were dense adhesions but no evidence of intra-abdominal infection.  Adhesions were lysed using finger fracture and this allowed access into the upper abdomen.  There was dense inflammatory changes that made identification of soft tissue structures, such as lymph nodes, and possible.  The duodenal stump was identified and was intact and viable.  The transverse colon was reflected inferiorly after dividing adhesions to the liver.  Dr. Og was able to unroofed the Buster limb used for the gastric bypass.  It was in a retrocolic position.  The jejunojejunostomy was identified distally and there was some lysis of adhesions of small bowel performed for clear identification.  There were dense adhesions in the region of the gastric pouch and it was elected not to modify the gastric bypass.  The remnant stomach was very distended and it was unroofed along the left upper quadrant lateral to the gastric pouch.  This was felt to be a good place for a gastrojejunostomy to allow emptying of the remnant stomach.  The Buster limb was gently brought up and laid over this aspect of the stomach in preparation for anastomosis.  This would allow access to the remnant stomach endoscopically if ongoing surveillance is necessary based on her history of gastric cancer.  A gastrotomy and enterotomy were both created.  The stomach had fluid and this was suctioned.  A stapled anastomosis was then performed with a MIKKI stapling device using a blue load.  The common channel was then closed in 2 layers with an inner layer of 3-0 Vicryl suture in an outer layer of 3-0 silk suture.  The anastomosis was intact, widely patent, and viable.  The area was copiously irrigated and hemostasis appeared adequate.  There was a large distended fundus and this  was squeezed with decompression through the gastrojejunostomy.  Based on the significant inflammatory changes harvesting lymph nodes for the recent cancer diagnosis was not feasible.  A 19 round Yaniv drain was placed in a position adjacent to the duodenal stump and then extending across the stomach adjacent to the newly created gastrojejunostomy.  This drain was secured at the skin with a 2-0 nylon suture.  The fascia was then unroofed using Bovie electrocautery to allow an adequate fascial closure.  The fascia was reapproximated with #1 PDS sutures in a running fashion.  The skin was loosely approximated with skin staples with the intervening open areas packed with normal saline soaked gauze.  A dressing was applied.  The sponge, needle, and instrument counts were correct at the end of the case.  The patient tolerated the procedure well and was transferred to the recovery room in stable condition.    Estimated Blood Loss:      100 cc    Specimens:     None    Slava Loo Jr., M.D.

## 2023-09-08 NOTE — PLAN OF CARE
Goal Outcome Evaluation:              Outcome Evaluation: HAS BEEN HAVING SOME VENTRICULAR ECTOPY OCCASIONALLY WITH NON SUSTAINED VTACH, 5-6 BEATS, ASYMPTOMATIC. POTASSIUM AND MAGNESIUM ARE STABLE.

## 2023-09-08 NOTE — ANESTHESIA PROCEDURE NOTES
Airway  Urgency: elective    Date/Time: 9/8/2023 2:55 PM  Airway not difficult    General Information and Staff    Patient location during procedure: OR  CRNA/CAA: Jimmy Ansari CRNA    Indications and Patient Condition  Indications for airway management: airway protection    Preoxygenated: yes  Mask difficulty assessment: 1 - vent by mask    Final Airway Details  Final airway type: endotracheal airway      Successful airway: ETT  Cuffed: yes   Successful intubation technique: direct laryngoscopy  Endotracheal tube insertion site: oral  Blade: Paul  Blade size: 3  ETT size (mm): 7.0  Cormack-Lehane Classification: grade I - full view of glottis  Placement verified by: chest auscultation and capnometry   Measured from: gums  ETT/EBT to gums (cm): 20  Number of attempts at approach: 1  Assessment: lips, teeth, and gum same as pre-op and atraumatic intubation          
Peripheral IV    Patient location during procedure: OR  Line placed for Fluids/Medication Admin.  Performed By   Anesthesiologist: Lorelei Sharif MD  Preanesthetic Checklist  Completed: patient identified, IV checked, site marked, risks and benefits discussed, surgical consent, monitors and equipment checked, pre-op evaluation and timeout performed  Peripheral IV Prep   Patient position: supine   Prep: ChloraPrep  Patient monitoring: heart rate, cardiac monitor and continuous pulse ox  Peripheral IV Procedure   Laterality:right  Location:  Arm  Catheter size: 18 G          Post Assessment   Dressing Type: gauze, tape and transparent.    IV Dressing/Site: clean, dry and intact          
Patient a/oX 3, at this time, food and fluids PO tolerated well, able to ambulate w/ steady gait, vital signs stable.  No pain or other symptom complaints.  Cleared by ED MD for safe discharge to home.  Discharge instruction reviewed w/ patient and verbalized understanding. Discharged to home in stable condiiton.
Patient sleepy, disoriented to place and time, denies any pain, not in any distress.  POC glucose 210, vital signs stable.  Fall precaution observed; monitored in front of nurses station, yellow gown, bed on low , siderails X 2.  Urinal at bedside.  Will continue to monitor.

## 2023-09-08 NOTE — PROGRESS NOTES
Name: Anne Jewell ADMIT: 2023   : 1950  PCP: Jewell Stephens APRN    MRN: [id number] LOS: 14 days   AGE/SEX: 72 y.o. female  ROOM: [facility]     Subjective   Subjective   Anxious/nervous about surgery today but also has positive outlook. Daughter present and very supportive. Having soft stools, ~ 2-3/day; not watery diarrhea       Objective   Objective   Vital Signs  Temp:  [97.3 °F (36.3 °C)-98.6 °F (37 °C)] 97.9 °F (36.6 °C)  Heart Rate:  [78-88] 88  Resp:  [18] 18  BP: (100-152)/(69-93) 152/93  SpO2:  [96 %-99 %] 97 %  on   ;   Device (Oxygen Therapy): room air  Body mass index is 33.16 kg/m².  Physical Exam  Vitals and nursing note reviewed.   Constitutional:       General: She is not in acute distress.     Appearance: She is ill-appearing. She is not toxic-appearing.   HENT:      Head: Normocephalic.      Mouth/Throat:      Mouth: Mucous membranes are moist.   Eyes:      Conjunctiva/sclera: Conjunctivae normal.   Cardiovascular:      Rate and Rhythm: Normal rate and regular rhythm.   Pulmonary:      Effort: Pulmonary effort is normal. No respiratory distress.      Breath sounds: No wheezing or rales.   Abdominal:      General: Bowel sounds are normal.      Palpations: Abdomen is soft.      Comments: Mid abd incision w/staples; approximated, no evidence of infection  AFIA drain x 1 w/serous drainage   Musculoskeletal:      Cervical back: Neck supple.      Right lower leg: No edema.      Left lower leg: No edema.   Skin:     General: Skin is warm and dry.   Neurological:      Mental Status: She is alert and oriented to person, place, and time.   Psychiatric:         Mood and Affect: Mood is anxious.     Results Review     I reviewed the patient's new clinical results.  Results from last 7 days   Lab Units 23  0437 23  0414 23  0600 23  0429   WBC 10*3/mm3 5.16 8.36 5.93 6.09   HEMOGLOBIN g/dL 9.9* 11.0* 10.6* 10.3*   PLATELETS 10*3/mm3 258 360 270 228     Results from  last 7 days   Lab Units 09/08/23 0437 09/07/23 2045 09/07/23 0415 09/06/23 0601 09/05/23 2038   SODIUM mmol/L 138  --  137 135* 139   POTASSIUM mmol/L 4.2 4.3 3.6 4.3 3.6   CHLORIDE mmol/L 106  --  102 101 101   CO2 mmol/L 24.8  --  27.9 22.0 24.0   BUN mg/dL 4*  --  5* 7* 7*   CREATININE mg/dL 0.86  --  0.81 0.70 0.75   GLUCOSE mg/dL 74  --  77 76 90   EGFR mL/min/1.73 71.9  --  77.2 92.0 84.7     Results from last 7 days   Lab Units 09/08/23 0437 09/07/23 0415 09/06/23 0601 09/05/23 2038   ALBUMIN g/dL 1.9* 2.2* 1.7* 2.0*   BILIRUBIN mg/dL 0.5 0.6 0.4 0.4   ALK PHOS U/L 184* 232* 244* 259*   AST (SGOT) U/L 21 27 39* 27   ALT (SGPT) U/L 9 10 11 11     Results from last 7 days   Lab Units 09/08/23 0437 09/07/23 0415 09/06/23 0601 09/05/23 2038   CALCIUM mg/dL 7.7* 8.2* 7.9* 7.7*   ALBUMIN g/dL 1.9* 2.2* 1.7* 2.0*   MAGNESIUM mg/dL  --   --  2.4 1.1*       No results found for: HGBA1C, POCGLU    CT Abdomen Pelvis With Contrast    Result Date: 9/6/2023  No contrast is identified within the excluded portion of the stomach.   Radiation dose reduction techniques were utilized, including automated exposure control and exposure modulation based on body size.   This report was finalized on 9/6/2023 9:49 PM by Dr. Rachel Burgos M.D.       I have personally reviewed all medications:  Scheduled Medications  baclofen, 5 mg, Oral, TID  cholecalciferol, 2,000 Units, Oral, Daily  escitalopram, 10 mg, Oral, Daily  famotidine, 20 mg, Intravenous, Q12H  guaiFENesin, 600 mg, Oral, Nightly  hydroCHLOROthiazide, 25 mg, Oral, Daily  levothyroxine, 100 mcg, Oral, Q AM  montelukast, 10 mg, Oral, Daily  multivitamin, 1 tablet, Oral, Daily  oxyCODONE, 80 mg, Oral, Q12H  potassium chloride, 20 mEq, Oral, BID  promethazine, 25 mg, Oral, BID  saccharomyces boulardii, 250 mg, Oral, BID  sodium chloride, 10 mL, Intravenous, Q12H  vitamin B-12, 1,000 mcg, Oral, Daily    Infusions  sodium chloride, 30 mL/hr, Last Rate: 9 mL/hr  (09/04/23 0900)    Diet  NPO Diet NPO Type: Strict NPO    I have personally reviewed:  [x]  Laboratory   [x]  Microbiology   [x]  Radiology   [x]  EKG/Telemetry  [x]  Cardiology/Vascular   []  Pathology    []  Records       Assessment/Plan     Active Hospital Problems    Diagnosis  POA    **Choledocholithiasis [K80.50]  Yes    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       72 y.o. female admitted with Choledocholithiasis.    Choledocholithiasis  History of gastric bypass  Elevated LFTs  Fungating stomach mass  -MRCP showing large stone in the distal common bile duct, 1.8 cm.  Intrahepatic biliary dilatation.  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 S/P CT A/P 9/6. Surgical plan now to return to OR for more of a gastrectomy, sample lymph nodes and poss takedown of gastric bypass today per surgery note  -NPO today; advance diet postop per surgery rec     Opioid induced constipation  - added amitiza, now complaining of excessive stools, will stop. PRN bowel regimen in place. Having 2-3 soft stools/day, not diarrhea     Vaginal yeast infection  -Diflucan x2 doses,  completed     COVID-19  Fever, resolved  -Blood cultures with no growth   -Chest x-ray  without any abnormalities.    -Completed 3 days of remdesivir  - asymptomatic. Encourage OPAP and IS. On room air  -D/W RN re: isolation, tested positive on 8/27. Follow protocol to remove out of isolation     History of pulmonary pulmonary embolism.  Diagnosed in 2007, has been on anticoagulation since.  At home takes Xarelto   -Xarelto held for return to OR     Hypothyroidism: Resume home levothyroxine, recent TSH normal     CKD stage IIIa: Creatinine stable, monitor.  Avoid nephrotoxic drugs.     Chronic pain syndrome/chronic arthritis: Bob reviewed.  Oxycodone extended release to 80 mg twice daily scheduled.  Oxycodone extended release to 80 mg twice a day as needed as well.  Continue home alprazolam 3 mg nightly     History of RA: Not on treatment for RA per chart review, reports she was diagnosed but was never on specific treatment.     Memory loss: Daughter states she has noticed memory changes for the last several months.  Was referred to neurology outpatient. Cognition/memory at baseline.  Follow-up as outpatient     Abnormal liver on MRI: Repeat CT abdomen with liver protocol in 2 to 3 months     Acute on chronic anemia-   On iron at home, iron stores low. Also mixed anemia. Holding po iron given recent surgery. Hgb stable, continue to monitor     Calcium 7.7, corrected to 9 for low albumin      SCDs for DVT prophylaxis while xarelto held for OR.  Full code.  Discussed with patient, family, and nursing staff.  Anticipate discharge home with home health timing yet to be determined..      EUGENIO Gonzalez  [facility]  09/08/23  11:18 EDT

## 2023-09-08 NOTE — CASE MANAGEMENT/SOCIAL WORK
Continued Stay Note  [facility]     Patient Name: Anne Jewell  MRN: [id number]  Today's Date: 9/8/2023    Admit Date: 8/25/2023    Plan: Home w/ [facility]   Discharge Plan       Row Name 09/08/23 0905       Plan    Plan Home w/ [facility]    Plan Comments Clinical chart reviewed; plan remains home w/ [facility]. CCP continues to follow for any dc planning needs pending clinical course. DANIEL TRINH                   Discharge Codes    No documentation.                 Expected Discharge Date and Time       Expected Discharge Date Expected Discharge Time    Sep 7, 2023               DANIEL Leary

## 2023-09-08 NOTE — NURSING NOTE
Spoke with Dr. Reid on multiple occasions.  Orders received for Ketamine, Demerol. And Versed. Will administer and monitor.

## 2023-09-08 NOTE — ANESTHESIA PREPROCEDURE EVALUATION
Anesthesia Evaluation     no history of anesthetic complications:                Airway   Dental      Pulmonary    (+) asthma,recent URI    ROS comment: Covid +  Cardiovascular     (+) dysrhythmias PVC, DVT    ROS comment: Pvcs resolved    Neuro/Psych  (+) syncope  GI/Hepatic/Renal/Endo    (+) GERD, liver disease history of elevated LFT, renal disease CRI, thyroid problem hypothyroidism    ROS Comment: Partial gastrectomy last week    Musculoskeletal     Abdominal    Substance History      OB/GYN          Other   arthritis, autoimmune disease rheumatoid arthritis,   history of cancer                    Anesthesia Plan    ASA 3     general     intravenous induction       CODE STATUS:    Level Of Support Discussed With: Patient  Code Status (Patient has no pulse and is not breathing): CPR (Attempt to Resuscitate)  Medical Interventions (Patient has pulse or is breathing): Full Support

## 2023-09-08 NOTE — PROGRESS NOTES
"Nutrition Services    Patient Name:  Anne Jewell  YOB: 1950  MRN: [id number]  Admit Date:  8/25/2023    FOLLOW UP - CLINICAL NUTRITION    Assessment Date:  09/08/23    Nutrition follow up completed. Pt experienced indigestion and nausea with advancement of diet. Back on full liquids 9/7. NPO for gastrectomy and possible takedown of gastric bypass today per MD note. Labs/skin reviewed. BUN 4.    REC:  Advance diet as tolerated and once medically appropriate per MD  Will continue to encourage and monitor PO intake    RD to follow per protocol.     Encounter Information         Reason for Encounter Follow up     Current Issues Choledocholithiasis      Current Nutrition Orders & Evaluation of Intake       Oral Nutrition     Current PO Diet NPO Diet NPO Type: Strict NPO   Supplement n/a   PO Evaluation     % PO Intake NPO    # of Days Evaluated     Factors Affecting Intake  abdominal pain, nausea, pain issues   --  Anthropometrics          Height    Weight Height: 154.9 cm (61\")  Weight: 79.6 kg (175 lb 7.8 oz) (09/08/23 0624)    BMI kg/m2 Body mass index is 33.16 kg/m².  Obese, Class I (30 - 34.9)    Weight trend Loss, Amount/Timeframe: 11 lb (5.8%) wt loss x 5 mo      Labs        Pertinent Labs Reviewed, listed below     Results from last 7 days   Lab Units 09/08/23 0437 09/07/23 2045 09/07/23 0415 09/06/23 0601   SODIUM mmol/L 138  --  137 135*   POTASSIUM mmol/L 4.2 4.3 3.6 4.3   CHLORIDE mmol/L 106  --  102 101   CO2 mmol/L 24.8  --  27.9 22.0   BUN mg/dL 4*  --  5* 7*   CREATININE mg/dL 0.86  --  0.81 0.70   CALCIUM mg/dL 7.7*  --  8.2* 7.9*   BILIRUBIN mg/dL 0.5  --  0.6 0.4   ALK PHOS U/L 184*  --  232* 244*   ALT (SGPT) U/L 9  --  10 11   AST (SGOT) U/L 21  --  27 39*   GLUCOSE mg/dL 74  --  77 76     Results from last 7 days   Lab Units 09/08/23  0437 09/07/23  0414 09/06/23 0601 09/06/23 0600 09/05/23 2038   MAGNESIUM mg/dL  --   --  2.4  --  1.1*   HEMOGLOBIN g/dL 9.9*   < >  --    < " >  --    HEMATOCRIT % 29.9*   < >  --    < >  --    WBC 10*3/mm3 5.16   < >  --    < >  --    ALBUMIN g/dL 1.9*   < > 1.7*  --  2.0*    < > = values in this interval not displayed.     Results from last 7 days   Lab Units 09/08/23  0437 09/07/23  0414 09/06/23  0600 09/05/23  0429 09/04/23  0405   PLATELETS 10*3/mm3 258 360 270 228 173     COVID19   Date Value Ref Range Status   08/27/2023 Detected (C) Not Detected - Ref. Range Final     Lab Results   Component Value Date    HGBA1C 5.7 (H) 04/14/2022          Medications            Scheduled Medications baclofen, 5 mg, Oral, TID  cholecalciferol, 2,000 Units, Oral, Daily  escitalopram, 10 mg, Oral, Daily  famotidine, 20 mg, Intravenous, Q12H  guaiFENesin, 600 mg, Oral, Nightly  hydroCHLOROthiazide, 25 mg, Oral, Daily  levothyroxine, 100 mcg, Oral, Q AM  montelukast, 10 mg, Oral, Daily  multivitamin, 1 tablet, Oral, Daily  oxyCODONE, 80 mg, Oral, Q12H  potassium chloride, 20 mEq, Oral, BID  promethazine, 25 mg, Oral, BID  saccharomyces boulardii, 250 mg, Oral, BID  sodium chloride, 10 mL, Intravenous, Q12H  vitamin B-12, 1,000 mcg, Oral, Daily        Infusions sodium chloride, 30 mL/hr, Last Rate: 9 mL/hr (09/04/23 0900)        PRN Medications   acetaminophen    albuterol    ALPRAZolam    aluminum-magnesium hydroxide-simethicone    senna-docusate sodium **AND** polyethylene glycol **AND** bisacodyl **AND** bisacodyl    calcium carbonate    HYDROmorphone    loperamide    Magnesium Standard Dose Replacement - Follow Nurse / BPA Driven Protocol    nitroglycerin    ondansetron    oxyCODONE    Potassium Replacement - Follow Nurse / BPA Driven Protocol    promethazine **OR** promethazine    simethicone    sodium chloride    sodium chloride    sodium chloride     Physical Findings          Physical Appearance alert, obese, oriented, room air   Oral/Mouth Cavity tooth or teeth missing   Edema  3+ (moderate)   Gastrointestinal nausea, non-distended , last bowel movement: 9/7    Skin  surgical incision: abdomen incision    Tubes/Drains/Lines none   NFPE Not indicated at this time   --  NUTRITION INTERVENTION / PLAN OF CARE  Intervention Goal         Intervention Goal(s) Maintain nutrition status, Reduce/improve symptoms, Disease management/therapy, Maintain intake, and Appropriate weight loss     Nutrition Intervention         RD Action Encourage intake, Follow Tx Progress, and Care plan reviewed     Nutrition Prescription         Diet Prescription     Supplement Prescription n/a   EN/PN Prescription    New Prescription Ordered? No changes at this time   --  Monitor/Evaluation        Monitor Per protocol, PO intake, Weight, Skin status, GI status, Symptoms, POC/GOC   Discharge Needs Pending clinical course   Education Will instruct as appropriate   --    RD to follow up per protocol.    Electronically signed by:  Jacki Hardinitislime Intern   09/08/23 09:04 EDT

## 2023-09-08 NOTE — ANESTHESIA POSTPROCEDURE EVALUATION
"Patient: Anne Jewell    Procedure Summary       Date: 09/08/23 Room / Location: [facility] OR  / [facility] MAIN OR    Anesthesia Start: 1440 Anesthesia Stop: 1706    Procedure: EXPLORATORY LAPAROTOMY WITH GASTROJEJUNOSTOMY (Abdomen) Diagnosis:       Malignant neoplasm of pyloric antrum      (Malignant neoplasm of pyloric antrum [C16.3])    Surgeons: Slava Loo Jr., MD Provider: Eleno Reid MD    Anesthesia Type: general ASA Status: 3            Anesthesia Type: general    Vitals  Vitals Value Taken Time   /89 09/08/23 1900   Temp 36.6 °C (97.9 °F) 09/08/23 1657   Pulse 98 09/08/23 1900   Resp 16 09/08/23 1815   SpO2 95 % 09/08/23 1900   Vitals shown include unvalidated device data.        Post Anesthesia Care and Evaluation    Patient location during evaluation: bedside  Patient participation: complete - patient participated  Level of consciousness: awake and alert  Pain management: adequate    Airway patency: patent  Anesthetic complications: No anesthetic complications    Cardiovascular status: acceptable  Respiratory status: acceptable  Hydration status: acceptable    Comments: /88 (BP Location: Right arm, Patient Position: Lying)   Pulse 78   Temp 36.6 °C (97.9 °F) (Oral)   Resp 16   Ht 154.9 cm (61\")   Wt 79.6 kg (175 lb 7.8 oz)   SpO2 99%   BMI 33.16 kg/m²       "

## 2023-09-08 NOTE — PLAN OF CARE
Goal Outcome Evaluation:              Outcome Evaluation: VSS. RESTED WELL OVERNIGHT AFTER ROUTINE PAIN MED AND PRN XANAX GIVEN AT HS. NPO FOR OR TODAY.

## 2023-09-09 LAB
ALBUMIN SERPL-MCNC: 2 G/DL (ref 3.5–5.2)
ALBUMIN SERPL-MCNC: 2 G/DL (ref 3.5–5.2)
ALBUMIN/GLOB SERPL: 0.6 G/DL
ALBUMIN/GLOB SERPL: 0.6 G/DL
ALP SERPL-CCNC: 159 U/L (ref 39–117)
ALP SERPL-CCNC: 177 U/L (ref 39–117)
ALT SERPL W P-5'-P-CCNC: 11 U/L (ref 1–33)
ALT SERPL W P-5'-P-CCNC: 11 U/L (ref 1–33)
ANION GAP SERPL CALCULATED.3IONS-SCNC: 11 MMOL/L (ref 5–15)
ANION GAP SERPL CALCULATED.3IONS-SCNC: 7.7 MMOL/L (ref 5–15)
ARTERIAL PATENCY WRIST A: POSITIVE
AST SERPL-CCNC: 26 U/L (ref 1–32)
AST SERPL-CCNC: 43 U/L (ref 1–32)
ATMOSPHERIC PRESS: 749.4 MMHG
BASE EXCESS BLDA CALC-SCNC: 0.1 MMOL/L (ref 0–2)
BASOPHILS # BLD AUTO: 0.04 10*3/MM3 (ref 0–0.2)
BASOPHILS NFR BLD AUTO: 0.3 % (ref 0–1.5)
BDY SITE: ABNORMAL
BILIRUB SERPL-MCNC: 0.4 MG/DL (ref 0–1.2)
BILIRUB SERPL-MCNC: 0.5 MG/DL (ref 0–1.2)
BUN SERPL-MCNC: 5 MG/DL (ref 8–23)
BUN SERPL-MCNC: 8 MG/DL (ref 8–23)
BUN/CREAT SERPL: 5.7 (ref 7–25)
BUN/CREAT SERPL: 8.2 (ref 7–25)
CALCIUM SPEC-SCNC: 7.7 MG/DL (ref 8.6–10.5)
CALCIUM SPEC-SCNC: 7.9 MG/DL (ref 8.6–10.5)
CHLORIDE SERPL-SCNC: 103 MMOL/L (ref 98–107)
CHLORIDE SERPL-SCNC: 106 MMOL/L (ref 98–107)
CO2 BLDA-SCNC: 25.2 MMOL/L (ref 23–27)
CO2 SERPL-SCNC: 21 MMOL/L (ref 22–29)
CO2 SERPL-SCNC: 25.3 MMOL/L (ref 22–29)
CREAT SERPL-MCNC: 0.87 MG/DL (ref 0.57–1)
CREAT SERPL-MCNC: 0.98 MG/DL (ref 0.57–1)
D-LACTATE SERPL-SCNC: 1.3 MMOL/L (ref 0.5–2)
DEPRECATED RDW RBC AUTO: 46.3 FL (ref 37–54)
DEPRECATED RDW RBC AUTO: 47.9 FL (ref 37–54)
EGFRCR SERPLBLD CKD-EPI 2021: 61.5 ML/MIN/1.73
EGFRCR SERPLBLD CKD-EPI 2021: 70.9 ML/MIN/1.73
EOSINOPHIL # BLD AUTO: 0.04 10*3/MM3 (ref 0–0.4)
EOSINOPHIL NFR BLD AUTO: 0.3 % (ref 0.3–6.2)
ERYTHROCYTE [DISTWIDTH] IN BLOOD BY AUTOMATED COUNT: 13.5 % (ref 12.3–15.4)
ERYTHROCYTE [DISTWIDTH] IN BLOOD BY AUTOMATED COUNT: 13.6 % (ref 12.3–15.4)
GEN 5 2HR TROPONIN T REFLEX: 28 NG/L
GLOBULIN UR ELPH-MCNC: 3.3 GM/DL
GLOBULIN UR ELPH-MCNC: 3.6 GM/DL
GLUCOSE BLDC GLUCOMTR-MCNC: 89 MG/DL (ref 70–130)
GLUCOSE BLDC GLUCOMTR-MCNC: 89 MG/DL (ref 70–130)
GLUCOSE BLDC GLUCOMTR-MCNC: 91 MG/DL (ref 70–130)
GLUCOSE SERPL-MCNC: 88 MG/DL (ref 65–99)
GLUCOSE SERPL-MCNC: 96 MG/DL (ref 65–99)
HCO3 BLDA-SCNC: 24.1 MMOL/L (ref 22–28)
HCT VFR BLD AUTO: 30.4 % (ref 34–46.6)
HCT VFR BLD AUTO: 34.5 % (ref 34–46.6)
HEMODILUTION: NO
HGB BLD-MCNC: 10 G/DL (ref 12–15.9)
HGB BLD-MCNC: 11.3 G/DL (ref 12–15.9)
IMM GRANULOCYTES # BLD AUTO: 0.05 10*3/MM3 (ref 0–0.05)
IMM GRANULOCYTES NFR BLD AUTO: 0.4 % (ref 0–0.5)
LYMPHOCYTES # BLD AUTO: 2.18 10*3/MM3 (ref 0.7–3.1)
LYMPHOCYTES NFR BLD AUTO: 17.5 % (ref 19.6–45.3)
MAGNESIUM SERPL-MCNC: 1.9 MG/DL (ref 1.6–2.4)
MCH RBC QN AUTO: 31.1 PG (ref 26.6–33)
MCH RBC QN AUTO: 31.8 PG (ref 26.6–33)
MCHC RBC AUTO-ENTMCNC: 32.8 G/DL (ref 31.5–35.7)
MCHC RBC AUTO-ENTMCNC: 32.9 G/DL (ref 31.5–35.7)
MCV RBC AUTO: 94.4 FL (ref 79–97)
MCV RBC AUTO: 97.2 FL (ref 79–97)
MODALITY: ABNORMAL
MONOCYTES # BLD AUTO: 0.65 10*3/MM3 (ref 0.1–0.9)
MONOCYTES NFR BLD AUTO: 5.2 % (ref 5–12)
NEUTROPHILS NFR BLD AUTO: 76.3 % (ref 42.7–76)
NEUTROPHILS NFR BLD AUTO: 9.48 10*3/MM3 (ref 1.7–7)
NRBC BLD AUTO-RTO: 0 /100 WBC (ref 0–0.2)
PCO2 BLDA: 35.8 MM HG (ref 35–45)
PH BLDA: 7.44 PH UNITS (ref 7.35–7.45)
PHOSPHATE SERPL-MCNC: 3.3 MG/DL (ref 2.5–4.5)
PLATELET # BLD AUTO: 367 10*3/MM3 (ref 140–450)
PLATELET # BLD AUTO: 432 10*3/MM3 (ref 140–450)
PMV BLD AUTO: 10.1 FL (ref 6–12)
PMV BLD AUTO: 10.1 FL (ref 6–12)
PO2 BLDA: 79.2 MM HG (ref 80–100)
POTASSIUM SERPL-SCNC: 4.9 MMOL/L (ref 3.5–5.2)
POTASSIUM SERPL-SCNC: 5 MMOL/L (ref 3.5–5.2)
POTASSIUM SERPL-SCNC: 5.3 MMOL/L (ref 3.5–5.2)
PROT SERPL-MCNC: 5.3 G/DL (ref 6–8.5)
PROT SERPL-MCNC: 5.6 G/DL (ref 6–8.5)
RBC # BLD AUTO: 3.22 10*6/MM3 (ref 3.77–5.28)
RBC # BLD AUTO: 3.55 10*6/MM3 (ref 3.77–5.28)
SAO2 % BLDCOA: 96.1 % (ref 92–98.5)
SODIUM SERPL-SCNC: 135 MMOL/L (ref 136–145)
SODIUM SERPL-SCNC: 139 MMOL/L (ref 136–145)
TOTAL RATE: 16 BREATHS/MINUTE
TROPONIN T DELTA: 1 NG/L
TROPONIN T SERPL HS-MCNC: 27 NG/L
VENTILATOR MODE: ABNORMAL
WBC NRBC COR # BLD: 12.44 10*3/MM3 (ref 3.4–10.8)
WBC NRBC COR # BLD: 15.66 10*3/MM3 (ref 3.4–10.8)

## 2023-09-09 PROCEDURE — 99024 POSTOP FOLLOW-UP VISIT: CPT | Performed by: SURGERY

## 2023-09-09 PROCEDURE — 80053 COMPREHEN METABOLIC PANEL: CPT | Performed by: INTERNAL MEDICINE

## 2023-09-09 PROCEDURE — 93005 ELECTROCARDIOGRAM TRACING: CPT | Performed by: INTERNAL MEDICINE

## 2023-09-09 PROCEDURE — 84484 ASSAY OF TROPONIN QUANT: CPT | Performed by: INTERNAL MEDICINE

## 2023-09-09 PROCEDURE — 99222 1ST HOSP IP/OBS MODERATE 55: CPT | Performed by: INTERNAL MEDICINE

## 2023-09-09 PROCEDURE — 85027 COMPLETE CBC AUTOMATED: CPT | Performed by: SURGERY

## 2023-09-09 PROCEDURE — 25010000002 AMIODARONE IN DEXTROSE 5% 150-4.21 MG/100ML-% SOLUTION: Performed by: INTERNAL MEDICINE

## 2023-09-09 PROCEDURE — 25010000002 DIGOXIN PER 500 MCG: Performed by: INTERNAL MEDICINE

## 2023-09-09 PROCEDURE — 93010 ELECTROCARDIOGRAM REPORT: CPT | Performed by: STUDENT IN AN ORGANIZED HEALTH CARE EDUCATION/TRAINING PROGRAM

## 2023-09-09 PROCEDURE — 84132 ASSAY OF SERUM POTASSIUM: CPT | Performed by: INTERNAL MEDICINE

## 2023-09-09 PROCEDURE — 36600 WITHDRAWAL OF ARTERIAL BLOOD: CPT

## 2023-09-09 PROCEDURE — 63710000001 PROMETHAZINE PER 25 MG: Performed by: SURGERY

## 2023-09-09 PROCEDURE — 83735 ASSAY OF MAGNESIUM: CPT | Performed by: INTERNAL MEDICINE

## 2023-09-09 PROCEDURE — 82948 REAGENT STRIP/BLOOD GLUCOSE: CPT

## 2023-09-09 PROCEDURE — 25010000002 KETOROLAC TROMETHAMINE PER 15 MG: Performed by: SURGERY

## 2023-09-09 PROCEDURE — 80053 COMPREHEN METABOLIC PANEL: CPT | Performed by: SURGERY

## 2023-09-09 PROCEDURE — 84100 ASSAY OF PHOSPHORUS: CPT | Performed by: INTERNAL MEDICINE

## 2023-09-09 PROCEDURE — 85025 COMPLETE CBC W/AUTO DIFF WBC: CPT | Performed by: INTERNAL MEDICINE

## 2023-09-09 PROCEDURE — 83605 ASSAY OF LACTIC ACID: CPT | Performed by: INTERNAL MEDICINE

## 2023-09-09 PROCEDURE — 82803 BLOOD GASES ANY COMBINATION: CPT

## 2023-09-09 PROCEDURE — 25010000002 AMIODARONE IN DEXTROSE 5% 360-4.14 MG/200ML-% SOLUTION: Performed by: INTERNAL MEDICINE

## 2023-09-09 RX ORDER — KETOROLAC TROMETHAMINE 15 MG/ML
15 INJECTION, SOLUTION INTRAMUSCULAR; INTRAVENOUS EVERY 6 HOURS PRN
Status: DISCONTINUED | OUTPATIENT
Start: 2023-09-09 | End: 2023-09-11

## 2023-09-09 RX ORDER — HYDROMORPHONE HCL IN 0.9% NACL 10 MG/50ML
PATIENT CONTROLLED ANALGESIA SYRINGE INTRAVENOUS CONTINUOUS
Status: DISCONTINUED | OUTPATIENT
Start: 2023-09-09 | End: 2023-09-10

## 2023-09-09 RX ORDER — DIGOXIN 0.25 MG/ML
500 INJECTION INTRAMUSCULAR; INTRAVENOUS ONCE
Status: COMPLETED | OUTPATIENT
Start: 2023-09-09 | End: 2023-09-09

## 2023-09-09 RX ADMIN — FAMOTIDINE 20 MG: 10 INJECTION INTRAVENOUS at 21:09

## 2023-09-09 RX ADMIN — ESCITALOPRAM OXALATE 10 MG: 10 TABLET, FILM COATED ORAL at 09:32

## 2023-09-09 RX ADMIN — PROMETHAZINE HYDROCHLORIDE 25 MG: 12.5 TABLET ORAL at 09:30

## 2023-09-09 RX ADMIN — Medication 1000 MCG: at 09:31

## 2023-09-09 RX ADMIN — BACLOFEN 5 MG: 10 TABLET ORAL at 09:32

## 2023-09-09 RX ADMIN — Medication 10 ML: at 12:31

## 2023-09-09 RX ADMIN — MONTELUKAST SODIUM 10 MG: 10 TABLET, FILM COATED ORAL at 09:31

## 2023-09-09 RX ADMIN — AMIODARONE HYDROCHLORIDE 1 MG/MIN: 1.8 INJECTION, SOLUTION INTRAVENOUS at 22:45

## 2023-09-09 RX ADMIN — SODIUM CHLORIDE 5 MG/HR: 900 INJECTION, SOLUTION INTRAVENOUS at 16:59

## 2023-09-09 RX ADMIN — OXYCODONE HYDROCHLORIDE 80 MG: 40 TABLET, FILM COATED, EXTENDED RELEASE ORAL at 21:08

## 2023-09-09 RX ADMIN — Medication 250 MG: at 21:09

## 2023-09-09 RX ADMIN — HYDROCHLOROTHIAZIDE 25 MG: 25 TABLET ORAL at 09:32

## 2023-09-09 RX ADMIN — DIGOXIN 500 MCG: 0.25 INJECTION INTRAMUSCULAR; INTRAVENOUS at 17:01

## 2023-09-09 RX ADMIN — KETOROLAC TROMETHAMINE 15 MG: 15 INJECTION, SOLUTION INTRAMUSCULAR; INTRAVENOUS at 12:43

## 2023-09-09 RX ADMIN — Medication: at 21:27

## 2023-09-09 RX ADMIN — Medication 250 MG: at 09:33

## 2023-09-09 RX ADMIN — LEVOTHYROXINE SODIUM 100 MCG: 0.1 TABLET ORAL at 09:32

## 2023-09-09 RX ADMIN — PROMETHAZINE HYDROCHLORIDE 25 MG: 12.5 TABLET ORAL at 21:08

## 2023-09-09 RX ADMIN — ALPRAZOLAM 3 MG: 1 TABLET ORAL at 21:08

## 2023-09-09 RX ADMIN — Medication: at 09:29

## 2023-09-09 RX ADMIN — Medication 10 ML: at 21:10

## 2023-09-09 RX ADMIN — AMIODARONE HYDROCHLORIDE 150 MG: 1.5 INJECTION, SOLUTION INTRAVENOUS at 22:43

## 2023-09-09 RX ADMIN — BACLOFEN 5 MG: 10 TABLET ORAL at 21:10

## 2023-09-09 RX ADMIN — GUAIFENESIN 600 MG: 600 TABLET, EXTENDED RELEASE ORAL at 21:09

## 2023-09-09 RX ADMIN — OXYCODONE HYDROCHLORIDE 80 MG: 40 TABLET, FILM COATED, EXTENDED RELEASE ORAL at 10:42

## 2023-09-09 RX ADMIN — Medication 1 TABLET: at 09:31

## 2023-09-09 RX ADMIN — BACLOFEN 5 MG: 10 TABLET ORAL at 18:13

## 2023-09-09 RX ADMIN — FAMOTIDINE 20 MG: 10 INJECTION INTRAVENOUS at 09:33

## 2023-09-09 RX ADMIN — Medication 2000 UNITS: at 09:26

## 2023-09-09 NOTE — PROGRESS NOTES
Postoperative day 1 s/p exploratory laparotomy with gastrojejunal anastomosis and postoperative day 7 s/p exploratory laparotomy with distal gastrectomy.    S: Patient complaining of worsening abdominal pain this morning she has been using PCA.  She reports not being able to move in bed.  She has been n.p.o.    O:   Vitals:    09/09/23 0410 09/09/23 0525 09/09/23 0820 09/09/23 1319   BP: 107/67  131/78    BP Location: Left arm  Left arm    Patient Position: Lying  Lying    Pulse: 83  88    Resp: 16  18 18   Temp: 97.7 °F (36.5 °C)  98.8 °F (37.1 °C)    TempSrc: Oral  Oral Oral   SpO2: 98%  97%    Weight:  76.9 kg (169 lb 8.5 oz)     Height:          Drain 382 cc  Alert, looks comfortable but complains of pain  Breathing comfortable  Regular rate rhythm  Abdomen soft, tender throughout, no rebound or guarding no peritoneal signs, Yaniv drain with serosanguineous fluid    White blood cell count 15,000, hemoglobin 10, stable  Alk phos downtrending.  Potassium 5.3    Assessment and plan    72-year-old female postop day 1 s/p exploratory laparotomy with gastrojejunal anastomosis and postoperative day 7 s/p exploratory laparotomy with distal gastrectomy.  Pain control is inadequate.  She is chronic narcotic user.  Discussed with the patient about the possibility of adding Toradol to her regimen for 3 days to try to help control her pain.  Continue home dose of OxyContin.  Continue Dilaudid PCA  Start clear liquid diet

## 2023-09-09 NOTE — PLAN OF CARE
Goal Outcome Evaluation:  Plan of Care Reviewed With: patient, daughter        Progress: no change  Outcome Evaluation: Pt returned to floor from PACU with 10/10 abd still. PCA pump started in PACU. Abd dressing changed due to drainage, Wet to dry with NS, gauze, and abd pad. AFIA with bloody output. Tubbs remains. VSS. Pt on RA. Daughter at bedside throughout shift.

## 2023-09-09 NOTE — PROGRESS NOTES
"[facility] Clinical Pharmacy Services: PCA Consult     Anne Jewell has a pharmacy consult to assess opioid medication per Dr. Slava Loo's request based on the current protocol \"Pharmacist to discontinue PRN opioid pain medications at connection of PCA. If there is no basal rate on PCA, long-acting opioids may be continued. Scheduled opioids for pain are allowed while weaning patient off of PCA but PRN opioids should be limited to breakthrough pain only.\"     The following PRN medications have been discontinued per the protocol above:  Hydromorphone 1 mg IV q2h prn severe pain.     Patient does have a PCA basal rate 0.2 mg/hr (started post op yesterday evening). Patient is on chronic high dose oxycontin from home. Will not make any changes to po pain regimen or PCA at this time (patient still rates pain 10/10). PCA likely limited to short post op duration.  RN to monitor.        Melissa Valenzuela [facility]  Clinical Pharmacist    "

## 2023-09-09 NOTE — PLAN OF CARE
Goal Outcome Evaluation  :quiet day until patient had extended period of V-tach  Pt HR was elevated when this nurse was in the room with patient-  pt instructed to cough which bought HR down for seconds --   Rapid called  High rate up to as high as upper 170's  02 placed per n/c @ 4L  Emotional support given during this period  Daughter at side  Care per rapid response team  Assisted RR team to CICU  Repost to Bailey Bateman RN

## 2023-09-09 NOTE — CODE DOCUMENTATION
Moving to CICU with oxygen.  Pt awake alert oriented no distress 12 lead EKG done.  /83.  HR 65 sat 99.  Tsf by bed.  CG

## 2023-09-09 NOTE — CONSULTS
Referring Provider: Dr. Alvarez  Reason for Consultation: MISTI caldera    Patient Care Team:  Jewell Stpehens APRN as PCP - General (Family Medicine)  Edilberto Lemus MD as Consulting Physician (Nephrology)  Rashaun Alvarado MD as Surgeon (Orthopedic Surgery)  Delma Montoya MD as Consulting Physician (Obstetrics and Gynecology)  Stuart Borges MD (Hematology and Oncology)  Isael Barker MD as Consulting Physician (Gastroenterology)  Haven Gr APRN as Nurse Practitioner (Nurse Practitioner)    Chief complaint:   Consulted for ICU management    History of present illness:    Subjective   This is a 72-year-old female patient with history of CKD, PE and RA.  No prior history of heart disease.    She was admitted on 8/25 by GI request due to presence of choledocholithiasis.  She had elevated liver enzymes and alk phos.  She tested positive for COVID on presentation but had no significant symptoms and no hypoxia.    MRCP showed CBD obstruction.  ERCP could not be completed and therefore patient was evaluated by surgery and underwent exploratory laparotomy on 9/1 with finding of choledocholithiasis and gastric mass for which she had distal gastrectomy.  She then had ex lap and gastrojejunostomy on 9/8.    Today, she had an episode of asymptomatic tachycardia reaching a heart rate of 170.  EKG was obtained and was concerning for V. tach.  I reviewed the EKG independently and there is a large complex tachycardia with RBBB pattern and irregular rhythm.  Patient was already evaluated by cardiology and it was suspected that he had atrial tachycardia with aberrancy.    Patient is currently in the intensive care unit.  She is on 2 L oxygen.  She denies cough or shortness of breath.  She stated that she feels better after her latest surgery.  Her only issue is excessive gas.  She is on liquid diet.    Review of Systems  Constitutional: No fever or chills.   ENMT: No sinus  congestion  Cardiovascular: No chest pain, palpitation or legs swelling.    Respiratory: No dyspnea, cough or wheezing.  Gastrointestinal: See above  Neurology: No headache, weakness, numbness or dizziness.   Musculoskeletal: No joints pain, stiffness or swelling.   Psychiatry: No depression.  Genitourinary: No dysuria or frequent urination  Endo: No weight changes. No cold or warm intolerance.  Lymphatic: No swollen glands.  Integumentary: No rash.    History  Past Medical History:   Diagnosis Date    Allergic rhinitis     Arthritis of back     Arthritis of neck     Asthma     Bronchospasm     Carpal tunnel syndrome, bilateral     Cervical disc disorder     Clotting disorder     Deep vein thrombosis     Disc degeneration, lumbar     Edema     Esophageal reflux     Glaucoma     Hip arthrosis     Joint pain     Kidney disease 2018    stage 3    Liver disease 2018    stage 3    Low back pain     Osteoarthritis     Osteopenia     Osteoporosis     Periarthritis of shoulder     Scoliosis     Status post total knee replacement, left 08/31/2017    Status post total knee replacement, right 08/31/2017    UTI (urinary tract infection)     Venous thrombosis    ,   Past Surgical History:   Procedure Laterality Date    BILATERAL BREAST REDUCTION      BREAST SURGERY      COLONOSCOPY  08/05/2016    ERCP N/A 8/30/2023    Procedure: ENDOSCOPIC RETROGRADE CHOLANGIOPANCREATOGRAPHY;  Surgeon: Elijah Simon MD;  Location: [facility] ENDOSCOPY;  Service: Gastroenterology;  Laterality: N/A;  cbd stone    ERCP N/A 9/1/2023    Procedure: ENDOSCOPIC RETROGRADE CHOLANGIOPANCREATOGRAPHY WITH CHOLANGIOGRAM, SPHINCTEROTOMY, BALLOON SWEEP;  Surgeon: Elijah Simon MD;  Location: [facility] OR;  Service: Gastroenterology;  Laterality: N/A;    EXPLORATORY LAPAROTOMY N/A 9/1/2023    Procedure: Exploratory laparotomy with Distal Gastrectomy;  Surgeon: Slava Loo Jr., MD;  Location: [facility] OR;  Service: General;  Laterality: N/A;     GASTRIC BYPASS      HAND SURGERY Right 01/14/2016    HERNIA REPAIR      x7    HYSTERECTOMY      JOINT REPLACEMENT      Both knees    OTHER SURGICAL HISTORY      vaginal sling operation for stress incontinence    TOTAL KNEE ARTHROPLASTY Left     TOTAL KNEE ARTHROPLASTY Bilateral    ,   Family History   Problem Relation Age of Onset    Prostate cancer Father     Arthritis Other     Hypertension Other         benign essential    Cancer Other     Diabetes Other     Heart disease Other     Nephrolithiasis Other    ,   Social History     Socioeconomic History    Marital status: Legally    Tobacco Use    Smoking status: Former     Passive exposure: Past    Smokeless tobacco: Never   Vaping Use    Vaping Use: Never used   Substance and Sexual Activity    Alcohol use: No    Drug use: No    Sexual activity: Defer     E-cigarette/Vaping    E-cigarette/Vaping Use Never User      E-cigarette/Vaping Substances    Nicotine No     THC No     CBD No     Flavoring No      E-cigarette/Vaping Devices    Disposable No     Pre-filled or Refillable Cartridge No     Refillable Tank No     Pre-filled Pod No          ,   Medications Prior to Admission   Medication Sig Dispense Refill Last Dose    albuterol sulfate  (90 Base) MCG/ACT inhaler Inhale 2 puffs Every 6 (Six) Hours As Needed for Wheezing. Proventil  (90 Base) MCG/ACT Inhalation Aerosol Solution; Patient Sig: Proventil  (90 Base) MCG/ACT Inhalation Aerosol Solution Inhale 2 puff(s) every 6 to 8 hours as needed; 6.7; 0; 05-Apr-2013; Active 8 g 0 Past Month    ALPRAZolam (XANAX) 2 MG tablet As Needed.   8/25/2023    baclofen (LIORESAL) 10 MG tablet TAKE ONE TABLET BY MOUTH THREE TIMES A  tablet 0 8/25/2023    Calcium Citrate-Vitamin D3 (CITRACAL) 315-6.25 MG-MCG tablet tablet Take 1 tablet by mouth Daily.   8/25/2023    chlorhexidine (PERIDEX) 0.12 % solution    Past Week    Cholecalciferol (VITAMIN D3) 2000 units capsule TAKE ONE CAPSULE BY  MOUTH DAILY 30 capsule 9 8/25/2023    Cyanocobalamin (B-12) 1000 MCG sublingual tablet PLACE 1 TABLET UNDER THE TONGUE AND LET DISSOLVE ONCE DAILY 30 each 1 8/25/2023    dicyclomine (BENTYL) 20 MG tablet Take 1 tablet by mouth Every 6 (Six) Hours As Needed (diarrhea). 90 tablet 1 Past Month    escitalopram (LEXAPRO) 10 MG tablet Take 1 tablet by mouth Daily.   8/25/2023    hydroCHLOROthiazide (HYDRODIURIL) 25 MG tablet TAKE ONE TABLET BY MOUTH DAILY 90 tablet 0 8/25/2023    KLOR-CON 20 MEQ CR tablet TAKE ONE TABLET BY MOUTH TWICE A  tablet 0 8/25/2023    levothyroxine (SYNTHROID, LEVOTHROID) 100 MCG tablet TAKE ONE TABLET BY MOUTH DAILY 90 tablet 0 8/25/2023    montelukast (SINGULAIR) 10 MG tablet TAKE ONE TABLET BY MOUTH DAILY 90 tablet 1 8/25/2023    Multiple Vitamin tablet Take 1 tablet by mouth Daily.   8/25/2023    Omega 3-6-9 capsule Take  by mouth.   8/25/2023    oxyCODONE ER (oxyCONTIN) 80 MG tablet extended-release 12 hour 12 hr tablet Take 2 tablets by mouth Every 12 (Twelve) Hours. 120 tablet 0 8/25/2023    promethazine (PHENERGAN) 25 MG tablet TAKE 1 TABLET BY MOUTH EVERY 8 HOURS AS NEEDED FOR NAUSEA AND VOMITING 90 tablet 0 8/25/2023    Xarelto 20 MG tablet TAKE ONE TABLET BY MOUTH DAILY 90 tablet 1 8/24/2023    EPINEPHrine (EPIPEN) 0.3 MG/0.3ML solution auto-injector injection    More than a month    naloxone (NARCAN) 4 MG/0.1ML nasal spray 1 spray into the nostril(s) as directed by provider As Needed (sedation or accidental overdose of opioid). 2 each 0 More than a month    saccharomyces boulardii (FLORASTOR) 250 MG capsule Take 1 capsule by mouth 2 (Two) Times a Day.      , Scheduled Meds:  baclofen, 5 mg, Oral, TID  cholecalciferol, 2,000 Units, Oral, Daily  escitalopram, 10 mg, Oral, Daily  famotidine, 20 mg, Intravenous, Q12H  guaiFENesin, 600 mg, Oral, Nightly  hydroCHLOROthiazide, 25 mg, Oral, Daily  levothyroxine, 100 mcg, Oral, Q AM  montelukast, 10 mg, Oral, Daily  multivitamin, 1  tablet, Oral, Daily  oxyCODONE, 80 mg, Oral, Q12H  promethazine, 25 mg, Oral, BID  saccharomyces boulardii, 250 mg, Oral, BID  sodium chloride, 10 mL, Intravenous, Q12H  vitamin B-12, 1,000 mcg, Oral, Daily   , and Allergies:  Penicillins    Objective     Vital Signs   Temp:  [97.7 °F (36.5 °C)-100 °F (37.8 °C)] 98.3 °F (36.8 °C)  Heart Rate:  [] 154  Resp:  [14-20] 14  BP: (107-171)/(67-97) 122/91    PPE used per hospital policy    Physical Exam:  Constitutional: Not in acute distress.  Eyes: Injected conjunctivae, EOMI. pupils equal reactive to light.  ENMT: Ríos 3. No oral thrush.  Moist tongue.  Neck: Large. Trachea midline. No thyromegaly  Heart: RRR, no murmur  Lungs: Good and equal air entry bilaterally.  Non labored breathing.   Abdomen: Obese.  AFIA drain noted.  Soft. No tenderness or dullness. No HSM.  Extremities: No cyanosis, clubbing but mild pitting edema.  Warm extremities and well-perfused.  Neuro: Conscious, alert, oriented x3.  Strength 5/5 in arms.  Psych: Appropriate mood and affect.    Integumentary: No rash.  Normal skin turgor  Lymphatic: No palpable cervical or supraclavicular lymph nodes.      Diagnostic imaging:  I personally and independently reviewed the following images:   EKG 9/9/2023: Atrial tachycardia with aberrancy, RBBB.  Lung portion of CT of the abdomen 1/6/2023: Clear lungs.    Laboratory workup:    Results from last 7 days   Lab Units 09/09/23  1622 09/09/23  0501 09/08/23  0437   SODIUM mmol/L 135* 139 138   POTASSIUM mmol/L 4.9  5.0 5.3* 4.2   CHLORIDE mmol/L 103 106 106   CO2 mmol/L 21.0* 25.3 24.8   BUN mg/dL 8 5* 4*   CREATININE mg/dL 0.98 0.87 0.86   GLUCOSE mg/dL 88 96 74   CALCIUM mg/dL 7.9* 7.7* 7.7*     Results from last 7 days   Lab Units 09/09/23  1622   HSTROP T ng/L 27*     Results from last 7 days   Lab Units 09/09/23  1654 09/09/23  0501 09/08/23  0437   WBC 10*3/mm3 12.44* 15.66* 5.16   HEMOGLOBIN g/dL 11.3* 10.0* 9.9*   HEMATOCRIT % 34.5 30.4* 29.9*    PLATELETS 10*3/mm3 432 367 258               Assessment     Atrial tachycardia with RBBB VS V. tach, converted now to NSR.  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

## 2023-09-09 NOTE — PROGRESS NOTES
[facility]  PROGRESS NOTE    Patient Name: Anne Jewell  : 1950  MRN: [id number]    Date of Admission: 2023  Primary Care Physician: Jewell Stephens APRN    Subjective   Subjective     CC:  Follow-up multiple issues    Subjective:  Patient reports she is having substantial abdominal pain following surgery.  Her daughter is at the bedside.  Despite PCA and substantial medications patient does get some relief but still reports pain.  She is asking for further adjustments.    Review of Systems  No current fevers or chills  No current headache or lightheadedness  No current chest pain or palpitations      Objective   Objective     Vital Signs:   Temp:  [97.7 °F (36.5 °C)-100 °F (37.8 °C)] 98.8 °F (37.1 °C)  Heart Rate:  [] 88  Resp:  [16-20] 18  BP: (107-177)/(67-96) 131/78        Physical Exam:  Constitutional:Awake, alert  HENT: NCAT, mucous membranes moist, neck supple  Respiratory: Rare cough, no wheezes, nonlabored breathing  Cardiovascular: Pulse rate is normal, normal radial pulses  Gastrointestinal: Postoperative surgical changes with dressing in place, obese, soft, nontender, nondistended  Musculoskeletal: Frail and chronically debilitated appearance obese BMI is 32, minimal lower extremity edema  Psychiatric: Mildly anxious affect, cooperative, conversational  Neurologic: No slurred speech or facial droop, follows commands  Skin: No rashes or jaundice, warm      Results Reviewed:  Results from last 7 days   Lab Units 23  0501 234   WBC 10*3/mm3 15.66* 5.16 8.36   HEMOGLOBIN g/dL 10.0* 9.9* 11.0*   HEMATOCRIT % 30.4* 29.9* 33.8*   PLATELETS 10*3/mm3 367 258 360     Results from last 7 days   Lab Units 23  0501 23  0437 23  0415   SODIUM mmol/L 139 138  --  137   POTASSIUM mmol/L 5.3* 4.2 4.3 3.6   CHLORIDE mmol/L 106 106  --  102   CO2 mmol/L 25.3 24.8  --  27.9   BUN mg/dL 5* 4*  --  5*    CREATININE mg/dL 0.87 0.86  --  0.81   GLUCOSE mg/dL 96 74  --  77   CALCIUM mg/dL 7.7* 7.7*  --  8.2*   ALT (SGPT) U/L 11 9  --  10   AST (SGOT) U/L 26 21  --  27     Estimated Creatinine Clearance: 54.8 mL/min (by C-G formula based on SCr of 0.87 mg/dL).    Microbiology Results Abnormal       Procedure Component Value - Date/Time    Blood Culture - Blood, Arm, Right [[id number]]  (Normal) Collected: 08/27/23 1017    Lab Status: Final result Specimen: Blood from Arm, Right Updated: 09/01/23 1100     Blood Culture No growth at 5 days    Blood Culture - Blood, Arm, Left [[id number]]  (Normal) Collected: 08/27/23 1021    Lab Status: Final result Specimen: Blood from Arm, Left Updated: 09/01/23 1100     Blood Culture No growth at 5 days    Clostridioides difficile Toxin - Stool, Per Rectum [[id number]]  (Normal) Collected: 09/01/23 0739    Lab Status: Final result Specimen: Stool from Per Rectum Updated: 09/01/23 0943    Narrative:      The following orders were created for panel order Clostridioides difficile Toxin - Stool, Per Rectum.  Procedure                               Abnormality         Status                     ---------                               -----------         ------                     Clostridioides difficile...[[id number]]  Normal              Final result                 Please view results for these tests on the individual orders.    Clostridioides difficile Toxin, PCR - Stool, Per Rectum [[id number]]  (Normal) Collected: 09/01/23 0739    Lab Status: Final result Specimen: Stool from Per Rectum Updated: 09/01/23 0943     Toxigenic C. difficile by PCR Negative    Narrative:      The result indicates the absence of toxigenic C. difficile from stool specimen.             Imaging Results (Last 24 Hours)       ** No results found for the last 24 hours. **                I have reviewed the medications:  Scheduled Meds:baclofen, 5 mg, Oral, TID  cholecalciferol, 2,000 Units, Oral, Daily  escitalopram,  10 mg, Oral, Daily  famotidine, 20 mg, Intravenous, Q12H  guaiFENesin, 600 mg, Oral, Nightly  hydroCHLOROthiazide, 25 mg, Oral, Daily  levothyroxine, 100 mcg, Oral, Q AM  montelukast, 10 mg, Oral, Daily  multivitamin, 1 tablet, Oral, Daily  oxyCODONE, 80 mg, Oral, Q12H  promethazine, 25 mg, Oral, BID  saccharomyces boulardii, 250 mg, Oral, BID  sodium chloride, 10 mL, Intravenous, Q12H  vitamin B-12, 1,000 mcg, Oral, Daily      Continuous Infusions:HYDROmorphone HCl-NaCl,   Pharmacy Consult,   sodium chloride, 30 mL/hr, Last Rate: 9 mL/hr (09/04/23 0900)  sodium chloride, 30 mL/hr      PRN Meds:.  acetaminophen    albuterol    ALPRAZolam    aluminum-magnesium hydroxide-simethicone    senna-docusate sodium **AND** polyethylene glycol **AND** bisacodyl **AND** bisacodyl    calcium carbonate    loperamide    Magnesium Standard Dose Replacement - Follow Nurse / BPA Driven Protocol    naloxone    nitroglycerin    ondansetron    oxyCODONE    Pharmacy Consult    Potassium Replacement - Follow Nurse / BPA Driven Protocol    promethazine **OR** promethazine    simethicone    sodium chloride    sodium chloride    sodium chloride    sodium chloride    Assessment & Plan   Assessment & Plan     Active Hospital Problems    Diagnosis  POA    **Choledocholithiasis [K80.50]  Yes    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        Brief Hospital Course to date:  Anne Jewell is a 72 y.o. female with choledocholithiasis found to have gastric adenocarcinoma and COVID-19 infection.    Discussion/plan for today:  All medical problems are new under my management  today.  Labs and vitals reviewed.  Operative report reviewed.  Case discussed with surgeon.  Leukocytosis today thought to be reactive.  Surgeon does not feel she needs antibiotics at this time per our conversation.    Due to exceptionally high chronic narcotics doses pain control would likely be very difficult as all of her narcotic receptors are saturated.  No sign of oversedation currently.  I will adjust PCA.    COVID-19 appears stable at this time.  Continue to monitor.  No current hypoxia.    Potassium minimally elevated at 5.3 postoperatively.  Continue to trend.  Telemetry monitoring.  No current concerning findings noted.  Discontinue potassium today.  Treatment plan discussed with nursing.    Remainder of treatment plan continues with medications as per above.    DVT Prophylaxis: Mechanical    Discharge planning: Pending clinical course    CODE STATUS:   Code Status and Medical Interventions:   Ordered at: 08/25/23 8477     Level Of Support Discussed With:    Patient     Code Status (Patient has no pulse and is not breathing):    CPR (Attempt to Resuscitate)     Medical Interventions (Patient has pulse or is breathing):    Full Support       Darrel Alvarez MD  09/09/23

## 2023-09-10 ENCOUNTER — APPOINTMENT (OUTPATIENT)
Dept: CARDIOLOGY | Facility: HOSPITAL | Age: 73
DRG: 423 | End: 2023-09-10
Payer: MEDICARE

## 2023-09-10 LAB
ALBUMIN SERPL-MCNC: 1.8 G/DL (ref 3.5–5.2)
ALBUMIN/GLOB SERPL: 0.6 G/DL
ALP SERPL-CCNC: 141 U/L (ref 39–117)
ALT SERPL W P-5'-P-CCNC: 8 U/L (ref 1–33)
ANION GAP SERPL CALCULATED.3IONS-SCNC: 7 MMOL/L (ref 5–15)
AST SERPL-CCNC: 20 U/L (ref 1–32)
BILIRUB SERPL-MCNC: 0.3 MG/DL (ref 0–1.2)
BUN SERPL-MCNC: 9 MG/DL (ref 8–23)
BUN/CREAT SERPL: 10.7 (ref 7–25)
CALCIUM SPEC-SCNC: 7.4 MG/DL (ref 8.6–10.5)
CHLORIDE SERPL-SCNC: 103 MMOL/L (ref 98–107)
CO2 SERPL-SCNC: 25 MMOL/L (ref 22–29)
CREAT SERPL-MCNC: 0.84 MG/DL (ref 0.57–1)
DEPRECATED RDW RBC AUTO: 48.4 FL (ref 37–54)
EGFRCR SERPLBLD CKD-EPI 2021: 73.5 ML/MIN/1.73
ERYTHROCYTE [DISTWIDTH] IN BLOOD BY AUTOMATED COUNT: 13.7 % (ref 12.3–15.4)
GLOBULIN UR ELPH-MCNC: 2.8 GM/DL
GLUCOSE SERPL-MCNC: 88 MG/DL (ref 65–99)
HCT VFR BLD AUTO: 29.1 % (ref 34–46.6)
HGB BLD-MCNC: 9.3 G/DL (ref 12–15.9)
MCH RBC QN AUTO: 30.8 PG (ref 26.6–33)
MCHC RBC AUTO-ENTMCNC: 32 G/DL (ref 31.5–35.7)
MCV RBC AUTO: 96.4 FL (ref 79–97)
PLATELET # BLD AUTO: 323 10*3/MM3 (ref 140–450)
PMV BLD AUTO: 10 FL (ref 6–12)
POTASSIUM SERPL-SCNC: 3.9 MMOL/L (ref 3.5–5.2)
PROT SERPL-MCNC: 4.6 G/DL (ref 6–8.5)
QT INTERVAL: 308 MS
QT INTERVAL: 323 MS
QT INTERVAL: 451 MS
QTC INTERVAL: 502 MS
QTC INTERVAL: 518 MS
QTC INTERVAL: 518 MS
RBC # BLD AUTO: 3.02 10*6/MM3 (ref 3.77–5.28)
SODIUM SERPL-SCNC: 135 MMOL/L (ref 136–145)
TSH SERPL DL<=0.05 MIU/L-ACNC: 4.8 UIU/ML (ref 0.27–4.2)
WBC NRBC COR # BLD: 8.33 10*3/MM3 (ref 3.4–10.8)

## 2023-09-10 PROCEDURE — 85027 COMPLETE CBC AUTOMATED: CPT | Performed by: SURGERY

## 2023-09-10 PROCEDURE — 80053 COMPREHEN METABOLIC PANEL: CPT | Performed by: SURGERY

## 2023-09-10 PROCEDURE — 99232 SBSQ HOSP IP/OBS MODERATE 35: CPT | Performed by: INTERNAL MEDICINE

## 2023-09-10 PROCEDURE — 93005 ELECTROCARDIOGRAM TRACING: CPT | Performed by: INTERNAL MEDICINE

## 2023-09-10 PROCEDURE — 25010000002 AMIODARONE IN DEXTROSE 5% 360-4.14 MG/200ML-% SOLUTION: Performed by: INTERNAL MEDICINE

## 2023-09-10 PROCEDURE — 84443 ASSAY THYROID STIM HORMONE: CPT | Performed by: INTERNAL MEDICINE

## 2023-09-10 PROCEDURE — 63710000001 PROMETHAZINE PER 25 MG: Performed by: INTERNAL MEDICINE

## 2023-09-10 PROCEDURE — 63710000001 PROMETHAZINE PER 25 MG: Performed by: SURGERY

## 2023-09-10 PROCEDURE — 93306 TTE W/DOPPLER COMPLETE: CPT

## 2023-09-10 PROCEDURE — 93010 ELECTROCARDIOGRAM REPORT: CPT | Performed by: STUDENT IN AN ORGANIZED HEALTH CARE EDUCATION/TRAINING PROGRAM

## 2023-09-10 PROCEDURE — 93306 TTE W/DOPPLER COMPLETE: CPT | Performed by: INTERNAL MEDICINE

## 2023-09-10 PROCEDURE — 99024 POSTOP FOLLOW-UP VISIT: CPT | Performed by: SURGERY

## 2023-09-10 RX ADMIN — OXYCODONE HYDROCHLORIDE 80 MG: 40 TABLET, FILM COATED, EXTENDED RELEASE ORAL at 08:09

## 2023-09-10 RX ADMIN — ESCITALOPRAM OXALATE 10 MG: 10 TABLET, FILM COATED ORAL at 08:11

## 2023-09-10 RX ADMIN — Medication 1000 MCG: at 08:10

## 2023-09-10 RX ADMIN — HYDROCHLOROTHIAZIDE 25 MG: 25 TABLET ORAL at 08:10

## 2023-09-10 RX ADMIN — Medication 1 TABLET: at 08:10

## 2023-09-10 RX ADMIN — BACLOFEN 5 MG: 10 TABLET ORAL at 15:17

## 2023-09-10 RX ADMIN — AMIODARONE HYDROCHLORIDE 0.5 MG/MIN: 1.8 INJECTION, SOLUTION INTRAVENOUS at 18:06

## 2023-09-10 RX ADMIN — Medication 2000 UNITS: at 08:12

## 2023-09-10 RX ADMIN — SODIUM CHLORIDE 500 ML: 9 INJECTION, SOLUTION INTRAVENOUS at 05:09

## 2023-09-10 RX ADMIN — ALPRAZOLAM 3 MG: 1 TABLET ORAL at 21:47

## 2023-09-10 RX ADMIN — BACLOFEN 5 MG: 10 TABLET ORAL at 21:48

## 2023-09-10 RX ADMIN — PROMETHAZINE HYDROCHLORIDE 25 MG: 12.5 TABLET ORAL at 08:19

## 2023-09-10 RX ADMIN — FAMOTIDINE 20 MG: 10 INJECTION INTRAVENOUS at 08:13

## 2023-09-10 RX ADMIN — Medication 250 MG: at 08:10

## 2023-09-10 RX ADMIN — AMIODARONE HYDROCHLORIDE 0.5 MG/MIN: 1.8 INJECTION, SOLUTION INTRAVENOUS at 05:08

## 2023-09-10 RX ADMIN — Medication 10 ML: at 08:17

## 2023-09-10 RX ADMIN — GUAIFENESIN 600 MG: 600 TABLET, EXTENDED RELEASE ORAL at 21:47

## 2023-09-10 RX ADMIN — PROMETHAZINE HYDROCHLORIDE 25 MG: 12.5 TABLET ORAL at 21:47

## 2023-09-10 RX ADMIN — Medication 250 MG: at 21:48

## 2023-09-10 RX ADMIN — LEVOTHYROXINE SODIUM 100 MCG: 0.1 TABLET ORAL at 08:11

## 2023-09-10 RX ADMIN — SODIUM CHLORIDE 10 MG/HR: 900 INJECTION, SOLUTION INTRAVENOUS at 00:38

## 2023-09-10 RX ADMIN — AMIODARONE HYDROCHLORIDE 0.5 MG/MIN: 1.8 INJECTION, SOLUTION INTRAVENOUS at 15:18

## 2023-09-10 RX ADMIN — OXYCODONE HYDROCHLORIDE 80 MG: 40 TABLET, FILM COATED, EXTENDED RELEASE ORAL at 21:47

## 2023-09-10 RX ADMIN — BACLOFEN 5 MG: 10 TABLET ORAL at 08:11

## 2023-09-10 RX ADMIN — AMIODARONE HYDROCHLORIDE 1 MG/MIN: 1.8 INJECTION, SOLUTION INTRAVENOUS at 03:31

## 2023-09-10 RX ADMIN — MONTELUKAST SODIUM 10 MG: 10 TABLET, FILM COATED ORAL at 08:11

## 2023-09-10 RX ADMIN — Medication 10 ML: at 21:48

## 2023-09-10 NOTE — PROGRESS NOTES
Postoperative day 2 s/p exploratory laparotomy with gastrojejunal anastomosis and postoperative day 7 s/p exploratory laparotomy with distal gastrectomy.     S: Transferred to ICU because of cardiac rhythm abnormality.  Denies any nausea or vomiting.  She has been able to tolerate clear liquid diet.  Abdominal pain is significantly improved    O:   Vitals:    09/10/23 0800 09/10/23 0830 09/10/23 0900 09/10/23 0930   BP: 105/72 117/73 106/65 99/70   Pulse: 88 81 83 93   Resp:       Temp: 98.2 °F (36.8 °C)      TempSrc: Oral      SpO2: 98% 97% 96% 94%   Weight:       Height:       Drain 90 cc serosanguineous    Alert, no acute distress breathing comfortable  Abdomen soft, appropriate tender, nondistended, incision clean dry and intact with dressings and packing    Hemoglobin 9.3 slightly down, although cell count is normal    Assessment and plan    Postoperative day 2 s/p exploratory laparotomy with gastrojejunal anastomosis and postoperative day 7 s/p exploratory laparotomy with distal gastrectomy.   Doing fine from surgical standpoint  Advance to full liquid diet  Start wet-to-dry dressing changes with saline and gauze 3 times a day  Continue current pain control regimen    We will continue to follow

## 2023-09-10 NOTE — PROGRESS NOTES
LOS: 16 days   Patient Care Team:  Jewell Stephens APRN as PCP - General (Family Medicine)  Edilberto Lemus MD as Consulting Physician (Nephrology)  Rashaun Alvarado MD as Surgeon (Orthopedic Surgery)  Delma Montoya MD as Consulting Physician (Obstetrics and Gynecology)  Stuart Borges MD (Hematology and Oncology)  Isael Barker MD as Consulting Physician (Gastroenterology)  Haven Gr APRN as Nurse Practitioner (Nurse Practitioner)    Chief Complaint: Follow-up paroxysmal atrial fibrillation with RVR (new).    Interval History: Blood pressure has been low this morning.  However, she has been in sinus rhythm on the amiodarone drip.  No chest pain or new shortness of breath.    Vital Signs:  Temp:  [97.1 °F (36.2 °C)-98.9 °F (37.2 °C)] 98.1 °F (36.7 °C)  Heart Rate:  [] 71  Resp:  [16] 16  BP: ()/(46-93) 135/62    Intake/Output Summary (Last 24 hours) at 9/10/2023 1744  Last data filed at 9/10/2023 1520  Gross per 24 hour   Intake 626.73 ml   Output 1455 ml   Net -828.27 ml       Physical Exam:   General Appearance:    No acute distress, alert and oriented x4, chronically ill-appearing.   Lungs:     Clear to auscultation bilaterally     Heart:    Regular rhythm and normal rate.  No murmurs, gallops, or    rubs.   Abdomen:     Soft, nontender, nondistended.    Extremities:   No clubbing, cyanosis, or edema.     Results Review:    Results from last 7 days   Lab Units 09/10/23  0140   SODIUM mmol/L 135*   POTASSIUM mmol/L 3.9   CHLORIDE mmol/L 103   CO2 mmol/L 25.0   BUN mg/dL 9   CREATININE mg/dL 0.84   GLUCOSE mg/dL 88   CALCIUM mg/dL 7.4*     Results from last 7 days   Lab Units 09/09/23  1857 09/09/23  1622   HSTROP T ng/L 28* 27*     Results from last 7 days   Lab Units 09/10/23  0140   WBC 10*3/mm3 8.33   HEMOGLOBIN g/dL 9.3*   HEMATOCRIT % 29.1*   PLATELETS 10*3/mm3 323             Results from last 7 days   Lab Units 09/09/23  1622   MAGNESIUM mg/dL 1.9           I  reviewed the patient's new clinical results.        Assessment:  1.  Choledocholithiasis  2.  Gastric adenocarcinoma  3.  COVID-19 infection  4.  Status post exploratory laparotomy and distal gastrectomy on 9/1/2023 by Dr. Loo  5.  Status post second exploratory laparotomy and gastrojejunostomy on 9/7/2023 by Dr. Loo  6.  Chronic pain syndrome  7.  Paroxysmal atrial fibrillation with RVR and aberrancy (new)  8.  Stage III chronic kidney disease   9.  Rheumatoid arthritis  10.  Osteoarthritis   11.  Hypoalbuminemia and low total protein    Plan:  -She is doing well on the amiodarone drip.  I am going to keep her on 0.5 mg/min today.    -She was quite rapid with heart rates as high as the 190s, and she did develop aberrant conduction with a left bundle-like pattern.  Again, this looked like ventricular tachycardia initially, although it was not.  This has been all atrial fibrillation.  She was also completely asymptomatic while this was occurring.    -No anticoagulation for now given recent surgery.    -Echocardiogram reviewed.  EF was 60 to 65%.  No major pathology.    -Discussed with the patient at bedside, as well as her daughter (who is a nurse).    Noel Koo MD  09/10/23  17:44 EDT

## 2023-09-10 NOTE — PROGRESS NOTES
"                                              LOS: 16 days   Patient Care Team:  Jewell Stephens APRN as PCP - General (Family Medicine)  Edilberto Lemus MD as Consulting Physician (Nephrology)  Rashaun Alvarado MD as Surgeon (Orthopedic Surgery)  Delma Montoya MD as Consulting Physician (Obstetrics and Gynecology)  Stuart Borges MD (Hematology and Oncology)  Isael Barker MD as Consulting Physician (Gastroenterology)  Haven Gr APRN as Nurse Practitioner (Nurse Practitioner)    Chief Complaint:  F/up tachyarrhythmia with large QRS complex.  Critical care management.  COVID infection and medical problems listed below    Subjective   Interval History  On RA.  Heart rate remains in normal range.  Had few episodes of hypotension overnight but currently BP is in normal range.    REVIEW OF SYSTEMS:   CARDIOVASCULAR: No chest pain, chest pressure or chest discomfort. No palpitations or edema.   RESPIRATORY: No shortness of breath, cough or sputum.   GASTROINTESTINAL: No anorexia, nausea, vomiting or diarrhea. No abdominal pain.  CONSTITUTIONAL: No fever or chills.     Ventilator/Non-Invasive Ventilation Settings (From admission, onward)      None                  Physical Exam:     Vital Signs  Temp:  [97.1 °F (36.2 °C)-98.9 °F (37.2 °C)] 98.2 °F (36.8 °C)  Heart Rate:  [] 84  Resp:  [14] 14  BP: ()/(46-97) 108/71    Intake/Output Summary (Last 24 hours) at 9/10/2023 1413  Last data filed at 9/10/2023 1223  Gross per 24 hour   Intake 450 ml   Output 830 ml   Net -380 ml     Flowsheet Rows      Flowsheet Row First Filed Value   Admission Height 154.9 cm (61\") Documented at 08/25/2023 1314   Admission Weight 81.6 kg (180 lb) Documented at 08/25/2023 1314            PPE used per hospital policy    General Appearance:   Alert, cooperative, in no acute distress   ENMT:  Mallampati score 3, moist mucous membrane   Eyes:  Pupils equal and reactive to light. EOMI   Neck:   Trachea midline. " No thyromegaly.   Lungs:    Clear anteriorly.  No crackles or wheezing.    Heart:   Regular rhythm and normal rate, normal S1 and S2, no         murmur   Skin:   No rash or ecchymosis   Abdomen:    Obese.  Abdominal incision noted.  Soft. No tenderness. No HSM.   Neuro/psych:  Conscious, alert, oriented x3. Strength 5/5 in upper and lower  ext.  Appropriate mood and affect   Extremities:  No cyanosis, clubbing but +2 edema.  Warm extremities and well-perfused          Results Review:        Results from last 7 days   Lab Units 09/10/23  0140 09/09/23  1622 09/09/23  0501   SODIUM mmol/L 135* 135* 139   POTASSIUM mmol/L 3.9 4.9  5.0 5.3*   CHLORIDE mmol/L 103 103 106   CO2 mmol/L 25.0 21.0* 25.3   BUN mg/dL 9 8 5*   CREATININE mg/dL 0.84 0.98 0.87   GLUCOSE mg/dL 88 88 96   CALCIUM mg/dL 7.4* 7.9* 7.7*     Results from last 7 days   Lab Units 09/09/23  1857 09/09/23  1622   HSTROP T ng/L 28* 27*     Results from last 7 days   Lab Units 09/10/23  0140 09/09/23  1654 09/09/23  0501   WBC 10*3/mm3 8.33 12.44* 15.66*   HEMOGLOBIN g/dL 9.3* 11.3* 10.0*   HEMATOCRIT % 29.1* 34.5 30.4*   PLATELETS 10*3/mm3 323 432 367                           Results from last 7 days   Lab Units 09/09/23  1716   PH, ARTERIAL pH units 7.436   PCO2, ARTERIAL mm Hg 35.8   PO2 ART mm Hg 79.2*   O2 SATURATION ART % 96.1   MODALITY  Cannula         I reviewed the patient's new clinical results.        Medication Review:   baclofen, 5 mg, Oral, TID  cholecalciferol, 2,000 Units, Oral, Daily  escitalopram, 10 mg, Oral, Daily  famotidine, 20 mg, Intravenous, Q12H  guaiFENesin, 600 mg, Oral, Nightly  hydroCHLOROthiazide, 25 mg, Oral, Daily  levothyroxine, 100 mcg, Oral, Q AM  montelukast, 10 mg, Oral, Daily  multivitamin, 1 tablet, Oral, Daily  oxyCODONE, 80 mg, Oral, Q12H  promethazine, 25 mg, Oral, BID  saccharomyces boulardii, 250 mg, Oral, BID  sodium chloride, 10 mL, Intravenous, Q12H  vitamin B-12, 1,000 mcg, Oral, Daily        amiodarone,  0.5 mg/min, Last Rate: 0.5 mg/min (09/10/23 0509)  dilTIAZem, 5-15 mg/hr, Last Rate: Stopped (09/10/23 0132)  HYDROmorphone HCl-NaCl,   Pharmacy Consult,   sodium chloride, 30 mL/hr, Last Rate: 9 mL/hr (09/04/23 0900)  sodium chloride, 30 mL/hr          Assessment     Atrial fibrillation with RVR and LBBB  COVID-19 infection, no evidence of pneumonia  Mild hypotension, resolved.  Could be secondary to Cardizem +/- amiodarone.  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  Currently BP is in normal range but soft.  Amiodarone drip per cardiology.  Discussed with Dr. Koo  Continue current pain management.  Incentive spirometry.  Encouraged to use.  PT OT and up to chair as tolerated    Dispo: Could transfer to the floor later if she remains stable.  Discussed with nursing staff.  Discussed with Dr. Michelle.      Cheyenne Greene MD  09/10/23  14:13 EDT          This note was dictated utilizing Dragon dictation

## 2023-09-10 NOTE — CONSULTS
Date of Consultation: 23    Referral Provider: Masoud Alvarez MD    Reason for Consultation: Rapid atrial fibrillation.    Encounter Provider: Noel Koo MD    Group of Service: [facility]     Patient Name: Anne Jewell    :1950    Chief complaint: Rapid heart rate.    History of Present Illness:      This is a very pleasant 72 year-old female with a history of prior pulmonary embolism, stage III chronic kidney disease, chronic pain syndrome, rheumatoid arthritis, and gastric adenocarcinoma.    The patient was directly admitted to the [facility] service on 2023 for evaluation of choledocholithiasis.  She was found to have COVID-19, although she really did not have significant symptoms with this.  She also was later found to have gastric adenocarcinoma.  She ultimately underwent an exploratory laparotomy and distal gastrectomy on 2023 by Dr. Loo.  She then underwent another exploratory laparotomy and a gastrojejunostomy on 2023 by Dr. Loo.  She has had issues with pain control after her surgery as she was on pain medications prior to this.  She really has not had any major issues from the COVID-19 infection.    Today, she developed tachycardia which initially appeared to be potential ventricular tachycardia.  However, on further review, this is rapid atrial fibrillation with aberrancy.  She was completely asymptomatic with regards to this, although her heart rate was as high as the 190s at one time.  She did not feel any palpitations, increasing shortness of breath, or chest pain.  She was transferred to the CICU for further evaluation and care.  She does not have a previous history of cardiac issues.    Past Medical History:   Diagnosis Date    Allergic rhinitis     Arthritis of back     Arthritis of neck     Asthma     Bronchospasm     Carpal tunnel syndrome, bilateral     Cervical disc disorder     Clotting disorder     Deep vein thrombosis     Disc  degeneration, lumbar     Edema     Esophageal reflux     Glaucoma     Hip arthrosis     Joint pain     Kidney disease 2018    stage 3    Liver disease 2018    stage 3    Low back pain     Osteoarthritis     Osteopenia     Osteoporosis     Periarthritis of shoulder     Scoliosis     Status post total knee replacement, left 08/31/2017    Status post total knee replacement, right 08/31/2017    UTI (urinary tract infection)     Venous thrombosis          Past Surgical History:   Procedure Laterality Date    BILATERAL BREAST REDUCTION      BREAST SURGERY      COLONOSCOPY  08/05/2016    ERCP N/A 8/30/2023    Procedure: ENDOSCOPIC RETROGRADE CHOLANGIOPANCREATOGRAPHY;  Surgeon: Elijah Simon MD;  Location: [facility] ENDOSCOPY;  Service: Gastroenterology;  Laterality: N/A;  cbd stone    ERCP N/A 9/1/2023    Procedure: ENDOSCOPIC RETROGRADE CHOLANGIOPANCREATOGRAPHY WITH CHOLANGIOGRAM, SPHINCTEROTOMY, BALLOON SWEEP;  Surgeon: Elijah Simon MD;  Location: [facility] OR;  Service: Gastroenterology;  Laterality: N/A;    EXPLORATORY LAPAROTOMY N/A 9/1/2023    Procedure: Exploratory laparotomy with Distal Gastrectomy;  Surgeon: Slava Loo Jr., MD;  Location: [facility] OR;  Service: General;  Laterality: N/A;    GASTRIC BYPASS      HAND SURGERY Right 01/14/2016    HERNIA REPAIR      x7    HYSTERECTOMY      JOINT REPLACEMENT      Both knees    OTHER SURGICAL HISTORY      vaginal sling operation for stress incontinence    TOTAL KNEE ARTHROPLASTY Left     TOTAL KNEE ARTHROPLASTY Bilateral          Allergies   Allergen Reactions    Penicillins Hives         No current facility-administered medications on file prior to encounter.     Current Outpatient Medications on File Prior to Encounter   Medication Sig Dispense Refill    albuterol sulfate  (90 Base) MCG/ACT inhaler Inhale 2 puffs Every 6 (Six) Hours As Needed for Wheezing. Proventil  (90 Base) MCG/ACT Inhalation Aerosol Solution; Patient Sig:  Proventil  (90 Base) MCG/ACT Inhalation Aerosol Solution Inhale 2 puff(s) every 6 to 8 hours as needed; 6.7; 0; 05-Apr-2013; Active 8 g 0    ALPRAZolam (XANAX) 2 MG tablet As Needed.      baclofen (LIORESAL) 10 MG tablet TAKE ONE TABLET BY MOUTH THREE TIMES A  tablet 0    Calcium Citrate-Vitamin D3 (CITRACAL) 315-6.25 MG-MCG tablet tablet Take 1 tablet by mouth Daily.      chlorhexidine (PERIDEX) 0.12 % solution       Cholecalciferol (VITAMIN D3) 2000 units capsule TAKE ONE CAPSULE BY MOUTH DAILY 30 capsule 9    Cyanocobalamin (B-12) 1000 MCG sublingual tablet PLACE 1 TABLET UNDER THE TONGUE AND LET DISSOLVE ONCE DAILY 30 each 1    dicyclomine (BENTYL) 20 MG tablet Take 1 tablet by mouth Every 6 (Six) Hours As Needed (diarrhea). 90 tablet 1    escitalopram (LEXAPRO) 10 MG tablet Take 1 tablet by mouth Daily.      hydroCHLOROthiazide (HYDRODIURIL) 25 MG tablet TAKE ONE TABLET BY MOUTH DAILY 90 tablet 0    KLOR-CON 20 MEQ CR tablet TAKE ONE TABLET BY MOUTH TWICE A  tablet 0    levothyroxine (SYNTHROID, LEVOTHROID) 100 MCG tablet TAKE ONE TABLET BY MOUTH DAILY 90 tablet 0    montelukast (SINGULAIR) 10 MG tablet TAKE ONE TABLET BY MOUTH DAILY 90 tablet 1    Multiple Vitamin tablet Take 1 tablet by mouth Daily.      Omega 3-6-9 capsule Take  by mouth.      oxyCODONE ER (oxyCONTIN) 80 MG tablet extended-release 12 hour 12 hr tablet Take 2 tablets by mouth Every 12 (Twelve) Hours. 120 tablet 0    promethazine (PHENERGAN) 25 MG tablet TAKE 1 TABLET BY MOUTH EVERY 8 HOURS AS NEEDED FOR NAUSEA AND VOMITING 90 tablet 0    Xarelto 20 MG tablet TAKE ONE TABLET BY MOUTH DAILY 90 tablet 1    EPINEPHrine (EPIPEN) 0.3 MG/0.3ML solution auto-injector injection       naloxone (NARCAN) 4 MG/0.1ML nasal spray 1 spray into the nostril(s) as directed by provider As Needed (sedation or accidental overdose of opioid). 2 each 0    saccharomyces boulardii (FLORASTOR) 250 MG capsule Take 1 capsule by mouth 2 (Two) Times a  "Day.           Social History     Socioeconomic History    Marital status: Legally    Tobacco Use    Smoking status: Former     Passive exposure: Past    Smokeless tobacco: Never   Vaping Use    Vaping Use: Never used   Substance and Sexual Activity    Alcohol use: No    Drug use: No    Sexual activity: Defer         Family History   Problem Relation Age of Onset    Prostate cancer Father     Arthritis Other     Hypertension Other         benign essential    Cancer Other     Diabetes Other     Heart disease Other     Nephrolithiasis Other        REVIEW OF SYSTEMS:   Pertinent positives are noted in the HPI above.  Otherwise, all other systems were reviewed, and are negative.     Objective:     Vitals:    09/09/23 1830 09/09/23 1915 09/09/23 1923 09/09/23 2214   BP: 140/76 95/68  111/70   BP Location:       Patient Position:       Pulse: 100 107  (!) 145   Resp:       Temp:   98.9 °F (37.2 °C)    TempSrc:   Oral    SpO2: 94% 92%     Weight:       Height:         Body mass index is 33.87 kg/m².  Flowsheet Rows      Flowsheet Row First Filed Value   Admission Height 154.9 cm (61\") Documented at 08/25/2023 1314   Admission Weight 81.6 kg (180 lb) Documented at 08/25/2023 1314             General:    No acute distress, alert and oriented x4, pleasant, chronically ill-appearing                   Head:    Normocephalic, atraumatic.   Eyes:          Conjunctivae and sclerae normal, no icterus.   Throat:   No oral lesions, no thrush, oral mucosa moist.    Neck:   Supple, trachea midline.   Lungs:     Clear to auscultation bilaterally     Heart:    Irregularly irregular rhythm with a normal rate.  No murmurs, gallops, or rubs noted.   Abdomen:     Soft, postop with AFIA drain, non-distended, positive bowel sounds.    Extremities:   Trace edema of the lower extremities bilaterally.   Pulses:   Pulses palpable and equal bilaterally.    Skin:   No bleeding or rash.   Neuro:   Non-focal.  Moves all extremities well.  "   Psychiatric:   Normal mood and affect.         Lab Review:                Results from last 7 days   Lab Units 09/09/23  1622   SODIUM mmol/L 135*   POTASSIUM mmol/L 4.9  5.0   CHLORIDE mmol/L 103   CO2 mmol/L 21.0*   BUN mg/dL 8   CREATININE mg/dL 0.98   GLUCOSE mg/dL 88   CALCIUM mg/dL 7.9*     Results from last 7 days   Lab Units 09/09/23  1857 09/09/23  1622   HSTROP T ng/L 28* 27*     Results from last 7 days   Lab Units 09/09/23  1654   WBC 10*3/mm3 12.44*   HEMOGLOBIN g/dL 11.3*   HEMATOCRIT % 34.5   PLATELETS 10*3/mm3 432             Results from last 7 days   Lab Units 09/09/23  1622   MAGNESIUM mg/dL 1.9           EKG (reviewed by me personally): Atrial fibrillation with RVR and aberrancy.      Assessment:   1.  Choledocholithiasis  2.  Gastric adenocarcinoma  3.  COVID-19 infection  4.  Status post exploratory laparotomy and distal gastrectomy on 9/1/2023 by Dr. Loo  5.  Status post second exploratory laparotomy and gastrojejunostomy on 9/7/2023 by Dr. Loo  6.  Chronic pain syndrome  7.  Paroxysmal atrial fibrillation with RVR and aberrancy (new)  8.  Stage III chronic kidney disease   9.  Rheumatoid arthritis  10.  Osteoarthritis   11.  Hypoalbuminemia and low total protein    Plan:       Again, there was initial concern that this was ventricular tachycardia as the rhythm was quite rapid and the QRS complexes were wide.  However, her arrhythmia is consistent with rapid atrial fibrillation and aberrant conduction with a left bundle-like pattern when she is tachycardic.  She is completely asymptomatic, and has no palpitations, chest pain, or significant shortness of breath with this.    I initially placed her on a Cardizem drip and gave her a dose of digoxin at 500 mcg IV x1 dose.  She converted to sinus rhythm, but then later went back into atrial fibrillation and was again rapid.  At this point, I started an IV amiodarone loading protocol drip.  She is status post 2 major operations, including  her last operation several days ago on 9/7/2023.  For these reasons, I will hold off on systemic anticoagulation (until safe from a surgical perspective).    Her TSH was last checked on 7/19/2023.  I will check this again tomorrow morning.  I will also check an echocardiogram to ensure she does not have any structural heart disease.    Thank you very much for this consult.    Gerard Koo MD

## 2023-09-10 NOTE — PLAN OF CARE
Goal Outcome Evaluation:  Plan of Care Reviewed With: patient, daughter        Progress: no change     Pt remains in CICU A&O x4  on 1L NC when sleeping. Pt had an episode of rapid A-fib and sustain HR in the 150s-160s while max on Cardizem, call cardiology received an order for amio help pt converted to normal sinus. BP started dropping Cardizem was turned off per order instructions, called [facility] c[facility]denas received an order for  500cc NS bolus. Which was effective in bringing BP up and maintaining MAP above 65. AFIA tube had 70 serosanguinous output. Will continue to monitor.

## 2023-09-10 NOTE — PLAN OF CARE
Problem: Adult Inpatient Plan of Care  Goal: Plan of Care Review  Outcome: Ongoing, Progressing  Flowsheets (Taken 9/10/2023 1807)  Progress: improving  Plan of Care Reviewed With: patient  Outcome Evaluation: Patient has been pleasant and cooperative during shift. Dilaudid PCA for pain, no nausea, no SOA. AOx4, 1LO2, SR, Assist x1/turn. Tubbs catheter in place and J/P. Amio gtt infusing. Patient in covid precautions. Will continue to monitor and assist patient as needed.  Goal: Patient-Specific Goal (Individualized)  Outcome: Ongoing, Progressing  Goal: Absence of Hospital-Acquired Illness or Injury  Outcome: Ongoing, Progressing  Intervention: Identify and Manage Fall Risk  Recent Flowsheet Documentation  Taken 9/10/2023 1800 by Héctor Rayo RN  Safety Promotion/Fall Prevention:   assistive device/personal items within reach   fall prevention program maintained   nonskid shoes/slippers when out of bed   safety round/check completed  Taken 9/10/2023 1712 by Héctor Rayo RN  Safety Promotion/Fall Prevention:   assistive device/personal items within reach   fall prevention program maintained   nonskid shoes/slippers when out of bed   safety round/check completed  Intervention: Prevent Skin Injury  Recent Flowsheet Documentation  Taken 9/10/2023 1800 by Héctor Rayo RN  Body Position:   left   tilted  Taken 9/10/2023 1712 by Héctor Rayo RN  Body Position:   left   tilted  Skin Protection: tubing/devices free from skin contact  Goal: Optimal Comfort and Wellbeing  Outcome: Ongoing, Progressing  Goal: Readiness for Transition of Care  Outcome: Ongoing, Progressing     Problem: Fall Injury Risk  Goal: Absence of Fall and Fall-Related Injury  Outcome: Ongoing, Progressing  Intervention: Promote Injury-Free Environment  Recent Flowsheet Documentation  Taken 9/10/2023 1800 by Héctor Rayo RN  Safety Promotion/Fall Prevention:   assistive device/personal items within reach   fall prevention  program maintained   nonskid shoes/slippers when out of bed   safety round/check completed  Taken 9/10/2023 1712 by Héctor Rayo RN  Safety Promotion/Fall Prevention:   assistive device/personal items within reach   fall prevention program maintained   nonskid shoes/slippers when out of bed   safety round/check completed     Problem: Pain Acute  Goal: Acceptable Pain Control and Functional Ability  Outcome: Ongoing, Progressing     Problem: Skin Injury Risk Increased  Goal: Skin Health and Integrity  Outcome: Ongoing, Progressing  Intervention: Optimize Skin Protection  Recent Flowsheet Documentation  Taken 9/10/2023 1800 by Héctor Rayo, RN  Head of Bed (HOB) Positioning: HOB at 30 degrees  Taken 9/10/2023 1712 by Héctor Rayo RN  Pressure Reduction Techniques:   frequent weight shift encouraged   weight shift assistance provided  Head of Bed (HOB) Positioning: HOB at 30 degrees  Pressure Reduction Devices: pressure-redistributing mattress utilized  Skin Protection: tubing/devices free from skin contact   Goal Outcome Evaluation:  Plan of Care Reviewed With: patient        Progress: improving  Outcome Evaluation: Patient has been pleasant and cooperative during shift. Dilaudid PCA for pain, no nausea, no SOA. AOx4, 1LO2, SR, Assist x1/turn. Tubbs catheter in place and J/P. Amio gtt infusing. Patient in covid precautions. Will continue to monitor and assist patient as needed.

## 2023-09-11 PROBLEM — E87.6 HYPOKALEMIA: Status: ACTIVE | Noted: 2023-09-11

## 2023-09-11 PROBLEM — C80.1 ADENOCARCINOMA: Status: ACTIVE | Noted: 2023-09-11

## 2023-09-11 PROBLEM — C16.9 GASTRIC ADENOCARCINOMA: Status: ACTIVE | Noted: 2023-09-11

## 2023-09-11 PROBLEM — I48.0 PAROXYSMAL A-FIB: Status: ACTIVE | Noted: 2023-09-11

## 2023-09-11 LAB
ALBUMIN SERPL-MCNC: 1.9 G/DL (ref 3.5–5.2)
ALBUMIN/GLOB SERPL: 0.7 G/DL
ALP SERPL-CCNC: 116 U/L (ref 39–117)
ALT SERPL W P-5'-P-CCNC: 9 U/L (ref 1–33)
ANION GAP SERPL CALCULATED.3IONS-SCNC: 7.5 MMOL/L (ref 5–15)
AST SERPL-CCNC: 23 U/L (ref 1–32)
BACTERIA UR QL AUTO: ABNORMAL /HPF
BILIRUB SERPL-MCNC: 0.3 MG/DL (ref 0–1.2)
BILIRUB UR QL STRIP: NEGATIVE
BUN SERPL-MCNC: 6 MG/DL (ref 8–23)
BUN/CREAT SERPL: 7.7 (ref 7–25)
CALCIUM SPEC-SCNC: 7.4 MG/DL (ref 8.6–10.5)
CHLORIDE SERPL-SCNC: 102 MMOL/L (ref 98–107)
CLARITY UR: CLEAR
CO2 SERPL-SCNC: 26.5 MMOL/L (ref 22–29)
COLOR UR: YELLOW
CREAT SERPL-MCNC: 0.78 MG/DL (ref 0.57–1)
DEPRECATED RDW RBC AUTO: 48.5 FL (ref 37–54)
EGFRCR SERPLBLD CKD-EPI 2021: 80.3 ML/MIN/1.73
ERYTHROCYTE [DISTWIDTH] IN BLOOD BY AUTOMATED COUNT: 13.8 % (ref 12.3–15.4)
GLOBULIN UR ELPH-MCNC: 2.6 GM/DL
GLUCOSE SERPL-MCNC: 74 MG/DL (ref 65–99)
GLUCOSE UR STRIP-MCNC: NEGATIVE MG/DL
HCT VFR BLD AUTO: 26.4 % (ref 34–46.6)
HGB BLD-MCNC: 8.9 G/DL (ref 12–15.9)
HGB UR QL STRIP.AUTO: NEGATIVE
HYALINE CASTS UR QL AUTO: ABNORMAL /LPF
KETONES UR QL STRIP: NEGATIVE
LAB AP CASE REPORT: NORMAL
LAB AP DIAGNOSIS COMMENT: NORMAL
LAB AP SYNOPTIC CHECKLIST: NORMAL
LEUKOCYTE ESTERASE UR QL STRIP.AUTO: ABNORMAL
MAGNESIUM SERPL-MCNC: 1.7 MG/DL (ref 1.6–2.4)
MAGNESIUM SERPL-MCNC: 2.6 MG/DL (ref 1.6–2.4)
MCH RBC QN AUTO: 32.6 PG (ref 26.6–33)
MCHC RBC AUTO-ENTMCNC: 33.7 G/DL (ref 31.5–35.7)
MCV RBC AUTO: 96.7 FL (ref 79–97)
NITRITE UR QL STRIP: NEGATIVE
PATH REPORT.ADDENDUM SPEC: NORMAL
PATH REPORT.FINAL DX SPEC: NORMAL
PATH REPORT.GROSS SPEC: NORMAL
PH UR STRIP.AUTO: 5.5 [PH] (ref 5–8)
PLATELET # BLD AUTO: 212 10*3/MM3 (ref 140–450)
PMV BLD AUTO: 10 FL (ref 6–12)
POTASSIUM SERPL-SCNC: 3.4 MMOL/L (ref 3.5–5.2)
PROT SERPL-MCNC: 4.5 G/DL (ref 6–8.5)
PROT UR QL STRIP: NEGATIVE
QT INTERVAL: 409 MS
QTC INTERVAL: 445 MS
RBC # BLD AUTO: 2.73 10*6/MM3 (ref 3.77–5.28)
RBC # UR STRIP: ABNORMAL /HPF
REF LAB TEST METHOD: ABNORMAL
SODIUM SERPL-SCNC: 136 MMOL/L (ref 136–145)
SP GR UR STRIP: 1.01 (ref 1–1.03)
SQUAMOUS #/AREA URNS HPF: ABNORMAL /HPF
STARCH GRANULES URNS QL MICRO: ABNORMAL /HPF
TRANS CELLS #/AREA URNS HPF: ABNORMAL /HPF
UROBILINOGEN UR QL STRIP: ABNORMAL
WBC # UR STRIP: ABNORMAL /HPF
WBC NRBC COR # BLD: 4.63 10*3/MM3 (ref 3.4–10.8)

## 2023-09-11 PROCEDURE — 97530 THERAPEUTIC ACTIVITIES: CPT

## 2023-09-11 PROCEDURE — 99233 SBSQ HOSP IP/OBS HIGH 50: CPT | Performed by: INTERNAL MEDICINE

## 2023-09-11 PROCEDURE — 99222 1ST HOSP IP/OBS MODERATE 55: CPT | Performed by: INTERNAL MEDICINE

## 2023-09-11 PROCEDURE — 99024 POSTOP FOLLOW-UP VISIT: CPT | Performed by: SURGERY

## 2023-09-11 PROCEDURE — 93010 ELECTROCARDIOGRAM REPORT: CPT | Performed by: INTERNAL MEDICINE

## 2023-09-11 PROCEDURE — 87077 CULTURE AEROBIC IDENTIFY: CPT | Performed by: STUDENT IN AN ORGANIZED HEALTH CARE EDUCATION/TRAINING PROGRAM

## 2023-09-11 PROCEDURE — 87186 SC STD MICRODIL/AGAR DIL: CPT | Performed by: STUDENT IN AN ORGANIZED HEALTH CARE EDUCATION/TRAINING PROGRAM

## 2023-09-11 PROCEDURE — 63710000001 PROMETHAZINE PER 25 MG: Performed by: INTERNAL MEDICINE

## 2023-09-11 PROCEDURE — 85027 COMPLETE CBC AUTOMATED: CPT | Performed by: INTERNAL MEDICINE

## 2023-09-11 PROCEDURE — 80053 COMPREHEN METABOLIC PANEL: CPT | Performed by: INTERNAL MEDICINE

## 2023-09-11 PROCEDURE — 87086 URINE CULTURE/COLONY COUNT: CPT | Performed by: STUDENT IN AN ORGANIZED HEALTH CARE EDUCATION/TRAINING PROGRAM

## 2023-09-11 PROCEDURE — 83735 ASSAY OF MAGNESIUM: CPT | Performed by: INTERNAL MEDICINE

## 2023-09-11 PROCEDURE — 81001 URINALYSIS AUTO W/SCOPE: CPT | Performed by: STUDENT IN AN ORGANIZED HEALTH CARE EDUCATION/TRAINING PROGRAM

## 2023-09-11 PROCEDURE — 93005 ELECTROCARDIOGRAM TRACING: CPT | Performed by: INTERNAL MEDICINE

## 2023-09-11 PROCEDURE — 25010000002 AMIODARONE IN DEXTROSE 5% 360-4.14 MG/200ML-% SOLUTION: Performed by: INTERNAL MEDICINE

## 2023-09-11 RX ORDER — POTASSIUM CHLORIDE 750 MG/1
40 TABLET, FILM COATED, EXTENDED RELEASE ORAL EVERY 4 HOURS
Status: COMPLETED | OUTPATIENT
Start: 2023-09-11 | End: 2023-09-11

## 2023-09-11 RX ORDER — CETIRIZINE HYDROCHLORIDE 10 MG/1
10 TABLET ORAL NIGHTLY
Status: DISCONTINUED | OUTPATIENT
Start: 2023-09-11 | End: 2023-09-15 | Stop reason: HOSPADM

## 2023-09-11 RX ADMIN — FAMOTIDINE 20 MG: 10 INJECTION INTRAVENOUS at 09:04

## 2023-09-11 RX ADMIN — FAMOTIDINE 20 MG: 10 INJECTION INTRAVENOUS at 22:01

## 2023-09-11 RX ADMIN — CETIRIZINE HYDROCHLORIDE 10 MG: 10 TABLET ORAL at 22:02

## 2023-09-11 RX ADMIN — POTASSIUM CHLORIDE 40 MEQ: 750 TABLET, EXTENDED RELEASE ORAL at 13:08

## 2023-09-11 RX ADMIN — OXYCODONE HYDROCHLORIDE 80 MG: 40 TABLET, FILM COATED, EXTENDED RELEASE ORAL at 22:01

## 2023-09-11 RX ADMIN — AMIODARONE HYDROCHLORIDE 0.5 MG/MIN: 1.8 INJECTION, SOLUTION INTRAVENOUS at 17:27

## 2023-09-11 RX ADMIN — OXYCODONE HYDROCHLORIDE 80 MG: 40 TABLET, FILM COATED, EXTENDED RELEASE ORAL at 09:03

## 2023-09-11 RX ADMIN — METOPROLOL TARTRATE 2.5 MG: 1 INJECTION, SOLUTION INTRAVENOUS at 13:18

## 2023-09-11 RX ADMIN — BACLOFEN 5 MG: 10 TABLET ORAL at 22:00

## 2023-09-11 RX ADMIN — Medication 1000 MCG: at 09:02

## 2023-09-11 RX ADMIN — Medication 10 ML: at 22:02

## 2023-09-11 RX ADMIN — Medication 2000 UNITS: at 09:04

## 2023-09-11 RX ADMIN — LEVOTHYROXINE SODIUM 100 MCG: 0.1 TABLET ORAL at 09:02

## 2023-09-11 RX ADMIN — Medication 250 MG: at 22:01

## 2023-09-11 RX ADMIN — ESCITALOPRAM OXALATE 10 MG: 10 TABLET, FILM COATED ORAL at 09:02

## 2023-09-11 RX ADMIN — MONTELUKAST SODIUM 10 MG: 10 TABLET, FILM COATED ORAL at 09:02

## 2023-09-11 RX ADMIN — ALPRAZOLAM 3 MG: 1 TABLET ORAL at 22:01

## 2023-09-11 RX ADMIN — Medication 1 TABLET: at 09:02

## 2023-09-11 RX ADMIN — Medication 250 MG: at 09:04

## 2023-09-11 RX ADMIN — Medication 10 ML: at 09:07

## 2023-09-11 RX ADMIN — PROMETHAZINE HYDROCHLORIDE 25 MG: 12.5 TABLET ORAL at 09:04

## 2023-09-11 RX ADMIN — METOPROLOL TARTRATE 25 MG: 25 TABLET, FILM COATED ORAL at 22:01

## 2023-09-11 RX ADMIN — GUAIFENESIN 600 MG: 600 TABLET, EXTENDED RELEASE ORAL at 22:01

## 2023-09-11 RX ADMIN — PROMETHAZINE HYDROCHLORIDE 25 MG: 12.5 TABLET ORAL at 22:01

## 2023-09-11 RX ADMIN — BACLOFEN 5 MG: 10 TABLET ORAL at 09:02

## 2023-09-11 RX ADMIN — POTASSIUM CHLORIDE 40 MEQ: 750 TABLET, EXTENDED RELEASE ORAL at 17:27

## 2023-09-11 RX ADMIN — BACLOFEN 5 MG: 10 TABLET ORAL at 14:46

## 2023-09-11 RX ADMIN — AMIODARONE HYDROCHLORIDE 0.5 MG/MIN: 1.8 INJECTION, SOLUTION INTRAVENOUS at 04:12

## 2023-09-11 RX ADMIN — RIVAROXABAN 20 MG: 20 TABLET, FILM COATED ORAL at 17:33

## 2023-09-11 NOTE — PROGRESS NOTES
Name: Anne Jewell ADMIT: 2023   : 1950  PCP: Jewell Stephens APRN    MRN: [id number] LOS: 17 days   AGE/SEX: 73 y.o. female  ROOM: [facility]     Subjective   Subjective   Up in chair. Feeling better. Pain controlled on current regimen. Tolerating liquids, feels hungry. No n/v. Afebrile. Denies chest pain. Has intermittent productive cough. On room air. Still has indwelling catheter       Objective   Objective   Vital Signs  Temp:  [97.5 °F (36.4 °C)-98.7 °F (37.1 °C)] 98.4 °F (36.9 °C)  Heart Rate:  [68-79] 77  Resp:  [16-18] 16  BP: ()/(50-85) 121/85  SpO2:  [93 %-100 %] 98 %  on  Flow (L/min):  [2] 2;   Device (Oxygen Therapy): nasal cannula  Body mass index is 35.69 kg/m².  Physical Exam  Vitals and nursing note reviewed.   Constitutional:       General: She is not in acute distress.     Appearance: She is ill-appearing. She is not toxic-appearing.   HENT:      Head: Normocephalic.      Mouth/Throat:      Mouth: Mucous membranes are moist.   Eyes:      Conjunctiva/sclera: Conjunctivae normal.   Cardiovascular:      Rate and Rhythm: Normal rate and regular rhythm.   Pulmonary:      Effort: Pulmonary effort is normal. No respiratory distress.      Comments: Rales present, improved w/cough  On room air  Abdominal:      General: Bowel sounds are normal.      Palpations: Abdomen is soft.      Comments: AFIA drain w/serosang drainage  Abd dressing CDI   Musculoskeletal:      Cervical back: Neck supple.      Right lower leg: Edema present.      Left lower leg: Edema present.      Comments: Trace   Skin:     General: Skin is warm and dry.   Neurological:      Mental Status: She is alert and oriented to person, place, and time.   Psychiatric:         Mood and Affect: Mood normal.         Behavior: Behavior normal.     Results Review     I reviewed the patient's new clinical results.  Results from last 7 days   Lab Units 23  0812 09/10/23  0140 23  1654 23  0501   WBC 10*3/mm3  4.63 8.33 12.44* 15.66*   HEMOGLOBIN g/dL 8.9* 9.3* 11.3* 10.0*   PLATELETS 10*3/mm3 212 323 432 367     Results from last 7 days   Lab Units 09/11/23  0606 09/10/23  0140 09/09/23  1622 09/09/23  0501   SODIUM mmol/L 136 135* 135* 139   POTASSIUM mmol/L 3.4* 3.9 4.9  5.0 5.3*   CHLORIDE mmol/L 102 103 103 106   CO2 mmol/L 26.5 25.0 21.0* 25.3   BUN mg/dL 6* 9 8 5*   CREATININE mg/dL 0.78 0.84 0.98 0.87   GLUCOSE mg/dL 74 88 88 96   EGFR mL/min/1.73 80.3 73.5 61.5 70.9     Results from last 7 days   Lab Units 09/11/23  0606 09/10/23  0140 09/09/23  1622 09/09/23  0501   ALBUMIN g/dL 1.9* 1.8* 2.0* 2.0*   BILIRUBIN mg/dL 0.3 0.3 0.5 0.4   ALK PHOS U/L 116 141* 177* 159*   AST (SGOT) U/L 23 20 43* 26   ALT (SGPT) U/L 9 8 11 11     Results from last 7 days   Lab Units 09/11/23  0606 09/10/23  0140 09/09/23  1622 09/09/23  0501 09/07/23  0415 09/06/23  0601 09/05/23  2038   CALCIUM mg/dL 7.4* 7.4* 7.9* 7.7*   < > 7.9* 7.7*   ALBUMIN g/dL 1.9* 1.8* 2.0* 2.0*   < > 1.7* 2.0*   MAGNESIUM mg/dL  --   --  1.9  --   --  2.4 1.1*   PHOSPHORUS mg/dL  --   --  3.3  --   --   --   --     < > = values in this interval not displayed.     Results from last 7 days   Lab Units 09/09/23  1654   LACTATE mmol/L 1.3     Glucose   Date/Time Value Ref Range Status   09/09/2023 2022 89 70 - 130 mg/dL Final   09/09/2023 1609 89 70 - 130 mg/dL Final   09/09/2023 1116 91 70 - 130 mg/dL Final       No radiology results for the last day    I have personally reviewed all medications:  Scheduled Medications  baclofen, 5 mg, Oral, TID  cholecalciferol, 2,000 Units, Oral, Daily  escitalopram, 10 mg, Oral, Daily  famotidine, 20 mg, Intravenous, Q12H  guaiFENesin, 600 mg, Oral, Nightly  levothyroxine, 100 mcg, Oral, Q AM  montelukast, 10 mg, Oral, Daily  multivitamin, 1 tablet, Oral, Daily  oxyCODONE, 80 mg, Oral, Q12H  potassium chloride ER, 40 mEq, Oral, Q4H  promethazine, 25 mg, Oral, BID  saccharomyces boulardii, 250 mg, Oral, BID  sodium  chloride, 10 mL, Intravenous, Q12H  vitamin B-12, 1,000 mcg, Oral, Daily    Infusions  amiodarone, 0.5 mg/min, Last Rate: 0.5 mg/min (09/11/23 0412)  sodium chloride, 30 mL/hr, Last Rate: 9 mL/hr (09/04/23 0900)  sodium chloride, 30 mL/hr    Diet  Diet: Gastrointestinal Diets; Fiber-Restricted; Texture: Soft to Chew (NDD 3); Soft to Chew: Chopped Meat; Fluid Consistency: Thin (IDDSI 0)    I have personally reviewed:  [x]  Laboratory   [x]  Microbiology   [x]  Radiology   [x]  EKG/Telemetry  [x]  Cardiology/Vascular   []  Pathology    []  Records       Assessment/Plan     Active Hospital Problems    Diagnosis  POA    **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       73 y.o. female admitted with Gastric adenocarcinoma.    Choledocholithiasis  History of gastric bypass  Elevated LFTs  Fungating stomach mass/adenocarcinoma  -MRCP showing large stone in the distal common bile duct, 1.8 cm.  Intrahepatic biliary dilatation.  Marked biliary dilatation with CBD measuring 2.1 cm  -S/P IV ceftriaxone, metronidazole  -ERCP via gastrotomy with Surgery with biliary sphincterectomy and balloon extraction  -Fungating gastric mass which was suspicious for benign lesion per surgery, but pathology consistent with adenocarcinoma  - S/P exp lap w/gastrojejunostomy 9/8/23   -Surgery advancing diet  -Pain controlled on current regimen  -Consult Hem/Onc     Opioid induced constipation  - Was on amitiza but stopped due to frequent  stools. PRN regimen in place     Vaginal yeast infection  -Diflucan x2 doses,  completed     COVID-19  Fever, resolved  -Blood cultures with no growth   -Chest x-ray without any abnormalities.    -Completed 3 days of remdesivir  - asymptomatic. Encourage OPAP and IS. On room air  -Still testing positive as of 9/8; isolation continues     History of pulmonary pulmonary embolism.  Diagnosed in 2007, has been on anticoagulation since.  At home takes Xarelto   -Xarelto has been held for OR; defer to Surgery when to restart     Hypothyroidism: levothyroxine, recent TSH normal     CKD stage IIIa: Creatinine stable, monitor.  Avoid nephrotoxic drugs.     Chronic pain syndrome/chronic arthritis: Bob reviewed.  Oxycodone extended release to 80 mg twice daily scheduled.  Oxycodone extended release to 80 mg twice a day as needed as well.  Continue home alprazolam 3 mg nightly     History of RA: Not on treatment for RA per chart review, reports she was diagnosed but was never on specific treatment.     Memory loss: Daughter states she has noticed memory changes for the last several months.  Was referred to neurology outpatient. Cognition/memory at baseline.  Follow-up as outpatient     Abnormal liver on MRI: Repeat CT abdomen with liver protocol in 2 to 3 months     Acute on chronic anemia-   On iron at home, iron stores low. Also mixed anemia. Holding po iron given recent surgery. Hgb stable, continue to monitor     Paroxysmal afib:  -Cardiology following. Amiodarone gtt. Currently in SR    Hypokalemia:  -K 3.4; replete per protocol         SCDs for DVT prophylaxis/Xarelto has been on hold.  Full code.  Discussed with patient and family.  Anticipate discharge home with home health when cleared by consultants..      EUGENIO Gonzalez  [facility]  09/11/23  13:31 EDT

## 2023-09-11 NOTE — CONSULTS
Subjective     REASON FOR CONSULTATION:  Provide an opinion on any further workup or treatment on:    Gastric cancer                       REQUESTING PHYSICIAN: Leon Flower APRN    HISTORY OF PRESENT ILLNESS:      Anne Jewell is a 73 y.o. patient who was admitted on 8/25/2023.  Patient has history of gastric bypass surgery.  She had elevated liver function tests.  MRCP revealed a large stone in the distal common bile duct with intrahepatic biliary dilatation.  On 9/1/2023, she underwent exploratory laparotomy with gastric access for ERCP and distal gastrectomy.  She was found to have a fungating gastric mass.  It was initially considered to be possibly benign.  However, pathology exam came back positive for adenocarcinoma.     Patient underwent exploratory laparotomy with gastrojejunostomy on 9/8/2023.     Patient has history of pulmonary embolism.  He was diagnosed in 2007.  She has been on anticoagulation since that time.  She is on Xarelto which was held for surgery.      Patient is complaining of generalized weakness.  She is frustrated because she used to be very active.    Past Medical History:   Diagnosis Date    Allergic rhinitis     Arthritis of back     Arthritis of neck     Asthma     Bronchospasm     Carpal tunnel syndrome, bilateral     Cervical disc disorder     Clotting disorder     Deep vein thrombosis     Disc degeneration, lumbar     Edema     Esophageal reflux     Glaucoma     Hip arthrosis     Joint pain     Kidney disease 2018    stage 3    Liver disease 2018    stage 3    Low back pain     Osteoarthritis     Osteopenia     Osteoporosis     Periarthritis of shoulder     Scoliosis     Status post total knee replacement, left 08/31/2017    Status post total knee replacement, right 08/31/2017    UTI (urinary tract infection)     Venous thrombosis      Past Surgical History:   Procedure Laterality Date    BILATERAL BREAST REDUCTION      BREAST SURGERY      COLONOSCOPY  08/05/2016    ERCP N/A  8/30/2023    Procedure: ENDOSCOPIC RETROGRADE CHOLANGIOPANCREATOGRAPHY;  Surgeon: Elijah Simon MD;  Location: [facility] ENDOSCOPY;  Service: Gastroenterology;  Laterality: N/A;  cbd stone    ERCP N/A 9/1/2023    Procedure: ENDOSCOPIC RETROGRADE CHOLANGIOPANCREATOGRAPHY WITH CHOLANGIOGRAM, SPHINCTEROTOMY, BALLOON SWEEP;  Surgeon: Elijah Simon MD;  Location: [facility] MAIN OR;  Service: Gastroenterology;  Laterality: N/A;    EXPLORATORY LAPAROTOMY N/A 9/1/2023    Procedure: Exploratory laparotomy with Distal Gastrectomy;  Surgeon: Slava Loo Jr., MD;  Location: [facility] MAIN OR;  Service: General;  Laterality: N/A;    GASTRECTOMY N/A 9/8/2023    Procedure: EXPLORATORY LAPAROTOMY WITH GASTROJEJUNOSTOMY;  Surgeon: Slava Loo Jr., MD;  Location: [facility] MAIN OR;  Service: General;  Laterality: N/A;    GASTRIC BYPASS      HAND SURGERY Right 01/14/2016    HERNIA REPAIR      x7    HYSTERECTOMY      JOINT REPLACEMENT      Both knees    OTHER SURGICAL HISTORY      vaginal sling operation for stress incontinence    TOTAL KNEE ARTHROPLASTY Left     TOTAL KNEE ARTHROPLASTY Bilateral      SCHEDULED MEDS:  baclofen, 5 mg, Oral, TID  cetirizine, 10 mg, Oral, Nightly  cholecalciferol, 2,000 Units, Oral, Daily  escitalopram, 10 mg, Oral, Daily  famotidine, 20 mg, Intravenous, Q12H  guaiFENesin, 600 mg, Oral, Nightly  levothyroxine, 100 mcg, Oral, Q AM  montelukast, 10 mg, Oral, Daily  multivitamin, 1 tablet, Oral, Daily  oxyCODONE, 80 mg, Oral, Q12H  potassium chloride ER, 40 mEq, Oral, Q4H  promethazine, 25 mg, Oral, BID  saccharomyces boulardii, 250 mg, Oral, BID  sodium chloride, 10 mL, Intravenous, Q12H  vitamin B-12, 1,000 mcg, Oral, Daily      INFUSIONS:  amiodarone, 0.5 mg/min, Last Rate: 0.5 mg/min (09/11/23 0412)  sodium chloride, 30 mL/hr, Last Rate: 9 mL/hr (09/04/23 0900)  sodium chloride, 30 mL/hr      ALLERGIES:  Allergies   Allergen Reactions    Penicillins Hives      Social History     Socioeconomic  History    Marital status: Legally    Tobacco Use    Smoking status: Former     Passive exposure: Past    Smokeless tobacco: Never   Vaping Use    Vaping Use: Never used   Substance and Sexual Activity    Alcohol use: No    Drug use: No    Sexual activity: Defer     Family History   Problem Relation Age of Onset    Prostate cancer Father     Arthritis Other     Hypertension Other         benign essential    Cancer Other     Diabetes Other     Heart disease Other     Nephrolithiasis Other       REVIEW OF SYSTEMS:   GENERAL: Generalized weakness.   SKIN: Negative.  HEME/LYMPH: Anemia.  EYES:  Negative.  ENT:  Negative.  RESPIRATORY:  Negative.   CVS:  Negative.   GI: Abdominal pain.   :  Negative.   MUSCULOSKELETAL:  Negative.  NEUROLOGICAL:  Negative.  PSYCHIATRIC:  Negative.     Objective   VITAL SIGNS:  Temp:  [97.5 °F (36.4 °C)-98.7 °F (37.1 °C)] 98.4 °F (36.9 °C)  Heart Rate:  [68-88] 88  Resp:  [16-18] 16  BP: ()/(50-85) 115/71     Wt Readings from Last 3 Encounters:   09/11/23 85.7 kg (188 lb 14.4 oz)   08/24/23 82.6 kg (182 lb)   07/28/23 82.6 kg (182 lb 3.2 oz)     PHYSICAL EXAMINATION:   GENERAL:  The patient appears in fair general condition, not in acute distress.  SKIN: No skin rash. No ecchymosis.  HEAD:  Normocephalic.  EYES:  No Jaundice. Pallor. Pupils equal. EOMI.  NECK:  Supple. No Thyromegaly. No Masses.  LYMPHATICS:  No cervical or supraclavicular lymphadenopathy.  CHEST: Normal respiratory effort. Lungs clear to auscultation.  Scattered wheezes.  CARDIAC:  Normal S1 & S2. No murmur.   ABDOMEN: Abdominal incision is covered with dressing.  Abdomen is soft.  No rebound or rigidity.  AFIA drain in place.  EXTREMITIES:  No edema. No Calf tenderness.  NEUROLOGICAL:  No Focal neurological deficits.     RESULT REVIEW:   Results from last 7 days   Lab Units 09/11/23  0812 09/10/23  0140 09/09/23  1654 09/09/23  0501 09/08/23  0437   WBC 10*3/mm3 4.63 8.33 12.44* 15.66* 5.16   NEUTROS ABS  10*3/mm3  --   --  9.48*  --   --    HEMOGLOBIN g/dL 8.9* 9.3* 11.3* 10.0* 9.9*   HEMATOCRIT % 26.4* 29.1* 34.5 30.4* 29.9*   PLATELETS 10*3/mm3 212 323 432 367 258     Results from last 7 days   Lab Units 09/11/23  0606 09/10/23  0140 09/09/23  1622 09/09/23  0501 09/08/23  0437 09/07/23  0415 09/06/23  0601 09/05/23 2038   SODIUM mmol/L 136 135* 135* 139 138   < > 135* 139   POTASSIUM mmol/L 3.4* 3.9 4.9  5.0 5.3* 4.2   < > 4.3 3.6   CHLORIDE mmol/L 102 103 103 106 106   < > 101 101   CO2 mmol/L 26.5 25.0 21.0* 25.3 24.8   < > 22.0 24.0   BUN mg/dL 6* 9 8 5* 4*   < > 7* 7*   CREATININE mg/dL 0.78 0.84 0.98 0.87 0.86   < > 0.70 0.75   CALCIUM mg/dL 7.4* 7.4* 7.9* 7.7* 7.7*   < > 7.9* 7.7*   ALBUMIN g/dL 1.9* 1.8* 2.0* 2.0* 1.9*   < > 1.7* 2.0*   BILIRUBIN mg/dL 0.3 0.3 0.5 0.4 0.5   < > 0.4 0.4   ALK PHOS U/L 116 141* 177* 159* 184*   < > 244* 259*   ALT (SGPT) U/L 9 8 11 11 9   < > 11 11   AST (SGOT) U/L 23 20 43* 26 21   < > 39* 27   MAGNESIUM mg/dL  --   --  1.9  --   --   --  2.4 1.1*    < > = values in this interval not displayed.     Pathology exam from 9/1/2023:  1. Stomach, Distal, Partial Gastrectomy:               A. INVASIVE MODERATELY DIFFERENTIATED ADENOCARCINOMA.               B. Tumor size:  6.0 cm in greatest dimension.               C. Tumor invades muscularis mucosa.                D. Margins are negative for malignancy; Closest distance:  Tumor is present 2.5 cm from an unoriented       staple line margin.                 E. Negative for lymphovascular space invasion.               F. Negative for perineural invasion.               G. See synoptic report and comment.    Her-2 - Negative by IHC  PD-L1 - CPS 6 (positive)    Procedure  Partial gastrectomy, distal   TUMOR   Tumor Site  Not specified   Histologic Type  Adenocarcinoma   Adenocarcinoma Classification (based on WHO)  Tubular adenocarcinoma   Histologic Grade  G2, moderately differentiated   Tumor Size  Greatest Dimension (Centimeters):  6.0 cm   Tumor Extent  Invades muscularis mucosae   Treatment Effect  No known presurgical therapy   Lymphatic and / or Vascular Invasion  Not identified   Perineural Invasion  Not identified   MARGINS   Margin Status for Invasive Carcinoma  All margins negative for invasive carcinoma   Closest Margin(s) to Invasive Carcinoma  Cannot be determined: unoriented   Distance from Invasive Carcinoma to Closest Margin  2.5 cm   Margin Status for Dysplasia  All margins negative for dysplasia   REGIONAL LYMPH NODES   Regional Lymph Node Status  Not applicable (no regional lymph nodes submitted or found)   pTNM CLASSIFICATION (AJCC 8th Edition)   Reporting of pT, pN, and (when applicable) pM categories is based on information available to the pathologist at the time the report is issued. As per the AJCC (Chapter 1, 8th Ed.) it is the managing physician’s responsibility to establish the final pathologic stage based upon all pertinent information, including but potentially not limited to this pathology report.   pT Category  pT1a   pN Category  pN not assigned (no nodes submitted or found)   ADDITIONAL FINDINGS   Additional Findings  High-grade dysplasia     Assessment & Plan   *Gastric adenocarcinoma.    Patient was found to have elevated liver function tests.    MRCP revealed a large stone in the distal common bile duct with intrahepatic biliary dilatation.    On 9/1/2023, she underwent exploratory laparotomy with gastric access for ERCP and distal gastrectomy.    She was found to have a fungating gastric mass which was initially felt to be benign  However, pathology exam came back positive for adenocarcinoma.   She underwent exploratory laparotomy with gastrojejunostomy on 9/8/2023.   Pathology exam revealed a 6 cm tumor invading muscularis mucosa.  Margins were negative with the closest being 2.5 cm.  No angiolymphatic invasion.  No perineural invasion.  No lymph nodes were present for evaluation.  Her-2 - Negative by  IHC  PD-L1 - CPS 6 (positive)  I explained the findings pathology exam to the patient and her daughter.  Since the status of the lymph nodes is not clear, recommended obtaining a PET scan in 4-6 weeks.  If the adjacent lymph nodes are hypermetabolic, I would recommend postoperative chemoradiation and chemotherapy if her performance status improves.  If her performance status does not improve, she would not be able to tolerate adjuvant therapy and we will monitor.  If the PET scan is negative, we will monitor with follow-up imaging studies..    *Macrocytic anemia.  9/11/2023: Hemoglobin 8.9.  MCV 96.7.  Because of the patient's prior gastric bypass surgery, she is at increased risk for iron and for vitamin B-12 deficiency.  She is taking oral vitamin B12 daily regularly.     *COVID infection.  This may have resulted in an increase in the ferritin level.    PLAN:    1.  Obtain ferritin, iron panel, vitamin B12, folate and methylmalonic acid levels.  2.  Obtain CEA and CA 19-9 level.  3.  Obtain PET scan in 4-6 weeks.    Discussed with the daughter at bedside.      Camila Lazaro MD  09/11/23

## 2023-09-11 NOTE — DISCHARGE PLACEMENT REQUEST
"Theresa Khan (73 y.o. Female)       Date of Birth   1950    Social Security Number       Address   [address] [place] [address]    Home Phone   [phone]    MRN   [id number]       [place]   Non-Rastafari    Marital Status   Legally                             Admission Date   8/25/23    Admission Type   Urgent    Admitting Provider   Darrel Alvarez MD    Attending Provider   Edilberto Meza MD    Department, Room/Bed   [address], [address]/1       Discharge Date       Discharge Disposition       Discharge Destination                                 Attending Provider: Edilberto Meza MD    Allergies: Penicillins    Isolation: Enh Drop/Con   Infection: COVID (confirmed) (08/27/23)   Code Status: CPR    Ht: 154.9 cm (61\")   Wt: 85.7 kg (188 lb 14.4 oz)    Admission Cmt: None   Principal Problem: Gastric adenocarcinoma [C16.9]                   Active Insurance as of 8/25/2023       Primary Coverage       Payor Plan Insurance Group Employer/Plan Group    MEDICARE MEDICARE A & B        Payor Plan Address Payor Plan Phone Number Payor Plan Fax Number Effective Dates    [address] [phone]  5/1/2004 - None Entered    [facility] [id number]         Subscriber Name Subscriber Birth Date Member ID       THERESA KHAN 1950 [id number]               Secondary Coverage       Payor Plan Insurance Group Employer/Plan Group    [facility] SUPP [id number]       Payor Plan Address Payor Plan Phone Number Payor Plan Fax Number Effective Dates    [address]   12/1/2016 - None Entered    [facility] [id number]         Subscriber Name Subscriber Birth Date Member ID       THERESA KHAN 1950 [id number]                     Emergency Contacts        (Rel.) Home Phone Work Phone Mobile Phone    JERRELL CORTES (Daughter) [phone] -- --                "

## 2023-09-11 NOTE — CASE MANAGEMENT/SOCIAL WORK
Continued Stay Note  [facility]     Patient Name: Anne Jewell  MRN: [id number]  Today's Date: 9/11/2023    Admit Date: 8/25/2023    Plan: Home w/ [facility] & 24/7 assist from family & wheelchair   Discharge Plan       Row Name 09/11/23 1401       Plan    Plan Home w/ [facility] HH & 24/7 assist from family & wheelchair    Plan Comments CCP spoke with patient's daughter over the phone regarding dc plan. Plan remains for patient to dc home w/ [facility] HH & 24/7 assist from family. Daughter confirms family is with her 24/7. Daughter is requesting a wheelchair and has no preference on supply company. CCP sent referral to [employer] and requested order from NP. CCP requested wheelchair verbiage from NP. GAYATHRI, JASONW                   Discharge Codes    No documentation.                 Expected Discharge Date and Time       Expected Discharge Date Expected Discharge Time    Sep 12, 2023               DANIEL Leary

## 2023-09-11 NOTE — PROGRESS NOTES
[facility]  Progress note: 2023    Patient Identification:  Name:Anne Jewell  Age:73 y.o.  Sex: female  :  1950  MRN: [id number]           CC:  Paroxysmal atrial fibrillation.  Maintaining sinus rhythm    Interval history:  Significant urine output overnight.  Patient had been in sinus rhythm until around 1230 when she developed atrial fibrillation with rapid rates as well as an episode of wide-complex tachycardia possibly ventricular tachycardia though.  She could not be ruled out she was symptomatic    Vital Signs:   Temp:  [97.5 °F (36.4 °C)-98.7 °F (37.1 °C)] 98.2 °F (36.8 °C)  Heart Rate:  [68-85] 77  Resp:  [16-18] 16  BP: ()/(50-81) 92/50    Intake/Output Summary (Last 24 hours) at 2023 1015  Last data filed at 2023 0930  Gross per 24 hour   Intake 296.73 ml   Output 2635 ml   Net -2338.27 ml       Physical Examination:    General Appearance No acute distress   Neck No adenopathy, supple, trachea midline, no thyromegaly, no carotid bruit, no JVD   Lungs Clear to auscultation,respirations regular, even and unlabored   Heart irregular rhythm with tachycardia, normal S1 and S2, no murmur, no gallop, no rub, no click   Chest wall No abnormalities observed   Abdomen Normal bowel sounds, no masses, no hepatomegaly, soft   Extremities Moves all extremities well, no edema, no cyanosis, no redness   Neurological Alert and oriented x 3     Lab Review:  Personally reviewed the labs, radiology imaging and other cardiac procedures.   Results from last 7 days   Lab Units 23  0606   SODIUM mmol/L 136   POTASSIUM mmol/L 3.4*   CHLORIDE mmol/L 102   CO2 mmol/L 26.5   BUN mg/dL 6*   CREATININE mg/dL 0.78   CALCIUM mg/dL 7.4*   BILIRUBIN mg/dL 0.3   ALK PHOS U/L 116   ALT (SGPT) U/L 9   AST (SGOT) U/L 23   GLUCOSE mg/dL 74     Results from last 7 days   Lab Units 23  1857 23  1622   HSTROP T ng/L 28* 27*     Results from last 7 days   Lab Units 23  0812  09/10/23  0140 09/09/23  1654   WBC 10*3/mm3 4.63 8.33 12.44*   HEMOGLOBIN g/dL 8.9* 9.3* 11.3*   HEMATOCRIT % 26.4* 29.1* 34.5   PLATELETS 10*3/mm3 212 323 432         Medication Review:   Meds reviewed  Scheduled Meds:baclofen, 5 mg, Oral, TID  cholecalciferol, 2,000 Units, Oral, Daily  escitalopram, 10 mg, Oral, Daily  famotidine, 20 mg, Intravenous, Q12H  guaiFENesin, 600 mg, Oral, Nightly  levothyroxine, 100 mcg, Oral, Q AM  montelukast, 10 mg, Oral, Daily  multivitamin, 1 tablet, Oral, Daily  oxyCODONE, 80 mg, Oral, Q12H  promethazine, 25 mg, Oral, BID  saccharomyces boulardii, 250 mg, Oral, BID  sodium chloride, 10 mL, Intravenous, Q12H  vitamin B-12, 1,000 mcg, Oral, Daily      Continuous Infusions:amiodarone, 0.5 mg/min, Last Rate: 0.5 mg/min (09/11/23 0412)  sodium chloride, 30 mL/hr, Last Rate: 9 mL/hr (09/04/23 0900)  sodium chloride, 30 mL/hr      I personally viewed and interpreted the patient's EKG/Telemetry data    Assessment and Plan  1.  Paroxysmal atrial fibrillation with rapid rates with apparent left bundle artis block type pattern and not felt to be due to ventricular tachycardia.  She was asymptomatic.  No anticoagulation due to recent surgery.  Echo shows ejection fraction 60 to 65% with no significant valvular dysfunction.  She remains on IV amiodarone.  She had been in sinus rhythm 30 minutes prior to my arrival.  Blood pressure currently is 126/78.  Heart rate in the 120s to 130s.  We will give her 2.5 mg of Lopressor IV, replete potassium and depending on results continue with p.o. metoprolol and/or digoxin.  Consider switching to oral amiodarone tomorrow.  2.  Wide-complex tachycardia.  This is still most consistent with aberrancy though cannot rule out ventricular tachycardia.  We will add beta-blocker therapy continue amiodarone and treat hypokalemia  3.  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  SP A/P second exploratory lap and gastrojejunostomy 9/7/2023  5.   Chronic renal insufficiency  6.  Active COVID-19 infection  7.  Rheumatoid arthritis  8.  Hypoalbuminemia  9.  Choledocholithiasis  10.  Hypothyroidism  11.  Hypokalemia.  We will place on protocol especially she is on IV amiodarone.    Addendum.  She converted to sinus rhythm and blood pressure remained stable.  We will add low-dose Lopressor.    Marion Mcnally  9/11/202310:15 EDT  35min spent in reviewing records, discussion and examination of the patient and discussion with other members of the patient's medical team.     Dictated utilizing Dragon dictation

## 2023-09-11 NOTE — PLAN OF CARE
Goal Outcome Evaluation:  Plan of Care Reviewed With: patient        Progress: no change  Outcome Evaluation: Pt sat up in r/c with PT , raymon well, schedule pain  med given for c/o abd and back pain. Tubbs cath d/c this shift, urine spec obtained via in and out cath, pt raymon well. had vtach spisode in monitor , hr 140-150 while pt in chair, Dr. Mcnally in the unit and order for metoprolol iv given per order, current hr sv -sr 80bpm. nad, amiodarone drip continous at 05mg/hr, bp monitored and is stable, raymon gi soft diet fair, no nausea, no bm today, voiding monitored. Had tearful moments this shift, pt encourage and reassurance given, daughter, who's a nurse at [employer] and provides [facility] care for pt. dressing to abd changed , raymon well.

## 2023-09-11 NOTE — PROGRESS NOTES
Anne Jewell is a 72-year-old female is unable to safely use cane or walker due to her history of PE on Xarelto, CKD stage III, hypothyroidism, chronic pain syndrome, RA, generalized osteoarthritis, memory loss. This is causing pain and weakness allowing her not to be mobile. A standard wheelchair is needed to assist with daily living activities in the home. Patient has upper body strength, and the mental capability to propel the wheelchair inside the home.

## 2023-09-11 NOTE — PLAN OF CARE
Goal Outcome Evaluation:  Plan of Care Reviewed With: patient, daughter           Outcome Evaluation: Pt. is a 72 year old female patient with PMH of CKD, PE and RA, Postoperative day 3 s/p exploratory laparotomy with gastrojejunal anastomosis and postoperative day 8 s/p exploratory laparotomy with distal gastrectomy. Today, pt. is able to perform x10 BLE therex including supine ankle pumps, heel slides, hip ABD/ADD, GS, QS. Supine>sit with CGA. STS CGA with HHA x1, ambulate ~15 ft. with HHA to bedside chair CGA. No LOB noted, mildly unsteady. Pt. daughter supportive and helpful throughout. PT recommending home with 24/7 assist/ HH at d/c once medically stable.      Anticipated Discharge Disposition (PT): home with 24/7 care, home with home health

## 2023-09-11 NOTE — THERAPY TREATMENT NOTE
Patient Name: Anne Jewell  : 1950    MRN: [id number]                              Today's Date: 2023       Admit Date: 2023    Visit Dx:     ICD-10-CM ICD-9-CM   1. Choledocholithiasis  K80.50 574.50   2. Malignant neoplasm of pyloric antrum  C16.3 151.2     Patient Active Problem List   Diagnosis    Chronic pain syndrome    Rheumatoid arthritis    Osteoarthritis of knee    Generalized osteoarthritis    Degeneration of intervertebral disc of lumbar region    Neck pain    Lumbar radiculopathy    Atopic rhinitis    Anxiety    Diarrhea    Indigestion    Dysthymic disorder    Gastroesophageal reflux disease    Acquired hypothyroidism    Insomnia    Pernicious anemia    Osteoporosis    Scoliosis    Vasovagal syncope    Vitamin D deficiency    Trigger middle finger of right hand    Trigger ring finger of right hand    Trigger little finger of right hand    Encounter for long-term (current) use of high-risk medication    Coagulation disorder    Hyperglycemia    Joint pain    Status post total knee replacement, left    Left knee pain    Status post total knee replacement, right    Osteoarthritis of thumb    Trigger finger of all digits of right hand    Left hip pain    Primary osteoarthritis of both hips    Edema    Nausea    Irritable bowel syndrome with diarrhea    Stage 3 chronic kidney disease    Cough    Acute non-recurrent frontal sinusitis    Dysuria    History of DVT (deep vein thrombosis)    Routine general medical examination at a health care facility    Frequency of urination    Memory loss    Visual changes    Choledocholithiasis    History of pulmonary embolism    Malignant neoplasm of pyloric antrum     Past Medical History:   Diagnosis Date    Allergic rhinitis     Arthritis of back     Arthritis of neck     Asthma     Bronchospasm     Carpal tunnel syndrome, bilateral     Cervical disc disorder     Clotting disorder     Deep vein thrombosis     Disc degeneration, lumbar     Edema      Esophageal reflux     Glaucoma     Hip arthrosis     Joint pain     Kidney disease 2018    stage 3    Liver disease 2018    stage 3    Low back pain     Osteoarthritis     Osteopenia     Osteoporosis     Periarthritis of shoulder     Scoliosis     Status post total knee replacement, left 08/31/2017    Status post total knee replacement, right 08/31/2017    UTI (urinary tract infection)     Venous thrombosis      Past Surgical History:   Procedure Laterality Date    BILATERAL BREAST REDUCTION      BREAST SURGERY      COLONOSCOPY  08/05/2016    ERCP N/A 8/30/2023    Procedure: ENDOSCOPIC RETROGRADE CHOLANGIOPANCREATOGRAPHY;  Surgeon: Elijah Simon MD;  Location: [facility] ENDOSCOPY;  Service: Gastroenterology;  Laterality: N/A;  cbd stone    ERCP N/A 9/1/2023    Procedure: ENDOSCOPIC RETROGRADE CHOLANGIOPANCREATOGRAPHY WITH CHOLANGIOGRAM, SPHINCTEROTOMY, BALLOON SWEEP;  Surgeon: Elijah Simon MD;  Location: [facility] OR;  Service: Gastroenterology;  Laterality: N/A;    EXPLORATORY LAPAROTOMY N/A 9/1/2023    Procedure: Exploratory laparotomy with Distal Gastrectomy;  Surgeon: Slava Loo Jr., MD;  Location: [facility] OR;  Service: General;  Laterality: N/A;    GASTRECTOMY N/A 9/8/2023    Procedure: EXPLORATORY LAPAROTOMY WITH GASTROJEJUNOSTOMY;  Surgeon: Slava Loo Jr., MD;  Location: [facility] OR;  Service: General;  Laterality: N/A;    GASTRIC BYPASS      HAND SURGERY Right 01/14/2016    HERNIA REPAIR      x7    HYSTERECTOMY      JOINT REPLACEMENT      Both knees    OTHER SURGICAL HISTORY      vaginal sling operation for stress incontinence    TOTAL KNEE ARTHROPLASTY Left     TOTAL KNEE ARTHROPLASTY Bilateral       General Information       Row Name 09/11/23 1117          Physical Therapy Time and Intention    Document Type therapy note (daily note)  -ER     Mode of Treatment individual therapy;physical therapy  -ER       Row Name 09/11/23 1117          General Information    Patient Profile  Reviewed yes  -ER     Existing Precautions/Restrictions fall  AFIA drain  -ER     Barriers to Rehab none identified  -ER       Row Name 09/11/23 1117          Living Environment    People in Home child(yaima), adult;spouse  -ER       Row Name 09/11/23 1117          Home Main Entrance    Number of Stairs, Main Entrance other (see comments)  ramp  -ER       Row Name 09/11/23 1117          Cognition    Orientation Status (Cognition) oriented x 4  -ER       Row Name 09/11/23 1117          Safety Issues, Functional Mobility    Impairments Affecting Function (Mobility) balance;coordination;endurance/activity tolerance;pain;strength  -ER               User Key  (r) = Recorded By, (t) = Taken By, (c) = Cosigned By      Initials Name Provider Type    ER Clotilde Kamara, PT Physical Therapist                   Mobility       Row Name 09/11/23 1118          Bed Mobility    Bed Mobility sit-supine;rolling left;rolling right  -ER     Rolling Left [place] (Bed Mobility) modified independence  -ER     Rolling Right [place] (Bed Mobility) modified independence  -ER     Supine-Sit [place] (Bed Mobility) contact guard;verbal cues  -ER     Assistive Device (Bed Mobility) head of bed elevated;bed rails  -ER     Comment, (Bed Mobility) Pt. uses bedrail to pull herself up to sitting, as well as hugs pillow for comfort. Pt. encouraged to use log roll method prior to coming to sit to proctect abdomen.  -ER       Row Name 09/11/23 1118          Sit-Stand Transfer    Sit-Stand [place] (Transfers) contact guard  -ER     Assistive Device (Sit-Stand Transfers) other (see comments)  HHA x1  -ER       Row Name 09/11/23 1118          Gait/Stairs (Locomotion)    [place] Level (Gait) contact guard;verbal cues  -ER     Assistive Device (Gait) other (see comments)  HHA  -ER     Patient was able to Ambulate yes  -ER     Distance in Feet (Gait) 15  -ER     Deviations/Abnormal Patterns (Gait) kristi  decreased;festinating/shuffling;stride length decreased  -ER     Bilateral Gait Deviations forward flexed posture  -ER     Comment, (Gait/Stairs) Pt. with mild unsteadiness however no loss of balance  -ER               User Key  (r) = Recorded By, (t) = Taken By, (c) = Cosigned By      Initials Name Provider Type    ER Clotilde Kamara PT Physical Therapist                   Obj/Interventions       Row Name 09/11/23 1120          Balance    Balance Assessment sitting static balance;sitting dynamic balance;sit to stand dynamic balance;standing static balance;standing dynamic balance  -ER     Static Sitting Balance independent  -ER     Dynamic Sitting Balance modified independence  -ER     Position, Sitting Balance unsupported;sitting edge of bed  -ER     Sit to Stand Dynamic Balance contact guard  -ER     Static Standing Balance standby assist  -ER     Dynamic Standing Balance standby assist  -ER     Position/Device Used, Standing Balance supported;other (see comments)  HHA  -ER     Balance Interventions sitting;standing;sit to stand;supported;static;dynamic  -ER               User Key  (r) = Recorded By, (t) = Taken By, (c) = Cosigned By      Initials Name Provider Type    ER Clotilde Kamara, PT Physical Therapist                   Goals/Plan       Row Name 09/11/23 1125          Bed Mobility Goal 1 (PT)    Activity/Assistive Device (Bed Mobility Goal 1, PT) bed mobility activities, all  -ER     [place] Level/Cues Needed (Bed Mobility Goal 1, PT) standby assist  -ER     Time Frame (Bed Mobility Goal 1, PT) 1 week  -ER       Row Name 09/11/23 1125          Transfer Goal 1 (PT)    Activity/Assistive Device (Transfer Goal 1, PT) transfers, all  -ER     [place] Level/Cues Needed (Transfer Goal 1, PT) standby assist  -ER     Time Frame (Transfer Goal 1, PT) 1 week  -ER       Row Name 09/11/23 112          Gait Training Goal 1 (PT)    Activity/Assistive Device (Gait Training Goal 1, PT) gait (walking locomotion)  -ER      [place] Level (Gait Training Goal 1, PT) contact guard required  -ER     Distance (Gait Training Goal 1, PT) 80ft  -ER     Time Frame (Gait Training Goal 1, PT) 1 week  -ER       Row Name 09/11/23 1125          Therapy Assessment/Plan (PT)    Planned Therapy Interventions (PT) balance training;bed mobility training;gait training;home exercise program;patient/family education;transfer training;strengthening;ROM (range of motion)  -ER               User Key  (r) = Recorded By, (t) = Taken By, (c) = Cosigned By      Initials Name Provider Type    ER Clotilde Kamara, PT Physical Therapist                   Clinical Impression       Row Name 09/11/23 1120          Pain    Pretreatment Pain Rating 0/10 - no pain  -ER     Posttreatment Pain Rating 0/10 - no pain  -ER     Pain Intervention(s) Repositioned;Rest;Ambulation/increased activity  -ER       Row Name 09/11/23 1120          Plan of Care Review    Plan of Care Reviewed With patient;daughter  -ER     Outcome Evaluation Pt. is a 72 year old female patient with PMH of CKD, PE and RA, Postoperative day 3 s/p exploratory laparotomy with gastrojejunal anastomosis and postoperative day 8 s/p exploratory laparotomy with distal gastrectomy. Today, pt. is able to perform x10 BLE therex including supine ankle pumps, heel slides, hip ABD/ADD, GS, QS. Supine>sit with CGA. STS CGA with HHA x1, ambulate ~15 ft. with HHA to bedside chair CGA. No LOB noted, mildly unsteady. Pt. daughter supportive and helpful throughout. PT recommending home with 24/7 assist/ HH at d/c once medically stable.  -ER       Row Name 09/11/23 1120          Therapy Assessment/Plan (PT)    Criteria for Skilled Interventions Met (PT) yes  -ER     Therapy Frequency (PT) 3 times/wk  -ER       Row Name 09/11/23 1120          Positioning and Restraints    Pre-Treatment Position in bed  -ER     Post Treatment Position chair  -ER     In Chair notified nsg;reclined;with family/caregiver;sitting;call light within  reach;encouraged to call for assist  -ER               User Key  (r) = Recorded By, (t) = Taken By, (c) = Cosigned By      Initials Name Provider Type    ER Clotilde Kamara PT Physical Therapist                   Outcome Measures       Row Name 09/11/23 1125          How much help from another person do you currently need...    Turning from your back to your side while in flat bed without using bedrails? 4  -ER     Moving from lying on back to sitting on the side of a flat bed without bedrails? 3  -ER     Moving to and from a bed to a chair (including a wheelchair)? 3  -ER     Standing up from a chair using your arms (e.g., wheelchair, bedside chair)? 3  -ER     Climbing 3-5 steps with a railing? 2  -ER     To walk in hospital room? 3  -ER     AM-PAC 6 Clicks Score (PT) 18  -ER     Highest level of mobility 6 --> Walked 10 steps or more  -ER       Row Name 09/11/23 1125          Functional Assessment    Outcome Measure Options AM-PAC 6 Clicks Basic Mobility (PT)  -ER               User Key  (r) = Recorded By, (t) = Taken By, (c) = Cosigned By      Initials Name Provider Type    ER Clotilde Kamara PT Physical Therapist                                 Physical Therapy Education       Title: PT OT SLP Therapies (Done)       Topic: Physical Therapy (Done)       Point: Mobility training (Done)       Learning Progress Summary             Patient Acceptance, E, VU by ER at 9/11/2023 1126    Acceptance, E,TB,D, VU,NR by  at 9/7/2023 1835    Acceptance, E,TB, VU,DU by JASON at 9/5/2023 1452    Acceptance, E,TB,D, VU,NR by  at 9/3/2023 1516    Acceptance, E, VU by  at 8/28/2023 1014   Family Acceptance, E,TB,D, VU,NR by  at 9/7/2023 1835                         Point: Home exercise program (Done)       Learning Progress Summary             Patient Acceptance, E, VU by ER at 9/11/2023 1126    Acceptance, E,TB,D, VU,NR by  at 9/7/2023 1835    Acceptance, E,TB, VU,DU by  at 9/5/2023 1452    Acceptance, E,TB,D, VU,NR by  at  9/3/2023 1516    Acceptance, E, VU by  at 8/28/2023 1014   Family Acceptance, E,TB,D, VU,NR by  at 9/7/2023 1835                         Point: Body mechanics (Done)       Learning Progress Summary             Patient Acceptance, E, VU by ER at 9/11/2023 1126    Acceptance, E,TB,D, VU,NR by  at 9/7/2023 1835    Acceptance, E,TB, VU,DU by  at 9/5/2023 1452    Acceptance, E,TB,D, VU,NR by  at 9/3/2023 1516    Acceptance, E, VU by  at 8/28/2023 1014   Family Acceptance, E,TB,D, VU,NR by  at 9/7/2023 1835                         Point: Precautions (Done)       Learning Progress Summary             Patient Acceptance, E, VU by ER at 9/11/2023 1126    Acceptance, E,TB,D, VU,NR by  at 9/7/2023 1835    Acceptance, E,TB, VU,DU by  at 9/5/2023 1452    Acceptance, E,TB,D, VU,NR by  at 9/3/2023 1516    Acceptance, E, VU by  at 8/28/2023 1014   Family Acceptance, E,TB,D, VU,NR by  at 9/7/2023 1835                                         User Key       Initials Effective Dates Name Provider Type Discipline     03/07/18 -  Sara Graf, PTA Physical Therapist Assistant PT     04/08/22 -  Margie Main, PT Physical Therapist PT     05/02/22 -  Dixie Butler, PT Physical Therapist PT     09/22/22 -  Anahy Johnson, PT Physical Therapist PT     06/26/23 -  Clotilde Kamara, PT Physical Therapist PT                  PT Recommendation and Plan  Planned Therapy Interventions (PT): balance training, bed mobility training, gait training, home exercise program, patient/family education, transfer training, strengthening, ROM (range of motion)  Plan of Care Reviewed With: patient, daughter  Outcome Evaluation: Pt. is a 72 year old female patient with PMH of CKD, PE and RA, Postoperative day 3 s/p exploratory laparotomy with gastrojejunal anastomosis and postoperative day 8 s/p exploratory laparotomy with distal gastrectomy. Today, pt. is able to perform x10 BLE therex including supine ankle pumps,  heel slides, hip ABD/ADD, GS, QS. Supine>sit with CGA. STS CGA with HHA x1, ambulate ~15 ft. with HHA to bedside chair CGA. No LOB noted, mildly unsteady. Pt. daughter supportive and helpful throughout. PT recommending home with 24/7 assist/ HH at d/c once medically stable.     Time Calculation:         PT Charges       Row Name 09/11/23 1126             Time Calculation    Start Time 0859  -ER      Stop Time 0947  -ER      Time Calculation (min) 48 min  -ER      PT Received On 09/11/23  -ER      PT - Next Appointment 09/13/23  -ER      PT Goal Re-Cert Due Date 09/18/23  -ER         Time Calculation- PT    Total Timed Code Minutes- PT 48 minute(s)  -ER         Timed Charges    98713 - PT Therapeutic Activity Minutes 48  -ER         Total Minutes    Timed Charges Total Minutes 48  -ER       Total Minutes 48  -ER                User Key  (r) = Recorded By, (t) = Taken By, (c) = Cosigned By      Initials Name Provider Type    ER Clotilde Kamara, PT Physical Therapist                  Therapy Charges for Today       Code Description Service Date Service Provider Modifiers Qty    [id number]  PT THERAPEUTIC ACT EA 15 MIN 9/11/2023 Clotilde Kamara, PT GP 3            PT G-Codes  Outcome Measure Options: AM-PAC 6 Clicks Basic Mobility (PT)  AM-PAC 6 Clicks Score (PT): 18  PT Discharge Summary  Anticipated Discharge Disposition (PT): home with 24/7 care, home with home health    Clotilde Kamara, NATHAN  9/11/2023

## 2023-09-11 NOTE — PROGRESS NOTES
"                                              LOS: 17 days   Patient Care Team:  Jewell Stephens APRN as PCP - General (Family Medicine)  Edilberto Lemus MD as Consulting Physician (Nephrology)  Rashaun Alvarado MD as Surgeon (Orthopedic Surgery)  Delma Montoya MD as Consulting Physician (Obstetrics and Gynecology)  Stuart Borges MD (Hematology and Oncology)  Isael Barker MD as Consulting Physician (Gastroenterology)  Haven Gr APRN as Nurse Practitioner (Nurse Practitioner)    Chief Complaint:  F/up tachyarrhythmia with large QRS complex.  Critical care management.  COVID infection and medical problems listed below    Subjective   Interval History  She had occasional episodes of desaturation.  When I saw her in the afternoon, her SPO2 was 91% on RA.  She had few arrhythmia throughout the day.  She reported mild cough with sputum.  No dyspnea at rest.    REVIEW OF SYSTEMS:   CARDIOVASCULAR: No chest pain, chest pressure or chest discomfort. No palpitations or edema.     GASTROINTESTINAL: No anorexia, nausea, vomiting or diarrhea. No abdominal pain.  CONSTITUTIONAL: No fever or chills.     Ventilator/Non-Invasive Ventilation Settings (From admission, onward)      None                  Physical Exam:     Vital Signs  Temp:  [97.5 °F (36.4 °C)-98.7 °F (37.1 °C)] 98.4 °F (36.9 °C)  Heart Rate:  [68-88] 88  Resp:  [16-18] 16  BP: ()/(50-85) 115/71    Intake/Output Summary (Last 24 hours) at 9/11/2023 1952  Last data filed at 9/11/2023 1315  Gross per 24 hour   Intake 240 ml   Output 2170 ml   Net -1930 ml       Flowsheet Rows      Flowsheet Row First Filed Value   Admission Height 154.9 cm (61\") Documented at 08/25/2023 1314   Admission Weight 81.6 kg (180 lb) Documented at 08/25/2023 1314            PPE used per hospital policy    General Appearance:   Alert, cooperative, in no acute distress   ENMT:  Mallampati score 3, moist mucous membrane   Eyes:  Pupils equal and reactive to " light. EOMI   Neck:   Trachea midline. No thyromegaly.   Lungs:   Coarse breath sounds bilaterally with mild crackles at the bases.  No wheezing.    Heart:   Regular rhythm and normal rate, normal S1 and S2, no         murmur   Skin:   No rash or ecchymosis   Abdomen:    Obese.  Abdominal incision noted.  Soft. No tenderness. No HSM.   Neuro/psych:  Conscious, alert, oriented x3. Strength 5/5 in upper and lower  ext.  Appropriate mood and affect   Extremities:  No cyanosis, clubbing but +2 edema.  Warm extremities and well-perfused          Results Review:        Results from last 7 days   Lab Units 09/11/23  0606 09/10/23  0140 09/09/23  1622   SODIUM mmol/L 136 135* 135*   POTASSIUM mmol/L 3.4* 3.9 4.9  5.0   CHLORIDE mmol/L 102 103 103   CO2 mmol/L 26.5 25.0 21.0*   BUN mg/dL 6* 9 8   CREATININE mg/dL 0.78 0.84 0.98   GLUCOSE mg/dL 74 88 88   CALCIUM mg/dL 7.4* 7.4* 7.9*       Results from last 7 days   Lab Units 09/09/23  1857 09/09/23  1622   HSTROP T ng/L 28* 27*       Results from last 7 days   Lab Units 09/11/23  0812 09/10/23  0140 09/09/23  1654   WBC 10*3/mm3 4.63 8.33 12.44*   HEMOGLOBIN g/dL 8.9* 9.3* 11.3*   HEMATOCRIT % 26.4* 29.1* 34.5   PLATELETS 10*3/mm3 212 323 432                             Results from last 7 days   Lab Units 09/09/23  1716   PH, ARTERIAL pH units 7.436   PCO2, ARTERIAL mm Hg 35.8   PO2 ART mm Hg 79.2*   O2 SATURATION ART % 96.1   MODALITY  Cannula           I reviewed the patient's new clinical results.        Medication Review:   baclofen, 5 mg, Oral, TID  cetirizine, 10 mg, Oral, Nightly  cholecalciferol, 2,000 Units, Oral, Daily  escitalopram, 10 mg, Oral, Daily  famotidine, 20 mg, Intravenous, Q12H  guaiFENesin, 600 mg, Oral, Nightly  levothyroxine, 100 mcg, Oral, Q AM  metoprolol tartrate, 25 mg, Oral, Q12H  montelukast, 10 mg, Oral, Daily  multivitamin, 1 tablet, Oral, Daily  oxyCODONE, 80 mg, Oral, Q12H  promethazine, 25 mg, Oral, BID  rivaroxaban, 20 mg, Oral, Daily  With Dinner  saccharomyces boulardii, 250 mg, Oral, BID  sodium chloride, 10 mL, Intravenous, Q12H  vitamin B-12, 1,000 mcg, Oral, Daily        amiodarone, 0.5 mg/min, Last Rate: 0.5 mg/min (09/11/23 1727)  sodium chloride, 30 mL/hr, Last Rate: 9 mL/hr (09/04/23 0900)  sodium chloride, 30 mL/hr          Assessment     Atrial fibrillation with RVR and LBBB  COVID-19 infection, no evidence of pneumonia  Mild hypotension, resolved.  Could be secondary to Cardizem +/- amiodarone.  Gastric adenocarcinoma s/p resection 9/1  Gastrojejunostomy 9/8  Choledocholithiasis  Electrolytes disturbance: Hyperkalemia and hypocalcemia     Mild anemia        Plan     Incentive spirometry.  Encouraged to use  Mucinex 600 mg twice daily  Oxygen by NC and titrate keep SPO2 >90%.  Suspect some atelectasis due to reduced mobilization.  Could check CXR if further desaturation.    Discussed with her daughter at bedside    Cheyenne Greene MD  09/11/23  19:52 EDT          This note was dictated utilizing Dragon dictation

## 2023-09-11 NOTE — PROGRESS NOTES
Chief Complaint:    S/P Exploratory laparotomy with distal gastrectomy, POD 8  S/P Exploratory laparotomy with gastrojejunostomy, POD 3    Subjective:    The patient is feeling well with only expected postop abdominal pain.  She is tolerating a full liquid diet with no nausea or vomiting and asking for more.    Objective:    Temp:  [97.5 °F (36.4 °C)-98.7 °F (37.1 °C)] 98.2 °F (36.8 °C)  Heart Rate:  [68-85] 77  Resp:  [16-18] 16  BP: ()/(50-81) 92/50    Physical Exam  Constitutional:       Appearance: She is not ill-appearing or toxic-appearing.   Abdominal:      Palpations: Abdomen is soft.      Tenderness: There is generalized abdominal tenderness (Appropriate postop tenderness).   Neurological:      Mental Status: She is alert.   Psychiatric:         Behavior: Behavior is cooperative.       Results:    WBC is 4.63.  H/H is 8.9/26.4.  Albumin is 1.9.    Impression/Plan:    The patient is POD 8 from an exploratory laparotomy with distal gastrectomy and POD 3 from exploratory laparotomy with gastrojejunostomy.  She is recovering well and I will advance her diet.  We will await full return of bowel function.    Slava Loo Jr., M.D.

## 2023-09-11 NOTE — PLAN OF CARE
"Goal Outcome Evaluation:  Plan of Care Reviewed With: patient      Pt A&Ox4. Pt on dilaudid pca at beginning of shift. Pt daughter states \"pt started saying odd things and was becoming loopy but remains oriented\" after being on the pca pump. Discussed this with LHA, pca pump d/c'd for now. Pt is on chronic pain meds at home which are scheduled and prn. Wound care, ivf's, amio gtt and IS continued. F/c is patent and draining. SR on the monitor. VSS. NAD noted. Will continue to monitor.               "

## 2023-09-12 LAB
ALBUMIN SERPL-MCNC: 1.7 G/DL (ref 3.5–5.2)
ALBUMIN/GLOB SERPL: 0.6 G/DL
ALP SERPL-CCNC: 120 U/L (ref 39–117)
ALT SERPL W P-5'-P-CCNC: 9 U/L (ref 1–33)
ANION GAP SERPL CALCULATED.3IONS-SCNC: 6 MMOL/L (ref 5–15)
AST SERPL-CCNC: 25 U/L (ref 1–32)
BILIRUB SERPL-MCNC: 0.3 MG/DL (ref 0–1.2)
BUN SERPL-MCNC: 5 MG/DL (ref 8–23)
BUN/CREAT SERPL: 6 (ref 7–25)
CALCIUM SPEC-SCNC: 7.6 MG/DL (ref 8.6–10.5)
CANCER AG19-9 SERPL-ACNC: 11.8 U/ML
CEA SERPL-MCNC: 1.63 NG/ML
CHLORIDE SERPL-SCNC: 106 MMOL/L (ref 98–107)
CO2 SERPL-SCNC: 26 MMOL/L (ref 22–29)
CREAT SERPL-MCNC: 0.83 MG/DL (ref 0.57–1)
DEPRECATED RDW RBC AUTO: 48.2 FL (ref 37–54)
EGFRCR SERPLBLD CKD-EPI 2021: 74.5 ML/MIN/1.73
ERYTHROCYTE [DISTWIDTH] IN BLOOD BY AUTOMATED COUNT: 13.8 % (ref 12.3–15.4)
FERRITIN SERPL-MCNC: 68.2 NG/ML (ref 13–150)
FOLATE SERPL-MCNC: 13.5 NG/ML (ref 4.78–24.2)
GLOBULIN UR ELPH-MCNC: 2.7 GM/DL
GLUCOSE SERPL-MCNC: 81 MG/DL (ref 65–99)
HCT VFR BLD AUTO: 26.8 % (ref 34–46.6)
HGB BLD-MCNC: 8.7 G/DL (ref 12–15.9)
IRON 24H UR-MRATE: 17 MCG/DL (ref 37–145)
IRON SATN MFR SERPL: 15 % (ref 20–50)
MCH RBC QN AUTO: 31.1 PG (ref 26.6–33)
MCHC RBC AUTO-ENTMCNC: 32.5 G/DL (ref 31.5–35.7)
MCV RBC AUTO: 95.7 FL (ref 79–97)
PLATELET # BLD AUTO: 254 10*3/MM3 (ref 140–450)
PMV BLD AUTO: 10 FL (ref 6–12)
POTASSIUM SERPL-SCNC: 3.7 MMOL/L (ref 3.5–5.2)
PROT SERPL-MCNC: 4.4 G/DL (ref 6–8.5)
QT INTERVAL: 444 MS
QTC INTERVAL: 483 MS
RBC # BLD AUTO: 2.8 10*6/MM3 (ref 3.77–5.28)
SODIUM SERPL-SCNC: 138 MMOL/L (ref 136–145)
TIBC SERPL-MCNC: 112 MCG/DL (ref 298–536)
TRANSFERRIN SERPL-MCNC: 75 MG/DL (ref 200–360)
VIT B12 BLD-MCNC: >2000 PG/ML (ref 211–946)
WBC NRBC COR # BLD: 4.75 10*3/MM3 (ref 3.4–10.8)

## 2023-09-12 PROCEDURE — 82746 ASSAY OF FOLIC ACID SERUM: CPT | Performed by: INTERNAL MEDICINE

## 2023-09-12 PROCEDURE — 82607 VITAMIN B-12: CPT | Performed by: INTERNAL MEDICINE

## 2023-09-12 PROCEDURE — 99233 SBSQ HOSP IP/OBS HIGH 50: CPT | Performed by: INTERNAL MEDICINE

## 2023-09-12 PROCEDURE — 99024 POSTOP FOLLOW-UP VISIT: CPT | Performed by: SURGERY

## 2023-09-12 PROCEDURE — 63710000001 DIPHENHYDRAMINE PER 50 MG: Performed by: INTERNAL MEDICINE

## 2023-09-12 PROCEDURE — 80053 COMPREHEN METABOLIC PANEL: CPT | Performed by: INTERNAL MEDICINE

## 2023-09-12 PROCEDURE — 93010 ELECTROCARDIOGRAM REPORT: CPT | Performed by: INTERNAL MEDICINE

## 2023-09-12 PROCEDURE — 63710000001 PROMETHAZINE PER 25 MG: Performed by: INTERNAL MEDICINE

## 2023-09-12 PROCEDURE — 86301 IMMUNOASSAY TUMOR CA 19-9: CPT | Performed by: INTERNAL MEDICINE

## 2023-09-12 PROCEDURE — 84466 ASSAY OF TRANSFERRIN: CPT | Performed by: INTERNAL MEDICINE

## 2023-09-12 PROCEDURE — 25010000002 NA FERRIC GLUC CPLX PER 12.5 MG: Performed by: INTERNAL MEDICINE

## 2023-09-12 PROCEDURE — 0 MAGNESIUM SULFATE 4 GM/100ML SOLUTION: Performed by: STUDENT IN AN ORGANIZED HEALTH CARE EDUCATION/TRAINING PROGRAM

## 2023-09-12 PROCEDURE — 82378 CARCINOEMBRYONIC ANTIGEN: CPT | Performed by: INTERNAL MEDICINE

## 2023-09-12 PROCEDURE — 93005 ELECTROCARDIOGRAM TRACING: CPT | Performed by: INTERNAL MEDICINE

## 2023-09-12 PROCEDURE — 85027 COMPLETE CBC AUTOMATED: CPT | Performed by: INTERNAL MEDICINE

## 2023-09-12 PROCEDURE — 83540 ASSAY OF IRON: CPT | Performed by: INTERNAL MEDICINE

## 2023-09-12 PROCEDURE — 99232 SBSQ HOSP IP/OBS MODERATE 35: CPT | Performed by: INTERNAL MEDICINE

## 2023-09-12 PROCEDURE — 83921 ORGANIC ACID SINGLE QUANT: CPT | Performed by: INTERNAL MEDICINE

## 2023-09-12 PROCEDURE — 25010000002 AMIODARONE IN DEXTROSE 5% 360-4.14 MG/200ML-% SOLUTION: Performed by: INTERNAL MEDICINE

## 2023-09-12 PROCEDURE — 82728 ASSAY OF FERRITIN: CPT | Performed by: INTERNAL MEDICINE

## 2023-09-12 PROCEDURE — 25010000002 ONDANSETRON PER 1 MG: Performed by: INTERNAL MEDICINE

## 2023-09-12 RX ORDER — DIPHENHYDRAMINE HCL 25 MG
25 CAPSULE ORAL DAILY
Status: COMPLETED | OUTPATIENT
Start: 2023-09-12 | End: 2023-09-15

## 2023-09-12 RX ORDER — AMIODARONE HYDROCHLORIDE 200 MG/1
200 TABLET ORAL
Status: DISCONTINUED | OUTPATIENT
Start: 2023-09-12 | End: 2023-09-15 | Stop reason: HOSPADM

## 2023-09-12 RX ORDER — MAGNESIUM SULFATE HEPTAHYDRATE 40 MG/ML
4 INJECTION, SOLUTION INTRAVENOUS ONCE
Status: COMPLETED | OUTPATIENT
Start: 2023-09-12 | End: 2023-09-12

## 2023-09-12 RX ADMIN — ESCITALOPRAM OXALATE 10 MG: 10 TABLET, FILM COATED ORAL at 09:18

## 2023-09-12 RX ADMIN — GUAIFENESIN 600 MG: 600 TABLET, EXTENDED RELEASE ORAL at 20:58

## 2023-09-12 RX ADMIN — Medication 250 MG: at 20:57

## 2023-09-12 RX ADMIN — BACLOFEN 5 MG: 10 TABLET ORAL at 09:19

## 2023-09-12 RX ADMIN — METOPROLOL TARTRATE 25 MG: 25 TABLET, FILM COATED ORAL at 20:59

## 2023-09-12 RX ADMIN — ONDANSETRON 4 MG: 2 INJECTION INTRAMUSCULAR; INTRAVENOUS at 13:06

## 2023-09-12 RX ADMIN — FAMOTIDINE 20 MG: 10 INJECTION INTRAVENOUS at 09:26

## 2023-09-12 RX ADMIN — AMIODARONE HYDROCHLORIDE 200 MG: 200 TABLET ORAL at 11:38

## 2023-09-12 RX ADMIN — Medication 250 MG: at 09:16

## 2023-09-12 RX ADMIN — MONTELUKAST SODIUM 10 MG: 10 TABLET, FILM COATED ORAL at 09:20

## 2023-09-12 RX ADMIN — Medication 10 ML: at 09:20

## 2023-09-12 RX ADMIN — ALPRAZOLAM 3 MG: 1 TABLET ORAL at 20:58

## 2023-09-12 RX ADMIN — FAMOTIDINE 20 MG: 10 INJECTION INTRAVENOUS at 20:59

## 2023-09-12 RX ADMIN — Medication 10 ML: at 20:59

## 2023-09-12 RX ADMIN — Medication 2000 UNITS: at 09:20

## 2023-09-12 RX ADMIN — AMIODARONE HYDROCHLORIDE 0.5 MG/MIN: 1.8 INJECTION, SOLUTION INTRAVENOUS at 04:57

## 2023-09-12 RX ADMIN — Medication 1000 MCG: at 09:19

## 2023-09-12 RX ADMIN — DIPHENHYDRAMINE HYDROCHLORIDE 25 MG: 25 CAPSULE ORAL at 14:43

## 2023-09-12 RX ADMIN — BACLOFEN 5 MG: 10 TABLET ORAL at 20:57

## 2023-09-12 RX ADMIN — CETIRIZINE HYDROCHLORIDE 10 MG: 10 TABLET ORAL at 20:57

## 2023-09-12 RX ADMIN — PROMETHAZINE HYDROCHLORIDE 25 MG: 12.5 TABLET ORAL at 20:58

## 2023-09-12 RX ADMIN — OXYCODONE HYDROCHLORIDE 80 MG: 40 TABLET, FILM COATED, EXTENDED RELEASE ORAL at 09:18

## 2023-09-12 RX ADMIN — METOPROLOL TARTRATE 25 MG: 25 TABLET, FILM COATED ORAL at 09:16

## 2023-09-12 RX ADMIN — SODIUM CHLORIDE 250 MG: 9 INJECTION, SOLUTION INTRAVENOUS at 14:41

## 2023-09-12 RX ADMIN — Medication 1 TABLET: at 09:20

## 2023-09-12 RX ADMIN — BACLOFEN 5 MG: 10 TABLET ORAL at 14:41

## 2023-09-12 RX ADMIN — MAGNESIUM SULFATE HEPTAHYDRATE 4 G: 40 INJECTION, SOLUTION INTRAVENOUS at 04:40

## 2023-09-12 RX ADMIN — LEVOTHYROXINE SODIUM 100 MCG: 0.1 TABLET ORAL at 09:17

## 2023-09-12 RX ADMIN — OXYCODONE HYDROCHLORIDE 80 MG: 40 TABLET, FILM COATED, EXTENDED RELEASE ORAL at 20:58

## 2023-09-12 RX ADMIN — PROMETHAZINE HYDROCHLORIDE 25 MG: 12.5 TABLET ORAL at 09:19

## 2023-09-12 RX ADMIN — RIVAROXABAN 20 MG: 20 TABLET, FILM COATED ORAL at 17:40

## 2023-09-12 NOTE — PROGRESS NOTES
Subjective     CHIEF COMPLAINT:     Gastric cancer  Anemia    INTERVAL HISTORY:     Patient reports developing nausea and vomiting.  She reports some abdominal discomfort.  She continues to feel weak.    REVIEW OF SYSTEMS:  A comprehensive review of systems was obtained with pertinent positive findings as noted in the interval history above.  All other systems negative.    SCHEDULED MEDS:  amiodarone, 200 mg, Oral, Q24H  baclofen, 5 mg, Oral, TID  cetirizine, 10 mg, Oral, Nightly  cholecalciferol, 2,000 Units, Oral, Daily  escitalopram, 10 mg, Oral, Daily  famotidine, 20 mg, Intravenous, Q12H  guaiFENesin, 600 mg, Oral, Nightly  levothyroxine, 100 mcg, Oral, Q AM  metoprolol tartrate, 25 mg, Oral, Q12H  montelukast, 10 mg, Oral, Daily  multivitamin, 1 tablet, Oral, Daily  oxyCODONE, 80 mg, Oral, Q12H  promethazine, 25 mg, Oral, BID  rivaroxaban, 20 mg, Oral, Daily With Dinner  saccharomyces boulardii, 250 mg, Oral, BID  sodium chloride, 10 mL, Intravenous, Q12H  vitamin B-12, 1,000 mcg, Oral, Daily      INFUSIONS:  sodium chloride, 30 mL/hr, Last Rate: 9 mL/hr (09/04/23 0900)  sodium chloride, 30 mL/hr      Objective   VITAL SIGNS:  Temp:  [97.9 °F (36.6 °C)-98.4 °F (36.9 °C)] 98.1 °F (36.7 °C)  Heart Rate:  [65-88] 75  Resp:  [16] 16  BP: ()/(51-85) 125/74     PHYSICAL EXAMINATION:  GENERAL:  The patient appears in fair general condition, not in acute distress.  SKIN: No skin rash. No ecchymosis.   HEAD:  Normocephalic.  EYES:  No Jaundice. Pallor.   NECK:  Supple. No Masses.  CHEST: Normal respiratory effort.  CARDIAC: No edema.  ABDOMEN: Abdomen incision is covered with dressing.  EXTREMITIES: No calf tenderness.  PSYCH: Normal mood and affect.     RESULT REVIEW:   Results from last 7 days   Lab Units 09/12/23  0629 09/11/23  0812 09/10/23  0140 09/09/23  1654 09/09/23  0501   WBC 10*3/mm3 4.75 4.63 8.33 12.44* 15.66*   NEUTROS ABS 10*3/mm3  --   --   --  9.48*  --    HEMOGLOBIN g/dL 8.7* 8.9* 9.3* 11.3*  10.0*   HEMATOCRIT % 26.8* 26.4* 29.1* 34.5 30.4*   PLATELETS 10*3/mm3 254 212 323 432 367     Results from last 7 days   Lab Units 09/12/23  0629 09/11/23  1902 09/11/23  0606 09/10/23  0140 09/09/23  1622 09/09/23  0501 09/07/23  0415 09/06/23  0601 09/05/23  2038   SODIUM mmol/L 138  --  136 135* 135* 139   < > 135* 139   POTASSIUM mmol/L 3.7  --  3.4* 3.9 4.9  5.0 5.3*   < > 4.3 3.6   CHLORIDE mmol/L 106  --  102 103 103 106   < > 101 101   CO2 mmol/L 26.0  --  26.5 25.0 21.0* 25.3   < > 22.0 24.0   BUN mg/dL 5*  --  6* 9 8 5*   < > 7* 7*   CREATININE mg/dL 0.83  --  0.78 0.84 0.98 0.87   < > 0.70 0.75   CALCIUM mg/dL 7.6*  --  7.4* 7.4* 7.9* 7.7*   < > 7.9* 7.7*   ALBUMIN g/dL 1.7*  --  1.9* 1.8* 2.0* 2.0*   < > 1.7* 2.0*   BILIRUBIN mg/dL 0.3  --  0.3 0.3 0.5 0.4   < > 0.4 0.4   ALK PHOS U/L 120*  --  116 141* 177* 159*   < > 244* 259*   ALT (SGPT) U/L 9  --  9 8 11 11   < > 11 11   AST (SGOT) U/L 25  --  23 20 43* 26   < > 39* 27   MAGNESIUM mg/dL  --  1.7 2.6*  --  1.9  --   --  2.4 1.1*    < > = values in this interval not displayed.         Lab 09/12/23  0629   IRON 17*   IRON SATURATION (TSAT) 15*   TIBC 112*   TRANSFERRIN 75*   FERRITIN 68.20   FOLATE 13.50   VITAMIN B 12 >2,000*        Assessment & Plan   *Gastric adenocarcinoma.    Patient was found to have elevated liver function tests.    MRCP revealed a large stone in the distal common bile duct with intrahepatic biliary dilatation.    On 9/1/2023, she underwent exploratory laparotomy with gastric access for ERCP and distal gastrectomy.    She was found to have a fungating gastric mass which was initially felt to be benign  However, pathology exam came back positive for adenocarcinoma.   She underwent exploratory laparotomy with gastrojejunostomy on 9/8/2023.   Pathology exam revealed a 6 cm tumor invading muscularis mucosa.  Margins were negative with the closest being 2.5 cm.  No angiolymphatic invasion.  No perineural invasion.  No lymph nodes  were present for evaluation.  Her-2 - Negative by IHC  PD-L1 - CPS 6 (positive)  I explained the findings pathology exam to the patient and her daughter.  Since the status of the lymph nodes is not clear, recommended obtaining a PET scan in 4-6 weeks.  If the adjacent lymph nodes are hypermetabolic, I would recommend postoperative chemoradiation and chemotherapy if her performance status improves.  If her performance status does not improve, she would not be able to tolerate adjuvant therapy and we will monitor.  If the PET scan is negative, we will monitor with follow-up imaging studies..     *Macrocytic anemia.  9/11/2023: Hemoglobin 8.9.  MCV 96.7.  She is taking oral vitamin B12 daily regularly.  9/12/2023: Hemoglobin 8.7.  9/12/2023: Ferritin 68.  Transferrin saturation 15%.  Vitamin B12 >2000.  Folate normal at 13.5.  Due to her gastric surgery and the expected decreased iron absorption, recommended IV iron therapy.    *Chronic anticoagulation.  Patient was on chronic anticoagulation with Xarelto which was held for the gas leak procedures.  She was restarted on Xarelto on 9/11/2023.     *COVID infection.  This may have resulted in an increase in the ferritin level.     PLAN:     1.  Start IV iron therapy with IV Ferrlecit.  2.  Continue vitamin B12 1000 mcg daily.  3.  I recommended obtaining a PET scan in 4-6 weeks to evaluate the status of the adjacent lymph nodes.  4.  Patient indicated that it will be difficult for them to come back and forth to [place] for follow-up and treatment.  They will look into seeing Dr. Borges at [facility] ([place]).     Discussed with daughter at bedside.        Camila Lazaro MD  09/12/23

## 2023-09-12 NOTE — PLAN OF CARE
Goal Outcome Evaluation:  Plan of Care Reviewed With: patient        Progress: no change  Outcome Evaluation: Pt sat up in r/c this shift, pleasant, following commnads and able to voice needs, tearful at times, daughter at bsd, provides bsd care for pt, voimited x1 at lunch, zofran iv given, effective, Fe gluconate infusion transfusing, raymon well,  vss, sr on monitor, hr in 70's, nad

## 2023-09-12 NOTE — PROGRESS NOTES
Chief Complaint:    S/P Exploratory laparotomy with distal gastrectomy, POD 9  S/P Exploratory laparotomy with gastrojejunostomy, POD 4    Subjective:    The patient is feeling well with no complaints outside of expected abdominal pain and back pain.  She is tolerating a diet with no nausea or vomiting.  She has no indigestion.  She had a liquid bowel movement.    Objective:    Temp:  [97.9 °F (36.6 °C)-98.4 °F (36.9 °C)] 98.1 °F (36.7 °C)  Heart Rate:  [65-88] 72  Resp:  [16] 16  BP: ()/(51-85) 125/65    Physical Exam  Constitutional:       Appearance: She is not ill-appearing or toxic-appearing.   Abdominal:      Palpations: Abdomen is soft.      Tenderness: There is generalized abdominal tenderness (Appropriate postop tenderness).      Comments: Incision: Loosely approximated with dressing in place and clean with no evidence of infection.   Neurological:      Mental Status: She is alert.   Psychiatric:         Behavior: Behavior is cooperative.       Results:    WBC is 4.75.  H/H is 8.7/26.8.  BUN is 5 and creatinine is 0.83.  Albumin is 1.7.    Impression/Plan:    The patient is POD 9 from an exploratory laparotomy with distal gastrectomy and POD 4 from an exploratory laparotomy with gastrojejunostomy.  She is recovering well and tolerating her diet.  She will need to continue current wound care.    Slava Loo Jr., M.D.

## 2023-09-12 NOTE — PROGRESS NOTES
LOS: 18 days   Patient Care Team:  Jewell Stephens APRN as PCP - General (Family Medicine)  Edilberto Lemus MD as Consulting Physician (Nephrology)  Rashaun Alvarado MD as Surgeon (Orthopedic Surgery)  Delma Montoya MD as Consulting Physician (Obstetrics and Gynecology)  Stuart Borges MD (Hematology and Oncology)  Isael Barker MD as Consulting Physician (Gastroenterology)  Haven Gr APRN as Nurse Practitioner (Nurse Practitioner)    Chief Complaint: Follow-up paroxysmal atrial fibrillation with RVR (new).    Interval History: Remains in sinus rhythm.  She feels better today in general.  She is slowly regaining strength.  No chest pain.    Vital Signs:  Temp:  [97.9 °F (36.6 °C)-98.8 °F (37.1 °C)] 98.8 °F (37.1 °C)  Heart Rate:  [65-84] 73  Resp:  [16] 16  BP: ()/(51-74) 102/64    Intake/Output Summary (Last 24 hours) at 9/12/2023 1857  Last data filed at 9/12/2023 0800  Gross per 24 hour   Intake 120 ml   Output 140 ml   Net -20 ml       Physical Exam:   General Appearance:    No acute distress, alert and oriented x4, chronically ill-appearing.   Lungs:     Clear to auscultation bilaterally     Heart:    Regular rhythm and normal rate.  No murmurs, gallops, or    rubs.   Abdomen:     Soft, nontender, nondistended.    Extremities:   No clubbing, cyanosis, or edema.     Results Review:    Results from last 7 days   Lab Units 09/12/23  0629   SODIUM mmol/L 138   POTASSIUM mmol/L 3.7   CHLORIDE mmol/L 106   CO2 mmol/L 26.0   BUN mg/dL 5*   CREATININE mg/dL 0.83   GLUCOSE mg/dL 81   CALCIUM mg/dL 7.6*     Results from last 7 days   Lab Units 09/09/23  1857 09/09/23  1622   HSTROP T ng/L 28* 27*     Results from last 7 days   Lab Units 09/12/23  0629   WBC 10*3/mm3 4.75   HEMOGLOBIN g/dL 8.7*   HEMATOCRIT % 26.8*   PLATELETS 10*3/mm3 254             Results from last 7 days   Lab Units 09/11/23  1902   MAGNESIUM mg/dL 1.7           I reviewed the patient's new clinical results.         Assessment:  1.  Choledocholithiasis  2.  Gastric adenocarcinoma  3.  COVID-19 infection  4.  Status post exploratory laparotomy and distal gastrectomy on 9/1/2023 by Dr. Loo  5.  Status post second exploratory laparotomy and gastrojejunostomy on 9/7/2023 by Dr. Loo  6.  Chronic pain syndrome  7.  Paroxysmal atrial fibrillation with RVR and aberrancy (new)  8.  Stage III chronic kidney disease   9.  Rheumatoid arthritis  10.  Osteoarthritis   11.  Hypoalbuminemia and low total protein    Plan:  -Remains in sinus rhythm.  Stop amiodarone drip and start oral amiodarone 200 mg/day.  Continue metoprolol 25 mg every 12 hours if blood pressure can still tolerate.    -No evidence of ventricular tachycardia.  This was atrial fibrillation with aberrant conduction and a left bundle pattern.  She was also completely asymptomatic.    -Xarelto 20 mg/day resumed.    -Discussed with the patient at bedside, as well as her daughter (who is a nurse).    Noel Koo MD  09/12/23  18:57 EDT

## 2023-09-12 NOTE — PLAN OF CARE
Goal Outcome Evaluation:  Plan of Care Reviewed With: patient      Pt A&Ox4. C/o back and incisional pain, scheduled pain meds given. Prn xanax given to help sleep. Man drain is patent and draining. Amio gtt, dressing changes and IS continued. Pt up to bsc a few times over the night. Pt reports liquid stool x1. Replacing mg+. SR on the monitor. VSS. NAD noted. Will continue to monitor.

## 2023-09-12 NOTE — PROGRESS NOTES
Name: Anne Jewell ADMIT: 2023   : 1950  PCP: Jewell Stephens APRN    MRN: [id number] LOS: 18 days   AGE/SEX: 73 y.o. female  ROOM: [facility]/     Subjective   Subjective   She is a bit more alert today.  Discussed with daughter at bedside.    Objective   Objective   Vital Signs  Temp:  [97.9 °F (36.6 °C)-98.8 °F (37.1 °C)] 98.8 °F (37.1 °C)  Heart Rate:  [65-84] 73  Resp:  [16] 16  BP: ()/(51-74) 102/64  SpO2:  [96 %-100 %] 97 %  on  Flow (L/min):  [1] 1;   Device (Oxygen Therapy): nasal cannula  Body mass index is 36.56 kg/m².  Physical Exam  Constitutional:       General: She is not in acute distress.     Appearance: Normal appearance. She is ill-appearing.   Cardiovascular:      Rate and Rhythm: Normal rate.      Pulses: Normal pulses.   Pulmonary:      Effort: No respiratory distress.      Breath sounds: No stridor.   Abdominal:      Comments: Drain right lower quadrant with serosanguineous output   Musculoskeletal:      Right lower leg: No edema.      Left lower leg: No edema.   Skin:     General: Skin is warm.   Neurological:      General: No focal deficit present.      Mental Status: She is alert.       Results Review     I reviewed the patient's new clinical results.  Results from last 7 days   Lab Units 23  0629 23  0812 09/10/23  0140 23  1654   WBC 10*3/mm3 4.75 4.63 8.33 12.44*   HEMOGLOBIN g/dL 8.7* 8.9* 9.3* 11.3*   PLATELETS 10*3/mm3 254 212 323 432     Results from last 7 days   Lab Units 23  0629 23  0606 09/10/23  0140 23  1622   SODIUM mmol/L 138 136 135* 135*   POTASSIUM mmol/L 3.7 3.4* 3.9 4.9  5.0   CHLORIDE mmol/L 106 102 103 103   CO2 mmol/L 26.0 26.5 25.0 21.0*   BUN mg/dL 5* 6* 9 8   CREATININE mg/dL 0.83 0.78 0.84 0.98   GLUCOSE mg/dL 81 74 88 88   EGFR mL/min/1.73 74.5 80.3 73.5 61.5     Results from last 7 days   Lab Units 23  0629 23  0606 09/10/23  0140 23  1622   ALBUMIN g/dL 1.7* 1.9* 1.8* 2.0*    BILIRUBIN mg/dL 0.3 0.3 0.3 0.5   ALK PHOS U/L 120* 116 141* 177*   AST (SGOT) U/L 25 23 20 43*   ALT (SGPT) U/L 9 9 8 11     Results from last 7 days   Lab Units 09/12/23  0629 09/11/23  1902 09/11/23  0606 09/10/23  0140 09/09/23  1622 09/07/23  0415 09/06/23  0601   CALCIUM mg/dL 7.6*  --  7.4* 7.4* 7.9*   < > 7.9*   ALBUMIN g/dL 1.7*  --  1.9* 1.8* 2.0*   < > 1.7*   MAGNESIUM mg/dL  --  1.7 2.6*  --  1.9  --  2.4   PHOSPHORUS mg/dL  --   --   --   --  3.3  --   --     < > = values in this interval not displayed.     Results from last 7 days   Lab Units 09/09/23  1654   LACTATE mmol/L 1.3     Glucose   Date/Time Value Ref Range Status   09/09/2023 2022 89 70 - 130 mg/dL Final   09/09/2023 1609 89 70 - 130 mg/dL Final       No radiology results for the last day  Scheduled Medications  amiodarone, 200 mg, Oral, Q24H  baclofen, 5 mg, Oral, TID  cetirizine, 10 mg, Oral, Nightly  cholecalciferol, 2,000 Units, Oral, Daily  diphenhydrAMINE, 25 mg, Oral, Daily  escitalopram, 10 mg, Oral, Daily  famotidine, 20 mg, Intravenous, Q12H  ferric gluconate, 250 mg, Intravenous, Daily  guaiFENesin, 600 mg, Oral, Nightly  levothyroxine, 100 mcg, Oral, Q AM  metoprolol tartrate, 25 mg, Oral, Q12H  montelukast, 10 mg, Oral, Daily  multivitamin, 1 tablet, Oral, Daily  oxyCODONE, 80 mg, Oral, Q12H  promethazine, 25 mg, Oral, BID  rivaroxaban, 20 mg, Oral, Daily With Dinner  saccharomyces boulardii, 250 mg, Oral, BID  sodium chloride, 10 mL, Intravenous, Q12H  vitamin B-12, 1,000 mcg, Oral, Daily    Infusions  sodium chloride, 30 mL/hr, Last Rate: 9 mL/hr (09/04/23 0900)  sodium chloride, 30 mL/hr    Diet  Diet: Gastrointestinal Diets; Fiber-Restricted; Texture: Soft to Chew (NDD 3); Soft to Chew: Chopped Meat; Fluid Consistency: Thin (IDDSI 0)       Assessment/Plan     Active Hospital Problems    Diagnosis  POA    **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       73 y.o. female admitted with Gastric adenocarcinoma.    Anne Jewell is a 72-year-old female is unable to safely use cane or walker due to her history of PE on Xarelto, CKD stage III, hypothyroidism, chronic pain syndrome, RA, generalized osteoarthritis, memory loss. This is causing pain and weakness allowing her not to be mobile. A standard wheelchair is needed to assist with daily living activities in the home. Patient has upper body strength, and the mental capability to propel the wheelchair inside the home.     09/12/23  Transitioned to p.o. amiodarone.    Choledocholithiasis  History of gastric bypass  Elevated LFTs  Fungating stomach mass/adenocarcinoma  -MRCP showing large stone in the distal common bile duct, 1.8 cm.  Intrahepatic biliary dilatation.  Marked biliary dilatation with CBD measuring 2.1 cm  -S/P IV ceftriaxone, metronidazole  -ERCP via gastrotomy with Surgery with biliary sphincterectomy and balloon extraction  -Fungating gastric mass which was suspicious for benign lesion per surgery, but pathology consistent with adenocarcinoma  - S/P exp lap w/gastrojejunostomy 9/8/23   -Pain controlled on current regimen  -Heme-onc following     Opioid induced constipation  - Was on amitiza but stopped due to frequent stools. PRN regimen in place     Vaginal yeast infection  -Diflucan x2 doses,  completed     COVID-19  Fever, resolved  -Blood cultures with no growth   -Chest x-ray without any abnormalities.    -Completed 3 days of remdesivir     History of pulmonary  pulmonary embolism  Diagnosed in 2007, has been on anticoagulation since.  At home takes Xarelto   -Xarelto resumed 9/11/2023     Hypothyroidism  -levothyroxine, recent TSH normal     CKD stage IIIa  -Creatinine stable, monitor.     Chronic pain syndrome/chronic arthritis  -Oxycodone extended release to 80 mg twice daily scheduled.  Oxycodone extended release to 80 mg twice a day as needed as well.  Continue home alprazolam 3 mg nightly     History of RA  -Not on treatment for RA per chart review, reports she was diagnosed but was never on specific treatment.     Memory loss  -Daughter states she has noticed memory changes for the last several months.  Was referred to neurology outpatient. Cognition/memory at baseline.  Follow-up as outpatient     Abnormal liver on MRI  -Repeat CT abdomen with liver protocol in 2 to 3 months     Acute on chronic anemia  -On iron at home, iron stores low. Also mixed anemia.     Paroxysmal afib:  -Cardiology following  -Amiodarone p.o.       Flow (L/min):  [1] 1      Xarelto (home med) for DVT prophylaxis.  Discussed with patient, family, and care team on multidisciplinary rounds.  Anticipate discharge home in 1-2 days      Edilberto Meza MD  [facility]  09/12/23  16:06 EDT

## 2023-09-12 NOTE — PROGRESS NOTES
"                                              LOS: 18 days   Patient Care Team:  Jewell Stephens APRN as PCP - General (Family Medicine)  Edilberto Lemus MD as Consulting Physician (Nephrology)  Rashaun Alvarado MD as Surgeon (Orthopedic Surgery)  Delma Montoya MD as Consulting Physician (Obstetrics and Gynecology)  Stuart Borges MD (Hematology and Oncology)  Isael Barker MD as Consulting Physician (Gastroenterology)  Haven Gr APRN as Nurse Practitioner (Nurse Practitioner)    Chief Complaint:  F/up tachyarrhythmia with large QRS complex.  Critical care management.  COVID infection and medical problems listed below    Subjective   Interval History  On RA.  Reported mild cough with no sputum.    REVIEW OF SYSTEMS:   CARDIOVASCULAR: No chest pain, chest pressure or chest discomfort. No palpitations or edema.       CONSTITUTIONAL: No fever or chills.     Ventilator/Non-Invasive Ventilation Settings (From admission, onward)      None                  Physical Exam:     Vital Signs  Temp:  [97.9 °F (36.6 °C)-98.8 °F (37.1 °C)] 98.8 °F (37.1 °C)  Heart Rate:  [65-84] 73  Resp:  [16] 16  BP: ()/(51-74) 102/64    Intake/Output Summary (Last 24 hours) at 9/12/2023 1452  Last data filed at 9/12/2023 0500  Gross per 24 hour   Intake --   Output 100 ml   Net -100 ml       Flowsheet Rows      Flowsheet Row First Filed Value   Admission Height 154.9 cm (61\") Documented at 08/25/2023 1314   Admission Weight 81.6 kg (180 lb) Documented at 08/25/2023 1314            PPE used per hospital policy    General Appearance:   Alert, cooperative, in no acute distress   ENMT:  Mallampati score 3, moist mucous membrane   Eyes:  Pupils equal and reactive to light. EOMI   Neck:   Trachea midline. No thyromegaly.   Lungs:   Clear to auscultation bilaterally but diminished air entry overall.  No crackles or wheezing.    Heart:   Regular rhythm and normal rate, normal S1 and S2, no         murmur   Skin:   No " rash or ecchymosis   Abdomen:    Obese.  Abdominal incision noted.  Soft. No tenderness. No HSM.   Neuro/psych:  Conscious, alert, oriented x3. Strength 5/5 in upper and lower  ext.  Appropriate mood and affect   Extremities:  No cyanosis, clubbing but +2 edema.  Warm extremities and well-perfused          Results Review:        Results from last 7 days   Lab Units 09/12/23  0629 09/11/23  0606 09/10/23  0140   SODIUM mmol/L 138 136 135*   POTASSIUM mmol/L 3.7 3.4* 3.9   CHLORIDE mmol/L 106 102 103   CO2 mmol/L 26.0 26.5 25.0   BUN mg/dL 5* 6* 9   CREATININE mg/dL 0.83 0.78 0.84   GLUCOSE mg/dL 81 74 88   CALCIUM mg/dL 7.6* 7.4* 7.4*       Results from last 7 days   Lab Units 09/09/23  1857 09/09/23  1622   HSTROP T ng/L 28* 27*       Results from last 7 days   Lab Units 09/12/23  0629 09/11/23  0812 09/10/23  0140   WBC 10*3/mm3 4.75 4.63 8.33   HEMOGLOBIN g/dL 8.7* 8.9* 9.3*   HEMATOCRIT % 26.8* 26.4* 29.1*   PLATELETS 10*3/mm3 254 212 323                             Results from last 7 days   Lab Units 09/09/23  1716   PH, ARTERIAL pH units 7.436   PCO2, ARTERIAL mm Hg 35.8   PO2 ART mm Hg 79.2*   O2 SATURATION ART % 96.1   MODALITY  Cannula           I reviewed the patient's new clinical results.        Medication Review:   amiodarone, 200 mg, Oral, Q24H  baclofen, 5 mg, Oral, TID  cetirizine, 10 mg, Oral, Nightly  cholecalciferol, 2,000 Units, Oral, Daily  diphenhydrAMINE, 25 mg, Oral, Daily  escitalopram, 10 mg, Oral, Daily  famotidine, 20 mg, Intravenous, Q12H  ferric gluconate, 250 mg, Intravenous, Daily  guaiFENesin, 600 mg, Oral, Nightly  levothyroxine, 100 mcg, Oral, Q AM  metoprolol tartrate, 25 mg, Oral, Q12H  montelukast, 10 mg, Oral, Daily  multivitamin, 1 tablet, Oral, Daily  oxyCODONE, 80 mg, Oral, Q12H  promethazine, 25 mg, Oral, BID  rivaroxaban, 20 mg, Oral, Daily With Dinner  saccharomyces boulardii, 250 mg, Oral, BID  sodium chloride, 10 mL, Intravenous, Q12H  vitamin B-12, 1,000 mcg, Oral,  Daily        sodium chloride, 30 mL/hr, Last Rate: 9 mL/hr (09/04/23 0900)  sodium chloride, 30 mL/hr          Assessment     Atrial fibrillation with RVR and LBBB  COVID-19 infection, no evidence of pneumonia  Mild hypotension, resolved.  Could be secondary to Cardizem +/- amiodarone.  Gastric adenocarcinoma s/p resection 9/1-negative margins  Gastrojejunostomy 9/8  Choledocholithiasis  Electrolytes disturbance: Hyperkalemia and hypocalcemia     Mild anemia        Plan     Incentive spirometry.  Encouraged to use  Mucinex 600 mg twice daily  Oxygen by NC and titrate keep SPO2 >90%.  Suspect some atelectasis due to reduced mobilization.  Iron transfusion with ferric gluconate  Noted plan for PET scan as outpatient.  VTE prophylaxis: Xarelto    Discussed with her daughter at bedside    Cheyenne Greene MD  09/12/23  14:52 EDT          This note was dictated utilizing [employer] dictation

## 2023-09-13 DIAGNOSIS — R35.0 FREQUENCY OF URINATION: ICD-10-CM

## 2023-09-13 DIAGNOSIS — I10 HYPERTENSION, UNSPECIFIED TYPE: ICD-10-CM

## 2023-09-13 DIAGNOSIS — J30.9 ALLERGIC RHINITIS, UNSPECIFIED SEASONALITY, UNSPECIFIED TRIGGER: ICD-10-CM

## 2023-09-13 LAB
ALBUMIN SERPL-MCNC: 1.7 G/DL (ref 3.5–5.2)
ALBUMIN/GLOB SERPL: 0.5 G/DL
ALP SERPL-CCNC: 126 U/L (ref 39–117)
ALT SERPL W P-5'-P-CCNC: 8 U/L (ref 1–33)
ANION GAP SERPL CALCULATED.3IONS-SCNC: 7.2 MMOL/L (ref 5–15)
AST SERPL-CCNC: 26 U/L (ref 1–32)
BILIRUB SERPL-MCNC: 0.3 MG/DL (ref 0–1.2)
BUN SERPL-MCNC: 5 MG/DL (ref 8–23)
BUN/CREAT SERPL: 6.8 (ref 7–25)
CALCIUM SPEC-SCNC: 7.8 MG/DL (ref 8.6–10.5)
CHLORIDE SERPL-SCNC: 107 MMOL/L (ref 98–107)
CO2 SERPL-SCNC: 26.8 MMOL/L (ref 22–29)
CREAT SERPL-MCNC: 0.73 MG/DL (ref 0.57–1)
DEPRECATED RDW RBC AUTO: 45.9 FL (ref 37–54)
EGFRCR SERPLBLD CKD-EPI 2021: 87 ML/MIN/1.73
ERYTHROCYTE [DISTWIDTH] IN BLOOD BY AUTOMATED COUNT: 13.3 % (ref 12.3–15.4)
GLOBULIN UR ELPH-MCNC: 3.3 GM/DL
GLUCOSE SERPL-MCNC: 80 MG/DL (ref 65–99)
HCT VFR BLD AUTO: 26.8 % (ref 34–46.6)
HGB BLD-MCNC: 8.8 G/DL (ref 12–15.9)
MAGNESIUM SERPL-MCNC: 1.8 MG/DL (ref 1.6–2.4)
MCH RBC QN AUTO: 31.2 PG (ref 26.6–33)
MCHC RBC AUTO-ENTMCNC: 32.8 G/DL (ref 31.5–35.7)
MCV RBC AUTO: 95 FL (ref 79–97)
PLATELET # BLD AUTO: 296 10*3/MM3 (ref 140–450)
PMV BLD AUTO: 10.2 FL (ref 6–12)
POTASSIUM SERPL-SCNC: 3.9 MMOL/L (ref 3.5–5.2)
PROT SERPL-MCNC: 5 G/DL (ref 6–8.5)
QT INTERVAL: 471 MS
QTC INTERVAL: 467 MS
RBC # BLD AUTO: 2.82 10*6/MM3 (ref 3.77–5.28)
SODIUM SERPL-SCNC: 141 MMOL/L (ref 136–145)
WBC NRBC COR # BLD: 5.66 10*3/MM3 (ref 3.4–10.8)

## 2023-09-13 PROCEDURE — 80053 COMPREHEN METABOLIC PANEL: CPT | Performed by: INTERNAL MEDICINE

## 2023-09-13 PROCEDURE — 93005 ELECTROCARDIOGRAM TRACING: CPT | Performed by: INTERNAL MEDICINE

## 2023-09-13 PROCEDURE — 99232 SBSQ HOSP IP/OBS MODERATE 35: CPT | Performed by: INTERNAL MEDICINE

## 2023-09-13 PROCEDURE — 97116 GAIT TRAINING THERAPY: CPT

## 2023-09-13 PROCEDURE — 83735 ASSAY OF MAGNESIUM: CPT | Performed by: STUDENT IN AN ORGANIZED HEALTH CARE EDUCATION/TRAINING PROGRAM

## 2023-09-13 PROCEDURE — 97110 THERAPEUTIC EXERCISES: CPT

## 2023-09-13 PROCEDURE — 85027 COMPLETE CBC AUTOMATED: CPT | Performed by: INTERNAL MEDICINE

## 2023-09-13 PROCEDURE — 94762 N-INVAS EAR/PLS OXIMTRY CONT: CPT

## 2023-09-13 PROCEDURE — 25010000002 NA FERRIC GLUC CPLX PER 12.5 MG: Performed by: INTERNAL MEDICINE

## 2023-09-13 PROCEDURE — 63710000001 DIPHENHYDRAMINE PER 50 MG: Performed by: INTERNAL MEDICINE

## 2023-09-13 PROCEDURE — 97530 THERAPEUTIC ACTIVITIES: CPT

## 2023-09-13 PROCEDURE — 99024 POSTOP FOLLOW-UP VISIT: CPT | Performed by: SURGERY

## 2023-09-13 PROCEDURE — 93010 ELECTROCARDIOGRAM REPORT: CPT | Performed by: INTERNAL MEDICINE

## 2023-09-13 PROCEDURE — 0 MAGNESIUM SULFATE 4 GM/100ML SOLUTION: Performed by: STUDENT IN AN ORGANIZED HEALTH CARE EDUCATION/TRAINING PROGRAM

## 2023-09-13 PROCEDURE — 63710000001 PROMETHAZINE PER 25 MG: Performed by: INTERNAL MEDICINE

## 2023-09-13 RX ORDER — HYDROCHLOROTHIAZIDE 25 MG/1
TABLET ORAL
Qty: 90 TABLET | Refills: 0 | OUTPATIENT
Start: 2023-09-13

## 2023-09-13 RX ORDER — NITROFURANTOIN 25; 75 MG/1; MG/1
CAPSULE ORAL
Qty: 14 CAPSULE | Refills: 0 | OUTPATIENT
Start: 2023-09-13

## 2023-09-13 RX ORDER — MAGNESIUM SULFATE HEPTAHYDRATE 40 MG/ML
4 INJECTION, SOLUTION INTRAVENOUS ONCE
Status: COMPLETED | OUTPATIENT
Start: 2023-09-13 | End: 2023-09-13

## 2023-09-13 RX ORDER — POTASSIUM CHLORIDE 1500 MG/1
TABLET, EXTENDED RELEASE ORAL
Qty: 180 TABLET | Refills: 0 | OUTPATIENT
Start: 2023-09-13

## 2023-09-13 RX ORDER — MONTELUKAST SODIUM 10 MG/1
TABLET ORAL
Qty: 90 TABLET | Refills: 1 | OUTPATIENT
Start: 2023-09-13

## 2023-09-13 RX ADMIN — Medication 10 ML: at 21:31

## 2023-09-13 RX ADMIN — ESCITALOPRAM OXALATE 10 MG: 10 TABLET, FILM COATED ORAL at 09:16

## 2023-09-13 RX ADMIN — SENNOSIDES AND DOCUSATE SODIUM 2 TABLET: 50; 8.6 TABLET ORAL at 09:17

## 2023-09-13 RX ADMIN — PROMETHAZINE HYDROCHLORIDE 25 MG: 12.5 TABLET ORAL at 09:15

## 2023-09-13 RX ADMIN — FAMOTIDINE 20 MG: 10 INJECTION INTRAVENOUS at 09:15

## 2023-09-13 RX ADMIN — SODIUM CHLORIDE 250 MG: 9 INJECTION, SOLUTION INTRAVENOUS at 09:17

## 2023-09-13 RX ADMIN — BACLOFEN 5 MG: 10 TABLET ORAL at 09:15

## 2023-09-13 RX ADMIN — OXYCODONE HYDROCHLORIDE 80 MG: 40 TABLET, FILM COATED, EXTENDED RELEASE ORAL at 09:16

## 2023-09-13 RX ADMIN — METOPROLOL TARTRATE 25 MG: 25 TABLET, FILM COATED ORAL at 09:15

## 2023-09-13 RX ADMIN — DIPHENHYDRAMINE HYDROCHLORIDE 25 MG: 25 CAPSULE ORAL at 09:15

## 2023-09-13 RX ADMIN — FAMOTIDINE 20 MG: 10 INJECTION INTRAVENOUS at 21:32

## 2023-09-13 RX ADMIN — METOPROLOL TARTRATE 25 MG: 25 TABLET, FILM COATED ORAL at 21:31

## 2023-09-13 RX ADMIN — GUAIFENESIN 600 MG: 600 TABLET, EXTENDED RELEASE ORAL at 21:30

## 2023-09-13 RX ADMIN — Medication 2000 UNITS: at 09:16

## 2023-09-13 RX ADMIN — ALPRAZOLAM 3 MG: 1 TABLET ORAL at 21:37

## 2023-09-13 RX ADMIN — Medication 1000 MCG: at 09:16

## 2023-09-13 RX ADMIN — PROMETHAZINE HYDROCHLORIDE 25 MG: 12.5 TABLET ORAL at 21:31

## 2023-09-13 RX ADMIN — CETIRIZINE HYDROCHLORIDE 10 MG: 10 TABLET ORAL at 21:31

## 2023-09-13 RX ADMIN — RIVAROXABAN 20 MG: 20 TABLET, FILM COATED ORAL at 18:24

## 2023-09-13 RX ADMIN — Medication 250 MG: at 09:15

## 2023-09-13 RX ADMIN — Medication 250 MG: at 21:31

## 2023-09-13 RX ADMIN — MAGNESIUM SULFATE HEPTAHYDRATE 4 G: 40 INJECTION, SOLUTION INTRAVENOUS at 12:19

## 2023-09-13 RX ADMIN — LEVOTHYROXINE SODIUM 100 MCG: 0.1 TABLET ORAL at 09:16

## 2023-09-13 RX ADMIN — BACLOFEN 5 MG: 10 TABLET ORAL at 21:32

## 2023-09-13 RX ADMIN — MONTELUKAST SODIUM 10 MG: 10 TABLET, FILM COATED ORAL at 09:16

## 2023-09-13 RX ADMIN — OXYCODONE HYDROCHLORIDE 80 MG: 40 TABLET, FILM COATED, EXTENDED RELEASE ORAL at 21:30

## 2023-09-13 RX ADMIN — Medication 10 ML: at 09:17

## 2023-09-13 RX ADMIN — AMIODARONE HYDROCHLORIDE 200 MG: 200 TABLET ORAL at 09:15

## 2023-09-13 RX ADMIN — BACLOFEN 5 MG: 10 TABLET ORAL at 14:59

## 2023-09-13 RX ADMIN — Medication 1 TABLET: at 09:15

## 2023-09-13 NOTE — PROGRESS NOTES
On 1 L oxygen.  No much else to add from pulmonary perspective.  We will sign off.  Please call back if needed.

## 2023-09-13 NOTE — THERAPY TREATMENT NOTE
Patient Name: Anne Jewell  : 1950    MRN: [id number]                              Today's Date: 2023       Admit Date: 2023    Visit Dx:     ICD-10-CM ICD-9-CM   1. Gastric adenocarcinoma  C16.9 151.9   2. Choledocholithiasis  K80.50 574.50   3. Malignant neoplasm of pyloric antrum  C16.3 151.2     Patient Active Problem List   Diagnosis    Chronic pain syndrome    Rheumatoid arthritis    Osteoarthritis of knee    Generalized osteoarthritis    Degeneration of intervertebral disc of lumbar region    Neck pain    Lumbar radiculopathy    Atopic rhinitis    Anxiety    Diarrhea    Indigestion    Dysthymic disorder    Gastroesophageal reflux disease    Acquired hypothyroidism    Insomnia    Pernicious anemia    Osteoporosis    Scoliosis    Vasovagal syncope    Vitamin D deficiency    Trigger middle finger of right hand    Trigger ring finger of right hand    Trigger little finger of right hand    Encounter for long-term (current) use of high-risk medication    Coagulation disorder    Hyperglycemia    Joint pain    Status post total knee replacement, left    Left knee pain    Status post total knee replacement, right    Osteoarthritis of thumb    Trigger finger of all digits of right hand    Left hip pain    Primary osteoarthritis of both hips    Edema    Nausea    Irritable bowel syndrome with diarrhea    Stage 3 chronic kidney disease    Cough    Acute non-recurrent frontal sinusitis    Dysuria    History of DVT (deep vein thrombosis)    Routine general medical examination at a health care facility    Frequency of urination    Memory loss    Visual changes    Choledocholithiasis    History of pulmonary embolism    Malignant neoplasm of pyloric antrum    Hypokalemia    Paroxysmal A-fib    Gastric adenocarcinoma     Past Medical History:   Diagnosis Date    Allergic rhinitis     Arthritis of back     Arthritis of neck     Asthma     Bronchospasm     Carpal tunnel syndrome, bilateral     Cervical disc  disorder     Clotting disorder     Deep vein thrombosis     Disc degeneration, lumbar     Edema     Esophageal reflux     Glaucoma     Hip arthrosis     Joint pain     Kidney disease 2018    stage 3    Liver disease 2018    stage 3    Low back pain     Osteoarthritis     Osteopenia     Osteoporosis     Periarthritis of shoulder     Scoliosis     Status post total knee replacement, left 08/31/2017    Status post total knee replacement, right 08/31/2017    UTI (urinary tract infection)     Venous thrombosis      Past Surgical History:   Procedure Laterality Date    BILATERAL BREAST REDUCTION      BREAST SURGERY      COLONOSCOPY  08/05/2016    ERCP N/A 8/30/2023    Procedure: ENDOSCOPIC RETROGRADE CHOLANGIOPANCREATOGRAPHY;  Surgeon: Elijah Simon MD;  Location: [facility] ENDOSCOPY;  Service: Gastroenterology;  Laterality: N/A;  cbd stone    ERCP N/A 9/1/2023    Procedure: ENDOSCOPIC RETROGRADE CHOLANGIOPANCREATOGRAPHY WITH CHOLANGIOGRAM, SPHINCTEROTOMY, BALLOON SWEEP;  Surgeon: Elijah Simon MD;  Location: [facility] OR;  Service: Gastroenterology;  Laterality: N/A;    EXPLORATORY LAPAROTOMY N/A 9/1/2023    Procedure: Exploratory laparotomy with Distal Gastrectomy;  Surgeon: Slava Loo Jr., MD;  Location: [facility] OR;  Service: General;  Laterality: N/A;    GASTRECTOMY N/A 9/8/2023    Procedure: EXPLORATORY LAPAROTOMY WITH GASTROJEJUNOSTOMY;  Surgeon: Slava Loo Jr., MD;  Location: [facility] OR;  Service: General;  Laterality: N/A;    GASTRIC BYPASS      HAND SURGERY Right 01/14/2016    HERNIA REPAIR      x7    HYSTERECTOMY      JOINT REPLACEMENT      Both knees    OTHER SURGICAL HISTORY      vaginal sling operation for stress incontinence    TOTAL KNEE ARTHROPLASTY Left     TOTAL KNEE ARTHROPLASTY Bilateral       General Information       Row Name 09/13/23 1310          Physical Therapy Time and Intention    Document Type therapy note (daily note)  -SM     Mode of Treatment physical therapy   -       Row Name 09/13/23 1310          General Information    Existing Precautions/Restrictions fall  -SM       Row Name 09/13/23 1310          Cognition    Orientation Status (Cognition) oriented x 3  -SM               User Key  (r) = Recorded By, (t) = Taken By, (c) = Cosigned By      Initials Name Provider Type    Leonor Kulkarni PTA Physical Therapist Assistant                   Mobility       Row Name 09/13/23 1312          Bed Mobility    Bed Mobility supine-sit  -SM     Supine-Sit Drew (Bed Mobility) standby assist  -     Assistive Device (Bed Mobility) bed rails;head of bed elevated  -       Row Name 09/13/23 1312          Sit-Stand Transfer    Sit-Stand Drew (Transfers) standby assist  -       Row Name 09/13/23 1312          Gait/Stairs (Locomotion)    Drew Level (Gait) standby assist;contact guard  -     Distance in Feet (Gait) 10  -SM     Deviations/Abnormal Patterns (Gait) kristi decreased;stride length decreased  -     Bilateral Gait Deviations forward flexed posture  -     Comment, (Gait/Stairs) slightly unsteady, reaching for objects to hold on to, though declining assist  -               User Key  (r) = Recorded By, (t) = Taken By, (c) = Cosigned By      Initials Name Provider Type    Leonor Kulkarni PTA Physical Therapist Assistant                   Obj/Interventions       Row Name 09/13/23 1323          Motor Skills    Therapeutic Exercise --  seated AP, LAQ, marches, pillow squeeze x10 reps; standing heel/toe raises, mini squats x10 reps  -               User Key  (r) = Recorded By, (t) = Taken By, (c) = Cosigned By      Initials Name Provider Type    Leonor Kulkarni PTA Physical Therapist Assistant                   Goals/Plan    No documentation.                  Clinical Impression       Row Name 09/13/23 1325          Pain    Pretreatment Pain Rating 8/10  -SM     Posttreatment Pain Rating 8/10  -SM     Pain Location - back   -     Pain Intervention(s) Repositioned;Ambulation/increased activity;Rest  -       Row Name 09/13/23 1325          Positioning and Restraints    Pre-Treatment Position in bed  -     Post Treatment Position chair  -SM     In Chair sitting;call light within reach;encouraged to call for assist;with family/caregiver  -               User Key  (r) = Recorded By, (t) = Taken By, (c) = Cosigned By      Initials Name Provider Type    Leonor Kulkarni PTA Physical Therapist Assistant                   Outcome Measures       Row Name 09/13/23 1327          How much help from another person do you currently need...    Turning from your back to your side while in flat bed without using bedrails? 3  -SM     Moving from lying on back to sitting on the side of a flat bed without bedrails? 3  -SM     Moving to and from a bed to a chair (including a wheelchair)? 3  -SM     Standing up from a chair using your arms (e.g., wheelchair, bedside chair)? 3  -SM     Climbing 3-5 steps with a railing? 3  -SM     To walk in hospital room? 3  -SM     AM-PAC 6 Clicks Score (PT) 18  -     Highest level of mobility 6 --> Walked 10 steps or more  -       Row Name 09/13/23 1327          Functional Assessment    Outcome Measure Options AM-PAC 6 Clicks Basic Mobility (PT)  -               User Key  (r) = Recorded By, (t) = Taken By, (c) = Cosigned By      Initials Name Provider Type    Leonor Kulkarni PTA Physical Therapist Assistant                                 Physical Therapy Education       Title: PT OT SLP Therapies (Done)       Topic: Physical Therapy (Done)       Point: Mobility training (Done)       Learning Progress Summary             Patient Acceptance, E,TB,D, VU,NR by  at 9/13/2023 1327    Acceptance, E, VU by ER at 9/11/2023 1126    Acceptance, E,TB,D, VU,NR by MARK at 9/7/2023 1835    Acceptance, E,TB, VU,DU by CS at 9/5/2023 1452    Acceptance, E,TB,D, VU,NR by  at 9/3/2023 1516    Acceptance, E, VU  by EDWARD at 8/28/2023 1014   Family Acceptance, E,TB,D, VU,NR by MARK at 9/7/2023 1835                         Point: Home exercise program (Done)       Learning Progress Summary             Patient Acceptance, E,TB,D, VU,NR by  at 9/13/2023 1327    Acceptance, E, VU by ER at 9/11/2023 1126    Acceptance, E,TB,D, VU,NR by MARK at 9/7/2023 1835    Acceptance, E,TB, VU,DU by JASON at 9/5/2023 1452    Acceptance, E,TB,D, VU,NR by  at 9/3/2023 1516    Acceptance, E, VU by EDWARD at 8/28/2023 1014   Family Acceptance, E,TB,D, VU,NR by MARK at 9/7/2023 1835                         Point: Body mechanics (Done)       Learning Progress Summary             Patient Acceptance, E,TB,D, VU,NR by  at 9/13/2023 1327    Acceptance, E, VU by ER at 9/11/2023 1126    Acceptance, E,TB,D, VU,NR by MARK at 9/7/2023 1835    Acceptance, E,TB, VU,DU by JASON at 9/5/2023 1452    Acceptance, E,TB,D, VU,NR by  at 9/3/2023 1516    Acceptance, E, VU by EDWARD at 8/28/2023 1014   Family Acceptance, E,TB,D, VU,NR by MARK at 9/7/2023 1835                         Point: Precautions (Done)       Learning Progress Summary             Patient Acceptance, E,TB,D, VU,NR by  at 9/13/2023 1327    Acceptance, E, VU by ER at 9/11/2023 1126    Acceptance, E,TB,D, VU,NR by MARK at 9/7/2023 1835    Acceptance, E,TB, VU,DU by  at 9/5/2023 1452    Acceptance, E,TB,D, VU,NR by  at 9/3/2023 1516    Acceptance, E, VU by [facility] at 8/28/2023 1014   Family Acceptance, E,TB,D, VU,NR by MARK at 9/7/2023 1835                                         User Key       Initials Effective Dates Name Provider Type Discipline     03/07/18 -  Sara Graf, PTA Physical Therapist Assistant PT     03/07/18 -  Leonor Graf, PTA Physical Therapist Assistant PT     04/08/22 -  Margie Main, PT Physical Therapist PT    [facility] 05/02/22 -  Dixie Butler, PT Physical Therapist PT    CS 09/22/22 -  Anahy Johnson, PT Physical Therapist PT    ER 06/26/23 -  Clotilde Kamara, PT Physical  Therapist PT                  PT Recommendation and Plan     Plan of Care Reviewed With: patient  Progress: improving  Outcome Evaluation: Pt tolerated treatment well this date. Required SBA for bed mobility and to stand, then SBA-CGA to ambulate 10ft, no AD. Pt was slightly unsteady at times, reaching out for objects to balance self, though declined assist. Instructed pt on a few seated and standing LE exercises, and encouraged pt to attempt on own during the day.     Time Calculation:         PT Charges       Row Name 09/13/23 1329             Time Calculation    Start Time 0859  -      Stop Time 0945  -      Time Calculation (min) 46 min  -      PT Received On 09/13/23  -      PT - Next Appointment 09/15/23  -SM                User Key  (r) = Recorded By, (t) = Taken By, (c) = Cosigned By      Initials Name Provider Type    Leonor Kulkarni PTA Physical Therapist Assistant                  Therapy Charges for Today       Code Description Service Date Service Provider Modifiers Qty    92789605639 HC GAIT TRAINING EA 15 MIN 9/13/2023 Leonor Graf PTA GP 1    12868473527 HC PT THERAPEUTIC ACT EA 15 MIN 9/13/2023 Leonor Graf PTA GP 1    25252513347 HC PT THER PROC EA 15 MIN 9/13/2023 Leonor Graf PTA GP 1            PT G-Codes  Outcome Measure Options: AM-PAC 6 Clicks Basic Mobility (PT)  AM-PAC 6 Clicks Score (PT): 18  PT Discharge Summary  Anticipated Discharge Disposition (PT): home with home health    Leonor Graf PTA  9/13/2023

## 2023-09-13 NOTE — CASE MANAGEMENT/SOCIAL WORK
Continued Stay Note  [facility]     Patient Name: Anne Jewell  MRN: [id number]  Today's Date: 9/13/2023    Admit Date: 8/25/2023    Plan: Home w/ [facility] & 24/7 assist from family, wheelchair from [employer]   Discharge Plan       Row Name 09/13/23 0926       Plan    Plan Home w/ [facility] & 24/7 assist from family, wheelchair from [employer]    Plan Comments CCP spoke with patient's daughter Ayanna over the phone regarding updates on the dc plan. Plan remains home w/ [facility] & 24/7 care from family. CCP notified daughter that wheelchair from [employer] will be delivered to home. Daughter is requesting overnight oximetry - secure chat sent to MD requesting. CCP following for any home night-time oxygen needs. DANIEL TRINH                   Discharge Codes    No documentation.                 Expected Discharge Date and Time       Expected Discharge Date Expected Discharge Time    Sep 12, 2023               DANIEL Leary

## 2023-09-13 NOTE — PROGRESS NOTES
LOS: 19 days   Patient Care Team:  Jewell Stephens APRN as PCP - General (Family Medicine)  Edilberto Lemus MD as Consulting Physician (Nephrology)  Rashaun Alvarado MD as Surgeon (Orthopedic Surgery)  Delma Montoya MD as Consulting Physician (Obstetrics and Gynecology)  Stuart Borges MD (Hematology and Oncology)  Isael Barker MD as Consulting Physician (Gastroenterology)  Haven Gr APRN as Nurse Practitioner (Nurse Practitioner)    Chief Complaint: Follow-up paroxysmal atrial fibrillation with RVR (new).    Interval History: Remains in sinus rhythm.  Slowly regaining strength.  No chest pain or shortness of breath.  She wants to get more active and eat more.    Vital Signs:  Temp:  [97.3 °F (36.3 °C)-98.4 °F (36.9 °C)] 97.3 °F (36.3 °C)  Heart Rate:  [65-72] 65  Resp:  [18] 18  BP: (100-116)/(53-57) 109/53    Intake/Output Summary (Last 24 hours) at 9/13/2023 1627  Last data filed at 9/13/2023 1400  Gross per 24 hour   Intake --   Output 300 ml   Net -300 ml       Physical Exam:   General Appearance:    No acute distress, alert and oriented x4, chronically ill-appearing.   Lungs:     Clear to auscultation bilaterally     Heart:    Regular rhythm and normal rate.  No murmurs, gallops, or    rubs.   Abdomen:     Soft, nontender, nondistended.    Extremities:   No clubbing, cyanosis, or edema.     Results Review:    Results from last 7 days   Lab Units 09/13/23  0548   SODIUM mmol/L 141   POTASSIUM mmol/L 3.9   CHLORIDE mmol/L 107   CO2 mmol/L 26.8   BUN mg/dL 5*   CREATININE mg/dL 0.73   GLUCOSE mg/dL 80   CALCIUM mg/dL 7.8*     Results from last 7 days   Lab Units 09/09/23  1857 09/09/23  1622   HSTROP T ng/L 28* 27*     Results from last 7 days   Lab Units 09/13/23  0548   WBC 10*3/mm3 5.66   HEMOGLOBIN g/dL 8.8*   HEMATOCRIT % 26.8*   PLATELETS 10*3/mm3 296             Results from last 7 days   Lab Units 09/13/23  0548   MAGNESIUM mg/dL 1.8           I reviewed the patient's new  clinical results.        Assessment:  1.  Choledocholithiasis  2.  Gastric adenocarcinoma  3.  COVID-19 infection  4.  Status post exploratory laparotomy and distal gastrectomy on 9/1/2023 by Dr. Loo  5.  Status post second exploratory laparotomy and gastrojejunostomy on 9/7/2023 by Dr. Loo  6.  Chronic pain syndrome  7.  Paroxysmal atrial fibrillation with RVR and aberrancy (new)  8.  Stage III chronic kidney disease   9.  Rheumatoid arthritis  10.  Osteoarthritis   11.  Hypoalbuminemia and low total protein    Plan:  -Remains in sinus rhythm.  Continue amiodarone 200 mg/day.  Continue metoprolol 25 mg every 12 hours (blood pressure tolerating currently).    -No evidence of ventricular tachycardia.  This was atrial fibrillation with aberrant conduction and a left bundle pattern.  She was also completely asymptomatic.    -Continue Xarelto 20 mg/day.  No bleeding.    -Discussed again with the patient at bedside, as well as her daughter (who is a nurse).    Noel Koo MD  09/13/23  16:27 EDT

## 2023-09-13 NOTE — PROGRESS NOTES
Chief Complaint:    S/P Exploratory laparotomy with distal gastrectomy, POD 10  S/P Exploratory laparotomy with gastrojejunostomy, POD 5    Subjective:    The patient is feeling well with no complaints except back pain.  She is tolerating a diet.  She had some vomiting yesterday immediately after she ate she vomited it up.  Otherwise, she has not had any problems.    Objective:    Temp:  [97.3 °F (36.3 °C)-98.4 °F (36.9 °C)] 97.3 °F (36.3 °C)  Heart Rate:  [65-72] 65  Resp:  [18] 18  BP: (100-116)/(53-57) 109/53    Physical Exam  Constitutional:       Appearance: She is not ill-appearing or toxic-appearing.   Abdominal:      Palpations: Abdomen is soft.      Tenderness: There is no abdominal tenderness.   Neurological:      Mental Status: She is alert.   Psychiatric:         Behavior: Behavior is cooperative.       Results:    WBC is 5.66.  H/H is 8.8/26.8.  BUN is 5 and creatinine 0.73.  Albumin is 1.7.    Impression/Plan:    The patient is POD 10 from an exploratory laparotomy with distal gastrectomy and POD 5 for exploratory laparotomy with gastrojejunostomy.  She is recovering well.  She is ready for discharge when felt medically able.    Slava Loo Jr., M.D.

## 2023-09-13 NOTE — PLAN OF CARE
Goal Outcome Evaluation:                      Patient alert and oriented with c/o chronic pain medicated per MAR. Daughter at bedside very attentive to patient and her needs. Dressing changed to midline incision- patient tolerated well. No acute distress noted, will continue to monitor.

## 2023-09-13 NOTE — PROGRESS NOTES
Subjective     CHIEF COMPLAINT:     Gastric cancer  Anemia    INTERVAL HISTORY:     Patient did not have vomiting today. She is tolerating the diet ok today.     REVIEW OF SYSTEMS:  A comprehensive review of systems was obtained with pertinent positive findings as noted in the interval history above.  All other systems negative.    SCHEDULED MEDS:  amiodarone, 200 mg, Oral, Q24H  baclofen, 5 mg, Oral, TID  cetirizine, 10 mg, Oral, Nightly  cholecalciferol, 2,000 Units, Oral, Daily  diphenhydrAMINE, 25 mg, Oral, Daily  escitalopram, 10 mg, Oral, Daily  famotidine, 20 mg, Intravenous, Q12H  ferric gluconate, 250 mg, Intravenous, Daily  guaiFENesin, 600 mg, Oral, Nightly  levothyroxine, 100 mcg, Oral, Q AM  magnesium sulfate, 4 g, Intravenous, Once  metoprolol tartrate, 25 mg, Oral, Q12H  montelukast, 10 mg, Oral, Daily  multivitamin, 1 tablet, Oral, Daily  oxyCODONE, 80 mg, Oral, Q12H  promethazine, 25 mg, Oral, BID  rivaroxaban, 20 mg, Oral, Daily With Dinner  saccharomyces boulardii, 250 mg, Oral, BID  sodium chloride, 10 mL, Intravenous, Q12H  vitamin B-12, 1,000 mcg, Oral, Daily      INFUSIONS:  sodium chloride, 30 mL/hr, Last Rate: 9 mL/hr (09/04/23 0900)  sodium chloride, 30 mL/hr      Objective   VITAL SIGNS:  Temp:  [97.3 °F (36.3 °C)-98.8 °F (37.1 °C)] 97.3 °F (36.3 °C)  Heart Rate:  [65-73] 65  Resp:  [16-18] 18  BP: (100-125)/(53-65) 109/53     PHYSICAL EXAMINATION:   GENERAL:  The patient appears in fair general condition, not in acute distress.  SKIN: No skin rash. No ecchymosis.   HEAD:  Normocephalic.  EYES:  Pallor. No jaundice.  NECK:  Supple. No Masses.  CHEST: Normal respiratory effort.  CARDIAC: No edema.  ABDOMEN: No tenderness.   EXTREMITIES: No calf tenderness.  PSYCH: Normal mood and affect.     RESULT REVIEW:   Results from last 7 days   Lab Units 09/13/23  0548 09/12/23  0629 09/11/23  0812 09/10/23  0140 09/09/23  1654   WBC 10*3/mm3 5.66 4.75 4.63 8.33 12.44*   NEUTROS ABS 10*3/mm3  --   --    --   --  9.48*   HEMOGLOBIN g/dL 8.8* 8.7* 8.9* 9.3* 11.3*   HEMATOCRIT % 26.8* 26.8* 26.4* 29.1* 34.5   PLATELETS 10*3/mm3 296 254 212 323 432     Results from last 7 days   Lab Units 09/13/23  0548 09/12/23  0629 09/11/23  1902 09/11/23  0606 09/10/23  0140 09/09/23  1622   SODIUM mmol/L 141 138  --  136 135* 135*   POTASSIUM mmol/L 3.9 3.7  --  3.4* 3.9 4.9  5.0   CHLORIDE mmol/L 107 106  --  102 103 103   CO2 mmol/L 26.8 26.0  --  26.5 25.0 21.0*   BUN mg/dL 5* 5*  --  6* 9 8   CREATININE mg/dL 0.73 0.83  --  0.78 0.84 0.98   CALCIUM mg/dL 7.8* 7.6*  --  7.4* 7.4* 7.9*   ALBUMIN g/dL 1.7* 1.7*  --  1.9* 1.8* 2.0*   BILIRUBIN mg/dL 0.3 0.3  --  0.3 0.3 0.5   ALK PHOS U/L 126* 120*  --  116 141* 177*   ALT (SGPT) U/L 8 9  --  9 8 11   AST (SGOT) U/L 26 25  --  23 20 43*   MAGNESIUM mg/dL 1.8  --  1.7 2.6*  --  1.9         Lab 09/12/23  0629   IRON 17*   IRON SATURATION (TSAT) 15*   TIBC 112*   TRANSFERRIN 75*   FERRITIN 68.20   FOLATE 13.50   VITAMIN B 12 >2,000*      Component      Latest Ref Rng 9/12/2023   CEA      ng/mL 1.63    CA 19-9      <=35.0 U/mL 11.8      Assessment & Plan   *Gastric adenocarcinoma.    Patient was found to have elevated liver function tests.    MRCP revealed a large stone in the distal common bile duct with intrahepatic biliary dilatation.    On 9/1/2023, she underwent exploratory laparotomy with gastric access for ERCP and distal gastrectomy.    She was found to have a fungating gastric mass which was initially felt to be benign  However, pathology exam came back positive for adenocarcinoma.   She underwent exploratory laparotomy with gastrojejunostomy on 9/8/2023.   Pathology exam revealed a 6 cm tumor invading muscularis mucosa.  Margins were negative with the closest being 2.5 cm.  No angiolymphatic invasion.  No perineural invasion.  No lymph nodes were present for evaluation.  Her-2 - Negative by IHC  PD-L1 - CPS 6 (positive)  9/12/2023: CEA 1.63.  CA 19-9 11.8.  Since the  status of the lymph nodes is not clear, recommended obtaining a PET scan in 4-6 weeks.  If the adjacent lymph nodes are hypermetabolic, postoperative chemoradiation and chemotherapy will be recommended if her performance status improves.  If her performance status does not improve, she would not be a good candidate for chemotherapy.  Patient indicated that she would prefer to have follow-up with local oncology close to home and are trying to see if they can make an appointment at [facility].     *Macrocytic anemia.  9/11/2023: Hemoglobin 8.9.  MCV 96.7.  She is taking oral vitamin B12 daily regularly.  9/12/2023: Hemoglobin 8.7.  9/12/2023: Ferritin 68.  Transferrin saturation 15%.  Vitamin B12 >2000.  Folate normal at 13.5.  Due to her gastric surgery and the expected decreased iron absorption, IV iron therapy was recommended.  Ferrlecit 250 mg daily was started on 9/12/2023.    *Chronic anticoagulation.  Patient was on chronic anticoagulation with Xarelto which was held for the gas leak procedures.  She was restarted on Xarelto on 9/11/2023.     *COVID infection.  This may have resulted in an increase in the ferritin level.     PLAN:     1.  Day #2 of IV Ferrlecit today.  2.  Continue vitamin B12 1000 mcg daily.  3.  Recommended obtaining PET scan in 4-6 weeks to evaluate the status of the lymph nodes.      Camila Lazaro MD  09/13/23

## 2023-09-13 NOTE — PLAN OF CARE
Goal Outcome Evaluation:  Plan of Care Reviewed With: patient        Progress: improving  Outcome Evaluation: No complaints voiced, routine pain med providing adequate pain relief, VSS, EKG this am shows Junctional rythm.  tolerates dressing change, Eyes closed at long interval, resting quietly in room

## 2023-09-13 NOTE — PROGRESS NOTES
Name: Anne Jewell ADMIT: 2023   : 1950  PCP: Jewell Stephens APRN    MRN: [id number] LOS: 19 days   AGE/SEX: 73 y.o. female  ROOM: [facility]     Subjective   Subjective   Feeling good today.  No acute complaints.    Objective   Objective   Vital Signs  Temp:  [97.3 °F (36.3 °C)-98.8 °F (37.1 °C)] 97.3 °F (36.3 °C)  Heart Rate:  [65-73] 65  Resp:  [16-18] 18  BP: (100-116)/(53-64) 109/53  SpO2:  [97 %-100 %] 100 %  on  Flow (L/min):  [1] 1;   Device (Oxygen Therapy): nasal cannula  Body mass index is 34.56 kg/m².  Physical Exam  Constitutional:       General: She is not in acute distress.     Appearance: Normal appearance. She is ill-appearing.   Cardiovascular:      Rate and Rhythm: Normal rate.      Pulses: Normal pulses.   Pulmonary:      Effort: No respiratory distress.      Breath sounds: No stridor.   Abdominal:      Comments: Drain right lower quadrant with serosanguineous output   Musculoskeletal:      Right lower leg: No edema.      Left lower leg: No edema.   Skin:     General: Skin is warm.   Neurological:      General: No focal deficit present.      Mental Status: She is alert.       Results Review     I reviewed the patient's new clinical results.  Results from last 7 days   Lab Units 23  0548 23  0629 23  0812 09/10/23  0140   WBC 10*3/mm3 5.66 4.75 4.63 8.33   HEMOGLOBIN g/dL 8.8* 8.7* 8.9* 9.3*   PLATELETS 10*3/mm3 296 254 212 323     Results from last 7 days   Lab Units 23  0548 23  0629 23  0606 09/10/23  0140   SODIUM mmol/L 141 138 136 135*   POTASSIUM mmol/L 3.9 3.7 3.4* 3.9   CHLORIDE mmol/L 107 106 102 103   CO2 mmol/L 26.8 26.0 26.5 25.0   BUN mg/dL 5* 5* 6* 9   CREATININE mg/dL 0.73 0.83 0.78 0.84   GLUCOSE mg/dL 80 81 74 88   EGFR mL/min/1.73 87.0 74.5 80.3 73.5     Results from last 7 days   Lab Units 23  0548 23  0629 23  0606 09/10/23  0140   ALBUMIN g/dL 1.7* 1.7* 1.9* 1.8*   BILIRUBIN mg/dL 0.3 0.3 0.3 0.3    ALK PHOS U/L 126* 120* 116 141*   AST (SGOT) U/L 26 25 23 20   ALT (SGPT) U/L 8 9 9 8     Results from last 7 days   Lab Units 09/13/23  0548 09/12/23  0629 09/11/23  1902 09/11/23  0606 09/10/23  0140 09/09/23  1622   CALCIUM mg/dL 7.8* 7.6*  --  7.4* 7.4* 7.9*   ALBUMIN g/dL 1.7* 1.7*  --  1.9* 1.8* 2.0*   MAGNESIUM mg/dL 1.8  --  1.7 2.6*  --  1.9   PHOSPHORUS mg/dL  --   --   --   --   --  3.3     Results from last 7 days   Lab Units 09/09/23  1654   LACTATE mmol/L 1.3     No results found for: HGBA1C, POCGLU      No radiology results for the last day  Scheduled Medications  amiodarone, 200 mg, Oral, Q24H  baclofen, 5 mg, Oral, TID  cetirizine, 10 mg, Oral, Nightly  cholecalciferol, 2,000 Units, Oral, Daily  diphenhydrAMINE, 25 mg, Oral, Daily  escitalopram, 10 mg, Oral, Daily  famotidine, 20 mg, Intravenous, Q12H  ferric gluconate, 250 mg, Intravenous, Daily  guaiFENesin, 600 mg, Oral, Nightly  levothyroxine, 100 mcg, Oral, Q AM  magnesium sulfate, 4 g, Intravenous, Once  metoprolol tartrate, 25 mg, Oral, Q12H  montelukast, 10 mg, Oral, Daily  multivitamin, 1 tablet, Oral, Daily  oxyCODONE, 80 mg, Oral, Q12H  promethazine, 25 mg, Oral, BID  rivaroxaban, 20 mg, Oral, Daily With Dinner  saccharomyces boulardii, 250 mg, Oral, BID  sodium chloride, 10 mL, Intravenous, Q12H  vitamin B-12, 1,000 mcg, Oral, Daily    Infusions  sodium chloride, 30 mL/hr, Last Rate: 9 mL/hr (09/04/23 0900)  sodium chloride, 30 mL/hr    Diet  Diet: Gastrointestinal Diets; Fiber-Restricted; Texture: Soft to Chew (NDD 3); Soft to Chew: Chopped Meat; Fluid Consistency: Thin (IDDSI 0)       Assessment/Plan     Active Hospital Problems    Diagnosis  POA    **Gastric adenocarcinoma [C16.9]  Yes    Hypokalemia [E87.6]  Yes    Paroxysmal A-fib [I48.0]  Yes    Malignant neoplasm of pyloric antrum [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       73 y.o. female admitted with Gastric adenocarcinoma.    Anne Jewell is a 72-year-old female is unable to safely use cane or walker due to her history of PE on Xarelto, CKD stage III, hypothyroidism, chronic pain syndrome, RA, generalized osteoarthritis, memory loss. This is causing pain and weakness allowing her not to be mobile. A standard wheelchair is needed to assist with daily living activities in the home. Patient has upper body strength, and the mental capability to propel the wheelchair inside the home.     09/13/23  Overnight oximetry test tonight.    Choledocholithiasis  History of gastric bypass  Elevated LFTs  Fungating stomach mass/adenocarcinoma  -MRCP showing large stone in the distal common bile duct, 1.8 cm.  Intrahepatic biliary dilatation.  Marked biliary dilatation with CBD measuring 2.1 cm  -S/P IV ceftriaxone, metronidazole  -ERCP via gastrotomy with Surgery with biliary sphincterectomy and balloon extraction  -Fungating gastric mass which was suspicious for benign lesion per surgery, but pathology consistent with adenocarcinoma  - S/P exp lap w/ distal gastrectomy 9/1/23, second ex lap w/ gastrojejunostomy 9/7/23  -Pain controlled on current regimen  -Heme-Onc following  -Will need PET scan in about 4 weeks (around 10/13/2023)     Opioid induced constipation  - Was on amitiza but stopped due to frequent stools. PRN regimen in place     Vaginal yeast infection  -Diflucan x2 doses,  completed     COVID-19  Fever, resolved  -Blood cultures with no growth   -Chest x-ray without any abnormalities.    -Completed 3 days of remdesivir     History of pulmonary pulmonary embolism  Diagnosed in 2007, has been on anticoagulation  since.  At home takes Xarelto   -Xarelto resumed 9/11/2023     Hypothyroidism  -levothyroxine, recent TSH normal     CKD stage IIIa  -Creatinine stable, monitor.     Chronic pain syndrome/chronic arthritis  -Oxycodone extended release to 80 mg twice daily scheduled.  Oxycodone extended release to 80 mg twice a day as needed as well.  Continue home alprazolam 3 mg nightly     History of RA  -Not on treatment for RA per chart review, reports she was diagnosed but was never on specific treatment.     Memory loss  -Daughter states she has noticed memory changes for the last several months.  Was referred to neurology outpatient. Cognition/memory at baseline.  Follow-up as outpatient     Abnormal liver on MRI  -Repeat CT abdomen with liver protocol in 2 to 3 months     Acute on chronic anemia  -IV iron per Heme/Onc     Paroxysmal afib:  -Cardiology following  -Amiodarone p.o.       Flow (L/min):  [1] 1      Xarelto (home med) for DVT prophylaxis.  Discussed with patient, family, and care team on multidisciplinary rounds.  Anticipate discharge home with HH in 1-2 days      Edilberto Meza MD  [facility]  09/13/23  12:55 EDT

## 2023-09-13 NOTE — PLAN OF CARE
Goal Outcome Evaluation:  Plan of Care Reviewed With: patient        Progress: improving  Outcome Evaluation: Pt tolerated treatment well this date. Required SBA for bed mobility and to stand, then SBA-CGA to ambulate 10ft, no AD. Pt was slightly unsteady at times, reaching out for objects to balance self, though declined assist. Instructed pt on a few seated and standing LE exercises, and encouraged pt to attempt on own during the day.      Anticipated Discharge Disposition (PT): home with home health

## 2023-09-14 ENCOUNTER — APPOINTMENT (OUTPATIENT)
Dept: GENERAL RADIOLOGY | Facility: HOSPITAL | Age: 73
DRG: 423 | End: 2023-09-14
Payer: MEDICARE

## 2023-09-14 DIAGNOSIS — I48.0 PAROXYSMAL A-FIB: Primary | ICD-10-CM

## 2023-09-14 LAB
ALBUMIN SERPL-MCNC: 2.1 G/DL (ref 3.5–5.2)
ALBUMIN/GLOB SERPL: 0.7 G/DL
ALP SERPL-CCNC: 118 U/L (ref 39–117)
ALT SERPL W P-5'-P-CCNC: 10 U/L (ref 1–33)
ANION GAP SERPL CALCULATED.3IONS-SCNC: 5 MMOL/L (ref 5–15)
AST SERPL-CCNC: 17 U/L (ref 1–32)
BACTERIA SPEC AEROBE CULT: ABNORMAL
BILIRUB SERPL-MCNC: 0.3 MG/DL (ref 0–1.2)
BUN SERPL-MCNC: 7 MG/DL (ref 8–23)
BUN/CREAT SERPL: 9.9 (ref 7–25)
CALCIUM SPEC-SCNC: 7.9 MG/DL (ref 8.6–10.5)
CHLORIDE SERPL-SCNC: 107 MMOL/L (ref 98–107)
CO2 SERPL-SCNC: 29 MMOL/L (ref 22–29)
CREAT SERPL-MCNC: 0.71 MG/DL (ref 0.57–1)
DEPRECATED RDW RBC AUTO: 47.5 FL (ref 37–54)
EGFRCR SERPLBLD CKD-EPI 2021: 89.9 ML/MIN/1.73
ERYTHROCYTE [DISTWIDTH] IN BLOOD BY AUTOMATED COUNT: 13.7 % (ref 12.3–15.4)
GLOBULIN UR ELPH-MCNC: 2.9 GM/DL
GLUCOSE SERPL-MCNC: 74 MG/DL (ref 65–99)
HCT VFR BLD AUTO: 28.2 % (ref 34–46.6)
HGB BLD-MCNC: 9.2 G/DL (ref 12–15.9)
MAGNESIUM SERPL-MCNC: 1.9 MG/DL (ref 1.6–2.4)
MAGNESIUM SERPL-MCNC: 2.4 MG/DL (ref 1.6–2.4)
MCH RBC QN AUTO: 31.2 PG (ref 26.6–33)
MCHC RBC AUTO-ENTMCNC: 32.6 G/DL (ref 31.5–35.7)
MCV RBC AUTO: 95.6 FL (ref 79–97)
PLATELET # BLD AUTO: 295 10*3/MM3 (ref 140–450)
PMV BLD AUTO: 9.7 FL (ref 6–12)
POTASSIUM SERPL-SCNC: 3.4 MMOL/L (ref 3.5–5.2)
POTASSIUM SERPL-SCNC: 3.9 MMOL/L (ref 3.5–5.2)
PROT SERPL-MCNC: 5 G/DL (ref 6–8.5)
RBC # BLD AUTO: 2.95 10*6/MM3 (ref 3.77–5.28)
SODIUM SERPL-SCNC: 141 MMOL/L (ref 136–145)
WBC NRBC COR # BLD: 5.63 10*3/MM3 (ref 3.4–10.8)

## 2023-09-14 PROCEDURE — 25010000002 NA FERRIC GLUC CPLX PER 12.5 MG: Performed by: INTERNAL MEDICINE

## 2023-09-14 PROCEDURE — 25010000002 ONDANSETRON PER 1 MG: Performed by: INTERNAL MEDICINE

## 2023-09-14 PROCEDURE — 80053 COMPREHEN METABOLIC PANEL: CPT | Performed by: INTERNAL MEDICINE

## 2023-09-14 PROCEDURE — 0 MAGNESIUM SULFATE 4 GM/100ML SOLUTION: Performed by: HOSPITALIST

## 2023-09-14 PROCEDURE — 63710000001 PROMETHAZINE PER 25 MG: Performed by: INTERNAL MEDICINE

## 2023-09-14 PROCEDURE — 71045 X-RAY EXAM CHEST 1 VIEW: CPT

## 2023-09-14 PROCEDURE — 99232 SBSQ HOSP IP/OBS MODERATE 35: CPT | Performed by: INTERNAL MEDICINE

## 2023-09-14 PROCEDURE — 63710000001 DIPHENHYDRAMINE PER 50 MG: Performed by: INTERNAL MEDICINE

## 2023-09-14 PROCEDURE — 99024 POSTOP FOLLOW-UP VISIT: CPT | Performed by: SURGERY

## 2023-09-14 PROCEDURE — 83735 ASSAY OF MAGNESIUM: CPT | Performed by: HOSPITALIST

## 2023-09-14 PROCEDURE — 85027 COMPLETE CBC AUTOMATED: CPT | Performed by: INTERNAL MEDICINE

## 2023-09-14 PROCEDURE — 84132 ASSAY OF SERUM POTASSIUM: CPT | Performed by: HOSPITALIST

## 2023-09-14 RX ORDER — POTASSIUM CHLORIDE 750 MG/1
40 TABLET, FILM COATED, EXTENDED RELEASE ORAL EVERY 4 HOURS
Status: COMPLETED | OUTPATIENT
Start: 2023-09-14 | End: 2023-09-14

## 2023-09-14 RX ORDER — MAGNESIUM SULFATE HEPTAHYDRATE 40 MG/ML
4 INJECTION, SOLUTION INTRAVENOUS ONCE
Status: COMPLETED | OUTPATIENT
Start: 2023-09-14 | End: 2023-09-14

## 2023-09-14 RX ADMIN — MONTELUKAST SODIUM 10 MG: 10 TABLET, FILM COATED ORAL at 09:07

## 2023-09-14 RX ADMIN — Medication 10 ML: at 21:02

## 2023-09-14 RX ADMIN — BACLOFEN 5 MG: 10 TABLET ORAL at 09:08

## 2023-09-14 RX ADMIN — BACLOFEN 5 MG: 10 TABLET ORAL at 21:00

## 2023-09-14 RX ADMIN — METOPROLOL TARTRATE 25 MG: 25 TABLET, FILM COATED ORAL at 09:08

## 2023-09-14 RX ADMIN — LEVOTHYROXINE SODIUM 100 MCG: 0.1 TABLET ORAL at 09:07

## 2023-09-14 RX ADMIN — DIPHENHYDRAMINE HYDROCHLORIDE 25 MG: 25 CAPSULE ORAL at 09:07

## 2023-09-14 RX ADMIN — GUAIFENESIN 600 MG: 600 TABLET, EXTENDED RELEASE ORAL at 21:01

## 2023-09-14 RX ADMIN — OXYCODONE HYDROCHLORIDE 80 MG: 40 TABLET, FILM COATED, EXTENDED RELEASE ORAL at 09:07

## 2023-09-14 RX ADMIN — ESCITALOPRAM OXALATE 10 MG: 10 TABLET, FILM COATED ORAL at 09:07

## 2023-09-14 RX ADMIN — OXYCODONE HYDROCHLORIDE 80 MG: 40 TABLET, FILM COATED, EXTENDED RELEASE ORAL at 21:01

## 2023-09-14 RX ADMIN — FAMOTIDINE 20 MG: 10 INJECTION INTRAVENOUS at 09:07

## 2023-09-14 RX ADMIN — Medication 250 MG: at 21:02

## 2023-09-14 RX ADMIN — ALPRAZOLAM 3 MG: 1 TABLET ORAL at 21:28

## 2023-09-14 RX ADMIN — POTASSIUM CHLORIDE 40 MEQ: 750 TABLET, EXTENDED RELEASE ORAL at 09:08

## 2023-09-14 RX ADMIN — BACLOFEN 5 MG: 10 TABLET ORAL at 14:25

## 2023-09-14 RX ADMIN — Medication 10 ML: at 09:17

## 2023-09-14 RX ADMIN — PROMETHAZINE HYDROCHLORIDE 25 MG: 12.5 TABLET ORAL at 21:02

## 2023-09-14 RX ADMIN — Medication 2000 UNITS: at 09:06

## 2023-09-14 RX ADMIN — SODIUM CHLORIDE 250 MG: 9 INJECTION, SOLUTION INTRAVENOUS at 09:09

## 2023-09-14 RX ADMIN — MAGNESIUM SULFATE HEPTAHYDRATE 4 G: 40 INJECTION, SOLUTION INTRAVENOUS at 11:33

## 2023-09-14 RX ADMIN — METOPROLOL TARTRATE 25 MG: 25 TABLET, FILM COATED ORAL at 21:02

## 2023-09-14 RX ADMIN — Medication 1 TABLET: at 09:07

## 2023-09-14 RX ADMIN — Medication 250 MG: at 09:21

## 2023-09-14 RX ADMIN — CETIRIZINE HYDROCHLORIDE 10 MG: 10 TABLET ORAL at 21:01

## 2023-09-14 RX ADMIN — PROMETHAZINE HYDROCHLORIDE 25 MG: 12.5 TABLET ORAL at 09:08

## 2023-09-14 RX ADMIN — AMIODARONE HYDROCHLORIDE 200 MG: 200 TABLET ORAL at 09:08

## 2023-09-14 RX ADMIN — FAMOTIDINE 20 MG: 10 INJECTION INTRAVENOUS at 21:02

## 2023-09-14 RX ADMIN — POTASSIUM CHLORIDE 40 MEQ: 750 TABLET, EXTENDED RELEASE ORAL at 12:50

## 2023-09-14 RX ADMIN — ONDANSETRON 4 MG: 2 INJECTION INTRAMUSCULAR; INTRAVENOUS at 18:40

## 2023-09-14 RX ADMIN — RIVAROXABAN 20 MG: 20 TABLET, FILM COATED ORAL at 18:40

## 2023-09-14 RX ADMIN — Medication 1000 MCG: at 09:07

## 2023-09-14 NOTE — PLAN OF CARE
Goal Outcome Evaluation:   Pt alert and oriented-  SR on the monitor-room air-pt c/o chronic pain -medicated per MAR. Daughter at bedside- VSS-Dressing changed to midline incision- patient tolerated well. Possible d/c tomorrow- No acute distress noted.

## 2023-09-14 NOTE — PROGRESS NOTES
"Nutrition Services    Patient Name:  Anne Jewell  YOB: 1950  MRN: [id number]  Admit Date:  8/25/2023    FOLLOW UP - CLINICAL NUTRITION    Assessment Date:  09/14/23  Pt tolerating Fiber-restricted diet with 25-75% po intake of meals.   +N/V 9/12, last BM 9/13  Boost Breeze started 9/13  Noted plans for possible d/c home tomorrow per MD notes.   RD to follow per protocol.     Encounter Information         Reason for Encounter Follow up     Current Issues Choledocholithiasis   POD#6 s/p exp. Lap with gastrojejunostomy  POD#11 s/p exp. Lap with distal gastrectomy     Current Nutrition Orders & Evaluation of Intake       Oral Nutrition     Current PO Diet Diet: Gastrointestinal Diets; Fiber-Restricted; Texture: Soft to Chew (NDD 3); Soft to Chew: Chopped Meat; Fluid Consistency: Thin (IDDSI 0)   Supplement Boost Breeze, TID   PO Evaluation     % PO Intake 25-75%    # of Days Evaluated 5    Factors Affecting Intake  nausea, vomiting   --  Anthropometrics          Height    Weight Height: 154.9 cm (61\")  Weight: 81 kg (178 lb 9.2 oz) (09/14/23 0500)    BMI kg/m2 Body mass index is 33.74 kg/m².  Obese, Class I (30 - 34.9)    Weight trend Loss, Amount/Timeframe: 11 lb (5.8%) wt loss x 5 mo      Labs        Pertinent Labs Reviewed, listed below     Results from last 7 days   Lab Units 09/14/23  0621 09/13/23  0548 09/12/23  0629   SODIUM mmol/L 141 141 138   POTASSIUM mmol/L 3.4* 3.9 3.7   CHLORIDE mmol/L 107 107 106   CO2 mmol/L 29.0 26.8 26.0   BUN mg/dL 7* 5* 5*   CREATININE mg/dL 0.71 0.73 0.83   CALCIUM mg/dL 7.9* 7.8* 7.6*   BILIRUBIN mg/dL 0.3 0.3 0.3   ALK PHOS U/L 118* 126* 120*   ALT (SGPT) U/L 10 8 9   AST (SGOT) U/L 17 26 25   GLUCOSE mg/dL 74 80 81       Results from last 7 days   Lab Units 09/14/23  0621 09/13/23  2351 09/13/23  0548 09/12/23  0629 09/11/23  1902 09/09/23  1654 09/09/23  1622   MAGNESIUM mg/dL  --  1.9 1.8  --  1.7   < > 1.9   PHOSPHORUS mg/dL  --   --   --   --   --   --  " 3.3   HEMOGLOBIN g/dL 9.2*  --  8.8*   < >  --    < >  --    HEMATOCRIT % 28.2*  --  26.8*   < >  --    < >  --    WBC 10*3/mm3 5.63  --  5.66   < >  --    < >  --    ALBUMIN g/dL 2.1*  --  1.7*   < >  --    < > 2.0*    < > = values in this interval not displayed.       Results from last 7 days   Lab Units 09/14/23  0621 09/13/23  0548 09/12/23  0629 09/11/23  0812 09/10/23  0140   PLATELETS 10*3/mm3 295 296 254 212 323       COVID19   Date Value Ref Range Status   09/08/2023 Detected (C) Presumptive Negative - Ref. Range Final     Lab Results   Component Value Date    HGBA1C 5.7 (H) 04/14/2022          Medications            Scheduled Medications amiodarone, 200 mg, Oral, Q24H  baclofen, 5 mg, Oral, TID  cetirizine, 10 mg, Oral, Nightly  cholecalciferol, 2,000 Units, Oral, Daily  diphenhydrAMINE, 25 mg, Oral, Daily  escitalopram, 10 mg, Oral, Daily  famotidine, 20 mg, Intravenous, Q12H  ferric gluconate, 250 mg, Intravenous, Daily  guaiFENesin, 600 mg, Oral, Nightly  levothyroxine, 100 mcg, Oral, Q AM  magnesium sulfate, 4 g, Intravenous, Once  metoprolol tartrate, 25 mg, Oral, Q12H  montelukast, 10 mg, Oral, Daily  multivitamin, 1 tablet, Oral, Daily  oxyCODONE, 80 mg, Oral, Q12H  promethazine, 25 mg, Oral, BID  rivaroxaban, 20 mg, Oral, Daily With Dinner  saccharomyces boulardii, 250 mg, Oral, BID  sodium chloride, 10 mL, Intravenous, Q12H  vitamin B-12, 1,000 mcg, Oral, Daily        Infusions sodium chloride, 30 mL/hr, Last Rate: 9 mL/hr (09/04/23 0900)  sodium chloride, 30 mL/hr        PRN Medications   acetaminophen    albuterol    ALPRAZolam    aluminum-magnesium hydroxide-simethicone    senna-docusate sodium **AND** polyethylene glycol **AND** bisacodyl **AND** bisacodyl    calcium carbonate    Calcium Replacement - Follow Nurse / BPA Driven Protocol    loperamide    Magnesium Cardiology Dose Replacement - Follow Nurse / BPA Driven Protocol    naloxone    nitroglycerin    ondansetron    Phosphorus  Replacement - Follow Nurse / BPA Driven Protocol    Potassium Replacement - Follow Nurse / BPA Driven Protocol    Potassium Replacement - Follow Nurse / BPA Driven Protocol    promethazine **OR** promethazine    simethicone    sodium chloride    sodium chloride    sodium chloride    sodium chloride     Physical Findings          Physical Appearance alert, obese, oriented, room air   Oral/Mouth Cavity tooth or teeth missing   Edema  1+ (trace)   Gastrointestinal diarrhea, hypoactive bowel sounds, nausea, non-distended , vomiting, last bowel movement: 9/13   Skin  surgical incision: abdomen incision    Tubes/Drains/Lines none   NFPE Not indicated at this time   --  NUTRITION INTERVENTION / PLAN OF CARE  Intervention Goal         Intervention Goal(s) Maintain nutrition status, Reduce/improve symptoms, Disease management/therapy, Maintain intake, and Appropriate weight loss     Nutrition Intervention         RD Action Encourage intake, Follow Tx Progress, and Care plan reviewed     Nutrition Prescription         Diet Prescription     Supplement Prescription Boost Breeze, TID   EN/PN Prescription    New Prescription Ordered? Continue same per protocol   --  Monitor/Evaluation        Monitor Per protocol, PO intake, Weight, Skin status, GI status, Symptoms, POC/GOC   Discharge Needs Pending clinical course   Education Will instruct as appropriate   --    RD to follow up per protocol.    Electronically signed by:  Willa Wiley RD, Dietitian Intern   09/14/23 13:18 EDT

## 2023-09-14 NOTE — PROGRESS NOTES
"DAILY PROGRESS NOTE  [facility]    Patient Identification:  Name: Anne Jewell  Age: 73 y.o.  Sex: female  :  1950  MRN: [id number]         Primary Care Physician: Jewell Stephens APRN      Subjective  Patient with no new or acute complaints.    Objective:  General Appearance:  Comfortable, in no acute distress and not in pain.    Vital signs: (most recent): Blood pressure 124/64, pulse 73, temperature 97.7 °F (36.5 °C), temperature source Oral, resp. rate 18, height 154.9 cm (61\"), weight 81 kg (178 lb 9.2 oz), SpO2 98 %, not currently breastfeeding.    Lungs:  Normal effort and normal respiratory rate.  Breath sounds clear to auscultation.    Heart: Normal rate.  Regular rhythm.    Abdomen: Abdomen is soft and non-distended.  (AFIA drain still in place.).  Bowel sounds are normal.   There is no abdominal tenderness.     Extremities: There is dependent edema.  (Trace pitting edema about ankles.)  Neurological: Patient is alert and oriented to person, place and time.    Skin:  Warm and dry.                Vital signs in last 24 hours:  Temp:  [97.7 °F (36.5 °C)-98.1 °F (36.7 °C)] 97.7 °F (36.5 °C)  Heart Rate:  [64-73] 73  Resp:  [16-18] 18  BP: (109-131)/(56-72) 124/64    Intake/Output:    Intake/Output Summary (Last 24 hours) at 2023 1212  Last data filed at 2023 1134  Gross per 24 hour   Intake 480 ml   Output 50 ml   Net 430 ml         Results from last 7 days   Lab Units 23  0621 23  0548 23  0629 23  0812 09/10/23  0140 23  1654 23  0501   WBC 10*3/mm3 5.63 5.66 4.75 4.63 8.33 12.44* 15.66*   HEMOGLOBIN g/dL 9.2* 8.8* 8.7* 8.9* 9.3* 11.3* 10.0*   PLATELETS 10*3/mm3 295 296 254 212 323 432 367     Results from last 7 days   Lab Units 23  0621 23  0548 23  0629 23  0606 09/10/23  0140 23  1622 23  0501   SODIUM mmol/L 141 141 138 136 135* 135* 139   POTASSIUM mmol/L 3.4* 3.9 3.7 3.4* 3.9 4.9  5.0 " 5.3*   CHLORIDE mmol/L 107 107 106 102 103 103 106   CO2 mmol/L 29.0 26.8 26.0 26.5 25.0 21.0* 25.3   BUN mg/dL 7* 5* 5* 6* 9 8 5*   CREATININE mg/dL 0.71 0.73 0.83 0.78 0.84 0.98 0.87   GLUCOSE mg/dL 74 80 81 74 88 88 96   Estimated Creatinine Clearance: 68.1 mL/min (by C-G formula based on SCr of 0.71 mg/dL).  Results from last 7 days   Lab Units 09/14/23  0621 09/13/23  2351 09/13/23  0548 09/12/23  0629 09/11/23  1902 09/11/23  0606 09/10/23  0140 09/09/23  1622 09/09/23  0501   CALCIUM mg/dL 7.9*  --  7.8* 7.6*  --  7.4* 7.4* 7.9* 7.7*   ALBUMIN g/dL 2.1*  --  1.7* 1.7*  --  1.9* 1.8* 2.0* 2.0*   MAGNESIUM mg/dL  --  1.9 1.8  --  1.7 2.6*  --  1.9  --    PHOSPHORUS mg/dL  --   --   --   --   --   --   --  3.3  --      Results from last 7 days   Lab Units 09/14/23  0621 09/13/23  0548 09/12/23  0629 09/11/23  0606 09/10/23  0140 09/09/23  1622 09/09/23  0501   ALBUMIN g/dL 2.1* 1.7* 1.7* 1.9* 1.8* 2.0* 2.0*   BILIRUBIN mg/dL 0.3 0.3 0.3 0.3 0.3 0.5 0.4   ALK PHOS U/L 118* 126* 120* 116 141* 177* 159*   AST (SGOT) U/L 17 26 25 23 20 43* 26   ALT (SGPT) U/L 10 8 9 9 8 11 11       Assessment:    Gastric adenocarcinoma    Chronic pain syndrome    Rheumatoid arthritis    Generalized osteoarthritis    Gastroesophageal reflux disease    Acquired hypothyroidism    Stage 3 chronic kidney disease    Choledocholithiasis    History of pulmonary embolism    Malignant neoplasm of pyloric antrum    Hypokalemia    Paroxysmal A-fib    Choledocholithiasis  History of gastric bypass  Elevated LFTs  Fungating stomach mass/adenocarcinoma  -MRCP showing large stone in the distal common bile duct, 1.8 cm.  Intrahepatic biliary dilatation.  Marked biliary dilatation with CBD measuring 2.1 cm  -S/P IV ceftriaxone, metronidazole  -ERCP via gastrotomy with Surgery with biliary sphincterectomy and balloon extraction  -Fungating gastric mass with pathology report of adenocarcinoma.  - S/P exp lap w/gastrojejunostomy 9/8/23   -Pain  controlled on current regimen  -Heme-onc following     Opioid induced constipation  - Was on amitiza but stopped due to frequent stools. PRN regimen in place     Vaginal yeast infection  -Diflucan x2 doses,  completed     COVID-19  Fever, resolved  -Blood cultures with no growth   -Chest x-ray without any abnormalities.    -Completed 3 days of remdesivir     History of pulmonary pulmonary embolism  Diagnosed in 2007, has been on anticoagulation since.  At home takes Xarelto   -Xarelto resumed 9/11/2023     Hypothyroidism  -levothyroxine, recent TSH normal     CKD stage IIIa  -Creatinine stable, monitor.     Chronic pain syndrome/chronic arthritis  -Oxycodone extended release to 80 mg twice daily scheduled.  Oxycodone extended release to 80 mg twice a day as needed as well.  Continue home alprazolam 3 mg nightly     History of RA  -Not on treatment for RA per chart review, reports she was diagnosed but was never on specific treatment.     Memory loss  -Daughter states she has noticed memory changes for the last several months.  Was referred to neurology outpatient. Cognition/memory at baseline.  Follow-up as outpatient     Abnormal liver on MRI  -Repeat CT abdomen with liver protocol in 2 to 3 months     Acute on chronic anemia  -On iron at home, iron stores low. Also mixed anemia.     Paroxysmal afib:  -Cardiology following  -Amiodarone p.o.       Xarelto (home med) for DVT prophylaxis.  Discussed with patient, family, and care team on multidisciplinary rounds.  Anticipate discharge home in 1-2 days    All problems new to this examiner.    Plan:  Please see above.  Awaiting nocturnal oximetry.  Likely discharge in a.m. with home health.    Raymond Hurley MD  9/14/2023  12:12 EDT

## 2023-09-14 NOTE — CASE MANAGEMENT/SOCIAL WORK
Continued Stay Note  [facility]     Patient Name: Anne Jewell  MRN: [id number]  Today's Date: 9/14/2023    Admit Date: 8/25/2023    Plan: Home w/ [facility] & 24/7 assist from family, wheelchair from [facility]   Discharge Plan       Row Name 09/14/23 1401       Plan    Plan Home w/ [facility] & 24/7 assist from family, wheelchair from [facility]    Plan Comments CCP spoke with daughter Ayanna over the phone; plan remains home w/ [facility] & 24/7 assist from family. Overnight oximetry order is still pending. Wheelchair to be delivered to home. CCP to notify [facility] when patient is dcing. DANIEL TRINH                   Discharge Codes    No documentation.                 Expected Discharge Date and Time       Expected Discharge Date Expected Discharge Time    Sep 12, 2023               DANIEL Leary

## 2023-09-14 NOTE — CONSULTS
[facility]    Reason for consult: eval for anirudh, nocturnal hypoxemia    HPI:  Mrs. Jewell is a 72yo female with multiple medical problems admitted 8/25.  She was covid positive and has an open abdominal wound.  She has had some oxygen desaturations at night.  An overnight oximetry was done last night showing short but multiple drops of oxygen desaturation totaling over 30 mins or so.  Mrs. Jewell is not having any shortness of breath. She says she does not have sleep apnea, says she does not snore.  She has some fatigue during the day and feels the amount of fatigue she has is appropriate for her age and medical problems.  She is not interested in evaluation for sleep apnea.  She wants to go home.     Past Medical History:   Diagnosis Date    Allergic rhinitis     Arthritis of back     Arthritis of neck     Asthma     Bronchospasm     Carpal tunnel syndrome, bilateral     Cervical disc disorder     Clotting disorder     Deep vein thrombosis     Disc degeneration, lumbar     Edema     Esophageal reflux     Glaucoma     Hip arthrosis     Joint pain     Kidney disease 2018    stage 3    Liver disease 2018    stage 3    Low back pain     Osteoarthritis     Osteopenia     Osteoporosis     Periarthritis of shoulder     Scoliosis     Status post total knee replacement, left 08/31/2017    Status post total knee replacement, right 08/31/2017    UTI (urinary tract infection)     Venous thrombosis      Social History     Socioeconomic History    Marital status: Legally    Tobacco Use    Smoking status: Former     Passive exposure: Past    Smokeless tobacco: Never   Vaping Use    Vaping Use: Never used   Substance and Sexual Activity    Alcohol use: No    Drug use: No    Sexual activity: Defer     Family History   Problem Relation Age of Onset    Prostate cancer Father     Arthritis Other     Hypertension Other         benign essential    Cancer Other     Diabetes Other     Heart disease Other      Nephrolithiasis Other      MEDS: reviewed as per chart  ALL: pcn  ROS: relevant 12 point as in HPI    Vital Sign Min/Max for last 24 hours  Temp  Min: 97.7 °F (36.5 °C)  Max: 98.1 °F (36.7 °C)   BP  Min: 109/56  Max: 124/64   Pulse  Min: 64  Max: 73   Resp  Min: 16  Max: 18   SpO2  Min: 93 %  Max: 99 %   Flow (L/min)  Min: 1  Max: 1   Weight  Min: 81 kg (178 lb 9.2 oz)  Max: 81 kg (178 lb 9.2 oz)     GEN:   appears ill,  A&O x3  HEENT: PERRL, EOMI, no icterus, mmm, no JVD, trachea midline, neck supple, mallpatti III  CHEST: CTA bilat, no wheezes, no crackles, no use of accessory muscles  CV: RRR, no m/g/r  ABD: soft, nt, nd +bs, no hepatosplenomegaly --significant wound  EXT: no c/c/ 2+edema  SKIN: no rashes, no xanthomas, nl turgor, warm, dry  LYMPH: no palpable cervical or supraclavicular lymphadenopathy  NEURO: CN 2-12 intact and symmetric bilaterally  PSYCH: nl affect, nl orientation, nl judgment, nl mood  MSK: some kyphosis, 5/5 strength ue and le bilaterally    Labs; 9/14: reviewed:  Bicarb 29  Wbc 5  Hgb 9.2  Plts 295    9/14: cXR reviwed as per dictated report    A/P:  Nocturnal hyopxemia -- not really surprising given her current situation.  Suspect her nocturnal oximetry meets criteria to qualify her for oxygen, the benefit from treating/not treating this degree of nocturnal hyopxemia is unknown, but patient daughter would like use of oxygen at night.  Patient is not interested in evaluation for sleep apnea  Covid  Open abdominal wound  Afib

## 2023-09-14 NOTE — PROGRESS NOTES
LOS: 20 days   Patient Care Team:  Jewell Stephens APRN as PCP - General (Family Medicine)  Edilberto Lemus MD as Consulting Physician (Nephrology)  Rashaun Alvarado MD as Surgeon (Orthopedic Surgery)  Delma Montoya MD as Consulting Physician (Obstetrics and Gynecology)  Stuart Borges MD (Hematology and Oncology)  Isael Barker MD as Consulting Physician (Gastroenterology)  Haven Gr APRN as Nurse Practitioner (Nurse Practitioner)    Chief Complaint: Follow-up paroxysmal atrial fibrillation with RVR (new).    Interval History: Remains in sinus rhythm.  Slowly regaining strength.  No chest pain or shortness of breath.  She did feel like she had unsteadiness as she felt like her abdomen was making her off balance earlier today when she stood up.    Vital Signs:  Temp:  [97.7 °F (36.5 °C)-98.1 °F (36.7 °C)] 97.9 °F (36.6 °C)  Heart Rate:  [64-73] 70  Resp:  [16-18] 18  BP: (109-131)/(56-72) 121/62    Intake/Output Summary (Last 24 hours) at 9/14/2023 1701  Last data filed at 9/14/2023 1418  Gross per 24 hour   Intake 600 ml   Output 40 ml   Net 560 ml       Physical Exam:   General Appearance:    No acute distress, alert and oriented x4, chronically ill-appearing.   Lungs:     Clear to auscultation bilaterally     Heart:    Regular rhythm and normal rate.  No murmurs, gallops, or    rubs.   Abdomen:     Soft, nontender, nondistended.    Extremities:   No clubbing, cyanosis, or edema.     Results Review:    Results from last 7 days   Lab Units 09/14/23  0621   SODIUM mmol/L 141   POTASSIUM mmol/L 3.4*   CHLORIDE mmol/L 107   CO2 mmol/L 29.0   BUN mg/dL 7*   CREATININE mg/dL 0.71   GLUCOSE mg/dL 74   CALCIUM mg/dL 7.9*     Results from last 7 days   Lab Units 09/09/23  1857 09/09/23  1622   HSTROP T ng/L 28* 27*     Results from last 7 days   Lab Units 09/14/23  0621   WBC 10*3/mm3 5.63   HEMOGLOBIN g/dL 9.2*   HEMATOCRIT % 28.2*   PLATELETS 10*3/mm3 295             Results from last 7 days    Lab Units 09/13/23  2351   MAGNESIUM mg/dL 1.9           I reviewed the patient's new clinical results.        Assessment:  1.  Choledocholithiasis  2.  Gastric adenocarcinoma  3.  COVID-19 infection  4.  Status post exploratory laparotomy and distal gastrectomy on 9/1/2023 by Dr. Loo  5.  Status post second exploratory laparotomy and gastrojejunostomy on 9/7/2023 by Dr. Loo  6.  Chronic pain syndrome  7.  Paroxysmal atrial fibrillation with RVR and aberrancy (new)  8.  Stage III chronic kidney disease   9.  Rheumatoid arthritis  10.  Osteoarthritis   11.  Hypoalbuminemia and low total protein    Plan:  -Remains in sinus rhythm.  Continue amiodarone 200 mg/day.  Continue metoprolol 25 mg every 12 hours (blood pressure tolerating currently).    -No evidence of ventricular tachycardia.  This was atrial fibrillation with aberrant conduction and a left bundle pattern.  She was also completely asymptomatic.    -Continue Xarelto 20 mg/day.  No bleeding.    -I ordered a chest x-ray as she felt like her abdomen was pushing upwards when she stood up.  I wanted to make sure that her lungs looked good.  Her lungs were clear on auscultation today.    -Her daughter asked for a cardiologist in [place].  I recommended Mateo Villaseñor for follow-up.    -Discussed again with the patient at bedside, as well as her daughter (who is a nurse).    Noel Koo MD  09/14/23  17:01 EDT

## 2023-09-14 NOTE — PROGRESS NOTES
Subjective     CHIEF COMPLAINT:     Gastric cancer  Anemia    INTERVAL HISTORY:     Patient reports having nausea.  No emesis.  She reports feeling somewhat better since she started receiving the IV iron.    REVIEW OF SYSTEMS:  A comprehensive review of systems was obtained with pertinent positive findings as noted in the interval history above.  All other systems negative.    SCHEDULED MEDS:  amiodarone, 200 mg, Oral, Q24H  baclofen, 5 mg, Oral, TID  cetirizine, 10 mg, Oral, Nightly  cholecalciferol, 2,000 Units, Oral, Daily  diphenhydrAMINE, 25 mg, Oral, Daily  escitalopram, 10 mg, Oral, Daily  famotidine, 20 mg, Intravenous, Q12H  ferric gluconate, 250 mg, Intravenous, Daily  guaiFENesin, 600 mg, Oral, Nightly  levothyroxine, 100 mcg, Oral, Q AM  magnesium sulfate, 4 g, Intravenous, Once  metoprolol tartrate, 25 mg, Oral, Q12H  montelukast, 10 mg, Oral, Daily  multivitamin, 1 tablet, Oral, Daily  oxyCODONE, 80 mg, Oral, Q12H  potassium chloride ER, 40 mEq, Oral, Q4H  promethazine, 25 mg, Oral, BID  rivaroxaban, 20 mg, Oral, Daily With Dinner  saccharomyces boulardii, 250 mg, Oral, BID  sodium chloride, 10 mL, Intravenous, Q12H  vitamin B-12, 1,000 mcg, Oral, Daily      INFUSIONS:  sodium chloride, 30 mL/hr, Last Rate: 9 mL/hr (09/04/23 0900)  sodium chloride, 30 mL/hr      Objective   VITAL SIGNS:  Temp:  [97.7 °F (36.5 °C)-98.1 °F (36.7 °C)] 97.7 °F (36.5 °C)  Heart Rate:  [64-73] 73  Resp:  [16-18] 18  BP: (109-131)/(56-72) 124/64     PHYSICAL EXAMINATION:    GENERAL:  The patient appears in fair general condition, not in acute distress.  SKIN: Old ecchymosis over the forearms.  HEAD:  Normocephalic.  EYES: Pale.  No jaundice.  NECK:  Supple. No Masses.  CHEST: Normal respiratory effort.  Few basal crepitations.  CARDIAC: Normal S1-S2.  No murmurs.  ABDOMEN: Nondistended.  PSYCH: Anxious.    RESULT REVIEW:   Results from last 7 days   Lab Units 09/14/23  0621 09/13/23  0548 09/12/23  0629 09/11/23  0812  09/10/23  0140 09/09/23  1654   WBC 10*3/mm3 5.63 5.66 4.75 4.63 8.33 12.44*   NEUTROS ABS 10*3/mm3  --   --   --   --   --  9.48*   HEMOGLOBIN g/dL 9.2* 8.8* 8.7* 8.9* 9.3* 11.3*   HEMATOCRIT % 28.2* 26.8* 26.8* 26.4* 29.1* 34.5   PLATELETS 10*3/mm3 295 296 254 212 323 432     Results from last 7 days   Lab Units 09/14/23  0621 09/13/23  2351 09/13/23  0548 09/12/23  0629 09/11/23  1902 09/11/23  0606 09/10/23  0140 09/09/23  1622   SODIUM mmol/L 141  --  141 138  --  136 135* 135*   POTASSIUM mmol/L 3.4*  --  3.9 3.7  --  3.4* 3.9 4.9  5.0   CHLORIDE mmol/L 107  --  107 106  --  102 103 103   CO2 mmol/L 29.0  --  26.8 26.0  --  26.5 25.0 21.0*   BUN mg/dL 7*  --  5* 5*  --  6* 9 8   CREATININE mg/dL 0.71  --  0.73 0.83  --  0.78 0.84 0.98   CALCIUM mg/dL 7.9*  --  7.8* 7.6*  --  7.4* 7.4* 7.9*   ALBUMIN g/dL 2.1*  --  1.7* 1.7*  --  1.9* 1.8* 2.0*   BILIRUBIN mg/dL 0.3  --  0.3 0.3  --  0.3 0.3 0.5   ALK PHOS U/L 118*  --  126* 120*  --  116 141* 177*   ALT (SGPT) U/L 10  --  8 9  --  9 8 11   AST (SGOT) U/L 17  --  26 25  --  23 20 43*   MAGNESIUM mg/dL  --  1.9 1.8  --  1.7 2.6*  --  1.9         Lab 09/12/23  0629   IRON 17*   IRON SATURATION (TSAT) 15*   TIBC 112*   TRANSFERRIN 75*   FERRITIN 68.20   FOLATE 13.50   VITAMIN B 12 >2,000*      Component      Latest Ref Rng 9/12/2023   CEA      ng/mL 1.63    CA 19-9      <=35.0 U/mL 11.8      Assessment & Plan   *Gastric adenocarcinoma.    Patient was found to have elevated liver function tests.    MRCP revealed a large stone in the distal common bile duct with intrahepatic biliary dilatation.    On 9/1/2023, she underwent exploratory laparotomy with gastric access for ERCP and distal gastrectomy.    She was found to have a fungating gastric mass which was initially felt to be benign  However, pathology exam came back positive for adenocarcinoma.   She underwent exploratory laparotomy with gastrojejunostomy on 9/8/2023.   Pathology exam revealed a 6 cm tumor  invading muscularis mucosa.  Margins were negative with the closest being 2.5 cm.  No angiolymphatic invasion.  No perineural invasion.  No lymph nodes were present for evaluation.  Her-2 - Negative by IHC  PD-L1 - CPS 6 (positive)  9/12/2023: CEA 1.63.  CA 19-9 11.8.  Since the status of the lymph nodes is not clear, recommended obtaining a PET scan in 4-6 weeks.  If the adjacent lymph nodes are hypermetabolic, postoperative chemoradiation and chemotherapy would be considered if her performance status improves.  If her performance status does not improve, she would not be a good candidate for chemotherapy.  Patient indicated that she would prefer to have follow-up with local oncology close to home and are trying to see if they can make an appointment at [facility].     *Iron deficiency anemia.  9/11/2023: Hemoglobin 8.9.  MCV 96.7.  She is taking oral vitamin B12 daily regularly.  9/12/2023: Hemoglobin 8.7.  9/12/2023: Ferritin 68.  Transferrin saturation 15%.  Vitamin B12 >2000.  Folate normal at 13.5.  Due to her gastric surgery and the expected decreased iron absorption, IV iron therapy was recommended.  Ferrlecit 250 mg daily was started on 9/12/2023.  9/14/2023: Hemoglobin improved to 9.2.    *Chronic anticoagulation.  Patient was on chronic anticoagulation with Xarelto which was held for the gas leak procedures.  She was restarted on Xarelto on 9/11/2023.     *COVID infection.  She is not complaining of cough or shortness of breath.     PLAN:     1.  Day #3 of IV Ferrlecit today.  2.  Continue vitamin B12 1000 mcg daily.  After discharge, I asked her to switch to sublingual form.  3.  Okay for discharge home tomorrow after dose #4 of IV Ferrlecit.  She does not need to take oral iron at this point.  4.  Patient is now scheduled to see Dr. Borges in [facility] to establish oncology care.    Discussed with the daughter at the bedside.      Camila Lazaro MD  09/14/23

## 2023-09-15 ENCOUNTER — TELEPHONE (OUTPATIENT)
Dept: FAMILY MEDICINE CLINIC | Age: 73
End: 2023-09-15
Payer: MEDICARE

## 2023-09-15 ENCOUNTER — READMISSION MANAGEMENT (OUTPATIENT)
Dept: CALL CENTER | Facility: HOSPITAL | Age: 73
End: 2023-09-15
Payer: MEDICARE

## 2023-09-15 VITALS
DIASTOLIC BLOOD PRESSURE: 66 MMHG | WEIGHT: 177.3 LBS | RESPIRATION RATE: 16 BRPM | TEMPERATURE: 97.9 F | BODY MASS INDEX: 33.47 KG/M2 | HEIGHT: 61 IN | HEART RATE: 74 BPM | OXYGEN SATURATION: 97 % | SYSTOLIC BLOOD PRESSURE: 123 MMHG

## 2023-09-15 LAB
ALBUMIN SERPL-MCNC: 2.1 G/DL (ref 3.5–5.2)
ALBUMIN/GLOB SERPL: 0.8 G/DL
ALP SERPL-CCNC: 107 U/L (ref 39–117)
ALT SERPL W P-5'-P-CCNC: 9 U/L (ref 1–33)
ANION GAP SERPL CALCULATED.3IONS-SCNC: 5.3 MMOL/L (ref 5–15)
AST SERPL-CCNC: 17 U/L (ref 1–32)
BILIRUB SERPL-MCNC: 0.3 MG/DL (ref 0–1.2)
BUN SERPL-MCNC: 7 MG/DL (ref 8–23)
BUN/CREAT SERPL: 8.5 (ref 7–25)
CALCIUM SPEC-SCNC: 8 MG/DL (ref 8.6–10.5)
CHLORIDE SERPL-SCNC: 107 MMOL/L (ref 98–107)
CO2 SERPL-SCNC: 27.7 MMOL/L (ref 22–29)
CREAT SERPL-MCNC: 0.82 MG/DL (ref 0.57–1)
DEPRECATED RDW RBC AUTO: 48.4 FL (ref 37–54)
EGFRCR SERPLBLD CKD-EPI 2021: 75.6 ML/MIN/1.73
ERYTHROCYTE [DISTWIDTH] IN BLOOD BY AUTOMATED COUNT: 13.7 % (ref 12.3–15.4)
GLOBULIN UR ELPH-MCNC: 2.6 GM/DL
GLUCOSE SERPL-MCNC: 76 MG/DL (ref 65–99)
HCT VFR BLD AUTO: 26.5 % (ref 34–46.6)
HGB BLD-MCNC: 8.4 G/DL (ref 12–15.9)
MCH RBC QN AUTO: 30.8 PG (ref 26.6–33)
MCHC RBC AUTO-ENTMCNC: 31.7 G/DL (ref 31.5–35.7)
MCV RBC AUTO: 97.1 FL (ref 79–97)
PLATELET # BLD AUTO: 258 10*3/MM3 (ref 140–450)
PMV BLD AUTO: 9.7 FL (ref 6–12)
POTASSIUM SERPL-SCNC: 4.2 MMOL/L (ref 3.5–5.2)
PROT SERPL-MCNC: 4.7 G/DL (ref 6–8.5)
RBC # BLD AUTO: 2.73 10*6/MM3 (ref 3.77–5.28)
SODIUM SERPL-SCNC: 140 MMOL/L (ref 136–145)
WBC NRBC COR # BLD: 5.48 10*3/MM3 (ref 3.4–10.8)

## 2023-09-15 PROCEDURE — 85027 COMPLETE CBC AUTOMATED: CPT | Performed by: INTERNAL MEDICINE

## 2023-09-15 PROCEDURE — 63710000001 DIPHENHYDRAMINE PER 50 MG: Performed by: INTERNAL MEDICINE

## 2023-09-15 PROCEDURE — 99232 SBSQ HOSP IP/OBS MODERATE 35: CPT | Performed by: INTERNAL MEDICINE

## 2023-09-15 PROCEDURE — 25010000002 NA FERRIC GLUC CPLX PER 12.5 MG: Performed by: INTERNAL MEDICINE

## 2023-09-15 PROCEDURE — 80053 COMPREHEN METABOLIC PANEL: CPT | Performed by: INTERNAL MEDICINE

## 2023-09-15 PROCEDURE — 63710000001 PROMETHAZINE PER 25 MG: Performed by: INTERNAL MEDICINE

## 2023-09-15 RX ORDER — PANTOPRAZOLE SODIUM 40 MG/1
40 TABLET, DELAYED RELEASE ORAL DAILY
Qty: 30 TABLET | Refills: 0 | Status: SHIPPED | OUTPATIENT
Start: 2023-09-15

## 2023-09-15 RX ORDER — LOPERAMIDE HYDROCHLORIDE 2 MG/1
2 CAPSULE ORAL 4 TIMES DAILY PRN
Qty: 28 CAPSULE | Refills: 0 | Status: SHIPPED | OUTPATIENT
Start: 2023-09-15

## 2023-09-15 RX ORDER — MAGNESIUM OXIDE 400 MG/1
400 TABLET ORAL 2 TIMES DAILY
Qty: 60 TABLET | Refills: 0 | Status: SHIPPED | OUTPATIENT
Start: 2023-09-15

## 2023-09-15 RX ORDER — AMIODARONE HYDROCHLORIDE 200 MG/1
200 TABLET ORAL
Qty: 30 TABLET | Refills: 0 | Status: SHIPPED | OUTPATIENT
Start: 2023-09-15

## 2023-09-15 RX ADMIN — Medication 1 TABLET: at 10:19

## 2023-09-15 RX ADMIN — OXYCODONE HYDROCHLORIDE 80 MG: 40 TABLET, FILM COATED, EXTENDED RELEASE ORAL at 10:17

## 2023-09-15 RX ADMIN — PROMETHAZINE HYDROCHLORIDE 25 MG: 12.5 TABLET ORAL at 10:20

## 2023-09-15 RX ADMIN — Medication 250 MG: at 10:18

## 2023-09-15 RX ADMIN — Medication 10 ML: at 10:20

## 2023-09-15 RX ADMIN — LEVOTHYROXINE SODIUM 100 MCG: 0.1 TABLET ORAL at 10:17

## 2023-09-15 RX ADMIN — AMIODARONE HYDROCHLORIDE 200 MG: 200 TABLET ORAL at 10:18

## 2023-09-15 RX ADMIN — Medication 2000 UNITS: at 10:20

## 2023-09-15 RX ADMIN — METOPROLOL TARTRATE 25 MG: 25 TABLET, FILM COATED ORAL at 10:20

## 2023-09-15 RX ADMIN — Medication 1000 MCG: at 10:18

## 2023-09-15 RX ADMIN — SODIUM CHLORIDE 250 MG: 9 INJECTION, SOLUTION INTRAVENOUS at 10:17

## 2023-09-15 RX ADMIN — BACLOFEN 5 MG: 10 TABLET ORAL at 10:18

## 2023-09-15 RX ADMIN — FAMOTIDINE 20 MG: 10 INJECTION INTRAVENOUS at 13:23

## 2023-09-15 RX ADMIN — MONTELUKAST SODIUM 10 MG: 10 TABLET, FILM COATED ORAL at 10:18

## 2023-09-15 RX ADMIN — ESCITALOPRAM OXALATE 10 MG: 10 TABLET, FILM COATED ORAL at 10:18

## 2023-09-15 RX ADMIN — DIPHENHYDRAMINE HYDROCHLORIDE 25 MG: 25 CAPSULE ORAL at 10:20

## 2023-09-15 NOTE — TELEPHONE ENCOUNTER
----- Message from EUGENIO Rodriguez sent at 9/15/2023  8:17 AM EDT -----  I went to sign this order, could not sign due to she is still in the hospital. See the pt advise message, contact pt directly and speak to them  ----- Message -----  From: Vaishnavi Keller LPN  Sent: 9/14/2023   3:26 PM EDT  To: EUGENIO Rodriguez    Please sign card referral

## 2023-09-15 NOTE — PLAN OF CARE
Goal Outcome Evaluation:      Pt alert and oriented. Cooperative. VSS. NSR on tele. Dressing changed. On RA. Plan of care ongoing.

## 2023-09-15 NOTE — PLAN OF CARE
Goal Outcome Evaluation:  Plan of Care Reviewed With: patient, daughter        Progress: no change  Outcome Evaluation: Pt in good spirit, happy to be going home today, daughter at bsd, no c/o nausea, ate fair this am, up to bsc, dressign to abd mid incision done and raymon well, AFIA drain x1 to rt side pulled, raymon well, 25cc out, sr on tele, nad. HH  [facility] to follow pt, at home. O2 for night time use to be delivered at home, daughter made aware.

## 2023-09-15 NOTE — OUTREACH NOTE
Prep Survey      Flowsheet Row Responses   Church facility patient discharged from? [place]   Is LACE score < 7 ? No   Eligibility [facility]   Date of Admission 08/25/23   Date of Discharge 09/15/23   Discharge Disposition Home or Self Care   Discharge diagnosis Gastric adenocarcinoma   Does the patient have one of the following disease processes/diagnoses(primary or secondary)? Other   Does the patient have Home health ordered? No   Is there a DME ordered? No   Prep survey completed? Yes            Amy TRIANA - Registered Nurse

## 2023-09-15 NOTE — CASE MANAGEMENT/SOCIAL WORK
Continued Stay Note  [facility]     Patient Name: Anne Jewell  MRN: [id number]  Today's Date: 9/15/2023    Admit Date: 8/25/2023    Plan: Home w/ [facility] HH, wheelchair from [employer], and nighttime oxygen from [employer]   Discharge Plan       Row Name 09/15/23 1009       Plan    Plan Home w/ [facility] HH, wheelchair from [employer], and nighttime oxygen from [employer]    Plan Comments Overnight oximetry noted. CCP spoke with Luis who is going to deliver oxygen. Wheelchair to be delivered to home. CCP spoke with Reese SCHAEFER who is requesting we send patient home with some supplies for her wound care and they will provide from there. DANIEL TRINH                   Discharge Codes    No documentation.                 Expected Discharge Date and Time       Expected Discharge Date Expected Discharge Time    Sep 12, 2023               DANIEL Leary

## 2023-09-15 NOTE — PROGRESS NOTES
LOS: 21 days   Patient Care Team:  Jewell Stephens APRN as PCP - General (Family Medicine)  Edilberto Lemus MD as Consulting Physician (Nephrology)  Rashaun Alvarado MD as Surgeon (Orthopedic Surgery)  Delma Montoya MD as Consulting Physician (Obstetrics and Gynecology)  Stuart Borges MD (Hematology and Oncology)  Isael Barker MD as Consulting Physician (Gastroenterology)  Haven Gr APRN as Nurse Practitioner (Nurse Practitioner)    Chief Complaint: Follow-up paroxysmal atrial fibrillation with RVR (new).    Interval History: Remains in sinus rhythm.  Still feels weak in general.  No chest pain or shortness of breath.    Vital Signs:  Temp:  [97.9 °F (36.6 °C)-98.1 °F (36.7 °C)] 97.9 °F (36.6 °C)  Heart Rate:  [65-79] 74  Resp:  [16-18] 16  BP: (108-132)/(56-66) 123/66    Intake/Output Summary (Last 24 hours) at 9/15/2023 1339  Last data filed at 9/15/2023 0900  Gross per 24 hour   Intake 240 ml   Output 65 ml   Net 175 ml       Physical Exam:   General Appearance:    No acute distress, alert and oriented x4, chronically ill-appearing.   Lungs:     Clear to auscultation bilaterally     Heart:    Regular rhythm and normal rate.  No murmurs, gallops, or    rubs.   Abdomen:     Soft, nontender, nondistended.    Extremities:   No clubbing, cyanosis, or edema.     Results Review:    Results from last 7 days   Lab Units 09/15/23  0500   SODIUM mmol/L 140   POTASSIUM mmol/L 4.2   CHLORIDE mmol/L 107   CO2 mmol/L 27.7   BUN mg/dL 7*   CREATININE mg/dL 0.82   GLUCOSE mg/dL 76   CALCIUM mg/dL 8.0*     Results from last 7 days   Lab Units 09/09/23  1857 09/09/23  1622   HSTROP T ng/L 28* 27*     Results from last 7 days   Lab Units 09/15/23  0500   WBC 10*3/mm3 5.48   HEMOGLOBIN g/dL 8.4*   HEMATOCRIT % 26.5*   PLATELETS 10*3/mm3 258             Results from last 7 days   Lab Units 09/14/23  1646   MAGNESIUM mg/dL 2.4           I reviewed the patient's new clinical results.        Assessment:  1.   Choledocholithiasis  2.  Gastric adenocarcinoma  3.  COVID-19 infection  4.  Status post exploratory laparotomy and distal gastrectomy on 9/1/2023 by Dr. Loo  5.  Status post second exploratory laparotomy and gastrojejunostomy on 9/7/2023 by Dr. Loo  6.  Chronic pain syndrome  7.  Paroxysmal atrial fibrillation with RVR and aberrancy (new)  8.  Stage III chronic kidney disease   9.  Rheumatoid arthritis  10.  Osteoarthritis   11.  Hypoalbuminemia and low total protein    Plan:  -Remains in sinus rhythm.  Continue amiodarone 200 mg/day.  Continue metoprolol 25 mg every 12 hours.  Continue Xarelto 20 mg/day.    -No evidence of ventricular tachycardia.  This was atrial fibrillation with aberrant conduction and a left bundle pattern.  She was also completely asymptomatic.    -It appears she is going to be discharged today.  She is going to follow-up with a cardiologist in [place], [place], per her daughter.  Her primary care physician is evidently going to set up follow-up.    -Discussed again with the patient at bedside, as well as her daughter (who is a nurse).    Noel Koo MD  09/15/23  13:39 EDT

## 2023-09-15 NOTE — CASE MANAGEMENT/SOCIAL WORK
Continued Stay Note  [facility]     Patient Name: Anne Jewell  MRN: [id number]  Today's Date: 9/15/2023    Admit Date: 8/25/2023    Plan: Home w/ [facility] HH, wheelchair from [employer], and nighttime oxygen from [employer]   Discharge Plan       Row Name 09/15/23 1047       Plan    Plan Comments Rn to send patient home with enough wound care supplies to last through weekend. Daughter to do dressing changes at home. Nighttime oxygen to be delivered at home. DANIEL TRINH      Row Name 09/15/23 1009       Plan    Plan Home w/ [facility] HH, [facility] from [employer], and nighttime oxygen from [employer]    Plan Comments Overnight oximetry noted. CCP spoke with [employer] who is going to deliver oxygen. Wheelchair to be delivered to home. CCP spoke with Reese SCHAEFER who is requesting we send patient home with some supplies for her wound care and they will provide from there. DANIEL TRINH                   Discharge Codes    No documentation.                 Expected Discharge Date and Time       Expected Discharge Date Expected Discharge Time    Sep 15, 2023               DANIEL Leary

## 2023-09-15 NOTE — DISCHARGE SUMMARY
PHYSICIAN DISCHARGE SUMMARY                                                                        [facility]    Patient Identification:  Name: Anne Jewell  Age: 73 y.o.  Sex: female  :  1950  MRN: [id number]  Primary Care Physician: Jewell Stephens APRN    Admit date: 2023  Discharge date and time: 9/15/2023     Discharged Condition: good    Discharge Diagnoses:  Choledocholithiasis  History of gastric bypass  Elevated LFTs  Fungating stomach mass/adenocarcinoma  -MRCP showing large stone in the distal common bile duct, 1.8 cm.  Intrahepatic biliary dilatation.  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  - S/P exp lap w/gastrojejunostomy 23   COVID-19 +  -Completed 3 days of remdesivir  History of pulmonary pulmonary embolism  Hypothyroidism  CKD stage IIIa  Chronic pain syndrome/chronic arthritis  History of RA  Memory loss  Abnormal liver on MRI  -Repeat CT abdomen with liver protocol in 2 to 3 months  Acute on chronic anemia -anemia chronic disease/iron deficiency anemia/acute blood loss anemia.  Status post ferric gluconate 250 mg IV x4.  Paroxysmal afib:  Continue anticoagulation, amiodarone, metoprolol.  Follow-up with cardiology.      Hospital Course:  Pleasant 73-year-old female with multiple medical issues presents initially due to an outpatient MRCP revealing ductal lithiasis.  Please H&P for full details.  Apparently the patient tested positive for COVID-19.  There are no infiltrates on the chest x-ray.  She did receive 3 days of remdesivir.  She received antibiotics in the interim for the choledocholithiasis.  She did then undergo an attempted ERCP which was unsuccessful secondary to aberrant anatomy and previous surgical procedures.   She was then taken to the OR via surgery and GI for an open approach to the stomach and ERCP was successfully performed with removal of the stone and relief of the obstruction.  Unfortunately during procedure a fungating mass of the stomach was observed.  Biopsy did reveal this to be an adenocarcinoma.  She then underwent an exploratory laparotomy with distal gastrectomy affections now postop day 11.  This was followed by an exploratory laparotomy with a gastrojejunostomy in which she is now postop day 6.  The hospital course was complicated by new onset of atrial fibrillation with rapid ventricular response.  She was transferred to ICU and cardiology consultation was obtained.  Now maintaining normal sinus rhythm and on amiodarone and metoprolol.  She was already on Xarelto prior to admission for history of DVTs.  This is being continued.  She had a slow recovery post procedure.  She has however taken in reasonable oral intake now and cleared for discharge per surgery.  Also noted to be significantly anemic.  Work-up revealed multiple issues in addition to her blood loss from procedures and these included anemia chronic disease and significant iron deficiency.  She was already on sublingual B12 supplements prior to admission those levels looked good.  She is seen by hematology oncology.  She received her fourth and last dose of intravenous ferric gluconate today.  She needs to be followed up as an outpatient with a PET scan for further staging of the gastric adenocarcinoma.  Liver abnormalities were also noted which will need an outpatient follow-up with the answers not noted in the PET scan.  Presently she remains afebrile vital signs stable.  She has been noticing loose stools recently.  No fever no leukocytosis no blood or mucus stools however.  We will need to keep an eye on this.  She normally has constipation secondary to her chronic narcotic use.  Arrangements are being made for home health including nursing  care and physical therapy.      Consults:     Consults       Date and Time Order Name Status Description    9/14/2023  6:53 PM Inpatient Pulmonology Consult      9/11/2023  1:30 PM Hematology & Oncology Inpatient Consult Completed     9/9/2023  4:22 PM Inpatient Pulmonology Consult Completed     9/9/2023  4:21 PM Inpatient Cardiology Consult Completed     8/30/2023  5:21 PM Inpatient General Surgery Consult Completed     8/25/2023  2:33 PM Inpatient Gastroenterology Consult                Discharge Exam:  Afebrile vital signs stable.  Well-developed older female no apparent distress.  Lungs clear to auscultation good air movement.  Heart regular rate and rhythm.  Extremities no clubbing cyanosis or edema.    Abdomen with normal bowel sounds.  She is alert oriented converse cooperative pleasant     Disposition:  Home    Patient Instructions:      Discharge Medications        New Medications        Instructions Start Date   amiodarone 200 MG tablet  Commonly known as: PACERONE   200 mg, Oral, Every 24 Hours Scheduled      loperamide 2 MG capsule  Commonly known as: IMODIUM   2 mg, Oral, 4 Times Daily PRN      magnesium oxide 400 MG tablet  Commonly known as: MAG-OX   400 mg, Oral, 2 Times Daily      metoprolol tartrate 25 MG tablet  Commonly known as: LOPRESSOR   25 mg, Oral, Every 12 Hours Scheduled      pantoprazole 40 MG EC tablet  Commonly known as: Protonix   40 mg, Oral, Daily      Saline Wound Wash 0.9 % solution  Generic drug: Sodium Chloride   5 mL, Apply externally, Daily             Continue These Medications        Instructions Start Date   albuterol sulfate  (90 Base) MCG/ACT inhaler  Commonly known as: PROVENTIL HFA;VENTOLIN HFA;PROAIR HFA   2 puffs, Inhalation, Every 6 Hours PRN, Proventil  (90 Base) MCG/ACT Inhalation Aerosol Solution; Patient Sig: Proventil  (90 Base) MCG/ACT Inhalation Aerosol Solution Inhale 2 puff(s) every 6 to 8 hours as needed; 6.7; 0; 05-Apr-2013; Active       ALPRAZolam 2 MG tablet  Commonly known as: XANAX   As Needed      B-12 1000 MCG sublingual tablet   PLACE 1 TABLET UNDER THE TONGUE AND LET DISSOLVE ONCE DAILY      baclofen 10 MG tablet  Commonly known as: LIORESAL   TAKE ONE TABLET BY MOUTH THREE TIMES A DAY      Calcium Citrate-Vitamin D3 315-6.25 MG-MCG tablet tablet  Commonly known as: CITRACAL   1 tablet, Oral, Daily      chlorhexidine 0.12 % solution  Commonly known as: PERIDEX   No dose, route, or frequency recorded.      dicyclomine 20 MG tablet  Commonly known as: BENTYL   20 mg, Oral, Every 6 Hours PRN      EPINEPHrine 0.3 MG/0.3ML solution auto-injector injection  Commonly known as: EPIPEN   No dose, route, or frequency recorded.      escitalopram 10 MG tablet  Commonly known as: LEXAPRO   1 tablet, Oral, Daily      levothyroxine 100 MCG tablet  Commonly known as: SYNTHROID, LEVOTHROID   TAKE ONE TABLET BY MOUTH DAILY      montelukast 10 MG tablet  Commonly known as: SINGULAIR   TAKE ONE TABLET BY MOUTH DAILY      multivitamin tablet tablet  Commonly known as: THERAGRAN   1 tablet, Oral, Daily      naloxone 4 MG/0.1ML nasal spray  Commonly known as: NARCAN   1 spray, Nasal, As Needed      Omega 3-6-9 capsule   Oral      oxyCODONE ER 80 MG tablet extended-release 12 hour 12 hr tablet  Commonly known as: oxyCONTIN   160 mg, Oral, Every 12 Hours Scheduled      promethazine 25 MG tablet  Commonly known as: PHENERGAN   TAKE 1 TABLET BY MOUTH EVERY 8 HOURS AS NEEDED FOR NAUSEA AND VOMITING      saccharomyces boulardii 250 MG capsule  Commonly known as: FLORASTOR   250 mg, Oral, 2 Times Daily      Vitamin D3 50 MCG (2000 UT) capsule   TAKE ONE CAPSULE BY MOUTH DAILY      Xarelto 20 MG tablet  Generic drug: rivaroxaban   TAKE ONE TABLET BY MOUTH DAILY             Stop These Medications      hydroCHLOROthiazide 25 MG tablet  Commonly known as: HYDRODIURIL     KLOR-CON 20 MEQ CR tablet  Generic drug: potassium chloride            Diet Instructions       Diet:  Gastrointestinal Diets; Fat-Restricted; Soft to Chew (NDD 3); Chopped Meat; Thin (IDDSI 0)      Discharge Diet: Gastrointestinal Diets    Gastrointestinal Diet: Fat-Restricted    Texture: Soft to Chew (NDD 3)    Soft to Chew: Chopped Meat    Fluid Consistency: Thin (IDDSI 0)          Future Appointments   Date Time Provider Department Center   9/21/2023  3:40 PM Sara Gr APRN [facility]   10/18/2023 12:30 PM JULIETA [place] [facility] 1  JULIETA [facility]   10/18/2023  2:15 PM Jewell Stephens APRN [facility]   6/6/2024  2:00 PM Rashaun Alvarado MD [facility]     Additional Instructions for the Follow-ups that You Need to Schedule       Ambulatory Referral to Home Health (Hospital)   As directed      Face to Face Visit Date: 9/15/2023   Follow-up provider for Plan of Care?: I treated the patient in an acute care facility and will not continue treatment after discharge.   Follow-up provider: JEWELL STEPHENS [[id number]]   Reason/Clinical Findings: Wound care.  Labs.  Generalized weakness-PT   Describe mobility limitations that make leaving home difficult: Please see above   Nursing/Therapeutic Services Requested: Skilled Nursing Physical Therapy   Skilled nursing orders: Wound care dressing/changes   PT orders: Therapeutic exercise Gait Training Strengthening Home safety assessment   Weight Bearing Status: As Tolerated   Frequency: 1 Week 1        Discharge Follow-up with PCP   As directed       Currently Documented PCP:    Jewell Stephens APRN    PCP Phone Number:    [phone]     Follow Up Details: 1 week        Discharge Follow-up with Specialty: Cardiology, Surgery, Oncology   As directed      Specialty: Cardiology, Surgery, Oncology   Follow Up Details: As directed.        Discharge Follow-up with Specialty: Surgery.  Oncology.   As directed      Specialty: Surgery.  Oncology.        CBC & Differential    Sep 18, 2023 (Approximate)      Results to PCP.    Order Comments: Results to  PCP.    Manual Differential: No   Release to patient: Routine Release               Contact information for follow-up providers       Jewell Stephens APRN Follow up.    Specialty: Family Medicine  Contact information:  [address]  [address]  [facility] [id number] [phone]               Slava Loo Jr., MD. Schedule an appointment as soon as possible for a visit in 1 week(s).    Specialty: General Surgery  Contact information:  [address] [facility] [address]  [place] [id number] [phone]               Jewell Stephens APRN .    Specialty: Family Medicine  Why: 1 week  Contact information:  [address]  [address]  [facility] [address] [phone]                       Contact information for after-discharge care       Home Medical Care       [facility] .    Services: Home Rehabilitation, Home Nursing, Home Living Aide Services  Contact information:  [address]  [facility] [id number] [phone]                                 Discharge Order (From admission, onward)       Start     Ordered    09/15/23 1014  Discharge patient  Once        Comments: Discharge after final dose of ferric gluconate and after AFIA drain removed.   Expected Discharge Date: 09/15/23   Discharge Disposition: Home or Self Care   Physician of Record for Attribution - Please select from Treatment Team: TRESSA BRITO [[id number]]   Review needed by CMO to determine Physician of Record: No      Question Answer Comment   Physician of Record for Attribution - Please select from Treatment Team TRESSA BRITO    Review needed by CMO to determine Physician of Record No        09/15/23 1027    08/31/23 0858  Discharge patient  Once,   Status:  Canceled        Expected Discharge Date: 08/31/23   Discharge Disposition: Home or Self Care   Physician of Record for Attribution - Please select from Treatment Team: DANIEL PERSON [[id number]]   Review needed by CMO to  determine Physician of Record: No      Question Answer Comment   Physician of Record for Attribution - Please select from Treatment Team DANIEL PERSON    Review needed by CMO to determine Physician of Record No        08/31/23 0858                      Total time spent discharging patient including evaluation,post hospitalization follow up,  medication and post hospitalization instructions and education total time exceeds 30 minutes.    Signed:  Raymond Hurley MD  9/15/2023  10:39 EDT    EMR Dragon/Transcription disclaimer:   Much of this encounter note is an electronic transcription/translation of spoken language to printed text. The electronic translation of spoken language may permit erroneous, or at times, nonsensical words or phrases to be inadvertently transcribed; Although I have reviewed the note for such errors, some may still exist.

## 2023-09-15 NOTE — PROGRESS NOTES
Chief Complaint:    S/P Exploratory laparotomy with distal gastrectomy, POD 11  S/P Exploratory laparotomy with gastrojejunostomy, POD 6    Subjective:    This note is a late entry after seeing her yesterday.  She is feeling well with plans for discharge to home.  She is tolerating a diet with no nausea or vomiting.  She is having bowel function.  She is also tolerating dressing changes.    Objective:    Temp:  [97.7 °F (36.5 °C)-98.1 °F (36.7 °C)] 97.9 °F (36.6 °C)  Heart Rate:  [65-73] 65  Resp:  [18] 18  BP: (108-132)/(56-66) 108/56    Physical Exam  Constitutional:       Appearance: She is not ill-appearing or toxic-appearing.   Abdominal:      Palpations: Abdomen is soft.      Tenderness: There is no abdominal tenderness.      Comments: Incision: Loosely approximated with intervening open areas clean with no evidence of infection.   Neurological:      Mental Status: She is alert.   Psychiatric:         Behavior: Behavior is cooperative.       Results:    WBC is 5.48.  H/H is 8.4/26.5.  BUN is 7 and creatinine 0.82.  Albumin is 2.1.    Impression/Plan:    The patient is POD 11 from an exploratory laparotomy with distal gastrectomy and POD 6 from an exploratory laparotomy with gastrojejunostomy.  She is recovering well and is ready for discharge from a surgical standpoint.    Her daughter was instructed to continue dressing changes but they can be performed once a day.  The dressing can be removed and she can bathe over the open wound.    The AFIA drain will be removed.    I will see her in the office next week.    Slava Loo Jr., M.D.

## 2023-09-16 LAB — METHYLMALONATE SERPL-SCNC: 166 NMOL/L (ref 0–378)

## 2023-09-17 ENCOUNTER — TRANSITIONAL CARE MANAGEMENT TELEPHONE ENCOUNTER (OUTPATIENT)
Dept: CALL CENTER | Facility: HOSPITAL | Age: 73
End: 2023-09-17
Payer: MEDICARE

## 2023-09-17 NOTE — OUTREACH NOTE
Call Center TCM Note      Flowsheet Row Responses   [facility] patient discharged from? [place]   Does the patient have one of the following disease processes/diagnoses(primary or secondary)? Other   TCM attempt successful? No  [No verbal release from PCP on file.]   Unsuccessful attempts Attempt 1            Chel Johansen RN    9/17/2023, 11:11 EDT

## 2023-09-17 NOTE — OUTREACH NOTE
Call Center TCM Note      Flowsheet Row Responses   [facility] patient discharged from? [place]   Does the patient have one of the following disease processes/diagnoses(primary or secondary)? Other   TCM attempt successful? Yes   Call start time 1318   Call end time 1326   General alerts for this patient Dtr is a nurse   Discharge diagnosis Gastric adenocarcinoma   Meds reviewed with patient/caregiver? Yes   Is the patient having any side effects they believe may be caused by any medication additions or changes? No   Does the patient have all medications ordered at discharge? Yes   Is the patient taking all medications as directed (includes completed medication regime)? Yes   Does the patient have an appointment with their PCP within 7-14 days of discharge? No   Nursing Interventions Patient declined scheduling/rescheduling appointment at this time, Routed TCM call to PCP office   Has home health visited the patient within 72 hours of discharge? Yes   Psychosocial issues? No   Did the patient receive a copy of their discharge instructions? Yes   Nursing interventions Reviewed instructions with patient   What is the patient's perception of their health status since discharge? Same  [Dtr reports swelling of feet and legs]   Is the patient/caregiver able to teach back signs and symptoms related to disease process for when to call PCP? Yes   Is the patient/caregiver able to teach back signs and symptoms related to disease process for when to call 911? Yes   Is the patient/caregiver able to teach back the hierarchy of who to call/visit for symptoms/problems? PCP, Specialist, Home health nurse, Urgent Care, ED, 911 Yes   If the patient is a current smoker, are they able to teach back resources for cessation? Not a smoker   TCM call completed? Yes   Wrap up additional comments Dtr reports patient taken of HCTZ and has developed swelling of feet and legs, PCP office currently trying to set patient up with  cardiology appt.   Call end time 1326   Would this patient benefit from a Referral to [facility] Social Work? No   Is the patient interested in additional calls from an ambulatory ? No            Chel Johansen RN    9/17/2023, 13:26 EDT

## 2023-09-18 ENCOUNTER — TELEPHONE (OUTPATIENT)
Dept: FAMILY MEDICINE CLINIC | Age: 73
End: 2023-09-18
Payer: MEDICARE

## 2023-09-18 PROCEDURE — 85025 COMPLETE CBC W/AUTO DIFF WBC: CPT | Performed by: NURSE PRACTITIONER

## 2023-09-18 RX ORDER — BACLOFEN 10 MG/1
10 TABLET ORAL 3 TIMES DAILY
Qty: 270 TABLET | Refills: 0 | Status: SHIPPED | OUTPATIENT
Start: 2023-09-18

## 2023-09-18 NOTE — TELEPHONE ENCOUNTER
Daughter said they were told to go through [facility] for cardiology referral because they wanted to see one in [place].

## 2023-09-18 NOTE — TELEPHONE ENCOUNTER
Elba with a home health called and said she did go out to see pt and do a start of care.  Her midline incision has staples that are spread out. It does seem to be more open than is should she thinks.  They are doing wet to dry packs.

## 2023-09-18 NOTE — TELEPHONE ENCOUNTER
Cardiologist she was seeing in hospital should have been managing her A fib and diuretic, but she does need to follow up with cardiology after discharge, does she have an appt with cardiology?

## 2023-09-18 NOTE — CASE MANAGEMENT/SOCIAL WORK
Case Management Discharge Note      Final Note: Pt discharged home with [facility] and O2 at night supplied by [facility] on 9/15.   TANMAY Smith RN         Selected Continued Care - Discharged on 9/15/2023 Admission date: 8/25/2023 - Discharge disposition: Home or Self Care      Destination    No services have been selected for the patient.                Durable Medical Equipment       Service Provider Selected Services Address Phone Fax Patient Preferred    [facility] [address], [place] [id number] [phone] --              Dialysis/Infusion    No services have been selected for the patient.                Home Medical Care Coordination complete.      Service Provider Selected Services Address Phone Fax Patient Preferred    [facility] ,  Home Nursing ,  Home Living Aide Services [address], [address], [place] [id number] [phone] --              Therapy    No services have been selected for the patient.                Community Resources    No services have been selected for the patient.                Community & [facility]    No services have been selected for the patient.                    Transportation Services  Private: Car    Final Discharge Disposition Code: 06 - home with home health care   Report is ready for review  Please assist

## 2023-09-19 ENCOUNTER — TELEPHONE (OUTPATIENT)
Dept: SURGERY | Facility: CLINIC | Age: 73
End: 2023-09-19
Payer: MEDICARE

## 2023-09-19 ENCOUNTER — PATIENT MESSAGE (OUTPATIENT)
Dept: SURGERY | Facility: CLINIC | Age: 73
End: 2023-09-19
Payer: MEDICARE

## 2023-09-19 ENCOUNTER — LAB REQUISITION (OUTPATIENT)
Dept: LAB | Facility: HOSPITAL | Age: 73
End: 2023-09-19
Payer: MEDICARE

## 2023-09-19 DIAGNOSIS — G89.4 CHRONIC PAIN SYNDROME: Chronic | ICD-10-CM

## 2023-09-19 DIAGNOSIS — I48.0 PAROXYSMAL ATRIAL FIBRILLATION: Primary | ICD-10-CM

## 2023-09-19 DIAGNOSIS — D63.1 ANEMIA IN CHRONIC KIDNEY DISEASE (CODE): ICD-10-CM

## 2023-09-19 DIAGNOSIS — R11.0 NAUSEA: ICD-10-CM

## 2023-09-19 LAB
BASOPHILS # BLD AUTO: 0.02 10*3/MM3 (ref 0–0.2)
BASOPHILS NFR BLD AUTO: 0.4 % (ref 0–1.5)
DEPRECATED RDW RBC AUTO: 53.5 FL (ref 37–54)
EOSINOPHIL # BLD AUTO: 0.11 10*3/MM3 (ref 0–0.4)
EOSINOPHIL NFR BLD AUTO: 2 % (ref 0.3–6.2)
ERYTHROCYTE [DISTWIDTH] IN BLOOD BY AUTOMATED COUNT: 14.7 % (ref 12.3–15.4)
HCT VFR BLD AUTO: 28.9 % (ref 34–46.6)
HGB BLD-MCNC: 9.2 G/DL (ref 12–15.9)
IMM GRANULOCYTES # BLD AUTO: 0.02 10*3/MM3 (ref 0–0.05)
IMM GRANULOCYTES NFR BLD AUTO: 0.4 % (ref 0–0.5)
LYMPHOCYTES # BLD AUTO: 1.26 10*3/MM3 (ref 0.7–3.1)
LYMPHOCYTES NFR BLD AUTO: 23.4 % (ref 19.6–45.3)
MCH RBC QN AUTO: 31.4 PG (ref 26.6–33)
MCHC RBC AUTO-ENTMCNC: 31.8 G/DL (ref 31.5–35.7)
MCV RBC AUTO: 98.6 FL (ref 79–97)
MONOCYTES # BLD AUTO: 0.53 10*3/MM3 (ref 0.1–0.9)
MONOCYTES NFR BLD AUTO: 9.8 % (ref 5–12)
NEUTROPHILS NFR BLD AUTO: 3.45 10*3/MM3 (ref 1.7–7)
NEUTROPHILS NFR BLD AUTO: 64 % (ref 42.7–76)
NRBC BLD AUTO-RTO: 0 /100 WBC (ref 0–0.2)
PLATELET # BLD AUTO: 252 10*3/MM3 (ref 140–450)
PMV BLD AUTO: 11.1 FL (ref 6–12)
RBC # BLD AUTO: 2.93 10*6/MM3 (ref 3.77–5.28)
WBC NRBC COR # BLD: 5.39 10*3/MM3 (ref 3.4–10.8)

## 2023-09-19 NOTE — TELEPHONE ENCOUNTER
Good to hear that she saw Dr Borges and is better, I definitely work with MD, if she does not need to continue to see MD, I will manage

## 2023-09-19 NOTE — TELEPHONE ENCOUNTER
From: Anne Jewell  Sent: 9/19/2023 2:40 PM EDT  To: [facility] Clinical Pool  Subject: Hello    Ok that’s great but we are still waiting on a appt with you all and a cardiologist in [place]. They said they have to have a referral bc of Medicare. We don’t care which one just one in [place] please. Then with u all afternoon appts are better for her. Sorry and thank you. I’m trying to juggle all this so please bare with me.

## 2023-09-19 NOTE — TELEPHONE ENCOUNTER
See how she is today, I have placed the order for her to see cardiology urgently, I will be talking to MD's on Thursday to see who will see pt, and if her symptoms are worsening she may need to go to ER ( I reviewed her CBC, WBD normal, hmg low but improved from last check) I would have like to have a BMP done on her before adding diuretics

## 2023-09-19 NOTE — TELEPHONE ENCOUNTER
Pts daughter Ayanna informed.  She said she is still weak but feeling a little better. Her wound is looking better as well.  She had her first appt with the oncologist Dr Borges today and he did several labs on her and examined her legs and started her on lasix and potassium.   She said her mother is wanting to know if A Stephens was going to turn her care over to a MD or if she is just going to turn over to a MD until she is better than see her again.

## 2023-09-20 ENCOUNTER — TELEPHONE (OUTPATIENT)
Dept: SURGERY | Facility: CLINIC | Age: 73
End: 2023-09-20
Payer: MEDICARE

## 2023-09-20 NOTE — TELEPHONE ENCOUNTER
Rx Refill Note  Requested Prescriptions     Pending Prescriptions Disp Refills    promethazine (PHENERGAN) 25 MG tablet [Pharmacy Med Name: PROMETHAZINE 25 MG TABLET] 90 tablet 0     Sig: TAKE 1 TABLET BY MOUTH EVERY 8 HOURS AS NEEDED FOR NAUSEA AND/OR VOMITING      Last office visit with prescribing clinician: 7/19/2023     Next office visit with prescribing clinician: 10/18/2023      Last filled 8/3/23 #90 30 day supply     Aure Smith LPN  09/20/23, 09:19 EDT

## 2023-09-20 NOTE — TELEPHONE ENCOUNTER
SPOKE TO PT'S DAUGHTER IN REGARDS TO HER POST-OP APPT FOLLOWING THE GASTRECTOMY ON 9/8. PER A TELEPHONE ENCOUNTER, OK TO SCHEDULE 1 MONTH OUT POST-OP BECAUSE THE PT'S WEAKNESS & HER DAUGHTER IS HAVING PROBLEMS TRANSPORTING HER. SCHEDULED AN APPT ON 10/12 @ 2 PM.

## 2023-09-21 ENCOUNTER — TELEMEDICINE (OUTPATIENT)
Dept: PAIN MEDICINE | Facility: CLINIC | Age: 73
End: 2023-09-21
Payer: MEDICARE

## 2023-09-21 ENCOUNTER — TELEPHONE (OUTPATIENT)
Dept: SURGERY | Facility: CLINIC | Age: 73
End: 2023-09-21
Payer: MEDICARE

## 2023-09-21 DIAGNOSIS — M15.9 GENERALIZED OSTEOARTHRITIS: ICD-10-CM

## 2023-09-21 DIAGNOSIS — G89.4 CHRONIC PAIN SYNDROME: ICD-10-CM

## 2023-09-21 DIAGNOSIS — M51.36 DEGENERATION OF INTERVERTEBRAL DISC OF LUMBAR REGION: ICD-10-CM

## 2023-09-21 DIAGNOSIS — M54.16 LUMBAR RADICULOPATHY: ICD-10-CM

## 2023-09-21 DIAGNOSIS — Z79.899 ENCOUNTER FOR LONG-TERM (CURRENT) USE OF HIGH-RISK MEDICATION: ICD-10-CM

## 2023-09-21 DIAGNOSIS — M54.16 LUMBAR RADICULOPATHY: Primary | ICD-10-CM

## 2023-09-21 DIAGNOSIS — C16.9 GASTRIC ADENOCARCINOMA: ICD-10-CM

## 2023-09-21 DIAGNOSIS — M06.9 RHEUMATOID ARTHRITIS, INVOLVING UNSPECIFIED SITE, UNSPECIFIED WHETHER RHEUMATOID FACTOR PRESENT: ICD-10-CM

## 2023-09-21 RX ORDER — PROMETHAZINE HYDROCHLORIDE 25 MG/1
TABLET ORAL
Qty: 90 TABLET | Refills: 0 | Status: SHIPPED | OUTPATIENT
Start: 2023-09-21

## 2023-09-21 RX ORDER — NALOXONE HYDROCHLORIDE 4 MG/.1ML
1 SPRAY NASAL AS NEEDED
Qty: 2 EACH | Refills: 0 | Status: SHIPPED | OUTPATIENT
Start: 2023-09-21

## 2023-09-21 RX ORDER — OXYCODONE HYDROCHLORIDE 80 MG/1
160 TABLET, FILM COATED, EXTENDED RELEASE ORAL EVERY 12 HOURS SCHEDULED
Qty: 120 TABLET | Refills: 0 | Status: SHIPPED | OUTPATIENT
Start: 2023-09-21

## 2023-09-21 NOTE — TELEPHONE ENCOUNTER
The patient was called on the telephone and the situation was discussed with her daughter.  She has been having nausea with solid food but she is tolerating protein shakes well.  Her daughter was advised to continue the protein shakes as needed.  Otherwise she is recovering well.  They will call for any concerns.

## 2023-09-21 NOTE — TELEPHONE ENCOUNTER
Patient seen during telehealth appointment. Reviewed UDS and VICENTA. Both updated and appropriate. Refill appropriate.  Please refill.

## 2023-09-21 NOTE — PROGRESS NOTES
TELEMEDICINE - VIDEO VISIT  You have chosen to receive care through a telehealth visit.  Do you consent to use a video/audio connection for your medical care today? Yes    Location of patient: Home  Location of Provider: [facility]   Anyone else present: Yes, patient's daughter  Type of Technology used- ZOOM through use of Epic/[employer] visit    CHIEF COMPLAINT  Chronic back and joint pain    Subjective   Anne Jewell is a 73 y.o. female  who presents for a video visit follow-up. She has a history of chronic back and joint pain. She reports that her pain has worsened since her last office visit.     Today pain is 7/10VAS in severity. Pain is located in her low back and radiates to bilateral feet. Describes this pain as a nearly continuous aching. Pain is worsened by rising from sitting to standing position, stress, weather changes, prolonged position, and walking long distances. Pain improves with rest/reposition, HEP, aqua therapy, heat/ice, and medications.      Continues with OxyContin 80mg 2 tablets BID and Baclofen 10mg 2-3/day (managed by PCP). Denies any side effects from the regimen, including constipation and somnolence. The regimen helps decrease pain by a significant amount. Notes improvement in activity and function with regimen. ADL's by self. Denies any bowel or bladder changes. Patient reports that she takes Xanax 2mg TID as needed. This is prescribed by psychiatrist.     She was admitted to the hospital from 8/25/23 - 9/15/23 where she was diagnosed with adenocarcinoma that was found when patient attempting to undergo an ERCP. She underwent a exploratory laparotomy with distal gastrectomy. Following surgery she experienced a new onset of atrial fibrillation with rapid ventricular response.  Cardiology was consulted.  She been seen by hematology/oncology. She was seen by Dr. Stuart Borges on 9/19/23 with [facility].  If the patient needs a PET/CT scan for  further evaluation to determine necessary treatment. Daughter is at bedside and helps recall hospital stay and findings.     Back Pain  This is a chronic problem. The current episode started more than 1 year ago. The problem occurs constantly. The problem has been gradually worsening since onset. The pain is present in the lumbar spine. The quality of the pain is described as aching. The pain radiates to the right foot and left foot. The pain is at a severity of 8/10. The pain is moderate. The symptoms are aggravated by standing, position and sitting (rising from sitting to standing, prolonged position, walking long distances, stress, weather changes). Associated symptoms include dysuria (UTI) and weakness. Pertinent negatives include no abdominal pain, chest pain, fever, headaches or numbness. She has tried analgesics, home exercises, heat and ice (aqua therapy) for the symptoms. The treatment provided moderate relief.   Joint Pain  This is a chronic (7/10 VAS in severity) problem. The current episode started more than 1 year ago. The problem occurs constantly. The problem has been gradually worsening. Associated symptoms include arthralgias, myalgias, neck pain and weakness. Pertinent negatives include no abdominal pain, chest pain, chills, congestion, coughing, fatigue, fever, headaches, numbness, vertigo or vomiting. The symptoms are aggravated by stress and exertion (weather changes, increased physical activity,). She has tried oral narcotics, position changes, rest, heat and ice for the symptoms. The treatment provided mild relief.      Review of Pertinent Medical Data ---  CT OF THE ABDOMEN AND PELVIS WITH CONTRAST     HISTORY: Distal gastrectomy.     COMPARISON: September 17, 2020     TECHNIQUE: Axial CT imaging was obtained through the abdomen and pelvis.  IV contrast was administered.     FINDINGS:  Images through the lung bases demonstrate some mild left basilar  atelectasis. No suspicious hepatic lesions  are seen. The patient is  status post gastric bypass procedure. The patient was administered a  small amount of oral contrast material for this study. The contrast has  passed into the efferent limb of the bypass. I see no contrast material  within the excluded portion of the stomach, either from an earlier upper  GI series, or from the current study. The excluded portion of the  stomach measures up to 11.9 x 7.4 cm on the axial series, and up to 16.8  cm in craniocaudal dimensions. There is no evidence of obstruction at  the gastrojejunostomy, or at the distal anastomosis. Calcified  granulomata are noted within the spleen. The patient has some mild intra  and extrahepatic biliary dilatation. The patient had a prior study at an  outside facility, which demonstrated the common bile duct measuring up  to 2.1 cm, as well as a large stone within the common bile duct. Common  bile duct measures up to 9 mm on the current exam. I do not see any  obvious stones within it on the current study. The pancreas is atrophic.  There is a surgical drain which is noted within the right upper  quadrant. There is some mesenteric stranding which is noted within the  right upper quadrant, in keeping with recent surgery. However, no focal  fluid collections are seen. The adrenal glands are normal. The kidneys  enhance symmetrically. There are areas of cortical scarring of both  kidneys. There are bilateral renal cysts. No distal ureteral or bladder  stones are seen. Uterus is absent. The appendix is normal. Patient  appears to have additional further small bowel resection, and  correlation with operative history is recommended. Again, there is no  evidence of small bowel obstruction no pneumatosis or free air is seen.  There are postsurgical changes noted within the anterior abdominal wall.  The patient has a lumbar scoliosis, with convexity to the right.     IMPRESSION:  No contrast is identified within the excluded portion of the  stomach.     Radiation dose reduction techniques were utilized, including automated  exposure control and exposure modulation based on body size.      This report was finalized on 9/6/2023 9:49 PM by Dr. Rachel Burgos M.D.    The following portions of the patient's history were reviewed and updated as appropriate: allergies, current medications, past family history, past medical history, past social history, past surgical history, and problem list.    Review of Systems   Constitutional:  Negative for chills, fatigue and fever.   HENT:  Negative for congestion.    Respiratory:  Negative for cough.    Cardiovascular:  Negative for chest pain.   Gastrointestinal:  Negative for abdominal pain and vomiting.   Genitourinary:  Positive for dysuria (UTI).   Musculoskeletal:  Positive for arthralgias, back pain, myalgias and neck pain.   Neurological:  Positive for weakness. Negative for vertigo, numbness and headaches.     I have reviewed and confirmed the accuracy of the ROS as documented by the MA/LPN/RN EUGENIO Ayala    Vitals:    09/21/23 1549   PainSc:   8   PainLoc: Back     Objective   Physical Exam  Constitutional:       Appearance: Normal appearance.   HENT:      Head: Normocephalic.   Neurological:      General: No focal deficit present.      Mental Status: She is alert and oriented to person, place, and time.   Psychiatric:         Mood and Affect: Mood normal.         Behavior: Behavior normal.         Thought Content: Thought content normal.         Cognition and Memory: Cognition normal.     Assessment & Plan   Diagnoses and all orders for this visit:    1. Lumbar radiculopathy (Primary)    2. Chronic pain syndrome    3. Rheumatoid arthritis, involving unspecified site, unspecified whether rheumatoid factor present    4. Generalized osteoarthritis    5. Degeneration of intervertebral disc of lumbar region    6. Encounter for long-term (current) use of high-risk medication  -     naloxone (NARCAN)  4 MG/0.1ML nasal spray; 1 spray into the nostril(s) as directed by provider As Needed (sedation or accidental overdose of opioid).  Dispense: 2 each; Refill: 0    7. Gastric adenocarcinoma    ----------------  Our practice is offering alternative &/or electronic methods to continue to follow our patients while at the same time further the efforts toward social distancing, in accordance with our organizational policies, professional societies' guidance, and gubernatorial mandates.  I support the Healthy at Home campaign and in this visit I have counseled the patient on our needs to limit in-person office visits and physical encounters with medical facilities whenever possible.  I have also educated the patient on the medical necessities of maintaining social distancing while we continue to function during this crisis period.      The patient agreed to a Video Visit.  ----------------  --- The urine drug screen confirmation from 7/28/23 has been reviewed and the result is appropriate based on patient history and VICENTA report  --- The patient signed an updated copy of the controlled substance agreement on 11/29/22  --- Narcan prescription sent to pharmacy   --- Continue OxyContin. Patient appears stable with current regimen. No adverse effects. Regarding continuation of opioids, there is no evidence of aberrant behavior or any red flags.  The patient continues with appropriate response to opioid therapy. ADL's remain intact by self.   --- Follow-up 1 month - ok for telehealth visit     VICENTA REPORT  As part of the patient's treatment plan, I am prescribing controlled substances. The patient has been made aware of appropriate use of such medications, including potential risk of somnolence, limited ability to drive and/or work safely, and the potential for dependence or overdose. It has also been made clear that these medications are for use by this patient only, without concomitant use of alcohol or other substances  unless prescribed.     Patient has completed prescribing agreement detailing terms of continued prescribing of controlled substances, including monitoring VICENTA reports, urine drug screening, and pill counts if necessary. The patient is aware that inappropriate use will results in cessation of prescribing such medications.    As the clinician, I personally reviewed the VICENTA from 9/21/23 while the patient was in the office today.    History and physical exam exhibit continued safe and appropriate use of controlled substances.  -------  EMR Dragon/Transcription disclaimer:   Much of this encounter note is an electronic transcription/translation of spoken language to printed text. The electronic translation of spoken language may permit erroneous, or at times, nonsensical words or phrases to be inadvertently transcribed; Although I have reviewed the note for such errors, some may still exist.      This document is intended for medical expert use only. Reading of this document by patients and/or patient's family without participating medical staff guidance may result in misinterpretation and unintended morbidity.   Any interpretation of such data is the responsibility of the patient and/or family member responsible for the patient in concert with their primary or specialist providers, not to be left for sources of online searches such as imagoo, Medi[employer]ens or similar queries. Relying on these approaches to knowledge may result in misinterpretation, misguided goals of care and even death should patients or family members try recommendations outside of the realm of professional medical care in a supervised way.

## 2023-09-22 ENCOUNTER — TELEPHONE (OUTPATIENT)
Dept: FAMILY MEDICINE CLINIC | Age: 73
End: 2023-09-22

## 2023-09-22 DIAGNOSIS — L89.319 PRESSURE INJURY OF SKIN OF RIGHT BUTTOCK, UNSPECIFIED INJURY STAGE: Primary | ICD-10-CM

## 2023-09-22 NOTE — TELEPHONE ENCOUNTER
Willa w/ [facility] called stating pt has a new pressure ulcer on her right buttock, requesting orders for gel cushion for her wheelchair and silvadene cream.

## 2023-09-25 NOTE — TELEPHONE ENCOUNTER
Okay for orders for wheelchair cushion and silvadene cream to apply / where does that need to be sent to for home health to apply

## 2023-09-25 NOTE — TELEPHONE ENCOUNTER
Left message for Willa at [facility].  Silvadine cream sent to her pharmacy.  Gel cushion for wheelchair sent to [facility].

## 2023-09-25 NOTE — TELEPHONE ENCOUNTER
Called to talk to Mrs Jewell's nurse with [facility].  Her nurse is Willa and she can be reached at [phone].

## 2023-09-26 ENCOUNTER — READMISSION MANAGEMENT (OUTPATIENT)
Dept: CALL CENTER | Facility: HOSPITAL | Age: 73
End: 2023-09-26
Payer: MEDICARE

## 2023-09-26 ENCOUNTER — TELEPHONE (OUTPATIENT)
Dept: FAMILY MEDICINE CLINIC | Age: 73
End: 2023-09-26
Payer: MEDICARE

## 2023-09-26 NOTE — TELEPHONE ENCOUNTER
Daughter Ayanna said she has wounds on her buttocks and she needs to schedule a f/u appt.  Will A Kat see her?

## 2023-09-26 NOTE — OUTREACH NOTE
Medical Week 2 Survey      Flowsheet Row Responses   [facility] patient discharged from? [place]   Does the patient have one of the following disease processes/diagnoses(primary or secondary)? Other   Week 2 attempt successful? Yes   Call start time 1550   Discharge diagnosis Gastric adenocarcinoma   Call end time 1552   Meds reviewed with patient/caregiver? Yes   Is the patient taking all medications as directed (includes completed medication regime)? Yes   Does the patient have a primary care provider?  Yes   Does the patient have an appointment with their PCP within 7 days of discharge? Yes   Has the patient kept scheduled appointments due by today? Yes   Has home health visited the patient within 72 hours of discharge? Yes   Psychosocial issues? No   Did the patient receive a copy of their discharge instructions? Yes   Nursing interventions Reviewed instructions with patient   What is the patient's perception of their health status since discharge? Improving   Is the patient/caregiver able to teach back signs and symptoms related to disease process for when to call PCP? Yes   Is the patient/caregiver able to teach back signs and symptoms related to disease process for when to call 911? Yes   Is the patient/caregiver able to teach back the hierarchy of who to call/visit for symptoms/problems? PCP, Specialist, Home health nurse, Urgent Care, ED, 911 Yes   Week 2 Call Completed? Yes   Graduated Yes   Graduated/Revoked comments Pt reports she is improving and her swelling has went down in feet and legs.   Call end time 1552            [facility] - Registered Nurse

## 2023-09-27 NOTE — TELEPHONE ENCOUNTER
Talked to Ayanna, her mother does have an appt scheduled with A Kat on 10/16/23.  We can move that appt up if she needs to be seen sooner.  She said no, its fine, she just wanted to make sure A Kat was going to continue seeing her due to all of her issues.  A Kat had told them she needs to be followed by a MD.  Can discuss this at her next appt with A Kat.

## 2023-09-27 NOTE — TELEPHONE ENCOUNTER
She needs to see wound care, and yes I can see her and or an acute provider or the MD, isn't home health coming, wound care nurse should also be able to see her

## 2023-10-02 NOTE — ANESTHESIA POSTPROCEDURE EVALUATION
Patient: Anne Jewell    Procedure Summary       Date: 08/30/23 [facility] / [facility] / [facility] END[facility]COPY    Anesthesia Start: 1657 Anesthesia Stop: 1716    Procedure: ENDOSCOPIC RETROGRADE CHOLANGIOPANCREATOGRAPHY Diagnosis:       Choledocholithiasis      (Choledocholithiasis [K80.50])    Surgeons: Elijah Simon MD Provider: George Anderson MD    Anesthesia Type: MAC ASA Status: 3            Anesthesia Type: MAC    Vitals  Vitals Value Taken Time   /71 08/30/23 1733   Temp     Pulse 79 08/30/23 1733   Resp 16 08/30/23 1733   SpO2 96 % 08/30/23 1733           Post Anesthesia Care and Evaluation    Patient location during evaluation: bedside  Pain management: adequate    Airway patency: patent  Anesthetic complications: No anesthetic complications    Cardiovascular status: acceptable  Respiratory status: acceptable  Hydration status: acceptable

## 2023-10-03 DIAGNOSIS — I48.0 PAROXYSMAL A-FIB: Primary | ICD-10-CM

## 2023-10-03 DIAGNOSIS — K21.9 GASTROESOPHAGEAL REFLUX DISEASE, UNSPECIFIED WHETHER ESOPHAGITIS PRESENT: Chronic | ICD-10-CM

## 2023-10-03 RX ORDER — PANTOPRAZOLE SODIUM 40 MG/1
40 TABLET, DELAYED RELEASE ORAL DAILY
Qty: 30 TABLET | Refills: 0 | Status: SHIPPED | OUTPATIENT
Start: 2023-10-03

## 2023-10-03 RX ORDER — MAGNESIUM OXIDE 400 MG/1
400 TABLET ORAL 2 TIMES DAILY
Qty: 60 TABLET | Refills: 0 | Status: SHIPPED | OUTPATIENT
Start: 2023-10-03

## 2023-10-03 RX ORDER — AMIODARONE HYDROCHLORIDE 200 MG/1
200 TABLET ORAL
Qty: 30 TABLET | Refills: 0 | Status: SHIPPED | OUTPATIENT
Start: 2023-10-03

## 2023-10-03 NOTE — TELEPHONE ENCOUNTER
Rx Refill Note  Requested Prescriptions     Pending Prescriptions Disp Refills    magnesium oxide (MAG-OX) 400 MG tablet 60 tablet 0     Sig: Take 1 tablet by mouth 2 (Two) Times a Day.    pantoprazole (Protonix) 40 MG EC tablet 30 tablet 0     Sig: Take 1 tablet by mouth Daily.    metoprolol tartrate (LOPRESSOR) 25 MG tablet 60 tablet 0     Sig: Take 1 tablet by mouth Every 12 (Twelve) Hours.    amiodarone (PACERONE) 200 MG tablet 30 tablet 0     Sig: Take 1 tablet by mouth Daily.      Last office visit with prescribing clinician: 7/19/2023   Last telemedicine visit with prescribing clinician: Visit date not found   Next office visit with prescribing clinician: 10/16/2023    Pts daughter said these need to be refilled until she can get into her cardiologist.  Appt at the end of November.  These were ordered when she was in the hospital.     Vaishnavi Keller LPN  10/03/23, 10:06 EDT

## 2023-10-04 DIAGNOSIS — E03.9 ACQUIRED HYPOTHYROIDISM: Chronic | ICD-10-CM

## 2023-10-04 RX ORDER — LEVOTHYROXINE SODIUM 0.1 MG/1
TABLET ORAL
Qty: 90 TABLET | Refills: 0 | Status: SHIPPED | OUTPATIENT
Start: 2023-10-04

## 2023-10-10 ENCOUNTER — CLINICAL SUPPORT (OUTPATIENT)
Dept: FAMILY MEDICINE CLINIC | Age: 73
End: 2023-10-10
Payer: MEDICARE

## 2023-10-10 DIAGNOSIS — J30.9 ALLERGIC RHINITIS, UNSPECIFIED SEASONALITY, UNSPECIFIED TRIGGER: Primary | ICD-10-CM

## 2023-10-10 PROCEDURE — 95115 IMMUNOTHERAPY ONE INJECTION: CPT | Performed by: FAMILY MEDICINE

## 2023-10-11 ENCOUNTER — TELEPHONE (OUTPATIENT)
Dept: SURGERY | Facility: CLINIC | Age: 73
End: 2023-10-11
Payer: MEDICARE

## 2023-10-11 NOTE — TELEPHONE ENCOUNTER
I spoke with Ayanna regarding her concerns of wound infection & UTI. I was under the impression that Anne is the one that has 7 different bacteria's in her urine but it is actually Ayanna. She believes since she has never heard of most the bacteria's that they had to come from Anne's wound when she is doing wound care. Anne has been having some drainage from her wound; slough, cloudy fluid sometimes & has recently developed a odor. Anne is not experiencing any nausea,vomiting, increased pain, fever or chills.   Anne has an appointment scheduled with  tomorrow & I assured Ayanna  will do a wound check. Ayanna wants her mom to have a UA because she has been having some discomfort with urinating. But I let Ayanna know unfortunately we don't have a lab in our office so we can't take urine samples or run UAs. It is highly unlikely Ayanna is getting a UTI from her mothers wound because UTI's aren't  contagious & they develop for different reasons.

## 2023-10-11 NOTE — TELEPHONE ENCOUNTER
Provider: DR.GEORGE Paytoner: JERRELL    Relationship to Patient: [facility]    Pharmacy:     Phone Number: [phone]    Reason for Call: BACTERIA- COULD THIS BE FROM MY MOM'S WOUND? DO I NEED HOME HEALTH TO COME OVER TO DO A CULTURE?    When was the patient last seen:     When did it start: STARTED 3 WEEKS AGO    Where is it located: URINE    Characteristics of symptom/severity: 7 TYPES OF BACTERIA FOUND IN URINE    Timing- Is it constant or intermittent:     What makes it worse:     What makes it better:     What therapies/medications have you tried: BACTRIM-MOXIFLOXACIN-CIPRO AND THERE IS ANOTHER ANTIBIOTIC IS BEING RX TODAY AS WELL. I HAVE A PORT.

## 2023-10-12 ENCOUNTER — OFFICE VISIT (OUTPATIENT)
Dept: SURGERY | Facility: CLINIC | Age: 73
End: 2023-10-12
Payer: MEDICARE

## 2023-10-12 VITALS
DIASTOLIC BLOOD PRESSURE: 68 MMHG | SYSTOLIC BLOOD PRESSURE: 125 MMHG | WEIGHT: 174 LBS | HEIGHT: 61 IN | BODY MASS INDEX: 32.85 KG/M2

## 2023-10-12 DIAGNOSIS — Z48.89 POSTOPERATIVE VISIT: Primary | ICD-10-CM

## 2023-10-12 PROCEDURE — 1160F RVW MEDS BY RX/DR IN RCRD: CPT | Performed by: SURGERY

## 2023-10-12 PROCEDURE — 99024 POSTOP FOLLOW-UP VISIT: CPT | Performed by: SURGERY

## 2023-10-12 PROCEDURE — 1159F MED LIST DOCD IN RCRD: CPT | Performed by: SURGERY

## 2023-10-12 RX ORDER — POTASSIUM CHLORIDE 1.5 G/1.58G
20 POWDER, FOR SOLUTION ORAL DAILY
COMMUNITY

## 2023-10-12 RX ORDER — FUROSEMIDE 20 MG/1
20 TABLET ORAL DAILY
COMMUNITY
Start: 2023-09-19 | End: 2024-09-18

## 2023-10-12 NOTE — LETTER
October 18, 2023     Jameson Dean MD     Patient: Anne Jewell   YOB: 1950   Date of Visit: 10/12/2023     Dear Jameson Dean MD:       Thank you for referring Anne Jewell to me for evaluation. Below are the relevant portions of my assessment and plan of care.    If you have questions, please do not hesitate to call me. I look forward to following Anne along with you.         Sincerely,        Slava Loo Jr., MD        CC:   No Recipients    Slava Loo Jr., MD  10/18/23 1549  Sign when Signing Visit  Subjective  Anne Jewell is a 73 y.o. female who returns to the office after undergoing an exploratory laparotomy with gastric access for ERCP and distal gastrectomy on 9/1/2023 and an exploratory laparotomy with gastrojejunostomy on 9/8/2023.     History of Present Illness     The patient is recovering well with no significant postop symptoms.  She is having minimal abdominal pain but has chronic back pain.  Her appetite has been reasonable, almost back to normal with some intermittent nausea but no vomiting.  She is having regular bowel function.    She had a PET scan that showed no evidence of metastatic disease.    Review of Systems   Constitutional:  Positive for appetite change and fatigue. Negative for activity change and fever.   Respiratory:  Negative for chest tightness and shortness of breath.    Cardiovascular:  Negative for chest pain and palpitations.   Gastrointestinal:  Positive for abdominal pain and nausea. Negative for constipation and diarrhea.   Skin:  Negative for rash and wound.   Psychiatric/Behavioral:  Negative for agitation and confusion.        Objective  Physical Exam  Constitutional:       General: She is not in acute distress.     Appearance: Normal appearance. She is not ill-appearing or toxic-appearing.   Pulmonary:      Effort: Pulmonary effort is normal. No respiratory distress.   Abdominal:      Palpations: Abdomen is soft.       Tenderness: There is no abdominal tenderness.   Skin:     Comments: Wound: Open with clean base of granulation tissue and no evidence of infection.   Neurological:      Mental Status: She is alert.   Psychiatric:         Behavior: Behavior normal.         Assessment & Plan    1.  Postoperative visit: The patient is recovering well from her exploratory laparotomy with gastric access for ERCP and distal gastrectomy for the gastric tumor followed by exploratory laparotomy with gastrojejunostomy.  She is recovering well and had a recent PET scan that shows no evidence of active disease.  There are no plans for chemotherapy present time.  Her daughter will continue to manage the wound.  She will follow-up in our office in 2 months at which time she will likely be scheduled for an EGD for evaluation.

## 2023-10-16 ENCOUNTER — OFFICE VISIT (OUTPATIENT)
Dept: FAMILY MEDICINE CLINIC | Age: 73
End: 2023-10-16
Payer: MEDICARE

## 2023-10-16 VITALS
SYSTOLIC BLOOD PRESSURE: 127 MMHG | WEIGHT: 174.4 LBS | HEART RATE: 57 BPM | DIASTOLIC BLOOD PRESSURE: 80 MMHG | BODY MASS INDEX: 32.93 KG/M2 | HEIGHT: 61 IN | TEMPERATURE: 98.1 F

## 2023-10-16 DIAGNOSIS — C16.9 GASTRIC ADENOCARCINOMA: ICD-10-CM

## 2023-10-16 DIAGNOSIS — I48.0 PAROXYSMAL A-FIB: ICD-10-CM

## 2023-10-16 DIAGNOSIS — E03.9 ACQUIRED HYPOTHYROIDISM: Primary | ICD-10-CM

## 2023-10-16 DIAGNOSIS — J30.9 ALLERGIC RHINITIS, UNSPECIFIED SEASONALITY, UNSPECIFIED TRIGGER: ICD-10-CM

## 2023-10-16 DIAGNOSIS — R41.3 MEMORY LOSS: ICD-10-CM

## 2023-10-16 LAB
BILIRUB UR QL STRIP: NEGATIVE
CLARITY UR: CLEAR
COLOR UR: YELLOW
GLUCOSE UR STRIP-MCNC: NEGATIVE MG/DL
HGB UR QL STRIP.AUTO: NEGATIVE
KETONES UR QL STRIP: NEGATIVE
LEUKOCYTE ESTERASE UR QL STRIP.AUTO: NEGATIVE
NITRITE UR QL STRIP: NEGATIVE
PH UR STRIP.AUTO: 6.5 [PH] (ref 5–8)
PROT UR QL STRIP: NEGATIVE
SP GR UR STRIP: 1.02 (ref 1–1.03)
UROBILINOGEN UR QL STRIP: NORMAL

## 2023-10-16 PROCEDURE — 81003 URINALYSIS AUTO W/O SCOPE: CPT | Performed by: NURSE PRACTITIONER

## 2023-10-16 PROCEDURE — 1159F MED LIST DOCD IN RCRD: CPT | Performed by: NURSE PRACTITIONER

## 2023-10-16 PROCEDURE — 1160F RVW MEDS BY RX/DR IN RCRD: CPT | Performed by: NURSE PRACTITIONER

## 2023-10-16 PROCEDURE — 99214 OFFICE O/P EST MOD 30 MIN: CPT | Performed by: NURSE PRACTITIONER

## 2023-10-16 NOTE — ASSESSMENT & PLAN NOTE
To keep upcoming appt to establish with MD for her PCP here; Reviewed her recent TSH and other labs

## 2023-10-16 NOTE — ASSESSMENT & PLAN NOTE
Keep her appt tomorrow with oncology to check with him re getting her flu vaccine; reviewed previous recent labs

## 2023-10-16 NOTE — ASSESSMENT & PLAN NOTE
She had brought up this in July and her daughter with her today thinks this has been an issue since March 2023, will check with Dr Dean about setting her up with neurology, preferably one that comes here, I had suggested Dr Wu, but he is not longer with [facility]   Will go ahead and check a u/a today : urine clear

## 2023-10-16 NOTE — Clinical Note
I will be gone next week when you see her for follow up, you might want to talk to me re her, the appt is 10-24-23 and she is one transferring from to you due to her complex health issues

## 2023-10-16 NOTE — PROGRESS NOTES
Chief Complaint  Allergic Rhinitis (6 month follow up allergic rhinitis and thyroid.)   Here with her daughter today     Subjective          Anne Jewell presents to [facility]    History of Present Illness  Allergies  Gets her allergy inj here and got one last week, helped her allergies    Has an appt with dr borges office tomorrow for an infusion    Hypothyroidism  Current rx: Levothroid 100 mcg   Lab Results       Component                Value               Date                       TSH                      1.962               09/19/2023              Will be establishing with MD here, due her her complex health issues, she did have a PCP in [place], he has retired.  She is also now seeing Dr Borges,  her Surgeon Dr Liz Loo, has an upcoming appt with cardiology dr bradley   Has chronic pain, uses a walker    Recent hospitalization in August 2023:   Hospital Course:  Pleasant 73-year-old female with multiple medical issues presents initially due to an outpatient MRCP revealing ductal lithiasis.  Please H&P for full details.  Apparently the patient tested positive for COVID-19.  There are no infiltrates on the chest x-ray.  She did receive 3 days of remdesivir.  She received antibiotics in the interim for the choledocholithiasis.  She did then undergo an attempted ERCP which was unsuccessful secondary to aberrant anatomy and previous surgical procedures.  She was then taken to the OR via surgery and GI for an open approach to the stomach and ERCP was successfully performed with removal of the stone and relief of the obstruction.  Unfortunately during procedure a fungating mass of the stomach was observed.  Biopsy did reveal this to be an adenocarcinoma.  She then underwent an exploratory laparotomy with distal gastrectomy affections now postop day 11.  This was followed by an exploratory laparotomy with a gastrojejunostomy in which she is now postop day 6.  The  hospital course was complicated by new onset of atrial fibrillation with rapid ventricular response.  She was transferred to ICU and cardiology consultation was obtained.  Now maintaining normal sinus rhythm and on amiodarone and metoprolol.  She was already on Xarelto prior to admission for history of DVTs.  This is being continued.  She had a slow recovery post procedure.  She has however taken in reasonable oral intake now and cleared for discharge per surgery.  Also noted to be significantly anemic.  Work-up revealed multiple issues in addition to her blood loss from procedures and these included anemia chronic disease and significant iron deficiency.  She was already on sublingual B12 supplements prior to admission those levels looked good.  She is seen by hematology oncology.  She received her fourth and last dose of intravenous ferric gluconate today.  She needs to be followed up as an outpatient with a PET scan for further staging of the gastric adenocarcinoma.  Liver abnormalities were also noted which will need an outpatient follow-up with the answers not noted in the PET scan.  Presently she remains afebrile vital signs stable.  She has been noticing loose stools recently.  No fever no leukocytosis no blood or mucus stools however.  We will need to keep an eye on this.  She normally has constipation secondary to her chronic narcotic use.  Arrangements are being made for home health including nursing care and physical therapy.          PAST MEDICAL HISTORY changes since :         Hospitalized 8-10-23 gastric cancer  A fib        + bilateral CTS     Positive for    Deep Venous Thrombosis: 2 DVT and a PE; ;     Positive for    Hypothyroidism: dx'd in ; ;     Positive for    Hx Obesity: used to weigh over 400 pounds; ;         GYNECOLOGICAL HISTORY:             CURRENT MEDICAL PROVIDERS:    Allergist: [facility]: allergy rx / goes yearly     Orthopedist: Dr Alvarado    Psychiatrist:  [facility], oz villasenor    Neph, amando chavez   Chiropractor [facility]  GYN, samson Feldman, PCP, retired     pain management Dr. Grajeda and NP Simón         PREVENTIVE HEALTH MAINTENANCE         DEXA 3-31-22 at [facility] and mammogram same day     COLORECTAL CANCER SCREENING: Up to date (colonoscopy q10y; sigmoidoscopy q5y; Cologuard q3y) was last done 20/ dr Barker         Surgical History:     Gastric surgery     Teeth removed      Cholecystectomy    Hysterectomy: TAHBSO; Other Surgeries    Hernia Repair: 7 different surgeries;     Joint Replacement: bilateral knees; , ;     Gastric Bypass ;    bladder repair;    right hand surgery ;    breast reduction ;     Procedures:    Colonoscopy ( / Jordan )    EGD         Family History:     Father:  at age 89;     ; Positive for Prostate Cancer;     Mother:  at age 78; Cause of death was renal failure    ; Positive for Congestive Heart Failure;     ; Positive for Breast Cancer;     ; Positive for Type 2 Diabetes;     ; Positive for blind;     Brother(s): 1 brother(s) total    ; Positive for Type 2 Diabetes;     Son(s): 1 son obesity     Daughter(s): 1 daughter(s) ;  Migraines / gastroparesis, POTS and dysautonomia, not working, is a nurse,  due to health issues         Social History:     Occupation: Disabled (due to [employer])     Marital Status:  since  /spouse comes and goes,now he is living there but they live separate lives, he has multiple myeloma;  daughter has moved in with her as well     Children: 2 children and 2 step-children                 Past Medical History:   Diagnosis Date    Abnormal ECG 2023    A-fib after procedures    Allergic rhinitis     Anemia     Arthritis of back     Arthritis of neck     Asthma     Bronchospasm     Cancer     Adenocarcinoma of the stomach    Carpal tunnel syndrome, bilateral     Cervical disc disorder     Clotting  disorder     Colon polyp Don’t remember    Years ago    Deep vein thrombosis     Disc degeneration, lumbar     Edema     Esophageal reflux     Glaucoma     Hip arthrosis     History of transfusion     Joint pain     Kidney disease 2018    stage 3    Liver disease 2018    stage 3    Low back pain     Osteoarthritis     Osteopenia     Osteoporosis     Periarthritis of shoulder     Scoliosis     Status post total knee replacement, left 08/31/2017    Status post total knee replacement, right 08/31/2017    Stomach cancer Sept 2023    UTI (urinary tract infection)     Venous thrombosis        Allergies   Allergen Reactions    Penicillins Hives        Past Surgical History:   Procedure Laterality Date    BILATERAL BREAST REDUCTION      BREAST SURGERY      COLONOSCOPY  08/05/2016    ERCP N/A 08/30/2023    Procedure: ENDOSCOPIC RETROGRADE CHOLANGIOPANCREATOGRAPHY;  Surgeon: Elijah Simon MD;  Location[facility] ENDOSCOPY;  Service: Gastroenterology;  Laterality: N/A;  cbd stone    ERCP N/A 09/01/2023    Procedure: ENDOSCOPIC RETROGRADE CHOLANGIOPANCREATOGRAPHY WITH CHOLANGIOGRAM, SPHINCTEROTOMY, BALLOON SWEEP;  Surgeon: Elijah Simon MD;  Location: [facility] OR;  Service: Gastroenterology;  Laterality: N/A;    EXPLORATORY LAPAROTOMY N/A 09/01/2023    Procedure: Exploratory laparotomy with Distal Gastrectomy;  Surgeon: Slava Loo Jr., MD;  Location: [facility] OR;  Service: General;  Laterality: N/A;    GASTRECTOMY N/A 09/08/2023    Procedure: EXPLORATORY LAPAROTOMY WITH GASTROJEJUNOSTOMY;  Surgeon: Slava Loo Jr., MD;  Location: [facility] OR;  Service: General;  Laterality: N/A;    GASTRIC BYPASS      HAND SURGERY Right 01/14/2016    HEMORRHOIDECTOMY      HERNIA REPAIR      x7    HYSTERECTOMY      JOINT REPLACEMENT      Both knees    OTHER SURGICAL HISTORY      vaginal sling operation for stress incontinence    REDUCTION MAMMAPLASTY      TOTAL KNEE ARTHROPLASTY Left     TOTAL KNEE ARTHROPLASTY  Bilateral         Social History     Tobacco Use    Smoking status: Former     Types: Cigarettes     Quit date: 2005     Years since quittin.0     Passive exposure: Past    Smokeless tobacco: Never   Substance Use Topics    Alcohol use: Never       Family History   Problem Relation Age of Onset    Prostate cancer Father     Arthritis Other     Hypertension Other         benign essential    Cancer Other     Diabetes Other     Heart disease Other     Nephrolithiasis Other         Health Maintenance Due   Topic Date Due    PAP SMEAR  Never done    ZOSTER VACCINE (2 of 2) 2023    INFLUENZA VACCINE  2023    COVID-19 Vaccine ( season) 2023        Current Outpatient Medications on File Prior to Visit   Medication Sig    albuterol sulfate  (90 Base) MCG/ACT inhaler Inhale 2 puffs Every 6 (Six) Hours As Needed for Wheezing. Proventil  (90 Base) MCG/ACT Inhalation Aerosol Solution; Patient Sig: Proventil  (90 Base) MCG/ACT Inhalation Aerosol Solution Inhale 2 puff(s) every 6 to 8 hours as needed; 6.7; 0; -2013; Active    ALPRAZolam (XANAX) 2 MG tablet As Needed.    amiodarone (PACERONE) 200 MG tablet Take 1 tablet by mouth Daily.    baclofen (LIORESAL) 10 MG tablet Take 1 tablet by mouth 3 (Three) Times a Day.    Calcium Citrate-Vitamin D3 (CITRACAL) 315-6.25 MG-MCG tablet tablet Take 1 tablet by mouth Daily.    chlorhexidine (PERIDEX) 0.12 % solution     Cholecalciferol (VITAMIN D3) 2000 units capsule TAKE ONE CAPSULE BY MOUTH DAILY    Cyanocobalamin (B-12) 1000 MCG sublingual tablet PLACE 1 TABLET UNDER THE TONGUE AND LET DISSOLVE ONCE DAILY    dicyclomine (BENTYL) 20 MG tablet Take 1 tablet by mouth Every 6 (Six) Hours As Needed (diarrhea).    EPINEPHrine (EPIPEN) 0.3 MG/0.3ML solution auto-injector injection     escitalopram (LEXAPRO) 10 MG tablet Take 1 tablet by mouth Daily.    furosemide (LASIX) 20 MG tablet Take 1 tablet by mouth Daily.    levothyroxine  (SYNTHROID, LEVOTHROID) 100 MCG tablet TAKE 1 TABLET BY MOUTH DAILY    loperamide (IMODIUM) 2 MG capsule Take 1 capsule by mouth 4 (Four) Times a Day As Needed for Diarrhea.    magnesium oxide (MAG-OX) 400 MG tablet Take 1 tablet by mouth 2 (Two) Times a Day.    metoprolol tartrate (LOPRESSOR) 25 MG tablet Take 1 tablet by mouth Every 12 (Twelve) Hours.    montelukast (SINGULAIR) 10 MG tablet TAKE ONE TABLET BY MOUTH DAILY    Multiple Vitamin tablet Take 1 tablet by mouth Daily.    naloxone (NARCAN) 4 MG/0.1ML nasal spray 1 spray into the nostril(s) as directed by provider As Needed (sedation or accidental overdose of opioid).    Omega 3-6-9 capsule Take  by mouth.    oxyCODONE ER (oxyCONTIN) 80 MG tablet extended-release 12 hour 12 hr tablet Take 2 tablets by mouth Every 12 (Twelve) Hours.    pantoprazole (Protonix) 40 MG EC tablet Take 1 tablet by mouth Daily.    potassium chloride (KLOR-CON) 20 MEQ packet Take 20 mEq by mouth Daily.    promethazine (PHENERGAN) 25 MG tablet TAKE 1 TABLET BY MOUTH EVERY 8 HOURS AS NEEDED FOR NAUSEA AND/OR VOMITING    saccharomyces boulardii (FLORASTOR) 250 MG capsule Take 1 capsule by mouth 2 (Two) Times a Day.    silver sulfadiazine (Silvadene) 1 % cream Apply 1 application  topically to the appropriate area as directed 2 (Two) Times a Day. Pressure ulcer on buttocks    Sodium Chloride 0.9 % solution Apply 5 mL topically Daily for 30 days.    Xarelto 20 MG tablet TAKE ONE TABLET BY MOUTH DAILY     No current facility-administered medications on file prior to visit.       Immunization History   Administered Date(s) Administered    COVID-19 (PFIZER) BIVALENT 12+YRS 10/05/2022    COVID-19 (PFIZER) Purple Cap Monovalent 02/23/2021, 03/16/2021, 08/25/2021    FluMist 2-49yrs 09/22/2014    Fluzone High Dose =>65 Years ([facility] ONLY) 10/17/2017, 10/08/2019, 09/02/2020, 09/20/2021, 09/21/2022    Fluzone High-Dose 65+yrs 09/12/2020, 09/20/2021, 09/21/2022    Influenza, Unspecified  "10/29/2021    Pneumococcal Conjugate 13-Valent (PCV13) 08/08/2017    Pneumococcal Polysaccharide (PPSV23) 09/06/2019    Shingrix 04/29/2023    Tdap 08/08/2017       Review of Systems   Constitutional:  Negative for fatigue and fever.   Respiratory:  Negative for cough and shortness of breath.    Cardiovascular:  Negative for chest pain, palpitations and leg swelling.   Gastrointestinal:         Abd dressing in place to her mid abd, dry    Genitourinary:  Negative for dysuria and hematuria (wants to be checked for UTI).   Neurological:  Positive for memory problem (issues, over 3 months (started in 3-2023)).        Objective     Vitals:    10/16/23 1405   BP: 127/80   BP Location: Right arm   Patient Position: Sitting   Cuff Size: Adult   Pulse: 57   Temp: 98.1 °F (36.7 °C)   TempSrc: Oral   Weight: 79.1 kg (174 lb 6.4 oz)   Height: 154.9 cm (61\")            Physical Exam  Vitals reviewed.   Constitutional:       General: She is not in acute distress.     Appearance: Normal appearance.   Neck:      Vascular: No carotid bruit.   Cardiovascular:      Rate and Rhythm: Normal rate and regular rhythm.      Heart sounds: Normal heart sounds. No murmur heard.  Pulmonary:      Effort: Pulmonary effort is normal. No respiratory distress.      Breath sounds: Normal breath sounds.   Musculoskeletal:      Right lower leg: No edema.      Left lower leg: No edema.   Neurological:      Mental Status: She is alert.   Psychiatric:         Mood and Affect: Mood normal.         Behavior: Behavior normal.         Result Review :     The following data was reviewed by: EUGENIO Rodriguez on 10/16/2023:                       Assessment and Plan      Diagnoses and all orders for this visit:    1. Acquired hypothyroidism (Primary)  Assessment & Plan:  To keep upcoming appt to establish with MD for her PCP here; Reviewed her recent TSH and other labs       2. Allergic rhinitis, unspecified seasonality, unspecified trigger  Assessment " & Plan:  She will follow up with allergist as directed and get her allergy immunotherapy       3. Gastric adenocarcinoma  Assessment & Plan:  Keep her appt tomorrow with oncology to check with him re getting her flu vaccine; reviewed previous recent labs       4. Paroxysmal A-fib  Assessment & Plan:  Keep upcoming appt with cardiology       5. Memory loss  Assessment & Plan:  She had brought up this in July and her daughter with her today thinks this has been an issue since March 2023, will check with Dr Dean about setting her up with neurology, preferably one that comes here, I had suggested Dr Wu, but he is not longer with [facility]   Will go ahead and check a u/a today : urine clear     Orders:  -     Urinalysis With Culture If Indicated - Urine, Clean Catch                  Follow Up     Return for Next scheduled follow up.    Patient was given instructions and counseling regarding her condition or for health maintenance advice. Please see specific information pulled into the AVS if appropriate.

## 2023-10-18 DIAGNOSIS — G89.4 CHRONIC PAIN SYNDROME: Chronic | ICD-10-CM

## 2023-10-18 DIAGNOSIS — R11.0 NAUSEA: ICD-10-CM

## 2023-10-18 NOTE — PROGRESS NOTES
Subjective   Anne Jewell is a 73 y.o. female who returns to the office after undergoing an exploratory laparotomy with gastric access for ERCP and distal gastrectomy on 9/1/2023 and an exploratory laparotomy with gastrojejunostomy on 9/8/2023.     History of Present Illness     The patient is recovering well with no significant postop symptoms.  She is having minimal abdominal pain but has chronic back pain.  Her appetite has been reasonable, almost back to normal with some intermittent nausea but no vomiting.  She is having regular bowel function.    She had a PET scan that showed no evidence of metastatic disease.    Review of Systems   Constitutional:  Positive for appetite change and fatigue. Negative for activity change and fever.   Respiratory:  Negative for chest tightness and shortness of breath.    Cardiovascular:  Negative for chest pain and palpitations.   Gastrointestinal:  Positive for abdominal pain and nausea. Negative for constipation and diarrhea.   Skin:  Negative for rash and wound.   Psychiatric/Behavioral:  Negative for agitation and confusion.        Objective   Physical Exam  Constitutional:       General: She is not in acute distress.     Appearance: Normal appearance. She is not ill-appearing or toxic-appearing.   Pulmonary:      Effort: Pulmonary effort is normal. No respiratory distress.   Abdominal:      Palpations: Abdomen is soft.      Tenderness: There is no abdominal tenderness.   Skin:     Comments: Wound: Open with clean base of granulation tissue and no evidence of infection.   Neurological:      Mental Status: She is alert.   Psychiatric:         Behavior: Behavior normal.         Assessment & Plan     1.  Postoperative visit: The patient is recovering well from her exploratory laparotomy with gastric access for ERCP and distal gastrectomy for the gastric tumor followed by exploratory laparotomy with gastrojejunostomy.  She is recovering well and had a recent PET scan that  shows no evidence of active disease.  There are no plans for chemotherapy present time.  Her daughter will continue to manage the wound.  She will follow-up in our office in 2 months at which time she will likely be scheduled for an EGD for evaluation.

## 2023-10-19 ENCOUNTER — TELEPHONE (OUTPATIENT)
Dept: FAMILY MEDICINE CLINIC | Age: 73
End: 2023-10-19
Payer: MEDICARE

## 2023-10-19 RX ORDER — PROMETHAZINE HYDROCHLORIDE 25 MG/1
TABLET ORAL
Qty: 90 TABLET | Refills: 0 | Status: SHIPPED | OUTPATIENT
Start: 2023-10-19

## 2023-10-19 NOTE — TELEPHONE ENCOUNTER
Rx Refill Note  Requested Prescriptions     Pending Prescriptions Disp Refills    promethazine (PHENERGAN) 25 MG tablet [Pharmacy Med Name: PROMETHAZINE 25 MG TABLET] 90 tablet 0     Sig: TAKE ONE TABLET BY MOUTH EVERY 8 HOURS AS NEEDED FOR NAUSEA / VOMITING      Last office visit with prescribing clinician: 10/16/2023   With Jewell Stephens     Next office visit with prescribing clinician: 10/24/23 Dr Dean       Last filled 9/22/23  #90     Aure Smith LPN  10/19/23, 09:06 EDT

## 2023-10-19 NOTE — TELEPHONE ENCOUNTER
----- Message from Vaishnavi Keller LPN sent at 5/1/2023  8:13 AM EDT -----      ----- Message -----  From: SYSTEM  Sent: 4/29/2023   1:18 AM EDT  To: [facility]

## 2023-10-20 ENCOUNTER — TELEMEDICINE (OUTPATIENT)
Dept: PAIN MEDICINE | Facility: CLINIC | Age: 73
End: 2023-10-20
Payer: MEDICARE

## 2023-10-20 DIAGNOSIS — M51.36 DEGENERATION OF INTERVERTEBRAL DISC OF LUMBAR REGION: ICD-10-CM

## 2023-10-20 DIAGNOSIS — M54.16 LUMBAR RADICULOPATHY: Primary | ICD-10-CM

## 2023-10-20 DIAGNOSIS — M15.9 GENERALIZED OSTEOARTHRITIS: ICD-10-CM

## 2023-10-20 DIAGNOSIS — G89.4 CHRONIC PAIN SYNDROME: ICD-10-CM

## 2023-10-20 DIAGNOSIS — M06.9 RHEUMATOID ARTHRITIS, INVOLVING UNSPECIFIED SITE, UNSPECIFIED WHETHER RHEUMATOID FACTOR PRESENT: ICD-10-CM

## 2023-10-20 DIAGNOSIS — M54.16 LUMBAR RADICULOPATHY: ICD-10-CM

## 2023-10-20 RX ORDER — OXYCODONE HCL 80 MG/1
160 TABLET, FILM COATED, EXTENDED RELEASE ORAL EVERY 12 HOURS SCHEDULED
Qty: 120 TABLET | Refills: 0 | Status: SHIPPED | OUTPATIENT
Start: 2023-10-20

## 2023-10-20 NOTE — TELEPHONE ENCOUNTER
Patient seen via telehealth today. Reviewed UDS and VICENTA. Both updated and appropriate. Refill appropriate.  DNF 10/22/23

## 2023-10-20 NOTE — PROGRESS NOTES
TELEMEDICINE - VIDEO VISIT  You have chosen to receive care through a telehealth visit.  Do you consent to use a video/audio connection for your medical care today? Yes    Location of patient: Home  Location of Provider: Home  Anyone else present: No  Type of Technology used- ZOOM through use of Epic/SpotOnWayt visit    CHIEF COMPLAINT  Back and joint pain    Subjective   Anne Jewell is a 73 y.o. female  who presents for a video visit follow-up. She has a history of chronic back and joint pain. She reports that her pain has remained consistent since her last office visit.      Today pain is 8/10VAS in severity. Pain is located in her low back and radiates to bilateral feet. Describes this pain as a nearly continuous aching. Pain is worsened by rising from sitting to standing position, stress, weather changes, prolonged position, and walking long distances. Pain improves with rest/reposition, HEP, aqua therapy, heat/ice, and medications. She is currently receiving at home OT and PT.     Continues with OxyContin 80mg 2 tablets BID and Baclofen 10mg 2-3/day (managed by PCP). Denies any side effects from the regimen, including constipation and somnolence. The regimen helps decrease pain by a significant amount. Notes improvement in activity and function with regimen. ADL's by self. Denies any bowel or bladder changes. Patient reports that she takes Xanax 2mg TID as needed. This is prescribed by psychiatrist.      She was admitted to the hospital from 8/25/23 - 9/15/23 where she was diagnosed with adenocarcinoma that was found when patient attempting to undergo an ERCP. She underwent a exploratory laparotomy with distal gastrectomy. Following surgery she experienced a new onset of atrial fibrillation with rapid ventricular response. Cardiology was consulted.  She been seen by hematology/oncology. She was seen by Dr. Stuart Borges on 9/19/23 with [facility].  PET scan on 10/6/23 showed no evidence of  metastatic disease. No plans for chemotherapy at this time per office note from Dr. Slava Loo from 10/12/23.     Back Pain  This is a chronic problem. The current episode started more than 1 year ago. The problem occurs constantly. The problem is unchanged (unchanged since last office visit). The pain is present in the lumbar spine. The quality of the pain is described as aching. The pain radiates to the right foot and left foot. The pain is at a severity of 8/10. The pain is moderate. The symptoms are aggravated by standing, position and sitting (rising from sitting to standing, prolonged position, walking long distances, stress, weather changes). Associated symptoms include dysuria (UTI) and weakness. Pertinent negatives include no abdominal pain, chest pain, fever, headaches or numbness. She has tried analgesics, home exercises, heat and ice (aqua therapy) for the symptoms. The treatment provided moderate relief.   Joint Pain  This is a chronic (7/10 VAS in severity) problem. The current episode started more than 1 year ago. The problem occurs constantly. The problem has been unchanged (unchanged since last office visit). Associated symptoms include arthralgias, myalgias, neck pain and weakness. Pertinent negatives include no abdominal pain, chest pain, chills, congestion, coughing, fatigue, fever, headaches, numbness, vertigo or vomiting. The symptoms are aggravated by stress and exertion (weather changes, increased physical activity,). She has tried oral narcotics, position changes, rest, heat and ice for the symptoms. The treatment provided mild relief.      Review of Pertinent Medical Data ---      The following portions of the patient's history were reviewed and updated as appropriate: allergies, current medications, past family history, past medical history, past social history, past surgical history, and problem list.      Review of Systems   Constitutional:  Negative for chills, fatigue and fever.    HENT:  Negative for congestion.    Respiratory:  Negative for cough.    Cardiovascular:  Negative for chest pain.   Gastrointestinal:  Negative for abdominal pain and vomiting.   Genitourinary:  Positive for dysuria (UTI).   Musculoskeletal:  Positive for arthralgias, myalgias and neck pain.   Neurological:  Positive for weakness. Negative for vertigo, numbness and headaches.     I have reviewed and confirmed the accuracy of the ROS as documented by the MA/LPN/RN Sara Gr APRN    Vitals:    10/20/23 1426   PainSc:   8   PainLoc: Back     Objective   Physical Exam  Constitutional:       Appearance: Normal appearance.   HENT:      Head: Normocephalic.   Pulmonary:      Effort: Pulmonary effort is normal.   Musculoskeletal:      Cervical back: Normal range of motion.   Neurological:      General: No focal deficit present.      Mental Status: She is alert and oriented to person, place, and time.   Psychiatric:         Mood and Affect: Mood normal.         Behavior: Behavior normal.         Thought Content: Thought content normal.         Cognition and Memory: Cognition normal.       Assessment & Plan   Diagnoses and all orders for this visit:    1. Lumbar radiculopathy (Primary)    2. Chronic pain syndrome    3. Rheumatoid arthritis, involving unspecified site, unspecified whether rheumatoid factor present    4. Generalized osteoarthritis    5. Degeneration of intervertebral disc of lumbar region    ----------------  Our practice is offering alternative &/or electronic methods to continue to follow our patients while at the same time further the efforts toward social distancing, in accordance with our organizational policies, professional societies' guidance, and gubernatorial mandates.  I support the Healthy at Home campaign and in this visit I have counseled the patient on our needs to limit in-person office visits and physical encounters with medical facilities whenever possible.  I have also educated the  patient on the medical necessities of maintaining social distancing while we continue to function during this crisis period.      The patient agreed to a Video Visit.  ----------------  --- The urine drug screen confirmation from 7/28/23 has been reviewed and the result is appropriate based on patient history and VICENTA report  --- The patient signed an updated copy of the controlled substance agreement on 11/29/22  --- Narcan prescription sent to pharmacy   --- Continue OxyContin. DNF 10/22/23. Patient appears stable with current regimen. No adverse effects. Regarding continuation of opioids, there is no evidence of aberrant behavior or any red flags.  The patient continues with appropriate response to opioid therapy. ADL's remain intact by self.   --- Follow-up 1 month        Anne Jewell reports a pain score of 8.  Given her pain assessment as noted, treatment options were discussed and the following options were decided upon as a follow-up plan to address the patient's pain: continuation of current treatment plan for pain and prescription for opiod analgesics.     VICENTA REPORT  As part of the patient's treatment plan, I am prescribing controlled substances. The patient has been made aware of appropriate use of such medications, including potential risk of somnolence, limited ability to drive and/or work safely, and the potential for dependence or overdose. It has also been made clear that these medications are for use by this patient only, without concomitant use of alcohol or other substances unless prescribed.     Patient has completed prescribing agreement detailing terms of continued prescribing of controlled substances, including monitoring VICENTA reports, urine drug screening, and pill counts if necessary. The patient is aware that inappropriate use will results in cessation of prescribing such medications.    As the clinician, I personally reviewed the VICENTA from 10/20/23 while the patient was in the  office today.    History and physical exam exhibit continued safe and appropriate use of controlled substances.    -------    EMR Dragon/Transcription disclaimer:   Much of this encounter note is an electronic transcription/translation of spoken language to printed text. The electronic translation of spoken language may permit erroneous, or at times, nonsensical words or phrases to be inadvertently transcribed; Although I have reviewed the note for such errors, some may still exist.      This document is intended for medical expert use only. Reading of this document by patients and/or patient's family without participating medical staff guidance may result in misinterpretation and unintended morbidity.   Any interpretation of such data is the responsibility of the patient and/or family member responsible for the patient in concert with their primary or specialist providers, not to be left for sources of online searches such as [place], "[employer] or similar queries. Relying on these approaches to knowledge may result in misinterpretation, misguided goals of care and even death should patients or family members try recommendations outside of the realm of professional medical care in a supervised way.

## 2023-10-24 ENCOUNTER — OFFICE VISIT (OUTPATIENT)
Dept: FAMILY MEDICINE CLINIC | Age: 73
End: 2023-10-24
Payer: MEDICARE

## 2023-10-24 VITALS
TEMPERATURE: 98.4 F | DIASTOLIC BLOOD PRESSURE: 66 MMHG | WEIGHT: 173.4 LBS | HEIGHT: 61 IN | SYSTOLIC BLOOD PRESSURE: 140 MMHG | BODY MASS INDEX: 32.74 KG/M2 | HEART RATE: 67 BPM | OXYGEN SATURATION: 95 %

## 2023-10-24 DIAGNOSIS — E03.9 HYPOTHYROIDISM, ACQUIRED: ICD-10-CM

## 2023-10-24 DIAGNOSIS — Z85.028 HISTORY OF STOMACH CANCER: Primary | ICD-10-CM

## 2023-10-24 DIAGNOSIS — E83.42 HYPOMAGNESEMIA: ICD-10-CM

## 2023-10-24 DIAGNOSIS — K21.9 GERD WITHOUT ESOPHAGITIS: ICD-10-CM

## 2023-10-24 DIAGNOSIS — Z76.89 ENCOUNTER TO ESTABLISH CARE: ICD-10-CM

## 2023-10-24 DIAGNOSIS — J30.9 ALLERGIC RHINITIS, UNSPECIFIED SEASONALITY, UNSPECIFIED TRIGGER: ICD-10-CM

## 2023-10-24 PROBLEM — Z00.00 ROUTINE GENERAL MEDICAL EXAMINATION AT A HEALTH CARE FACILITY: Status: RESOLVED | Noted: 2023-04-19 | Resolved: 2023-10-24

## 2023-10-24 PROBLEM — Z86.718 HISTORY OF DVT (DEEP VEIN THROMBOSIS): Status: RESOLVED | Noted: 2022-10-19 | Resolved: 2023-10-24

## 2023-10-24 PROBLEM — M65.311 TRIGGER FINGER OF ALL DIGITS OF RIGHT HAND: Status: RESOLVED | Noted: 2019-05-02 | Resolved: 2023-10-24

## 2023-10-24 PROBLEM — M25.552 LEFT HIP PAIN: Status: RESOLVED | Noted: 2019-09-10 | Resolved: 2023-10-24

## 2023-10-24 PROBLEM — Z98.84 BARIATRIC SURGERY STATUS: Status: ACTIVE | Noted: 2023-10-24

## 2023-10-24 PROBLEM — Z96.651 STATUS POST TOTAL KNEE REPLACEMENT, RIGHT: Status: RESOLVED | Noted: 2017-08-31 | Resolved: 2023-10-24

## 2023-10-24 PROBLEM — J01.10 ACUTE NON-RECURRENT FRONTAL SINUSITIS: Status: RESOLVED | Noted: 2022-07-13 | Resolved: 2023-10-24

## 2023-10-24 PROBLEM — C16.3 MALIGNANT NEOPLASM OF PYLORIC ANTRUM: Status: RESOLVED | Noted: 2023-09-07 | Resolved: 2023-10-24

## 2023-10-24 PROBLEM — M65.351 TRIGGER FINGER OF ALL DIGITS OF RIGHT HAND: Status: RESOLVED | Noted: 2019-05-02 | Resolved: 2023-10-24

## 2023-10-24 PROBLEM — M65.321 TRIGGER FINGER OF ALL DIGITS OF RIGHT HAND: Status: RESOLVED | Noted: 2019-05-02 | Resolved: 2023-10-24

## 2023-10-24 PROBLEM — M18.10 OSTEOARTHRITIS OF THUMB: Status: RESOLVED | Noted: 2017-08-31 | Resolved: 2023-10-24

## 2023-10-24 PROBLEM — R11.0 NAUSEA: Status: RESOLVED | Noted: 2022-04-11 | Resolved: 2023-10-24

## 2023-10-24 PROBLEM — R41.3 MEMORY LOSS: Status: RESOLVED | Noted: 2023-07-19 | Resolved: 2023-10-24

## 2023-10-24 PROBLEM — M25.562 LEFT KNEE PAIN: Status: RESOLVED | Noted: 2017-08-31 | Resolved: 2023-10-24

## 2023-10-24 PROBLEM — N18.30 STAGE 3 CHRONIC KIDNEY DISEASE: Status: RESOLVED | Noted: 2021-12-03 | Resolved: 2023-10-24

## 2023-10-24 PROBLEM — R35.0 FREQUENCY OF URINATION: Status: RESOLVED | Noted: 2023-07-19 | Resolved: 2023-10-24

## 2023-10-24 PROBLEM — M65.341 TRIGGER FINGER OF ALL DIGITS OF RIGHT HAND: Status: RESOLVED | Noted: 2019-05-02 | Resolved: 2023-10-24

## 2023-10-24 PROBLEM — E87.6 HYPOKALEMIA: Status: RESOLVED | Noted: 2023-09-11 | Resolved: 2023-10-24

## 2023-10-24 PROBLEM — K80.50 CHOLEDOCHOLITHIASIS: Status: RESOLVED | Noted: 2023-08-25 | Resolved: 2023-10-24

## 2023-10-24 PROBLEM — Z96.652 STATUS POST TOTAL KNEE REPLACEMENT, LEFT: Status: RESOLVED | Noted: 2017-08-31 | Resolved: 2023-10-24

## 2023-10-24 PROBLEM — M16.0 PRIMARY OSTEOARTHRITIS OF BOTH HIPS: Status: RESOLVED | Noted: 2019-09-10 | Resolved: 2023-10-24

## 2023-10-24 PROBLEM — H53.9 VISUAL CHANGES: Status: RESOLVED | Noted: 2023-07-19 | Resolved: 2023-10-24

## 2023-10-24 PROBLEM — R30.0 DYSURIA: Status: RESOLVED | Noted: 2022-08-24 | Resolved: 2023-10-24

## 2023-10-24 PROBLEM — R05.9 COUGH: Status: RESOLVED | Noted: 2022-07-13 | Resolved: 2023-10-24

## 2023-10-24 PROBLEM — M65.331 TRIGGER FINGER OF ALL DIGITS OF RIGHT HAND: Status: RESOLVED | Noted: 2019-05-02 | Resolved: 2023-10-24

## 2023-10-24 RX ORDER — DRONABINOL 5 MG/1
5 CAPSULE ORAL 3 TIMES DAILY
COMMUNITY
Start: 2023-10-24

## 2023-10-24 RX ORDER — PREDNISOLONE ACETATE 10 MG/ML
2 SUSPENSION/ DROPS OPHTHALMIC DAILY
COMMUNITY
Start: 2023-10-17

## 2023-10-24 NOTE — PROGRESS NOTES
Chief Complaint  Establish Care (HERNÁN from AB )    Subjective          Anne Jewell presents to [facility]  History of Present Illness  --RECENT DISTAL GASTRECTOMY FOR ADENOCARCINOMA, FOLLOWED BY ONCOLOGY, RECEIVING INFUSIONS  --GERD IS WELL CONTROLLED WITH THE PPI  --RECENT TSH WAS OK ON CURRENT DOSE OF SYNTHROID  --MG++ LEVEL WAS LOW, IS NOW ON A SUPPLEMENT        Allergies   Allergen Reactions    Penicillins Hives        Health Maintenance Due   Topic Date Due    PAP SMEAR  Never done    ZOSTER VACCINE (2 of 2) 06/24/2023    COVID-19 Vaccine (5 - 2023-24 season) 09/01/2023        Current Outpatient Medications on File Prior to Visit   Medication Sig    albuterol sulfate  (90 Base) MCG/ACT inhaler Inhale 2 puffs Every 6 (Six) Hours As Needed for Wheezing. Proventil  (90 Base) MCG/ACT Inhalation Aerosol Solution; Patient Sig: Proventil  (90 Base) MCG/ACT Inhalation Aerosol Solution Inhale 2 puff(s) every 6 to 8 hours as needed; 6.7; 0; 05-Apr-2013; Active    ALPRAZolam (XANAX) 2 MG tablet As Needed.    amiodarone (PACERONE) 200 MG tablet Take 1 tablet by mouth Daily.    baclofen (LIORESAL) 10 MG tablet Take 1 tablet by mouth 3 (Three) Times a Day.    Calcium Citrate-Vitamin D3 (CITRACAL) 315-6.25 MG-MCG tablet tablet Take 1 tablet by mouth Daily.    chlorhexidine (PERIDEX) 0.12 % solution     Cholecalciferol (VITAMIN D3) 2000 units capsule TAKE ONE CAPSULE BY MOUTH DAILY    Cyanocobalamin (B-12) 1000 MCG sublingual tablet PLACE 1 TABLET UNDER THE TONGUE AND LET DISSOLVE ONCE DAILY    dicyclomine (BENTYL) 20 MG tablet Take 1 tablet by mouth Every 6 (Six) Hours As Needed (diarrhea).    dronabinol (MARINOL) 5 MG capsule Take 1 capsule by mouth 3 (Three) Times a Day.    EPINEPHrine (EPIPEN) 0.3 MG/0.3ML solution auto-injector injection     escitalopram (LEXAPRO) 10 MG tablet Take 1 tablet by mouth Daily.    furosemide (LASIX) 20 MG tablet Take 1 tablet by mouth  Daily.    levothyroxine (SYNTHROID, LEVOTHROID) 100 MCG tablet TAKE 1 TABLET BY MOUTH DAILY    loperamide (IMODIUM) 2 MG capsule Take 1 capsule by mouth 4 (Four) Times a Day As Needed for Diarrhea.    magnesium oxide (MAG-OX) 400 MG tablet Take 1 tablet by mouth 2 (Two) Times a Day.    metoprolol tartrate (LOPRESSOR) 25 MG tablet Take 1 tablet by mouth Every 12 (Twelve) Hours.    montelukast (SINGULAIR) 10 MG tablet TAKE ONE TABLET BY MOUTH DAILY    Multiple Vitamin tablet Take 1 tablet by mouth Daily.    Omega 3-6-9 capsule Take  by mouth.    oxyCODONE ER (oxyCONTIN) 80 MG tablet extended-release 12 hour 12 hr tablet Take 2 tablets by mouth Every 12 (Twelve) Hours.    pantoprazole (Protonix) 40 MG EC tablet Take 1 tablet by mouth Daily.    potassium chloride (KLOR-CON) 20 MEQ packet Take 20 mEq by mouth Daily.    prednisoLONE acetate (PRED FORTE) 1 % ophthalmic suspension Administer 2 drops into the left eye Daily.    promethazine (PHENERGAN) 25 MG tablet TAKE ONE TABLET BY MOUTH EVERY 8 HOURS AS NEEDED FOR NAUSEA / VOMITING    saccharomyces boulardii (FLORASTOR) 250 MG capsule Take 1 capsule by mouth 2 (Two) Times a Day.    silver sulfadiazine (Silvadene) 1 % cream Apply 1 application  topically to the appropriate area as directed 2 (Two) Times a Day. Pressure ulcer on buttocks (Patient taking differently: Apply 1 application  topically to the appropriate area as directed 2 (Two) Times a Day As Needed. Pressure ulcer on buttocks)    Xarelto 20 MG tablet TAKE ONE TABLET BY MOUTH DAILY     No current facility-administered medications on file prior to visit.       Immunization History   Administered Date(s) Administered    COVID-19 (PFIZER) BIVALENT 12+YRS 10/05/2022    COVID-19 (PFIZER) Purple Cap Monovalent 02/23/2021, 03/16/2021, 08/25/2021    FluMist 2-49yrs 09/22/2014    Fluad Quad 65+ 10/17/2023    Fluzone High Dose =>65 Years ([employer] ONLY) 10/17/2017, 10/08/2019, 09/02/2020, 09/20/2021, 09/21/2022    Fluzone  "High-Dose 65+yrs 09/12/2020, 09/20/2021, 09/21/2022    Influenza, Unspecified 10/29/2021    Pneumococcal Conjugate 13-Valent (PCV13) 08/08/2017    Pneumococcal Polysaccharide (PPSV23) 09/06/2019    Shingrix 04/29/2023    Tdap 08/08/2017       Review of Systems   Constitutional:  Positive for fatigue. Negative for activity change, appetite change, chills and fever.   HENT:  Negative for congestion, ear pain, rhinorrhea and sore throat.    Respiratory:  Negative for cough and shortness of breath.    Cardiovascular:  Negative for chest pain, palpitations and leg swelling.   Gastrointestinal:  Negative for abdominal pain, constipation, diarrhea, nausea and vomiting.   Musculoskeletal:  Negative for arthralgias and myalgias.   Neurological:  Negative for headache.        Objective     /66 (BP Location: Right arm, Patient Position: Sitting, Cuff Size: Adult)   Pulse 67   Temp 98.4 °F (36.9 °C) (Oral)   Ht 154.9 cm (61\")   Wt 78.7 kg (173 lb 6.4 oz)   SpO2 95%   BMI 32.76 kg/m²       Physical Exam  Vitals and nursing note reviewed.   Constitutional:       General: She is not in acute distress.     Appearance: Normal appearance.   Cardiovascular:      Rate and Rhythm: Normal rate and regular rhythm.      Heart sounds: Normal heart sounds. No murmur heard.  Pulmonary:      Effort: Pulmonary effort is normal.      Breath sounds: Normal breath sounds.   Abdominal:      Palpations: Abdomen is soft.      Tenderness: There is no abdominal tenderness.   Musculoskeletal:      Cervical back: Neck supple.      Right lower leg: No edema.      Left lower leg: No edema.   Lymphadenopathy:      Cervical: No cervical adenopathy.   Neurological:      General: No focal deficit present.      Mental Status: She is alert.      Cranial Nerves: No cranial nerve deficit.      Coordination: Coordination normal.      Gait: Gait normal.   Psychiatric:         Mood and Affect: Mood normal.         Behavior: Behavior normal.         Result " Review :                             Assessment and Plan      Diagnoses and all orders for this visit:    1. History of stomach cancer (Primary)  Assessment & Plan:  RESOLVED CURRENTLY, PLANS PER ONCOLOGY       2. Allergic rhinitis, unspecified seasonality, unspecified trigger  -     Allergy Serum Injection    3. GERD without esophagitis  Assessment & Plan:  IMPROVED WITH CURRENT TREATMENT, CONTINUE SAME, WILL REEVALUATE AT NEXT VISIT       4. Hypothyroidism, acquired  Assessment & Plan:  IMPROVED WITH CURRENT TREATMENT, CONTINUE SAME, WILL REEVALUATE AT NEXT VISIT       5. Hypomagnesemia  Assessment & Plan:  RECENTLY DIAGNOSED, RECEIVING A SUPPLEMENT       6. Encounter to establish care  Comments:  RECORDS REVIEWED AND PROBLEM LIST UPDATED            Follow Up     Return in about 3 months (around 1/24/2024).    Patient was given instructions and counseling regarding her condition or for health maintenance advice. Please see specific information pulled into the AVS if appropriate.

## 2023-10-25 PROBLEM — E83.42 HYPOMAGNESEMIA: Status: ACTIVE | Noted: 2023-10-25

## 2023-11-03 ENCOUNTER — CLINICAL SUPPORT (OUTPATIENT)
Dept: FAMILY MEDICINE CLINIC | Age: 73
End: 2023-11-03
Payer: MEDICARE

## 2023-11-03 DIAGNOSIS — J30.9 ALLERGIC RHINITIS, UNSPECIFIED SEASONALITY, UNSPECIFIED TRIGGER: Primary | ICD-10-CM

## 2023-11-03 PROCEDURE — 95115 IMMUNOTHERAPY ONE INJECTION: CPT | Performed by: FAMILY MEDICINE

## 2023-11-03 NOTE — TELEPHONE ENCOUNTER
Rx Refill Note  Requested Prescriptions     Pending Prescriptions Disp Refills    KLOR-CON 20 MEQ CR tablet [Pharmacy Med Name: KLOR-CON M20 TABLET] 180 tablet      Sig: TAKE 1 TABLET BY MOUTH TWICE A DAY      Last office visit with prescribing clinician: 10/24/23 Dr Dean  Last telemedicine visit with prescribing clinician: Visit date not found   Next office visit with prescribing clinician: 01/18/2024  COMPREHENSIVE METABOLIC PANEL (09/19/2023 14:33)   Potassium Supplement Protocol Failed      Vaishnavi Keller LPN  11/03/23, 09:01 EDT

## 2023-11-04 DIAGNOSIS — K21.9 GASTROESOPHAGEAL REFLUX DISEASE, UNSPECIFIED WHETHER ESOPHAGITIS PRESENT: Chronic | ICD-10-CM

## 2023-11-04 DIAGNOSIS — I48.0 PAROXYSMAL A-FIB: ICD-10-CM

## 2023-11-04 RX ORDER — POTASSIUM CHLORIDE 1500 MG/1
20 TABLET, EXTENDED RELEASE ORAL 2 TIMES DAILY
Qty: 180 TABLET | Refills: 1 | Status: SHIPPED | OUTPATIENT
Start: 2023-11-04

## 2023-11-06 RX ORDER — AMIODARONE HYDROCHLORIDE 200 MG/1
200 TABLET ORAL
Qty: 90 TABLET | Refills: 0 | Status: SHIPPED | OUTPATIENT
Start: 2023-11-06

## 2023-11-06 RX ORDER — PANTOPRAZOLE SODIUM 40 MG/1
40 TABLET, DELAYED RELEASE ORAL DAILY
Qty: 90 TABLET | Refills: 0 | Status: SHIPPED | OUTPATIENT
Start: 2023-11-06

## 2023-11-06 RX ORDER — LANOLIN ALCOHOL/MO/W.PET/CERES
1 CREAM (GRAM) TOPICAL 2 TIMES DAILY
Qty: 180 TABLET | Refills: 0 | Status: SHIPPED | OUTPATIENT
Start: 2023-11-06

## 2023-11-06 NOTE — TELEPHONE ENCOUNTER
Rx Refill Note  Requested Prescriptions     Pending Prescriptions Disp Refills    metoprolol tartrate (LOPRESSOR) 25 MG tablet [Pharmacy Med Name: METOPROLOL TARTRATE 25 MG TAB] 60 tablet 0     Sig: TAKE ONE TABLET BY MOUTH EVERY 12 HOURS    Magnesium Oxide -Mg Supplement 400 (240 Mg) MG tablet [Pharmacy Med Name: MAGNESIUM OXIDE 400 MG TABLET] 60 tablet      Sig: TAKE 1 TABLET BY MOUTH TWICE A DAY    amiodarone (PACERONE) 200 MG tablet [Pharmacy Med Name: AMIODARONE  MG TABLET] 30 tablet 0     Sig: TAKE 1 TABLET BY MOUTH DAILY    pantoprazole (PROTONIX) 40 MG EC tablet [Pharmacy Med Name: PANTOPRAZOLE SOD DR 40 MG TAB] 30 tablet 0     Sig: TAKE 1 TABLET BY MOUTH DAILY      Last office visit with prescribing clinician: 10/24/23,  Dr Dean  Last telemedicine visit with prescribing clinician: Visit date not found   Next office visit with prescribing clinician: 01/18/24 Dr Dianne Keller, LPN  11/06/23, 09:24 EST

## 2023-11-10 ENCOUNTER — OFFICE VISIT (OUTPATIENT)
Dept: PAIN MEDICINE | Facility: CLINIC | Age: 73
End: 2023-11-10
Payer: MEDICARE

## 2023-11-10 VITALS
HEART RATE: 54 BPM | HEIGHT: 61 IN | BODY MASS INDEX: 32.76 KG/M2 | DIASTOLIC BLOOD PRESSURE: 60 MMHG | OXYGEN SATURATION: 98 % | RESPIRATION RATE: 12 BRPM | TEMPERATURE: 97.3 F | SYSTOLIC BLOOD PRESSURE: 110 MMHG

## 2023-11-10 DIAGNOSIS — M54.16 LUMBAR RADICULOPATHY: Primary | ICD-10-CM

## 2023-11-10 DIAGNOSIS — M15.9 GENERALIZED OSTEOARTHRITIS: ICD-10-CM

## 2023-11-10 DIAGNOSIS — G89.4 CHRONIC PAIN SYNDROME: ICD-10-CM

## 2023-11-10 DIAGNOSIS — J30.9 ALLERGIC RHINITIS, UNSPECIFIED SEASONALITY, UNSPECIFIED TRIGGER: ICD-10-CM

## 2023-11-10 DIAGNOSIS — M51.36 DEGENERATION OF INTERVERTEBRAL DISC OF LUMBAR REGION: ICD-10-CM

## 2023-11-10 DIAGNOSIS — M06.9 RHEUMATOID ARTHRITIS, INVOLVING UNSPECIFIED SITE, UNSPECIFIED WHETHER RHEUMATOID FACTOR PRESENT: ICD-10-CM

## 2023-11-10 DIAGNOSIS — Z79.899 ENCOUNTER FOR LONG-TERM (CURRENT) USE OF HIGH-RISK MEDICATION: ICD-10-CM

## 2023-11-10 LAB
POC AMPHETAMINES: NEGATIVE
POC BARBITURATES: NEGATIVE
POC BENZODIAZEPHINES: POSITIVE
POC COCAINE: NEGATIVE
POC METHADONE: NEGATIVE
POC METHAMPHETAMINE SCREEN URINE: NEGATIVE
POC OPIATES: NEGATIVE
POC OXYCODONE: NEGATIVE
POC PHENCYCLIDINE: NEGATIVE
POC PROPOXYPHENE: NEGATIVE
POC THC: NEGATIVE
POC TRICYCLIC ANTIDEPRESSANTS: NEGATIVE

## 2023-11-10 RX ORDER — MONTELUKAST SODIUM 10 MG/1
TABLET ORAL
Qty: 90 TABLET | Refills: 1 | Status: SHIPPED | OUTPATIENT
Start: 2023-11-10

## 2023-11-10 NOTE — PROGRESS NOTES
CHIEF COMPLAINT  Back and joint pain    Subjective   Anne Jewell is a 73 y.o. female  who presents for follow-up.  She has a history of chronic back and joint pain. She reports that her pain has remained consistent since her last office visit and varies based on activity level.     Today pain is 7/10VAS in severity. Pain is located in her low back and radiates to bilateral feet. Describes this pain as a nearly continuous aching. Pain is worsened by rising from sitting to standing position, stress, weather changes, prolonged position, and walking long distances. Pain improves with rest/reposition, HEP, aqua therapy, heat/ice, and medications. She is currently receiving at home OT and PT.     Continues with OxyContin 80mg 2 tablets BID and Baclofen 10mg 2-3/day (managed by PCP). Denies any side effects from the regimen, including constipation and somnolence. The regimen helps decrease pain by a significant amount. Notes improvement in activity and function with regimen. ADL's by self. Denies any bowel or bladder changes. Patient reports that she takes Xanax 2mg TID as needed. This is prescribed by psychiatrist.      She was admitted to the hospital from 8/25/23 - 9/15/23 where she was diagnosed with adenocarcinoma that was found when patient attempting to undergo an ERCP. She underwent a exploratory laparotomy with distal gastrectomy. Following surgery she experienced a new onset of atrial fibrillation with rapid ventricular response. Cardiology was consulted.  She been seen by hematology/oncology. She was seen by Dr. Stuart Borges on 9/19/23 with [facility].  PET scan on 10/6/23 showed no evidence of metastatic disease. No plans for chemotherapy at this time per office note from Dr. Slava Loo from 10/12/23. She was started on Dronabinol in September by her hematologist/oncologist to help stimulate her appetite and for N/V.     Back Pain  This is a chronic problem. The current episode  started more than 1 year ago. The problem occurs constantly. The problem has been waxing and waning since onset. The pain is present in the lumbar spine. The quality of the pain is described as aching. The pain radiates to the right foot and left foot. The pain is at a severity of 7/10. The pain is moderate. The symptoms are aggravated by standing, position and sitting (rising from sitting to standing, prolonged position, walking long distances, stress, weather changes). Associated symptoms include abdominal pain. Pertinent negatives include no chest pain, dysuria, fever, headaches, numbness or weakness. She has tried analgesics, home exercises, heat and ice (aqua therapy) for the symptoms. The treatment provided moderate relief.   Joint Pain  This is a chronic (7/10 VAS in severity) problem. The current episode started more than 1 year ago. The problem occurs constantly. The problem has been waxing and waning. Associated symptoms include abdominal pain, arthralgias, myalgias and neck pain. Pertinent negatives include no chest pain, chills, congestion, coughing, fatigue, fever, headaches, numbness, vertigo, vomiting or weakness. The symptoms are aggravated by stress and exertion (weather changes, increased physical activity,). She has tried oral narcotics, position changes, rest, heat and ice for the symptoms. The treatment provided mild relief.      PEG Assessment   What number best describes your pain on average in the past week?8  What number best describes how, during the past week, pain has interfered with your enjoyment of life?10  What number best describes how, during the past week, pain has interfered with your general activity?  10    The following portions of the patient's history were reviewed and updated as appropriate: allergies, current medications, past family history, past medical history, past social history, past surgical history, and problem list.    Review of Systems   Constitutional:  Negative  "for activity change, chills, fatigue and fever.   HENT:  Negative for congestion.    Respiratory:  Negative for cough and chest tightness.    Cardiovascular:  Negative for chest pain.   Gastrointestinal:  Positive for abdominal pain. Negative for constipation, diarrhea and vomiting.   Genitourinary:  Negative for difficulty urinating and dysuria.   Musculoskeletal:  Positive for arthralgias, back pain, myalgias and neck pain.   Neurological:  Negative for dizziness, vertigo, weakness, light-headedness, numbness and headaches.   Psychiatric/Behavioral:  Positive for agitation. Negative for sleep disturbance and suicidal ideas. The patient is nervous/anxious.      I have reviewed and confirmed the accuracy of the ROS as documented by the MA/LPN/RN EUGENIO Ayala    Vitals:    11/10/23 1113   BP: 110/60   BP Location: Right arm   Patient Position: Sitting   Cuff Size: Adult   Pulse: 54   Resp: 12   Temp: 97.3 °F (36.3 °C)   TempSrc: Temporal   SpO2: 98%   Height: 154.9 cm (61\")   PainSc:   7     Objective   Physical Exam  Constitutional:       Appearance: Normal appearance.   HENT:      Head: Normocephalic.   Cardiovascular:      Rate and Rhythm: Normal rate and regular rhythm.   Pulmonary:      Effort: Pulmonary effort is normal.      Breath sounds: Normal breath sounds.   Musculoskeletal:      Right shoulder: Tenderness present.      Left shoulder: Tenderness present.      Right wrist: Tenderness present.      Left wrist: Tenderness present.      Right hand: Tenderness present.      Left hand: Tenderness present.      Cervical back: Normal range of motion.      Lumbar back: Tenderness present. Decreased range of motion.      Right hip: Tenderness present.      Left hip: Tenderness present.      Right knee: Tenderness present.      Left knee: Tenderness present.      Comments: Back brace for support in place   Skin:     General: Skin is warm and dry.      Capillary Refill: Capillary refill takes less than 2 " seconds.   Neurological:      General: No focal deficit present.      Mental Status: She is alert and oriented to person, place, and time.      Gait: Gait abnormal (slowed, ambulating with rolling walker).   Psychiatric:         Mood and Affect: Mood normal.         Behavior: Behavior normal.         Thought Content: Thought content normal.         Cognition and Memory: Cognition normal.       Assessment & Plan   Diagnoses and all orders for this visit:    1. Lumbar radiculopathy (Primary)  -     Urine Drug Screen Confirmation - Urine, Clean Catch; Future  -     POC Urine Drug Screen, Triage    2. Chronic pain syndrome  -     Urine Drug Screen Confirmation - Urine, Clean Catch; Future  -     POC Urine Drug Screen, Triage    3. Rheumatoid arthritis, involving unspecified site, unspecified whether rheumatoid factor present  -     Urine Drug Screen Confirmation - Urine, Clean Catch; Future  -     POC Urine Drug Screen, Triage    4. Generalized osteoarthritis  -     Urine Drug Screen Confirmation - Urine, Clean Catch; Future  -     POC Urine Drug Screen, Triage    5. Degeneration of intervertebral disc of lumbar region  -     Urine Drug Screen Confirmation - Urine, Clean Catch; Future  -     POC Urine Drug Screen, Triage    6. Encounter for long-term (current) use of high-risk medication  -     Urine Drug Screen Confirmation - Urine, Clean Catch; Future  -     POC Urine Drug Screen, Triage    --- Routine UDS in office today as part of monitoring requirements for controlled substances.  The specimen was viewed and the immunoassay result reviewed and is +BZO and -OXY (patient states last dose of medication was this morning).  This specimen will be sent to [facility] laboratory for confirmation.     --- The patient signed an updated copy of the controlled substance agreement on 11/10/23  --- Narcan prescription current  --- Continue OxyContin. No refill needed at this time. Patient appears stable with current regimen. No  adverse effects. Regarding continuation of opioids, there is no evidence of aberrant behavior or any red flags.  The patient continues with appropriate response to opioid therapy. ADL's remain intact by self.   --- Follow-up 1 month    Anne Jewell reports a pain score of 7.  Given her pain assessment as noted, treatment options were discussed and the following options were decided upon as a follow-up plan to address the patient's pain: continuation of current treatment plan for pain.       VICENTA REPORT  As part of the patient's treatment plan, I am prescribing controlled substances. The patient has been made aware of appropriate use of such medications, including potential risk of somnolence, limited ability to drive and/or work safely, and the potential for dependence or overdose. It has also been made clear that these medications are for use by this patient only, without concomitant use of alcohol or other substances unless prescribed.     Patient has completed prescribing agreement detailing terms of continued prescribing of controlled substances, including monitoring VICENTA reports, urine drug screening, and pill counts if necessary. The patient is aware that inappropriate use will results in cessation of prescribing such medications.    As the clinician, I personally reviewed the VICENTA from 11/10/23 while the patient was in the office today.    History and physical exam exhibit continued safe and appropriate use of controlled substances.    Dictated utilizing Dragon dictation.

## 2023-11-13 ENCOUNTER — OUTSIDE FACILITY SERVICE (OUTPATIENT)
Dept: FAMILY MEDICINE CLINIC | Age: 73
End: 2023-11-13
Payer: MEDICARE

## 2023-11-13 ENCOUNTER — TELEPHONE (OUTPATIENT)
Dept: SURGERY | Facility: CLINIC | Age: 73
End: 2023-11-13
Payer: MEDICARE

## 2023-11-13 NOTE — TELEPHONE ENCOUNTER
Patient sentmychart message wanting to schedule c-scope and egd. Your last note states she was to follow up with you in 2 months and then possibly schedule an egd. Do you want her to wait and see you in Dec or place orders and schedule?

## 2023-11-15 ENCOUNTER — CLINICAL SUPPORT (OUTPATIENT)
Dept: FAMILY MEDICINE CLINIC | Age: 73
End: 2023-11-15
Payer: MEDICARE

## 2023-11-15 ENCOUNTER — OFFICE VISIT (OUTPATIENT)
Dept: CARDIOLOGY | Facility: CLINIC | Age: 73
End: 2023-11-15
Payer: MEDICARE

## 2023-11-15 ENCOUNTER — LAB (OUTPATIENT)
Dept: LAB | Facility: HOSPITAL | Age: 73
End: 2023-11-15
Payer: MEDICARE

## 2023-11-15 VITALS
SYSTOLIC BLOOD PRESSURE: 116 MMHG | DIASTOLIC BLOOD PRESSURE: 59 MMHG | WEIGHT: 183 LBS | HEIGHT: 61 IN | HEART RATE: 54 BPM | BODY MASS INDEX: 34.55 KG/M2

## 2023-11-15 DIAGNOSIS — I48.0 PAROXYSMAL ATRIAL FIBRILLATION: Primary | ICD-10-CM

## 2023-11-15 DIAGNOSIS — J30.9 ALLERGIC RHINITIS, UNSPECIFIED SEASONALITY, UNSPECIFIED TRIGGER: Primary | ICD-10-CM

## 2023-11-15 DIAGNOSIS — I48.0 PAROXYSMAL ATRIAL FIBRILLATION: ICD-10-CM

## 2023-11-15 LAB
ALBUMIN SERPL-MCNC: 3.8 G/DL (ref 3.5–5.2)
ALBUMIN/GLOB SERPL: 1.4 G/DL
ALP SERPL-CCNC: 116 U/L (ref 39–117)
ALT SERPL W P-5'-P-CCNC: 17 U/L (ref 1–33)
ANION GAP SERPL CALCULATED.3IONS-SCNC: 2 MMOL/L (ref 5–15)
AST SERPL-CCNC: 24 U/L (ref 1–32)
BILIRUB SERPL-MCNC: 0.2 MG/DL (ref 0–1.2)
BUN SERPL-MCNC: 27 MG/DL (ref 8–23)
BUN/CREAT SERPL: 22.5 (ref 7–25)
CALCIUM SPEC-SCNC: 9.7 MG/DL (ref 8.6–10.5)
CHLORIDE SERPL-SCNC: 102 MMOL/L (ref 98–107)
CO2 SERPL-SCNC: 34 MMOL/L (ref 22–29)
CREAT SERPL-MCNC: 1.2 MG/DL (ref 0.57–1)
DEPRECATED RDW RBC AUTO: 55.8 FL (ref 37–54)
EGFRCR SERPLBLD CKD-EPI 2021: 47.9 ML/MIN/1.73
ERYTHROCYTE [DISTWIDTH] IN BLOOD BY AUTOMATED COUNT: 15.2 % (ref 12.3–15.4)
GLOBULIN UR ELPH-MCNC: 2.8 GM/DL
GLUCOSE SERPL-MCNC: 95 MG/DL (ref 65–99)
HCT VFR BLD AUTO: 41.4 % (ref 34–46.6)
HGB BLD-MCNC: 12.6 G/DL (ref 12–15.9)
MCH RBC QN AUTO: 30.2 PG (ref 26.6–33)
MCHC RBC AUTO-ENTMCNC: 30.4 G/DL (ref 31.5–35.7)
MCV RBC AUTO: 99.3 FL (ref 79–97)
PLATELET # BLD AUTO: 180 10*3/MM3 (ref 140–450)
PMV BLD AUTO: 10.1 FL (ref 6–12)
POTASSIUM SERPL-SCNC: 4.8 MMOL/L (ref 3.5–5.2)
PROT SERPL-MCNC: 6.6 G/DL (ref 6–8.5)
RBC # BLD AUTO: 4.17 10*6/MM3 (ref 3.77–5.28)
SODIUM SERPL-SCNC: 138 MMOL/L (ref 136–145)
T4 FREE SERPL-MCNC: 1.47 NG/DL (ref 0.93–1.7)
TSH SERPL DL<=0.05 MIU/L-ACNC: 2.36 UIU/ML (ref 0.27–4.2)
WBC NRBC COR # BLD: 5.08 10*3/MM3 (ref 3.4–10.8)

## 2023-11-15 PROCEDURE — 85027 COMPLETE CBC AUTOMATED: CPT

## 2023-11-15 PROCEDURE — 84439 ASSAY OF FREE THYROXINE: CPT

## 2023-11-15 PROCEDURE — 84443 ASSAY THYROID STIM HORMONE: CPT

## 2023-11-15 PROCEDURE — 95115 IMMUNOTHERAPY ONE INJECTION: CPT | Performed by: FAMILY MEDICINE

## 2023-11-15 PROCEDURE — 80053 COMPREHEN METABOLIC PANEL: CPT

## 2023-11-15 PROCEDURE — 36415 COLL VENOUS BLD VENIPUNCTURE: CPT

## 2023-11-17 DIAGNOSIS — M54.16 LUMBAR RADICULOPATHY: ICD-10-CM

## 2023-11-17 DIAGNOSIS — G89.4 CHRONIC PAIN SYNDROME: Chronic | ICD-10-CM

## 2023-11-17 DIAGNOSIS — G89.4 CHRONIC PAIN SYNDROME: ICD-10-CM

## 2023-11-17 DIAGNOSIS — R11.0 NAUSEA: ICD-10-CM

## 2023-11-17 RX ORDER — OXYCODONE HCL 80 MG/1
160 TABLET, FILM COATED, EXTENDED RELEASE ORAL EVERY 12 HOURS SCHEDULED
Qty: 120 TABLET | Refills: 0 | Status: SHIPPED | OUTPATIENT
Start: 2023-11-21

## 2023-11-17 RX ORDER — PROMETHAZINE HYDROCHLORIDE 25 MG/1
TABLET ORAL
Qty: 90 TABLET | Refills: 0 | Status: SHIPPED | OUTPATIENT
Start: 2023-11-17

## 2023-11-17 NOTE — PROGRESS NOTES
Recent labs showed normal thyroid hormone levels.  Hemoglobin improved from the time of discharge hospital, currently normal.  Creatinine, a marker of kidney function along with BUN is higher than her baseline.    Recommend to increase fluid intake.  Continue amiodarone as discussed during recent office visit.      Electronically signed by Jemal Mathews MD, 11/17/23, 6:04 PM EST.

## 2023-11-17 NOTE — ASSESSMENT & PLAN NOTE
She is currently in sinus rhythm, denies any palpitations.  Planning to continue amiodarone for total of 3 to 6 months duration.  Recommend to decrease the dose to 200 mg every other day from December 15 onwards.  We will continue metoprolol.  Continue Xarelto for anticoagulation.  For surveillance, will check thyroid panel, complete metabolic panel and CBC today.  Recent echocardiogram showed preserved LV function.

## 2023-11-17 NOTE — PROGRESS NOTES
CARDIOLOGY INITIAL CONSULT       Chief Complaint  Establish Care and Atrial Fibrillation    Subjective            Anne Jewell presents to [facility] CARDIOLOGY  History of Present Illness    Ms Jewell is here to establish cardiac care, due to atrial fibrillation.  She was recently admitted to [facility] through the hospital on 8/25/2023 for evaluation of choledocholithiasis.  Subsequent work-up revealed gastric adenocarcinoma, for which she underwent exploratory laparotomy with gastrectomy on 9/1/2023.  She had another procedure done with gastrojejunostomy on 9/7/2023.  Further part of the hospital stay was complicated by rapid atrial fibrillation, at times with aberrant conduction.  She was completely asymptomatic with these episodes even when the heart rates were really high.  During hospital stay, she was seen by [facility].  Eventually, A-fib converted to normal sinus rhythm, on amiodarone.  The medication was changed to oral form and once hemoglobin stabilized anticoagulation was initiated.  Of note, she has history of pulmonary embolism and was on long-term anticoagulation.    Today patient denies any new complaints.  She still does not feel any palpitations.  Denies any dizziness.  No immediate surgical procedures are planned at this time.  She has an abdominal binder in place.  Patient and daughter are concerned about potential long-term side effects of amiodarone.      Past History:    Paroxysmal atrial fibrillation, detected during hospital admission in September, 2023    History of pulmonary embolism on long-term anticoagulation  Gastric adenocarcinoma, status post surgery    Medical History:  Past Medical History:   Diagnosis Date    Allergic rhinitis     Anemia     Arthritis of back     Arthritis of neck     Asthma     Bronchospasm     Cancer     Adenocarcinoma of the stomach    Carpal tunnel syndrome, bilateral     Cervical disc disorder     Clotting disorder      Colon polyp Don’t remember    Years ago    Deep vein thrombosis     Disc degeneration, lumbar     Edema     Esophageal reflux     Glaucoma     Hip arthrosis     History of transfusion     Joint pain     Kidney disease 2018    stage 3    Liver disease 2018    stage 3    Low back pain     Osteoarthritis     Osteopenia     Osteoporosis     Periarthritis of shoulder     Scoliosis     Status post total knee replacement, left 08/31/2017    Status post total knee replacement, right 08/31/2017    Stomach cancer Sept 2023    UTI (urinary tract infection)     Venous thrombosis        Surgical History: has a past surgical history that includes Breast Reduction; Gastric bypass; Hernia repair; Hysterectomy; Total knee arthroplasty (Bilateral); Bladder suspension; Hand surgery (Right, 01/14/2016); Colonoscopy (08/05/2016); ERCP (N/A, 08/30/2023); Exploratory Laparotomy (N/A, 09/01/2023); ERCP (N/A, 09/01/2023); Gastrectomy (N/A, 09/08/2023); and Hemorrhoid surgery.     Family History: family history includes Arthritis in an other family member; Cancer in an other family member; Diabetes in an other family member; Heart disease in an other family member; Hypertension in an other family member; Nephrolithiasis in an other family member; Prostate cancer in her father.     Social History: reports that she quit smoking about 18 years ago. Her smoking use included cigarettes. She has a 12.50 pack-year smoking history. She has been exposed to tobacco smoke. She has never used smokeless tobacco. She reports that she does not drink alcohol and does not use drugs.    Allergies: Penicillins    Current Outpatient Medications on File Prior to Visit   Medication Sig    albuterol sulfate  (90 Base) MCG/ACT inhaler Inhale 2 puffs Every 6 (Six) Hours As Needed for Wheezing. Proventil  (90 Base) MCG/ACT Inhalation Aerosol Solution; Patient Sig: Proventil  (90 Base) MCG/ACT Inhalation Aerosol Solution Inhale 2 puff(s) every 6  to 8 hours as needed; 6.7; 0; 05-Apr-2013; Active    ALPRAZolam (XANAX) 2 MG tablet As Needed.    amiodarone (PACERONE) 200 MG tablet TAKE 1 TABLET BY MOUTH DAILY    baclofen (LIORESAL) 10 MG tablet Take 1 tablet by mouth 3 (Three) Times a Day.    Calcium Citrate-Vitamin D3 (CITRACAL) 315-6.25 MG-MCG tablet tablet Take 1 tablet by mouth Daily.    chlorhexidine (PERIDEX) 0.12 % solution     Cholecalciferol (VITAMIN D3) 2000 units capsule TAKE ONE CAPSULE BY MOUTH DAILY    Cyanocobalamin (B-12) 1000 MCG sublingual tablet PLACE 1 TABLET UNDER THE TONGUE AND LET DISSOLVE ONCE DAILY    dicyclomine (BENTYL) 20 MG tablet Take 1 tablet by mouth Every 6 (Six) Hours As Needed (diarrhea).    dronabinol (MARINOL) 5 MG capsule Take 1 capsule by mouth 3 (Three) Times a Day.    EPINEPHrine (EPIPEN) 0.3 MG/0.3ML solution auto-injector injection     escitalopram (LEXAPRO) 10 MG tablet Take 1 tablet by mouth Daily.    furosemide (LASIX) 20 MG tablet Take 1 tablet by mouth Every Other Day.    KLOR-CON 20 MEQ CR tablet TAKE 1 TABLET BY MOUTH TWICE A DAY (Patient taking differently: Take 1 tablet by mouth Every Other Day. Only takes when take the lasix.)    levothyroxine (SYNTHROID, LEVOTHROID) 100 MCG tablet TAKE 1 TABLET BY MOUTH DAILY    loperamide (IMODIUM) 2 MG capsule Take 1 capsule by mouth 4 (Four) Times a Day As Needed for Diarrhea.    Magnesium Oxide -Mg Supplement 400 (240 Mg) MG tablet TAKE 1 TABLET BY MOUTH TWICE A DAY    metoprolol tartrate (LOPRESSOR) 25 MG tablet TAKE ONE TABLET BY MOUTH EVERY 12 HOURS    montelukast (SINGULAIR) 10 MG tablet TAKE ONE TABLET BY MOUTH DAILY    Multiple Vitamin tablet Take 1 tablet by mouth Daily.    Omega 3-6-9 capsule Take  by mouth.    pantoprazole (PROTONIX) 40 MG EC tablet TAKE 1 TABLET BY MOUTH DAILY    potassium chloride (KLOR-CON) 20 MEQ packet Take 20 mEq by mouth Daily.    prednisoLONE acetate (PRED FORTE) 1 % ophthalmic suspension Administer 2 drops into the left eye Daily.     "saccharomyces boulardii (FLORASTOR) 250 MG capsule Take 1 capsule by mouth 2 (Two) Times a Day.    silver sulfadiazine (Silvadene) 1 % cream Apply 1 application  topically to the appropriate area as directed 2 (Two) Times a Day. Pressure ulcer on buttocks (Patient taking differently: Apply 1 application  topically to the appropriate area as directed 2 (Two) Times a Day As Needed. Pressure ulcer on buttocks)    Xarelto 20 MG tablet TAKE ONE TABLET BY MOUTH DAILY    [DISCONTINUED] oxyCODONE ER (oxyCONTIN) 80 MG tablet extended-release 12 hour 12 hr tablet Take 2 tablets by mouth Every 12 (Twelve) Hours.    [DISCONTINUED] promethazine (PHENERGAN) 25 MG tablet TAKE ONE TABLET BY MOUTH EVERY 8 HOURS AS NEEDED FOR NAUSEA / VOMITING     No current facility-administered medications on file prior to visit.          Review of Systems   Constitutional:  Positive for fatigue. Negative for unexpected weight gain and unexpected weight loss.   Eyes:  Negative for double vision.   Respiratory:  Negative for cough, shortness of breath and wheezing.    Cardiovascular:  Negative for chest pain, palpitations and leg swelling.   Gastrointestinal:  Negative for abdominal pain, nausea and vomiting.   Endocrine: Negative for cold intolerance, heat intolerance, polydipsia and polyuria.   Musculoskeletal:  Negative for arthralgias and back pain.   Skin:  Negative for color change.   Neurological:  Negative for dizziness, syncope, weakness and headache.   Hematological:  Does not bruise/bleed easily.        Objective     /59   Pulse 54   Ht 154.9 cm (61\")   Wt 83 kg (183 lb)   BMI 34.58 kg/m²       Physical Exam  Constitutional:       General: She is awake. She is not in acute distress.     Appearance: Normal appearance.   Eyes:      Extraocular Movements: Extraocular movements intact.      Pupils: Pupils are equal, round, and reactive to light.   Neck:      Thyroid: No thyromegaly.      Vascular: No carotid bruit or JVD. "   Cardiovascular:      Rate and Rhythm: Normal rate and regular rhythm.      Chest Wall: PMI is not displaced.      Heart sounds: Normal heart sounds, S1 normal and S2 normal. No murmur heard.     No friction rub. No gallop. No S3 or S4 sounds.   Pulmonary:      Effort: Pulmonary effort is normal. No respiratory distress.      Breath sounds: Normal breath sounds. No wheezing, rhonchi or rales.   Abdominal:      General: Bowel sounds are normal.      Palpations: Abdomen is soft.      Tenderness: There is no abdominal tenderness.   Musculoskeletal:      Cervical back: Neck supple.      Right lower leg: No edema.      Left lower leg: No edema.   Skin:     Nails: There is no clubbing.   Neurological:      General: No focal deficit present.      Mental Status: She is alert and oriented to person, place, and time.           Result Review :     The following data was reviewed by: Jemal Mathews MD on 11/15/2023:    CMP          9/14/2023    06:21 9/14/2023    16:46 9/15/2023    05:00 11/15/2023    14:14   CMP   Glucose 74   76  95    BUN 7   7  27    Creatinine 0.71   0.82  1.20    EGFR 89.9   75.6  47.9    Sodium 141   140  138    Potassium 3.4  3.9  4.2  4.8    Chloride 107   107  102    Calcium 7.9   8.0  9.7    Total Protein 5.0   4.7  6.6    Albumin 2.1   2.1  3.8    Globulin 2.9   2.6  2.8    Total Bilirubin 0.3   0.3  0.2    Alkaline Phosphatase 118   107  116    AST (SGOT) 17   17  24    ALT (SGPT) 10   9  17    Albumin/Globulin Ratio 0.7   0.8  1.4    BUN/Creatinine Ratio 9.9   8.5  22.5    Anion Gap 5.0   5.3  2.0      CBC          9/15/2023    05:00 9/18/2023    05:00 11/15/2023    14:14   CBC   WBC 5.48  5.39  5.08    RBC 2.73  2.93  4.17    Hemoglobin 8.4  9.2  12.6    Hematocrit 26.5  28.9  41.4    MCV 97.1  98.6  99.3    MCH 30.8  31.4  30.2    MCHC 31.7  31.8  30.4    RDW 13.7  14.7  15.2    Platelets 258  252  180      TSH          9/10/2023    01:40 9/19/2023    14:33 11/15/2023    14:14   TSH   TSH  4.800  1.962     2.360       Details          This result is from an external source.                  Data reviewed: Cardiology studies    Results for orders placed during the hospital encounter of 08/25/23    Adult Transthoracic Echo Complete W/ Cont if Necessary Per Protocol    Interpretation Summary    Left ventricular systolic function is normal. Left ventricular ejection fraction appears to be 61 - 65%.    Left ventricular diastolic function was normal.    There is calcification of the aortic valve.                   Assessment and Plan        Diagnoses and all orders for this visit:    1. Paroxysmal atrial fibrillation (Primary)  Assessment & Plan:  She is currently in sinus rhythm, denies any palpitations.  Planning to continue amiodarone for total of 3 to 6 months duration.  Recommend to decrease the dose to 200 mg every other day from December 15 onwards.  We will continue metoprolol.  Continue Xarelto for anticoagulation.  For surveillance, will check thyroid panel, complete metabolic panel and CBC today.  Recent echocardiogram showed preserved LV function.    Orders:  -     CBC (No Diff); Future  -     Comprehensive Metabolic Panel; Future  -     TSH; Future  -     T4, Free; Future          I spent 18 minutes caring for Anne on this date of service. This time includes time spent by me in the following activities:reviewing tests, obtaining and/or reviewing a separately obtained history, performing a medically appropriate examination and/or evaluation , ordering medications, tests, or procedures, and documenting information in the medical record    Follow Up     Return in about 4 months (around 3/15/2024) for Next scheduled follow up.    Patient was given instructions and counseling regarding her condition or for health maintenance advice. Please see specific information pulled into the AVS if appropriate.

## 2023-11-17 NOTE — TELEPHONE ENCOUNTER
Rx Refill Note  Requested Prescriptions     Pending Prescriptions Disp Refills    promethazine (PHENERGAN) 25 MG tablet [Pharmacy Med Name: PROMETHAZINE 25 MG TABLET] 90 tablet 0     Sig: TAKE 1 TABLET BY MOUTH EVERY 8 HOURS AS NEEDED FOR NAUSEA AND/OR VOMITING      Last office visit with prescribing clinician: 10/16/2023   Last telemedicine visit with prescribing clinician: Visit date not found   Next office visit with prescribing clinician: Visit date not found  Last refill sent: 10/19/23, #90 tablets    Vaishnavi Keller LPN  11/17/23, 12:06 EST

## 2023-11-20 ENCOUNTER — CLINICAL SUPPORT (OUTPATIENT)
Dept: FAMILY MEDICINE CLINIC | Age: 73
End: 2023-11-20
Payer: MEDICARE

## 2023-11-20 DIAGNOSIS — J30.9 ALLERGIC RHINITIS, UNSPECIFIED SEASONALITY, UNSPECIFIED TRIGGER: Primary | ICD-10-CM

## 2023-11-20 PROCEDURE — 95115 IMMUNOTHERAPY ONE INJECTION: CPT | Performed by: FAMILY MEDICINE

## 2023-11-29 ENCOUNTER — CLINICAL SUPPORT (OUTPATIENT)
Dept: FAMILY MEDICINE CLINIC | Age: 73
End: 2023-11-29
Payer: MEDICARE

## 2023-11-29 ENCOUNTER — TELEPHONE (OUTPATIENT)
Dept: SURGERY | Facility: CLINIC | Age: 73
End: 2023-11-29
Payer: MEDICARE

## 2023-11-29 DIAGNOSIS — J30.9 ALLERGIC RHINITIS, UNSPECIFIED SEASONALITY, UNSPECIFIED TRIGGER: Primary | ICD-10-CM

## 2023-11-29 DIAGNOSIS — Z85.028 HISTORY OF STOMACH CANCER: Primary | ICD-10-CM

## 2023-11-29 DIAGNOSIS — D50.0 IRON DEFICIENCY ANEMIA DUE TO CHRONIC BLOOD LOSS: ICD-10-CM

## 2023-11-29 PROCEDURE — 95115 IMMUNOTHERAPY ONE INJECTION: CPT | Performed by: FAMILY MEDICINE

## 2023-11-29 NOTE — TELEPHONE ENCOUNTER
Hub staff attempted to follow warm transfer process and was unsuccessful     Caller: Anne Jewell    Relationship to patient: Self    Best call back number: [phone]     Patient is needing: PT RETURNING ARIANA'S CALL ABOUT SCHEDULING THE EGD & C-SCOPE - SHE STATES SHE ALREADY SCHEDULED EARLIER TODAY WITH OMAR FOR 12/28/23 - PLEASE ONLY CALL BACK IF RESCHEDULE NEEDED.

## 2023-12-06 ENCOUNTER — CLINICAL SUPPORT (OUTPATIENT)
Dept: FAMILY MEDICINE CLINIC | Age: 73
End: 2023-12-06
Payer: MEDICARE

## 2023-12-06 DIAGNOSIS — J30.9 ALLERGIC RHINITIS, UNSPECIFIED SEASONALITY, UNSPECIFIED TRIGGER: Primary | ICD-10-CM

## 2023-12-06 PROCEDURE — 95115 IMMUNOTHERAPY ONE INJECTION: CPT | Performed by: FAMILY MEDICINE

## 2023-12-12 ENCOUNTER — OFFICE VISIT (OUTPATIENT)
Dept: SURGERY | Facility: CLINIC | Age: 73
End: 2023-12-12
Payer: MEDICARE

## 2023-12-12 VITALS
SYSTOLIC BLOOD PRESSURE: 132 MMHG | BODY MASS INDEX: 36.63 KG/M2 | WEIGHT: 194 LBS | DIASTOLIC BLOOD PRESSURE: 84 MMHG | HEIGHT: 61 IN

## 2023-12-12 DIAGNOSIS — Z48.89 POSTOPERATIVE VISIT: Primary | ICD-10-CM

## 2023-12-12 PROCEDURE — 1159F MED LIST DOCD IN RCRD: CPT | Performed by: SURGERY

## 2023-12-12 PROCEDURE — 99024 POSTOP FOLLOW-UP VISIT: CPT | Performed by: SURGERY

## 2023-12-12 PROCEDURE — 1160F RVW MEDS BY RX/DR IN RCRD: CPT | Performed by: SURGERY

## 2023-12-12 NOTE — PROGRESS NOTES
Subjective   Anne Jewell is a 73 y.o. female who returns to the office after undergoing an exploratory laparotomy with gastric access for ERCP and distal gastrectomy on 9/1/2023 followed by an exploratory laparotomy with gastrojejunostomy on 9/8/2023.     History of Present Illness     The patient is recovering well with no significant postop symptoms.  She is having no abdominal pain.  Her appetite is back to its baseline and she has been gaining weight.  She is having regular bowel function.  She is scheduled for an EGD and colonoscopy on 12/28/2023.  Her wound is completely healed.    Review of Systems    Objective   Physical Exam  Constitutional:       Appearance: Normal appearance. She is well-developed. She is not toxic-appearing.   Eyes:      General: No scleral icterus.  Pulmonary:      Effort: Pulmonary effort is normal. No respiratory distress.   Abdominal:      Palpations: Abdomen is soft.      Tenderness: There is no abdominal tenderness.   Skin:     General: Skin is warm and dry.   Neurological:      Mental Status: She is alert and oriented to person, place, and time.   Psychiatric:         Behavior: Behavior normal.         Thought Content: Thought content normal.         Judgment: Judgment normal.              Assessment & Plan     1.  Postoperative visit: The patient is recovering well from her gastric access for ERCP with distal gastrectomy for gastric cancer followed by an exploratory laparotomy with gastrojejunostomy.  At this point she has no limitations.  She is scheduled for an EGD and colonoscopy on 12/20/2023.

## 2023-12-12 NOTE — LETTER
December 12, 2023     Jameson Dean MD     Patient: Anne Jewell   YOB: 1950   Date of Visit: 12/12/2023     Dear Jameson Dean MD:       Thank you for referring Anne Jewell to me for evaluation. Below are the relevant portions of my assessment and plan of care.    If you have questions, please do not hesitate to call me. I look forward to following Anne along with you.         Sincerely,        Slava Loo Jr., MD        CC:   No Recipients    Slava Loo Jr., MD  12/12/23 1344  Sign when Signing Visit  Subjective  Anne Jewell is a 73 y.o. female who returns to the office after undergoing an exploratory laparotomy with gastric access for ERCP and distal gastrectomy on 9/1/2023 followed by an exploratory laparotomy with gastrojejunostomy on 9/8/2023.     History of Present Illness     The patient is recovering well with no significant postop symptoms.  She is having no abdominal pain.  Her appetite is back to its baseline and she has been gaining weight.  She is having regular bowel function.  She is scheduled for an EGD and colonoscopy on 12/28/2023.  Her wound is completely healed.    Review of Systems    Objective  Physical Exam  Constitutional:       Appearance: Normal appearance. She is well-developed. She is not toxic-appearing.   Eyes:      General: No scleral icterus.  Pulmonary:      Effort: Pulmonary effort is normal. No respiratory distress.   Abdominal:      Palpations: Abdomen is soft.      Tenderness: There is no abdominal tenderness.   Skin:     General: Skin is warm and dry.   Neurological:      Mental Status: She is alert and oriented to person, place, and time.   Psychiatric:         Behavior: Behavior normal.         Thought Content: Thought content normal.         Judgment: Judgment normal.              Assessment & Plan    1.  Postoperative visit: The patient is recovering well from her gastric access for ERCP with distal gastrectomy for  gastric cancer followed by an exploratory laparotomy with gastrojejunostomy.  At this point she has no limitations.  She is scheduled for an EGD and colonoscopy on 12/20/2023.

## 2023-12-18 ENCOUNTER — TELEPHONE (OUTPATIENT)
Dept: CARDIOLOGY | Facility: CLINIC | Age: 73
End: 2023-12-18
Payer: MEDICARE

## 2023-12-18 RX ORDER — FUROSEMIDE 40 MG/1
40 TABLET ORAL DAILY
Qty: 5 TABLET | Refills: 3 | Status: SHIPPED | OUTPATIENT
Start: 2023-12-18

## 2023-12-18 RX ORDER — AMIODARONE HYDROCHLORIDE 200 MG/1
100 TABLET ORAL EVERY OTHER DAY
Qty: 45 TABLET | Refills: 1 | Status: SHIPPED | OUTPATIENT
Start: 2023-12-18

## 2023-12-18 NOTE — TELEPHONE ENCOUNTER
Attempted to call patient. Left VM with call back number.     Attempted to call patient daughter Ayanna, left  with call back number.     Patient's daughter called back. Noted patient heart rate has been falling into 30-'s during sleep. Patient wears oxygen- sat's stay in the 90's.     Patient does have increased swelling in lower extremities denies increased SOA or CP.  Patient daughter (former nurse) states patient cough, sounds like a  cardiac cough is very wet, rhonchi and rales heard when in the room.     Patient currently takes:    Lasix 20 mg every other day,   Amiodarone 200 mg every other day since the 15th of December.  Xarelto daily        Please advise

## 2023-12-18 NOTE — TELEPHONE ENCOUNTER
Increased dose of furosemide to 40 mg daily for 5 days, and then change to 20 mg daily.  Decrease dose of amiodarone to 100 mg every other day.  If this does not improve her symptoms, then we can schedule her for follow-up in office next week.

## 2023-12-18 NOTE — TELEPHONE ENCOUNTER
----- Message from Khalida Warren MA sent at 12/18/2023  8:45 AM EST -----  Regarding: FWAlexia Landa  Contact: [phone]    ----- Message -----  From: Anne Jewell  Sent: 12/17/2023  10:44 AM EST  [facility]  Subject: Hey                                              My mothers legs are swelling bad again and she also has a cardiac cough that has returned since being home from hospital. Also when she is sleeping her pulse is getting down to mid 30s. What do we need to do???

## 2023-12-18 NOTE — TELEPHONE ENCOUNTER
NARGIS patient daughter. Went over medication changes. Patient will take the 40 mg for 5 days. New prescriptions sent.     Patient encouraged to call the office is her symptoms do not improve. Understanding noted and verbalized.

## 2023-12-19 ENCOUNTER — OFFICE VISIT (OUTPATIENT)
Dept: PAIN MEDICINE | Facility: CLINIC | Age: 73
End: 2023-12-19
Payer: MEDICARE

## 2023-12-19 VITALS
SYSTOLIC BLOOD PRESSURE: 160 MMHG | WEIGHT: 185.7 LBS | TEMPERATURE: 97.1 F | BODY MASS INDEX: 35.06 KG/M2 | OXYGEN SATURATION: 96 % | HEART RATE: 58 BPM | DIASTOLIC BLOOD PRESSURE: 82 MMHG | HEIGHT: 61 IN | RESPIRATION RATE: 18 BRPM

## 2023-12-19 DIAGNOSIS — M51.36 DEGENERATION OF INTERVERTEBRAL DISC OF LUMBAR REGION: ICD-10-CM

## 2023-12-19 DIAGNOSIS — G89.4 CHRONIC PAIN SYNDROME: Primary | ICD-10-CM

## 2023-12-19 DIAGNOSIS — C16.9 GASTRIC ADENOCARCINOMA: ICD-10-CM

## 2023-12-19 DIAGNOSIS — M06.9 RHEUMATOID ARTHRITIS, INVOLVING UNSPECIFIED SITE, UNSPECIFIED WHETHER RHEUMATOID FACTOR PRESENT: ICD-10-CM

## 2023-12-19 DIAGNOSIS — M54.16 LUMBAR RADICULOPATHY: ICD-10-CM

## 2023-12-19 DIAGNOSIS — G89.4 CHRONIC PAIN SYNDROME: ICD-10-CM

## 2023-12-19 DIAGNOSIS — M15.9 GENERALIZED OSTEOARTHRITIS: ICD-10-CM

## 2023-12-19 PROCEDURE — 99214 OFFICE O/P EST MOD 30 MIN: CPT

## 2023-12-19 PROCEDURE — 1125F AMNT PAIN NOTED PAIN PRSNT: CPT

## 2023-12-19 PROCEDURE — 1160F RVW MEDS BY RX/DR IN RCRD: CPT

## 2023-12-19 PROCEDURE — 1159F MED LIST DOCD IN RCRD: CPT

## 2023-12-19 RX ORDER — OXYCODONE HCL 80 MG/1
160 TABLET, FILM COATED, EXTENDED RELEASE ORAL EVERY 12 HOURS SCHEDULED
Qty: 120 TABLET | Refills: 0 | Status: SHIPPED | OUTPATIENT
Start: 2023-12-19

## 2023-12-19 NOTE — TELEPHONE ENCOUNTER
Patient seen in office today. Reviewed UDS and VICENTA. Both updated and appropriate. Refill appropriate.  DNF 12/27/23. Please refill. Thanks.

## 2023-12-19 NOTE — PROGRESS NOTES
CHIEF COMPLAINT  Back and joint pain    Subjective   Anne Jewell is a 73 y.o. female  who presents for follow-up.  She has a history of chronic back and joint pain. She reports that her pain has remained consistent since her last office visit and varies based on activity level.     Today pain is 7/10VAS in severity. Pain is located in her low back and radiates to bilateral feet. Describes this pain as a nearly continuous aching. Pain is worsened by rising from sitting to standing position, stress, weather changes, prolonged position, and walking long distances. Pain improves with rest/reposition, HEP, aqua therapy, heat/ice, and medications. She is currently receiving at home OT and PT.     Continues with OxyContin 80mg 2 tablets BID and Baclofen 10mg 2-3/day (managed by PCP). Denies any side effects from the regimen, including constipation and somnolence. The regimen helps decrease pain by a significant amount. Notes improvement in activity and function with regimen. ADL's by self. Denies any bowel or bladder changes. Patient reports that she takes Xanax 2mg TID as needed. This is prescribed by psychiatrist.      She was admitted to the hospital from 8/25/23 - 9/15/23 where she was diagnosed with adenocarcinoma that was found when patient attempting to undergo an ERCP. She underwent a exploratory laparotomy with distal gastrectomy. Following surgery she experienced a new onset of atrial fibrillation with rapid ventricular response. Cardiology was consulted.  She been seen by hematology/oncology. She was seen by Dr. Stuart Borges on 9/19/23 with [facility].  PET scan on 10/6/23 showed no evidence of metastatic disease. No plans for chemotherapy at this time per office note from Dr. Slava Loo from 10/12/23. She was started on Dronabinol in September by her hematologist/oncologist to help stimulate her appetite and for N/V.  Lasix was increased to 40 mg for 5 days and amiodarone was  decreased to 100 mg every other day by Dr. Mathews due to lower extremity swelling.    Back Pain  This is a chronic problem. The current episode started more than 1 year ago. The problem occurs constantly. The problem is unchanged (unchanged since last office visit). The pain is present in the lumbar spine. The quality of the pain is described as aching. The pain radiates to the right foot and left foot. The pain is at a severity of 7/10. The pain is moderate. The symptoms are aggravated by standing, position and sitting (rising from sitting to standing, prolonged position, walking long distances, stress, weather changes). Associated symptoms include abdominal pain and weakness. Pertinent negatives include no chest pain, dysuria, fever, headaches or numbness. She has tried analgesics, home exercises, heat and ice (aqua therapy) for the symptoms. The treatment provided moderate relief.   Joint Pain  This is a chronic (7/10 VAS in severity) problem. The current episode started more than 1 year ago. The problem occurs constantly. The problem has been unchanged (unchanged since last office visit). Associated symptoms include abdominal pain, arthralgias, myalgias, neck pain and weakness. Pertinent negatives include no chest pain, chills, congestion, coughing, fatigue, fever, headaches, numbness, vertigo or vomiting. The symptoms are aggravated by stress and exertion (weather changes, increased physical activity,). She has tried oral narcotics, position changes, rest, heat and ice for the symptoms. The treatment provided mild relief.      PEG Assessment   What number best describes your pain on average in the past week?6  What number best describes how, during the past week, pain has interfered with your enjoyment of life?8  What number best describes how, during the past week, pain has interfered with your general activity?  6    The following portions of the patient's history were reviewed and updated as appropriate:  "allergies, current medications, past family history, past medical history, past social history, past surgical history, and problem list.    Review of Systems   Constitutional:  Negative for chills, fatigue and fever.   HENT:  Negative for congestion.    Respiratory:  Negative for cough.    Cardiovascular:  Negative for chest pain.   Gastrointestinal:  Positive for abdominal pain. Negative for constipation, diarrhea and vomiting.   Genitourinary:  Negative for difficulty urinating and dysuria.   Musculoskeletal:  Positive for arthralgias, back pain, myalgias and neck pain.   Neurological:  Positive for weakness. Negative for vertigo, numbness and headaches.   Psychiatric/Behavioral:  Negative for sleep disturbance and suicidal ideas. The patient is not nervous/anxious.      I have reviewed and confirmed the accuracy of the ROS as documented by the MA/LPN/RN EUGENIO Ayala    Vitals:    12/19/23 1309   BP: 160/82   Pulse: 58   Resp: 18   Temp: 97.1 °F (36.2 °C)   SpO2: 96%   Weight: 84.2 kg (185 lb 11.2 oz)   Height: 154.9 cm (61\")   PainSc:   7   PainLoc: Back     Objective   Physical Exam  Constitutional:       Appearance: Normal appearance.   HENT:      Head: Normocephalic.   Cardiovascular:      Rate and Rhythm: Normal rate and regular rhythm.   Pulmonary:      Effort: Pulmonary effort is normal.      Breath sounds: Normal breath sounds.   Musculoskeletal:      Right shoulder: Tenderness present.      Left shoulder: Tenderness present.      Right wrist: Tenderness present.      Left wrist: Tenderness present.      Right hand: Tenderness present.      Left hand: Tenderness present.      Cervical back: Normal range of motion.      Lumbar back: Tenderness present. Decreased range of motion.      Right hip: Tenderness present.      Left hip: Tenderness present.      Right knee: Tenderness present.      Left knee: Tenderness present.      Comments: Back brace for support in place   Skin:     General: Skin is " warm and dry.      Capillary Refill: Capillary refill takes less than 2 seconds.   Neurological:      General: No focal deficit present.      Mental Status: She is alert and oriented to person, place, and time.      Gait: Gait abnormal (slowed, ambulating with rolling walker).   Psychiatric:         Mood and Affect: Mood normal.         Behavior: Behavior normal.         Thought Content: Thought content normal.         Cognition and Memory: Cognition normal.       Assessment & Plan   Diagnoses and all orders for this visit:    1. Chronic pain syndrome (Primary)    2. Rheumatoid arthritis, involving unspecified site, unspecified whether rheumatoid factor present    3. Lumbar radiculopathy    4. Generalized osteoarthritis    5. Degeneration of intervertebral disc of lumbar region    6. Gastric adenocarcinoma      Anne Jewell reports a pain score of 7.  Given her pain assessment as noted, treatment options were discussed and the following options were decided upon as a follow-up plan to address the patient's pain: continuation of current treatment plan for pain and prescription for opiod analgesics.    --- The urine drug screen confirmation from 11/10/23 has been reviewed and the result is appropriate based on patient history and VICENTA report  --- The patient signed an updated copy of the controlled substance agreement on 11/10/23  --- Narcan prescription current  --- Continue OxyContin. DNF 12/27/23. Patient appears stable with current regimen. No adverse effects. Regarding continuation of opioids, there is no evidence of aberrant behavior or any red flags.  The patient continues with appropriate response to opioid therapy. ADL's remain intact by self.   --- Follow-up 1 month - ok for telehealth if needed       VICENTA REPORT  As part of the patient's treatment plan, I am prescribing controlled substances. The patient has been made aware of appropriate use of such medications, including potential risk of  somnolence, limited ability to drive and/or work safely, and the potential for dependence or overdose. It has also been made clear that these medications are for use by this patient only, without concomitant use of alcohol or other substances unless prescribed.     Patient has completed prescribing agreement detailing terms of continued prescribing of controlled substances, including monitoring VICENTA reports, urine drug screening, and pill counts if necessary. The patient is aware that inappropriate use will results in cessation of prescribing such medications.    As the clinician, I personally reviewed the VICENTA from 12/19/23 while the patient was in the office today.    History and physical exam exhibit continued safe and appropriate use of controlled substances.    Dictated utilizing Dragon dictation.

## 2023-12-20 ENCOUNTER — CLINICAL SUPPORT (OUTPATIENT)
Dept: FAMILY MEDICINE CLINIC | Age: 73
End: 2023-12-20
Payer: MEDICARE

## 2023-12-20 DIAGNOSIS — J30.9 ALLERGIC RHINITIS, UNSPECIFIED SEASONALITY, UNSPECIFIED TRIGGER: Primary | ICD-10-CM

## 2023-12-20 PROCEDURE — 95115 IMMUNOTHERAPY ONE INJECTION: CPT | Performed by: FAMILY MEDICINE

## 2023-12-21 DIAGNOSIS — R11.0 NAUSEA: ICD-10-CM

## 2023-12-21 DIAGNOSIS — G89.4 CHRONIC PAIN SYNDROME: Chronic | ICD-10-CM

## 2023-12-22 ENCOUNTER — TELEPHONE (OUTPATIENT)
Dept: CARDIOLOGY | Facility: CLINIC | Age: 73
End: 2023-12-22
Payer: MEDICARE

## 2023-12-22 RX ORDER — PROMETHAZINE HYDROCHLORIDE 25 MG/1
25 TABLET ORAL EVERY 8 HOURS PRN
Qty: 90 TABLET | Refills: 0 | Status: SHIPPED | OUTPATIENT
Start: 2023-12-22

## 2023-12-22 NOTE — TELEPHONE ENCOUNTER
SW daughter. Patient was seen in ER due to fall. Patient was recommended to f/u with PCP with possible holter monitor.     Made f/u with EUGENIO Menon in [place] on 1/2/24.

## 2023-12-22 NOTE — TELEPHONE ENCOUNTER
----- Message from Pura Petit sent at 12/22/2023  8:03 AM EST -----  Regarding: FW: Syeda  Contact: [phone]    ----- Message -----  From: Debra Dinero  Sent: 12/22/2023   7:56 AM EST  To: Pura Petit  Subject: KAMI: Hey                                            ----- Message -----  From: Anne Jewell  Sent: 12/21/2023   4:30 PM EST  [facility]  Subject: Hey                                              My mother fell last night and she is now going to let me take her to the hospital but I think she needs a cardio holster monitor!!! How do we get set up with that? That could be reason she fell

## 2023-12-26 ENCOUNTER — TELEPHONE (OUTPATIENT)
Dept: SURGERY | Facility: CLINIC | Age: 73
End: 2023-12-26
Payer: MEDICARE

## 2023-12-26 NOTE — TELEPHONE ENCOUNTER
Patient scheduled for c-scope and egd on Thursday 12/28. She recently fell( notes in [facility] at [facility]). She does not want to reschedule if she doesn't have to. Would you be able to look over her ER note and confirm that she can proceed Thursday.

## 2023-12-28 ENCOUNTER — TELEPHONE (OUTPATIENT)
Dept: CARDIOLOGY | Facility: CLINIC | Age: 73
End: 2023-12-28
Payer: MEDICARE

## 2023-12-28 NOTE — TELEPHONE ENCOUNTER
----- Message from EUGENIO Patricia sent at 12/28/2023  8:42 AM EST -----  Regarding: FW: Appointment Request  Contact: [phone]  She has an appointment with me on Tuesday of next week, would recommend keeping, she is being seen in the [place] office.  If she prefers, we can arrange to see her in the office here in [place] tomorrow.  However in terms of her nose, and injury to her arm, these would need to be addressed by primary care.  ----- Message -----  From: Jewell Timmons, RAMO  Sent: 12/28/2023   8:15 AM EST  To: EUGENIO Patricia  Subject: FW: Appointment Request                            ----- Message -----  From: Mark Gamble RegSched Rep  Sent: 12/28/2023   7:19 AM EST  To: Jewell Timmons RN  Subject: FW: Appointment Request                          Is the a PCP issue? She has an apt on Jan 2  ----- Message -----  From: Anne Jewell  Sent: 12/27/2023   6:42 PM EST  To: [facility] Card [facility] Desk Pool  Subject: Appointment Request                              Appointment Request From: Anne Jewell    With Provider: Jemal Mathews [[facility] CARDIOLOGY]    Preferred Date Range: Any date 12/28/2023 or later    Preferred Times: Any Time    Reason for visit: Hospital Follow-up Visit    Comments:  My heart cough is still here and  I feel very weak  please see me I went to ER [facility] Broke nose and I have blood in mucus left arm skin off scraped off  I went down on heart script  fluid seems to be loaded in stomach  they did not do blood or urine at hosp so I think something bad going on in me

## 2023-12-28 NOTE — TELEPHONE ENCOUNTER
NARGIS patient. Patient states she continues to have blood present when coughing. Patient encouraged to reach out to her PCP that prescribes Xarelto- explained to the patient that after falling and breaking her nose it could be  her nose continuing to bleed and run down her throat.     Patient verbalized understanding and wishes to discuss with cardiologist at follow up.

## 2023-12-31 DIAGNOSIS — E03.9 ACQUIRED HYPOTHYROIDISM: Chronic | ICD-10-CM

## 2024-01-02 ENCOUNTER — OFFICE VISIT (OUTPATIENT)
Dept: CARDIOLOGY | Facility: CLINIC | Age: 74
End: 2024-01-02
Payer: MEDICARE

## 2024-01-02 ENCOUNTER — CLINICAL SUPPORT (OUTPATIENT)
Dept: FAMILY MEDICINE CLINIC | Age: 74
End: 2024-01-02
Payer: MEDICARE

## 2024-01-02 VITALS
SYSTOLIC BLOOD PRESSURE: 148 MMHG | BODY MASS INDEX: 35.3 KG/M2 | HEIGHT: 61 IN | HEART RATE: 63 BPM | WEIGHT: 187 LBS | DIASTOLIC BLOOD PRESSURE: 73 MMHG

## 2024-01-02 DIAGNOSIS — I48.0 PAROXYSMAL ATRIAL FIBRILLATION: Primary | ICD-10-CM

## 2024-01-02 DIAGNOSIS — R60.0 PEDAL EDEMA: ICD-10-CM

## 2024-01-02 DIAGNOSIS — J30.9 ALLERGIC RHINITIS, UNSPECIFIED SEASONALITY, UNSPECIFIED TRIGGER: Primary | ICD-10-CM

## 2024-01-02 PROCEDURE — 95115 IMMUNOTHERAPY ONE INJECTION: CPT | Performed by: FAMILY MEDICINE

## 2024-01-02 RX ORDER — FUROSEMIDE 20 MG/1
20 TABLET ORAL DAILY
Start: 2024-01-02 | End: 2024-01-16 | Stop reason: SDUPTHER

## 2024-01-02 RX ORDER — LEVOTHYROXINE SODIUM 0.1 MG/1
TABLET ORAL
Qty: 90 TABLET | Refills: 0 | Status: SHIPPED | OUTPATIENT
Start: 2024-01-02

## 2024-01-02 NOTE — PROGRESS NOTES
Chief Complaint  No chief complaint on file.    Subjective        History of Present Illness  Anne Jewell presents to [facility] CARDIOLOGY   Ms. Jewell is a 73-year-old female patient coming in today for cardiac follow-up.  The patient and her daughter contacted our office in mid December with complaints of worsening lower extremity swelling, and concern for coughing, as well as decreased heart rate.  Diuretic dose was increased temporarily for 5 days, with good resolution to her lower extremity swelling.  Amiodarone dose was also decreased.  On December 21, she was seen in the ER following a mechanical fall, patient denies any loss of consciousness or dizziness, but ended up with significant nosebleed, and after evaluation in the ER it was noted that she had fracture.  She is can be seeing ENT for further workup.  She has resumed Xarelto without any further issues bleeding.  States currently she is doing better, says her breathing is at her baseline, lower extremity swelling has improved.  She denies any chest pain, palpitations, lightheadedness or dizziness.  States she has been under a lot of stress at home recently, coping with both her and her daughter's chronic health problems.       Past History:     Paroxysmal atrial fibrillation, detected during hospital admission in September, 2023     History of pulmonary embolism on long-term anticoagulation  Gastric adenocarcinoma, status post surgery    Past Medical History:   Diagnosis Date    Allergic rhinitis     Anemia     Arthritis of back     Arthritis of neck     Asthma     Bronchospasm     Cancer     Carpal tunnel syndrome, bilateral     Cervical disc disorder     Clotting disorder     Colon polyp Don’t remember    Deep vein thrombosis     Disc degeneration, lumbar     Edema     Esophageal reflux     Glaucoma     Hip arthrosis     History of transfusion     Joint pain     Kidney disease 2018    Liver disease 2018    Low back pain      Osteoarthritis     Osteopenia     Osteoporosis     Periarthritis of shoulder     Scoliosis     Status post total knee replacement, left 08/31/2017    Status post total knee replacement, right 08/31/2017    Stomach cancer Sept 2023    UTI (urinary tract infection)     Venous thrombosis        Allergies   Allergen Reactions    Penicillins Hives        Past Surgical History:   Procedure Laterality Date    BILATERAL BREAST REDUCTION      BLADDER SUSPENSION      vaginal sling operation for stress incontinence    COLONOSCOPY  08/05/2016    NORMAL    ERCP N/A 08/30/2023    Procedure: ENDOSCOPIC RETROGRADE CHOLANGIOPANCREATOGRAPHY;  Surgeon: Elijah Simon MD;  Location: [facility] ENDOSCOPY;  Service: Gastroenterology;  Laterality: N/A;  cbd stone    ERCP N/A 09/01/2023    Procedure: ENDOSCOPIC RETROGRADE CHOLANGIOPANCREATOGRAPHY WITH CHOLANGIOGRAM, SPHINCTEROTOMY, BALLOON SWEEP;  Surgeon: Elijah Simon MD;  Location: [facility] OR;  Service: Gastroenterology;  Laterality: N/A;    EXPLORATORY LAPAROTOMY N/A 09/01/2023    DIATAL GASTRECTOMY    GASTRECTOMY N/A 09/08/2023    Procedure: EXPLORATORY LAPAROTOMY WITH GASTROJEJUNOSTOMY;  Surgeon: Slava Loo Jr., MD;  Location: [facility] OR;  Service: General;  Laterality: N/A;    GASTRIC BYPASS      HAND SURGERY Right 01/14/2016    HEMORRHOIDECTOMY      HERNIA REPAIR      x7    HYSTERECTOMY      TOTAL KNEE ARTHROPLASTY Bilateral         Social History  She  reports that she quit smoking about 18 years ago. Her smoking use included cigarettes. She has a 12.50 pack-year smoking history. She has been exposed to tobacco smoke. She has never used smokeless tobacco. She reports that she does not drink alcohol and does not use drugs.    Family History  Her family history includes Arthritis in an other family member; Cancer in an other family member; Diabetes in an other family member; Heart disease in an other family member; Hypertension in an other family member;  Nephrolithiasis in an other family member; Prostate cancer in her father.       Current Outpatient Medications on File Prior to Visit   Medication Sig    albuterol sulfate  (90 Base) MCG/ACT inhaler Inhale 2 puffs Every 6 (Six) Hours As Needed for Wheezing. Proventil  (90 Base) MCG/ACT Inhalation Aerosol Solution; Patient Sig: Proventil  (90 Base) MCG/ACT Inhalation Aerosol Solution Inhale 2 puff(s) every 6 to 8 hours as needed; 6.7; 0; 05-Apr-2013; Active    ALPRAZolam (XANAX) 2 MG tablet As Needed.    amiodarone (PACERONE) 200 MG tablet Take 0.5 tablets by mouth Every Other Day.    baclofen (LIORESAL) 10 MG tablet Take 1 tablet by mouth 3 (Three) Times a Day.    Calcium Citrate-Vitamin D3 (CITRACAL) 315-6.25 MG-MCG tablet tablet Take 1 tablet by mouth Daily.    chlorhexidine (PERIDEX) 0.12 % solution     Cholecalciferol (VITAMIN D3) 2000 units capsule TAKE ONE CAPSULE BY MOUTH DAILY    Cyanocobalamin (B-12) 1000 MCG sublingual tablet PLACE 1 TABLET UNDER THE TONGUE AND LET DISSOLVE ONCE DAILY    dicyclomine (BENTYL) 20 MG tablet Take 1 tablet by mouth Every 6 (Six) Hours As Needed (diarrhea).    dronabinol (MARINOL) 5 MG capsule Take 1 capsule by mouth 3 (Three) Times a Day.    EPINEPHrine (EPIPEN) 0.3 MG/0.3ML solution auto-injector injection     escitalopram (LEXAPRO) 10 MG tablet Take 1 tablet by mouth Daily.    KLOR-CON 20 MEQ CR tablet TAKE 1 TABLET BY MOUTH TWICE A DAY (Patient taking differently: Take 1 tablet by mouth Every Other Day. Only takes when take the lasix.)    levothyroxine (SYNTHROID, LEVOTHROID) 100 MCG tablet TAKE 1 TABLET BY MOUTH DAILY    loperamide (IMODIUM) 2 MG capsule Take 1 capsule by mouth 4 (Four) Times a Day As Needed for Diarrhea.    Magnesium Oxide -Mg Supplement 400 (240 Mg) MG tablet TAKE 1 TABLET BY MOUTH TWICE A DAY    metoprolol tartrate (LOPRESSOR) 25 MG tablet TAKE ONE TABLET BY MOUTH EVERY 12 HOURS    montelukast (SINGULAIR) 10 MG tablet TAKE ONE TABLET  "BY MOUTH DAILY    Multiple Vitamin tablet Take 1 tablet by mouth Daily.    Omega 3-6-9 capsule Take  by mouth.    oxyCODONE ER (oxyCONTIN) 80 MG tablet extended-release 12 hour 12 hr tablet Take 2 tablets by mouth Every 12 (Twelve) Hours.    pantoprazole (PROTONIX) 40 MG EC tablet TAKE 1 TABLET BY MOUTH DAILY    potassium chloride (KLOR-CON) 20 MEQ packet Take 20 mEq by mouth Daily.    prednisoLONE acetate (PRED FORTE) 1 % ophthalmic suspension Administer 2 drops into the left eye Daily.    promethazine (PHENERGAN) 25 MG tablet Take 1 tablet by mouth Every 8 (Eight) Hours As Needed for Nausea or Vomiting.    saccharomyces boulardii (FLORASTOR) 250 MG capsule Take 1 capsule by mouth 2 (Two) Times a Day.    silver sulfadiazine (Silvadene) 1 % cream Apply 1 application  topically to the appropriate area as directed 2 (Two) Times a Day. Pressure ulcer on buttocks (Patient taking differently: Apply 1 application  topically to the appropriate area as directed 2 (Two) Times a Day As Needed. Pressure ulcer on buttocks)     No current facility-administered medications on file prior to visit.         Review of Systems   Constitutional:  Negative for fatigue.   Respiratory:  Positive for shortness of breath. Negative for cough and chest tightness.    Cardiovascular:  Negative for chest pain, palpitations and leg swelling.   Gastrointestinal:  Negative for nausea and vomiting.   Musculoskeletal:  Positive for arthralgias.   Neurological:  Negative for dizziness and syncope.   Psychiatric/Behavioral:  Positive for stress.         Objective   Vitals:    01/02/24 1429   BP: 148/73   Pulse: 63   Weight: 84.8 kg (187 lb)   Height: 154.9 cm (61\")         Physical Exam  General : Alert, awake, no acute distress  Neck : Supple, no carotid bruit, no jugular venous distention  CVS : Regular rate and rhythm, no murmur, rubs or gallops  Lungs: Clear to auscultation bilaterally, no crackles or rhonchi  Abdomen: Soft, nontender, bowel sounds " "active  Extremities: Warm, well-perfused, no pedal edema      Result Review     The following data was reviewed by Cindy Cárdenas, EUGENIO  No results found for: \"PROBNP\"  CMP          9/14/2023    06:21 9/14/2023    16:46 9/15/2023    05:00 11/15/2023    14:14   CMP   Glucose 74   76  95    BUN 7   7  27    Creatinine 0.71   0.82  1.20    EGFR 89.9   75.6  47.9    Sodium 141   140  138    Potassium 3.4  3.9  4.2  4.8    Chloride 107   107  102    Calcium 7.9   8.0  9.7    Total Protein 5.0   4.7  6.6    Albumin 2.1   2.1  3.8    Globulin 2.9   2.6  2.8    Total Bilirubin 0.3   0.3  0.2    Alkaline Phosphatase 118   107  116    AST (SGOT) 17   17  24    ALT (SGPT) 10   9  17    Albumin/Globulin Ratio 0.7   0.8  1.4    BUN/Creatinine Ratio 9.9   8.5  22.5    Anion Gap 5.0   5.3  2.0      CBC w/diff          9/15/2023    05:00 9/18/2023    05:00 11/15/2023    14:14   CBC w/Diff   WBC 5.48  5.39  5.08    RBC 2.73  2.93  4.17    Hemoglobin 8.4  9.2  12.6    Hematocrit 26.5  28.9  41.4    MCV 97.1  98.6  99.3    MCH 30.8  31.4  30.2    MCHC 31.7  31.8  30.4    RDW 13.7  14.7  15.2    Platelets 258  252  180    Neutrophil Rel %  64.0     Immature Granulocyte Rel %  0.4     Lymphocyte Rel %  23.4     Monocyte Rel %  9.8     Eosinophil Rel %  2.0     Basophil Rel %  0.4        Lab Results   Component Value Date    TSH 4.419 (H) 01/09/2024      Lab Results   Component Value Date    FREET4 1.40 01/09/2024      No results found for: \"DDIMERQUANT\"  Magnesium   Date Value Ref Range Status   01/09/2024 2.0 1.8 - 2.4 mg/dL Final      No results found for: \"DIGOXIN\"   Lab Results   Component Value Date    TROPONINT 28 (H) 09/09/2023               Results for orders placed during the hospital encounter of 08/25/23    Adult Transthoracic Echo Complete W/ Cont if Necessary Per Protocol    Interpretation Summary    Left ventricular systolic function is normal. Left ventricular ejection fraction appears to be 61 - 65%.    Left ventricular " diastolic function was normal.    There is calcification of the aortic valve.             Assessment and Plan   Diagnoses and all orders for this visit:    1. Paroxysmal atrial fibrillation (Primary)  Assessment & Plan:  She remains in sinus rhythm without complaints of palpitations.  Recent echocardiogram showed preserved LV function.  Dose of amiodarone was decreased further to 100 mg every other day, with goal of stopping entirely in the next few months.  Continue metoprolol.  Continue Xarelto for anticoagulation.  Repeat blood count and chemistry panel.    Orders:  -     Comprehensive Metabolic Panel; Future  -     CBC (No Diff); Future    2. Pedal edema  Assessment & Plan:  She had recent worsening pedal edema, previous echocardiogram shows normal LV systolic and diastolic function.  She responded well to increased diuretics, she is back to furosemide 20 mg daily, continue the same.  Recheck chemistry panel.      Other orders  -     furosemide (LASIX) 20 MG tablet; Take 1 tablet by mouth Daily.                Follow Up   Return for with Dr. Mathews, As already scheduled.    Patient was given instructions and counseling regarding her condition or for health maintenance advice. Please see specific information pulled into the AVS if appropriate.     Signed,  Cindy Cárdenas, APRN  01/02/2024     Dictated Utilizing Dragon Dictation: Please note that portions of this note were completed with a voice recognition program.  Part of this note may be an electronic transcription/translation of spoken language to printed text using the Dragon Dictation System.

## 2024-01-07 DIAGNOSIS — Z86.718 HISTORY OF DVT (DEEP VEIN THROMBOSIS): ICD-10-CM

## 2024-01-08 RX ORDER — RIVAROXABAN 20 MG/1
20 TABLET, FILM COATED ORAL DAILY
Qty: 90 TABLET | Refills: 0 | Status: SHIPPED | OUTPATIENT
Start: 2024-01-08

## 2024-01-09 ENCOUNTER — CLINICAL SUPPORT (OUTPATIENT)
Dept: FAMILY MEDICINE CLINIC | Age: 74
End: 2024-01-09
Payer: MEDICARE

## 2024-01-09 DIAGNOSIS — J30.9 ALLERGIC RHINITIS, UNSPECIFIED SEASONALITY, UNSPECIFIED TRIGGER: Primary | ICD-10-CM

## 2024-01-09 PROCEDURE — 95115 IMMUNOTHERAPY ONE INJECTION: CPT | Performed by: FAMILY MEDICINE

## 2024-01-15 PROBLEM — R60.0 PEDAL EDEMA: Status: ACTIVE | Noted: 2024-01-15

## 2024-01-16 RX ORDER — FUROSEMIDE 20 MG/1
20 TABLET ORAL DAILY
Qty: 90 TABLET | Refills: 1 | Status: SHIPPED | OUTPATIENT
Start: 2024-01-16

## 2024-01-16 RX ORDER — FUROSEMIDE 20 MG/1
20 TABLET ORAL DAILY
Qty: 90 TABLET | Refills: 3 | Status: SHIPPED | OUTPATIENT
Start: 2024-01-16 | End: 2024-01-16 | Stop reason: SDUPTHER

## 2024-01-16 NOTE — ASSESSMENT & PLAN NOTE
She remains in sinus rhythm without complaints of palpitations.  Recent echocardiogram showed preserved LV function.  Dose of amiodarone was decreased further to 100 mg every other day, with goal of stopping entirely in the next few months.  Continue metoprolol.  Continue Xarelto for anticoagulation.  Repeat blood count and chemistry panel.

## 2024-01-16 NOTE — ASSESSMENT & PLAN NOTE
She had recent worsening pedal edema, previous echocardiogram shows normal LV systolic and diastolic function.  She responded well to increased diuretics, she is back to furosemide 20 mg daily, continue the same.  Recheck chemistry panel.

## 2024-01-17 ENCOUNTER — TELEPHONE (OUTPATIENT)
Dept: PAIN MEDICINE | Facility: CLINIC | Age: 74
End: 2024-01-17
Payer: MEDICARE

## 2024-01-17 NOTE — TELEPHONE ENCOUNTER
Patient wants to know if you would changer her appt on 1/19/24 to a telehealth appointment. She states that she does not have a car right now because hers has broken down. She does not have anyone to drive her. She fell recently and  her nose. She also stated that the weather will be bad. Please advise.

## 2024-01-18 ENCOUNTER — OFFICE VISIT (OUTPATIENT)
Dept: FAMILY MEDICINE CLINIC | Age: 74
End: 2024-01-18
Payer: MEDICARE

## 2024-01-18 VITALS
HEIGHT: 61 IN | BODY MASS INDEX: 34.85 KG/M2 | HEART RATE: 52 BPM | DIASTOLIC BLOOD PRESSURE: 63 MMHG | SYSTOLIC BLOOD PRESSURE: 133 MMHG | WEIGHT: 184.6 LBS

## 2024-01-18 DIAGNOSIS — M54.16 LUMBAR RADICULOPATHY: ICD-10-CM

## 2024-01-18 DIAGNOSIS — G89.4 CHRONIC PAIN SYNDROME: ICD-10-CM

## 2024-01-18 DIAGNOSIS — E61.1 LOW SERUM IRON: ICD-10-CM

## 2024-01-18 DIAGNOSIS — J30.9 ALLERGIC RHINITIS, UNSPECIFIED SEASONALITY, UNSPECIFIED TRIGGER: ICD-10-CM

## 2024-01-18 DIAGNOSIS — I87.2 VENOUS INSUFFICIENCY: ICD-10-CM

## 2024-01-18 DIAGNOSIS — J45.20 MILD INTERMITTENT ASTHMA, UNCOMPLICATED: ICD-10-CM

## 2024-01-18 DIAGNOSIS — K21.9 GERD WITHOUT ESOPHAGITIS: Primary | ICD-10-CM

## 2024-01-18 RX ORDER — ESCITALOPRAM OXALATE 20 MG/1
20 TABLET ORAL DAILY
COMMUNITY

## 2024-01-18 RX ORDER — OXYCODONE HCL 80 MG/1
160 TABLET, FILM COATED, EXTENDED RELEASE ORAL EVERY 12 HOURS SCHEDULED
Qty: 120 TABLET | Refills: 0 | Status: SHIPPED | OUTPATIENT
Start: 2024-01-18

## 2024-01-18 NOTE — TELEPHONE ENCOUNTER
Patient being seen tomorrow. However, medication is not due until 1/26/24. Reviewed UDS and VICENTA. Both updated and appropriate. Refill appropriate.

## 2024-01-18 NOTE — PROGRESS NOTES
Chief Complaint  Hypothyroidism (3 months)    Subjective          Anne Jewell presents to [facility]  History of Present Illness  --BREATHING IS STABLE ON CURRENT INHALED MEDS  --LAST HG WAS > 14.  FOLLOWED BY HEMATOLOGY FOR I.V. IRON INFUSIONS  --GERD IS WELL CONTROLLED WITH THE PPI  --THE LOWER LEG EDEMA IS IMPROVED WITH ELEVATION, WRAPPING AND PRN LASIX        Allergies   Allergen Reactions    Penicillins Hives        Health Maintenance Due   Topic Date Due    COVID-19 Vaccine (5 - 2023-24 season) 09/01/2023        Current Outpatient Medications on File Prior to Visit   Medication Sig    albuterol sulfate  (90 Base) MCG/ACT inhaler Inhale 2 puffs Every 6 (Six) Hours As Needed for Wheezing. Proventil  (90 Base) MCG/ACT Inhalation Aerosol Solution; Patient Sig: Proventil  (90 Base) MCG/ACT Inhalation Aerosol Solution Inhale 2 puff(s) every 6 to 8 hours as needed; 6.7; 0; 05-Apr-2013; Active    ALPRAZolam (XANAX) 2 MG tablet As Needed.    amiodarone (PACERONE) 200 MG tablet Take 0.5 tablets by mouth Every Other Day.    baclofen (LIORESAL) 10 MG tablet Take 1 tablet by mouth 3 (Three) Times a Day.    Calcium Citrate-Vitamin D3 (CITRACAL) 315-6.25 MG-MCG tablet tablet Take 1 tablet by mouth Daily.    chlorhexidine (PERIDEX) 0.12 % solution     Cholecalciferol (VITAMIN D3) 2000 units capsule TAKE ONE CAPSULE BY MOUTH DAILY    Cyanocobalamin (B-12) 1000 MCG sublingual tablet PLACE 1 TABLET UNDER THE TONGUE AND LET DISSOLVE ONCE DAILY    dicyclomine (BENTYL) 20 MG tablet Take 1 tablet by mouth Every 6 (Six) Hours As Needed (diarrhea).    dronabinol (MARINOL) 5 MG capsule Take 1 capsule by mouth 3 (Three) Times a Day.    EPINEPHrine (EPIPEN) 0.3 MG/0.3ML solution auto-injector injection     escitalopram (LEXAPRO) 20 MG tablet Take 1 tablet by mouth Daily.    furosemide (LASIX) 20 MG tablet Take 1 tablet by mouth Daily.    KLOR-CON 20 MEQ CR tablet TAKE 1 TABLET BY  MOUTH TWICE A DAY (Patient taking differently: Take 1 tablet by mouth Every Other Day. Only takes when take the lasix.)    levothyroxine (SYNTHROID, LEVOTHROID) 100 MCG tablet TAKE 1 TABLET BY MOUTH DAILY    loperamide (IMODIUM) 2 MG capsule Take 1 capsule by mouth 4 (Four) Times a Day As Needed for Diarrhea.    Magnesium Oxide -Mg Supplement 400 (240 Mg) MG tablet TAKE 1 TABLET BY MOUTH TWICE A DAY    metoprolol tartrate (LOPRESSOR) 25 MG tablet TAKE ONE TABLET BY MOUTH EVERY 12 HOURS    montelukast (SINGULAIR) 10 MG tablet TAKE ONE TABLET BY MOUTH DAILY    Multiple Vitamin tablet Take 1 tablet by mouth Daily.    Omega 3-6-9 capsule Take  by mouth.    pantoprazole (PROTONIX) 40 MG EC tablet TAKE 1 TABLET BY MOUTH DAILY    prednisoLONE acetate (PRED FORTE) 1 % ophthalmic suspension Administer 2 drops into the left eye Daily.    promethazine (PHENERGAN) 25 MG tablet Take 1 tablet by mouth Every 8 (Eight) Hours As Needed for Nausea or Vomiting.    saccharomyces boulardii (FLORASTOR) 250 MG capsule Take 1 capsule by mouth 2 (Two) Times a Day.    silver sulfadiazine (Silvadene) 1 % cream Apply 1 application  topically to the appropriate area as directed 2 (Two) Times a Day. Pressure ulcer on buttocks (Patient taking differently: Apply 1 application  topically to the appropriate area as directed 2 (Two) Times a Day As Needed. Pressure ulcer on buttocks)    Xarelto 20 MG tablet TAKE 1 TABLET BY MOUTH DAILY    [DISCONTINUED] escitalopram (LEXAPRO) 10 MG tablet Take 1 tablet by mouth Daily.    [DISCONTINUED] oxyCODONE ER (oxyCONTIN) 80 MG tablet extended-release 12 hour 12 hr tablet Take 2 tablets by mouth Every 12 (Twelve) Hours.    [DISCONTINUED] potassium chloride (KLOR-CON) 20 MEQ packet Take 20 mEq by mouth Daily.     No current facility-administered medications on file prior to visit.       Immunization History   Administered Date(s) Administered    COVID-19 (PFIZER) BIVALENT 12+YRS 10/05/2022    COVID-19 (PFIZER)  "Purple Cap Monovalent 02/23/2021, 03/16/2021, 08/25/2021    FluMist 2-49yrs 09/22/2014    Fluad Quad 65+ 10/17/2023    Fluzone High Dose =>65 Years ([employer] ONLY) 10/17/2017, 10/08/2019, 09/02/2020, 09/20/2021, 09/21/2022    Fluzone High-Dose 65+yrs 09/12/2020, 09/20/2021, 09/21/2022    Influenza, Unspecified 10/29/2021    Pneumococcal Conjugate 13-Valent (PCV13) 08/08/2017    Pneumococcal Polysaccharide (PPSV23) 09/06/2019    Shingrix 04/29/2023    Tdap 08/08/2017, 12/21/2023       Review of Systems   Constitutional:  Negative for activity change, appetite change, chills, fatigue and fever.   HENT:  Negative for congestion, ear pain, rhinorrhea and sore throat.    Respiratory:  Negative for cough and shortness of breath.    Cardiovascular:  Negative for chest pain, palpitations and leg swelling.   Gastrointestinal:  Negative for abdominal pain, constipation, diarrhea, nausea and vomiting.   Musculoskeletal:  Negative for arthralgias and myalgias.   Neurological:  Negative for headache.        Objective     /63 (BP Location: Right arm, Patient Position: Sitting)   Pulse 52   Ht 154.9 cm (61\")   Wt 83.7 kg (184 lb 9.6 oz)   BMI 34.88 kg/m²       Physical Exam  Vitals and nursing note reviewed.   Constitutional:       General: She is not in acute distress.     Appearance: Normal appearance.   Cardiovascular:      Rate and Rhythm: Normal rate and regular rhythm.      Heart sounds: Normal heart sounds. No murmur heard.  Pulmonary:      Effort: Pulmonary effort is normal.      Breath sounds: Normal breath sounds.   Abdominal:      Palpations: Abdomen is soft.      Tenderness: There is no abdominal tenderness.   Musculoskeletal:      Cervical back: Neck supple.      Right lower leg: No edema.      Left lower leg: No edema.   Lymphadenopathy:      Cervical: No cervical adenopathy.   Neurological:      General: No focal deficit present.      Mental Status: She is alert.      Cranial Nerves: No cranial nerve " deficit.      Coordination: Coordination normal.      Gait: Gait normal.   Psychiatric:         Mood and Affect: Mood normal.         Behavior: Behavior normal.         Result Review :                             Assessment and Plan      Diagnoses and all orders for this visit:    1. GERD without esophagitis (Primary)  Assessment & Plan:  IMPROVED WITH CURRENT TREATMENT, CONTINUE SAME, WILL REEVALUATE AT NEXT VISIT       2. Allergic rhinitis, unspecified seasonality, unspecified trigger  -     Allergy Serum Injection    3. Low serum iron (Hematology)  Assessment & Plan:  LABS REVIEWED, PLANS PER HEMATOLOGY       4. Mild intermittent asthma, uncomplicated  Assessment & Plan:  Asthma is improving with treatment.  The patient is experiencing no daytime asthma symptoms. She is experiencing no nighttime asthma symptoms.  Discussed monitoring symptoms and use of quick-relief medications and contacting us early in the course of exacerbations.          5. Venous insufficiency  Assessment & Plan:  IMPROVED WITH CURRENT TREATMENT, CONTINUE SAME, WILL REEVALUATE AT NEXT VISIT               Follow Up     Return in about 6 months (around 7/18/2024).    Patient was given instructions and counseling regarding her condition or for health maintenance advice. Please see specific information pulled into the AVS if appropriate.

## 2024-01-19 ENCOUNTER — TELEMEDICINE (OUTPATIENT)
Dept: PAIN MEDICINE | Facility: CLINIC | Age: 74
End: 2024-01-19
Payer: MEDICARE

## 2024-01-19 DIAGNOSIS — M51.36 DEGENERATION OF INTERVERTEBRAL DISC OF LUMBAR REGION: ICD-10-CM

## 2024-01-19 DIAGNOSIS — M54.16 LUMBAR RADICULOPATHY: Primary | ICD-10-CM

## 2024-01-19 DIAGNOSIS — M54.16 LUMBAR RADICULOPATHY: ICD-10-CM

## 2024-01-19 DIAGNOSIS — Z79.899 ENCOUNTER FOR LONG-TERM (CURRENT) USE OF HIGH-RISK MEDICATION: ICD-10-CM

## 2024-01-19 DIAGNOSIS — M15.9 GENERALIZED OSTEOARTHRITIS: ICD-10-CM

## 2024-01-19 DIAGNOSIS — G89.4 CHRONIC PAIN SYNDROME: ICD-10-CM

## 2024-01-19 DIAGNOSIS — M06.9 RHEUMATOID ARTHRITIS, INVOLVING UNSPECIFIED SITE, UNSPECIFIED WHETHER RHEUMATOID FACTOR PRESENT: ICD-10-CM

## 2024-01-19 DIAGNOSIS — C16.9 GASTRIC ADENOCARCINOMA: ICD-10-CM

## 2024-01-19 PROCEDURE — 1159F MED LIST DOCD IN RCRD: CPT

## 2024-01-19 PROCEDURE — 1125F AMNT PAIN NOTED PAIN PRSNT: CPT

## 2024-01-19 PROCEDURE — 99214 OFFICE O/P EST MOD 30 MIN: CPT

## 2024-01-19 PROCEDURE — 1160F RVW MEDS BY RX/DR IN RCRD: CPT

## 2024-01-19 NOTE — PROGRESS NOTES
TELEMEDICINE - VIDEO VISIT  You have chosen to receive care through a telehealth visit.  Do you consent to use a video/audio connection for your medical care today? Yes    Location of patient: Home  Location of Provider: [facility]  Anyone else present: No  Type of Technology used- ZOOM through use of Epic/[employer] visit    CHIEF COMPLAINT  Back and joint pain    Subjective   Anne Jewell is a 73 y.o. female  who presents for a video visit follow-up.She has a history of chronic back and joint pain. She reports that her pain has remained consistent since her last office visit. Pain varies based on activity level and weather changes.     Today pain is 7/10VAS in severity. Pain is located in her low back and radiates to bilateral feet. Describes this pain as a nearly continuous aching. Pain is worsened by rising from sitting to standing position, stress, weather changes, prolonged position, and walking long distances. Pain improves with rest/reposition, HEP, aqua therapy, heat/ice, and medications. She is currently receiving at home OT and PT.     Continues with OxyContin 80mg 2 tablets BID and Baclofen 10mg 2-3/day (managed by PCP). Denies any side effects from the regimen, including constipation and somnolence. The regimen helps decrease pain by a significant amount. Notes improvement in activity and function with regimen. ADL's by self. Denies any bowel or bladder changes. Patient reports that she takes Xanax 2mg TID as needed. This is prescribed by psychiatrist.      She was admitted to the hospital from 8/25/23 - 9/15/23 where she was diagnosed with adenocarcinoma. She was seen by Dr. Stuart Borges on 9/19/23 with [facility].  PET scan on 10/6/23 showed no evidence of metastatic disease. No plans for chemotherapy at this time per office note from Dr. Slava Loo from 10/12/23. She was started on Dronabinol in September by her hematologist/oncologist to help stimulate her  appetite and for N/V.      Back Pain  This is a chronic problem. The current episode started more than 1 year ago. The problem occurs constantly. The problem is unchanged (unchanged since last office visit). The pain is present in the lumbar spine. The quality of the pain is described as aching. The pain radiates to the right foot and left foot. The pain is at a severity of 7/10. The pain is moderate. The symptoms are aggravated by standing, position and sitting (rising from sitting to standing, prolonged position, walking long distances, stress, weather changes). Associated symptoms include abdominal pain and weakness. Pertinent negatives include no chest pain, dysuria, fever, headaches or numbness. She has tried analgesics, home exercises, heat and ice (aqua therapy) for the symptoms. The treatment provided moderate relief.   Joint Pain  This is a chronic (7/10 VAS in severity) problem. The current episode started more than 1 year ago. The problem occurs constantly. The problem has been unchanged (unchanged since last office visit). Associated symptoms include abdominal pain, arthralgias, myalgias, neck pain and weakness. Pertinent negatives include no chest pain, chills, congestion, coughing, fatigue, fever, headaches, numbness, vertigo or vomiting. The symptoms are aggravated by stress and exertion (weather changes, increased physical activity,). She has tried oral narcotics, position changes, rest, heat and ice for the symptoms. The treatment provided mild relief.     The following portions of the patient's history were reviewed and updated as appropriate: allergies, current medications, past family history, past medical history, past social history, past surgical history, and problem list.    Review of Systems   Constitutional:  Negative for chills, fatigue and fever.   HENT:  Negative for congestion.    Respiratory:  Negative for cough.    Cardiovascular:  Negative for chest pain.   Gastrointestinal:   Positive for abdominal pain. Negative for vomiting.   Genitourinary:  Negative for dysuria.   Musculoskeletal:  Positive for arthralgias, back pain, myalgias and neck pain.   Neurological:  Positive for weakness. Negative for vertigo, numbness and headaches.     I have reviewed and confirmed the accuracy of the ROS as documented by the MA/LPN/RN Sara Gr, APRN    Vitals:    01/19/24 1307   PainSc:   7   PainLoc: Back     Objective   Physical Exam  Constitutional:       Appearance: Normal appearance.   HENT:      Head: Normocephalic.   Cardiovascular:      Rate and Rhythm: Normal rate.   Musculoskeletal:      Cervical back: Normal range of motion.   Skin:     Capillary Refill: Capillary refill takes less than 2 seconds.   Neurological:      General: No focal deficit present.      Mental Status: She is alert and oriented to person, place, and time.   Psychiatric:         Mood and Affect: Mood normal.         Behavior: Behavior normal.         Thought Content: Thought content normal.         Cognition and Memory: Cognition normal.       Assessment & Plan   Diagnoses and all orders for this visit:    1. Lumbar radiculopathy (Primary)    2. Chronic pain syndrome    3. Rheumatoid arthritis, involving unspecified site, unspecified whether rheumatoid factor present    4. Generalized osteoarthritis    5. Degeneration of intervertebral disc of lumbar region    6. Gastric adenocarcinoma    7. Encounter for long-term (current) use of high-risk medication    ----------------  Our practice is offering alternative &/or electronic methods to continue to follow our patients while at the same time further the efforts toward social distancing, in accordance with our organizational policies, professional societies' guidance, and gubernatorial mandates.  I support the Healthy at Home campaign and in this visit I have counseled the patient on our needs to limit in-person office visits and physical encounters with medical  facilities whenever possible.  I have also educated the patient on the medical necessities of maintaining social distancing while we continue to function during this crisis period.      The patient agreed to a Video Visit.  ----------------  --- The urine drug screen confirmation from 11/10/23 has been reviewed and the result is appropriate based on patient history and VICENTA report  --- The patient signed an updated copy of the controlled substance agreement on 11/20/23  --- Continue OxyContin. DNF 1/26/24. Patient appears stable with current regimen. No adverse effects. Regarding continuation of opioids, there is no evidence of aberrant behavior or any red flags.  The patient continues with appropriate response to opioid therapy. ADL's remain intact by self.   --- Narcan prescription sent to pharmacy on 9/21/23  --- PHQ-2 assessment at next office visit.   --- Follow-up 1 month     VICENTA REPORT  As part of the patient's treatment plan, I am prescribing controlled substances. The patient has been made aware of appropriate use of such medications, including potential risk of somnolence, limited ability to drive and/or work safely, and the potential for dependence or overdose. It has also been made clear that these medications are for use by this patient only, without concomitant use of alcohol or other substances unless prescribed.     Patient has completed prescribing agreement detailing terms of continued prescribing of controlled substances, including monitoring VICENTA reports, urine drug screening, and pill counts if necessary. The patient is aware that inappropriate use will results in cessation of prescribing such medications.    As the clinician, I personally reviewed the VICENTA from 1/19/24 while the patient was in the office today.    History and physical exam exhibit continued safe and appropriate use of controlled substances.  -------  EMR Dragon/Transcription disclaimer:   Much of this encounter note is  an electronic transcription/translation of spoken language to printed text. The electronic translation of spoken language may permit erroneous, or at times, nonsensical words or phrases to be inadvertently transcribed; Although I have reviewed the note for such errors, some may still exist.      This document is intended for medical expert use only. Reading of this document by patients and/or patient's family without participating medical staff guidance may result in misinterpretation and unintended morbidity.   Any interpretation of such data is the responsibility of the patient and/or family member responsible for the patient in concert with their primary or specialist providers, not to be left for sources of online searches such as [employer], [employer] or similar queries. Relying on these approaches to knowledge may result in misinterpretation, misguided goals of care and even death should patients or family members try recommendations outside of the realm of professional medical care in a supervised way.

## 2024-01-22 ENCOUNTER — CLINICAL SUPPORT (OUTPATIENT)
Dept: FAMILY MEDICINE CLINIC | Age: 74
End: 2024-01-22
Payer: MEDICARE

## 2024-01-22 ENCOUNTER — HOSPITAL ENCOUNTER (OUTPATIENT)
Dept: MAMMOGRAPHY | Facility: HOSPITAL | Age: 74
Discharge: HOME | End: 2024-01-22
Admitting: NURSE PRACTITIONER
Payer: MEDICARE

## 2024-01-22 DIAGNOSIS — G89.4 CHRONIC PAIN SYNDROME: Chronic | ICD-10-CM

## 2024-01-22 DIAGNOSIS — Z12.31 BREAST CANCER SCREENING BY MAMMOGRAM: ICD-10-CM

## 2024-01-22 DIAGNOSIS — J30.9 ALLERGIC RHINITIS, UNSPECIFIED SEASONALITY, UNSPECIFIED TRIGGER: Primary | ICD-10-CM

## 2024-01-22 DIAGNOSIS — R11.0 NAUSEA: ICD-10-CM

## 2024-01-22 PROCEDURE — 95115 IMMUNOTHERAPY ONE INJECTION: CPT | Performed by: FAMILY MEDICINE

## 2024-01-22 PROCEDURE — 77063 BREAST TOMOSYNTHESIS BI: CPT

## 2024-01-22 PROCEDURE — 77067 SCR MAMMO BI INCL CAD: CPT

## 2024-01-22 NOTE — TELEPHONE ENCOUNTER
Rx Refill Note  Requested Prescriptions     Pending Prescriptions Disp Refills    promethazine (PHENERGAN) 25 MG tablet [Pharmacy Med Name: PROMETHAZINE 25 MG TABLET] 90 tablet 0     Sig: TAKE 1 TABLET BY MOUTH EVERY 8 HOURS AS NEEDED FOR NAUSEA AND/OR VOMITING      Last office visit with prescribing clinician: 1/18/2024   Last telemedicine visit with prescribing clinician: Visit date not found   Next office visit with prescribing clinician: 7/18/2024  Last refill sent: 12/22/23, #90    Vaishnavi Keller LPN  01/22/24, 15:09 EST

## 2024-01-23 RX ORDER — PROMETHAZINE HYDROCHLORIDE 25 MG/1
TABLET ORAL
Qty: 90 TABLET | Refills: 0 | Status: SHIPPED | OUTPATIENT
Start: 2024-01-23

## 2024-01-24 ENCOUNTER — OUTSIDE FACILITY SERVICE (OUTPATIENT)
Dept: FAMILY MEDICINE CLINIC | Age: 74
End: 2024-01-24
Payer: MEDICARE

## 2024-01-24 PROBLEM — D50.0 IRON DEFICIENCY ANEMIA DUE TO CHRONIC BLOOD LOSS: Status: ACTIVE | Noted: 2023-11-29

## 2024-01-25 ENCOUNTER — ANESTHESIA (OUTPATIENT)
Dept: GASTROENTEROLOGY | Facility: HOSPITAL | Age: 74
End: 2024-01-25
Payer: MEDICARE

## 2024-01-25 ENCOUNTER — HOSPITAL ENCOUNTER (OUTPATIENT)
Facility: HOSPITAL | Age: 74
Setting detail: HOSPITAL OUTPATIENT SURGERY
Discharge: HOME | End: 2024-01-25
Attending: SURGERY | Admitting: SURGERY
Payer: MEDICARE

## 2024-01-25 ENCOUNTER — ANESTHESIA EVENT (OUTPATIENT)
Dept: GASTROENTEROLOGY | Facility: HOSPITAL | Age: 74
End: 2024-01-25
Payer: MEDICARE

## 2024-01-25 VITALS
OXYGEN SATURATION: 97 % | HEIGHT: 61 IN | BODY MASS INDEX: 32.85 KG/M2 | WEIGHT: 174 LBS | SYSTOLIC BLOOD PRESSURE: 134 MMHG | RESPIRATION RATE: 18 BRPM | DIASTOLIC BLOOD PRESSURE: 75 MMHG | HEART RATE: 57 BPM

## 2024-01-25 DIAGNOSIS — D50.0 IRON DEFICIENCY ANEMIA DUE TO CHRONIC BLOOD LOSS: ICD-10-CM

## 2024-01-25 DIAGNOSIS — Z85.028 HISTORY OF STOMACH CANCER: ICD-10-CM

## 2024-01-25 PROCEDURE — 43235 EGD DIAGNOSTIC BRUSH WASH: CPT | Performed by: SURGERY

## 2024-01-25 PROCEDURE — 45385 COLONOSCOPY W/LESION REMOVAL: CPT | Performed by: SURGERY

## 2024-01-25 PROCEDURE — 45380 COLONOSCOPY AND BIOPSY: CPT | Performed by: SURGERY

## 2024-01-25 PROCEDURE — S0260 H&P FOR SURGERY: HCPCS | Performed by: SURGERY

## 2024-01-25 PROCEDURE — 25010000002 PROPOFOL 10 MG/ML EMULSION

## 2024-01-25 PROCEDURE — 88305 TISSUE EXAM BY PATHOLOGIST: CPT | Performed by: SURGERY

## 2024-01-25 RX ORDER — PROPOFOL 10 MG/ML
VIAL (ML) INTRAVENOUS CONTINUOUS PRN
Status: DISCONTINUED | OUTPATIENT
Start: 2024-01-25 | End: 2024-01-25 | Stop reason: SURG

## 2024-01-25 RX ORDER — LIDOCAINE HYDROCHLORIDE 20 MG/ML
INJECTION, SOLUTION INFILTRATION; PERINEURAL AS NEEDED
Status: DISCONTINUED | OUTPATIENT
Start: 2024-01-25 | End: 2024-01-25 | Stop reason: SURG

## 2024-01-25 RX ORDER — SODIUM CHLORIDE, SODIUM LACTATE, POTASSIUM CHLORIDE, CALCIUM CHLORIDE 600; 310; 30; 20 MG/100ML; MG/100ML; MG/100ML; MG/100ML
30 INJECTION, SOLUTION INTRAVENOUS CONTINUOUS PRN
Status: DISCONTINUED | OUTPATIENT
Start: 2024-01-25 | End: 2024-01-25 | Stop reason: HOSPADM

## 2024-01-25 RX ADMIN — PROPOFOL 120 MCG/KG/MIN: 10 INJECTION, EMULSION INTRAVENOUS at 10:01

## 2024-01-25 RX ADMIN — LIDOCAINE HYDROCHLORIDE 60 MG: 20 INJECTION, SOLUTION INFILTRATION; PERINEURAL at 10:01

## 2024-01-25 NOTE — H&P
[facility]   HISTORY AND PHYSICAL    Patient Name: Anne Jewell  : 1950  MRN: [id number]  Primary Care Physician:  Jameson Dean MD  Date of admission: 2024    Subjective   Subjective     Chief Complaint: History of gastric cancer and iron deficiency anemia    History of Present Illness    The patient is a pleasant 73-year-old female whose had a previous gastric bypass.  She developed choledocholithiasis and required an ERCP.  This necessitated a gastric access procedure for the ERCP due to the previous gastric bypass and a gastric mass was noted during the gastric access procedure.  She underwent a distal gastrectomy with the pathology returning as adenocarcinoma.  She was then taken back to surgery to decompress her remnant stomach by anastomosing it to the Buster limb used for the gastric bypass.  She has recovered well and is eating reasonably well.  She does have longstanding iron deficiency anemia that requires evaluation.  She presents for EGD and colonoscopy.    Review of Systems   Constitutional:  Negative for fatigue and fever.   Respiratory:  Negative for chest tightness and shortness of breath.    Cardiovascular:  Negative for chest pain and palpitations.   Gastrointestinal:  Negative for abdominal pain, blood in stool, constipation, diarrhea, nausea and vomiting.        Personal History     Past Medical History:   Diagnosis Date    Allergic rhinitis     Anemia     Arthritis of back     Arthritis of neck     Asthma     Broken nose     Bronchospasm     Cancer     Adenocarcinoma of the stomach    Carpal tunnel syndrome, bilateral     Cervical disc disorder     Clotting disorder     Colon polyp Don’t remember    Years ago    Deep vein thrombosis     Disc degeneration, lumbar     Edema     Esophageal reflux     Glaucoma     Hip arthrosis     History of transfusion     Joint pain     Kidney disease 2018    stage 3    Liver disease 2018    stage 3    Low back pain     Osteoarthritis      Osteopenia     Osteoporosis     Oxygen dependent     AT NIGHT    Periarthritis of shoulder     Scoliosis     Skin abrasion     LEFT ARM FIRST LAYER OF SKIN GONE, WRAPPED BY VISITING NURSE    Status post total knee replacement, left 08/31/2017    Status post total knee replacement, right 08/31/2017    Stomach cancer Sept 2023    UTI (urinary tract infection)     Venous thrombosis        Past Surgical History:   Procedure Laterality Date    BILATERAL BREAST REDUCTION      BLADDER SUSPENSION      vaginal sling operation for stress incontinence    COLONOSCOPY  08/05/2016    NORMAL    ERCP N/A 08/30/2023    Procedure: ENDOSCOPIC RETROGRADE CHOLANGIOPANCREATOGRAPHY;  Surgeon: Elijah Simon MD;  Location: [facility] ENDOSCOPY;  Service: Gastroenterology;  Laterality: N/A;  cbd stone    ERCP N/A 09/01/2023    Procedure: ENDOSCOPIC RETROGRADE CHOLANGIOPANCREATOGRAPHY WITH CHOLANGIOGRAM, SPHINCTEROTOMY, BALLOON SWEEP;  Surgeon: Elijah Simon MD;  Location: [facility] OR;  Service: Gastroenterology;  Laterality: N/A;    EXPLORATORY LAPAROTOMY N/A 09/01/2023    DIATAL GASTRECTOMY    GASTRECTOMY N/A 09/08/2023    Procedure: EXPLORATORY LAPAROTOMY WITH GASTROJEJUNOSTOMY;  Surgeon: Slava Loo Jr., MD;  Location: [facility];  Service: General;  Laterality: N/A;    GASTRIC BYPASS      HAND SURGERY Right 01/14/2016    HEMORRHOIDECTOMY      HERNIA REPAIR      x7    HYSTERECTOMY      TOTAL KNEE ARTHROPLASTY Bilateral        Family History: family history includes Arthritis in an other family member; Cancer in an other family member; Diabetes in an other family member; Heart disease in an other family member; Hypertension in an other family member; Nephrolithiasis in an other family member; Prostate cancer in her father. Otherwise pertinent FHx was reviewed and not pertinent to current issue.    Social History:  reports that she quit smoking about 18 years ago. Her smoking use included cigarettes. She has a 12.50  pack-year smoking history. She has been exposed to tobacco smoke. She has never used smokeless tobacco. She reports that she does not drink alcohol and does not use drugs.    Home Medications:  ALPRAZolam, B-12, Calcium Citrate-Vitamin D3, EPINEPHrine, Magnesium Oxide -Mg Supplement, Omega 3-6-9, Vitamin D3, albuterol sulfate HFA, amiodarone, baclofen, chlorhexidine, dicyclomine, dronabinol, escitalopram, furosemide, levothyroxine, loperamide, metoprolol tartrate, montelukast, multivitamin, oxyCODONE ER, pantoprazole, potassium chloride, promethazine, rivaroxaban, saccharomyces boulardii, and silver sulfadiazine    Allergies:  Allergies   Allergen Reactions    Penicillins Hives       Objective    Objective     Vitals:   Heart Rate:  [52] 52  Resp:  [16] 16  BP: (131)/(69) 131/69    Physical Exam  Constitutional:       Appearance: Normal appearance. She is well-developed. She is not toxic-appearing.   Eyes:      General: No scleral icterus.  Pulmonary:      Effort: Pulmonary effort is normal. No respiratory distress.   Abdominal:      Palpations: Abdomen is soft.      Tenderness: There is no abdominal tenderness.   Skin:     General: Skin is warm and dry.   Neurological:      Mental Status: She is alert and oriented to person, place, and time.   Psychiatric:         Behavior: Behavior normal.         Thought Content: Thought content normal.         Judgment: Judgment normal.           Assessment & Plan   Assessment / Plan     Brief Patient Summary:  Anne Jewell is a 73 y.o. female who has history of gastric cancer and iron deficiency anemia.    Active Hospital Problems:  Active Hospital Problems    Diagnosis     **Iron deficiency anemia due to chronic blood loss     History of stomach cancer      Plan: EGD and colonoscopy.  The patient understands the indications, alternatives, risks, and benefits of the procedure and wishes to proceed.       Slava Loo Jr., MD

## 2024-01-25 NOTE — ANESTHESIA PREPROCEDURE EVALUATION
Anesthesia Evaluation     Patient summary reviewed and Nursing notes reviewed                Airway   Mallampati: II  TM distance: >3 FB  Neck ROM: full  Dental    (+) edentulous    Pulmonary    (+) a smoker Former, COPD, asthma,home oxygen  Cardiovascular     ECG reviewed  PT is on anticoagulation therapy  Patient on routine beta blocker  Rhythm: regular  Rate: normal    (+) dysrhythmias PVC, Paroxysmal Atrial Fib, DVT      Neuro/Psych  (+) numbness, psychiatric history Anxiety and Depression  GI/Hepatic/Renal/Endo    (+) obesity, GERD, liver disease, renal disease- CRI, thyroid problem hypothyroidism    Musculoskeletal     Abdominal    Substance History - negative use     OB/GYN negative ob/gyn ROS         Other   arthritis,   history of cancer                  Anesthesia Plan    ASA 4     MAC     (Broken nose  Home oxygen  COPD  edentulous)  intravenous induction     Anesthetic plan, risks, benefits, and alternatives have been provided, discussed and informed consent has been obtained with: patient and spouse/significant other.    Plan discussed with CRNA.    CODE STATUS:

## 2024-01-25 NOTE — ANESTHESIA POSTPROCEDURE EVALUATION
Patient: Anne Jewell    Procedure Summary       Date: 01/25/24 Room / Location: [facility] ENDOSCOPY 1 / [facility] ENDOSCOPY    Anesthesia Start: 0958 Anesthesia Stop: 1105    Procedures:       ESOPHAGOGASTRODUODENOSCOPY (Esophagus)      COLONOSCOPY to cecum w/cold biopsis/polypectomies with hot snare and cold biopsy forceps Diagnosis:       History of stomach cancer      Iron deficiency anemia due to chronic blood loss      (History of stomach cancer [Z85.028])      (Iron deficiency anemia due to chronic blood loss [D50.0])    Surgeons: Slava Loo Jr., MD Provider: Kenneth Bender MD    Anesthesia Type: MAC ASA Status: 4            Anesthesia Type: MAC    Vitals  Vitals Value Taken Time   /76 01/25/24 1134   Temp     Pulse 57 01/25/24 1138   Resp 18 01/25/24 1125   SpO2 97 % 01/25/24 1138   Vitals shown include unfiled device data.        Post Anesthesia Care and Evaluation    Patient location during evaluation: PACU  Patient participation: complete - patient participated  Level of consciousness: awake and alert  Pain management: adequate    Airway patency: patent  Anesthetic complications: No anesthetic complications    Cardiovascular status: acceptable  Respiratory status: acceptable  Hydration status: acceptable    Comments: --------------------            01/25/24               1125     --------------------   BP:       134/75     Pulse:      57       Resp:       18       SpO2:      97%      --------------------

## 2024-01-25 NOTE — DISCHARGE INSTRUCTIONS
For the next 24 hours patient needs to be with a responsible adult.    For 24 hours DO NOT drive, operate machinery, appliances, drink alcohol, make important decisions or sign legal documents.    Start with a light or bland diet if you are feeling sick to your stomach otherwise advance to regular diet as tolerated.    Follow recommendations on procedure report if provided by your doctor.    Call Dr Loo for problems [phone].  Office will call with biopsy results within 1-2 weeks.  Restart Xarelto on Sunday    Problems may include but not limited to: large amounts of bleeding, trouble breathing, repeated vomiting, severe unrelieved pain, fever or chills.

## 2024-01-25 NOTE — OP NOTE
Surgeon:     Slava Loo Jr., M.D.    Preoperative Diagnosis:     1.  History of gastric cancer  2.  Iron deficiency anemia    Postoperative Diagnosis:     1.  Normal postop gastric bypass  2.  Colon polyps  Procedure Performed:     1.  EGD  2.  Colonoscopy with hot snare polypectomy    Indications:     The patient is a pleasant 73-year-old female who had a previous gastric bypass and has a history of gastric adenocarcinoma.  She also has chronic iron deficiency anemia.  She presents for EGD and colonoscopy.  The patient understands the indications, alternatives, risks, and benefits of the procedure and wishes to proceed.    Procedure:     The patient was identified, taken to the endoscopy suite, and place in the left side down decubitus procedure.  The patient underwent a MAC anesthesia and was appropriately monitored through the case by the anesthesia personnel.  Attention was turned to the EGD.  A bite-block was placed.  The Scope was then draped in the oropharynx advanced very carefully on the esophagus which was normal.  It was advanced into the gastric pouch and down the jejunal limb without difficulty.  Access to the remnant stomach could not be established through the Buster limb.  The scope was withdrawn.  The patient tolerated the procedure well.  Attention was then turned to the colonoscopy.  A rectal exam was performed that was normal.  The colonoscope was introduced into the rectum and advanced very carefully to the cecum that was identified by the cecal strap, ileocecal valve, and the appendiceal orifice.  The scope was then slowly withdrawn with careful circumferential examination of the mucosa performed.  The bowel prep was good allowing adequate visualization of mucosal surfaces.  The scope was withdrawn.  The patient tolerated the procedure well and was transferred the recovery area in stable condition.     Findings:    The esophagus and gastric pouch from the gastric bypass had a normal  appearance.  The jejunal limb from the Buster-en-Y was also normal in appearance.  Despite careful evaluation access to the remnant stomach through the gastrojejunostomy could not be established in the remnant stomach was not evaluated.    There were 4 separate polyps in the colon including a 4 mm polyp in the right colon that was removed by cold biopsy forceps and retrieved.  There was a thickened fold that had an adenomatous appearance in the transverse colon and multiple biopsies were taken with cold biopsy forceps and retrieved.  There was also a 6 mm polyp in the transverse colon was removed by hot snare polypectomy and retrieved.  There was a 4 mm polyp in the descending colon that was removed by cold biopsy forceps and retrieved.    Recommendations:     1.  Evaluation of present stomach was not possible based on anatomic considerations.  Surveillance will have to be performed by either CT scans of the abdomen and pelvis or PET scans.  2.  Await pathology from the polypectomies.  3.  Repeat colonoscopy in 5 years.    Slava Loo Jr., M.D.

## 2024-01-26 LAB
LAB AP CASE REPORT: NORMAL
PATH REPORT.FINAL DX SPEC: NORMAL
PATH REPORT.GROSS SPEC: NORMAL

## 2024-01-31 ENCOUNTER — TELEPHONE (OUTPATIENT)
Dept: PAIN MEDICINE | Facility: CLINIC | Age: 74
End: 2024-01-31
Payer: MEDICARE

## 2024-01-31 ENCOUNTER — CLINICAL SUPPORT (OUTPATIENT)
Dept: FAMILY MEDICINE CLINIC | Age: 74
End: 2024-01-31
Payer: MEDICARE

## 2024-01-31 DIAGNOSIS — J30.9 ALLERGIC RHINITIS, UNSPECIFIED SEASONALITY, UNSPECIFIED TRIGGER: Primary | ICD-10-CM

## 2024-01-31 PROCEDURE — 95115 IMMUNOTHERAPY ONE INJECTION: CPT | Performed by: FAMILY MEDICINE

## 2024-01-31 NOTE — TELEPHONE ENCOUNTER
RONNY she's been on this for longer than I have been treating her.  Ask her what she might have taken in the past when she was treating with Dr. Feldman and Dr. Bashir.

## 2024-01-31 NOTE — TELEPHONE ENCOUNTER
Patients insurance is requiring a PA for her Oxycodone ER. Theya re requiring that she has tried Morphine ER caps or tabs and I am not seeing that she has anywhere in her chart. How would you like to proceed.

## 2024-02-01 NOTE — TELEPHONE ENCOUNTER
Hub staff attempted to follow warm transfer process and was unsuccessful     Caller: THERESA KHAN    Relationship to patient: SELF     Best call back number: [phone] (home)     Patient is needing: PT IS CALLING VALERIY BACK ABOUT INSURANCE AUTH.

## 2024-02-01 NOTE — TELEPHONE ENCOUNTER
She has not tried Morphine Er in the past. Do you want me to perform the PA and then appeal it since they gave her the 30 day supply? And see what happens.

## 2024-02-02 DIAGNOSIS — I48.0 PAROXYSMAL A-FIB: ICD-10-CM

## 2024-02-02 NOTE — TELEPHONE ENCOUNTER
Rx Refill Note  Requested Prescriptions     Pending Prescriptions Disp Refills    metoprolol tartrate (LOPRESSOR) 25 MG tablet 180 tablet 0     Sig: Take 1 tablet by mouth Every 12 (Twelve) Hours.      Last office visit with prescribing clinician: 1/18/2024   Last telemedicine visit with prescribing clinician: Visit date not found   Next office visit with prescribing clinician: 7/18/2024  COMPREHENSIVE METABOLIC PANEL (01/09/2024 12:34)     Vaishnavi Keller LPN  02/02/24, 16:48 EST

## 2024-02-05 DIAGNOSIS — K21.9 GASTROESOPHAGEAL REFLUX DISEASE, UNSPECIFIED WHETHER ESOPHAGITIS PRESENT: Chronic | ICD-10-CM

## 2024-02-05 RX ORDER — PANTOPRAZOLE SODIUM 40 MG/1
40 TABLET, DELAYED RELEASE ORAL DAILY
Qty: 90 TABLET | Refills: 0 | Status: SHIPPED | OUTPATIENT
Start: 2024-02-05

## 2024-02-06 ENCOUNTER — TELEPHONE (OUTPATIENT)
Dept: CARDIOLOGY | Facility: CLINIC | Age: 74
End: 2024-02-06
Payer: MEDICARE

## 2024-02-06 RX ORDER — AMIODARONE HYDROCHLORIDE 100 MG/1
100 TABLET ORAL EVERY OTHER DAY
Qty: 45 TABLET | Refills: 1 | Status: SHIPPED | OUTPATIENT
Start: 2024-02-06

## 2024-02-06 NOTE — TELEPHONE ENCOUNTER
NARGIS PT.  PT IS REQUESTING AMIODARONE 100 MG TABLETS TO TAKE EVERY OTHER DAY SO THAT SHE DOES NOT HAVE TO BREAK TABLETS IN HALF.  PLEASE ADVISE

## 2024-02-06 NOTE — TELEPHONE ENCOUNTER
[facility] received a fax that requires your attention. The document has been indexed to the patient’s chart for your review.      Reason for sending: EXTERNAL MEDICAL RECORD NOTIFICATION     Documents Description: MEDS-AMIODARONE REFILL-2.5.24    Name of Sender: BANDAR     Date Indexed: 2.5.24

## 2024-02-07 ENCOUNTER — CLINICAL SUPPORT (OUTPATIENT)
Dept: FAMILY MEDICINE CLINIC | Age: 74
End: 2024-02-07
Payer: MEDICARE

## 2024-02-07 DIAGNOSIS — J30.9 ALLERGIC RHINITIS, UNSPECIFIED SEASONALITY, UNSPECIFIED TRIGGER: Primary | ICD-10-CM

## 2024-02-07 PROCEDURE — 95115 IMMUNOTHERAPY ONE INJECTION: CPT | Performed by: FAMILY MEDICINE

## 2024-02-08 ENCOUNTER — PATIENT MESSAGE (OUTPATIENT)
Dept: CARDIOLOGY | Facility: CLINIC | Age: 74
End: 2024-02-08
Payer: MEDICARE

## 2024-02-09 ENCOUNTER — TELEPHONE (OUTPATIENT)
Dept: SURGERY | Facility: CLINIC | Age: 74
End: 2024-02-09
Payer: MEDICARE

## 2024-02-09 NOTE — TELEPHONE ENCOUNTER
Called and informed patient that polyps were benign and that she will repeat colonoscopy in 5 years. 5 year recall has been placed

## 2024-02-09 NOTE — TELEPHONE ENCOUNTER
----- Message from Slava Loo Jr., MD sent at 2/7/2024  3:16 PM EST -----  Please contact this patient to inform that the polyps are benign and a repeat colonoscopy is needed in 5 years.  Please place in recall for a colonoscopy in 5 years.  Thanks.

## 2024-02-14 ENCOUNTER — CLINICAL SUPPORT (OUTPATIENT)
Dept: FAMILY MEDICINE CLINIC | Age: 74
End: 2024-02-14
Payer: MEDICARE

## 2024-02-14 DIAGNOSIS — J30.9 ALLERGIC RHINITIS, UNSPECIFIED SEASONALITY, UNSPECIFIED TRIGGER: Primary | ICD-10-CM

## 2024-02-14 PROCEDURE — 95115 IMMUNOTHERAPY ONE INJECTION: CPT | Performed by: FAMILY MEDICINE

## 2024-02-21 ENCOUNTER — CLINICAL SUPPORT (OUTPATIENT)
Dept: FAMILY MEDICINE CLINIC | Age: 74
End: 2024-02-21
Payer: MEDICARE

## 2024-02-21 DIAGNOSIS — J30.9 ALLERGIC RHINITIS, UNSPECIFIED SEASONALITY, UNSPECIFIED TRIGGER: Primary | ICD-10-CM

## 2024-02-21 PROCEDURE — 95115 IMMUNOTHERAPY ONE INJECTION: CPT | Performed by: FAMILY MEDICINE

## 2024-02-22 ENCOUNTER — OFFICE VISIT (OUTPATIENT)
Dept: PAIN MEDICINE | Facility: CLINIC | Age: 74
End: 2024-02-22
Payer: MEDICARE

## 2024-02-22 VITALS
DIASTOLIC BLOOD PRESSURE: 76 MMHG | HEART RATE: 56 BPM | BODY MASS INDEX: 34.93 KG/M2 | OXYGEN SATURATION: 96 % | HEIGHT: 61 IN | SYSTOLIC BLOOD PRESSURE: 122 MMHG | WEIGHT: 185 LBS | TEMPERATURE: 97.1 F

## 2024-02-22 DIAGNOSIS — Z79.899 ENCOUNTER FOR LONG-TERM (CURRENT) USE OF HIGH-RISK MEDICATION: ICD-10-CM

## 2024-02-22 DIAGNOSIS — C16.9 GASTRIC ADENOCARCINOMA: ICD-10-CM

## 2024-02-22 DIAGNOSIS — G89.4 CHRONIC PAIN SYNDROME: ICD-10-CM

## 2024-02-22 DIAGNOSIS — M51.36 DEGENERATION OF INTERVERTEBRAL DISC OF LUMBAR REGION: ICD-10-CM

## 2024-02-22 DIAGNOSIS — M54.16 LUMBAR RADICULOPATHY: ICD-10-CM

## 2024-02-22 DIAGNOSIS — M06.9 RHEUMATOID ARTHRITIS, INVOLVING UNSPECIFIED SITE, UNSPECIFIED WHETHER RHEUMATOID FACTOR PRESENT: ICD-10-CM

## 2024-02-22 DIAGNOSIS — M54.16 LUMBAR RADICULOPATHY: Primary | ICD-10-CM

## 2024-02-22 DIAGNOSIS — M15.9 GENERALIZED OSTEOARTHRITIS: ICD-10-CM

## 2024-02-22 PROCEDURE — 99214 OFFICE O/P EST MOD 30 MIN: CPT

## 2024-02-22 PROCEDURE — 1125F AMNT PAIN NOTED PAIN PRSNT: CPT

## 2024-02-22 PROCEDURE — 1160F RVW MEDS BY RX/DR IN RCRD: CPT

## 2024-02-22 PROCEDURE — 1159F MED LIST DOCD IN RCRD: CPT

## 2024-02-22 RX ORDER — OXYCODONE HCL 80 MG/1
160 TABLET, FILM COATED, EXTENDED RELEASE ORAL EVERY 12 HOURS SCHEDULED
Qty: 120 TABLET | Refills: 0 | Status: SHIPPED | OUTPATIENT
Start: 2024-02-22

## 2024-02-22 NOTE — TELEPHONE ENCOUNTER
Patient seen in office today. Reviewed LOLIS and VICENTA (scanned into chart). Both updated and appropriate. Refill appropriate.  DNF 2/25/24. Please refill. Thanks.

## 2024-02-22 NOTE — PROGRESS NOTES
CHIEF COMPLAINT  Back and joint pain    Subjective   Anne Jewell is a 73 y.o. female  who presents for follow-up.  She has a history of chronic back and joint pain. She reports that her pain has remained consistent since her last office visit.     Today pain is 7/10VAS in severity. Pain is located in her low back and radiates to bilateral feet. Describes this pain as a nearly continuous aching. Pain is worsened by rising from sitting to standing position, stress, weather changes, prolonged position, and walking long distances. Pain improves with rest/reposition, HEP, aqua therapy, heat/ice, and medications. She is currently receiving at home OT and PT.     Continues with OxyContin 80mg 2 tablets BID and Baclofen 10mg 2-3/day (managed by PCP). Denies any side effects from the regimen, including constipation and somnolence. The regimen helps decrease pain by a significant amount. Notes improvement in activity and function with regimen. ADL's by self. Denies any bowel or bladder changes. Patient reports that she takes Xanax 2mg TID as needed. This is prescribed by psychiatrist.      She was admitted to the hospital from 8/25/23 - 9/15/23 where she was diagnosed with adenocarcinoma. She was seen by Dr. Stuart Borges on 9/19/23 with [facility].  PET scan on 10/6/23 showed no evidence of metastatic disease. No plans for chemotherapy at this time per office note from Dr. Slava Loo from 10/12/23. She was started on Dronabinol in September by her hematologist/oncologist to help stimulate her appetite and for N/V.      Back Pain  This is a chronic problem. The current episode started more than 1 year ago. The problem occurs constantly. The problem is unchanged (unchanged since last office visit). The pain is present in the lumbar spine. The quality of the pain is described as aching. The pain radiates to the right foot and left foot. The pain is at a severity of 8/10. The pain is moderate. The  symptoms are aggravated by standing, position and sitting (rising from sitting to standing, prolonged position, walking long distances, stress, weather changes). Associated symptoms include weakness. Pertinent negatives include no abdominal pain, chest pain, dysuria, fever, headaches or numbness. She has tried analgesics, home exercises, heat and ice (aqua therapy) for the symptoms. The treatment provided moderate relief.   Joint Pain  This is a chronic (8/10 VAS in severity) problem. The current episode started more than 1 year ago. The problem occurs constantly. The problem has been waxing and waning. Associated symptoms include arthralgias, myalgias, neck pain and weakness. Pertinent negatives include no abdominal pain, chest pain, chills, congestion, coughing, fatigue, fever, headaches, numbness, vertigo or vomiting. The symptoms are aggravated by stress and exertion (weather changes, increased physical activity,). She has tried oral narcotics, position changes, rest, heat and ice for the symptoms. The treatment provided mild relief.      PEG Assessment   What number best describes your pain on average in the past week?7  What number best describes how, during the past week, pain has interfered with your enjoyment of life?6  What number best describes how, during the past week, pain has interfered with your general activity?  6    The following portions of the patient's history were reviewed and updated as appropriate: allergies, current medications, past family history, past medical history, past social history, past surgical history, and problem list.    Review of Systems   Constitutional:  Negative for chills, fatigue and fever.   HENT:  Negative for congestion.    Respiratory:  Negative for cough and shortness of breath.    Cardiovascular:  Negative for chest pain.   Gastrointestinal:  Negative for abdominal pain, constipation, diarrhea and vomiting.   Genitourinary:  Negative for difficulty urinating and  "dysuria.   Musculoskeletal:  Positive for arthralgias, back pain, gait problem (ambulates with a walker), myalgias and neck pain.   Neurological:  Positive for weakness. Negative for dizziness, vertigo, light-headedness, numbness and headaches.     I have reviewed and confirmed the accuracy of the ROS as documented by the MA/LPN/RN EUGENIO Ayala    Vitals:    02/22/24 1259   BP: 122/76   BP Location: Left arm   Patient Position: Sitting   Pulse: 56   Temp: 97.1 °F (36.2 °C)   TempSrc: Temporal   SpO2: 96%   Weight: 83.9 kg (185 lb)   Height: 154.9 cm (61\")   PainSc:   8   PainLoc: Back     Objective   Physical Exam  Constitutional:       Appearance: Normal appearance.   HENT:      Head: Normocephalic.   Cardiovascular:      Rate and Rhythm: Normal rate and regular rhythm.   Pulmonary:      Effort: Pulmonary effort is normal.      Breath sounds: Normal breath sounds.   Musculoskeletal:      Right shoulder: Tenderness present.      Left shoulder: Tenderness present.      Right wrist: Tenderness present.      Left wrist: Tenderness present.      Right hand: Tenderness present.      Left hand: Tenderness present.      Cervical back: Normal range of motion.      Lumbar back: Tenderness present. Decreased range of motion.      Right hip: Tenderness present.      Left hip: Tenderness present.      Right knee: Tenderness present.      Left knee: Tenderness present.      Comments: Back brace for support in place   Skin:     General: Skin is warm and dry.      Capillary Refill: Capillary refill takes less than 2 seconds.   Neurological:      General: No focal deficit present.      Mental Status: She is alert and oriented to person, place, and time.      Gait: Gait abnormal (slowed, ambulating with rolling walker).   Psychiatric:         Mood and Affect: Mood normal.         Behavior: Behavior normal.         Thought Content: Thought content normal.         Cognition and Memory: Cognition normal.       Assessment & " Plan   Diagnoses and all orders for this visit:    1. Lumbar radiculopathy (Primary)    2. Chronic pain syndrome    3. Rheumatoid arthritis, involving unspecified site, unspecified whether rheumatoid factor present    4. Generalized osteoarthritis    5. Degeneration of intervertebral disc of lumbar region    6. Gastric adenocarcinoma    7. Encounter for long-term (current) use of high-risk medication      Anne Jewell reports a pain score of 8.  Given her pain assessment as noted, treatment options were discussed and the following options were decided upon as a follow-up plan to address the patient's pain: continuation of current treatment plan for pain and prescription for opiod analgesics.    --- The urine drug screen confirmation from 11/10/23 has been reviewed and the result is appropriate based on patient history and VICENTA report  --- The patient signed an updated copy of the controlled substance agreement on 11/20/23  --- Narcan prescription current   --- Continue OxyContin. DNF 2/25/24. Patient appears stable with current regimen. No adverse effects. Regarding continuation of opioids, there is no evidence of aberrant behavior or any red flags.  The patient continues with appropriate response to opioid therapy. ADL's remain intact by self.   --- Follow-up 1 month     VICENTA REPORT  As part of the patient's treatment plan, I am prescribing controlled substances. The patient has been made aware of appropriate use of such medications, including potential risk of somnolence, limited ability to drive and/or work safely, and the potential for dependence or overdose. It has also been made clear that these medications are for use by this patient only, without concomitant use of alcohol or other substances unless prescribed.     Patient has completed prescribing agreement detailing terms of continued prescribing of controlled substances, including monitoring VICENTA reports, urine drug screening, and pill counts if  necessary. The patient is aware that inappropriate use will results in cessation of prescribing such medications.    As the clinician, I personally reviewed the VICENTA from 2/22/24 while the patient was in the office today.    History and physical exam exhibit continued safe and appropriate use of controlled substances.    Dictated utilizing Dragon dictation.

## 2024-02-23 DIAGNOSIS — G89.4 CHRONIC PAIN SYNDROME: Chronic | ICD-10-CM

## 2024-02-23 DIAGNOSIS — R11.0 NAUSEA: ICD-10-CM

## 2024-02-24 RX ORDER — PROMETHAZINE HYDROCHLORIDE 25 MG/1
25 TABLET ORAL EVERY 8 HOURS PRN
Qty: 90 TABLET | Refills: 0 | Status: SHIPPED | OUTPATIENT
Start: 2024-02-24

## 2024-02-28 ENCOUNTER — CLINICAL SUPPORT (OUTPATIENT)
Dept: FAMILY MEDICINE CLINIC | Age: 74
End: 2024-02-28
Payer: MEDICARE

## 2024-02-28 DIAGNOSIS — J30.9 ALLERGIC RHINITIS, UNSPECIFIED SEASONALITY, UNSPECIFIED TRIGGER: Primary | ICD-10-CM

## 2024-02-28 PROCEDURE — 95115 IMMUNOTHERAPY ONE INJECTION: CPT | Performed by: FAMILY MEDICINE

## 2024-02-29 ENCOUNTER — HOSPITAL ENCOUNTER (OUTPATIENT)
Dept: GENERAL RADIOLOGY | Facility: HOSPITAL | Age: 74
Discharge: HOME | End: 2024-02-29
Admitting: PHYSICIAN ASSISTANT
Payer: MEDICARE

## 2024-02-29 ENCOUNTER — OFFICE VISIT (OUTPATIENT)
Dept: FAMILY MEDICINE CLINIC | Age: 74
End: 2024-02-29
Payer: MEDICARE

## 2024-02-29 VITALS
HEIGHT: 61 IN | BODY MASS INDEX: 35.27 KG/M2 | WEIGHT: 186.8 LBS | SYSTOLIC BLOOD PRESSURE: 96 MMHG | DIASTOLIC BLOOD PRESSURE: 61 MMHG | TEMPERATURE: 98.1 F | HEART RATE: 71 BPM

## 2024-02-29 DIAGNOSIS — R05.1 ACUTE COUGH: ICD-10-CM

## 2024-02-29 DIAGNOSIS — J18.9 PNEUMONIA OF LOWER LOBE DUE TO INFECTIOUS ORGANISM, UNSPECIFIED LATERALITY: Primary | ICD-10-CM

## 2024-02-29 PROCEDURE — 99213 OFFICE O/P EST LOW 20 MIN: CPT | Performed by: PHYSICIAN ASSISTANT

## 2024-02-29 PROCEDURE — 87428 SARSCOV & INF VIR A&B AG IA: CPT | Performed by: PHYSICIAN ASSISTANT

## 2024-02-29 PROCEDURE — 1159F MED LIST DOCD IN RCRD: CPT | Performed by: PHYSICIAN ASSISTANT

## 2024-02-29 PROCEDURE — 1160F RVW MEDS BY RX/DR IN RCRD: CPT | Performed by: PHYSICIAN ASSISTANT

## 2024-02-29 PROCEDURE — 71046 X-RAY EXAM CHEST 2 VIEWS: CPT

## 2024-02-29 RX ORDER — DOXYCYCLINE HYCLATE 100 MG/1
100 CAPSULE ORAL 2 TIMES DAILY
Qty: 20 CAPSULE | Refills: 0 | Status: SHIPPED | OUTPATIENT
Start: 2024-02-29 | End: 2024-03-10

## 2024-02-29 NOTE — PROGRESS NOTES
"Subjective     CHIEF COMPLAINT    Chief Complaint   Patient presents with    Cough            History of Present Illness  This is a 73-year-old female presenting to the clinic complaining of cough and postnasal drainage for the last 2 to 3 days.  She has also had headaches, which is somewhat unusual for her.  She reports \"I do not walk around all day coughing, I just have mucus in my throat that I need to cough up.\"  She states the mucus is \"coming from her lungs.\"  It is brown in appearance but denies any blood.  She is having to sleep with her head elevated some to avoid coughing.  She reports a history of pneumonia and states she is worried that she might have that again.            Review of Systems   Constitutional:  Negative for chills, fatigue and fever.   HENT:  Positive for postnasal drip. Negative for rhinorrhea and sore throat.    Respiratory:  Positive for cough. Negative for shortness of breath and wheezing.    Cardiovascular:  Negative for chest pain.   Gastrointestinal:  Negative for abdominal pain, diarrhea, nausea and vomiting.   Musculoskeletal:  Negative for myalgias.   Skin:  Negative for rash.   Neurological:  Positive for headaches.            Past Medical History:   Diagnosis Date    Allergic rhinitis     Anemia     Arthritis of back     Arthritis of neck     Asthma     Broken nose     Bronchospasm     Cancer     Adenocarcinoma of the stomach    Carpal tunnel syndrome, bilateral     Cervical disc disorder     Clotting disorder     Colon polyp Don’t remember    Years ago    Deep vein thrombosis     Disc degeneration, lumbar     Edema     Esophageal reflux     Glaucoma     Hip arthrosis     History of transfusion     Joint pain     Kidney disease 2018    stage 3    Liver disease 2018    stage 3    Low back pain     Osteoarthritis     Osteopenia     Osteoporosis     Oxygen dependent     AT NIGHT    Periarthritis of shoulder     Scoliosis     Skin abrasion     LEFT ARM FIRST LAYER OF SKIN GONE, " WRAPPED BY VISITING NURSE    Status post total knee replacement, left 08/31/2017    Status post total knee replacement, right 08/31/2017    Stomach cancer Sept 2023    UTI (urinary tract infection)     Venous thrombosis             Past Surgical History:   Procedure Laterality Date    BILATERAL BREAST REDUCTION      BLADDER SUSPENSION      vaginal sling operation for stress incontinence    COLONOSCOPY  08/05/2016    NORMAL    COLONOSCOPY N/A 1/25/2024    Procedure: COLONOSCOPY to cecum w/cold biopsis/polypectomies with hot snare and cold biopsy forceps;  Surgeon: Slava Loo Jr., MD;  Location: [facility] ENDOSCOPY;  Service: General;  Laterality: N/A;  pre  post   polyps    ENDOSCOPY N/A 1/25/2024    Procedure: ESOPHAGOGASTRODUODENOSCOPY;  Surgeon: Slava Loo Jr., MD;  Location: [facility] ENDOSCOPY;  Service: General;  Laterality: N/A;  pre gastric ca iron defiency anemia post wnl    ERCP N/A 08/30/2023    Procedure: ENDOSCOPIC RETROGRADE CHOLANGIOPANCREATOGRAPHY;  Surgeon: Elijah Simon MD;  Location: [facility] ENDOSCOPY;  Service: Gastroenterology;  Laterality: N/A;  cbd stone    ERCP N/A 09/01/2023    Procedure: ENDOSCOPIC RETROGRADE CHOLANGIOPANCREATOGRAPHY WITH CHOLANGIOGRAM, SPHINCTEROTOMY, BALLOON SWEEP;  Surgeon: Elijah Simon MD;  Location: [facility] MAIN OR;  Service: Gastroenterology;  Laterality: N/A;    EXPLORATORY LAPAROTOMY N/A 09/01/2023    DIATAL GASTRECTOMY    GASTRECTOMY N/A 09/08/2023    Procedure: EXPLORATORY LAPAROTOMY WITH GASTROJEJUNOSTOMY;  Surgeon: Slava Loo Jr., MD;  Location: [facility] OR;  Service: General;  Laterality: N/A;    GASTRIC BYPASS      HAND SURGERY Right 01/14/2016    HEMORRHOIDECTOMY      HERNIA REPAIR      x7    HYSTERECTOMY      TOTAL KNEE ARTHROPLASTY Bilateral             Family History   Problem Relation Age of Onset    Prostate cancer Father     Arthritis Other     Hypertension Other         benign essential    Cancer Other     Diabetes Other      Heart disease Other     Nephrolithiasis Other     Malig Hyperthermia Neg Hx             Social History     Socioeconomic History    Marital status: Other   Tobacco Use    Smoking status: Former     Packs/day: 0.50     Years: 25.00     Additional pack years: 0.00     Total pack years: 12.50     Types: Cigarettes     Quit date: 2005     Years since quittin.4     Passive exposure: Past    Smokeless tobacco: Never   Vaping Use    Vaping Use: Never used   Substance and Sexual Activity    Alcohol use: Never    Drug use: Never    Sexual activity: Not Currently     Partners: Male     Birth control/protection: Hysterectomy            Allergies   Allergen Reactions    Penicillins Hives            Current Outpatient Medications on File Prior to Visit   Medication Sig Dispense Refill    albuterol sulfate  (90 Base) MCG/ACT inhaler Inhale 2 puffs Every 6 (Six) Hours As Needed for Wheezing. Proventil  (90 Base) MCG/ACT Inhalation Aerosol Solution; Patient Sig: Proventil  (90 Base) MCG/ACT Inhalation Aerosol Solution Inhale 2 puff(s) every 6 to 8 hours as needed; 6.7; 0; -2013; Active 8 g 0    ALPRAZolam (XANAX) 2 MG tablet Take 1 tablet by mouth As Needed for Anxiety.      amiodarone (PACERONE) 100 MG tablet Take 1 tablet by mouth Every Other Day. 45 tablet 1    baclofen (LIORESAL) 10 MG tablet Take 1 tablet by mouth 3 (Three) Times a Day. 270 tablet 0    Calcium Citrate-Vitamin D3 (CITRACAL) 315-6.25 MG-MCG tablet tablet Take 1 tablet by mouth Daily.      chlorhexidine (PERIDEX) 0.12 % solution       Cholecalciferol (VITAMIN D3) 2000 units capsule TAKE ONE CAPSULE BY MOUTH DAILY 30 capsule 9    Cyanocobalamin (B-12) 1000 MCG sublingual tablet PLACE 1 TABLET UNDER THE TONGUE AND LET DISSOLVE ONCE DAILY 30 each 1    dicyclomine (BENTYL) 20 MG tablet Take 1 tablet by mouth Every 6 (Six) Hours As Needed (diarrhea). 90 tablet 1    dronabinol (MARINOL) 5 MG capsule Take 1 capsule by mouth 3 (Three)  Times a Day.      EPINEPHrine (EPIPEN) 0.3 MG/0.3ML solution auto-injector injection       escitalopram (LEXAPRO) 20 MG tablet Take 1 tablet by mouth Daily.      furosemide (LASIX) 20 MG tablet Take 1 tablet by mouth Daily. 90 tablet 1    KLOR-CON 20 MEQ CR tablet TAKE 1 TABLET BY MOUTH TWICE A DAY (Patient taking differently: Take 1 tablet by mouth Every Other Day. Only takes when take the lasix.) 180 tablet 1    levothyroxine (SYNTHROID, LEVOTHROID) 100 MCG tablet TAKE 1 TABLET BY MOUTH DAILY 90 tablet 0    loperamide (IMODIUM) 2 MG capsule Take 1 capsule by mouth 4 (Four) Times a Day As Needed for Diarrhea. 28 capsule 0    Magnesium Oxide -Mg Supplement 400 (240 Mg) MG tablet TAKE 1 TABLET BY MOUTH TWICE A  tablet 0    metoprolol tartrate (LOPRESSOR) 25 MG tablet Take 1 tablet by mouth Every 12 (Twelve) Hours. 180 tablet 1    montelukast (SINGULAIR) 10 MG tablet TAKE ONE TABLET BY MOUTH DAILY 90 tablet 1    Multiple Vitamin tablet Take 1 tablet by mouth Daily.      Omega 3-6-9 capsule Take 1 capsule by mouth Daily.      oxyCODONE ER (oxyCONTIN) 80 MG tablet extended-release 12 hour 12 hr tablet Take 2 tablets by mouth Every 12 (Twelve) Hours. 120 tablet 0    pantoprazole (PROTONIX) 40 MG EC tablet Take 1 tablet by mouth Daily. 90 tablet 0    promethazine (PHENERGAN) 25 MG tablet TAKE ONE TABLET BY MOUTH EVERY 8 HOURS AS NEEDED FOR NAUSEA OR VOMITING 90 tablet 0    saccharomyces boulardii (FLORASTOR) 250 MG capsule Take 1 capsule by mouth 2 (Two) Times a Day.      silver sulfadiazine (Silvadene) 1 % cream Apply 1 application  topically to the appropriate area as directed 2 (Two) Times a Day. Pressure ulcer on buttocks (Patient taking differently: Apply 1 Application topically to the appropriate area as directed 2 (Two) Times a Day As Needed. Pressure ulcer on buttocks) 85 g 0    Xarelto 20 MG tablet TAKE 1 TABLET BY MOUTH DAILY 90 tablet 0     No current facility-administered medications on file prior to  "visit.            BP 96/61 (BP Location: Left arm, Patient Position: Sitting)   Pulse 71   Temp 98.1 °F (36.7 °C) (Oral)   Ht 154.9 cm (61\")   Wt 84.7 kg (186 lb 12.8 oz)   BMI 35.30 kg/m²          Objective     Physical Exam  Vitals and nursing note reviewed.   Constitutional:       General: She is not in acute distress.     Appearance: Normal appearance.   HENT:      Head: Normocephalic and atraumatic.      Right Ear: Tympanic membrane, ear canal and external ear normal.      Left Ear: Tympanic membrane, ear canal and external ear normal.      Nose: No congestion or rhinorrhea.      Mouth/Throat:      Mouth: Mucous membranes are moist.      Pharynx: Oropharynx is clear. Posterior oropharyngeal erythema present.   Eyes:      Extraocular Movements: Extraocular movements intact.      Conjunctiva/sclera: Conjunctivae normal.      Pupils: Pupils are equal, round, and reactive to light.   Cardiovascular:      Rate and Rhythm: Normal rate and regular rhythm.      Heart sounds: Normal heart sounds.   Pulmonary:      Effort: Pulmonary effort is normal. No respiratory distress.      Breath sounds: Normal breath sounds. No wheezing, rhonchi or rales.   Musculoskeletal:      Cervical back: Normal range of motion. No rigidity.   Skin:     General: Skin is warm and dry.   Neurological:      Mental Status: She is alert and oriented to person, place, and time.   Psychiatric:         Mood and Affect: Mood normal.         Behavior: Behavior normal.                    Lab Results (last 24 hours)       Procedure Component Value Units Date/Time    POCT SARS-CoV-2 Antigen PIERRE + Flu [[id number]] Collected: 02/29/24 1527    Specimen: Swab Updated: 02/29/24 1528     SARS Antigen Not Detected     Influenza A Antigen PIERRE Not Detected     Influenza B Antigen PIERRE Not Detected     Internal Control Passed     Lot Number [id number]     Expiration Date 09/18/2024                  XR Chest PA & Lateral    Result Date: 2/29/2024  PROCEDURE: XR " CHEST PA AND LATERAL  COMPARISON: None  INDICATIONS: CONGESTION X 5 DAYS  FINDINGS:  The patient is rotated.  There are mild lower lobe airspace opacities, better visualized on the lateral view.  No consolidation or effusion is seen.  Cardiomediastinal contours appear within limits.        Mild lower lobe airspace opacities, which may be due to atelectasis and or pneumonia.  No previous chest x-ray available for comparison.     MARINO DOMÍNGUEZ MD       Electronically Signed and Approved By: MARINO DOMÍNGUEZ MD on 2/29/2024 at 16:39                              Assessment & Plan  Pneumonia of lower lobe due to infectious organism, unspecified laterality  Given patient's acute cough and x-ray report, will cover with doxycycline out of an abundance of caution.  She should follow-up in the office if her symptoms fail to improve.  Acute cough      Orders Placed This Encounter   Procedures    XR Chest PA & Lateral    POCT SARS-CoV-2 Antigen PIERRE + Flu     New Medications Ordered This Visit   Medications    doxycycline (VIBRAMYCIN) 100 MG capsule     Sig: Take 1 capsule by mouth 2 (Two) Times a Day for 10 days.     Dispense:  20 capsule     Refill:  0                FOR FULL DISCHARGE INSTRUCTIONS/COMMENTS/HANDOUTS please see the   AVS

## 2024-03-01 NOTE — TELEPHONE ENCOUNTER
Rx Refill Note  Requested Prescriptions     Pending Prescriptions Disp Refills    Magnesium Oxide -Mg Supplement 400 (240 Mg) MG tablet 180 tablet 0     Sig: Take 1 tablet by mouth 2 (Two) Times a Day.      Last office visit with prescribing clinician: 1/18/2024   Last telemedicine visit with prescribing clinician: Visit date not found   Next office visit with prescribing clinician: 7/18/2024  MAGNESIUM (01/09/2024 12:34)     Vaishnavi Keller LPN  03/01/24, 14:06 EST

## 2024-03-02 RX ORDER — LANOLIN ALCOHOL/MO/W.PET/CERES
1 CREAM (GRAM) TOPICAL 2 TIMES DAILY
Qty: 180 TABLET | Refills: 1 | Status: SHIPPED | OUTPATIENT
Start: 2024-03-02

## 2024-03-06 ENCOUNTER — CLINICAL SUPPORT (OUTPATIENT)
Dept: FAMILY MEDICINE CLINIC | Age: 74
End: 2024-03-06
Payer: MEDICARE

## 2024-03-06 DIAGNOSIS — J30.9 ALLERGIC RHINITIS, UNSPECIFIED SEASONALITY, UNSPECIFIED TRIGGER: Primary | ICD-10-CM

## 2024-03-06 PROCEDURE — 95115 IMMUNOTHERAPY ONE INJECTION: CPT | Performed by: FAMILY MEDICINE

## 2024-03-13 ENCOUNTER — CLINICAL SUPPORT (OUTPATIENT)
Dept: FAMILY MEDICINE CLINIC | Age: 74
End: 2024-03-13
Payer: MEDICARE

## 2024-03-13 DIAGNOSIS — J30.9 ALLERGIC RHINITIS, UNSPECIFIED SEASONALITY, UNSPECIFIED TRIGGER: Primary | ICD-10-CM

## 2024-03-13 PROCEDURE — 95115 IMMUNOTHERAPY ONE INJECTION: CPT | Performed by: FAMILY MEDICINE

## 2024-03-19 ENCOUNTER — CLINICAL SUPPORT (OUTPATIENT)
Dept: FAMILY MEDICINE CLINIC | Age: 74
End: 2024-03-19
Payer: MEDICARE

## 2024-03-19 DIAGNOSIS — J30.9 ALLERGIC RHINITIS, UNSPECIFIED SEASONALITY, UNSPECIFIED TRIGGER: Primary | ICD-10-CM

## 2024-03-19 PROCEDURE — 95115 IMMUNOTHERAPY ONE INJECTION: CPT | Performed by: FAMILY MEDICINE

## 2024-03-22 ENCOUNTER — TELEMEDICINE (OUTPATIENT)
Dept: PAIN MEDICINE | Facility: CLINIC | Age: 74
End: 2024-03-22
Payer: MEDICARE

## 2024-03-22 DIAGNOSIS — M06.9 RHEUMATOID ARTHRITIS, INVOLVING UNSPECIFIED SITE, UNSPECIFIED WHETHER RHEUMATOID FACTOR PRESENT: ICD-10-CM

## 2024-03-22 DIAGNOSIS — G89.4 CHRONIC PAIN SYNDROME: ICD-10-CM

## 2024-03-22 DIAGNOSIS — M15.9 GENERALIZED OSTEOARTHRITIS: ICD-10-CM

## 2024-03-22 DIAGNOSIS — M54.16 LUMBAR RADICULOPATHY: ICD-10-CM

## 2024-03-22 DIAGNOSIS — Z79.899 ENCOUNTER FOR LONG-TERM (CURRENT) USE OF HIGH-RISK MEDICATION: ICD-10-CM

## 2024-03-22 DIAGNOSIS — M51.36 DEGENERATION OF INTERVERTEBRAL DISC OF LUMBAR REGION: ICD-10-CM

## 2024-03-22 DIAGNOSIS — M54.16 LUMBAR RADICULOPATHY: Primary | ICD-10-CM

## 2024-03-22 DIAGNOSIS — C16.9 GASTRIC ADENOCARCINOMA: ICD-10-CM

## 2024-03-22 PROCEDURE — 99214 OFFICE O/P EST MOD 30 MIN: CPT

## 2024-03-22 PROCEDURE — 1159F MED LIST DOCD IN RCRD: CPT

## 2024-03-22 PROCEDURE — 1160F RVW MEDS BY RX/DR IN RCRD: CPT

## 2024-03-22 PROCEDURE — 1125F AMNT PAIN NOTED PAIN PRSNT: CPT

## 2024-03-22 RX ORDER — OXYCODONE HCL 80 MG/1
160 TABLET, FILM COATED, EXTENDED RELEASE ORAL EVERY 12 HOURS SCHEDULED
Qty: 120 TABLET | Refills: 0 | Status: SHIPPED | OUTPATIENT
Start: 2024-03-22

## 2024-03-22 NOTE — PROGRESS NOTES
TELEMEDICINE - VIDEO VISIT  You have chosen to receive care through a telehealth visit.  Do you consent to use a video/audio connection for your medical care today? Yes    Location of patient:Yes  Location of Provider: [facility] office  Anyone else present: No  Type of Technology used- ZOOM through use of [email or web address] visit    CHIEF COMPLAINT  Back and joint pain    Subjective   Anne Jewell is a 73 y.o. female  who presents for a video visit follow-up.She has a history of chornic back and joint pain. She reports that her pain has remained consistent since her last office visit.    Today pain is 8/10VAS in severity. Pain is located in her low back and radiates to bilateral feet. Describes this pain as a nearly continuous aching. Pain is worsened by rising from sitting to standing position, stress, weather changes, prolonged position, and walking long distances. Pain improves with rest/reposition, HEP, aqua therapy, heat/ice, and medications. She is currently receiving at home OT and PT. She plans to start going to the [facility] in [place] to get out of the house and interact with other people.      Continues with OxyContin 80mg 2 tablets BID and Baclofen 10mg 2-3/day (managed by PCP). Denies any side effects from the regimen, including constipation and somnolence. The regimen helps decrease pain by a significant amount. Notes improvement in activity and function with regimen. ADL's by self. Denies any bowel or bladder changes. Patient reports that she takes Xanax 2mg TID as needed. This is prescribed by psychiatrist.      She was admitted to the hospital from 8/25/23 - 9/15/23 where she was diagnosed with adenocarcinoma. She was seen by Dr. Stuart Borges on 9/19/23 with [facility].  PET scan on 10/6/23 showed no evidence of metastatic disease. No plans for chemotherapy at this time per office note from Dr. Slava Loo from 10/12/23. She was started on Dronabinol in  September by her hematologist/oncologist to help stimulate her appetite and for N/V.      She had areas of concern on her face removed on her face on Monday. There is concern that they may be pre-cancerous.     Back Pain  This is a chronic problem. The current episode started more than 1 year ago. The problem occurs constantly. The problem is unchanged (unchanged since last office visit). The pain is present in the lumbar spine. The quality of the pain is described as aching. The pain radiates to the right foot and left foot. The pain is at a severity of 8/10. The pain is moderate. The symptoms are aggravated by standing, position and sitting (rising from sitting to standing, prolonged position, walking long distances, stress, weather changes). Associated symptoms include weakness. Pertinent negatives include no abdominal pain, chest pain, dysuria, fever, headaches or numbness. She has tried analgesics, home exercises, heat and ice (aqua therapy) for the symptoms. The treatment provided moderate relief.   Joint Pain  This is a chronic (8/10 VAS in severity) problem. The current episode started more than 1 year ago. The problem occurs constantly. The problem has been unchanged (unchanged since last office visit). Associated symptoms include arthralgias, myalgias, neck pain and weakness. Pertinent negatives include no abdominal pain, chest pain, chills, congestion, coughing, fatigue, fever, headaches, numbness, vertigo or vomiting. The symptoms are aggravated by stress and exertion (weather changes, increased physical activity,). She has tried oral narcotics, position changes, rest, heat and ice for the symptoms. The treatment provided mild relief.     The following portions of the patient's history were reviewed and updated as appropriate: allergies, current medications, past family history, past medical history, past social history, past surgical history, and problem list.    Review of Systems   Constitutional:   Negative for chills, fatigue and fever.   HENT:  Negative for congestion.    Respiratory:  Negative for cough.    Cardiovascular:  Negative for chest pain.   Gastrointestinal:  Negative for abdominal pain and vomiting.   Genitourinary:  Negative for dysuria.   Musculoskeletal:  Positive for arthralgias, back pain, myalgias and neck pain.   Neurological:  Positive for weakness. Negative for vertigo, numbness and headaches.     Vitals:    03/22/24 1245   PainSc:   8   PainLoc: Back  Comment: joint pain     Objective   Physical Exam  Constitutional:       Appearance: Normal appearance.   HENT:      Head: Normocephalic.   Cardiovascular:      Rate and Rhythm: Normal rate.   Musculoskeletal:      Cervical back: Normal range of motion.   Skin:     Capillary Refill: Capillary refill takes less than 2 seconds.   Neurological:      General: No focal deficit present.      Mental Status: She is alert and oriented to person, place, and time.   Psychiatric:         Mood and Affect: Mood normal.         Behavior: Behavior normal.         Thought Content: Thought content normal.         Cognition and Memory: Cognition normal.       Assessment & Plan   Diagnoses and all orders for this visit:    1. Lumbar radiculopathy (Primary)    2. Chronic pain syndrome    3. Rheumatoid arthritis, involving unspecified site, unspecified whether rheumatoid factor present    4. Generalized osteoarthritis    5. Degeneration of intervertebral disc of lumbar region    6. Gastric adenocarcinoma    7. Encounter for long-term (current) use of high-risk medication    ----------------  Our practice is offering alternative &/or electronic methods to continue to follow our patients while at the same time further the efforts toward social distancing, in accordance with our organizational policies, professional societies' guidance, and gubernatorial mandates.  I support the Healthy at Home campaign and in this visit I have counseled the patient on our needs  to limit in-person office visits and physical encounters with medical facilities whenever possible.  I have also educated the patient on the medical necessities of maintaining social distancing while we continue to function during this crisis period.      The patient agreed to a Video Visit.  ----------------  --- The urine drug screen confirmation from 11/10/23 has been reviewed and the result is appropriate based on patient history and VICENTA report  --- The patient signed an updated copy of the controlled substance agreement on 11/20/23  --- Narcan prescription current   --- Continue OxyContin. DNF 3/27/24. Patient appears stable with current regimen. No adverse effects. Regarding continuation of opioids, there is no evidence of aberrant behavior or any red flags.  The patient continues with appropriate response to opioid therapy. ADL's remain intact by self.   --- Follow-up 1 month     VICENTA REPORT  As part of the patient's treatment plan, I am prescribing controlled substances. The patient has been made aware of appropriate use of such medications, including potential risk of somnolence, limited ability to drive and/or work safely, and the potential for dependence or overdose. It has also been made clear that these medications are for use by this patient only, without concomitant use of alcohol or other substances unless prescribed.     Patient has completed prescribing agreement detailing terms of continued prescribing of controlled substances, including monitoring VICENTA reports, urine drug screening, and pill counts if necessary. The patient is aware that inappropriate use will results in cessation of prescribing such medications.    As the clinician, I personally reviewed the VICENTA from 3/22/24 while the patient was in the office today.    History and physical exam exhibit continued safe and appropriate use of controlled substances.  -------  EMR Dragon/Transcription disclaimer:   Much of this encounter  note is an electronic transcription/translation of spoken language to printed text. The electronic translation of spoken language may permit erroneous, or at times, nonsensical words or phrases to be inadvertently transcribed; Although I have reviewed the note for such errors, some may still exist.       This document is intended for medical expert use only. Reading of this document by patients and/or patient's family without participating medical staff guidance may result in misinterpretation and unintended morbidity.   Any interpretation of such data is the responsibility of the patient and/or family member responsible for the patient in concert with their primary or specialist providers, not to be left for sources of online searches such as aCon, HangIt or similar queries. Relying on these approaches to knowledge may result in misinterpretation, misguided goals of care and even death should patients or family members try recommendations outside of the realm of professional medical care in a supervised way.

## 2024-03-22 NOTE — TELEPHONE ENCOUNTER
Patient seen in office today. Reviewed UDS and VICENTA. Both updated and appropriate. Refill appropriate.  DNF 3/27/24. Please refill. Thanks.

## 2024-03-27 ENCOUNTER — CLINICAL SUPPORT (OUTPATIENT)
Dept: FAMILY MEDICINE CLINIC | Age: 74
End: 2024-03-27
Payer: MEDICARE

## 2024-03-27 ENCOUNTER — OFFICE VISIT (OUTPATIENT)
Dept: CARDIOLOGY | Facility: CLINIC | Age: 74
End: 2024-03-27
Payer: MEDICARE

## 2024-03-27 VITALS
WEIGHT: 191 LBS | SYSTOLIC BLOOD PRESSURE: 160 MMHG | DIASTOLIC BLOOD PRESSURE: 69 MMHG | BODY MASS INDEX: 36.06 KG/M2 | HEIGHT: 61 IN | HEART RATE: 58 BPM

## 2024-03-27 DIAGNOSIS — G89.4 CHRONIC PAIN SYNDROME: Chronic | ICD-10-CM

## 2024-03-27 DIAGNOSIS — J30.9 ALLERGIC RHINITIS, UNSPECIFIED SEASONALITY, UNSPECIFIED TRIGGER: Primary | ICD-10-CM

## 2024-03-27 DIAGNOSIS — R60.0 PEDAL EDEMA: ICD-10-CM

## 2024-03-27 DIAGNOSIS — R11.0 NAUSEA: ICD-10-CM

## 2024-03-27 DIAGNOSIS — I48.0 PAROXYSMAL ATRIAL FIBRILLATION: Primary | ICD-10-CM

## 2024-03-27 PROCEDURE — 1159F MED LIST DOCD IN RCRD: CPT | Performed by: INTERNAL MEDICINE

## 2024-03-27 PROCEDURE — 1160F RVW MEDS BY RX/DR IN RCRD: CPT | Performed by: INTERNAL MEDICINE

## 2024-03-27 PROCEDURE — 95115 IMMUNOTHERAPY ONE INJECTION: CPT | Performed by: FAMILY MEDICINE

## 2024-03-27 PROCEDURE — 99213 OFFICE O/P EST LOW 20 MIN: CPT | Performed by: INTERNAL MEDICINE

## 2024-03-27 RX ORDER — PROMETHAZINE HYDROCHLORIDE 25 MG/1
TABLET ORAL
Qty: 90 TABLET | Refills: 0 | Status: SHIPPED | OUTPATIENT
Start: 2024-03-27

## 2024-03-27 NOTE — PROGRESS NOTES
CARDIOLOGY FOLLOW-UP PROGRESS NOTE        Chief Complaint  Dizziness, Atrial Fibrillation, and Follow-up    Subjective            Anne Jewell presents to [facility] CARDIOLOGY  History of Present Illness      Ms Jewell is here for routine follow-up visit.  She was previously seen in December of last year due to increasing swelling of the feet.  She is currently taking Lasix every day and swelling improved.  She denies any recent episodes of palpitations.  Denies chest pain.  No major shortness of breath.  She uses a walker to walk around but fairly independent at baseline.  No bleeding problems.  Taking Xarelto as before.      Past History:    Paroxysmal atrial fibrillation, detected during hospital admission in September, 2023     History of pulmonary embolism on long-term anticoagulation  Gastric adenocarcinoma, status post surgery    Medical History:  Past Medical History:   Diagnosis Date    Allergic rhinitis     Anemia     Arthritis of back     Arthritis of neck     Asthma     Broken nose     Bronchospasm     Cancer     Adenocarcinoma of the stomach    Carpal tunnel syndrome, bilateral     Cervical disc disorder     Clotting disorder     Colon polyp Don’t remember    Years ago    Deep vein thrombosis     Disc degeneration, lumbar     Edema     Esophageal reflux     Glaucoma     Hip arthrosis     History of transfusion     Joint pain     Kidney disease 2018    stage 3    Liver disease 2018    stage 3    Low back pain     Osteoarthritis     Osteopenia     Osteoporosis     Oxygen dependent     AT NIGHT    Periarthritis of shoulder     Scoliosis     Skin abrasion     LEFT ARM FIRST LAYER OF SKIN GONE, WRAPPED BY VISITING NURSE    Status post total knee replacement, left 08/31/2017    Status post total knee replacement, right 08/31/2017    Stomach cancer Sept 2023    UTI (urinary tract infection)     Venous thrombosis        Surgical History: has a past surgical history that includes Breast  Reduction; Gastric bypass; Hernia repair; Hysterectomy; Total knee arthroplasty (Bilateral); Bladder suspension; Hand surgery (Right, 01/14/2016); Colonoscopy (08/05/2016); ERCP (N/A, 08/30/2023); Exploratory Laparotomy (N/A, 09/01/2023); ERCP (N/A, 09/01/2023); Gastrectomy (N/A, 09/08/2023); Hemorrhoid surgery; Esophagogastroduodenoscopy (N/A, 1/25/2024); and Colonoscopy (N/A, 1/25/2024).     Family History: family history includes Arthritis in an other family member; Cancer in an other family member; Diabetes in an other family member; Heart disease in an other family member; Hypertension in an other family member; Nephrolithiasis in an other family member; Prostate cancer in her father.     Social History: reports that she quit smoking about 18 years ago. Her smoking use included cigarettes. She started smoking about 43 years ago. She has a 12.5 pack-year smoking history. She has been exposed to tobacco smoke. She has never used smokeless tobacco. She reports that she does not drink alcohol and does not use drugs.    Allergies: Penicillins    Current Outpatient Medications on File Prior to Visit   Medication Sig    albuterol sulfate  (90 Base) MCG/ACT inhaler Inhale 2 puffs Every 6 (Six) Hours As Needed for Wheezing. Proventil  (90 Base) MCG/ACT Inhalation Aerosol Solution; Patient Sig: Proventil  (90 Base) MCG/ACT Inhalation Aerosol Solution Inhale 2 puff(s) every 6 to 8 hours as needed; 6.7; 0; 05-Apr-2013; Active    ALPRAZolam (XANAX) 2 MG tablet Take 1 tablet by mouth As Needed for Anxiety.    baclofen (LIORESAL) 10 MG tablet Take 1 tablet by mouth 3 (Three) Times a Day.    Calcium Citrate-Vitamin D3 (CITRACAL) 315-6.25 MG-MCG tablet tablet Take 1 tablet by mouth Daily.    Cholecalciferol (VITAMIN D3) 2000 units capsule TAKE ONE CAPSULE BY MOUTH DAILY    Cyanocobalamin (B-12) 1000 MCG sublingual tablet PLACE 1 TABLET UNDER THE TONGUE AND LET DISSOLVE ONCE DAILY    dicyclomine (BENTYL) 20  "MG tablet Take 1 tablet by mouth Every 6 (Six) Hours As Needed (diarrhea).    dronabinol (MARINOL) 5 MG capsule Take 1 capsule by mouth 3 (Three) Times a Day.    EPINEPHrine (EPIPEN) 0.3 MG/0.3ML solution auto-injector injection     escitalopram (LEXAPRO) 20 MG tablet Take 1 tablet by mouth Daily.    furosemide (LASIX) 20 MG tablet Take 1 tablet by mouth Daily.    KLOR-CON 20 MEQ CR tablet TAKE 1 TABLET BY MOUTH TWICE A DAY (Patient taking differently: Take 1 tablet by mouth Every Other Day. Only takes when take the lasix.)    levothyroxine (SYNTHROID, LEVOTHROID) 100 MCG tablet TAKE 1 TABLET BY MOUTH DAILY    Magnesium Oxide -Mg Supplement 400 (240 Mg) MG tablet Take 1 tablet by mouth 2 (Two) Times a Day.    metoprolol tartrate (LOPRESSOR) 25 MG tablet Take 1 tablet by mouth Every 12 (Twelve) Hours.    montelukast (SINGULAIR) 10 MG tablet TAKE ONE TABLET BY MOUTH DAILY    Multiple Vitamin tablet Take 1 tablet by mouth Daily.    Omega 3-6-9 capsule Take 1 capsule by mouth Daily.    oxyCODONE ER (oxyCONTIN) 80 MG tablet extended-release 12 hour 12 hr tablet Take 2 tablets by mouth Every 12 (Twelve) Hours.    pantoprazole (PROTONIX) 40 MG EC tablet Take 1 tablet by mouth Daily.    promethazine (PHENERGAN) 25 MG tablet TAKE ONE TABLET BY MOUTH EVERY 8 HOURS AS NEEDED FOR NAUSEA OR VOMITING    Xarelto 20 MG tablet TAKE 1 TABLET BY MOUTH DAILY    [DISCONTINUED] amiodarone (PACERONE) 100 MG tablet Take 1 tablet by mouth Every Other Day.       Review of Systems   Respiratory:  Positive for shortness of breath. Negative for cough and wheezing.    Cardiovascular:  Negative for chest pain, palpitations and leg swelling.   Gastrointestinal:  Negative for nausea and vomiting.   Neurological:  Negative for dizziness and syncope.        Objective     /69   Pulse 58   Ht 154.9 cm (61\")   Wt 86.6 kg (191 lb)   BMI 36.09 kg/m²       Physical Exam    General : Alert, awake, no acute distress  Neck : Supple, no carotid " bruit, no jugular venous distention  CVS : Regular rate and rhythm, no murmur, rubs or gallops  Lungs: Clear to auscultation bilaterally, no crackles or rhonchi  Abdomen: Soft, nontender, bowel sounds heard in all 4 quadrants  Extremities: Warm, well-perfused, 1+ edema bilaterally    Result Review :     The following data was reviewed by: Jemal Mathews MD on 03/27/2024:    CMP          9/14/2023    06:21 9/14/2023    16:46 9/15/2023    05:00 11/15/2023    14:14   CMP   Glucose 74   76  95    BUN 7   7  27    Creatinine 0.71   0.82  1.20    EGFR 89.9   75.6  47.9    Sodium 141   140  138    Potassium 3.4  3.9  4.2  4.8    Chloride 107   107  102    Calcium 7.9   8.0  9.7    Total Protein 5.0   4.7  6.6    Albumin 2.1   2.1  3.8    Globulin 2.9   2.6  2.8    Total Bilirubin 0.3   0.3  0.2    Alkaline Phosphatase 118   107  116    AST (SGOT) 17   17  24    ALT (SGPT) 10   9  17    Albumin/Globulin Ratio 0.7   0.8  1.4    BUN/Creatinine Ratio 9.9   8.5  22.5    Anion Gap 5.0   5.3  2.0      CBC          9/15/2023    05:00 9/18/2023    05:00 11/15/2023    14:14   CBC   WBC 5.48  5.39  5.08    RBC 2.73  2.93  4.17    Hemoglobin 8.4  9.2  12.6    Hematocrit 26.5  28.9  41.4    MCV 97.1  98.6  99.3    MCH 30.8  31.4  30.2    MCHC 31.7  31.8  30.4    RDW 13.7  14.7  15.2    Platelets 258  252  180      TSH          9/19/2023    14:33 11/15/2023    14:14 1/9/2024    12:34   TSH   TSH 1.962     2.360  4.419               Data reviewed: Cardiology studies        Results for orders placed during the hospital encounter of 08/25/23    Adult Transthoracic Echo Complete W/ Cont if Necessary Per Protocol    Interpretation Summary    Left ventricular systolic function is normal. Left ventricular ejection fraction appears to be 61 - 65%.    Left ventricular diastolic function was normal.    There is calcification of the aortic valve.                   Assessment and Plan        Diagnoses and all orders for this visit:    1.  Paroxysmal atrial fibrillation (Primary)  Assessment & Plan:  She is currently in normal sinus rhythm.  Denies any palpitations.  We will discontinue amiodarone to avoid long-term complications.  Continue Xarelto for anticoagulation.  Continue metoprolol for rate and rhythm management.  Recent labs showed stable hemoglobin and renal function.      2. Pedal edema  Assessment & Plan:  Previous echocardiogram showed normal LV systolic function.  She has engorged veins bilaterally.  Likely dependent edema but reports improvement with Lasix.  Recommend to continue Lasix 20 mg daily.  She is also using compression stockings.                Follow Up     Return in about 6 months (around 9/27/2024) for Next scheduled follow up, with Cindy BECKER.    Patient was given instructions and counseling regarding her condition or for health maintenance advice. Please see specific information pulled into the AVS if appropriate.

## 2024-03-27 NOTE — ASSESSMENT & PLAN NOTE
Previous echocardiogram showed normal LV systolic function.  She has engorged veins bilaterally.  Likely dependent edema but reports improvement with Lasix.  Recommend to continue Lasix 20 mg daily.  She is also using compression stockings.

## 2024-03-27 NOTE — TELEPHONE ENCOUNTER
Rx Refill Note  Requested Prescriptions     Pending Prescriptions Disp Refills    promethazine (PHENERGAN) 25 MG tablet [Pharmacy Med Name: PROMETHAZINE 25 MG TABLET] 90 tablet 0     Sig: TAKE 1 TABLET BY MOUTH EVERY 8 HOURS AS NEEDED FOR NAUSEA AND/OR VOMITING      Last office visit with prescribing clinician: 1/18/2024   Last telemedicine visit with prescribing clinician: Visit date not found   Next office visit with prescribing clinician: 7/18/2024  Last refill sent: 02/24/24, #90    Vaishnavi Keller LPN  03/27/24, 10:07 EDT

## 2024-03-27 NOTE — ASSESSMENT & PLAN NOTE
She is currently in normal sinus rhythm.  Denies any palpitations.  We will discontinue amiodarone to avoid long-term complications.  Continue Xarelto for anticoagulation.  Continue metoprolol for rate and rhythm management.  Recent labs showed stable hemoglobin and renal function.

## 2024-03-29 DIAGNOSIS — E03.9 ACQUIRED HYPOTHYROIDISM: Chronic | ICD-10-CM

## 2024-03-29 RX ORDER — LEVOTHYROXINE SODIUM 0.1 MG/1
TABLET ORAL
Qty: 90 TABLET | Refills: 3 | Status: SHIPPED | OUTPATIENT
Start: 2024-03-29

## 2024-03-29 NOTE — TELEPHONE ENCOUNTER
Rx Refill Note  Requested Prescriptions     Pending Prescriptions Disp Refills    levothyroxine (SYNTHROID, LEVOTHROID) 100 MCG tablet [Pharmacy Med Name: LEVOTHYROXINE 100 MCG TABLET] 90 tablet 0     Sig: TAKE 1 TABLET BY MOUTH DAILY      Last office visit with prescribing clinician: 01/18/2024, Dr Dean  Last telemedicine visit with prescribing clinician: Visit date not found   Next office visit with prescribing clinician: 07/18/2024, Dr Dean  TSH (01/09/2024 12:34)  T4, FREE (01/09/2024 12:34)  Thyroid Hormones Protocol Failed     Dr Dean is out of the office until 04/01/24, ok to wait.     Vaishnavi Keller LPN  03/29/24, 10:13 EDT

## 2024-04-03 ENCOUNTER — CLINICAL SUPPORT (OUTPATIENT)
Dept: FAMILY MEDICINE CLINIC | Age: 74
End: 2024-04-03
Payer: MEDICARE

## 2024-04-03 DIAGNOSIS — J30.9 ALLERGIC RHINITIS, UNSPECIFIED SEASONALITY, UNSPECIFIED TRIGGER: Primary | ICD-10-CM

## 2024-04-03 PROCEDURE — 95115 IMMUNOTHERAPY ONE INJECTION: CPT | Performed by: FAMILY MEDICINE

## 2024-04-06 DIAGNOSIS — G89.4 CHRONIC PAIN SYNDROME: Chronic | ICD-10-CM

## 2024-04-07 DIAGNOSIS — Z86.718 HISTORY OF DVT (DEEP VEIN THROMBOSIS): ICD-10-CM

## 2024-04-08 RX ORDER — RIVAROXABAN 20 MG/1
20 TABLET, FILM COATED ORAL DAILY
Qty: 90 TABLET | Refills: 0 | Status: SHIPPED | OUTPATIENT
Start: 2024-04-08

## 2024-04-08 NOTE — TELEPHONE ENCOUNTER
Rx Refill Note  Requested Prescriptions     Pending Prescriptions Disp Refills    baclofen (LIORESAL) 10 MG tablet [Pharmacy Med Name: BACLOFEN 10 MG TABLET] 270 tablet 0     Sig: TAKE ONE TABLET BY MOUTH THREE TIMES A DAY      Last office visit with prescribing clinician: 01/18/24, Dr Dean  Last telemedicine visit with prescribing clinician: Visit date not found   Next office visit with prescribing clinician: 07/18/24, Dr Dean  Last refill sent: 06/18/23, #270    Vaishnavi Keller LPN  04/08/24, 09:37 EDT

## 2024-04-08 NOTE — TELEPHONE ENCOUNTER
Rx Refill Note  Requested Prescriptions     Pending Prescriptions Disp Refills    Xarelto 20 MG tablet [Pharmacy Med Name: XARELTO 20 MG TABLET] 90 tablet 0     Sig: TAKE 1 TABLET BY MOUTH DAILY      Last office visit with prescribing clinician: 01/18/24, Dr Dean  Last telemedicine visit with prescribing clinician: Visit date not found   Next office visit with prescribing clinician: 07/18/24, Dr Dianne Keller, LPRJ  04/08/24, 09:41 EDT

## 2024-04-09 RX ORDER — BACLOFEN 10 MG/1
10 TABLET ORAL 3 TIMES DAILY
Qty: 270 TABLET | Refills: 0 | Status: SHIPPED | OUTPATIENT
Start: 2024-04-09

## 2024-04-10 ENCOUNTER — CLINICAL SUPPORT (OUTPATIENT)
Dept: FAMILY MEDICINE CLINIC | Age: 74
End: 2024-04-10
Payer: MEDICARE

## 2024-04-10 DIAGNOSIS — J30.9 ALLERGIC RHINITIS, UNSPECIFIED SEASONALITY, UNSPECIFIED TRIGGER: Primary | ICD-10-CM

## 2024-04-10 PROCEDURE — 95115 IMMUNOTHERAPY ONE INJECTION: CPT | Performed by: FAMILY MEDICINE

## 2024-04-16 ENCOUNTER — CLINICAL SUPPORT (OUTPATIENT)
Dept: FAMILY MEDICINE CLINIC | Age: 74
End: 2024-04-16
Payer: MEDICARE

## 2024-04-16 DIAGNOSIS — J30.9 ALLERGIC RHINITIS, UNSPECIFIED SEASONALITY, UNSPECIFIED TRIGGER: Primary | ICD-10-CM

## 2024-04-16 PROCEDURE — 95115 IMMUNOTHERAPY ONE INJECTION: CPT | Performed by: FAMILY MEDICINE

## 2024-04-18 ENCOUNTER — CLINICAL SUPPORT (OUTPATIENT)
Dept: FAMILY MEDICINE CLINIC | Age: 74
End: 2024-04-18
Payer: MEDICARE

## 2024-04-18 DIAGNOSIS — J30.9 ALLERGIC RHINITIS, UNSPECIFIED SEASONALITY, UNSPECIFIED TRIGGER: Primary | ICD-10-CM

## 2024-04-18 PROCEDURE — 95115 IMMUNOTHERAPY ONE INJECTION: CPT | Performed by: FAMILY MEDICINE

## 2024-04-24 ENCOUNTER — CLINICAL SUPPORT (OUTPATIENT)
Dept: FAMILY MEDICINE CLINIC | Age: 74
End: 2024-04-24
Payer: MEDICARE

## 2024-04-24 DIAGNOSIS — J30.9 ALLERGIC RHINITIS, UNSPECIFIED SEASONALITY, UNSPECIFIED TRIGGER: Primary | ICD-10-CM

## 2024-04-24 PROCEDURE — 95115 IMMUNOTHERAPY ONE INJECTION: CPT | Performed by: FAMILY MEDICINE

## 2024-04-25 ENCOUNTER — OFFICE VISIT (OUTPATIENT)
Dept: PAIN MEDICINE | Facility: CLINIC | Age: 74
End: 2024-04-25
Payer: MEDICARE

## 2024-04-25 VITALS
DIASTOLIC BLOOD PRESSURE: 72 MMHG | HEART RATE: 70 BPM | SYSTOLIC BLOOD PRESSURE: 118 MMHG | WEIGHT: 195.2 LBS | HEIGHT: 61 IN | OXYGEN SATURATION: 97 % | TEMPERATURE: 96.4 F | BODY MASS INDEX: 36.85 KG/M2

## 2024-04-25 DIAGNOSIS — G89.4 CHRONIC PAIN SYNDROME: ICD-10-CM

## 2024-04-25 DIAGNOSIS — M54.16 LUMBAR RADICULOPATHY: ICD-10-CM

## 2024-04-25 DIAGNOSIS — Z79.899 ENCOUNTER FOR LONG-TERM (CURRENT) USE OF HIGH-RISK MEDICATION: ICD-10-CM

## 2024-04-25 DIAGNOSIS — M51.36 DEGENERATION OF INTERVERTEBRAL DISC OF LUMBAR REGION: ICD-10-CM

## 2024-04-25 DIAGNOSIS — M54.16 LUMBAR RADICULOPATHY: Primary | ICD-10-CM

## 2024-04-25 DIAGNOSIS — M06.9 RHEUMATOID ARTHRITIS, INVOLVING UNSPECIFIED SITE, UNSPECIFIED WHETHER RHEUMATOID FACTOR PRESENT: ICD-10-CM

## 2024-04-25 DIAGNOSIS — M15.9 GENERALIZED OSTEOARTHRITIS: ICD-10-CM

## 2024-04-25 DIAGNOSIS — C16.9 GASTRIC ADENOCARCINOMA: ICD-10-CM

## 2024-04-25 PROCEDURE — 1125F AMNT PAIN NOTED PAIN PRSNT: CPT

## 2024-04-25 PROCEDURE — 1160F RVW MEDS BY RX/DR IN RCRD: CPT

## 2024-04-25 PROCEDURE — 1159F MED LIST DOCD IN RCRD: CPT

## 2024-04-25 PROCEDURE — 99214 OFFICE O/P EST MOD 30 MIN: CPT

## 2024-04-25 RX ORDER — OXYCODONE HCL 80 MG/1
160 TABLET, FILM COATED, EXTENDED RELEASE ORAL EVERY 12 HOURS SCHEDULED
Qty: 120 TABLET | Refills: 0 | Status: SHIPPED | OUTPATIENT
Start: 2024-04-25

## 2024-04-25 RX ORDER — DRONABINOL 5 MG/1
5 CAPSULE ORAL
COMMUNITY
Start: 2024-03-27

## 2024-04-25 NOTE — PROGRESS NOTES
CHIEF COMPLAINT  Back and joint pain    Subjective   Anne Jewell is a 73 y.o. female  who presents for follow-up.  She has a history of back and joint pain.  She reports that her pain has slightly worsened since her last office visit.    Today pain is 7/10VAS in severity. Pain is located in her low back and radiates to bilateral feet. Describes this pain as a nearly continuous aching. Pain is worsened by rising from sitting to standing position, stress, weather changes, prolonged position, and walking long distances. Pain improves with rest/reposition, HEP, aqua therapy, heat/ice, and medications. She is currently receiving at home OT and PT.     Continues with OxyContin 80mg 2 tablets BID and Baclofen 10mg 2-3/day (managed by PCP). Denies any side effects from the regimen, including constipation and somnolence. The regimen helps decrease pain by a significant amount. Notes improvement in activity and function with regimen. ADL's by self. Denies any bowel or bladder changes. Patient reports that she takes Xanax 2mg TID as needed. This is prescribed by psychiatrist.      She was admitted to the hospital from 8/25/23 - 9/15/23 where she was diagnosed with adenocarcinoma. She was seen by Dr. Stuart Borges on 9/19/23 with [facility].  PET scan on 10/6/23 showed no evidence of metastatic disease. No plans for chemotherapy at this time per office note from Dr. Slava Loo from 10/12/23. She was started on Dronabinol in September by her hematologist/oncologist to help stimulate her appetite and for N/V.  She takes this medication sparingly.     Anticoagulated on Xarelto     Back Pain  This is a chronic problem. The current episode started more than 1 year ago. The problem occurs constantly. The problem has been worse since onset. The pain is present in the lumbar spine. The quality of the pain is described as aching. The pain radiates to the right foot and left foot. The pain is at a severity of  7/10. The pain is moderate. The symptoms are aggravated by standing, position and sitting (rising from sitting to standing, prolonged position, walking long distances, stress, weather changes). Pertinent negatives include no abdominal pain, chest pain, dysuria, fever, headaches, numbness or weakness. She has tried analgesics, home exercises, heat and ice (aqua therapy) for the symptoms. The treatment provided moderate relief.   Joint Pain  This is a chronic (7/10 VAS in severity) problem. The current episode started more than 1 year ago. The problem occurs constantly. The problem has been waxing and waning. Associated symptoms include arthralgias, myalgias and neck pain. Pertinent negatives include no abdominal pain, chest pain, chills, congestion, coughing, fatigue, fever, headaches, numbness, vertigo, vomiting or weakness. The symptoms are aggravated by stress and exertion (weather changes, increased physical activity,). She has tried oral narcotics, position changes, rest, heat and ice for the symptoms. The treatment provided mild relief.      PEG Assessment   What number best describes your pain on average in the past week?7  What number best describes how, during the past week, pain has interfered with your enjoyment of life?8  What number best describes how, during the past week, pain has interfered with your general activity?  7    Review of Pertinent Medical Data ---      The following portions of the patient's history were reviewed and updated as appropriate: allergies, current medications, past family history, past medical history, past social history, past surgical history, and problem list.    Review of Systems   Constitutional:  Negative for chills, fatigue and fever.   HENT:  Negative for congestion.    Respiratory:  Negative for cough and shortness of breath.    Cardiovascular:  Negative for chest pain.   Gastrointestinal:  Negative for abdominal pain, constipation, diarrhea and vomiting.  "  Genitourinary:  Negative for difficulty urinating and dysuria.   Musculoskeletal:  Positive for arthralgias, back pain, gait problem (ambulates with a walker), myalgias and neck pain.   Neurological:  Negative for dizziness, vertigo, weakness, light-headedness, numbness and headaches.   Psychiatric/Behavioral:  Negative for agitation.      I have reviewed and confirmed the accuracy of the ROS as documented by the MA/LPN/RN EUGENIO Ayala    Vitals:    04/25/24 1237   BP: 118/72   BP Location: Left arm   Patient Position: Sitting   Pulse: 70   Temp: 96.4 °F (35.8 °C)   TempSrc: Temporal   SpO2: 97%   Weight: 88.5 kg (195 lb 3.2 oz)   Height: 154.9 cm (61\")   PainSc:   7   PainLoc: Back     Objective   Physical Exam  Constitutional:       Appearance: Normal appearance.   HENT:      Head: Normocephalic.   Cardiovascular:      Rate and Rhythm: Normal rate and regular rhythm.   Pulmonary:      Effort: Pulmonary effort is normal.      Breath sounds: Normal breath sounds.   Musculoskeletal:      Right shoulder: Tenderness present.      Left shoulder: Tenderness present.      Right wrist: Tenderness present.      Left wrist: Tenderness present.      Right hand: Tenderness present.      Left hand: Tenderness present.      Cervical back: Normal range of motion.      Lumbar back: Tenderness present. Decreased range of motion.      Right hip: Tenderness present.      Left hip: Tenderness present.      Right knee: Tenderness present.      Left knee: Tenderness present.      Comments: Back brace for support in place   Skin:     General: Skin is warm and dry.      Capillary Refill: Capillary refill takes less than 2 seconds.   Neurological:      General: No focal deficit present.      Mental Status: She is alert and oriented to person, place, and time.      Gait: Gait abnormal (slowed, ambulating with rolling walker).   Psychiatric:         Mood and Affect: Mood normal.         Behavior: Behavior normal.         Thought " Content: Thought content normal.         Cognition and Memory: Cognition normal.       Assessment & Plan   Diagnoses and all orders for this visit:    1. Lumbar radiculopathy (Primary)    2. Chronic pain syndrome    3. Rheumatoid arthritis, involving unspecified site, unspecified whether rheumatoid factor present    4. Generalized osteoarthritis    5. Degeneration of intervertebral disc of lumbar region    6. Gastric adenocarcinoma    7. Encounter for long-term (current) use of high-risk medication      Anne Jewell reports a pain score of 7.  Given her pain assessment as noted, treatment options were discussed and the following options were decided upon as a follow-up plan to address the patient's pain: continuation of current treatment plan for pain and prescription for opiod analgesics.    --- The urine drug screen confirmation from 11/10/23 has been reviewed and the result is appropriate based on patient history and VICENTA report  --- The patient signed an updated copy of the controlled substance agreement on 11/20/23  --- Narcan prescription current   --- Continue OxyContin. DNF 4/26/24. Patient appears stable with current regimen. No adverse effects. Regarding continuation of opioids, there is no evidence of aberrant behavior or any red flags.  The patient continues with appropriate response to opioid therapy. ADL's remain intact by self.   --- Follow-up 1 month     VICENTA REPORT  As part of the patient's treatment plan, I am prescribing controlled substances. The patient has been made aware of appropriate use of such medications, including potential risk of somnolence, limited ability to drive and/or work safely, and the potential for dependence or overdose. It has also been made clear that these medications are for use by this patient only, without concomitant use of alcohol or other substances unless prescribed.     Patient has completed prescribing agreement detailing terms of continued prescribing of  controlled substances, including monitoring VICENTA reports, urine drug screening, and pill counts if necessary. The patient is aware that inappropriate use will results in cessation of prescribing such medications.    As the clinician, I personally reviewed the VICENTA from 4/25/24 while the patient was in the office today.    History and physical exam exhibit continued safe and appropriate use of controlled substances.    Dictated utilizing Dragon dictation.

## 2024-04-25 NOTE — TELEPHONE ENCOUNTER
Patient seen in office today. Reviewed UDS and VICENTA. Both updated and appropriate. Refill appropriate. DNF 4/26/24.

## 2024-04-26 ENCOUNTER — CLINICAL SUPPORT (OUTPATIENT)
Dept: FAMILY MEDICINE CLINIC | Age: 74
End: 2024-04-26
Payer: MEDICARE

## 2024-04-26 DIAGNOSIS — J30.9 ALLERGIC RHINITIS, UNSPECIFIED SEASONALITY, UNSPECIFIED TRIGGER: Primary | ICD-10-CM

## 2024-04-26 PROCEDURE — 95115 IMMUNOTHERAPY ONE INJECTION: CPT | Performed by: FAMILY MEDICINE

## 2024-04-30 ENCOUNTER — CLINICAL SUPPORT (OUTPATIENT)
Dept: FAMILY MEDICINE CLINIC | Age: 74
End: 2024-04-30
Payer: MEDICARE

## 2024-04-30 DIAGNOSIS — G89.4 CHRONIC PAIN SYNDROME: Chronic | ICD-10-CM

## 2024-04-30 DIAGNOSIS — R11.0 NAUSEA: ICD-10-CM

## 2024-04-30 DIAGNOSIS — J30.9 ALLERGIC RHINITIS, UNSPECIFIED SEASONALITY, UNSPECIFIED TRIGGER: Primary | ICD-10-CM

## 2024-04-30 NOTE — TELEPHONE ENCOUNTER
Rx Refill Note  Requested Prescriptions     Pending Prescriptions Disp Refills    promethazine (PHENERGAN) 25 MG tablet [Pharmacy Med Name: PROMETHAZINE 25 MG TABLET] 90 tablet 0     Sig: TAKE 1 TABLET BY MOUTH EVERY 8 HOURS AS NEEDED FOR NAUSEA AND/OR VOMITING      Last office visit with prescribing clinician: 1/18/2024   Last telemedicine visit with prescribing clinician: Visit date not found   Next office visit with prescribing clinician: 7/18/2024       Peri Malin LPN  04/30/24, 15:51 EDT    -3/27/24 #90, RF-0

## 2024-05-01 RX ORDER — PROMETHAZINE HYDROCHLORIDE 25 MG/1
TABLET ORAL
Qty: 90 TABLET | Refills: 0 | Status: SHIPPED | OUTPATIENT
Start: 2024-05-01

## 2024-05-02 DIAGNOSIS — K21.9 GASTROESOPHAGEAL REFLUX DISEASE, UNSPECIFIED WHETHER ESOPHAGITIS PRESENT: Chronic | ICD-10-CM

## 2024-05-02 RX ORDER — PANTOPRAZOLE SODIUM 40 MG/1
40 TABLET, DELAYED RELEASE ORAL DAILY
Qty: 90 TABLET | Refills: 0 | Status: SHIPPED | OUTPATIENT
Start: 2024-05-02

## 2024-05-03 NOTE — TELEPHONE ENCOUNTER
Rx Refill Note  Requested Prescriptions     Pending Prescriptions Disp Refills    potassium chloride (Klor-Con M20) 20 MEQ CR tablet 180 tablet 1     Sig: Take 1 tablet by mouth 2 (Two) Times a Day.      Last office visit with prescribing clinician: 1/18/2024   Last telemedicine visit with prescribing clinician: Visit date not found   Next office visit with prescribing clinician: 7/18/2024  Last refill sent: 11/04/23, #180, 1 refills.   COMPREHENSIVE METABOLIC PANEL (04/10/2024 10:41)     Vaishnavi Keller LPN  05/03/24, 16:30 EDT

## 2024-05-04 RX ORDER — POTASSIUM CHLORIDE 20 MEQ/1
20 TABLET, EXTENDED RELEASE ORAL EVERY OTHER DAY
Qty: 90 TABLET | Refills: 1 | Status: SHIPPED | OUTPATIENT
Start: 2024-05-04

## 2024-05-06 DIAGNOSIS — J30.9 ALLERGIC RHINITIS, UNSPECIFIED SEASONALITY, UNSPECIFIED TRIGGER: ICD-10-CM

## 2024-05-06 RX ORDER — MONTELUKAST SODIUM 10 MG/1
10 TABLET ORAL DAILY
Qty: 90 TABLET | Refills: 1 | Status: SHIPPED | OUTPATIENT
Start: 2024-05-06

## 2024-05-06 RX ORDER — AMIODARONE HYDROCHLORIDE 100 MG/1
100 TABLET ORAL EVERY OTHER DAY
Qty: 45 TABLET | Refills: 1 | OUTPATIENT
Start: 2024-05-06

## 2024-05-07 ENCOUNTER — TELEPHONE (OUTPATIENT)
Dept: FAMILY MEDICINE CLINIC | Age: 74
End: 2024-05-07
Payer: MEDICARE

## 2024-05-07 RX ORDER — POTASSIUM CHLORIDE 20 MEQ/1
20 TABLET, EXTENDED RELEASE ORAL DAILY
Start: 2024-05-07

## 2024-05-14 ENCOUNTER — CLINICAL SUPPORT (OUTPATIENT)
Dept: FAMILY MEDICINE CLINIC | Age: 74
End: 2024-05-14
Payer: MEDICARE

## 2024-05-14 DIAGNOSIS — J30.9 ALLERGIC RHINITIS, UNSPECIFIED SEASONALITY, UNSPECIFIED TRIGGER: Primary | ICD-10-CM

## 2024-05-14 PROCEDURE — 95115 IMMUNOTHERAPY ONE INJECTION: CPT | Performed by: FAMILY MEDICINE

## 2024-05-16 ENCOUNTER — OFFICE VISIT (OUTPATIENT)
Dept: FAMILY MEDICINE CLINIC | Age: 74
End: 2024-05-16
Payer: MEDICARE

## 2024-05-16 VITALS
SYSTOLIC BLOOD PRESSURE: 152 MMHG | HEIGHT: 61 IN | BODY MASS INDEX: 37.72 KG/M2 | TEMPERATURE: 98.1 F | HEART RATE: 56 BPM | DIASTOLIC BLOOD PRESSURE: 71 MMHG | WEIGHT: 199.8 LBS

## 2024-05-16 DIAGNOSIS — M25.531 ACUTE PAIN OF RIGHT WRIST: Primary | ICD-10-CM

## 2024-05-16 DIAGNOSIS — I87.2 VENOUS INSUFFICIENCY: ICD-10-CM

## 2024-05-16 PROBLEM — Z86.010 HISTORY OF COLON POLYPS: Status: ACTIVE | Noted: 2024-05-16

## 2024-05-16 PROBLEM — R60.0 PEDAL EDEMA: Status: RESOLVED | Noted: 2024-03-27 | Resolved: 2024-05-16

## 2024-05-16 PROBLEM — Z86.0100 HISTORY OF COLON POLYPS: Status: ACTIVE | Noted: 2024-05-16

## 2024-05-16 PROBLEM — D50.0 IRON DEFICIENCY ANEMIA DUE TO CHRONIC BLOOD LOSS: Status: RESOLVED | Noted: 2023-11-29 | Resolved: 2024-05-16

## 2024-05-16 PROCEDURE — 99213 OFFICE O/P EST LOW 20 MIN: CPT | Performed by: FAMILY MEDICINE

## 2024-05-16 PROCEDURE — 1160F RVW MEDS BY RX/DR IN RCRD: CPT | Performed by: FAMILY MEDICINE

## 2024-05-16 PROCEDURE — 1125F AMNT PAIN NOTED PAIN PRSNT: CPT | Performed by: FAMILY MEDICINE

## 2024-05-16 PROCEDURE — G2211 COMPLEX E/M VISIT ADD ON: HCPCS | Performed by: FAMILY MEDICINE

## 2024-05-16 PROCEDURE — 1159F MED LIST DOCD IN RCRD: CPT | Performed by: FAMILY MEDICINE

## 2024-05-16 RX ORDER — POTASSIUM CHLORIDE 20 MEQ/1
20 TABLET, EXTENDED RELEASE ORAL DAILY
Qty: 90 TABLET | Refills: 3
Start: 2024-05-16

## 2024-05-16 RX ORDER — PREDNISONE 20 MG/1
20 TABLET ORAL 2 TIMES DAILY
Qty: 10 TABLET | Refills: 0 | Status: SHIPPED | OUTPATIENT
Start: 2024-05-16

## 2024-05-16 NOTE — PROGRESS NOTES
Chief Complaint  Hand Pain (Right hand pain and swelling)    Subjective          Anne Jewell presents to [facility]  History of Present Illness  --HISTORY OF SEVERE O.A. AND R.A.  FOLLOWED BY ORTHO.  [facility] TWO DAYS AGO WITH PAIN AND SWELLING OF THE RIGHT WRIST.  [facility] SHOWS DJD AND SOFT TISSUE SWELLING.  HAS ORTHO APPT IN TWO WEEKS.    --THE VENOUS INSUFFICIENCY IS DOING WELL WITH THE LASIX, NEEDS A REFILL OF THE POTASSIUM  --RECENT COLONOSCOPY SHOWED TWO POLYPS          Allergies   Allergen Reactions    Penicillins Hives        Health Maintenance Due   Topic Date Due    RSV Vaccine - Adults (1 - 1-dose 60+ series) Never done    COVID-19 Vaccine (5 - 2023-24 season) 09/01/2023    DXA SCAN  03/31/2024    ANNUAL WELLNESS VISIT  04/19/2024        Current Outpatient Medications on File Prior to Visit   Medication Sig    albuterol sulfate  (90 Base) MCG/ACT inhaler Inhale 2 puffs Every 6 (Six) Hours As Needed for Wheezing. Proventil  (90 Base) MCG/ACT Inhalation Aerosol Solution; Patient Sig: Proventil  (90 Base) MCG/ACT Inhalation Aerosol Solution Inhale 2 puff(s) every 6 to 8 hours as needed; 6.7; 0; 05-Apr-2013; Active    ALPRAZolam (XANAX) 2 MG tablet Take 1 tablet by mouth As Needed for Anxiety.    baclofen (LIORESAL) 10 MG tablet TAKE ONE TABLET BY MOUTH THREE TIMES A DAY    Calcium Citrate-Vitamin D3 (CITRACAL) 315-6.25 MG-MCG tablet tablet Take 1 tablet by mouth Daily.    Cholecalciferol (VITAMIN D3) 2000 units capsule TAKE ONE CAPSULE BY MOUTH DAILY    Cyanocobalamin (B-12) 1000 MCG sublingual tablet PLACE 1 TABLET UNDER THE TONGUE AND LET DISSOLVE ONCE DAILY    dicyclomine (BENTYL) 20 MG tablet Take 1 tablet by mouth Every 6 (Six) Hours As Needed (diarrhea).    dronabinol (MARINOL) 5 MG capsule Take 1 capsule by mouth.    EPINEPHrine (EPIPEN) 0.3 MG/0.3ML solution auto-injector injection     escitalopram (LEXAPRO) 20 MG  tablet Take 1 tablet by mouth Daily.    furosemide (LASIX) 20 MG tablet Take 1 tablet by mouth Daily.    levothyroxine (SYNTHROID, LEVOTHROID) 100 MCG tablet TAKE 1 TABLET BY MOUTH DAILY    Magnesium Oxide -Mg Supplement 400 (240 Mg) MG tablet Take 1 tablet by mouth 2 (Two) Times a Day.    metoprolol tartrate (LOPRESSOR) 25 MG tablet Take 1 tablet by mouth Every 12 (Twelve) Hours.    montelukast (SINGULAIR) 10 MG tablet TAKE 1 TABLET BY MOUTH DAILY    Multiple Vitamin tablet Take 1 tablet by mouth Daily.    Omega 3-6-9 capsule Take 1 capsule by mouth Daily.    oxyCODONE ER (oxyCONTIN) 80 MG tablet extended-release 12 hour 12 hr tablet Take 2 tablets by mouth Every 12 (Twelve) Hours.    pantoprazole (PROTONIX) 40 MG EC tablet TAKE 1 TABLET BY MOUTH DAILY    promethazine (PHENERGAN) 25 MG tablet TAKE 1 TABLET BY MOUTH EVERY 8 HOURS AS NEEDED FOR NAUSEA AND/OR VOMITING    Xarelto 20 MG tablet TAKE 1 TABLET BY MOUTH DAILY    [DISCONTINUED] potassium chloride (Klor-Con M20) 20 MEQ CR tablet Take 1 tablet by mouth Daily. TAKE DAILY WITH LASIX     No current facility-administered medications on file prior to visit.       Immunization History   Administered Date(s) Administered    COVID-19 (PFIZER) BIVALENT 12+YRS 10/05/2022    COVID-19 (PFIZER) Purple Cap Monovalent 02/23/2021, 03/16/2021, 08/25/2021    FluMist 2-49yrs 09/22/2014    Fluad Quad 65+ 10/17/2023    Fluzone High Dose =>65 Years ([facility] ONLY) 10/17/2017, 10/08/2019, 09/02/2020, 09/20/2021, 09/21/2022    Fluzone High-Dose 65+yrs 09/12/2020, 09/20/2021, 09/21/2022    Influenza, Unspecified 10/29/2021    Pneumococcal Conjugate 13-Valent (PCV13) 08/08/2017    Pneumococcal Polysaccharide (PPSV23) 09/06/2019    Shingrix 04/29/2023    Tdap 08/08/2017, 12/21/2023       Review of Systems   Constitutional:  Negative for activity change, appetite change, chills, fatigue and fever.   HENT:  Negative for congestion, ear pain, rhinorrhea and sore throat.    Respiratory:   "Negative for cough and shortness of breath.    Cardiovascular:  Negative for chest pain, palpitations and leg swelling.   Gastrointestinal:  Negative for abdominal pain, constipation, diarrhea, nausea and vomiting.   Musculoskeletal:  Positive for arthralgias. Negative for myalgias.   Neurological:  Negative for headache.        Objective     /71 (BP Location: Left arm, Patient Position: Sitting)   Pulse 56   Temp 98.1 °F (36.7 °C) (Oral)   Ht 154.9 cm (61\")   Wt 90.6 kg (199 lb 12.8 oz)   BMI 37.75 kg/m²       Physical Exam  Vitals and nursing note reviewed.   Constitutional:       General: She is not in acute distress.     Appearance: Normal appearance.   Cardiovascular:      Rate and Rhythm: Normal rate and regular rhythm.      Heart sounds: Normal heart sounds. No murmur heard.  Pulmonary:      Effort: Pulmonary effort is normal.      Breath sounds: Normal breath sounds.   Abdominal:      Palpations: Abdomen is soft.      Tenderness: There is no abdominal tenderness.   Musculoskeletal:      Cervical back: Neck supple.      Right lower leg: No edema.      Left lower leg: No edema.      Comments: RIGHT WRIST WARM AND SWOLLEN, NO ERYTHEMA.  LIMITED ROM.  GOOD DISTAL SENSATION AND CAP REFILL.     Lymphadenopathy:      Cervical: No cervical adenopathy.   Neurological:      General: No focal deficit present.      Mental Status: She is alert.      Cranial Nerves: No cranial nerve deficit.      Coordination: Coordination normal.      Gait: Gait normal.   Psychiatric:         Mood and Affect: Mood normal.         Behavior: Behavior normal.         Result Review :                             Assessment and Plan      Diagnoses and all orders for this visit:    1. Acute pain of right wrist (Primary)  Comments:  NONSPECIFIC, POSSIBLE GOUT FLARE.  X-RAY REPORT REVIEWED.  WILL COVER AS NOTED.  F/U WITH ORTHO AS PLANNED  Orders:  -     predniSONE (DELTASONE) 20 MG tablet; Take 1 tablet by mouth 2 (Two) Times a Day.  " Dispense: 10 tablet; Refill: 0    2. Venous insufficiency  Assessment & Plan:  IMPROVED WITH CURRENT TREATMENT, CONTINUE SAME, WILL REEVALUATE AT NEXT VISIT     Orders:  -     potassium chloride (Klor-Con M20) 20 MEQ CR tablet; Take 1 tablet by mouth Daily. TAKE DAILY WITH LASIX  Dispense: 90 tablet; Refill: 3            Follow Up     No follow-ups on file.    Patient was given instructions and counseling regarding her condition or for health maintenance advice. Please see specific information pulled into the AVS if appropriate.

## 2024-05-20 ENCOUNTER — TELEPHONE (OUTPATIENT)
Dept: FAMILY MEDICINE CLINIC | Age: 74
End: 2024-05-20
Payer: MEDICARE

## 2024-05-20 NOTE — TELEPHONE ENCOUNTER
PLEASE CALL THIS PATIENT ON MONDAY (5/20) TO SEE HOW HER WRIST PAIN IS DOING   
Pt said the medication is helping a lot. Its not normal but better.  She will have Dr Alvarado look at it at her next appt with him in June.   
2 = assistive person

## 2024-05-22 ENCOUNTER — CLINICAL SUPPORT (OUTPATIENT)
Dept: FAMILY MEDICINE CLINIC | Age: 74
End: 2024-05-22
Payer: MEDICARE

## 2024-05-22 DIAGNOSIS — J30.9 ALLERGIC RHINITIS, UNSPECIFIED SEASONALITY, UNSPECIFIED TRIGGER: Primary | ICD-10-CM

## 2024-05-22 PROCEDURE — 95115 IMMUNOTHERAPY ONE INJECTION: CPT | Performed by: FAMILY MEDICINE

## 2024-05-28 ENCOUNTER — CLINICAL SUPPORT (OUTPATIENT)
Dept: FAMILY MEDICINE CLINIC | Age: 74
End: 2024-05-28
Payer: MEDICARE

## 2024-05-28 DIAGNOSIS — G89.4 CHRONIC PAIN SYNDROME: Chronic | ICD-10-CM

## 2024-05-28 DIAGNOSIS — R11.0 NAUSEA: ICD-10-CM

## 2024-05-28 DIAGNOSIS — J30.9 ALLERGIC RHINITIS, UNSPECIFIED SEASONALITY, UNSPECIFIED TRIGGER: Primary | ICD-10-CM

## 2024-05-28 PROCEDURE — 95115 IMMUNOTHERAPY ONE INJECTION: CPT | Performed by: FAMILY MEDICINE

## 2024-05-28 NOTE — TELEPHONE ENCOUNTER
Rx Refill Note  Requested Prescriptions     Pending Prescriptions Disp Refills    promethazine (PHENERGAN) 25 MG tablet [Pharmacy Med Name: PROMETHAZINE 25 MG TABLET] 90 tablet 0     Sig: TAKE 1 TABLET BY MOUTH EVERY 8 HOURS AS NEEDED FOR NAUSEA AND/OR VOMITING      Last office visit with prescribing clinician: 5/16/2024   Last telemedicine visit with prescribing clinician: Visit date not found   Next office visit with prescribing clinician: 7/18/2024       Peri Malin LPN  05/28/24, 12:46 EDT    -5/1/24 #90, RF-0

## 2024-05-29 RX ORDER — PROMETHAZINE HYDROCHLORIDE 25 MG/1
TABLET ORAL
Qty: 90 TABLET | Refills: 0 | OUTPATIENT
Start: 2024-05-29

## 2024-05-30 ENCOUNTER — OFFICE VISIT (OUTPATIENT)
Dept: PAIN MEDICINE | Facility: CLINIC | Age: 74
End: 2024-05-30
Payer: MEDICARE

## 2024-05-30 VITALS
SYSTOLIC BLOOD PRESSURE: 143 MMHG | OXYGEN SATURATION: 97 % | DIASTOLIC BLOOD PRESSURE: 64 MMHG | TEMPERATURE: 94.6 F | WEIGHT: 196.4 LBS | HEART RATE: 54 BPM | HEIGHT: 61 IN | BODY MASS INDEX: 37.08 KG/M2

## 2024-05-30 DIAGNOSIS — Z79.899 ENCOUNTER FOR LONG-TERM (CURRENT) USE OF HIGH-RISK MEDICATION: ICD-10-CM

## 2024-05-30 DIAGNOSIS — M51.36 DEGENERATION OF INTERVERTEBRAL DISC OF LUMBAR REGION: ICD-10-CM

## 2024-05-30 DIAGNOSIS — M06.9 RHEUMATOID ARTHRITIS, INVOLVING UNSPECIFIED SITE, UNSPECIFIED WHETHER RHEUMATOID FACTOR PRESENT: ICD-10-CM

## 2024-05-30 DIAGNOSIS — M15.9 GENERALIZED OSTEOARTHRITIS: Primary | ICD-10-CM

## 2024-05-30 DIAGNOSIS — G89.4 CHRONIC PAIN SYNDROME: ICD-10-CM

## 2024-05-30 DIAGNOSIS — M54.16 LUMBAR RADICULOPATHY: ICD-10-CM

## 2024-05-30 DIAGNOSIS — C16.9 GASTRIC ADENOCARCINOMA: ICD-10-CM

## 2024-05-30 DIAGNOSIS — G89.4 CHRONIC PAIN SYNDROME: Chronic | ICD-10-CM

## 2024-05-30 DIAGNOSIS — R11.0 NAUSEA: ICD-10-CM

## 2024-05-30 RX ORDER — OXYCODONE HCL 80 MG/1
160 TABLET, FILM COATED, EXTENDED RELEASE ORAL EVERY 12 HOURS SCHEDULED
Qty: 120 TABLET | Refills: 0 | Status: SHIPPED | OUTPATIENT
Start: 2024-05-30

## 2024-05-30 NOTE — PROGRESS NOTES
CHIEF COMPLAINT  Back and joint pain    Subjective   Anne Jewell is a 73 y.o. female  who presents for follow-up.  She has a history of chronic back and joint pain. She reports that her pain has remained consistent since her last office visit.    Today pain is 7/10VAS in severity. Pain is located in her low back and radiates to bilateral feet. Describes this pain as a nearly continuous aching. Pain is worsened by rising from sitting to standing position, stress, weather changes, prolonged position, and walking long distances. Pain improves with rest/reposition, HEP, aqua therapy, heat/ice, and medications. She is currently receiving at home OT and PT.     Continues with OxyContin 80mg 2 tablets BID and Baclofen 10mg 2-3/day (managed by PCP). Denies any side effects from the regimen, including constipation and somnolence. The regimen helps decrease pain by a significant amount. Notes improvement in activity and function with regimen. ADL's by self. Denies any bowel or bladder changes. Patient reports that she takes Xanax 2mg TID as needed. This is prescribed by psychiatrist.      She was admitted to the hospital from 8/25/23 - 9/15/23 where she was diagnosed with adenocarcinoma. She was seen by Dr. Stuart Borges on 9/19/23 with [facility].  PET scan on 10/6/23 showed no evidence of metastatic disease. No plans for chemotherapy at this time per office note from Dr. Slava Loo from 10/12/23. She takes Dronabinol PRN that was prescribed by her hematologist/oncologist to help stimulate her appetite and for N/V.  She takes this medication sparingly.      Anticoagulated on Xarelto     Recently diagnosed with calcium pyrophosphate deposition disease. X-ray of right wrist performed on 5/15/24. She saw Dr. Dean on 5/16/24 in regards to this pain. He noted possible gout flare. She was prescribed Prednisone and instructed to follow up with Dr. Alvarado on 6/12/24.     Back Pain  This is a chronic  problem. The current episode started more than 1 year ago. The problem occurs constantly. The problem has been comes and goes since onset. The pain is present in the lumbar spine. The quality of the pain is described as aching. The pain radiates to the right foot and left foot. The pain is at a severity of 7/10. The pain is moderate. The symptoms are aggravated by standing, position and sitting (rising from sitting to standing, prolonged position, walking long distances, stress, weather changes). Pertinent negatives include no abdominal pain, chest pain, dysuria, fever, headaches, numbness or weakness. She has tried analgesics, home exercises, heat and ice (aqua therapy) for the symptoms. The treatment provided moderate relief.   Joint Pain  This is a chronic (7/10 VAS in severity) problem. The current episode started more than 1 year ago. The problem occurs constantly. The problem has been unchanged. Associated symptoms include arthralgias, fatigue, myalgias and neck pain. Pertinent negatives include no abdominal pain, chest pain, chills, congestion, coughing, fever, headaches, numbness, vertigo, vomiting or weakness. The symptoms are aggravated by stress and exertion (weather changes, increased physical activity,). She has tried oral narcotics, position changes, rest, heat and ice for the symptoms. The treatment provided mild relief.     PEG Assessment   What number best describes your pain on average in the past week?8  What number best describes how, during the past week, pain has interfered with your enjoyment of life?8  What number best describes how, during the past week, pain has interfered with your general activity?  8    Review of Pertinent Medical Data ---      The following portions of the patient's history were reviewed and updated as appropriate: allergies, current medications, past family history, past medical history, past social history, past surgical history, and problem list.    Review of Systems  "  Constitutional:  Positive for activity change and fatigue. Negative for chills and fever.   HENT:  Negative for congestion.    Respiratory:  Negative for cough.    Cardiovascular:  Negative for chest pain.   Gastrointestinal:  Negative for abdominal pain, constipation, diarrhea and vomiting.   Genitourinary:  Negative for difficulty urinating and dysuria.   Musculoskeletal:  Positive for arthralgias, back pain, myalgias and neck pain.   Neurological:  Negative for dizziness, vertigo, weakness, light-headedness, numbness and headaches.   Psychiatric/Behavioral:  Negative for agitation, sleep disturbance and suicidal ideas. The patient is not nervous/anxious.      I have reviewed and confirmed the accuracy of the ROS as documented by the MA/VALENTÍNN/RN EUGENIO Ayala    Vitals:    05/30/24 1421   BP: 143/64   BP Location: Left arm   Patient Position: Sitting   Cuff Size: Large Adult   Pulse: 54   Temp: 94.6 °F (34.8 °C)   SpO2: 97%   Weight: 89.1 kg (196 lb 6.4 oz)   Height: 154.9 cm (61\")   PainSc:   8     Objective   Physical Exam  Constitutional:       Appearance: Normal appearance.   HENT:      Head: Normocephalic.   Cardiovascular:      Rate and Rhythm: Normal rate and regular rhythm.   Pulmonary:      Effort: Pulmonary effort is normal.      Breath sounds: Normal breath sounds.   Musculoskeletal:      Right shoulder: Tenderness present.      Left shoulder: Tenderness present.      Right wrist: Tenderness present.      Left wrist: Tenderness present.      Right hand: Tenderness present.      Left hand: Tenderness present.      Cervical back: Normal range of motion.      Lumbar back: Tenderness present. Decreased range of motion.      Right hip: Tenderness present.      Left hip: Tenderness present.      Right knee: Tenderness present.      Left knee: Tenderness present.      Comments: Back brace for support in place   Skin:     General: Skin is warm and dry.      Capillary Refill: Capillary refill takes less " than 2 seconds.   Neurological:      General: No focal deficit present.      Mental Status: She is alert and oriented to person, place, and time.      Gait: Gait abnormal (slowed, ambulating with rolling walker).   Psychiatric:         Mood and Affect: Mood normal.         Behavior: Behavior normal.         Thought Content: Thought content normal.         Cognition and Memory: Cognition normal.       Assessment & Plan   Diagnoses and all orders for this visit:    1. Generalized osteoarthritis (Primary)  -     Urine Drug Screen Confirmation - Urine, Clean Catch; Future  -     POC Urine Drug Screen, Triage    2. Lumbar radiculopathy  -     Urine Drug Screen Confirmation - Urine, Clean Catch; Future  -     POC Urine Drug Screen, Triage    3. Chronic pain syndrome  -     Urine Drug Screen Confirmation - Urine, Clean Catch; Future  -     POC Urine Drug Screen, Triage    4. Rheumatoid arthritis, involving unspecified site, unspecified whether rheumatoid factor present  -     Urine Drug Screen Confirmation - Urine, Clean Catch; Future  -     POC Urine Drug Screen, Triage    5. Degeneration of intervertebral disc of lumbar region  -     Urine Drug Screen Confirmation - Urine, Clean Catch; Future  -     POC Urine Drug Screen, Triage    6. Gastric adenocarcinoma  -     Urine Drug Screen Confirmation - Urine, Clean Catch; Future  -     POC Urine Drug Screen, Triage    7. Encounter for long-term (current) use of high-risk medication  -     Urine Drug Screen Confirmation - Urine, Clean Catch; Future  -     POC Urine Drug Screen, Triage      Anne Jewell reports a pain score of 8.  Given her pain assessment as noted, treatment options were discussed and the following options were decided upon as a follow-up plan to address the patient's pain: continuation of current treatment plan for pain and prescription for opiod analgesics.    --- Routine UDS in office today as part of monitoring requirements for controlled substances.  The  specimen was viewed and the immunoassay result reviewed and is +OXY,OPI, & BZO.  This specimen will be sent to [facility] for confirmation.      --- The patient signed an updated copy of the controlled substance agreement on 11/20/23  --- Narcan prescription current   --- Continue OxyContin. Patient appears stable with current regimen. No adverse effects. Regarding continuation of opioids, there is no evidence of aberrant behavior or any red flags.  The patient continues with appropriate response to opioid therapy. ADL's remain intact by self.   --- Follow-up 1 month     VICENTA REPORT  As part of the patient's treatment plan, I am prescribing controlled substances. The patient has been made aware of appropriate use of such medications, including potential risk of somnolence, limited ability to drive and/or work safely, and the potential for dependence or overdose. It has also been made clear that these medications are for use by this patient only, without concomitant use of alcohol or other substances unless prescribed.     Patient has completed prescribing agreement detailing terms of continued prescribing of controlled substances, including monitoring VICENTA reports, urine drug screening, and pill counts if necessary. The patient is aware that inappropriate use will results in cessation of prescribing such medications.    As the clinician, I personally reviewed the VICENTA from 5/30/24 while the patient was in the office today.    History and physical exam exhibit continued safe and appropriate use of controlled substances.    Dictated utilizing Dragon dictation.

## 2024-05-30 NOTE — TELEPHONE ENCOUNTER
Patient seen in office today. Reviewed UDS and VICENTA. Both updated and appropriate. Refill appropriate.  Please refill.

## 2024-05-31 NOTE — TELEPHONE ENCOUNTER
Rx Refill Note  Requested Prescriptions     Pending Prescriptions Disp Refills    promethazine (PHENERGAN) 25 MG tablet [Pharmacy Med Name: PROMETHAZINE 25 MG TABLET] 90 tablet 0     Sig: TAKE 1 TABLET BY MOUTH EVERY 8 HOURS AS NEEDED FOR NAUSEA AND/OR VOMITING      Last office visit with prescribing clinician: 5/16/2024   Last telemedicine visit with prescribing clinician: Visit date not found   Next office visit with prescribing clinician: 7/18/2024    Pt said she has to have this phenergan to take along with the oxycodone.  She has taken it for 24 yrs.       Vaishnavi Keller LPN  05/31/24, 16:06 EDT

## 2024-06-01 RX ORDER — PROMETHAZINE HYDROCHLORIDE 25 MG/1
25 TABLET ORAL EVERY 8 HOURS PRN
Qty: 90 TABLET | Refills: 2 | Status: SHIPPED | OUTPATIENT
Start: 2024-06-01

## 2024-06-04 ENCOUNTER — CLINICAL SUPPORT (OUTPATIENT)
Dept: FAMILY MEDICINE CLINIC | Age: 74
End: 2024-06-04
Payer: MEDICARE

## 2024-06-04 DIAGNOSIS — J30.9 ALLERGIC RHINITIS, UNSPECIFIED SEASONALITY, UNSPECIFIED TRIGGER: Primary | ICD-10-CM

## 2024-06-04 PROCEDURE — 95115 IMMUNOTHERAPY ONE INJECTION: CPT | Performed by: FAMILY MEDICINE

## 2024-06-18 ENCOUNTER — CLINICAL SUPPORT (OUTPATIENT)
Dept: FAMILY MEDICINE CLINIC | Age: 74
End: 2024-06-18
Payer: MEDICARE

## 2024-06-18 DIAGNOSIS — J30.9 ALLERGIC RHINITIS, UNSPECIFIED SEASONALITY, UNSPECIFIED TRIGGER: Primary | ICD-10-CM

## 2024-06-18 PROCEDURE — 95115 IMMUNOTHERAPY ONE INJECTION: CPT | Performed by: FAMILY MEDICINE

## 2024-06-26 ENCOUNTER — CLINICAL SUPPORT (OUTPATIENT)
Dept: FAMILY MEDICINE CLINIC | Age: 74
End: 2024-06-26
Payer: MEDICARE

## 2024-06-26 DIAGNOSIS — J30.9 ALLERGIC RHINITIS, UNSPECIFIED SEASONALITY, UNSPECIFIED TRIGGER: Primary | ICD-10-CM

## 2024-06-26 PROCEDURE — 95115 IMMUNOTHERAPY ONE INJECTION: CPT | Performed by: FAMILY MEDICINE

## 2024-06-27 ENCOUNTER — OFFICE VISIT (OUTPATIENT)
Dept: PAIN MEDICINE | Facility: CLINIC | Age: 74
End: 2024-06-27
Payer: MEDICARE

## 2024-06-27 VITALS
OXYGEN SATURATION: 98 % | HEIGHT: 61 IN | HEART RATE: 61 BPM | SYSTOLIC BLOOD PRESSURE: 155 MMHG | TEMPERATURE: 98.8 F | WEIGHT: 198 LBS | BODY MASS INDEX: 37.38 KG/M2 | DIASTOLIC BLOOD PRESSURE: 78 MMHG

## 2024-06-27 DIAGNOSIS — Z79.899 ENCOUNTER FOR LONG-TERM (CURRENT) USE OF HIGH-RISK MEDICATION: ICD-10-CM

## 2024-06-27 DIAGNOSIS — C16.9 GASTRIC ADENOCARCINOMA: ICD-10-CM

## 2024-06-27 DIAGNOSIS — M06.9 RHEUMATOID ARTHRITIS, INVOLVING UNSPECIFIED SITE, UNSPECIFIED WHETHER RHEUMATOID FACTOR PRESENT: ICD-10-CM

## 2024-06-27 DIAGNOSIS — M54.16 LUMBAR RADICULOPATHY: ICD-10-CM

## 2024-06-27 DIAGNOSIS — M51.36 DEGENERATION OF INTERVERTEBRAL DISC OF LUMBAR REGION: ICD-10-CM

## 2024-06-27 DIAGNOSIS — G89.4 CHRONIC PAIN SYNDROME: ICD-10-CM

## 2024-06-27 DIAGNOSIS — M15.9 GENERALIZED OSTEOARTHRITIS: Primary | ICD-10-CM

## 2024-06-27 PROCEDURE — 1160F RVW MEDS BY RX/DR IN RCRD: CPT

## 2024-06-27 PROCEDURE — 1159F MED LIST DOCD IN RCRD: CPT

## 2024-06-27 PROCEDURE — 1125F AMNT PAIN NOTED PAIN PRSNT: CPT

## 2024-06-27 PROCEDURE — 99214 OFFICE O/P EST MOD 30 MIN: CPT

## 2024-06-27 RX ORDER — OXYCODONE HCL 80 MG/1
160 TABLET, FILM COATED, EXTENDED RELEASE ORAL EVERY 12 HOURS SCHEDULED
Qty: 120 TABLET | Refills: 0 | Status: SHIPPED | OUTPATIENT
Start: 2024-06-27

## 2024-06-27 NOTE — PROGRESS NOTES
CHIEF COMPLAINT  Back and joint pain    Subjective   Anne Jewell is a 73 y.o. female  who presents for follow-up.  She has a history of chronic back and joint pain. She reports that her pain has remained consistent since her last office visit and varies based on activity level.    Today pain is 7/10VAS in severity. Pain is located in her low back and radiates to bilateral feet. Describes this pain as a nearly continuous aching. Pain is worsened by rising from sitting to standing position, stress, weather changes, prolonged position, and walking long distances. Pain improves with rest/reposition, HEP, aqua therapy, heat/ice, and medications. She has completed home PT and OT in the past.     Continues with OxyContin 80mg 2 tablets BID and Baclofen 10mg 2-3/day (managed by PCP). Denies any side effects from the regimen, including constipation and somnolence. The regimen helps decrease pain by a significant amount. Notes improvement in activity and function with regimen. ADL's by self. Denies any bowel or bladder changes. Patient reports that she takes Xanax 2mg TID as needed. This is prescribed by psychiatrist.      She was admitted to the hospital from 8/25/23 - 9/15/23 where she was diagnosed with adenocarcinoma. She was seen by Dr. Stuart Borges on 9/19/23 with [facility].  PET scan on 10/6/23 showed no evidence of metastatic disease. No plans for chemotherapy at this time per office note from Dr. Slava Loo from 10/12/23. She takes Dronabinol PRN that was prescribed by her hematologist/oncologist to help stimulate her appetite and for N/V.  She takes this medication sparingly.      Anticoagulated on Xarelto      Recently diagnosed with calcium pyrophosphate deposition disease.    Back Pain  This is a chronic problem. The current episode started more than 1 year ago. The problem occurs constantly. The problem is unchanged. The pain is present in the lumbar spine. The quality of the pain is  described as aching. The pain radiates to the right foot and left foot. The pain is at a severity of 7/10. The pain is moderate. The symptoms are aggravated by standing, position and sitting (rising from sitting to standing, prolonged position, walking long distances, stress, weather changes). Pertinent negatives include no abdominal pain, chest pain, dysuria, fever, headaches, numbness or weakness. She has tried analgesics, home exercises, heat and ice (aqua therapy) for the symptoms. The treatment provided moderate relief.   Joint Pain  This is a chronic (7/10 VAS in severity) problem. The current episode started more than 1 year ago. The problem occurs constantly. The problem has been waxing and waning. Associated symptoms include arthralgias, myalgias and neck pain. Pertinent negatives include no abdominal pain, chest pain, chills, congestion, coughing, fatigue, fever, headaches, numbness, vertigo, vomiting or weakness. The symptoms are aggravated by stress and exertion (weather changes, increased physical activity,). She has tried oral narcotics, position changes, rest, heat and ice for the symptoms. The treatment provided mild relief.      PEG Assessment   What number best describes your pain on average in the past week?7-8  What number best describes how, during the past week, pain has interfered with your enjoyment of life?7-8  What number best describes how, during the past week, pain has interfered with your general activity?  7-8    The following portions of the patient's history were reviewed and updated as appropriate: allergies, current medications, past family history, past medical history, past social history, past surgical history, and problem list.    Review of Systems   Constitutional:  Negative for activity change, chills, fatigue and fever.   HENT:  Negative for congestion.    Respiratory:  Negative for cough.    Cardiovascular:  Negative for chest pain.   Gastrointestinal:  Negative for  "abdominal pain, constipation, diarrhea and vomiting.   Genitourinary:  Negative for dysuria.   Musculoskeletal:  Positive for arthralgias, back pain, myalgias and neck pain.   Neurological:  Negative for dizziness, vertigo, weakness, light-headedness, numbness and headaches.   Psychiatric/Behavioral:  Negative for agitation, sleep disturbance and suicidal ideas. The patient is not nervous/anxious.      I have reviewed and confirmed the accuracy of the ROS as documented by the MA/LPN/RN EUGENIO Ayala    Vitals:    06/27/24 1356   BP: 155/78   BP Location: Left arm   Patient Position: Sitting   Cuff Size: Adult   Pulse: 61   Temp: 98.8 °F (37.1 °C)   SpO2: 98%   Weight: 89.8 kg (198 lb)   Height: 154.9 cm (61\")   PainSc:   7     Objective   Physical Exam  Constitutional:       Appearance: Normal appearance.   HENT:      Head: Normocephalic.   Cardiovascular:      Rate and Rhythm: Normal rate and regular rhythm.   Pulmonary:      Effort: Pulmonary effort is normal.      Breath sounds: Normal breath sounds.   Musculoskeletal:      Right shoulder: Tenderness present.      Left shoulder: Tenderness present.      Right wrist: Tenderness present.      Left wrist: Tenderness present.      Right hand: Tenderness present.      Left hand: Tenderness present.      Cervical back: Normal range of motion.      Lumbar back: Tenderness present. Decreased range of motion.      Right hip: Tenderness present.      Left hip: Tenderness present.      Right knee: Tenderness present.      Left knee: Tenderness present.      Comments: Back brace for support in place   Skin:     General: Skin is warm and dry.      Capillary Refill: Capillary refill takes less than 2 seconds.   Neurological:      General: No focal deficit present.      Mental Status: She is alert and oriented to person, place, and time.      Gait: Gait abnormal (slowed, ambulating with rolling walker).   Psychiatric:         Mood and Affect: Mood normal.         " Behavior: Behavior normal.         Thought Content: Thought content normal.         Cognition and Memory: Cognition normal.       Assessment & Plan   Diagnoses and all orders for this visit:    1. Generalized osteoarthritis (Primary)    2. Lumbar radiculopathy    3. Chronic pain syndrome    4. Rheumatoid arthritis, involving unspecified site, unspecified whether rheumatoid factor present    5. Degeneration of intervertebral disc of lumbar region    6. Gastric adenocarcinoma    7. Encounter for long-term (current) use of high-risk medication      Anne Jewell reports a pain score of 7.  Given her pain assessment as noted, treatment options were discussed and the following options were decided upon as a follow-up plan to address the patient's pain: continuation of current treatment plan for pain and prescription for opiod analgesics.    --- The urine drug screen confirmation from 5/30/24 has been reviewed and the result is appropriate based on patient history and VICENTA report  -- The patient signed an updated copy of the controlled substance agreement on 11/20/23  --- Narcan prescription current   --- Continue OxyContin. DNF 6/29/24. Patient appears stable with current regimen. No adverse effects. Regarding continuation of opioids, there is no evidence of aberrant behavior or any red flags.  The patient continues with appropriate response to opioid therapy. ADL's remain intact by self.   --- Follow-up 1 month     VICENTA REPORT  As part of the patient's treatment plan, I am prescribing controlled substances. The patient has been made aware of appropriate use of such medications, including potential risk of somnolence, limited ability to drive and/or work safely, and the potential for dependence or overdose. It has also been made clear that these medications are for use by this patient only, without concomitant use of alcohol or other substances unless prescribed.     Patient has completed prescribing agreement  detailing terms of continued prescribing of controlled substances, including monitoring VICENTA reports, urine drug screening, and pill counts if necessary. The patient is aware that inappropriate use will results in cessation of prescribing such medications.    As the clinician, I personally reviewed the VICENTA from 6/27/24 while the patient was in the office today.    History and physical exam exhibit continued safe and appropriate use of controlled substances.    Dictated utilizing Dragon dictation.

## 2024-06-27 NOTE — TELEPHONE ENCOUNTER
Patient seen in office today. Reviewed UDS and VICENTA. Both updated and appropriate. Refill appropriate.  DNF 6/29/24. Please refill. Thanks.

## 2024-07-02 ENCOUNTER — CLINICAL SUPPORT (OUTPATIENT)
Dept: FAMILY MEDICINE CLINIC | Age: 74
End: 2024-07-02
Payer: MEDICARE

## 2024-07-02 DIAGNOSIS — J30.9 ALLERGIC RHINITIS, UNSPECIFIED SEASONALITY, UNSPECIFIED TRIGGER: Primary | ICD-10-CM

## 2024-07-02 PROCEDURE — 95115 IMMUNOTHERAPY ONE INJECTION: CPT | Performed by: FAMILY MEDICINE

## 2024-07-09 DIAGNOSIS — Z86.718 HISTORY OF DVT (DEEP VEIN THROMBOSIS): ICD-10-CM

## 2024-07-12 DIAGNOSIS — G89.4 CHRONIC PAIN SYNDROME: Chronic | ICD-10-CM

## 2024-07-12 RX ORDER — BACLOFEN 10 MG/1
10 TABLET ORAL 3 TIMES DAILY
Qty: 90 TABLET | Refills: 0 | Status: SHIPPED | OUTPATIENT
Start: 2024-07-12

## 2024-07-18 ENCOUNTER — OFFICE VISIT (OUTPATIENT)
Dept: FAMILY MEDICINE CLINIC | Age: 74
End: 2024-07-18
Payer: MEDICARE

## 2024-07-18 VITALS
SYSTOLIC BLOOD PRESSURE: 129 MMHG | BODY MASS INDEX: 36.97 KG/M2 | HEART RATE: 50 BPM | DIASTOLIC BLOOD PRESSURE: 74 MMHG | HEIGHT: 61 IN | WEIGHT: 195.8 LBS | OXYGEN SATURATION: 95 %

## 2024-07-18 DIAGNOSIS — Z86.718 HISTORY OF DVT (DEEP VEIN THROMBOSIS): ICD-10-CM

## 2024-07-18 DIAGNOSIS — J45.20 MILD INTERMITTENT ASTHMA, UNCOMPLICATED: ICD-10-CM

## 2024-07-18 DIAGNOSIS — J30.9 ALLERGIC RHINITIS, UNSPECIFIED SEASONALITY, UNSPECIFIED TRIGGER: ICD-10-CM

## 2024-07-18 DIAGNOSIS — E03.9 HYPOTHYROIDISM, ACQUIRED: ICD-10-CM

## 2024-07-18 DIAGNOSIS — K21.9 GERD WITHOUT ESOPHAGITIS: ICD-10-CM

## 2024-07-18 DIAGNOSIS — Z00.00 MEDICARE ANNUAL WELLNESS VISIT, SUBSEQUENT: Primary | ICD-10-CM

## 2024-07-18 NOTE — PROGRESS NOTES
Subjective   The ABCs of the Annual Wellness Visit  Medicare Wellness Visit      Anne Jewell is a 73 y.o. patient who presents for a Medicare Wellness Visit.    The following portions of the patient's history were reviewed and   updated as appropriate: allergies, current medications, past family history, past medical history, past social history, past surgical history, and problem list.    Compared to one year ago, the patient's physical   health is the same.  Compared to one year ago, the patient's mental   health is the same.    Recent Hospitalizations:  This patient has had a [facility] admission record on file within the last 365 days.  Current Medical Providers:  Patient Care Team:  Jameson Dean MD as PCP - General (Family Medicine)  Edilberto Lemus MD as Consulting Physician (Nephrology)  Rashaun Alvarado MD as Surgeon (Orthopedic Surgery)  Delma Montoya MD as Consulting Physician (Obstetrics and Gynecology)  Stuart Borges MD (Hematology and Oncology)  Isael Barker MD as Consulting Physician (Gastroenterology)  Haven Gr APRN as Nurse Practitioner (Nurse Practitioner)  Jemal Mathews MD as Consulting Physician (Cardiology)  Cindy Cárdenas APRN as Nurse Practitioner (Nurse Practitioner)    Outpatient Medications Prior to Visit   Medication Sig Dispense Refill    albuterol sulfate  (90 Base) MCG/ACT inhaler Inhale 2 puffs Every 6 (Six) Hours As Needed for Wheezing. Proventil  (90 Base) MCG/ACT Inhalation Aerosol Solution; Patient Sig: Proventil  (90 Base) MCG/ACT Inhalation Aerosol Solution Inhale 2 puff(s) every 6 to 8 hours as needed; 6.7; 0; 05-Apr-2013; Active 8 g 0    ALPRAZolam (XANAX) 2 MG tablet Take 1 tablet by mouth As Needed for Anxiety.      baclofen (LIORESAL) 10 MG tablet Take 1 tablet by mouth 3 (Three) Times a Day. 90 tablet 0    Calcium Citrate-Vitamin D3 (CITRACAL) 315-6.25 MG-MCG tablet tablet Take 1 tablet by mouth  Daily.      Cholecalciferol (VITAMIN D3) 2000 units capsule TAKE ONE CAPSULE BY MOUTH DAILY 30 capsule 9    Cyanocobalamin (B-12) 1000 MCG sublingual tablet PLACE 1 TABLET UNDER THE TONGUE AND LET DISSOLVE ONCE DAILY 30 each 1    dicyclomine (BENTYL) 20 MG tablet Take 1 tablet by mouth Every 6 (Six) Hours As Needed (diarrhea). 90 tablet 1    dronabinol (MARINOL) 5 MG capsule Take 1 capsule by mouth.      EPINEPHrine (EPIPEN) 0.3 MG/0.3ML solution auto-injector injection       escitalopram (LEXAPRO) 20 MG tablet Take 1 tablet by mouth Daily.      furosemide (LASIX) 20 MG tablet Take 1 tablet by mouth Daily. 90 tablet 1    levothyroxine (SYNTHROID, LEVOTHROID) 100 MCG tablet TAKE 1 TABLET BY MOUTH DAILY 90 tablet 3    Magnesium Oxide -Mg Supplement 400 (240 Mg) MG tablet Take 1 tablet by mouth 2 (Two) Times a Day. 180 tablet 1    metoprolol tartrate (LOPRESSOR) 25 MG tablet Take 1 tablet by mouth Every 12 (Twelve) Hours. 180 tablet 1    montelukast (SINGULAIR) 10 MG tablet TAKE 1 TABLET BY MOUTH DAILY 90 tablet 1    Multiple Vitamin tablet Take 1 tablet by mouth Daily.      Omega 3-6-9 capsule Take 1 capsule by mouth Daily.      oxyCODONE ER (oxyCONTIN) 80 MG tablet extended-release 12 hour 12 hr tablet Take 2 tablets by mouth Every 12 (Twelve) Hours. 30 day supply. [facility] 6/29/24 120 tablet 0    pantoprazole (PROTONIX) 40 MG EC tablet TAKE 1 TABLET BY MOUTH DAILY 90 tablet 0    potassium chloride (Klor-Con M20) 20 MEQ CR tablet Take 1 tablet by mouth Daily. TAKE DAILY WITH LASIX 90 tablet 3    promethazine (PHENERGAN) 25 MG tablet Take 1 tablet by mouth Every 8 (Eight) Hours As Needed for Nausea or Vomiting. 90 tablet 2    rivaroxaban (Xarelto) 20 MG tablet Take 1 tablet by mouth Daily. 30 tablet 0    predniSONE (DELTASONE) 20 MG tablet Take 1 tablet by mouth 2 (Two) Times a Day. 10 tablet 0     No facility-administered medications prior to visit.     Opioid medication/s are on active medication list.  and I have  "evaluated her active treatment plan and pain score trends (see table).  Vitals:    07/18/24 1412   PainSc:   6   PainLoc: Back     I have reviewed the chart for potential of high risk medication and harmful drug interactions in the elderly.        Aspirin is not on active medication list.  Aspirin use is contraindicated for this patient due to: ERROR.  .    Patient Active Problem List   Diagnosis    Rheumatoid arthritis    Generalized osteoarthritis    Chronic midline low back pain without sciatica    Other allergic rhinitis    Dysthymic disorder (Psychiatry)    GERD without esophagitis    Hypothyroidism, acquired    Postmenopausal osteoporosis    Vitamin D deficiency    Irritable bowel syndrome with diarrhea    Medicare annual wellness visit, subsequent    History of pulmonary embolism and recurrent DVT's    Paroxysmal atrial fibrillation    History of stomach cancer    Bariatric surgery status    Hypomagnesemia    Venous insufficiency    Low serum iron (Hematology)    Mild intermittent asthma, uncomplicated    History of colon polyps (last scope was in 2024)     Advance Care Planning (Click this link to access ACP Navigator)  Advance Directive is not on file.  ACP discussion was held with the patient during this visit. Patient does not have an advance directive, information provided.        Objective   Vitals:    07/18/24 1412   BP: 129/74   BP Location: Left arm   Patient Position: Sitting   Pulse: 50   SpO2: 95%  Comment: on room air   Weight: 88.8 kg (195 lb 12.8 oz)   Height: 154.9 cm (61\")   PainSc:   6   PainLoc: Back       Estimated body mass index is 37 kg/m² as calculated from the following:    Height as of this encounter: 154.9 cm (61\").    Weight as of this encounter: 88.8 kg (195 lb 12.8 oz).             Does the patient have evidence of cognitive impairment? No                                                                                                Health  Risk Assessment    Smoking " Status:  Social History     Tobacco Use   Smoking Status Former    Current packs/day: 0.00    Average packs/day: 0.5 packs/day for 25.0 years (12.5 ttl pk-yrs)    Types: Cigarettes    Start date: 1980    Quit date: 2005    Years since quittin.8    Passive exposure: Past   Smokeless Tobacco Never     Alcohol Consumption:  Social History     Substance and Sexual Activity   Alcohol Use Never     Fall Risk Screen:  FILEMONADI Fall Risk Assessment was completed, and patient is at HIGH risk for falls. Assessment completed on:2024    Depression Screenin/18/2024     2:10 PM   PHQ-2/PHQ-9 Depression Screening   Little Interest or Pleasure in Doing Things 0-->not at all   Feeling Down, Depressed or Hopeless 0-->not at all   PHQ-9: Brief Depression Severity Measure Score 0     Health Habits and Functional and Cognitive Screenin/18/2024     2:11 PM   Functional & Cognitive Status   Do you have difficulty preparing food and eating? No   Do you have difficulty bathing yourself, getting dressed or grooming yourself? No   Do you have difficulty using the toilet? No   Do you have difficulty moving around from place to place? No   Do you have trouble with steps or getting out of a bed or a chair? No   Current Diet Well Balanced Diet   Dental Exam Not up to date   Eye Exam Up to date   Exercise (times per week) 7 times per week   Current Exercises Include Other;Weightlifting   Do you need help using the phone?  No   Are you deaf or do you have serious difficulty hearing?  No   Do you need help to go to places out of walking distance? No   Do you need help shopping? No   Do you need help preparing meals?  No   Do you need help with housework?  Yes   Do you need help with laundry? No   Do you need help taking your medications? No   Do you need help managing money? No   Do you ever drive or ride in a car without wearing a seat belt? No   Have you felt unusual stress, anger or loneliness in the last  month? Yes   Who do you live with? Child   If you need help, do you have trouble finding someone available to you? No   Have you been bothered in the last four weeks by sexual problems? No   Do you have difficulty concentrating, remembering or making decisions? No             Age-appropriate Screening Schedule:  Refer to the list below for future screening recommendations based on patient's age, sex and/or medical conditions. Orders for these recommended tests are listed in the plan section. The patient has been provided with a written plan.    Health Maintenance List  Health Maintenance   Topic Date Due    COVID-19 Vaccine (5 - 2023-24 season) 09/01/2023    DXA SCAN  03/31/2024    ZOSTER VACCINE (2 of 2) 01/18/2025 (Originally 6/24/2023)    INFLUENZA VACCINE  08/01/2024    BMI FOLLOWUP  03/27/2025    ANNUAL WELLNESS VISIT  07/18/2025    MAMMOGRAM  01/22/2026    TDAP/TD VACCINES (3 - Td or Tdap) 12/21/2033    COLORECTAL CANCER SCREENING  01/25/2034    HEPATITIS C SCREENING  Completed    Pneumococcal Vaccine 65+  Completed    PAP SMEAR  Discontinued                                                                                                                                                CMS Preventative Services Quick Reference  Risk Factors Identified During Encounter  None Identified    The above risks/problems have been discussed with the patient.  Pertinent information has been shared with the patient in the After Visit Summary.  An After Visit Summary and PPPS were made available to the patient.    Follow Up:   Next Medicare Wellness visit to be scheduled in 1 year.         Additional E&M Note during same encounter follows:  Patient has additional, significant, and separately identifiable condition(s)/problem(s) that require work above and beyond the Medicare Wellness Visit     Chief Complaint  Medicare Wellness-subsequent    Subjective   --GERD IS WELL CONTROLLED WITH THE PPI  --LAST TSH WAS OK ON CURRENT  "DOSE OF SYNTHROID  --BREATHING IS STABLE ON CURRENT INHALED MEDS  --CONTINUES ON XARELTO FOR HISTORY OF P.E., RECURRENT DVT'S AND PAF  --RECENT CBC, CMP AND TSH WERE OK       Anne is also being seen today for additional medical problem/s.    Review of Systems   Constitutional:  Negative for activity change, appetite change, chills, fatigue and fever.   HENT:  Negative for congestion, ear pain, hearing loss, rhinorrhea and sore throat.    Eyes:  Negative for discharge and visual disturbance.   Respiratory:  Negative for cough and shortness of breath.    Cardiovascular:  Negative for chest pain, palpitations and leg swelling.   Gastrointestinal:  Negative for abdominal pain, diarrhea, nausea and vomiting.   Genitourinary:  Negative for dysuria and hematuria.   Musculoskeletal:  Positive for arthralgias. Negative for myalgias.   Psychiatric/Behavioral:  Negative for dysphoric mood.               Objective   Vital Signs:  /74 (BP Location: Left arm, Patient Position: Sitting)   Pulse 50   Ht 154.9 cm (61\")   Wt 88.8 kg (195 lb 12.8 oz)   SpO2 95% Comment: on room air  BMI 37.00 kg/m²   Physical Exam  Vitals and nursing note reviewed.   Constitutional:       General: She is not in acute distress.     Appearance: Normal appearance.   HENT:      Right Ear: Tympanic membrane normal.      Left Ear: Tympanic membrane normal.      Mouth/Throat:      Pharynx: Oropharynx is clear.   Eyes:      Conjunctiva/sclera: Conjunctivae normal.   Cardiovascular:      Rate and Rhythm: Normal rate and regular rhythm.      Heart sounds: Normal heart sounds. No murmur heard.  Pulmonary:      Effort: Pulmonary effort is normal.      Breath sounds: Normal breath sounds.   Abdominal:      General: Bowel sounds are normal.      Palpations: Abdomen is soft.      Tenderness: There is no abdominal tenderness.   Musculoskeletal:      Cervical back: Neck supple.      Right lower leg: No edema.      Left lower leg: No edema. "   Lymphadenopathy:      Cervical: No cervical adenopathy.   Neurological:      General: No focal deficit present.      Mental Status: She is alert.      Cranial Nerves: No cranial nerve deficit.      Coordination: Coordination normal.      Gait: Gait normal.   Psychiatric:         Mood and Affect: Mood normal.         Behavior: Behavior normal.                 Assessment and Plan               Allergic rhinitis, unspecified seasonality, unspecified trigger    History of DVT (deep vein thrombosis)    GERD without esophagitis  IMPROVED WITH CURRENT TREATMENT, CONTINUE SAME, WILL REEVALUATE AT NEXT VISIT   Hypothyroidism, acquired  IMPROVED WITH CURRENT TREATMENT, CONTINUE SAME, WILL REEVALUATE AT NEXT VISIT   Medicare annual wellness visit, subsequent  ADVICE GIVEN RE:  SEATBELT USE, ALCOHOL USE, HEALTHY DIET, ROUTINE EYE AND DENTAL EXAM, ROUTINE VACCINATIONS.    Mild intermittent asthma, uncomplicated  Asthma is stable.  The patient is experiencing no daytime asthma symptoms and she is experiencing no nighttime asthma symptoms.    Plan: Continue same medication/s without change.    Discussed medication dosage, use, side effects, and goals of treatment in detail.      Patient Treatment Goals: symptom prevention.    Followup in 6 months.          No orders of the defined types were placed in this encounter.    New Medications Ordered This Visit   Medications    Allergy Serum Injection    rivaroxaban (Xarelto) 20 MG tablet     Sig: Take 1 tablet by mouth Daily.     Dispense:  90 tablet     Refill:  3          Follow Up   Return in about 6 months (around 1/18/2025).  Patient was given instructions and counseling regarding her condition or for health maintenance advice. Please see specific information pulled into the AVS if appropriate.

## 2024-07-18 NOTE — ASSESSMENT & PLAN NOTE
Asthma is stable.  The patient is experiencing no daytime asthma symptoms and she is experiencing no nighttime asthma symptoms.    Plan: Continue same medication/s without change.    Discussed medication dosage, use, side effects, and goals of treatment in detail.      Patient Treatment Goals: symptom prevention.    Followup in 6 months.

## 2024-07-24 ENCOUNTER — CLINICAL SUPPORT (OUTPATIENT)
Dept: FAMILY MEDICINE CLINIC | Age: 74
End: 2024-07-24
Payer: MEDICARE

## 2024-07-24 DIAGNOSIS — J30.9 ALLERGIC RHINITIS, UNSPECIFIED SEASONALITY, UNSPECIFIED TRIGGER: Primary | ICD-10-CM

## 2024-07-24 PROCEDURE — 95115 IMMUNOTHERAPY ONE INJECTION: CPT | Performed by: FAMILY MEDICINE

## 2024-07-25 ENCOUNTER — OFFICE VISIT (OUTPATIENT)
Dept: PAIN MEDICINE | Facility: CLINIC | Age: 74
End: 2024-07-25
Payer: MEDICARE

## 2024-07-25 VITALS
DIASTOLIC BLOOD PRESSURE: 75 MMHG | SYSTOLIC BLOOD PRESSURE: 146 MMHG | TEMPERATURE: 96.6 F | WEIGHT: 194.6 LBS | HEIGHT: 61 IN | BODY MASS INDEX: 36.74 KG/M2 | RESPIRATION RATE: 18 BRPM | OXYGEN SATURATION: 97 % | HEART RATE: 53 BPM

## 2024-07-25 DIAGNOSIS — G89.4 CHRONIC PAIN SYNDROME: ICD-10-CM

## 2024-07-25 DIAGNOSIS — M54.16 LUMBAR RADICULOPATHY: ICD-10-CM

## 2024-07-25 DIAGNOSIS — M51.36 DEGENERATION OF INTERVERTEBRAL DISC OF LUMBAR REGION: ICD-10-CM

## 2024-07-25 DIAGNOSIS — C16.9 GASTRIC ADENOCARCINOMA: ICD-10-CM

## 2024-07-25 DIAGNOSIS — Z79.899 ENCOUNTER FOR LONG-TERM (CURRENT) USE OF HIGH-RISK MEDICATION: ICD-10-CM

## 2024-07-25 DIAGNOSIS — M54.16 LUMBAR RADICULOPATHY: Primary | ICD-10-CM

## 2024-07-25 DIAGNOSIS — M15.9 GENERALIZED OSTEOARTHRITIS: ICD-10-CM

## 2024-07-25 DIAGNOSIS — M06.9 RHEUMATOID ARTHRITIS, INVOLVING UNSPECIFIED SITE, UNSPECIFIED WHETHER RHEUMATOID FACTOR PRESENT: ICD-10-CM

## 2024-07-25 PROCEDURE — 1160F RVW MEDS BY RX/DR IN RCRD: CPT

## 2024-07-25 PROCEDURE — 99214 OFFICE O/P EST MOD 30 MIN: CPT

## 2024-07-25 PROCEDURE — 1125F AMNT PAIN NOTED PAIN PRSNT: CPT

## 2024-07-25 PROCEDURE — 1159F MED LIST DOCD IN RCRD: CPT

## 2024-07-25 NOTE — TELEPHONE ENCOUNTER
Patient seen in office today. Reviewed UDS and VICENTA. Both updated and appropriate. Refill appropriate. DNF 7/31/24. Please refill. Thanks.

## 2024-07-25 NOTE — PROGRESS NOTES
CHIEF COMPLAINT  Back and joint pain    Subjective   Anne Jewell is a 73 y.o. female  who presents for follow-up.  She has a history of chronic back and joint pain. She reports that her pain has remained consistent since her last office visit and varies based on activity level.     Today pain is 7/10VAS in severity. Pain is located in her low back and radiates to bilateral feet. Describes this pain as a nearly continuous aching. Pain is worsened by rising from sitting to standing position, stress, weather changes, prolonged position, and walking long distances. Pain improves with rest/reposition, HEP, aqua therapy, heat/ice, and medications. She has completed home PT and OT in the past.     Continues with OxyContin 80mg 2 tablets BID and Baclofen 10mg 2-3/day (managed by PCP). Denies any side effects from the regimen, including constipation and somnolence. The regimen helps decrease pain by a significant amount. Notes improvement in activity and function with regimen. ADL's by self. Denies any bowel or bladder changes. Patient reports that she takes Xanax 2mg TID as needed. This is prescribed by psychiatrist.      She was admitted to the hospital from 8/25/23 - 9/15/23 where she was diagnosed with adenocarcinoma. She was seen by Dr. Stuart Borges on 9/19/23 with [facility].  PET scan on 10/6/23 showed no evidence of metastatic disease. No plans for chemotherapy at this time per office note from Dr. Slava Loo from 10/12/23. She was prescribed Dronabinol PRN by her hematologist/oncologist to help stimulate her appetite and for N/V.  She takes this medication sparingly.      Anticoagulated on Xarelto     Back Pain  This is a chronic problem. The current episode started more than 1 year ago. The problem occurs constantly. The problem is unchanged. The pain is present in the lumbar spine. The quality of the pain is described as aching. The pain radiates to the right foot and left foot. The pain  is at a severity of 7/10. The pain is moderate. The symptoms are aggravated by standing, position and sitting (rising from sitting to standing, prolonged position, walking long distances, stress, weather changes). Associated symptoms include weakness. Pertinent negatives include no abdominal pain, chest pain, dysuria, fever, headaches or numbness. She has tried analgesics, home exercises, heat and ice (aqua therapy) for the symptoms. The treatment provided moderate relief.   Joint Pain  This is a chronic (7/10 VAS in severity) problem. The current episode started more than 1 year ago. The problem occurs constantly. The problem has been waxing and waning. Associated symptoms include arthralgias, myalgias, neck pain and weakness. Pertinent negatives include no abdominal pain, chest pain, chills, congestion, coughing, fatigue, fever, headaches, numbness, vertigo or vomiting. The symptoms are aggravated by stress and exertion (weather changes, increased physical activity,). She has tried oral narcotics, position changes, rest, heat and ice for the symptoms. The treatment provided mild relief.      PEG Assessment   What number best describes your pain on average in the past week?7  What number best describes how, during the past week, pain has interfered with your enjoyment of life?8  What number best describes how, during the past week, pain has interfered with your general activity?  8    The following portions of the patient's history were reviewed and updated as appropriate: allergies, current medications, past family history, past medical history, past social history, past surgical history, and problem list.    Review of Systems   Constitutional:  Negative for chills, fatigue and fever.   HENT:  Negative for congestion.    Respiratory:  Negative for cough.    Cardiovascular:  Negative for chest pain.   Gastrointestinal:  Negative for abdominal pain, constipation, diarrhea and vomiting.   Genitourinary:  Negative for  "difficulty urinating and dysuria.   Musculoskeletal:  Positive for arthralgias, back pain, myalgias and neck pain.   Neurological:  Positive for weakness. Negative for vertigo, numbness and headaches.   Psychiatric/Behavioral:  Negative for sleep disturbance and suicidal ideas. The patient is not nervous/anxious.      I have reviewed and confirmed the accuracy of the ROS as documented by the MA/LPN/RN EUGENIO Ayala    Vitals:    07/25/24 1324   BP: 146/75   Pulse: 53   Resp: 18   Temp: 96.6 °F (35.9 °C)   SpO2: 97%   Weight: 88.3 kg (194 lb 9.6 oz)   Height: 154.9 cm (61\")   PainSc:   7   PainLoc: Back     Objective   Physical Exam  Constitutional:       Appearance: Normal appearance.   HENT:      Head: Normocephalic.   Cardiovascular:      Rate and Rhythm: Normal rate and regular rhythm.   Pulmonary:      Effort: Pulmonary effort is normal.      Breath sounds: Normal breath sounds.   Musculoskeletal:      Right shoulder: Tenderness present.      Left shoulder: Tenderness present.      Right wrist: Tenderness present.      Left wrist: Tenderness present.      Right hand: Tenderness present.      Left hand: Tenderness present.      Cervical back: Normal range of motion.      Lumbar back: Tenderness present. Decreased range of motion.      Right hip: Tenderness present.      Left hip: Tenderness present.      Right knee: Tenderness present.      Left knee: Tenderness present.      Comments: Back brace for support in place   Skin:     General: Skin is warm and dry.      Capillary Refill: Capillary refill takes less than 2 seconds.   Neurological:      General: No focal deficit present.      Mental Status: She is alert and oriented to person, place, and time.      Gait: Gait abnormal (slowed, ambulating with rolling walker).   Psychiatric:         Mood and Affect: Mood normal.         Behavior: Behavior normal.         Thought Content: Thought content normal.         Cognition and Memory: Cognition normal. "       Assessment & Plan   Diagnoses and all orders for this visit:    1. Lumbar radiculopathy (Primary)    2. Chronic pain syndrome    3. Generalized osteoarthritis    4. Rheumatoid arthritis, involving unspecified site, unspecified whether rheumatoid factor present    5. Degeneration of intervertebral disc of lumbar region    6. Gastric adenocarcinoma    7. Encounter for long-term (current) use of high-risk medication      Anne Jewell reports a pain score of 7.  Given her pain assessment as noted, treatment options were discussed and the following options were decided upon as a follow-up plan to address the patient's pain: continuation of current treatment plan for pain and prescription for opiod analgesics.    --- The urine drug screen confirmation from 5/30/24 has been reviewed and the result is appropriate based on patient history and VICENTA report  -- The patient signed an updated copy of the controlled substance agreement on 11/20/23  --- Narcan prescription current   --- Continue OxyContin. DNF 7/31/24. Patient appears stable with current regimen. No adverse effects. Regarding continuation of opioids, there is no evidence of aberrant behavior or any red flags.  The patient continues with appropriate response to opioid therapy. ADL's remain intact by self.   --- Follow-up 1 month     Pain / Disability Scale  The scale used for measurement of pain and/or disability for this patient was the [place] back pain disability scale.  The score was 41 on 07/25/2024     VICENTA REPORT  As part of the patient's treatment plan, I am prescribing controlled substances. The patient has been made aware of appropriate use of such medications, including potential risk of somnolence, limited ability to drive and/or work safely, and the potential for dependence or overdose. It has also been made clear that these medications are for use by this patient only, without concomitant use of alcohol or other substances unless prescribed.      Patient has completed prescribing agreement detailing terms of continued prescribing of controlled substances, including monitoring VICENTA reports, urine drug screening, and pill counts if necessary. The patient is aware that inappropriate use will results in cessation of prescribing such medications.    As the clinician, I personally reviewed the VICENTA from 7/25/24 while the patient was in the office today.    History and physical exam exhibit continued safe and appropriate use of controlled substances.    Dictated utilizing Dragon dictation.

## 2024-07-28 DIAGNOSIS — K21.9 GASTROESOPHAGEAL REFLUX DISEASE, UNSPECIFIED WHETHER ESOPHAGITIS PRESENT: Chronic | ICD-10-CM

## 2024-07-30 RX ORDER — OXYCODONE HCL 80 MG/1
160 TABLET, FILM COATED, EXTENDED RELEASE ORAL EVERY 12 HOURS SCHEDULED
Qty: 120 TABLET | Refills: 0 | Status: SHIPPED | OUTPATIENT
Start: 2024-07-30

## 2024-07-30 RX ORDER — PANTOPRAZOLE SODIUM 40 MG/1
40 TABLET, DELAYED RELEASE ORAL DAILY
Qty: 90 TABLET | Refills: 3 | Status: SHIPPED | OUTPATIENT
Start: 2024-07-30

## 2024-08-01 DIAGNOSIS — I48.0 PAROXYSMAL A-FIB: ICD-10-CM

## 2024-08-07 ENCOUNTER — CLINICAL SUPPORT (OUTPATIENT)
Dept: FAMILY MEDICINE CLINIC | Age: 74
End: 2024-08-07
Payer: MEDICARE

## 2024-08-07 DIAGNOSIS — J30.9 ALLERGIC RHINITIS, UNSPECIFIED SEASONALITY, UNSPECIFIED TRIGGER: Primary | ICD-10-CM

## 2024-08-07 PROCEDURE — 95115 IMMUNOTHERAPY ONE INJECTION: CPT | Performed by: FAMILY MEDICINE

## 2024-08-16 ENCOUNTER — PATIENT OUTREACH (OUTPATIENT)
Age: 74
End: 2024-08-16
Payer: MEDICARE

## 2024-08-16 NOTE — OUTREACH NOTE
MSW sent [facility] today for any resources needed.    Shelly TRIANA -   Ambulatory Case Management    8/16/2024, 10:24 EDT

## 2024-08-20 ENCOUNTER — DOCUMENTATION (OUTPATIENT)
Dept: FAMILY MEDICINE CLINIC | Age: 74
End: 2024-08-20
Payer: MEDICARE

## 2024-08-20 ENCOUNTER — TELEPHONE (OUTPATIENT)
Dept: FAMILY MEDICINE CLINIC | Age: 74
End: 2024-08-20
Payer: MEDICARE

## 2024-08-20 RX ORDER — PREDNISONE 20 MG/1
20 TABLET ORAL 2 TIMES DAILY
Qty: 10 TABLET | Refills: 0 | Status: SHIPPED | OUTPATIENT
Start: 2024-08-20

## 2024-08-20 NOTE — TELEPHONE ENCOUNTER
Pt called in and said she has gout in her left hand now, its painful and she can't move her fingers very well.  She said she had this once before in her right hand and Dr Dean gave her prednisone and it really helped.  She has been taking care of her daughter that has a feeding tube and needs something called in.

## 2024-08-20 NOTE — TELEPHONE ENCOUNTER
RX for prednisone sent to [facility] [place] [place]. Needs to be seen if worsens or not improved in a few days.

## 2024-08-21 ENCOUNTER — CLINICAL SUPPORT (OUTPATIENT)
Dept: FAMILY MEDICINE CLINIC | Age: 74
End: 2024-08-21
Payer: MEDICARE

## 2024-08-21 DIAGNOSIS — J30.9 ALLERGIC RHINITIS, UNSPECIFIED SEASONALITY, UNSPECIFIED TRIGGER: Primary | ICD-10-CM

## 2024-08-21 PROCEDURE — 95115 IMMUNOTHERAPY ONE INJECTION: CPT | Performed by: FAMILY MEDICINE

## 2024-08-28 ENCOUNTER — CLINICAL SUPPORT (OUTPATIENT)
Dept: FAMILY MEDICINE CLINIC | Age: 74
End: 2024-08-28
Payer: MEDICARE

## 2024-08-28 DIAGNOSIS — J30.9 ALLERGIC RHINITIS, UNSPECIFIED SEASONALITY, UNSPECIFIED TRIGGER: Primary | ICD-10-CM

## 2024-08-28 PROCEDURE — 95115 IMMUNOTHERAPY ONE INJECTION: CPT | Performed by: FAMILY MEDICINE

## 2024-08-29 NOTE — TELEPHONE ENCOUNTER
Rx Refill Note  Requested Prescriptions     Pending Prescriptions Disp Refills    Magnesium Oxide -Mg Supplement 400 (240 Mg) MG tablet [Pharmacy Med Name: MAGNESIUM OXIDE 400 MG TABLET] 180 tablet 1     Sig: TAKE 1 TABLET BY MOUTH TWICE A DAY      Last office visit with prescribing clinician: 7/18/2024   Last telemedicine visit with prescribing clinician: Visit date not found   Next office visit with prescribing clinician: 1/23/2025  Last refill sent: 03/02/24, #180, 1 refill  MAGNESIUM (06/04/2024 12:47)     Vaishnavi Keller LPN  08/29/24, 09:29 EDT

## 2024-08-30 ENCOUNTER — OFFICE VISIT (OUTPATIENT)
Dept: PAIN MEDICINE | Facility: CLINIC | Age: 74
End: 2024-08-30
Payer: MEDICARE

## 2024-08-30 VITALS
BODY MASS INDEX: 36.97 KG/M2 | OXYGEN SATURATION: 96 % | RESPIRATION RATE: 16 BRPM | HEART RATE: 56 BPM | WEIGHT: 195.8 LBS | DIASTOLIC BLOOD PRESSURE: 70 MMHG | TEMPERATURE: 98.1 F | HEIGHT: 61 IN | SYSTOLIC BLOOD PRESSURE: 160 MMHG

## 2024-08-30 DIAGNOSIS — C16.9 GASTRIC ADENOCARCINOMA: ICD-10-CM

## 2024-08-30 DIAGNOSIS — G89.4 CHRONIC PAIN SYNDROME: ICD-10-CM

## 2024-08-30 DIAGNOSIS — M54.16 LUMBAR RADICULOPATHY: ICD-10-CM

## 2024-08-30 DIAGNOSIS — Z79.899 ENCOUNTER FOR LONG-TERM (CURRENT) USE OF HIGH-RISK MEDICATION: ICD-10-CM

## 2024-08-30 DIAGNOSIS — M06.9 RHEUMATOID ARTHRITIS, INVOLVING UNSPECIFIED SITE, UNSPECIFIED WHETHER RHEUMATOID FACTOR PRESENT: Primary | ICD-10-CM

## 2024-08-30 DIAGNOSIS — M51.36 DEGENERATION OF INTERVERTEBRAL DISC OF LUMBAR REGION: ICD-10-CM

## 2024-08-30 DIAGNOSIS — M15.9 GENERALIZED OSTEOARTHRITIS: ICD-10-CM

## 2024-08-30 PROCEDURE — 1159F MED LIST DOCD IN RCRD: CPT

## 2024-08-30 PROCEDURE — 1160F RVW MEDS BY RX/DR IN RCRD: CPT

## 2024-08-30 PROCEDURE — 99214 OFFICE O/P EST MOD 30 MIN: CPT

## 2024-08-30 PROCEDURE — 1125F AMNT PAIN NOTED PAIN PRSNT: CPT

## 2024-08-30 RX ORDER — OXYCODONE HCL 80 MG/1
160 TABLET, FILM COATED, EXTENDED RELEASE ORAL EVERY 12 HOURS SCHEDULED
Qty: 120 TABLET | Refills: 0 | Status: SHIPPED | OUTPATIENT
Start: 2024-08-30

## 2024-08-30 RX ORDER — LANOLIN ALCOHOL/MO/W.PET/CERES
1 CREAM (GRAM) TOPICAL 2 TIMES DAILY
Qty: 180 TABLET | Refills: 1 | Status: SHIPPED | OUTPATIENT
Start: 2024-08-30

## 2024-08-30 NOTE — PROGRESS NOTES
CHIEF COMPLAINT  Back and joint pain    Subjective   Anne Jeewll is a 73 y.o. female  who presents for follow-up.  She has a history of chronic back and joint pain. She reports that her pain level has fluctuated since her last office visit.    Today pain is 7/10VAS in severity. Pain is located in her low back and radiates to bilateral feet. Describes this pain as a nearly continuous aching. Pain is worsened by rising from sitting to standing position, stress, weather changes, prolonged position, and walking long distances. Pain improves with rest/reposition, HEP, aqua therapy, heat/ice, and medications. She has completed home PT and OT in the past.     Continues with OxyContin 80mg 2 tablets BID and Baclofen 10mg 2-3/day (managed by PCP). Denies any side effects from the regimen, including constipation and somnolence. The regimen helps decrease pain by a significant amount. Notes improvement in activity and function with regimen. ADL's by self. Denies any bowel or bladder changes. Patient reports that she takes Xanax 2mg TID as needed. This is prescribed by psychiatrist.      She was admitted to the hospital from 8/25/23 - 9/15/23 where she was diagnosed with adenocarcinoma. PET scan on 10/6/23 showed no evidence of metastatic disease. No plans for chemotherapy at this time per office note from Dr. Slava Loo from 10/12/23. Takes Dronabinol sparingly to help stimulate her appetite and for N/V.       Anticoagulated on Xarelto     Back Pain  This is a chronic problem. The current episode started more than 1 year ago. The problem occurs constantly. The problem is unchanged (pain level fluctuates since her last office visit). The pain is present in the lumbar spine. The quality of the pain is described as aching. The pain radiates to the right foot and left foot. The pain is at a severity of 8/10. The pain is moderate. The symptoms are aggravated by standing, position and sitting (rising from sitting to standing,  prolonged position, walking long distances, stress, weather changes). Associated symptoms include weakness (legs). Pertinent negatives include no abdominal pain, chest pain, dysuria, fever, headaches or numbness. She has tried analgesics, home exercises, heat and ice (aqua therapy) for the symptoms. The treatment provided moderate relief.   Joint Pain  This is a chronic (8/10 VAS in severity) problem. The current episode started more than 1 year ago. The problem occurs intermittently. The problem has been waxing and waning. Associated symptoms include arthralgias, coughing, myalgias, neck pain and weakness (legs). Pertinent negatives include no abdominal pain, chest pain, chills, congestion, fatigue, fever, headaches, numbness, vertigo or vomiting. The symptoms are aggravated by stress and exertion (weather changes, increased physical activity,). She has tried oral narcotics, position changes, rest, heat and ice for the symptoms. The treatment provided mild relief.     PEG Assessment   What number best describes your pain on average in the past week?8  What number best describes how, during the past week, pain has interfered with your enjoyment of life?9  What number best describes how, during the past week, pain has interfered with your general activity?  9    The following portions of the patient's history were reviewed and updated as appropriate: allergies, current medications, past family history, past medical history, past social history, past surgical history, and problem list.    Review of Systems   Constitutional:  Negative for activity change, chills, fatigue and fever.   HENT:  Negative for congestion.    Respiratory:  Positive for cough. Negative for chest tightness.    Cardiovascular:  Negative for chest pain.   Gastrointestinal:  Negative for abdominal pain, constipation, diarrhea and vomiting.   Genitourinary:  Negative for difficulty urinating and dysuria.   Musculoskeletal:  Positive for arthralgias,  "back pain, myalgias and neck pain.   Neurological:  Positive for weakness (legs). Negative for dizziness, vertigo, light-headedness, numbness and headaches.   Psychiatric/Behavioral:  Negative for agitation, sleep disturbance and suicidal ideas. The patient is nervous/anxious.      I have reviewed and confirmed the accuracy of the ROS as documented by the MA/LPN/RN EUGENIO Ayala    Vitals:    08/30/24 1259   BP: 160/70   BP Location: Left arm   Patient Position: Sitting   Cuff Size: Adult   Pulse: 56   Resp: 16   Temp: 98.1 °F (36.7 °C)   TempSrc: Temporal   SpO2: 96%   Weight: 88.8 kg (195 lb 12.8 oz)   Height: 154.9 cm (61\")   PainSc:   8     Objective   Physical Exam  Constitutional:       Appearance: Normal appearance.   HENT:      Head: Normocephalic.   Cardiovascular:      Rate and Rhythm: Normal rate and regular rhythm.   Pulmonary:      Effort: Pulmonary effort is normal.      Breath sounds: Normal breath sounds.   Musculoskeletal:      Right shoulder: Tenderness present.      Left shoulder: Tenderness present.      Right wrist: Tenderness present.      Left wrist: Tenderness present.      Right hand: Tenderness present.      Left hand: Tenderness present.      Cervical back: Normal range of motion.      Lumbar back: Tenderness present. Decreased range of motion.      Right hip: Tenderness present.      Left hip: Tenderness present.      Right knee: Tenderness present.      Left knee: Tenderness present.      Comments: Back brace for support in place   Skin:     General: Skin is warm and dry.      Capillary Refill: Capillary refill takes less than 2 seconds.   Neurological:      General: No focal deficit present.      Mental Status: She is alert and oriented to person, place, and time.      Gait: Gait abnormal (slowed, ambulating with rolling walker).   Psychiatric:         Mood and Affect: Mood normal.         Behavior: Behavior normal.         Thought Content: Thought content normal.         " Cognition and Memory: Cognition normal.       Assessment & Plan   Diagnoses and all orders for this visit:    1. Rheumatoid arthritis, involving unspecified site, unspecified whether rheumatoid factor present (Primary)    2. Lumbar radiculopathy    3. Chronic pain syndrome    4. Generalized osteoarthritis    5. Degeneration of intervertebral disc of lumbar region    6. Gastric adenocarcinoma    7. Encounter for long-term (current) use of high-risk medication      Anne Jewell reports a pain score of 8.  Given her pain assessment as noted, treatment options were discussed and the following options were decided upon as a follow-up plan to address the patient's pain: continuation of current treatment plan for pain and prescription for opiod analgesics.    --- The urine drug screen confirmation from 5/30/24 has been reviewed and the result is appropriate based on patient history and VICENTA report  -- The patient signed an updated copy of the controlled substance agreement on 11/20/23  --- Narcan prescription current   --- Continue OxyContin. DNF 9/2/24. Patient appears stable with current regimen. No adverse effects. Regarding continuation of opioids, there is no evidence of aberrant behavior or any red flags.  The patient continues with appropriate response to opioid therapy. ADL's remain intact by self.   --- Follow-up 1 month    Pain / Disability Scale  The scale used for measurement of pain and/or disability for this patient was the [place] back pain disability scale.  The score was 41 on 07/25/2024       VICENTA REPORT  As part of the patient's treatment plan, I am prescribing controlled substances. The patient has been made aware of appropriate use of such medications, including potential risk of somnolence, limited ability to drive and/or work safely, and the potential for dependence or overdose. It has also been made clear that these medications are for use by this patient only, without concomitant use of  alcohol or other substances unless prescribed.     Patient has completed prescribing agreement detailing terms of continued prescribing of controlled substances, including monitoring VICENTA reports, urine drug screening, and pill counts if necessary. The patient is aware that inappropriate use will results in cessation of prescribing such medications.    As the clinician, I personally reviewed the VICENTA from 8/30/24 while the patient was in the office today.    History and physical exam exhibit continued safe and appropriate use of controlled substances.    Dictated utilizing Dragon dictation.

## 2024-08-30 NOTE — TELEPHONE ENCOUNTER
Patient seen in office today. Reviewed UDS and VICENTA. Both updated and appropriate. Refill appropriate.  DNF 9/2/24.

## 2024-09-04 ENCOUNTER — PATIENT OUTREACH (OUTPATIENT)
Age: 74
End: 2024-09-04
Payer: MEDICARE

## 2024-09-04 DIAGNOSIS — G89.4 CHRONIC PAIN SYNDROME: Chronic | ICD-10-CM

## 2024-09-04 DIAGNOSIS — R11.0 NAUSEA: ICD-10-CM

## 2024-09-04 NOTE — TELEPHONE ENCOUNTER
Rx Refill Note  Requested Prescriptions     Pending Prescriptions Disp Refills    promethazine (PHENERGAN) 25 MG tablet [Pharmacy Med Name: PROMETHAZINE 25 MG TABLET] 90 tablet 2     Sig: TAKE 1 TABLET BY MOUTH EVERY 8 HOURS AS NEEDED FOR NAUSEA AND/OR VOMITING      Last office visit with prescribing clinician: 7/18/2024   Last telemedicine visit with prescribing clinician: Visit date not found   Next office visit with prescribing clinician: 1/23/2025  Last refill sent: 06/01/24, #90, 2 refills.      Vaishnavi Keller LPN  09/04/24, 10:20 EDT

## 2024-09-04 NOTE — OUTREACH NOTE
MSW sent [facility] for assistance with resources. No response and UTR. MSW to discharge but available if needed with PCP referral in the future.    Shelly TRIANA -   Ambulatory Case Management    9/4/2024, 11:13 EDT

## 2024-09-05 RX ORDER — PROMETHAZINE HYDROCHLORIDE 25 MG/1
TABLET ORAL
Qty: 90 TABLET | Refills: 2 | Status: SHIPPED | OUTPATIENT
Start: 2024-09-05

## 2024-09-10 ENCOUNTER — CLINICAL SUPPORT (OUTPATIENT)
Dept: FAMILY MEDICINE CLINIC | Age: 74
End: 2024-09-10
Payer: MEDICARE

## 2024-09-10 ENCOUNTER — OFFICE VISIT (OUTPATIENT)
Dept: CARDIOLOGY | Facility: CLINIC | Age: 74
End: 2024-09-10
Payer: MEDICARE

## 2024-09-10 VITALS
SYSTOLIC BLOOD PRESSURE: 150 MMHG | DIASTOLIC BLOOD PRESSURE: 73 MMHG | WEIGHT: 201 LBS | HEART RATE: 56 BPM | BODY MASS INDEX: 37.95 KG/M2 | HEIGHT: 61 IN

## 2024-09-10 DIAGNOSIS — R60.0 PEDAL EDEMA: ICD-10-CM

## 2024-09-10 DIAGNOSIS — J30.9 ALLERGIC RHINITIS, UNSPECIFIED SEASONALITY, UNSPECIFIED TRIGGER: Primary | ICD-10-CM

## 2024-09-10 DIAGNOSIS — I48.0 PAROXYSMAL ATRIAL FIBRILLATION: Primary | ICD-10-CM

## 2024-09-10 PROCEDURE — 1160F RVW MEDS BY RX/DR IN RCRD: CPT | Performed by: FAMILY MEDICINE

## 2024-09-10 PROCEDURE — 95115 IMMUNOTHERAPY ONE INJECTION: CPT | Performed by: FAMILY MEDICINE

## 2024-09-10 PROCEDURE — 1159F MED LIST DOCD IN RCRD: CPT | Performed by: FAMILY MEDICINE

## 2024-09-10 PROCEDURE — 99213 OFFICE O/P EST LOW 20 MIN: CPT | Performed by: FAMILY MEDICINE

## 2024-09-10 NOTE — PROGRESS NOTES
Chief Complaint  Follow-up and Atrial Fibrillation    Subjective        History of Present Illness  Anne Jewell presents to [facility] CARDIOLOGY   Ms. Jewell 74-year-old female patient coming in for routine 6-month cardiac follow-up.  Cardiac wise she is not have any new concerns, is not reporting any episodes of chest pain, no  major shortness of breath, or palpitations.  Reports good compliance with medications.  Admits to being stressed recently dealing with her daughter's illness.    Past History:     Paroxysmal atrial fibrillation, detected during hospital admission in September, 2023     History of pulmonary embolism on long-term anticoagulation  Gastric adenocarcinoma, status post surgery      Past Medical History:   Diagnosis Date    Allergic rhinitis     Anemia     Arthritis of back     Arthritis of neck     Asthma     Broken nose     Bronchospasm     Cancer     Carpal tunnel syndrome, bilateral     Cervical disc disorder     Clotting disorder     Colon polyp Don’t remember    Deep vein thrombosis     Disc degeneration, lumbar     Edema     Esophageal reflux     Glaucoma     Hip arthrosis     History of transfusion     Joint pain     Kidney disease 2018    Liver disease 2018    Low back pain     Osteoarthritis     Osteopenia     Osteoporosis     Oxygen dependent     Periarthritis of shoulder     Scoliosis     Skin abrasion     Status post total knee replacement, left 08/31/2017    Status post total knee replacement, right 08/31/2017    Stomach cancer Sept 2023    UTI (urinary tract infection)     Venous thrombosis        Allergies   Allergen Reactions    Penicillins Hives        Past Surgical History:   Procedure Laterality Date    BILATERAL BREAST REDUCTION      BLADDER SUSPENSION      vaginal sling operation for stress incontinence    COLONOSCOPY  08/05/2016    NORMAL    COLONOSCOPY N/A 01/25/2024    POLYPS    ENDOSCOPY N/A 01/25/2024    Procedure: ESOPHAGOGASTRODUODENOSCOPY;  Surgeon:  Slava Loo Jr., MD;  Location: [facility] ENDOSCOPY;  Service: General;  Laterality: N/A;  pre gastric ca iron defiency anemia post wnl    ERCP N/A 08/30/2023    Procedure: ENDOSCOPIC RETROGRADE CHOLANGIOPANCREATOGRAPHY;  Surgeon: Elijah Simon MD;  Location: [facility] ENDOSCOPY;  Service: Gastroenterology;  Laterality: N/A;  cbd stone    ERCP N/A 09/01/2023    Procedure: ENDOSCOPIC RETROGRADE CHOLANGIOPANCREATOGRAPHY WITH CHOLANGIOGRAM, SPHINCTEROTOMY, BALLOON SWEEP;  Surgeon: Elijah Simon MD;  Location: [facility] MAIN OR;  Service: Gastroenterology;  Laterality: N/A;    EXPLORATORY LAPAROTOMY N/A 09/01/2023    DIATAL GASTRECTOMY    GASTRECTOMY N/A 09/08/2023    Procedure: EXPLORATORY LAPAROTOMY WITH GASTROJEJUNOSTOMY;  Surgeon: Slava Loo Jr., MD;  Location: [facility] OR;  Service: General;  Laterality: N/A;    GASTRIC BYPASS      HAND SURGERY Right 01/14/2016    HEMORRHOIDECTOMY      HERNIA REPAIR      x7    HYSTERECTOMY      TOTAL KNEE ARTHROPLASTY Bilateral         Social History  She  reports that she quit smoking about 19 years ago. Her smoking use included cigarettes. She started smoking about 44 years ago. She has a 12.5 pack-year smoking history. She has been exposed to tobacco smoke. She has never used smokeless tobacco. She reports that she does not drink alcohol and does not use drugs.    Family History  Her family history includes Arthritis in an other family member; Cancer in an other family member; Diabetes in an other family member; Heart disease in an other family member; Hypertension in an other family member; Nephrolithiasis in an other family member; Prostate cancer in her father.       Current Outpatient Medications on File Prior to Visit   Medication Sig    albuterol sulfate  (90 Base) MCG/ACT inhaler Inhale 2 puffs Every 6 (Six) Hours As Needed for Wheezing. Proventil  (90 Base) MCG/ACT Inhalation Aerosol Solution; Patient Sig: Proventil  (90 Base)  MCG/ACT Inhalation Aerosol Solution Inhale 2 puff(s) every 6 to 8 hours as needed; 6.7; 0; 05-Apr-2013; Active    ALPRAZolam (XANAX) 2 MG tablet Take 1 tablet by mouth As Needed for Anxiety.    baclofen (LIORESAL) 10 MG tablet Take 1 tablet by mouth 3 (Three) Times a Day.    Calcium Citrate-Vitamin D3 (CITRACAL) 315-6.25 MG-MCG tablet tablet Take 1 tablet by mouth Daily.    Cholecalciferol (VITAMIN D3) 2000 units capsule TAKE ONE CAPSULE BY MOUTH DAILY    Cyanocobalamin (B-12) 1000 MCG sublingual tablet PLACE 1 TABLET UNDER THE TONGUE AND LET DISSOLVE ONCE DAILY    dicyclomine (BENTYL) 20 MG tablet Take 1 tablet by mouth Every 6 (Six) Hours As Needed (diarrhea).    dronabinol (MARINOL) 5 MG capsule Take 1 capsule by mouth.    EPINEPHrine (EPIPEN) 0.3 MG/0.3ML solution auto-injector injection     escitalopram (LEXAPRO) 20 MG tablet Take 1 tablet by mouth Daily.    furosemide (LASIX) 20 MG tablet Take 1 tablet by mouth Daily.    levothyroxine (SYNTHROID, LEVOTHROID) 100 MCG tablet TAKE 1 TABLET BY MOUTH DAILY    Magnesium Oxide -Mg Supplement 400 (240 Mg) MG tablet TAKE 1 TABLET BY MOUTH TWICE A DAY    metoprolol tartrate (LOPRESSOR) 25 MG tablet TAKE ONE TABLET BY MOUTH EVERY 12 HOURS    montelukast (SINGULAIR) 10 MG tablet TAKE 1 TABLET BY MOUTH DAILY    Multiple Vitamin tablet Take 1 tablet by mouth Daily.    Omega 3-6-9 capsule Take 1 capsule by mouth Daily.    oxyCODONE ER (oxyCONTIN) 80 MG tablet extended-release 12 hour 12 hr tablet Take 2 tablets by mouth Every 12 (Twelve) Hours. 30 day supply. DNF 9/2/24    pantoprazole (PROTONIX) 40 MG EC tablet TAKE 1 TABLET BY MOUTH DAILY    potassium chloride (Klor-Con M20) 20 MEQ CR tablet Take 1 tablet by mouth Daily. TAKE DAILY WITH LASIX    predniSONE (DELTASONE) 20 MG tablet Take 1 tablet by mouth 2 (Two) Times a Day.    promethazine (PHENERGAN) 25 MG tablet TAKE 1 TABLET BY MOUTH EVERY 8 HOURS AS NEEDED FOR NAUSEA AND/OR VOMITING    rivaroxaban (Xarelto) 20 MG  "tablet Take 1 tablet by mouth Daily.     No current facility-administered medications on file prior to visit.         Review of Systems   Constitutional:  Negative for fatigue.   Respiratory:  Negative for cough, chest tightness and shortness of breath.    Cardiovascular:  Negative for chest pain, palpitations and leg swelling.   Gastrointestinal:  Negative for nausea and vomiting.   Neurological:  Negative for dizziness and syncope.        Objective   Vitals:    09/10/24 1346   BP: 150/73   Pulse: 56   Weight: 91.2 kg (201 lb)   Height: 154.9 cm (61\")         Physical Exam  General : Alert, awake, no acute distress  Neck : Supple, no carotid bruit, no jugular venous distention  CVS : Regular rate and rhythm, no murmur, no rubs or gallops  Lungs: Clear to auscultation bilaterally, no crackles or rhonchi  Abdomen: Soft, nontender, bowel sounds active  Extremities: Warm, well-perfused, trace bilateral pedal edema      Result Review     The following data was reviewed by Cindy Cárdenas, APRN  No results found for: \"PROBNP\"  CMP          11/15/2023    14:14   CMP   Glucose 95    BUN 27    Creatinine 1.20    EGFR 47.9    Sodium 138    Potassium 4.8    Chloride 102    Calcium 9.7    Total Protein 6.6    Albumin 3.8    Globulin 2.8    Total Bilirubin 0.2    Alkaline Phosphatase 116    AST (SGOT) 24    ALT (SGPT) 17    Albumin/Globulin Ratio 1.4    BUN/Creatinine Ratio 22.5    Anion Gap 2.0      CBC w/diff          9/18/2023    05:00 11/15/2023    14:14   CBC w/Diff   WBC 5.39  5.08    RBC 2.93  4.17    Hemoglobin 9.2  12.6    Hematocrit 28.9  41.4    MCV 98.6  99.3    MCH 31.4  30.2    MCHC 31.8  30.4    RDW 14.7  15.2    Platelets 252  180    Neutrophil Rel % 64.0     Immature Granulocyte Rel % 0.4     Lymphocyte Rel % 23.4     Monocyte Rel % 9.8     Eosinophil Rel % 2.0     Basophil Rel % 0.4        Lab Results   Component Value Date    TSH 0.795 06/04/2024      Lab Results   Component Value Date    FREET4 1.00 " "06/04/2024      No results found for: \"DDIMERQUANT\"  Magnesium   Date Value Ref Range Status   06/04/2024 1.9 1.8 - 2.4 mg/dL Final      No results found for: \"DIGOXIN\"   Lab Results   Component Value Date    TROPONINT 28 (H) 09/09/2023             Component 06/04/24 04/10/24 01/09/24 01/03/24 09/19/23   WBC 4.5 5.9 7.7 6.1 6.0   RBC 4.30 4.24 4.64 4.71 3.19 Low    Hemoglobin 13.4 13.5 14.1 14.2 10.0 Low    Hematocrit 41.7 41.3 44.7 45.1 31.7 Low    MCV 97 97 96 96 99   MCH 31.2 High  31.8 High  30.4 30.1 31.3 High    MCHC 32.1 32.7 31.5 Low  31.5 Low  31.5 Low    RDW 13.0 13.5 15.9 High  15.3 High  14.5 High    Platelets 168 177 204 195 289   MPV 10.2 High  10.5 High  10.9 High  9.9 High  10.4 High    Nucleated Red Blood Cell 0.0 0.0 0.0 0.0 0.0   % Neutros 73 59 68 67 69   % Lymphs 19 28 23 23 20   % Monos 6 10 5 6 8   % Eos 0 3 2 3 2   % Baso 0 1 0 0 1   # Neutros 3.31 3.46 5.27 4.10 4.18   # Lymphs 0.87 1.67 1.74 1.43 1.19 Low    # Monos 0.29 0.57 0.41 0.39 0.50   # Eos 0.02 Low  0.16 0.17 0.15 0.11   # Baso 0.02 0.03 0.03 0.02 0.03   Immature Granulocytes-Relative 0.20 0.20 1.30 High  0.20 0.50   # IG 0.01 0.01 0.10 High  0.01 0.03         Component 06/04/24 04/10/24 01/09/24 01/03/24 09/19/23   Sodium 139 138 137 138 141   Potassium 4.0 4.1 4.1 4.4 4.1   Chloride 103 100 98 101 102   CO2 29 31 28 30 31   Calcium 8.3 Low  8.9 9.3 9.5 8.5   Glucose 116 High  77 92 106 93   BUN 24 High  26 High  25 High  19 High  19 High    Creatinine 0.85 1.05 High  0.98 High  1.09 High  0.93   BUN/Creatinine 28 25 26 17 20   Albumin 2.8 Low  3.0 Low  3.0 Low  3.0 Low  1.8 Low    Alkaline Phosphatase 178 High  170 High  137 High  143 High  140 High    ALT 22 26 21 19 16   AST 23 24 28 25 41 High    Total Bilirubin 0.3 0.3 0.2 0.2 0.3   Protein, Total 7.0 6.9 7.5 7.4 6.0 Low    Anion Gap 11 11 15 11 12   A/G Ratio 0.7 0.8 0.7 0.7 0.4   Globulin 4.2 3.9 4.5 4.4 4.2   Osmolality Calc 282.6 279.2 277.9 278.4 283.2   eGFR " (mL/min/1.73m2) >60  56 Low   >60  54 Low   >60        Results for orders placed during the hospital encounter of 08/25/23    Adult Transthoracic Echo Complete W/ Cont if Necessary Per Protocol    Interpretation Summary    Left ventricular systolic function is normal. Left ventricular ejection fraction appears to be 61 - 65%.    Left ventricular diastolic function was normal.    There is calcification of the aortic valve.             Assessment and Plan   Diagnoses and all orders for this visit:    1. Paroxysmal atrial fibrillation (Primary)  Assessment & Plan:  She remains in a normal rhythm without any complaints of palpitations.  Continue metoprolol for rate and rhythm control.  Continue chronic anticoagulation with Xarelto.  Denies any bleeding issues.      2. Pedal edema  Assessment & Plan:  No significant edema today, well-controlled with low-dose furosemide.  Previous echocardiogram showed normal LV systolic function, encouraged her to continue will use compression stockings.                  Follow Up   Return in about 9 months (around 6/10/2025) for with Dr. Mathews.    Patient was given instructions and counseling regarding her condition or for health maintenance advice. Please see specific information pulled into the AVS if appropriate.     Signed,  Cindy Cárdenas, EUGENIO  09/10/2024     Dictated Utilizing Dragon Dictation: Please note that portions of this note were completed with a voice recognition program.  Part of this note may be an electronic transcription/translation of spoken language to printed text using the Dragon Dictation System.

## 2024-09-25 ENCOUNTER — CLINICAL SUPPORT (OUTPATIENT)
Dept: FAMILY MEDICINE CLINIC | Age: 74
End: 2024-09-25
Payer: MEDICARE

## 2024-09-25 DIAGNOSIS — J30.9 ALLERGIC RHINITIS, UNSPECIFIED SEASONALITY, UNSPECIFIED TRIGGER: Primary | ICD-10-CM

## 2024-09-25 PROCEDURE — 95115 IMMUNOTHERAPY ONE INJECTION: CPT | Performed by: FAMILY MEDICINE

## 2024-09-29 DIAGNOSIS — G89.4 CHRONIC PAIN SYNDROME: Chronic | ICD-10-CM

## 2024-09-30 RX ORDER — BACLOFEN 10 MG/1
10 TABLET ORAL 3 TIMES DAILY
Qty: 90 TABLET | Refills: 0 | Status: SHIPPED | OUTPATIENT
Start: 2024-09-30

## 2024-09-30 NOTE — TELEPHONE ENCOUNTER
Rx Refill Note  Requested Prescriptions     Pending Prescriptions Disp Refills    baclofen (LIORESAL) 10 MG tablet [Pharmacy Med Name: BACLOFEN 10 MG TABLET] 90 tablet 0     Sig: TAKE 1 TABLET BY MOUTH 3 TIMES A DAY      Last office visit with prescribing clinician: 07/18/2024, Dr Dean  Last telemedicine visit with prescribing clinician: Visit date not found   Next office visit with prescribing clinician: 01/23/2025, Dr Dean  Baclofen last refill sent: 07/12/24, # 90 by Dr aRmila Keller LPN  09/30/24, 12:19 EDT

## 2024-09-30 NOTE — ASSESSMENT & PLAN NOTE
She remains in a normal rhythm without any complaints of palpitations.  Continue metoprolol for rate and rhythm control.  Continue chronic anticoagulation with Xarelto.  Denies any bleeding issues.

## 2024-09-30 NOTE — ASSESSMENT & PLAN NOTE
No significant edema today, well-controlled with low-dose furosemide.  Previous echocardiogram showed normal LV systolic function, encouraged her to continue will use compression stockings.

## 2024-10-04 ENCOUNTER — OFFICE VISIT (OUTPATIENT)
Dept: PAIN MEDICINE | Facility: CLINIC | Age: 74
End: 2024-10-04
Payer: MEDICARE

## 2024-10-04 VITALS
WEIGHT: 204.6 LBS | OXYGEN SATURATION: 95 % | HEIGHT: 61 IN | BODY MASS INDEX: 38.63 KG/M2 | TEMPERATURE: 97.9 F | SYSTOLIC BLOOD PRESSURE: 144 MMHG | RESPIRATION RATE: 16 BRPM | HEART RATE: 61 BPM | DIASTOLIC BLOOD PRESSURE: 74 MMHG

## 2024-10-04 DIAGNOSIS — G89.4 CHRONIC PAIN SYNDROME: ICD-10-CM

## 2024-10-04 DIAGNOSIS — M15.9 GENERALIZED OSTEOARTHRITIS: ICD-10-CM

## 2024-10-04 DIAGNOSIS — M06.9 RHEUMATOID ARTHRITIS, INVOLVING UNSPECIFIED SITE, UNSPECIFIED WHETHER RHEUMATOID FACTOR PRESENT: ICD-10-CM

## 2024-10-04 DIAGNOSIS — C16.9 GASTRIC ADENOCARCINOMA: ICD-10-CM

## 2024-10-04 DIAGNOSIS — Z79.899 ENCOUNTER FOR LONG-TERM (CURRENT) USE OF HIGH-RISK MEDICATION: ICD-10-CM

## 2024-10-04 DIAGNOSIS — M54.16 LUMBAR RADICULOPATHY: Primary | ICD-10-CM

## 2024-10-04 DIAGNOSIS — M51.360 DEGENERATION OF INTERVERTEBRAL DISC OF LUMBAR REGION WITH DISCOGENIC BACK PAIN: ICD-10-CM

## 2024-10-04 DIAGNOSIS — M54.16 LUMBAR RADICULOPATHY: ICD-10-CM

## 2024-10-04 PROCEDURE — 1160F RVW MEDS BY RX/DR IN RCRD: CPT

## 2024-10-04 PROCEDURE — 1125F AMNT PAIN NOTED PAIN PRSNT: CPT

## 2024-10-04 PROCEDURE — 1159F MED LIST DOCD IN RCRD: CPT

## 2024-10-04 PROCEDURE — 99214 OFFICE O/P EST MOD 30 MIN: CPT

## 2024-10-04 NOTE — TELEPHONE ENCOUNTER
Patient seen in office today. Reviewed LOLIS and VICENTA. Both updated and appropriate. Refill appropriate. Can you please refill for Dr. Butler? Refill due today.

## 2024-10-04 NOTE — PROGRESS NOTES
CHIEF COMPLAINT  Back and joint pain    Subjective   Anne Jewell is a 74 y.o. female  who presents for follow-up.  She has a history of chronic back and joint pain. She reports that her pain has remained consistent since her last office visit.    Today pain is 8/10VAS in severity. Pain is located in her low back and radiates to bilateral feet. Describes this pain as a nearly continuous aching. Pain is worsened by rising from sitting to standing position, stress, weather changes, prolonged position, and walking long distances. Pain improves with rest/reposition, HEP, aqua therapy, heat/ice, and medications. She has completed home PT and OT in the past.     Continues with OxyContin 80mg 2 tablets BID and Baclofen 10mg 2-3/day (managed by PCP). Denies any side effects from the regimen, including constipation and somnolence. The regimen helps decrease pain by a significant amount. Notes improvement in activity and function with regimen. ADL's by self. Denies any bowel or bladder changes. Patient reports that she takes Xanax 2mg TID as needed. This is prescribed by psychiatrist.      She was admitted to the hospital from 8/25/23 - 9/15/23 where she was diagnosed with adenocarcinoma. PET scan on 10/6/23 showed no evidence of metastatic disease. No plans for chemotherapy at this time per office note from Dr. Slava Loo from 10/12/23. Takes Dronabinol sparingly to help stimulate her appetite and for N/V.       Anticoagulated on Xarelto     Back Pain  This is a chronic problem. The current episode started more than 1 year ago. The problem occurs constantly. The problem has been comes and goes (pain level fluctuates since her last office visit) since onset. The pain is present in the lumbar spine. The quality of the pain is described as aching. The pain radiates to the right foot and left foot. The pain is at a severity of 8/10. The pain is moderate. The symptoms are aggravated by standing, position and sitting (rising  from sitting to standing, prolonged position, walking long distances, stress, weather changes). Associated symptoms include numbness (feet). Pertinent negatives include no abdominal pain, chest pain, dysuria, fever, headaches or weakness. She has tried analgesics, home exercises, heat and ice (aqua therapy) for the symptoms. The treatment provided moderate relief.   Joint Pain  This is a chronic (8/10 VAS in severity) problem. The current episode started more than 1 year ago. The problem occurs intermittently. The problem has been unchanged. Associated symptoms include arthralgias, myalgias, neck pain and numbness (feet). Pertinent negatives include no abdominal pain, chest pain, chills, congestion, coughing, fatigue, fever, headaches, vertigo, vomiting or weakness. The symptoms are aggravated by stress and exertion (weather changes, increased physical activity,). She has tried oral narcotics, position changes, rest, heat and ice for the symptoms. The treatment provided mild relief.     PEG Assessment   What number best describes your pain on average in the past week?8  What number best describes how, during the past week, pain has interfered with your enjoyment of life?8  What number best describes how, during the past week, pain has interfered with your general activity?  8    The following portions of the patient's history were reviewed and updated as appropriate: allergies, current medications, past family history, past medical history, past social history, past surgical history, and problem list.    Review of Systems   Constitutional:  Negative for activity change, chills, fatigue and fever.   HENT:  Negative for congestion.    Respiratory:  Negative for cough and chest tightness.    Cardiovascular:  Negative for chest pain.   Gastrointestinal:  Negative for abdominal pain, constipation, diarrhea and vomiting.   Genitourinary:  Negative for dysuria.   Musculoskeletal:  Positive for arthralgias, back pain,  "myalgias and neck pain.   Neurological:  Positive for numbness (feet). Negative for dizziness, vertigo, weakness, light-headedness and headaches.   Psychiatric/Behavioral:  Positive for sleep disturbance. Negative for agitation and suicidal ideas. The patient is not nervous/anxious.      I have reviewed and confirmed the accuracy of the ROS as documented by the MA/LPN/RN EUGENIO Ayala    Vitals:    10/04/24 1244   BP: 144/74   BP Location: Left arm   Patient Position: Sitting   Cuff Size: Adult   Pulse: 61   Resp: 16   Temp: 97.9 °F (36.6 °C)   TempSrc: Oral   SpO2: 95%   Weight: 92.8 kg (204 lb 9.6 oz)   Height: 154.9 cm (61\")   PainSc:   8     Objective   Physical Exam  Constitutional:       Appearance: Normal appearance.   HENT:      Head: Normocephalic.   Cardiovascular:      Rate and Rhythm: Normal rate and regular rhythm.   Pulmonary:      Effort: Pulmonary effort is normal.      Breath sounds: Normal breath sounds.   Musculoskeletal:      Right shoulder: Tenderness present.      Left shoulder: Tenderness present.      Right wrist: Tenderness present.      Left wrist: Tenderness present.      Right hand: Tenderness present.      Left hand: Tenderness present.      Cervical back: Normal range of motion.      Lumbar back: Tenderness present. Decreased range of motion.      Right hip: Tenderness present.      Left hip: Tenderness present.      Right knee: Tenderness present.      Left knee: Tenderness present.      Comments: Back brace for support in place   Skin:     General: Skin is warm and dry.      Capillary Refill: Capillary refill takes less than 2 seconds.   Neurological:      General: No focal deficit present.      Mental Status: She is alert and oriented to person, place, and time.      Gait: Gait abnormal (slowed, ambulating with rolling walker).   Psychiatric:         Mood and Affect: Mood normal.         Behavior: Behavior normal.         Thought Content: Thought content normal.         " Cognition and Memory: Cognition normal.       Assessment & Plan   Diagnoses and all orders for this visit:    1. Lumbar radiculopathy (Primary)    2. Chronic pain syndrome    3. Rheumatoid arthritis, involving unspecified site, unspecified whether rheumatoid factor present    4. Generalized osteoarthritis    5. Degeneration of intervertebral disc of lumbar region with discogenic back pain    6. Gastric adenocarcinoma    7. Encounter for long-term (current) use of high-risk medication      Anne Jewell reports a pain score of 8.  Given her pain assessment as noted, treatment options were discussed and the following options were decided upon as a follow-up plan to address the patient's pain: continuation of current treatment plan for pain and prescription for opiod analgesics.    --- The urine drug screen confirmation from 5/30/24 has been reviewed and the result is appropriate based on patient history and VICENTA report  -- The patient signed an updated copy of the controlled substance agreement on 11/20/23  --- Narcan prescription current   --- Continue OxyContin. Patient appears stable with current regimen. No adverse effects. Regarding continuation of opioids, there is no evidence of aberrant behavior or any red flags.  The patient continues with appropriate response to opioid therapy. ADL's remain intact by self.   --- Follow-up 1 month     [facility] REPORT  As part of the patient's treatment plan, I am prescribing controlled substances. The patient has been made aware of appropriate use of such medications, including potential risk of somnolence, limited ability to drive and/or work safely, and the potential for dependence or overdose. It has also been made clear that these medications are for use by this patient only, without concomitant use of alcohol or other substances unless prescribed.     Patient has completed prescribing agreement detailing terms of continued prescribing of controlled substances, including  monitoring V[facility]ENTA reports, urine drug screening, and pill counts if necessary. The patient is aware that inappropriate use will results in cessation of prescribing such medications.    As the clinician, I personally reviewed the VICENTA from 10/4/24 while the patient was in the office today.    History and physical exam exhibit continued safe and appropriate use of controlled substances.     Dictated utilizing Dragon dictation.

## 2024-10-07 RX ORDER — OXYCODONE HCL 80 MG/1
160 TABLET, FILM COATED, EXTENDED RELEASE ORAL EVERY 12 HOURS SCHEDULED
Qty: 120 TABLET | Refills: 0 | Status: SHIPPED | OUTPATIENT
Start: 2024-10-07

## 2024-10-07 NOTE — TELEPHONE ENCOUNTER
VICENTA reviewed and appropriate.  Last drug screen 5/30/24 appropriate.     This patient is under the care of my colleague and I am covering patient care for him at this time.  I have reviewed pertinent information/documentation as necessary and will continue the plan of care as previously directed to the best of my ability.

## 2024-10-09 ENCOUNTER — CLINICAL SUPPORT (OUTPATIENT)
Dept: FAMILY MEDICINE CLINIC | Age: 74
End: 2024-10-09
Payer: MEDICARE

## 2024-10-09 DIAGNOSIS — J30.9 ALLERGIC RHINITIS, UNSPECIFIED SEASONALITY, UNSPECIFIED TRIGGER: Primary | ICD-10-CM

## 2024-10-09 PROCEDURE — 95115 IMMUNOTHERAPY ONE INJECTION: CPT | Performed by: FAMILY MEDICINE

## 2024-10-23 ENCOUNTER — CLINICAL SUPPORT (OUTPATIENT)
Dept: FAMILY MEDICINE CLINIC | Age: 74
End: 2024-10-23
Payer: MEDICARE

## 2024-10-23 DIAGNOSIS — J30.9 ALLERGIC RHINITIS, UNSPECIFIED SEASONALITY, UNSPECIFIED TRIGGER: Primary | ICD-10-CM

## 2024-10-23 PROCEDURE — 95115 IMMUNOTHERAPY ONE INJECTION: CPT | Performed by: FAMILY MEDICINE

## 2024-11-01 DIAGNOSIS — J30.9 ALLERGIC RHINITIS, UNSPECIFIED SEASONALITY, UNSPECIFIED TRIGGER: ICD-10-CM

## 2024-11-01 RX ORDER — MONTELUKAST SODIUM 10 MG/1
10 TABLET ORAL DAILY
Qty: 90 TABLET | Refills: 1 | Status: SHIPPED | OUTPATIENT
Start: 2024-11-01

## 2024-11-04 ENCOUNTER — OFFICE VISIT (OUTPATIENT)
Dept: PAIN MEDICINE | Facility: CLINIC | Age: 74
End: 2024-11-04
Payer: MEDICARE

## 2024-11-04 ENCOUNTER — CLINICAL SUPPORT (OUTPATIENT)
Dept: FAMILY MEDICINE CLINIC | Age: 74
End: 2024-11-04
Payer: MEDICARE

## 2024-11-04 VITALS
HEART RATE: 54 BPM | DIASTOLIC BLOOD PRESSURE: 80 MMHG | WEIGHT: 205.4 LBS | OXYGEN SATURATION: 96 % | BODY MASS INDEX: 38.78 KG/M2 | HEIGHT: 61 IN | SYSTOLIC BLOOD PRESSURE: 152 MMHG | TEMPERATURE: 96.2 F | RESPIRATION RATE: 18 BRPM

## 2024-11-04 DIAGNOSIS — M54.16 LUMBAR RADICULOPATHY: Primary | ICD-10-CM

## 2024-11-04 DIAGNOSIS — M54.16 LUMBAR RADICULOPATHY: ICD-10-CM

## 2024-11-04 DIAGNOSIS — G89.4 CHRONIC PAIN SYNDROME: ICD-10-CM

## 2024-11-04 DIAGNOSIS — J30.9 ALLERGIC RHINITIS, UNSPECIFIED SEASONALITY, UNSPECIFIED TRIGGER: Primary | ICD-10-CM

## 2024-11-04 DIAGNOSIS — Z79.899 ENCOUNTER FOR LONG-TERM (CURRENT) USE OF HIGH-RISK MEDICATION: ICD-10-CM

## 2024-11-04 DIAGNOSIS — C16.9 GASTRIC ADENOCARCINOMA: ICD-10-CM

## 2024-11-04 DIAGNOSIS — M15.9 GENERALIZED OSTEOARTHRITIS: ICD-10-CM

## 2024-11-04 DIAGNOSIS — M06.9 RHEUMATOID ARTHRITIS, INVOLVING UNSPECIFIED SITE, UNSPECIFIED WHETHER RHEUMATOID FACTOR PRESENT: ICD-10-CM

## 2024-11-04 DIAGNOSIS — M51.360 DEGENERATION OF INTERVERTEBRAL DISC OF LUMBAR REGION WITH DISCOGENIC BACK PAIN: ICD-10-CM

## 2024-11-04 PROCEDURE — 1159F MED LIST DOCD IN RCRD: CPT

## 2024-11-04 PROCEDURE — 1160F RVW MEDS BY RX/DR IN RCRD: CPT

## 2024-11-04 PROCEDURE — 95115 IMMUNOTHERAPY ONE INJECTION: CPT | Performed by: FAMILY MEDICINE

## 2024-11-04 PROCEDURE — 1125F AMNT PAIN NOTED PAIN PRSNT: CPT

## 2024-11-04 PROCEDURE — 99214 OFFICE O/P EST MOD 30 MIN: CPT

## 2024-11-04 PROCEDURE — 80305 DRUG TEST PRSMV DIR OPT OBS: CPT

## 2024-11-04 RX ORDER — OXYCODONE HCL 80 MG/1
160 TABLET, FILM COATED, EXTENDED RELEASE ORAL EVERY 12 HOURS SCHEDULED
Qty: 120 TABLET | Refills: 0 | Status: SHIPPED | OUTPATIENT
Start: 2024-11-04

## 2024-11-04 NOTE — PROGRESS NOTES
CHIEF COMPLAINT  Back and joint pain    Subjective   Anne Jewell is a 74 y.o. female  who presents for follow-up.  She has a history of chronic back and joint pain. She reports that her pain level has fluctuated since her last office visit and varies based on activity level.     Today pain is 7/10VAS in severity. Pain is located in her low back and radiates to bilateral feet. Describes this pain as a nearly continuous aching. Pain is worsened by rising from sitting to standing position, stress, weather changes, prolonged position, and walking long distances. Pain improves with rest/reposition, HEP, aqua therapy, heat/ice, and medications. She has completed home PT and OT in the past.     Continues with OxyContin 80mg 2 tablets BID and Baclofen 10mg 2-3/day (managed by PCP). Denies any side effects from the regimen, including constipation and somnolence. The regimen helps decrease pain by a significant amount. Notes improvement in activity and function with regimen. ADL's by self. Denies any bowel or bladder changes. Patient reports that she takes Xanax 2mg TID as needed. This is prescribed by psychiatrist.      History of adenocarcinoma. PET scan on 10/6/23 showed no evidence of metastatic disease. No plans for chemotherapy at this time per office note from Dr. Slava Loo from 10/12/23. Takes Dronabinol sparingly to help stimulate her appetite and for N/V.       Anticoagulated on Xarelto      Back Pain  This is a chronic problem. The current episode started more than 1 year ago. The problem occurs constantly. The problem is unchanged (pain level fluctuates since her last office visit). The pain is present in the lumbar spine. The quality of the pain is described as aching. The pain radiates to the right foot and left foot. The pain is at a severity of 7/10. The pain is moderate. The symptoms are aggravated by standing, position and sitting (rising from sitting to standing, prolonged position, walking long  distances, stress, weather changes). Associated symptoms include numbness (bilateral feet) and weakness. Pertinent negatives include no abdominal pain, chest pain, dysuria, fever or headaches. She has tried analgesics, home exercises, heat and ice (aqua therapy) for the symptoms. The treatment provided moderate relief.   Joint Pain  This is a chronic (7/10 VAS in severity) problem. The current episode started more than 1 year ago. The problem occurs intermittently. The problem has been unchanged. Associated symptoms include arthralgias, myalgias, neck pain, numbness (bilateral feet) and weakness. Pertinent negatives include no abdominal pain, chest pain, chills, congestion, coughing, fatigue, fever, headaches, vertigo or vomiting. The symptoms are aggravated by stress and exertion (weather changes, increased physical activity,). She has tried oral narcotics, position changes, rest, heat and ice for the symptoms. The treatment provided mild relief.     PEG Assessment   What number best describes your pain on average in the past week?7  What number best describes how, during the past week, pain has interfered with your enjoyment of life?8  What number best describes how, during the past week, pain has interfered with your general activity?  8    The following portions of the patient's history were reviewed and updated as appropriate: allergies, current medications, past family history, past medical history, past social history, past surgical history, and problem list.    Review of Systems   Constitutional:  Negative for chills, fatigue and fever.   HENT:  Negative for congestion.    Respiratory:  Negative for cough.    Cardiovascular:  Negative for chest pain.   Gastrointestinal:  Negative for abdominal pain, constipation, diarrhea and vomiting.   Genitourinary:  Negative for difficulty urinating and dysuria.   Musculoskeletal:  Positive for arthralgias, back pain, myalgias and neck pain.   Neurological:  Positive for  "weakness and numbness (bilateral feet). Negative for vertigo and headaches.   Psychiatric/Behavioral:  Positive for sleep disturbance. Negative for suicidal ideas. The patient is not nervous/anxious.      I have reviewed and confirmed the accuracy of the ROS as documented by the MA/LPN/RN EUGENIO Ayala    Vitals:    11/04/24 1339   BP: 152/80   Pulse: 54   Resp: 18   Temp: 96.2 °F (35.7 °C)   SpO2: 96%   Weight: 93.2 kg (205 lb 6.4 oz)   Height: 154.9 cm (61\")   PainSc:   7   PainLoc: Back     Objective   Physical Exam  Constitutional:       Appearance: Normal appearance.   HENT:      Head: Normocephalic.   Cardiovascular:      Rate and Rhythm: Normal rate and regular rhythm.   Pulmonary:      Effort: Pulmonary effort is normal.      Breath sounds: Normal breath sounds.   Musculoskeletal:      Right shoulder: Tenderness present.      Left shoulder: Tenderness present.      Right wrist: Tenderness present.      Left wrist: Tenderness present.      Right hand: Tenderness present.      Left hand: Tenderness present.      Cervical back: Normal range of motion.      Lumbar back: Tenderness present. Decreased range of motion.      Right hip: Tenderness present.      Left hip: Tenderness present.      Right knee: Tenderness present.      Left knee: Tenderness present.      Comments: Back brace for support in place   Skin:     General: Skin is warm and dry.      Capillary Refill: Capillary refill takes less than 2 seconds.   Neurological:      General: No focal deficit present.      Mental Status: She is alert and oriented to person, place, and time.      Gait: Gait abnormal (slowed, ambulating with rolling walker).   Psychiatric:         Mood and Affect: Mood normal.         Behavior: Behavior normal.         Thought Content: Thought content normal.         Cognition and Memory: Cognition normal.       Assessment & Plan   Diagnoses and all orders for this visit:    1. Lumbar radiculopathy (Primary)  -     Urine " Drug Screen Confirmation - Urine, Clean Catch; Future  -     POC Urine Drug Screen, Triage    2. Chronic pain syndrome  -     Urine Drug Screen Confirmation - Urine, Clean Catch; Future  -     POC Urine Drug Screen, Triage    3. Gastric adenocarcinoma  -     Urine Drug Screen Confirmation - Urine, Clean Catch; Future  -     POC Urine Drug Screen, Triage    4. Rheumatoid arthritis, involving unspecified site, unspecified whether rheumatoid factor present  -     Urine Drug Screen Confirmation - Urine, Clean Catch; Future  -     POC Urine Drug Screen, Triage    5. Generalized osteoarthritis  -     Urine Drug Screen Confirmation - Urine, Clean Catch; Future  -     POC Urine Drug Screen, Triage    6. Degeneration of intervertebral disc of lumbar region with discogenic back pain  -     Urine Drug Screen Confirmation - Urine, Clean Catch; Future  -     POC Urine Drug Screen, Triage    7. Encounter for long-term (current) use of high-risk medication  -     Urine Drug Screen Confirmation - Urine, Clean Catch; Future  -     POC Urine Drug Screen, Triage      Anne Jewell reports a pain score of 7.  Given her pain assessment as noted, treatment options were discussed and the following options were decided upon as a follow-up plan to address the patient's pain: continuation of current treatment plan for pain and prescription for opiod analgesics.    --- Routine UDS in office today as part of monitoring requirements for controlled substances.  The specimen was viewed and the immunoassay result reviewed and is +[id number].  This specimen will be sent to [facility] laboratory for confirmation.     --- The patient signed an updated copy of the controlled substance agreement on 11/4/24   --- Narcan prescription current   --- Continue OxyContin. DNF 11/9/24. Patient appears stable with current regimen. No adverse effects. Regarding continuation of opioids, there is no evidence of aberrant behavior or any red flags.  The patient  continues with appropriate response to opioid therapy. ADL's remain intact by self.   --- Follow-up 1 month     [facility] REPORT  As part of the patient's treatment plan, I am prescribing controlled substances. The patient has been made aware of appropriate use of such medications, including potential risk of somnolence, limited ability to drive and/or work safely, and the potential for dependence or overdose. It has also been made clear that these medications are for use by this patient only, without concomitant use of alcohol or other substances unless prescribed.     Patient has completed prescribing agreement detailing terms of continued prescribing of controlled substances, including monitoring [facility]A reports, urine drug screening, and pill counts if necessary. The patient is aware that inappropriate use will results in cessation of prescribing such medications.    As the clinician, I personally reviewed the VICENTA from 11/4/24 while the patient was in the office today.    History and physical exam exhibit continued safe and appropriate use of controlled substances.    Dictated utilizing Dragon dictation.

## 2024-11-04 NOTE — TELEPHONE ENCOUNTER
Patient seen in office today. Reviewed UDS and VICENTA. Both updated and appropriate. Refill appropriate.  DNF 11/9/24.

## 2024-11-20 ENCOUNTER — CLINICAL SUPPORT (OUTPATIENT)
Dept: FAMILY MEDICINE CLINIC | Age: 74
End: 2024-11-20
Payer: MEDICARE

## 2024-11-20 DIAGNOSIS — J30.9 ALLERGIC RHINITIS, UNSPECIFIED SEASONALITY, UNSPECIFIED TRIGGER: Primary | ICD-10-CM

## 2024-11-20 PROCEDURE — 95115 IMMUNOTHERAPY ONE INJECTION: CPT | Performed by: FAMILY MEDICINE

## 2024-12-04 ENCOUNTER — CLINICAL SUPPORT (OUTPATIENT)
Dept: FAMILY MEDICINE CLINIC | Age: 74
End: 2024-12-04
Payer: MEDICARE

## 2024-12-04 DIAGNOSIS — J30.9 ALLERGIC RHINITIS, UNSPECIFIED SEASONALITY, UNSPECIFIED TRIGGER: Primary | ICD-10-CM

## 2024-12-04 PROCEDURE — 95115 IMMUNOTHERAPY ONE INJECTION: CPT | Performed by: FAMILY MEDICINE

## 2024-12-05 ENCOUNTER — OFFICE VISIT (OUTPATIENT)
Dept: PAIN MEDICINE | Facility: CLINIC | Age: 74
End: 2024-12-05
Payer: MEDICARE

## 2024-12-05 VITALS
RESPIRATION RATE: 18 BRPM | WEIGHT: 203.8 LBS | HEIGHT: 61 IN | DIASTOLIC BLOOD PRESSURE: 72 MMHG | SYSTOLIC BLOOD PRESSURE: 161 MMHG | TEMPERATURE: 97.8 F | OXYGEN SATURATION: 97 % | HEART RATE: 59 BPM | BODY MASS INDEX: 38.48 KG/M2

## 2024-12-05 DIAGNOSIS — G89.4 CHRONIC PAIN SYNDROME: ICD-10-CM

## 2024-12-05 DIAGNOSIS — C16.9 GASTRIC ADENOCARCINOMA: ICD-10-CM

## 2024-12-05 DIAGNOSIS — M54.16 LUMBAR RADICULOPATHY: ICD-10-CM

## 2024-12-05 DIAGNOSIS — M51.360 DEGENERATION OF INTERVERTEBRAL DISC OF LUMBAR REGION WITH DISCOGENIC BACK PAIN: ICD-10-CM

## 2024-12-05 DIAGNOSIS — Z79.899 ENCOUNTER FOR LONG-TERM (CURRENT) USE OF HIGH-RISK MEDICATION: ICD-10-CM

## 2024-12-05 DIAGNOSIS — M06.9 RHEUMATOID ARTHRITIS, INVOLVING UNSPECIFIED SITE, UNSPECIFIED WHETHER RHEUMATOID FACTOR PRESENT: ICD-10-CM

## 2024-12-05 DIAGNOSIS — M15.9 GENERALIZED OSTEOARTHRITIS: ICD-10-CM

## 2024-12-05 DIAGNOSIS — M54.16 LUMBAR RADICULOPATHY: Primary | ICD-10-CM

## 2024-12-05 PROCEDURE — 1159F MED LIST DOCD IN RCRD: CPT

## 2024-12-05 PROCEDURE — 1160F RVW MEDS BY RX/DR IN RCRD: CPT

## 2024-12-05 PROCEDURE — 1125F AMNT PAIN NOTED PAIN PRSNT: CPT

## 2024-12-05 PROCEDURE — 99214 OFFICE O/P EST MOD 30 MIN: CPT

## 2024-12-05 RX ORDER — OXYCODONE HCL 80 MG/1
160 TABLET, FILM COATED, EXTENDED RELEASE ORAL EVERY 12 HOURS SCHEDULED
Qty: 120 TABLET | Refills: 0 | Status: SHIPPED | OUTPATIENT
Start: 2024-12-05

## 2024-12-05 NOTE — TELEPHONE ENCOUNTER
Patient seen in office today. Reviewed UDS and VICENTA. Both updated and appropriate. Refill appropriate.  DNF 12/9/24.     10

## 2024-12-05 NOTE — PROGRESS NOTES
CHIEF COMPLAINT  Back and joint pain     Subjective   Anne Jewell is a 74 y.o. female  who presents for follow-up.  She has a history of chronic back and joint pain. She reports that her pain has remained consistent  since her last office visit.    Today pain is 8/10VAS in severity. Pain is located in her low back and radiates to bilateral feet. Describes this pain as a nearly continuous aching. Pain is worsened by rising from sitting to standing position, stress, weather changes, prolonged position, and walking long distances. Pain improves with rest/reposition, HEP, aqua therapy, heat/ice, and medications. She has completed home PT and OT in the past. She has been using an under the desk elliptical and feels like this has made her legs stronger.      Continues with OxyContin 80mg 2 tablets BID and Baclofen 10mg 2-3/day (managed by PCP). Denies any side effects from the regimen, including constipation and somnolence. The regimen helps decrease pain by a significant amount and allows her to remain active. Patient reports that she takes Xanax 2mg TID as needed. This is prescribed by psychiatrist.      History of adenocarcinoma. PET scan on 10/6/23 showed no evidence of metastatic disease. No plans for chemotherapy at this time per office note from Dr. Slava Loo from 10/12/23.      Anticoagulated on Xarelto      Back Pain  This is a chronic problem. The current episode started more than 1 year ago. The problem occurs constantly. The problem is unchanged (pain has remained consistent since her last office visit). The pain is present in the lumbar spine. The quality of the pain is described as aching. The pain radiates to the right foot and left foot. The pain is at a severity of 8/10. The pain is moderate. The symptoms are aggravated by standing, position and sitting (rising from sitting to standing, prolonged position, walking long distances, stress, weather changes). Pertinent negatives include no abdominal  pain, chest pain, dysuria, fever, headaches, numbness or weakness. She has tried analgesics, home exercises, heat and ice (aqua therapy) for the symptoms. The treatment provided moderate relief.   Joint Pain  This is a chronic (8/10 VAS in severity) problem. The current episode started more than 1 year ago. The problem occurs intermittently. The problem has been waxing and waning. Associated symptoms include arthralgias, myalgias and neck pain. Pertinent negatives include no abdominal pain, chest pain, chills, congestion, coughing, fatigue, fever, headaches, numbness, vertigo, vomiting or weakness. The symptoms are aggravated by stress and exertion (weather changes, increased physical activity,). She has tried oral narcotics, position changes, rest, heat and ice for the symptoms. The treatment provided mild relief.      PEG Assessment   What number best describes your pain on average in the past week?8  What number best describes how, during the past week, pain has interfered with your enjoyment of life?8  What number best describes how, during the past week, pain has interfered with your general activity?  8    The following portions of the patient's history were reviewed and updated as appropriate: allergies, current medications, past family history, past medical history, past social history, past surgical history, and problem list.    Review of Systems   Constitutional:  Negative for chills, fatigue and fever.   HENT:  Negative for congestion.    Respiratory:  Negative for cough.    Cardiovascular:  Negative for chest pain.   Gastrointestinal:  Negative for abdominal pain, constipation, diarrhea and vomiting.   Genitourinary:  Negative for difficulty urinating and dysuria.   Musculoskeletal:  Positive for arthralgias, back pain, myalgias and neck pain.   Neurological:  Negative for vertigo, weakness, numbness and headaches.   Psychiatric/Behavioral:  Negative for sleep disturbance and suicidal ideas. The patient  "is nervous/anxious.      I have reviewed and confirmed the accuracy of the ROS as documented by the MA/LPN/RN EUGENIO Ayala    Vitals:    12/05/24 1242   BP: 161/72   Pulse: 59   Resp: 18   Temp: 97.8 °F (36.6 °C)   SpO2: 97%   Weight: 92.4 kg (203 lb 12.8 oz)   Height: 154.9 cm (61\")   PainSc:   8   PainLoc: Back     Objective   Physical Exam  Constitutional:       Appearance: Normal appearance.   HENT:      Head: Normocephalic.   Cardiovascular:      Rate and Rhythm: Normal rate and regular rhythm.   Pulmonary:      Effort: Pulmonary effort is normal.      Breath sounds: Normal breath sounds.   Musculoskeletal:      Right shoulder: Tenderness present.      Left shoulder: Tenderness present.      Right wrist: Tenderness present.      Left wrist: Tenderness present.      Right hand: Tenderness present.      Left hand: Tenderness present.      Cervical back: Normal range of motion.      Lumbar back: Tenderness present. Decreased range of motion.      Right hip: Tenderness present.      Left hip: Tenderness present.      Right knee: Tenderness present.      Left knee: Tenderness present.      Comments: Back brace for support in place   Skin:     General: Skin is warm and dry.      Capillary Refill: Capillary refill takes less than 2 seconds.   Neurological:      General: No focal deficit present.      Mental Status: She is alert and oriented to person, place, and time.      Gait: Gait abnormal (slowed, ambulating with rolling walker).   Psychiatric:         Mood and Affect: Mood normal.         Behavior: Behavior normal.         Thought Content: Thought content normal.         Cognition and Memory: Cognition normal.       Assessment & Plan   Diagnoses and all orders for this visit:    1. Lumbar radiculopathy (Primary)    2. Chronic pain syndrome    3. Gastric adenocarcinoma    4. Rheumatoid arthritis, involving unspecified site, unspecified whether rheumatoid factor present    5. Generalized " osteoarthritis    6. Degeneration of intervertebral disc of lumbar region with discogenic back pain    7. Encounter for long-term (current) use of high-risk medication      Anne Jewell reports a pain score of 8.  Given her pain assessment as noted, treatment options were discussed and the following options were decided upon as a follow-up plan to address the patient's pain: continuation of current treatment plan for pain and prescription for opiod analgesics.    --- The urine drug screen confirmation from 11/4/24 has been reviewed and the result is appropriate based on patient history and VICENTA report  --- The patient signed an updated copy of the controlled substance agreement on 11/4/24   --- Narcan prescription current   --- Continue OxyContin. DNF 12/9/24. Patient appears stable with current regimen. No adverse effects. Regarding continuation of opioids, there is no evidence of aberrant behavior or any red flags.  The patient continues with appropriate response to opioid therapy. ADL's remain intact by self.   --- Follow-up 1 month     VICENTA REPORT  As part of the patient's treatment plan, I am prescribing controlled substances. The patient has been made aware of appropriate use of such medications, including potential risk of somnolence, limited ability to drive and/or work safely, and the potential for dependence or overdose. It has also been made clear that these medications are for use by this patient only, without concomitant use of alcohol or other substances unless prescribed.     Patient has completed prescribing agreement detailing terms of continued prescribing of controlled substances, including monitoring VICENTA reports, urine drug screening, and pill counts if necessary. The patient is aware that inappropriate use will results in cessation of prescribing such medications.    As the clinician, I personally reviewed the VICENTA from 12/5/24 while the patient was in the office today.    History and  physical exam exhibit continued safe and appropriate use of controlled substances.    Dictated utilizing Dragon dictation.

## 2024-12-18 ENCOUNTER — OFFICE VISIT (OUTPATIENT)
Dept: FAMILY MEDICINE CLINIC | Age: 74
End: 2024-12-18
Payer: MEDICARE

## 2024-12-18 VITALS
BODY MASS INDEX: 38.33 KG/M2 | TEMPERATURE: 98.6 F | WEIGHT: 203 LBS | HEART RATE: 52 BPM | DIASTOLIC BLOOD PRESSURE: 62 MMHG | OXYGEN SATURATION: 95 % | HEIGHT: 61 IN | SYSTOLIC BLOOD PRESSURE: 138 MMHG

## 2024-12-18 DIAGNOSIS — J30.9 ALLERGIC RHINITIS, UNSPECIFIED SEASONALITY, UNSPECIFIED TRIGGER: ICD-10-CM

## 2024-12-18 DIAGNOSIS — B34.9 ACUTE VIRAL DISEASE: Primary | ICD-10-CM

## 2024-12-18 DIAGNOSIS — K58.0 IRRITABLE BOWEL SYNDROME WITH DIARRHEA: ICD-10-CM

## 2024-12-18 PROCEDURE — 1160F RVW MEDS BY RX/DR IN RCRD: CPT | Performed by: NURSE PRACTITIONER

## 2024-12-18 PROCEDURE — 99213 OFFICE O/P EST LOW 20 MIN: CPT | Performed by: NURSE PRACTITIONER

## 2024-12-18 PROCEDURE — 87428 SARSCOV & INF VIR A&B AG IA: CPT | Performed by: NURSE PRACTITIONER

## 2024-12-18 PROCEDURE — 95115 IMMUNOTHERAPY ONE INJECTION: CPT | Performed by: NURSE PRACTITIONER

## 2024-12-18 PROCEDURE — 1159F MED LIST DOCD IN RCRD: CPT | Performed by: NURSE PRACTITIONER

## 2024-12-18 PROCEDURE — 1125F AMNT PAIN NOTED PAIN PRSNT: CPT | Performed by: NURSE PRACTITIONER

## 2024-12-18 RX ORDER — DICYCLOMINE HCL 20 MG
20 TABLET ORAL EVERY 6 HOURS PRN
Qty: 90 TABLET | Refills: 1 | Status: SHIPPED | OUTPATIENT
Start: 2024-12-18

## 2024-12-18 NOTE — PROGRESS NOTES
Chief Complaint  Cough (Deep cough x1 week) and Fatigue (X1 week)    Subjective        Anne Jewell presents to [facility] today for deep cough, fatigue, not feeling well for 1 week.  Nonproductive, no sinus pressure pain no sore throat no fever.  Does take cough medicine to help keep your lungs clear.      Current Outpatient Medications:     albuterol sulfate  (90 Base) MCG/ACT inhaler, Inhale 2 puffs Every 6 (Six) Hours As Needed for Wheezing. Proventil  (90 Base) MCG/ACT Inhalation Aerosol Solution; Patient Sig: Proventil  (90 Base) MCG/ACT Inhalation Aerosol Solution Inhale 2 puff(s) every 6 to 8 hours as needed; 6.7; 0; 05-Apr-2013; Active, Disp: 8 g, Rfl: 0    ALPRAZolam (XANAX) 2 MG tablet, Take 1 tablet by mouth As Needed for Anxiety., Disp: , Rfl:     baclofen (LIORESAL) 10 MG tablet, TAKE 1 TABLET BY MOUTH 3 TIMES A DAY, Disp: 90 tablet, Rfl: 0    Calcium Citrate-Vitamin D3 (CITRACAL) 315-6.25 MG-MCG tablet tablet, Take 1 tablet by mouth Daily., Disp: , Rfl:     Cholecalciferol (VITAMIN D3) 2000 units capsule, TAKE ONE CAPSULE BY MOUTH DAILY, Disp: 30 capsule, Rfl: 9    Cyanocobalamin (B-12) 1000 MCG sublingual tablet, PLACE 1 TABLET UNDER THE TONGUE AND LET DISSOLVE ONCE DAILY, Disp: 30 each, Rfl: 1    dicyclomine (BENTYL) 20 MG tablet, Take 1 tablet by mouth Every 6 (Six) Hours As Needed (diarrhea)., Disp: 90 tablet, Rfl: 1    dronabinol (MARINOL) 5 MG capsule, Take 1 capsule by mouth., Disp: , Rfl:     EPINEPHrine (EPIPEN) 0.3 MG/0.3ML solution auto-injector injection, , Disp: , Rfl:     escitalopram (LEXAPRO) 20 MG tablet, Take 1 tablet by mouth Daily., Disp: , Rfl:     furosemide (LASIX) 20 MG tablet, Take 1 tablet by mouth Daily., Disp: 90 tablet, Rfl: 1    levothyroxine (SYNTHROID, LEVOTHROID) 100 MCG tablet, TAKE 1 TABLET BY MOUTH DAILY, Disp: 90 tablet, Rfl: 3    Magnesium Oxide -Mg Supplement 400 (240 Mg) MG tablet, TAKE 1 TABLET BY MOUTH  "TWICE A DAY, Disp: 180 tablet, Rfl: 1    metoprolol tartrate (LOPRESSOR) 25 MG tablet, TAKE ONE TABLET BY MOUTH EVERY 12 HOURS, Disp: 180 tablet, Rfl: 1    montelukast (SINGULAIR) 10 MG tablet, TAKE 1 TABLET BY MOUTH DAILY, Disp: 90 tablet, Rfl: 1    Multiple Vitamin tablet, Take 1 tablet by mouth Daily., Disp: , Rfl:     Omega 3-6-9 capsule, Take 1 capsule by mouth Daily., Disp: , Rfl:     oxyCODONE ER (oxyCONTIN) 80 MG tablet extended-release 12 hour 12 hr tablet, Take 2 tablets by mouth Every 12 (Twelve) Hours. 30 day supply. DNF 12/9/24, Disp: 120 tablet, Rfl: 0    pantoprazole (PROTONIX) 40 MG EC tablet, TAKE 1 TABLET BY MOUTH DAILY, Disp: 90 tablet, Rfl: 3    potassium chloride (Klor-Con M20) 20 MEQ CR tablet, Take 1 tablet by mouth Daily. TAKE DAILY WITH LASIX, Disp: 90 tablet, Rfl: 3    predniSONE (DELTASONE) 20 MG tablet, Take 1 tablet by mouth 2 (Two) Times a Day., Disp: 10 tablet, Rfl: 0    promethazine (PHENERGAN) 25 MG tablet, TAKE 1 TABLET BY MOUTH EVERY 8 HOURS AS NEEDED FOR NAUSEA AND/OR VOMITING, Disp: 90 tablet, Rfl: 2    rivaroxaban (Xarelto) 20 MG tablet, Take 1 tablet by mouth Daily., Disp: 90 tablet, Rfl: 3  Medications Discontinued During This Encounter   Medication Reason    dicyclomine (BENTYL) 20 MG tablet Reorder         Allergies:  Penicillins      Objective   Vital Signs:   Vitals:    12/18/24 1340   BP: 138/62   BP Location: Right arm   Patient Position: Sitting   Cuff Size: Adult   Pulse: 52   Temp: 98.6 °F (37 °C)   TempSrc: Oral   SpO2: 95%   Weight: 92.1 kg (203 lb)   Height: 154.9 cm (60.98\")     Body mass index is 38.38 kg/m².           Physical Exam  Constitutional:       Appearance: Normal appearance.   HENT:      Right Ear: Tympanic membrane normal.      Left Ear: Tympanic membrane normal.      Nose: Congestion present.      Mouth/Throat:      Pharynx: No oropharyngeal exudate or posterior oropharyngeal erythema.   Neck:      Vascular: No carotid bruit.   Cardiovascular:      " Rate and Rhythm: Normal rate and regular rhythm.      Heart sounds: Normal heart sounds.   Pulmonary:      Effort: Pulmonary effort is normal.      Breath sounds: Normal breath sounds.   Musculoskeletal:         General: Normal range of motion.   Skin:     General: Skin is warm and dry.   Neurological:      General: No focal deficit present.      Mental Status: She is alert.   Psychiatric:         Mood and Affect: Mood normal.         Behavior: Behavior normal.             Lab Results   Component Value Date    GLUCOSE 95 11/15/2023    BUN 27 (H) 11/15/2023    CREATININE 1.20 (H) 11/15/2023    EGFRIFNONA 52 (L) 12/16/2020    EGFRIFAFRI 63 12/16/2020    BCR 22.5 11/15/2023    K 4.8 11/15/2023    CO2 34.0 (H) 11/15/2023    CALCIUM 9.7 11/15/2023    PROTENTOTREF 6.3 04/14/2022    ALBUMIN 3.8 11/15/2023    AST 24 11/15/2023    ALT 17 11/15/2023       Lab Results   Component Value Date    TRIG 79 04/14/2022    HDL 49 04/14/2022    LDL 56 04/14/2022       Lab Results   Component Value Date    WBC 5.08 11/15/2023    HGB 12.6 11/15/2023    HCT 41.4 11/15/2023    MCV 99.3 (H) 11/15/2023     11/15/2023           Procedures    Lab Results (last 24 hours)       Procedure Component Value Units Date/Time    POCT SARS-CoV-2 Antigen PIERRE + Flu [[id number]] Collected: 12/18/24 1409    Specimen: Swab Updated: 12/18/24 1409     SARS Antigen Not Detected     Influenza A Antigen PIERRE Not Detected     Influenza B Antigen PIERRE Not Detected     Internal Control Passed     Lot Number [id number]     Expiration Date 7-11-25               Diagnoses and all orders for this visit:    1. Acute viral disease (Primary)  -     POCT SARS-CoV-2 Antigen PIERRE + Flu    2. Irritable bowel syndrome with diarrhea  -     dicyclomine (BENTYL) 20 MG tablet; Take 1 tablet by mouth Every 6 (Six) Hours As Needed (diarrhea).  Dispense: 90 tablet; Refill: 1            Follow Up  Return if symptoms worsen or fail to improve, for If not improving or symptoms are  getting worse.  Patient was given instructions and counseling regarding her condition or for health maintenance advice. Please see specific information pulled into the AVS if appropriate.       Discussed using over-the-counter medications to help with symptoms.    Lulú Dickerson, APRN  12/18/2024    Please note that portions of this document were completed using a voice recognition program.

## 2024-12-27 ENCOUNTER — CLINICAL SUPPORT (OUTPATIENT)
Dept: FAMILY MEDICINE CLINIC | Age: 74
End: 2024-12-27
Payer: MEDICARE

## 2024-12-27 DIAGNOSIS — J30.9 ALLERGIC RHINITIS, UNSPECIFIED SEASONALITY, UNSPECIFIED TRIGGER: Primary | ICD-10-CM

## 2024-12-27 PROCEDURE — 95115 IMMUNOTHERAPY ONE INJECTION: CPT | Performed by: FAMILY MEDICINE

## 2024-12-30 DIAGNOSIS — G89.4 CHRONIC PAIN SYNDROME: Chronic | ICD-10-CM

## 2024-12-30 DIAGNOSIS — R11.0 NAUSEA: ICD-10-CM

## 2024-12-31 RX ORDER — PROMETHAZINE HYDROCHLORIDE 25 MG/1
TABLET ORAL
Qty: 90 TABLET | Refills: 2 | Status: SHIPPED | OUTPATIENT
Start: 2024-12-31

## 2024-12-31 NOTE — TELEPHONE ENCOUNTER
Rx Refill Note  Requested Prescriptions     Pending Prescriptions Disp Refills    promethazine (PHENERGAN) 25 MG tablet [Pharmacy Med Name: PROMETHAZINE 25 MG TABLET] 90 tablet 2     Sig: TAKE 1 TABLET BY MOUTH EVERY 8 HOURS AS NEEDED FOR NAUSEA AND/OR VOMITING      Last office visit with prescribing clinician: 7/18/2024   Last telemedicine visit with prescribing clinician: Visit date not found   Next office visit with prescribing clinician: 1/23/2025       Peri Malin LPN  12/31/24, 09:21 EST    -9/5/24 #90, RF-2

## 2025-01-09 ENCOUNTER — TELEPHONE (OUTPATIENT)
Dept: PAIN MEDICINE | Facility: CLINIC | Age: 75
End: 2025-01-09
Payer: MEDICARE

## 2025-01-09 NOTE — TELEPHONE ENCOUNTER
Pt left a detailed vm stating if the weather is bad tomorrow she will not make it ion for her office visit.   I called pt but got her vm.

## 2025-01-10 ENCOUNTER — TELEMEDICINE (OUTPATIENT)
Dept: PAIN MEDICINE | Facility: CLINIC | Age: 75
End: 2025-01-10
Payer: MEDICARE

## 2025-01-10 DIAGNOSIS — M54.16 LUMBAR RADICULOPATHY: Primary | ICD-10-CM

## 2025-01-10 DIAGNOSIS — G89.4 CHRONIC PAIN SYNDROME: ICD-10-CM

## 2025-01-10 DIAGNOSIS — M54.16 LUMBAR RADICULOPATHY: ICD-10-CM

## 2025-01-10 DIAGNOSIS — M06.9 RHEUMATOID ARTHRITIS, INVOLVING UNSPECIFIED SITE, UNSPECIFIED WHETHER RHEUMATOID FACTOR PRESENT: ICD-10-CM

## 2025-01-10 DIAGNOSIS — M51.360 DEGENERATION OF INTERVERTEBRAL DISC OF LUMBAR REGION WITH DISCOGENIC BACK PAIN: ICD-10-CM

## 2025-01-10 DIAGNOSIS — M15.9 GENERALIZED OSTEOARTHRITIS: ICD-10-CM

## 2025-01-10 DIAGNOSIS — Z79.899 ENCOUNTER FOR LONG-TERM (CURRENT) USE OF HIGH-RISK MEDICATION: ICD-10-CM

## 2025-01-10 DIAGNOSIS — C16.9 GASTRIC ADENOCARCINOMA: ICD-10-CM

## 2025-01-10 PROCEDURE — 99214 OFFICE O/P EST MOD 30 MIN: CPT

## 2025-01-10 PROCEDURE — 1160F RVW MEDS BY RX/DR IN RCRD: CPT

## 2025-01-10 PROCEDURE — 1125F AMNT PAIN NOTED PAIN PRSNT: CPT

## 2025-01-10 PROCEDURE — 1159F MED LIST DOCD IN RCRD: CPT

## 2025-01-10 RX ORDER — OXYCODONE HCL 80 MG/1
160 TABLET, FILM COATED, EXTENDED RELEASE ORAL EVERY 12 HOURS SCHEDULED
Qty: 120 TABLET | Refills: 0 | Status: SHIPPED | OUTPATIENT
Start: 2025-01-10

## 2025-01-10 NOTE — TELEPHONE ENCOUNTER
Telehealth visit with patient today. Reviewed LOLIS and VICENTA. Both updated and appropriate. Refill appropriate.  DNF 1/11/25. Please refill. Thanks.

## 2025-01-16 ENCOUNTER — CLINICAL SUPPORT (OUTPATIENT)
Dept: FAMILY MEDICINE CLINIC | Age: 75
End: 2025-01-16
Payer: MEDICARE

## 2025-01-16 DIAGNOSIS — J30.9 ALLERGIC RHINITIS, UNSPECIFIED SEASONALITY, UNSPECIFIED TRIGGER: Primary | ICD-10-CM

## 2025-01-16 PROCEDURE — 95115 IMMUNOTHERAPY ONE INJECTION: CPT | Performed by: FAMILY MEDICINE

## 2025-01-23 ENCOUNTER — OFFICE VISIT (OUTPATIENT)
Dept: FAMILY MEDICINE CLINIC | Age: 75
End: 2025-01-23
Payer: MEDICARE

## 2025-01-23 VITALS
HEIGHT: 61 IN | SYSTOLIC BLOOD PRESSURE: 143 MMHG | OXYGEN SATURATION: 98 % | TEMPERATURE: 97.7 F | HEART RATE: 50 BPM | WEIGHT: 206.2 LBS | DIASTOLIC BLOOD PRESSURE: 72 MMHG | BODY MASS INDEX: 38.93 KG/M2

## 2025-01-23 DIAGNOSIS — R05.1 ACUTE COUGH: ICD-10-CM

## 2025-01-23 DIAGNOSIS — J45.20 MILD INTERMITTENT ASTHMA, UNCOMPLICATED: ICD-10-CM

## 2025-01-23 DIAGNOSIS — J30.9 ALLERGIC RHINITIS, UNSPECIFIED SEASONALITY, UNSPECIFIED TRIGGER: ICD-10-CM

## 2025-01-23 DIAGNOSIS — E03.9 HYPOTHYROIDISM, ACQUIRED: ICD-10-CM

## 2025-01-23 DIAGNOSIS — K21.9 GERD WITHOUT ESOPHAGITIS: Primary | ICD-10-CM

## 2025-01-23 PROBLEM — R60.0 PEDAL EDEMA: Status: RESOLVED | Noted: 2024-03-27 | Resolved: 2025-01-23

## 2025-01-23 PROBLEM — Z00.00 MEDICARE ANNUAL WELLNESS VISIT, SUBSEQUENT: Status: RESOLVED | Noted: 2023-04-19 | Resolved: 2025-01-23

## 2025-01-23 PROBLEM — N18.31 STAGE 3A CHRONIC KIDNEY DISEASE: Status: ACTIVE | Noted: 2021-12-03

## 2025-01-23 PROCEDURE — 1125F AMNT PAIN NOTED PAIN PRSNT: CPT | Performed by: FAMILY MEDICINE

## 2025-01-23 PROCEDURE — 1160F RVW MEDS BY RX/DR IN RCRD: CPT | Performed by: FAMILY MEDICINE

## 2025-01-23 PROCEDURE — 99214 OFFICE O/P EST MOD 30 MIN: CPT | Performed by: FAMILY MEDICINE

## 2025-01-23 PROCEDURE — 95115 IMMUNOTHERAPY ONE INJECTION: CPT | Performed by: FAMILY MEDICINE

## 2025-01-23 PROCEDURE — G2211 COMPLEX E/M VISIT ADD ON: HCPCS | Performed by: FAMILY MEDICINE

## 2025-01-23 PROCEDURE — 1159F MED LIST DOCD IN RCRD: CPT | Performed by: FAMILY MEDICINE

## 2025-01-23 RX ORDER — DOXYCYCLINE 100 MG/1
100 CAPSULE ORAL 2 TIMES DAILY
Qty: 20 CAPSULE | Refills: 0 | Status: SHIPPED | OUTPATIENT
Start: 2025-01-23

## 2025-01-23 NOTE — PROGRESS NOTES
Chief Complaint  Hypothyroidism (6 months) and Cough (Still with productive cough continuing from last visit on 12-18-24)    Subjective          Anne Jewell presents to [facility]  History of Present Illness  --LAST TSH WAS OK ON CURRENT DOSE OF SYNTHROID  --GERD IS WELL CONTROLLED WITH THE PPI  --BREATHING IS STABLE WITH CURRENT INHALED MEDS  --LOOSE COUGH FOR THE PAST FEW WEEKS BUT NO SOA.          Allergies   Allergen Reactions    Penicillins Hives        Health Maintenance Due   Topic Date Due    DXA SCAN  03/31/2024    BMI FOLLOWUP  04/19/2024        Current Outpatient Medications on File Prior to Visit   Medication Sig    albuterol sulfate  (90 Base) MCG/ACT inhaler Inhale 2 puffs Every 6 (Six) Hours As Needed for Wheezing. Proventil  (90 Base) MCG/ACT Inhalation Aerosol Solution; Patient Sig: Proventil  (90 Base) MCG/ACT Inhalation Aerosol Solution Inhale 2 puff(s) every 6 to 8 hours as needed; 6.7; 0; 05-Apr-2013; Active    ALPRAZolam (XANAX) 2 MG tablet Take 1 tablet by mouth As Needed for Anxiety.    baclofen (LIORESAL) 10 MG tablet TAKE 1 TABLET BY MOUTH 3 TIMES A DAY    Cholecalciferol (VITAMIN D3) 2000 units capsule TAKE ONE CAPSULE BY MOUTH DAILY    Cyanocobalamin (B-12) 1000 MCG sublingual tablet PLACE 1 TABLET UNDER THE TONGUE AND LET DISSOLVE ONCE DAILY    dicyclomine (BENTYL) 20 MG tablet Take 1 tablet by mouth Every 6 (Six) Hours As Needed (diarrhea).    EPINEPHrine (EPIPEN) 0.3 MG/0.3ML solution auto-injector injection     escitalopram (LEXAPRO) 20 MG tablet Take 1 tablet by mouth Daily.    furosemide (LASIX) 20 MG tablet Take 1 tablet by mouth Daily.    levothyroxine (SYNTHROID, LEVOTHROID) 100 MCG tablet TAKE 1 TABLET BY MOUTH DAILY    Magnesium Oxide -Mg Supplement 400 (240 Mg) MG tablet TAKE 1 TABLET BY MOUTH TWICE A DAY    metoprolol tartrate (LOPRESSOR) 25 MG tablet TAKE ONE TABLET BY MOUTH EVERY 12 HOURS    montelukast (SINGULAIR)  10 MG tablet TAKE 1 TABLET BY MOUTH DAILY    Multiple Vitamin tablet Take 1 tablet by mouth Daily.    Omega 3-6-9 capsule Take 1 capsule by mouth Daily.    oxyCODONE ER (oxyCONTIN) 80 MG tablet extended-release 12 hour 12 hr tablet Take 2 tablets by mouth Every 12 (Twelve) Hours. 30 day supply. DNF 1/11/25    pantoprazole (PROTONIX) 40 MG EC tablet TAKE 1 TABLET BY MOUTH DAILY    potassium chloride (Klor-Con M20) 20 MEQ CR tablet Take 1 tablet by mouth Daily. TAKE DAILY WITH LASIX    promethazine (PHENERGAN) 25 MG tablet TAKE 1 TABLET BY MOUTH EVERY 8 HOURS AS NEEDED FOR NAUSEA AND/OR VOMITING    rivaroxaban (Xarelto) 20 MG tablet Take 1 tablet by mouth Daily.    [DISCONTINUED] Calcium Citrate-Vitamin D3 (CITRACAL) 315-6.25 MG-MCG tablet tablet Take 1 tablet by mouth Daily. (Patient not taking: Reported on 1/23/2025)    [DISCONTINUED] dronabinol (MARINOL) 5 MG capsule Take 1 capsule by mouth. (Patient not taking: Reported on 1/23/2025)    [DISCONTINUED] predniSONE (DELTASONE) 20 MG tablet Take 1 tablet by mouth 2 (Two) Times a Day.     No current facility-administered medications on file prior to visit.       Immunization History   Administered Date(s) Administered    COVID-19 (PFIZER) 12YRS+ (COMIRNATY) 10/11/2024    COVID-19 (PFIZER) BIVALENT 12+YRS 10/05/2022    COVID-19 (PFIZER) Purple Cap Monovalent 02/23/2021, 03/16/2021, 08/25/2021    FluMist 2-49yrs 09/22/2014    Fluad Quad 65+ 10/17/2023    Fluzone High-Dose 65+YRS 10/17/2017, 10/08/2019, 09/02/2020, 09/20/2021, 09/21/2022, 10/11/2024    Fluzone High-Dose 65+yrs 09/12/2020, 09/20/2021, 09/21/2022    Influenza, Unspecified 10/29/2021    Pneumococcal Conjugate 13-Valent (PCV13) 08/08/2017    Pneumococcal Polysaccharide (PPSV23) 09/06/2019    Shingrix 04/29/2023    Tdap 08/08/2017, 12/21/2023       Review of Systems   Constitutional:  Negative for activity change, appetite change, chills, fatigue and fever.   HENT:  Negative for congestion, ear pain,  "rhinorrhea and sore throat.    Respiratory:  Negative for shortness of breath.    Cardiovascular:  Negative for chest pain, palpitations and leg swelling.   Gastrointestinal:  Negative for abdominal pain, constipation, diarrhea, nausea and vomiting.   Musculoskeletal:  Negative for arthralgias and myalgias.   Neurological:  Negative for headache.        Objective     /72 (BP Location: Right arm, Patient Position: Sitting)   Pulse 50   Temp 97.7 °F (36.5 °C) (Oral)   Ht 154.9 cm (61\")   Wt 93.5 kg (206 lb 3.2 oz)   SpO2 98% Comment: on room air  BMI 38.96 kg/m²       Physical Exam  Vitals and nursing note reviewed.   Constitutional:       General: She is not in acute distress.     Appearance: Normal appearance.   Cardiovascular:      Rate and Rhythm: Normal rate and regular rhythm.      Heart sounds: Normal heart sounds. No murmur heard.  Pulmonary:      Effort: Pulmonary effort is normal.      Breath sounds: Normal breath sounds.   Abdominal:      Palpations: Abdomen is soft.      Tenderness: There is no abdominal tenderness.   Musculoskeletal:      Cervical back: Neck supple.      Right lower leg: No edema.      Left lower leg: No edema.   Lymphadenopathy:      Cervical: No cervical adenopathy.   Neurological:      General: No focal deficit present.      Mental Status: She is alert.      Cranial Nerves: No cranial nerve deficit.      Coordination: Coordination normal.      Gait: Gait normal.   Psychiatric:         Mood and Affect: Mood normal.         Behavior: Behavior normal.         Result Review :                             Assessment and Plan      Diagnoses and all orders for this visit:    1. GERD without esophagitis (Primary)  Assessment & Plan:  IMPROVED WITH CURRENT TREATMENT, WILL REEVALUATE AT NEXT VISIT       2. Hypothyroidism, acquired  Assessment & Plan:  IMPROVED WITH CURRENT TREATMENT, WILL REEVALUATE AT NEXT VISIT       3. Mild intermittent asthma, uncomplicated  Assessment & " Plan:  IMPROVED WITH CURRENT TREATMENT, WILL REEVALUATE AT NEXT VISIT             4. Acute cough  Comments:  WILL COVER AS NOTED, FLUIDS--REST, OTC MEDS PRN, CONSIDER FUTHER WORKUP SHOULD SYMPTOMS PERSIST OR RECUR  Orders:  -     doxycycline (VIBRAMYCIN) 100 MG capsule; Take 1 capsule by mouth 2 (Two) Times a Day.  Dispense: 20 capsule; Refill: 0    5. Allergic rhinitis, unspecified seasonality, unspecified trigger  -     Allergy Serum Injection                    Follow Up     Return in about 6 months (around 7/23/2025).    Patient was given instructions and counseling regarding her condition or for health maintenance advice. Please see specific information pulled into the AVS if appropriate.

## 2025-01-25 DIAGNOSIS — I48.0 PAROXYSMAL A-FIB: ICD-10-CM

## 2025-01-27 RX ORDER — METOPROLOL TARTRATE 25 MG/1
25 TABLET, FILM COATED ORAL EVERY 12 HOURS
Qty: 180 TABLET | Refills: 1 | Status: SHIPPED | OUTPATIENT
Start: 2025-01-27

## 2025-01-30 ENCOUNTER — TELEPHONE (OUTPATIENT)
Dept: FAMILY MEDICINE CLINIC | Age: 75
End: 2025-01-30
Payer: MEDICARE

## 2025-01-30 NOTE — TELEPHONE ENCOUNTER
----- Message from  1/23/2025  3:13 PM EST -----  Jameson Dean MD  P [facility] [facility]  PLEASE CALL THIS PATIENT IN A WEEK TO SEE HOW HER COUGH IS DOING

## 2025-01-30 NOTE — TELEPHONE ENCOUNTER
Avoid greasy, spicy, fatty foods.    The hepatic cyst is a normal variant seen often on imaging.  It is not related to the discomforts you are having today.    Try the Bentyl for the abdominal discomfort    Follow up with Primary Care Provider and if symptoms persist, can consider seeing a GI provider/specialist.    Return Precautions    Although you are being discharged from the ED today, I encourage you to return for worsening symptoms.  Things can, and do, change such that treatment at home with medication may not be adequate.      Specifically, return for any of the following:    Chest pain, shortness of breath, pain or nausea and vomiting not controlled by medications provided.    Please make a follow up with your Primary Care Provider for a blood pressure recheck.      Given her history of asthma, this cough may persist for a while. Let’s give it some time to see if it resolves. Let us know if getting worse, however.

## 2025-01-30 NOTE — TELEPHONE ENCOUNTER
Pt said her cough is better but she still has it.  Its a productive cough with greenish/ yellow sputum.  No fever.  The antibiotic doxycycline did help.   She said its not coming from her head its more in her chest. Takes mucinex 1200mg twice a day.  She said she has had this cough for years.  She said Dr Dean said she may need a chest xray.  She said it is better so she doesn't want one unless needed.    Uses 2 lpm, oxygen at nights.

## 2025-02-12 ENCOUNTER — CLINICAL SUPPORT (OUTPATIENT)
Dept: FAMILY MEDICINE CLINIC | Age: 75
End: 2025-02-12
Payer: MEDICARE

## 2025-02-12 DIAGNOSIS — J30.9 ALLERGIC RHINITIS, UNSPECIFIED SEASONALITY, UNSPECIFIED TRIGGER: Primary | ICD-10-CM

## 2025-02-12 PROCEDURE — 95115 IMMUNOTHERAPY ONE INJECTION: CPT | Performed by: FAMILY MEDICINE

## 2025-02-13 ENCOUNTER — OFFICE VISIT (OUTPATIENT)
Dept: PAIN MEDICINE | Facility: CLINIC | Age: 75
End: 2025-02-13
Payer: MEDICARE

## 2025-02-13 VITALS
SYSTOLIC BLOOD PRESSURE: 137 MMHG | BODY MASS INDEX: 38.96 KG/M2 | RESPIRATION RATE: 16 BRPM | HEART RATE: 62 BPM | TEMPERATURE: 95.4 F | HEIGHT: 61 IN | DIASTOLIC BLOOD PRESSURE: 87 MMHG | OXYGEN SATURATION: 96 %

## 2025-02-13 DIAGNOSIS — M54.16 LUMBAR RADICULOPATHY: ICD-10-CM

## 2025-02-13 DIAGNOSIS — G89.4 CHRONIC PAIN SYNDROME: ICD-10-CM

## 2025-02-13 DIAGNOSIS — Z79.899 ENCOUNTER FOR LONG-TERM (CURRENT) USE OF HIGH-RISK MEDICATION: ICD-10-CM

## 2025-02-13 DIAGNOSIS — C16.9 GASTRIC ADENOCARCINOMA: ICD-10-CM

## 2025-02-13 DIAGNOSIS — M54.16 LUMBAR RADICULOPATHY: Primary | ICD-10-CM

## 2025-02-13 DIAGNOSIS — M15.9 GENERALIZED OSTEOARTHRITIS: ICD-10-CM

## 2025-02-13 DIAGNOSIS — M06.9 RHEUMATOID ARTHRITIS, INVOLVING UNSPECIFIED SITE, UNSPECIFIED WHETHER RHEUMATOID FACTOR PRESENT: ICD-10-CM

## 2025-02-13 PROCEDURE — 1159F MED LIST DOCD IN RCRD: CPT

## 2025-02-13 PROCEDURE — 1125F AMNT PAIN NOTED PAIN PRSNT: CPT

## 2025-02-13 PROCEDURE — 99214 OFFICE O/P EST MOD 30 MIN: CPT

## 2025-02-13 PROCEDURE — 1160F RVW MEDS BY RX/DR IN RCRD: CPT

## 2025-02-13 RX ORDER — OXYCODONE HCL 80 MG/1
160 TABLET, FILM COATED, EXTENDED RELEASE ORAL EVERY 12 HOURS SCHEDULED
Qty: 120 TABLET | Refills: 0 | Status: SHIPPED | OUTPATIENT
Start: 2025-02-13

## 2025-02-13 NOTE — PROGRESS NOTES
CHIEF COMPLAINT  Back pain    Subjective   Anne Jewell is a 74 y.o. female  who presents for follow-up.  She has a history of chronic back and joint pain. She reports that her pain level has worsened since her last office visit.     Today pain is 9/10VAS in severity. Pain is located in her low back and radiates to bilateral feet. Describes this pain as a nearly continuous aching. Pain is worsened by rising from sitting to standing position, stress, weather changes, prolonged position, and walking long distances. Pain improves with rest/reposition, HEP, aqua therapy, heat/ice, and medications. She has completed home PT and OT in the past. She has been using an under the desk elliptical and feels like this has made her legs stronger.      Continues with OxyContin 80mg 2 tablets BID and Baclofen 10mg 2-3/day (managed by PCP). Denies any side effects from the regimen, including constipation and somnolence. The regimen helps decrease pain by a significant amount and allows her to remain active. Patient reports that she takes Xanax 2mg TID as needed. This is prescribed by psychiatrist.      History of adenocarcinoma. PET scan on 10/6/23 showed no evidence of metastatic disease. No plans for chemotherapy at this time per office note from Dr. Slava Loo from 10/12/23.      Anticoagulated on Xarelto     She was seen by Dr. Dean on 1/23/25 for follow-up of hypothyroidism and cough.  He notes her last TSH was stable on current dose of Synthroid.  She continues to report a loose cough for the past few weeks but has no shortness of breath.  He prescribed doxycycline.  She will follow-up in 6 months.    Back Pain  This is a chronic problem. The current episode started more than 1 year ago. The problem occurs constantly. The problem has been worse (pain level has fluctuated since her last office visit) since onset. The pain is present in the lumbar spine. The quality of the pain is described as aching. The pain radiates to  the right foot and left foot. The pain is at a severity of 9/10. The pain is moderate. The symptoms are aggravated by standing, position and sitting (rising from sitting to standing, prolonged position, walking long distances, stress, weather changes). Pertinent negatives include no abdominal pain, chest pain, dysuria, fever, headaches, numbness or weakness. She has tried analgesics, home exercises, heat and ice (aqua therapy) for the symptoms. The treatment provided moderate relief.   Joint Pain  This is a chronic (8/10 VAS in severity) problem. The current episode started more than 1 year ago. The problem occurs constantly. The problem has been unchanged (pain level has fluctuated since her last office visit). Associated symptoms include arthralgias, myalgias and neck pain. Pertinent negatives include no abdominal pain, chest pain, chills, congestion, coughing, fatigue, fever, headaches, nausea, numbness, vertigo, vomiting or weakness. The symptoms are aggravated by stress and exertion (weather changes, increased physical activity,). She has tried oral narcotics, position changes, rest, heat and ice for the symptoms. The treatment provided mild relief.      PEG Assessment   What number best describes your pain on average in the past week?9  What number best describes how, during the past week, pain has interfered with your enjoyment of life?9  What number best describes how, during the past week, pain has interfered with your general activity?  9    The following portions of the patient's history were reviewed and updated as appropriate: allergies, current medications, past family history, past medical history, past social history, past surgical history, and problem list.    Review of Systems   Constitutional:  Negative for activity change, chills, fatigue and fever.   HENT:  Negative for congestion.    Respiratory:  Negative for cough, chest tightness and shortness of breath.    Cardiovascular:  Negative for  "chest pain.   Gastrointestinal:  Negative for abdominal pain, diarrhea, nausea and vomiting.   Genitourinary:  Negative for dysuria.   Musculoskeletal:  Positive for arthralgias, back pain, myalgias and neck pain.   Neurological:  Negative for dizziness, vertigo, weakness, light-headedness, numbness and headaches.   Psychiatric/Behavioral:  Negative for agitation, sleep disturbance and suicidal ideas. The patient is not nervous/anxious and is not hyperactive.      I have reviewed and confirmed the accuracy of the ROS as documented by the MA/LPN/RN EUGENIO Ayala    Vitals:    02/13/25 1242   BP: 137/87   BP Location: Right arm   Patient Position: Sitting   Cuff Size: Large Adult   Pulse: 62   Resp: 16   Temp: 95.4 °F (35.2 °C)   TempSrc: Temporal   SpO2: 96%   Weight: Comment: REFUSED   Height: 154.9 cm (61\")   PainSc:   9     Objective   Physical Exam  Constitutional:       Appearance: Normal appearance.   HENT:      Head: Normocephalic.   Cardiovascular:      Rate and Rhythm: Normal rate and regular rhythm.   Pulmonary:      Effort: Pulmonary effort is normal.      Breath sounds: Normal breath sounds.   Musculoskeletal:      Right shoulder: Tenderness present.      Left shoulder: Tenderness present.      Right wrist: Tenderness present.      Left wrist: Tenderness present.      Right hand: Tenderness present.      Left hand: Tenderness present.      Cervical back: Normal range of motion.      Lumbar back: Tenderness present. Decreased range of motion.      Right hip: Tenderness present.      Left hip: Tenderness present.      Right knee: Tenderness present.      Left knee: Tenderness present.      Comments: Back brace for support in place   Skin:     General: Skin is warm and dry.      Capillary Refill: Capillary refill takes less than 2 seconds.   Neurological:      General: No focal deficit present.      Mental Status: She is alert and oriented to person, place, and time.      Gait: Gait abnormal " (slowed, ambulating with rolling walker).   Psychiatric:         Mood and Affect: Mood normal.         Behavior: Behavior normal.         Thought Content: Thought content normal.         Cognition and Memory: Cognition normal.       Assessment & Plan   Diagnoses and all orders for this visit:    1. Lumbar radiculopathy (Primary)    2. Chronic pain syndrome    3. Gastric adenocarcinoma    4. Rheumatoid arthritis, involving unspecified site, unspecified whether rheumatoid factor present    5. Generalized osteoarthritis    6. Encounter for long-term (current) use of high-risk medication      Anne Jewell reports a pain score of 9.  Given her pain assessment as noted, treatment options were discussed and the following options were decided upon as a follow-up plan to address the patient's pain: continuation of current treatment plan for pain and prescription for opiod analgesics.    --- The urine drug screen confirmation from 11/4/24 has been reviewed and the result is appropriate based on patient history and VICENTA report  --- The patient signed an updated copy of the controlled substance agreement on 11/4/24   --- Narcan prescription current   --- Continue OxyContin. DNF 2/14/25. Patient appears stable with current regimen. No adverse effects. Regarding continuation of opioids, there is no evidence of aberrant behavior or any red flags.  The patient continues with appropriate response to opioid therapy. ADL's remain intact by self.   --- Follow-up 1 month        VICENTA REPORT  As part of the patient's treatment plan, I am prescribing controlled substances. The patient has been made aware of appropriate use of such medications, including potential risk of somnolence, limited ability to drive and/or work safely, and the potential for dependence or overdose. It has also been made clear that these medications are for use by this patient only, without concomitant use of alcohol or other substances unless prescribed.      Patient has completed prescribing agreement detailing terms of continued prescribing of controlled substances, including monitoring VICENTA reports, urine drug screening, and pill counts if necessary. The patient is aware that inappropriate use will results in cessation of prescribing such medications.    As the clinician, I personally reviewed the VICENTA from 2/13/25 while the patient was in the office today.    History and physical exam exhibit continued safe and appropriate use of controlled substances.    Dictated utilizing Dragon dictation.

## 2025-02-26 ENCOUNTER — CLINICAL SUPPORT (OUTPATIENT)
Dept: FAMILY MEDICINE CLINIC | Age: 75
End: 2025-02-26
Payer: MEDICARE

## 2025-02-26 DIAGNOSIS — J30.9 ALLERGIC RHINITIS, UNSPECIFIED SEASONALITY, UNSPECIFIED TRIGGER: Primary | ICD-10-CM

## 2025-02-26 PROCEDURE — 95115 IMMUNOTHERAPY ONE INJECTION: CPT | Performed by: FAMILY MEDICINE

## 2025-03-03 RX ORDER — LANOLIN ALCOHOL/MO/W.PET/CERES
1 CREAM (GRAM) TOPICAL 2 TIMES DAILY
Qty: 180 TABLET | Refills: 1 | Status: SHIPPED | OUTPATIENT
Start: 2025-03-03

## 2025-03-10 ENCOUNTER — CLINICAL SUPPORT (OUTPATIENT)
Dept: FAMILY MEDICINE CLINIC | Age: 75
End: 2025-03-10
Payer: MEDICARE

## 2025-03-10 DIAGNOSIS — J30.9 ALLERGIC RHINITIS, UNSPECIFIED SEASONALITY, UNSPECIFIED TRIGGER: Primary | ICD-10-CM

## 2025-03-10 PROCEDURE — 95115 IMMUNOTHERAPY ONE INJECTION: CPT | Performed by: FAMILY MEDICINE

## 2025-03-13 ENCOUNTER — OFFICE VISIT (OUTPATIENT)
Dept: PAIN MEDICINE | Facility: CLINIC | Age: 75
End: 2025-03-13
Payer: MEDICARE

## 2025-03-13 VITALS
OXYGEN SATURATION: 95 % | HEART RATE: 56 BPM | WEIGHT: 201.8 LBS | DIASTOLIC BLOOD PRESSURE: 70 MMHG | HEIGHT: 61 IN | BODY MASS INDEX: 38.1 KG/M2 | TEMPERATURE: 97.1 F | SYSTOLIC BLOOD PRESSURE: 121 MMHG

## 2025-03-13 DIAGNOSIS — G89.4 CHRONIC PAIN SYNDROME: ICD-10-CM

## 2025-03-13 DIAGNOSIS — M54.16 LUMBAR RADICULOPATHY: Primary | ICD-10-CM

## 2025-03-13 DIAGNOSIS — M06.9 RHEUMATOID ARTHRITIS, INVOLVING UNSPECIFIED SITE, UNSPECIFIED WHETHER RHEUMATOID FACTOR PRESENT: ICD-10-CM

## 2025-03-13 DIAGNOSIS — C16.9 GASTRIC ADENOCARCINOMA: ICD-10-CM

## 2025-03-13 DIAGNOSIS — M15.9 GENERALIZED OSTEOARTHRITIS: ICD-10-CM

## 2025-03-13 DIAGNOSIS — Z79.899 ENCOUNTER FOR LONG-TERM (CURRENT) USE OF HIGH-RISK MEDICATION: ICD-10-CM

## 2025-03-13 DIAGNOSIS — M54.16 LUMBAR RADICULOPATHY: ICD-10-CM

## 2025-03-13 PROCEDURE — 1160F RVW MEDS BY RX/DR IN RCRD: CPT

## 2025-03-13 PROCEDURE — 1125F AMNT PAIN NOTED PAIN PRSNT: CPT

## 2025-03-13 PROCEDURE — 1159F MED LIST DOCD IN RCRD: CPT

## 2025-03-13 PROCEDURE — 99214 OFFICE O/P EST MOD 30 MIN: CPT

## 2025-03-13 RX ORDER — COLCHICINE 0.6 MG/1
0.6 TABLET ORAL AS NEEDED
COMMUNITY
Start: 2025-03-10

## 2025-03-13 RX ORDER — OXYCODONE HCL 80 MG/1
160 TABLET, FILM COATED, EXTENDED RELEASE ORAL EVERY 12 HOURS SCHEDULED
Qty: 120 TABLET | Refills: 0 | Status: SHIPPED | OUTPATIENT
Start: 2025-03-13

## 2025-03-13 NOTE — TELEPHONE ENCOUNTER
Patient seen in office today. Reviewed UDS and VICENTA. Both updated and appropriate. Refill appropriate.  DNF 3/16/25

## 2025-03-13 NOTE — PROGRESS NOTES
CHIEF COMPLAINT  Back and joint pain     Subjective   Anne Jewell is a 74 y.o. female  who presents for follow-up.  She has a history of chronic back and joint pain. She reports that her pain has remained consistent since her last office visit.     Today pain is 7/10VAS in severity. Pain is located in her low back and radiates to bilateral feet. Describes this pain as a nearly continuous aching. Pain is worsened by rising from sitting to standing position, stress, weather changes, prolonged position, and walking long distances. Pain improves with rest/reposition, HEP, aqua therapy, heat/ice, and medications. She has completed home PT and OT in the past. She has been using an under the desk elliptical and feels like this has made her legs stronger.      Continues with OxyContin 80mg 2 tablets BID and Baclofen 10mg 2-3/day (managed by PCP). Denies any side effects from the regimen, including constipation and somnolence. The regimen helps decrease pain by a significant amount and allows her to remain active. Patient reports that she takes Xanax 2mg TID as needed. This is prescribed by psychiatrist.      History of adenocarcinoma. PET scan on 10/6/23 showed no evidence of metastatic disease. No plans for chemotherapy at this time per office note from Dr. Slava Loo from 10/12/23.      Anticoagulated on Xarelto      She was seen by EUGENIO Simental with [facility] on 3/10/25. History of stage 3 kidney disease. She was diagnosed with gout in her right hand/wrist. Uric acid level checked. She was started colchicine 0.6mg.     Back Pain  This is a chronic problem. The current episode started more than 1 year ago. The problem occurs constantly. The problem is unchanged (pain has remained consistent since her last office visit). The pain is present in the lumbar spine. The quality of the pain is described as aching. The pain radiates to the right foot and left foot. The pain is at a severity  of 7/10. The pain is moderate. The symptoms are aggravated by standing, position and sitting (rising from sitting to standing, prolonged position, walking long distances, stress, weather changes). Associated symptoms include headaches, numbness and weakness. Pertinent negatives include no abdominal pain, chest pain, dysuria or fever. She has tried analgesics, home exercises, heat and ice (aqua therapy) for the symptoms. The treatment provided moderate relief.   Joint Pain  This is a chronic (7/10 VAS in severity) problem. The current episode started more than 1 year ago. The problem occurs constantly. The problem has been unchanged (pain level has fluctuated since her last office visit). Associated symptoms include arthralgias, headaches, myalgias, neck pain, numbness and weakness. Pertinent negatives include no abdominal pain, chest pain, chills, congestion, coughing, fatigue, fever, nausea, vertigo or vomiting. The symptoms are aggravated by stress and exertion (weather changes, increased physical activity,). She has tried oral narcotics, position changes, rest, heat and ice for the symptoms. The treatment provided mild relief.     PEG Assessment   What number best describes your pain on average in the past week?8  What number best describes how, during the past week, pain has interfered with your enjoyment of life?0  What number best describes how, during the past week, pain has interfered with your general activity?  7    The following portions of the patient's history were reviewed and updated as appropriate: allergies, current medications, past family history, past medical history, past social history, past surgical history, and problem list.    Review of Systems   Constitutional:  Negative for activity change, chills, fatigue and fever.   HENT:  Negative for congestion.    Eyes:  Negative for visual disturbance.   Respiratory:  Negative for cough, chest tightness and shortness of breath.    Cardiovascular:   "Negative for chest pain.   Gastrointestinal:  Negative for abdominal pain, constipation, diarrhea, nausea and vomiting.   Genitourinary:  Negative for difficulty urinating, dyspareunia and dysuria.   Musculoskeletal:  Positive for arthralgias, back pain, myalgias and neck pain.   Neurological:  Positive for weakness, numbness and headaches. Negative for dizziness, vertigo and light-headedness.   Psychiatric/Behavioral:  Positive for agitation. Negative for self-injury, sleep disturbance and suicidal ideas. The patient is nervous/anxious.      I have reviewed and confirmed the accuracy of the ROS as documented by the MA/LPN/RN EUGENIO Ayala    Vitals:    03/13/25 1337   BP: 121/70   Pulse: 56   Temp: 97.1 °F (36.2 °C)   SpO2: 95%   Weight: 91.5 kg (201 lb 12.8 oz)   Height: 154.9 cm (61\")   PainSc: 7    PainLoc: Back      Objective   Physical Exam  Constitutional:       Appearance: Normal appearance.   HENT:      Head: Normocephalic.   Cardiovascular:      Rate and Rhythm: Normal rate and regular rhythm.   Pulmonary:      Effort: Pulmonary effort is normal.      Breath sounds: Normal breath sounds.   Musculoskeletal:      Right shoulder: Tenderness present.      Left shoulder: Tenderness present.      Right wrist: Tenderness present.      Left wrist: Tenderness present.      Right hand: Tenderness present.      Left hand: Tenderness present.      Cervical back: Normal range of motion.      Lumbar back: Tenderness present. Decreased range of motion.      Right hip: Tenderness present.      Left hip: Tenderness present.      Right knee: Tenderness present.      Left knee: Tenderness present.      Comments: Back brace for support in place   Skin:     General: Skin is warm and dry.      Capillary Refill: Capillary refill takes less than 2 seconds.   Neurological:      General: No focal deficit present.      Mental Status: She is alert and oriented to person, place, and time.      Gait: Gait abnormal (slowed, " ambulating with rolling walker).   Psychiatric:         Mood and Affect: Mood normal.         Behavior: Behavior normal.         Thought Content: Thought content normal.         Cognition and Memory: Cognition normal.       Assessment & Plan   Diagnoses and all orders for this visit:    1. Lumbar radiculopathy (Primary)    2. Chronic pain syndrome    3. Gastric adenocarcinoma    4. Rheumatoid arthritis, involving unspecified site, unspecified whether rheumatoid factor present    5. Generalized osteoarthritis    6. Encounter for long-term (current) use of high-risk medication      Anne Jewell reports a pain score of 7.  Given her pain assessment as noted, treatment options were discussed and the following options were decided upon as a follow-up plan to address the patient's pain: continuation of current treatment plan for pain and prescription for opiod analgesics.    --- The urine drug screen confirmation from 11/4/24 has been reviewed and the result is appropriate based on patient history and VICENTA report  --- The patient signed an updated copy of the controlled substance agreement on 11/4/24   --- Narcan prescription current   --- Continue OxyContin. DNF 3/16/25. Patient appears stable with current regimen. No adverse effects. Regarding continuation of opioids, there is no evidence of aberrant behavior or any red flags.  The patient continues with appropriate response to opioid therapy. ADL's remain intact by self.   --- Follow-up 1 month      VICENTA REPORT  As part of the patient's treatment plan, I am prescribing controlled substances. The patient has been made aware of appropriate use of such medications, including potential risk of somnolence, limited ability to drive and/or work safely, and the potential for dependence or overdose. It has also been made clear that these medications are for use by this patient only, without concomitant use of alcohol or other substances unless prescribed.     Patient has  completed prescribing agreement detailing terms of continued prescribing of controlled substances, including monitoring VICENTA reports, urine drug screening, and pill counts if necessary. The patient is aware that inappropriate use will results in cessation of prescribing such medications.    As the clinician, I personally reviewed the VICENTA from 3/13/25 while the patient was in the office today.    History and physical exam exhibit continued safe and appropriate use of controlled substances.    Dictated utilizing Dragon dictation.

## 2025-03-19 ENCOUNTER — CLINICAL SUPPORT (OUTPATIENT)
Dept: FAMILY MEDICINE CLINIC | Age: 75
End: 2025-03-19
Payer: MEDICARE

## 2025-03-19 DIAGNOSIS — J30.9 ALLERGIC RHINITIS, UNSPECIFIED SEASONALITY, UNSPECIFIED TRIGGER: Primary | ICD-10-CM

## 2025-03-19 PROCEDURE — 95115 IMMUNOTHERAPY ONE INJECTION: CPT | Performed by: FAMILY MEDICINE

## 2025-03-28 DIAGNOSIS — E03.9 ACQUIRED HYPOTHYROIDISM: Chronic | ICD-10-CM

## 2025-03-28 RX ORDER — LEVOTHYROXINE SODIUM 100 UG/1
100 TABLET ORAL DAILY
Qty: 90 TABLET | Refills: 1 | Status: SHIPPED | OUTPATIENT
Start: 2025-03-28

## 2025-03-28 NOTE — TELEPHONE ENCOUNTER
Rx Refill Note  Requested Prescriptions     Pending Prescriptions Disp Refills    levothyroxine (SYNTHROID, LEVOTHROID) 100 MCG tablet 90 tablet 1     Sig: Take 1 tablet by mouth Daily.      Last office visit with prescribing clinician: 1/23/2025     Next office visit with prescribing clinician: 7/24/2025    Last filled 12/26/24  #90    Aure Smith LPN  03/28/25, 15:36 EDT

## 2025-04-03 ENCOUNTER — CLINICAL SUPPORT (OUTPATIENT)
Dept: FAMILY MEDICINE CLINIC | Age: 75
End: 2025-04-03
Payer: MEDICARE

## 2025-04-03 DIAGNOSIS — J30.9 ALLERGIC RHINITIS, UNSPECIFIED SEASONALITY, UNSPECIFIED TRIGGER: Primary | ICD-10-CM

## 2025-04-03 PROCEDURE — 95115 IMMUNOTHERAPY ONE INJECTION: CPT | Performed by: FAMILY MEDICINE

## 2025-04-09 RX ORDER — FUROSEMIDE 20 MG/1
20 TABLET ORAL DAILY
Qty: 90 TABLET | Refills: 1 | Status: SHIPPED | OUTPATIENT
Start: 2025-04-09

## 2025-04-09 NOTE — TELEPHONE ENCOUNTER
Rx Refill Note  Requested Prescriptions     Pending Prescriptions Disp Refills    furosemide (LASIX) 20 MG tablet [Pharmacy Med Name: FUROSEMIDE 20 MG TABLET] 90 tablet 1     Sig: TAKE 1 TABLET BY MOUTH DAILY        LAST OFFICE VISIT:  9/10/2024     NEXT OFFICE VISIT:  06/25/2025     Does the medication requests match the last office note:    [x] Yes   [] No    Does this refill request meet protocol details for MA to approve:     [] Yes   [x] No   Normal serum potassium in past 12 months    Normal serum sodium in past 12 months

## 2025-04-15 ENCOUNTER — CLINICAL SUPPORT (OUTPATIENT)
Dept: FAMILY MEDICINE CLINIC | Age: 75
End: 2025-04-15
Payer: MEDICARE

## 2025-04-15 DIAGNOSIS — J30.9 ALLERGIC RHINITIS, UNSPECIFIED SEASONALITY, UNSPECIFIED TRIGGER: Primary | ICD-10-CM

## 2025-04-15 PROCEDURE — 95115 IMMUNOTHERAPY ONE INJECTION: CPT | Performed by: FAMILY MEDICINE

## 2025-04-17 ENCOUNTER — OFFICE VISIT (OUTPATIENT)
Dept: PAIN MEDICINE | Facility: CLINIC | Age: 75
End: 2025-04-17
Payer: MEDICARE

## 2025-04-17 VITALS
SYSTOLIC BLOOD PRESSURE: 126 MMHG | OXYGEN SATURATION: 93 % | HEIGHT: 61 IN | BODY MASS INDEX: 39.08 KG/M2 | RESPIRATION RATE: 16 BRPM | TEMPERATURE: 97.4 F | WEIGHT: 207 LBS | HEART RATE: 52 BPM | DIASTOLIC BLOOD PRESSURE: 73 MMHG

## 2025-04-17 DIAGNOSIS — M54.16 LUMBAR RADICULOPATHY: ICD-10-CM

## 2025-04-17 DIAGNOSIS — Z79.899 ENCOUNTER FOR LONG-TERM (CURRENT) USE OF HIGH-RISK MEDICATION: ICD-10-CM

## 2025-04-17 DIAGNOSIS — C16.9 GASTRIC ADENOCARCINOMA: ICD-10-CM

## 2025-04-17 DIAGNOSIS — G89.4 CHRONIC PAIN SYNDROME: ICD-10-CM

## 2025-04-17 DIAGNOSIS — M15.9 GENERALIZED OSTEOARTHRITIS: ICD-10-CM

## 2025-04-17 DIAGNOSIS — M54.16 LUMBAR RADICULOPATHY: Primary | ICD-10-CM

## 2025-04-17 DIAGNOSIS — M06.9 RHEUMATOID ARTHRITIS, INVOLVING UNSPECIFIED SITE, UNSPECIFIED WHETHER RHEUMATOID FACTOR PRESENT: ICD-10-CM

## 2025-04-17 PROCEDURE — 1159F MED LIST DOCD IN RCRD: CPT

## 2025-04-17 PROCEDURE — 99214 OFFICE O/P EST MOD 30 MIN: CPT

## 2025-04-17 PROCEDURE — 1160F RVW MEDS BY RX/DR IN RCRD: CPT

## 2025-04-17 PROCEDURE — 1125F AMNT PAIN NOTED PAIN PRSNT: CPT

## 2025-04-17 RX ORDER — OXYCODONE HCL 80 MG/1
160 TABLET, FILM COATED, EXTENDED RELEASE ORAL EVERY 12 HOURS SCHEDULED
Qty: 120 TABLET | Refills: 0 | Status: SHIPPED | OUTPATIENT
Start: 2025-04-17

## 2025-04-17 NOTE — TELEPHONE ENCOUNTER
Patient seen in office today. Reviewed UDS and VICENTA. Both updated and appropriate. Refill appropriate.  DNF 4/19/25

## 2025-04-17 NOTE — PROGRESS NOTES
CHIEF COMPLAINT  Back and joint pain     Subjective   Anne Jewell is a 74 y.o. female  who presents for follow-up.  She has a history of chronic back and joint pain. Today pain is 8/10VAS in severity. Pain is located in her low back and radiates to bilateral feet. Describes this pain as a nearly continuous aching. She reports that her pain has slightly worsened since her last office visit. Pain is worsened by rising from sitting to standing position, stress, weather changes, prolonged position, and walking long distances. Pain improves with rest/reposition, HEP, aqua therapy, heat/ice, and medications. She has completed home PT and OT in the past. She has been using an under the desk elliptical and feels like this has made her legs stronger.      Continues with OxyContin 80mg 2 tablets BID and Baclofen 10mg 2-3/day (managed by PCP). Denies any side effects from the regimen, including constipation and somnolence. She reports that this regimen helps decrease pain by a significant amount allowing her to remain active. Patient reports that she takes Xanax 2mg TID as needed. This is prescribed by psychiatrist.      History of adenocarcinoma. PET scan on 10/6/23 showed no evidence of metastatic disease. No plans for chemotherapy at this time per office note from Dr. Slava Loo from 10/12/23.      Anticoagulated on Xarelto      No reports no changes to her health or medication since her las office visit. She is scheduled to have cataract surgery in the next few weeks.     Back Pain  This is a chronic problem. The current episode started more than 1 year ago. The problem occurs constantly. The problem has been worse since onset. The pain is present in the lumbar spine. The quality of the pain is described as aching. The pain radiates to the right foot and left foot. The pain is at a severity of 8/10. The pain is moderate. The symptoms are aggravated by standing, position and sitting (rising from sitting to standing,  prolonged position, walking long distances, stress, weather changes). Associated symptoms include headaches and numbness (bilateral feet). Pertinent negatives include no abdominal pain, chest pain, dysuria, fever or weakness. She has tried analgesics, home exercises, heat and ice (aqua therapy) for the symptoms. The treatment provided moderate relief.   Joint Pain  Symptoms are chronic.   Onset was more than 1 year ago.   Symptoms occur constantly.   Symptoms have been worse (Rates pain 8/10 VAS) since onset.   Symptoms include headaches, myalgias, neck pain and numbness (bilateral feet).    Pertinent negative symptoms include no abdominal pain, no chest pain, no chills, no congestion, no cough, no fatigue, no fever, no nausea, no dysuria, no vertigo, no vomiting and no weakness.   Aggravating factors include stress and exertion (weather changes, increased physical activity,).   Treatments tried include oral narcotics, position changes, rest, heat and ice.   Improvement on treatment was mild.     PEG Assessment   What number best describes your pain on average in the past week?8  What number best describes how, during the past week, pain has interfered with your enjoyment of life?5  What number best describes how, during the past week, pain has interfered with your general activity?  10    The following portions of the patient's history were reviewed and updated as appropriate: allergies, current medications, past family history, past medical history, past social history, past surgical history, and problem list.    Review of Systems   Constitutional:  Negative for activity change, chills, fatigue and fever.   HENT:  Negative for congestion.    Respiratory:  Negative for cough.    Cardiovascular:  Negative for chest pain.   Gastrointestinal:  Negative for abdominal pain, constipation, diarrhea, nausea and vomiting.   Genitourinary:  Negative for difficulty urinating and dysuria.   Musculoskeletal:  Positive for  "arthralgias, back pain, myalgias and neck pain.   Neurological:  Positive for numbness (bilateral feet) and headaches. Negative for vertigo and weakness.   Psychiatric/Behavioral:  Negative for self-injury, sleep disturbance and suicidal ideas.      I have reviewed and confirmed the accuracy of the ROS as documented by the MA/LPN/RN EUGENIO Ayala    Vitals:    04/17/25 1320   BP: 126/73   Pulse: 52   Resp: 16   Temp: 97.4 °F (36.3 °C)   SpO2: 93%   Weight: 93.9 kg (207 lb)   Height: 154.9 cm (61\")   PainSc: 8      Objective   Physical Exam  Constitutional:       Appearance: Normal appearance.   HENT:      Head: Normocephalic.   Cardiovascular:      Rate and Rhythm: Normal rate and regular rhythm.   Pulmonary:      Effort: Pulmonary effort is normal.      Breath sounds: Normal breath sounds.   Musculoskeletal:      Right shoulder: Tenderness present.      Left shoulder: Tenderness present.      Right wrist: Tenderness present.      Left wrist: Tenderness present.      Right hand: Tenderness present.      Left hand: Tenderness present.      Cervical back: Normal range of motion.      Lumbar back: Tenderness present. Decreased range of motion.      Right hip: Tenderness present.      Left hip: Tenderness present.      Right knee: Tenderness present.      Left knee: Tenderness present.      Comments: Back brace for support in place   Skin:     General: Skin is warm and dry.      Capillary Refill: Capillary refill takes less than 2 seconds.   Neurological:      General: No focal deficit present.      Mental Status: She is alert and oriented to person, place, and time.      Gait: Gait abnormal (slowed, ambulating with rolling walker).   Psychiatric:         Mood and Affect: Mood normal.         Behavior: Behavior normal.         Thought Content: Thought content normal.         Cognition and Memory: Cognition normal.       Assessment & Plan   Diagnoses and all orders for this visit:    1. Lumbar radiculopathy " (Primary)    2. Chronic pain syndrome    3. Gastric adenocarcinoma    4. Rheumatoid arthritis, involving unspecified site, unspecified whether rheumatoid factor present    5. Generalized osteoarthritis    6. Encounter for long-term (current) use of high-risk medication      Anne Jewell reports a pain score of 8.  Given her pain assessment as noted, treatment options were discussed and the following options were decided upon as a follow-up plan to address the patient's pain: continuation of current treatment plan for pain and prescription for opiod analgesics.    --- The urine drug screen confirmation from 11/4/24 has been reviewed and the result is appropriate based on patient history and VICENTA report  --- The patient signed an updated copy of the controlled substance agreement on 11/4/24   --- Narcan prescription current   --- Continue OxyContin. DNF 4/19/25. Patient appears stable with current regimen. No adverse effects. Regarding continuation of opioids, there is no evidence of aberrant behavior or any red flags.  The patient continues with appropriate response to opioid therapy. ADL's remain intact by self.   --- Follow-up 1 month      VICENTA REPORT  As part of the patient's treatment plan, I am prescribing controlled substances. The patient has been made aware of appropriate use of such medications, including potential risk of somnolence, limited ability to drive and/or work safely, and the potential for dependence or overdose. It has also been made clear that these medications are for use by this patient only, without concomitant use of alcohol or other substances unless prescribed.     Patient has completed prescribing agreement detailing terms of continued prescribing of controlled substances, including monitoring VICENTA reports, urine drug screening, and pill counts if necessary. The patient is aware that inappropriate use will results in cessation of prescribing such medications.    As the clinician,  I personally reviewed the VICENTA from 4/17/25 while the patient was in the office today.    History and physical exam exhibit continued safe and appropriate use of controlled substances.    Dictated utilizing Dragon dictation.

## 2025-04-24 ENCOUNTER — CLINICAL SUPPORT (OUTPATIENT)
Dept: FAMILY MEDICINE CLINIC | Age: 75
End: 2025-04-24
Payer: MEDICARE

## 2025-04-24 DIAGNOSIS — J30.9 ALLERGIC RHINITIS, UNSPECIFIED SEASONALITY, UNSPECIFIED TRIGGER: Primary | ICD-10-CM

## 2025-04-24 PROCEDURE — 95115 IMMUNOTHERAPY ONE INJECTION: CPT | Performed by: FAMILY MEDICINE

## 2025-05-01 DIAGNOSIS — J30.9 ALLERGIC RHINITIS, UNSPECIFIED SEASONALITY, UNSPECIFIED TRIGGER: ICD-10-CM

## 2025-05-01 RX ORDER — MONTELUKAST SODIUM 10 MG/1
10 TABLET ORAL DAILY
Qty: 90 TABLET | Refills: 1 | Status: SHIPPED | OUTPATIENT
Start: 2025-05-01

## 2025-05-07 ENCOUNTER — CLINICAL SUPPORT (OUTPATIENT)
Dept: FAMILY MEDICINE CLINIC | Age: 75
End: 2025-05-07
Payer: MEDICARE

## 2025-05-07 DIAGNOSIS — J30.9 ALLERGIC RHINITIS, UNSPECIFIED SEASONALITY, UNSPECIFIED TRIGGER: Primary | ICD-10-CM

## 2025-05-07 PROCEDURE — 95115 IMMUNOTHERAPY ONE INJECTION: CPT | Performed by: FAMILY MEDICINE

## 2025-05-18 DIAGNOSIS — R11.0 NAUSEA: ICD-10-CM

## 2025-05-18 DIAGNOSIS — G89.4 CHRONIC PAIN SYNDROME: Chronic | ICD-10-CM

## 2025-05-19 NOTE — TELEPHONE ENCOUNTER
Rx Refill Note  Requested Prescriptions     Pending Prescriptions Disp Refills    promethazine (PHENERGAN) 25 MG tablet [Pharmacy Med Name: PROMETHAZINE 25 MG TABLET] 90 tablet 2     Sig: TAKE 1 TABLET BY MOUTH EVERY 8 HOURS AS NEEDED FOR NAUSEA AND/OR VOMITING      Last office visit with prescribing clinician: 1/23/2025   Last telemedicine visit with prescribing clinician: Visit date not found   Next office visit with prescribing clinician: 7/24/2025  Last refill sent: 12/31/24, #90, 2 refills    Vaishnavi Keller LPN  05/19/25, 10:58 EDT

## 2025-05-20 ENCOUNTER — CLINICAL SUPPORT (OUTPATIENT)
Dept: FAMILY MEDICINE CLINIC | Age: 75
End: 2025-05-20
Payer: MEDICARE

## 2025-05-20 DIAGNOSIS — J30.9 ALLERGIC RHINITIS, UNSPECIFIED SEASONALITY, UNSPECIFIED TRIGGER: Primary | ICD-10-CM

## 2025-05-20 PROCEDURE — 95115 IMMUNOTHERAPY ONE INJECTION: CPT | Performed by: FAMILY MEDICINE

## 2025-05-20 RX ORDER — PROMETHAZINE HYDROCHLORIDE 25 MG/1
TABLET ORAL
Qty: 90 TABLET | Refills: 2 | Status: SHIPPED | OUTPATIENT
Start: 2025-05-20

## 2025-05-23 ENCOUNTER — OFFICE VISIT (OUTPATIENT)
Dept: FAMILY MEDICINE CLINIC | Age: 75
End: 2025-05-23
Payer: MEDICARE

## 2025-05-23 VITALS
SYSTOLIC BLOOD PRESSURE: 136 MMHG | RESPIRATION RATE: 18 BRPM | TEMPERATURE: 97.4 F | WEIGHT: 200.6 LBS | BODY MASS INDEX: 37.87 KG/M2 | HEART RATE: 62 BPM | OXYGEN SATURATION: 96 % | HEIGHT: 61 IN | DIASTOLIC BLOOD PRESSURE: 64 MMHG

## 2025-05-23 DIAGNOSIS — M15.9 GENERALIZED OSTEOARTHRITIS: Primary | Chronic | ICD-10-CM

## 2025-05-23 DIAGNOSIS — R03.0 ELEVATED BLOOD PRESSURE READING: ICD-10-CM

## 2025-05-23 DIAGNOSIS — M25.531 ARTHRALGIA OF RIGHT WRIST: ICD-10-CM

## 2025-05-23 DIAGNOSIS — R30.0 DYSURIA: ICD-10-CM

## 2025-05-23 RX ORDER — PREDNISONE 5 MG/1
5 TABLET ORAL
COMMUNITY
Start: 2025-05-23 | End: 2025-05-23

## 2025-05-23 RX ORDER — CIPROFLOXACIN 500 MG/1
TABLET, FILM COATED ORAL
COMMUNITY
Start: 2025-05-22

## 2025-05-23 RX ORDER — METHYLPREDNISOLONE 4 MG/1
TABLET ORAL
Qty: 21 TABLET | Refills: 0 | Status: SHIPPED | OUTPATIENT
Start: 2025-05-23

## 2025-05-23 NOTE — PROGRESS NOTES
Chief Complaint     Gout (Right hand gout)    History of Present Illness     Anne Jewell is a 74 y.o. female who presents to [facility].     Patient or patient representative verbalized consent for the use of Ambient Listening during the visit with  EUGENIO Little for chart documentation. 5/24/2025  14:22 EDT      History of Present Illness  The patient is a 74-year-old female who presents for evaluation of gout and urinary tract infection.    She reports experiencing severe pain in her right hand, which she initially suspected to be a fracture due to the intensity of the discomfort. The pain was so severe that it impeded her ability to breathe. She sought medical attention from Dr. Gaitan, after nephrologist thought it may be gout. She has been under the care of a nephrologist, who prescribed a different steroid than Dr. Gaitan's. This medication did not alleviate her symptoms. She has an upcoming appointment with her nephrologist next month for a comprehensive workup. She has not yet started the new prescription as she wanted to consult with us first. She has been using ice packs to manage the swelling in her wrist. She has a history of osteoarthritis, rheumatoid arthritis, and carpal tunnel syndrome. She has undergone surgery on three fingers, which were affected by arthritis. She has also had two knee replacements and a history of pulmonary embolism, for which she is on anticoagulant therapy. She has been receiving allergy injections for the past 30 years. She is currently not on prednisone. She has been taking OxyContin for approximately 25 years, which she finds effective for her back pain. She has also been on hydrocodone but has successfully weaned herself off this medication.    She experienced a burning sensation in her urine a few days ago, which she initially dismissed as she had not experienced this symptom for a long time. However, she was diagnosed with a  urinary tract infection at an urgent care facility yesterday and was prescribed Cipro.    She had eye surgery and is on antibiotic drops for her eyes. She had cataracts removed. She had a gastric bypass in 1981. She is on oxygen at night.    Blood pressure elevated today at 149/58 initially, rechecked prior to leaving the office it was 136/64.    PAST SURGICAL HISTORY:  - Surgery on three fingers affected by arthritis  - Two knee replacements  - Gastric bypass in 1981  - Eye surgery for cataracts    FAMILY HISTORY           History      Past Medical History:   Diagnosis Date    Allergic rhinitis     Anemia     Arthritis of back     Arthritis of neck     Asthma     Broken nose     Bronchospasm     Cancer     Adenocarcinoma of the stomach    Carpal tunnel syndrome, bilateral     Cervical disc disorder     Clotting disorder     Colon polyp Don’t remember    Years ago    Deep vein thrombosis     Disc degeneration, lumbar     Edema     Esophageal reflux     Glaucoma     Hip arthrosis     History of transfusion     Joint pain     Kidney disease 2018    stage 3    Liver disease 2018    stage 3    Low back pain     Osteoarthritis     Osteopenia     Osteoporosis     Oxygen dependent     AT NIGHT    Periarthritis of shoulder     Scoliosis     Skin abrasion     LEFT ARM FIRST LAYER OF SKIN GONE, WRAPPED BY VISITING NURSE    Status post total knee replacement, left 08/31/2017    Status post total knee replacement, right 08/31/2017    Stomach cancer Sept 2023    UTI (urinary tract infection)     Venous thrombosis        Past Surgical History:   Procedure Laterality Date    BILATERAL BREAST REDUCTION      BLADDER SUSPENSION      vaginal sling operation for stress incontinence    COLONOSCOPY  08/05/2016    NORMAL    COLONOSCOPY N/A 01/25/2024    POLYPS    ENDOSCOPY N/A 01/25/2024    Procedure: ESOPHAGOGASTRODUODENOSCOPY;  Surgeon: Slava Loo Jr., MD;  Location: [facility] ENDOSCOPY;  Service: General;  Laterality: N/A;  pre  gastric ca iron defiency anemia post wnl    ERCP N/A 08/30/2023    Procedure: ENDOSCOPIC RETROGRADE CHOLANGIOPANCREATOGRAPHY;  Surgeon: Elijah Simon MD;  Location: [facility] ENDOSCOPY;  Service: Gastroenterology;  Laterality: N/A;  cbd stone    ERCP N/A 09/01/2023    Procedure: ENDOSCOPIC RETROGRADE CHOLANGIOPANCREATOGRAPHY WITH CHOLANGIOGRAM, SPHINCTEROTOMY, BALLOON SWEEP;  Surgeon: Elijah Simon MD;  Location: [facility] MAIN OR;  Service: Gastroenterology;  Laterality: N/A;    EXPLORATORY LAPAROTOMY N/A 09/01/2023    DIATAL GASTRECTOMY    GASTRECTOMY N/A 09/08/2023    Procedure: EXPLORATORY LAPAROTOMY WITH GASTROJEJUNOSTOMY;  Surgeon: Slava Loo Jr., MD;  Location: [facility] MAIN OR;  Service: General;  Laterality: N/A;    GASTRIC BYPASS      HAND SURGERY Right 01/14/2016    HEMORRHOIDECTOMY      HERNIA REPAIR      x7    HYSTERECTOMY      TOTAL KNEE ARTHROPLASTY Bilateral        Family History   Problem Relation Age of Onset    Prostate cancer Father     Arthritis Other     Hypertension Other         benign essential    Cancer Other     Diabetes Other     Heart disease Other     Nephrolithiasis Other     Malig Hyperthermia Neg Hx         Current Medications        Current Outpatient Medications:     albuterol sulfate  (90 Base) MCG/ACT inhaler, Inhale 2 puffs Every 6 (Six) Hours As Needed for Wheezing. Proventil  (90 Base) MCG/ACT Inhalation Aerosol Solution; Patient Sig: Proventil  (90 Base) MCG/ACT Inhalation Aerosol Solution Inhale 2 puff(s) every 6 to 8 hours as needed; 6.7; 0; 05-Apr-2013; Active, Disp: 8 g, Rfl: 0    ALPRAZolam (XANAX) 2 MG tablet, Take 1 tablet by mouth As Needed for Anxiety., Disp: , Rfl:     baclofen (LIORESAL) 10 MG tablet, TAKE 1 TABLET BY MOUTH 3 TIMES A DAY, Disp: 90 tablet, Rfl: 0    Cholecalciferol (VITAMIN D3) 2000 units capsule, TAKE ONE CAPSULE BY MOUTH DAILY, Disp: 30 capsule, Rfl: 9    ciprofloxacin (CIPRO) 500 MG tablet, , Disp: , Rfl:      colchicine 0.6 MG tablet, Take 1 tablet by mouth As Needed., Disp: , Rfl:     Cyanocobalamin (B-12) 1000 MCG sublingual tablet, PLACE 1 TABLET UNDER THE TONGUE AND LET DISSOLVE ONCE DAILY, Disp: 30 each, Rfl: 1    dicyclomine (BENTYL) 20 MG tablet, Take 1 tablet by mouth Every 6 (Six) Hours As Needed (diarrhea)., Disp: 90 tablet, Rfl: 1    EPINEPHrine (EPIPEN) 0.3 MG/0.3ML solution auto-injector injection, , Disp: , Rfl:     escitalopram (LEXAPRO) 20 MG tablet, Take 1 tablet by mouth Daily., Disp: , Rfl:     furosemide (LASIX) 20 MG tablet, TAKE 1 TABLET BY MOUTH DAILY, Disp: 90 tablet, Rfl: 1    levothyroxine (SYNTHROID, LEVOTHROID) 100 MCG tablet, Take 1 tablet by mouth Daily., Disp: 90 tablet, Rfl: 1    Magnesium Oxide -Mg Supplement 400 (240 Mg) MG tablet, TAKE 1 TABLET BY MOUTH TWICE A DAY, Disp: 180 tablet, Rfl: 1    metoprolol tartrate (LOPRESSOR) 25 MG tablet, TAKE ONE TABLET BY MOUTH EVERY 12 HOURS, Disp: 180 tablet, Rfl: 1    montelukast (SINGULAIR) 10 MG tablet, TAKE 1 TABLET BY MOUTH DAILY, Disp: 90 tablet, Rfl: 1    Multiple Vitamin tablet, Take 1 tablet by mouth Daily., Disp: , Rfl:     O2 (OXYGEN), Inhale 2 L/min Every Night., Disp: , Rfl:     Omega 3-6-9 capsule, Take 1 capsule by mouth Daily., Disp: , Rfl:     oxyCODONE ER (oxyCONTIN) 80 MG tablet extended-release 12 hour 12 hr tablet, Take 2 tablets by mouth Every 12 (Twelve) Hours. 30 day supply. DNF 4/19/25, Disp: 120 tablet, Rfl: 0    pantoprazole (PROTONIX) 40 MG EC tablet, TAKE 1 TABLET BY MOUTH DAILY, Disp: 90 tablet, Rfl: 3    potassium chloride (Klor-Con M20) 20 MEQ CR tablet, Take 1 tablet by mouth Daily. TAKE DAILY WITH LASIX, Disp: 90 tablet, Rfl: 3    promethazine (PHENERGAN) 25 MG tablet, TAKE 1 TABLET BY MOUTH EVERY 8 HOURS AS NEEDED FOR NAUSEA AND/OR VOMITING, Disp: 90 tablet, Rfl: 2    rivaroxaban (Xarelto) 20 MG tablet, Take 1 tablet by mouth Daily., Disp: 90 tablet, Rfl: 3    doxycycline (VIBRAMYCIN) 100 MG capsule, Take 1 capsule  "by mouth 2 (Two) Times a Day., Disp: 20 capsule, Rfl: 0    methylPREDNISolone (MEDROL) 4 MG dose pack, Take as directed on package instructions., Disp: 21 tablet, Rfl: 0     Allergies     Allergies   Allergen Reactions    Penicillins Hives       Social History       Social History     Social History Narrative    Not on file       Immunizations     Immunization:  Immunization History   Administered Date(s) Administered    COVID-19 (PFIZER) 12YRS+ (COMIRNATY) 10/11/2024    COVID-19 (PFIZER) BIVALENT 12+YRS 10/05/2022    COVID-19 (PFIZER) Purple Cap Monovalent 02/23/2021, 03/16/2021, 08/25/2021    FluMist 2-49yrs 09/22/2014    Fluad Quad 65+ 10/17/2023    Fluzone High-Dose 65+YRS 10/17/2017, 10/08/2019, 09/02/2020, 09/20/2021, 09/21/2022, 10/11/2024    Fluzone High-Dose 65+yrs 09/12/2020, 09/20/2021, 09/21/2022    Influenza, Unspecified 10/29/2021    Pneumococcal Conjugate 13-Valent (PCV13) 08/08/2017    Pneumococcal Polysaccharide (PPSV23) 09/06/2019    Shingrix 04/29/2023    Tdap 08/08/2017, 12/21/2023          Objective     Objective     Vital Signs:   /64 (BP Location: Right arm, Patient Position: Sitting, Cuff Size: Adult) Comment: manual bp  Pulse 62   Temp 97.4 °F (36.3 °C) (Oral)   Resp 18   Ht 154.9 cm (60.98\")   Wt 91 kg (200 lb 9.6 oz)   SpO2 96% Comment: room air  BMI 37.92 kg/m²       Physical Exam  Vitals and nursing note reviewed.   Constitutional:       Appearance: Normal appearance. She is obese.   HENT:      Head: Normocephalic.   Eyes:      Conjunctiva/sclera: Conjunctivae normal.      Pupils: Pupils are equal, round, and reactive to light.   Cardiovascular:      Rate and Rhythm: Normal rate and regular rhythm.      Pulses: Normal pulses.      Heart sounds: Normal heart sounds.   Pulmonary:      Effort: Pulmonary effort is normal.      Breath sounds: Normal breath sounds.   Abdominal:      General: Bowel sounds are normal.      Palpations: Abdomen is soft.   Musculoskeletal:         " General: Normal range of motion.      Right wrist: Swelling and bony tenderness present.      Right hand: Bony tenderness present.      Cervical back: Normal range of motion and neck supple.   Skin:     General: Skin is warm and dry.   Neurological:      General: No focal deficit present.      Mental Status: She is alert and oriented to person, place, and time.   Psychiatric:         Attention and Perception: Attention normal.         Mood and Affect: Mood and affect normal.         Behavior: Behavior normal. Behavior is cooperative.         Physical Exam  Respiratory: Clear to auscultation, no wheezing, rales or rhonchi  Cardiovascular: Regular rate and rhythm, no murmurs, rubs, or gallops      Results    The following data was reviewed by: EUGENIO Little on 05/24/25               Results           Assessment and Plan        Assessment and Plan       Generalized osteoarthritis    Orders:    methylPREDNISolone (MEDROL) 4 MG dose pack; Take as directed on package instructions.    Arthralgia of right wrist    Orders:    methylPREDNISolone (MEDROL) 4 MG dose pack; Take as directed on package instructions.  Arthralgia is likely related to osteoarthritis over gout due to normal uric acid levels in previous testing during pain.  Will do a steroid Dosepak as patient is unable to take NSAIDs due to kidney function.  Dysuria  Diagnosed with a UTI at an urgent clinic and prescribed Cipro.  Instructed patient to take antibiotics as directed and to finish all.       Elevated blood pressure reading  Blood pressure elevated today at 149/58 initially, rechecked prior to leaving the office it was 136/64.  Continue taking metoprolol and furosemide. Monitor blood pressure at home.             Assessment & Plan  1. Arthralgia.  - Severe pain in the right hand, primarily at the wrist.  - Previous treatment with Medrol Dosepak (methylprednisolone) was effective; colchicine was ineffective.  - Discussed the ineffectiveness of  colchicine and the effectiveness of Medrol Dosepak.  - Prescription for Medrol Dosepak issued with instructions to take 2 tablets at breakfast, lunch, dinner, and bedtime on the first day, tapering down each subsequent day over a 6-day period. Advised to discontinue colchicine.    2. Urinary tract infection.  - Reports burning sensation and was diagnosed with a UTI at an urgent care facility.  - Discussed the diagnosis and treatment plan.  - Previously prescribed Cipro and advised to complete the course.        Follow Up        Follow Up   Return for With PCP, Next scheduled follow up, sooner if condition worsens.  Patient was given instructions and counseling regarding her condition or for health maintenance advice. Please see specific information pulled into the AVS if appropriate.

## 2025-05-25 NOTE — ASSESSMENT & PLAN NOTE
Orders:    methylPREDNISolone (MEDROL) 4 MG dose pack; Take as directed on package instructions.

## 2025-05-28 ENCOUNTER — TELEPHONE (OUTPATIENT)
Dept: FAMILY MEDICINE CLINIC | Age: 75
End: 2025-05-28
Payer: MEDICARE
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
## 2025-05-29 ENCOUNTER — CLINICAL SUPPORT (OUTPATIENT)
Dept: FAMILY MEDICINE CLINIC | Age: 75
End: 2025-05-29
Payer: MEDICARE

## 2025-05-29 DIAGNOSIS — Z87.440 HISTORY OF UTI: ICD-10-CM

## 2025-05-29 DIAGNOSIS — Z87.440 HISTORY OF UTI: Primary | ICD-10-CM

## 2025-05-29 DIAGNOSIS — J30.9 ALLERGIC RHINITIS, UNSPECIFIED SEASONALITY, UNSPECIFIED TRIGGER: Primary | ICD-10-CM

## 2025-05-29 LAB
BILIRUB UR QL STRIP: NEGATIVE
CLARITY UR: CLEAR
COLOR UR: YELLOW
GLUCOSE UR STRIP-MCNC: NEGATIVE MG/DL
HGB UR QL STRIP.AUTO: NEGATIVE
KETONES UR QL STRIP: NEGATIVE
LEUKOCYTE ESTERASE UR QL STRIP.AUTO: NEGATIVE
NITRITE UR QL STRIP: NEGATIVE
PH UR STRIP.AUTO: 6 [PH] (ref 5–8)
PROT UR QL STRIP: NEGATIVE
SP GR UR STRIP: 1.02 (ref 1–1.03)
UROBILINOGEN UR QL STRIP: NORMAL

## 2025-05-29 PROCEDURE — 95115 IMMUNOTHERAPY ONE INJECTION: CPT | Performed by: FAMILY MEDICINE

## 2025-05-29 PROCEDURE — 81003 URINALYSIS AUTO W/O SCOPE: CPT | Performed by: STUDENT IN AN ORGANIZED HEALTH CARE EDUCATION/TRAINING PROGRAM

## 2025-05-29 NOTE — TELEPHONE ENCOUNTER
Spoke with pt who would just like to do a U/a and would like appt cx, cx appt, please place order for urine with culture

## 2025-05-29 NOTE — TELEPHONE ENCOUNTER
Please call patient and see when this episode happened if it was before I saw her last time on 5/23/2025 then she does not have to come in and we can just reorder a UA if it was after I saw her last then she can keep the appointment.  Thanks-Danette

## 2025-06-02 ENCOUNTER — OFFICE VISIT (OUTPATIENT)
Dept: PAIN MEDICINE | Facility: CLINIC | Age: 75
End: 2025-06-02
Payer: MEDICARE

## 2025-06-02 VITALS
SYSTOLIC BLOOD PRESSURE: 120 MMHG | HEART RATE: 56 BPM | WEIGHT: 197.6 LBS | HEIGHT: 61 IN | BODY MASS INDEX: 37.31 KG/M2 | TEMPERATURE: 96.6 F | OXYGEN SATURATION: 96 % | DIASTOLIC BLOOD PRESSURE: 68 MMHG

## 2025-06-02 DIAGNOSIS — Z79.899 ENCOUNTER FOR LONG-TERM (CURRENT) USE OF HIGH-RISK MEDICATION: ICD-10-CM

## 2025-06-02 DIAGNOSIS — M15.9 GENERALIZED OSTEOARTHRITIS: ICD-10-CM

## 2025-06-02 DIAGNOSIS — M54.16 LUMBAR RADICULOPATHY: ICD-10-CM

## 2025-06-02 DIAGNOSIS — M54.16 LUMBAR RADICULOPATHY: Primary | ICD-10-CM

## 2025-06-02 DIAGNOSIS — G89.4 CHRONIC PAIN SYNDROME: ICD-10-CM

## 2025-06-02 DIAGNOSIS — M06.9 RHEUMATOID ARTHRITIS, INVOLVING UNSPECIFIED SITE, UNSPECIFIED WHETHER RHEUMATOID FACTOR PRESENT: ICD-10-CM

## 2025-06-02 DIAGNOSIS — C16.9 GASTRIC ADENOCARCINOMA: ICD-10-CM

## 2025-06-02 LAB
POC AMPHETAMINES: NEGATIVE
POC BARBITURATES: NEGATIVE
POC BENZODIAZEPHINES: POSITIVE
POC BUPRENORPHINE: NEGATIVE
POC COCAINE: NEGATIVE
POC METHADONE: NEGATIVE
POC METHAMPHETAMINE SCREEN URINE: NEGATIVE
POC OPIATES: POSITIVE
POC OXYCODONE: POSITIVE
POC PHENCYCLIDINE: NEGATIVE
POC PROPOXYPHENE: NEGATIVE
POC THC: NEGATIVE

## 2025-06-02 PROCEDURE — 99214 OFFICE O/P EST MOD 30 MIN: CPT

## 2025-06-02 PROCEDURE — 1125F AMNT PAIN NOTED PAIN PRSNT: CPT

## 2025-06-02 PROCEDURE — 1159F MED LIST DOCD IN RCRD: CPT

## 2025-06-02 PROCEDURE — 80305 DRUG TEST PRSMV DIR OPT OBS: CPT

## 2025-06-02 PROCEDURE — 1160F RVW MEDS BY RX/DR IN RCRD: CPT

## 2025-06-02 RX ORDER — OXYCODONE HCL 80 MG/1
160 TABLET, FILM COATED, EXTENDED RELEASE ORAL EVERY 12 HOURS SCHEDULED
Qty: 120 TABLET | Refills: 0 | Status: SHIPPED | OUTPATIENT
Start: 2025-06-02

## 2025-06-02 NOTE — PROGRESS NOTES
CHIEF COMPLAINT  Back and joint pain    Subjective   Anne Jewell is a 74 y.o. female  who presents for follow-up.  She has a history of chronic back and joint pain. She reports that her pain has slightly worsened due to a flare in gout in her left hand.    Today pain is 8/10VAS in severity. Pain is located in her low back and radiates to bilateral feet. Describes this pain as a nearly continuous aching. She reports that her pain has slightly worsened since her last office visit. Pain is worsened by rising from sitting to standing position, stress, weather changes, prolonged position, and walking long distances. Pain improves with rest/reposition, HEP, aqua therapy, heat/ice, and medications. She has completed home PT and OT in the past. She has been using an under the desk elliptical and feels like this has made her legs stronger.      Continues with OxyContin 80mg 2 tablets BID and Baclofen 10mg 2-3/day (managed by PCP). Denies any side effects from the regimen, including constipation and somnolence. She reports that this regimen helps decrease pain by a significant amount allowing her to remain active. Patient reports that she takes Xanax 2mg TID as needed. This is prescribed by psychiatrist.      History of adenocarcinoma. PET scan on 10/6/23 showed no evidence of metastatic disease. No plans for chemotherapy at this time per office note from Dr. Slava Loo from 10/12/23.      Anticoagulated on Xarelto     Back Pain  Chronicity:  Chronic  Onset:  More than 1 year ago  Frequency:  Constantly  Progression since onset:  Unchanged  Pain location:  Lumbar spine  Pain quality:  Aching  Radiates to:  Right foot and left foot  Pain-numeric:  8/10  Pain severity:  Moderate  Aggravated by:  Standing, position and sitting (rising from sitting to standing, prolonged position, walking long distances, stress, weather changes)  Associated symptoms: headaches, numbness (bilateral foot intermittently) and weakness  (bilateral hand)    Associated symptoms: no abdominal pain, no chest pain, no dysuria and no fever    Treatments tried:  Analgesics, home exercises, heat and ice (aqua therapy)  Improvement on treatment:  Moderate  Joint Pain  Symptoms are chronic.   Onset was more than 1 year ago.   Symptoms occur constantly.   Symptoms have been worse (Rates pain 8/10 VAS) since onset.   Symptoms include headaches, myalgias, neck pain, numbness (bilateral foot intermittently) and weakness (bilateral hand).    Pertinent negative symptoms include no abdominal pain, no chest pain, no chills, no congestion, no cough, no fatigue, no fever, no nausea, no dysuria, no vertigo and no vomiting.   Aggravating factors include stress and exertion (weather changes, increased physical activity,).   Treatments tried include oral narcotics, position changes, rest, heat and ice.   Improvement on treatment was mild.      PEG Assessment   What number best describes your pain on average in the past week?8  What number best describes how, during the past week, pain has interfered with your enjoyment of life?7  What number best describes how, during the past week, pain has interfered with your general activity?  8    Review of Pertinent Medical Data ---  Reviewed office note from EUGENIO Little from 5/23/2025.  Patient presents for evaluation of gout and UTI.  Patient reports she has been experiencing severe pain in her right hand.  History of osteoarthritis, RA, and carpal tunnel syndrome.  She also notes she has been experiencing a burning sensation in her urine for a few days.  Patient was diagnosed with a UTI at urgent care and was prescribed Cipro.  He was prescribed a Medrol Dosepak at this time for her gout flare and instructed to finish her antibiotics.    The following portions of the patient's history were reviewed and updated as appropriate: allergies, current medications, past family history, past medical history, past social history,  "past surgical history, and problem list.    Review of Systems   Constitutional:  Negative for chills, fatigue and fever.   HENT:  Negative for congestion.    Respiratory:  Negative for cough and shortness of breath.    Cardiovascular:  Negative for chest pain.   Gastrointestinal:  Negative for abdominal pain, constipation, diarrhea, nausea and vomiting.   Genitourinary:  Negative for difficulty urinating and dysuria.   Musculoskeletal:  Positive for arthralgias, back pain, gait problem (ambulates with a walker), myalgias and neck pain.   Neurological:  Positive for weakness (bilateral hand), numbness (bilateral foot intermittently) and headaches. Negative for dizziness and vertigo.     I have reviewed and confirmed the accuracy of the ROS as documented by the MA/VALENTÍNN/RN EUGENIO Ayala    Vitals:    06/02/25 1139   BP: 120/68   BP Location: Left arm   Patient Position: Sitting   Pulse: 56   Temp: 96.6 °F (35.9 °C)   TempSrc: Temporal   SpO2: 96%   Weight: 89.6 kg (197 lb 9.6 oz)   Height: 154.9 cm (60.98\")   PainSc: 8      Objective   Physical Exam  Constitutional:       Appearance: Normal appearance.   HENT:      Head: Normocephalic.   Cardiovascular:      Rate and Rhythm: Normal rate and regular rhythm.   Pulmonary:      Effort: Pulmonary effort is normal.      Breath sounds: Normal breath sounds.   Musculoskeletal:      Right shoulder: Tenderness present.      Left shoulder: Tenderness present.      Right wrist: Tenderness present.      Left wrist: Tenderness present.      Right hand: Tenderness present.      Left hand: Tenderness present.      Cervical back: Normal range of motion.      Lumbar back: Tenderness present. Decreased range of motion.      Right hip: Tenderness present.      Left hip: Tenderness present.      Right knee: Tenderness present.      Left knee: Tenderness present.      Comments: Back brace for support in place   Skin:     General: Skin is warm and dry.      Capillary Refill: " Capillary refill takes less than 2 seconds.   Neurological:      General: No focal deficit present.      Mental Status: She is alert and oriented to person, place, and time.      Gait: Gait abnormal (slowed, ambulating with rolling walker).   Psychiatric:         Mood and Affect: Mood normal.         Behavior: Behavior normal.         Thought Content: Thought content normal.         Cognition and Memory: Cognition normal.       Assessment & Plan   Diagnoses and all orders for this visit:    1. Lumbar radiculopathy (Primary)    2. Chronic pain syndrome    3. Gastric adenocarcinoma    4. Rheumatoid arthritis, involving unspecified site, unspecified whether rheumatoid factor present    5. Generalized osteoarthritis    6. Encounter for long-term (current) use of high-risk medication      Anne Jewell reports a pain score of 8.  Given her pain assessment as noted, treatment options were discussed and the following options were decided upon as a follow-up plan to address the patient's pain: continuation of current treatment plan for pain and prescription for opiod analgesics.    --- Routine UDS in office today as part of monitoring requirements for controlled substances.  The specimen was viewed and the immunoassay result reviewed and is +OPI/OXY/BZO.  This specimen will be sent to [facility] laboratory for confirmation.     --- The patient signed an updated copy of the controlled substance agreement on 11/4/24   --- Narcan prescription current   --- Continue OxyContin. Patient appears stable with current regimen. No adverse effects. Regarding continuation of opioids, there is no evidence of aberrant behavior or any red flags.  The patient continues with appropriate response to opioid therapy. ADL's remain intact by self.   --- Follow-up 1 month      VICENTA REPORT  As part of the patient's treatment plan, I am prescribing controlled substances. The patient has been made aware of appropriate use of such medications,  including potential risk of somnolence, limited ability to drive and/or work safely, and the potential for dependence or overdose. It has also been made clear that these medications are for use by this patient only, without concomitant use of alcohol or other substances unless prescribed.     Patient has completed prescribing agreement detailing terms of continued prescribing of controlled substances, including monitoring VICENTA reports, urine drug screening, and pill counts if necessary. The patient is aware that inappropriate use will results in cessation of prescribing such medications.    As the clinician, I personally reviewed the VICENTA from 6/2/25 while the patient was in the office today.    History and physical exam exhibit continued safe and appropriate use of controlled substances.    Dictated utilizing Dragon dictation.

## 2025-06-03 DIAGNOSIS — I87.2 VENOUS INSUFFICIENCY: ICD-10-CM

## 2025-06-03 RX ORDER — POTASSIUM CHLORIDE 1500 MG/1
TABLET, EXTENDED RELEASE ORAL
Qty: 90 TABLET | Refills: 3 | Status: SHIPPED | OUTPATIENT
Start: 2025-06-03

## 2025-06-03 NOTE — TELEPHONE ENCOUNTER
Rx Refill Note  Requested Prescriptions     Pending Prescriptions Disp Refills    Klor-Con M20 20 MEQ CR tablet [Pharmacy Med Name: KLOR-CON M20 TABLET] 90 tablet 3     Sig: TAKE 1 TABLET BY MOUTH DAILY WITH FUROSEMIDE      Last office visit with prescribing clinician: 1/23/2025   Last telemedicine visit with prescribing clinician: Visit date not found   Next office visit with prescribing clinician: 7/24/2025  Potassium Supplement Protocol Failed       Vaishnavi Keller LPN  06/03/25, 09:29 EDT

## 2025-06-06 ENCOUNTER — TELEPHONE (OUTPATIENT)
Dept: FAMILY MEDICINE CLINIC | Age: 75
End: 2025-06-06
Payer: MEDICARE

## 2025-06-06 NOTE — TELEPHONE ENCOUNTER
Caller: Anne Jewell    Relationship: Self    Best call back number: [phone]    What medication are you requesting: Something for pain (gout)    What are your current symptoms: Gout in hand    How long have you been experiencing symptoms:     Have you had these symptoms before:    [x] Yes  [] No    Have you been treated for these symptoms before:   [x] Yes  [] No    If a prescription is needed, what is your preferred pharmacy and phone number: [facility] [phone] - [place], [place] - [address] [facility] [phone] [facility] [phone] FX     Additional notes:Patient is requesting medication and said that she is having a hard time functioning due to pain. Patient said she is soaking hand in ice and it is not working patient is requesting a call back with clinical advice.

## 2025-06-09 ENCOUNTER — OFFICE VISIT (OUTPATIENT)
Dept: FAMILY MEDICINE CLINIC | Age: 75
End: 2025-06-09
Payer: MEDICARE

## 2025-06-09 ENCOUNTER — HOSPITAL ENCOUNTER (OUTPATIENT)
Dept: GENERAL RADIOLOGY | Facility: HOSPITAL | Age: 75
Discharge: HOME | End: 2025-06-09
Admitting: STUDENT IN AN ORGANIZED HEALTH CARE EDUCATION/TRAINING PROGRAM
Payer: MEDICARE

## 2025-06-09 VITALS
OXYGEN SATURATION: 97 % | HEART RATE: 85 BPM | HEIGHT: 61 IN | DIASTOLIC BLOOD PRESSURE: 71 MMHG | TEMPERATURE: 98.1 F | BODY MASS INDEX: 37.38 KG/M2 | RESPIRATION RATE: 16 BRPM | WEIGHT: 198 LBS | SYSTOLIC BLOOD PRESSURE: 145 MMHG

## 2025-06-09 DIAGNOSIS — R03.0 ELEVATED BLOOD PRESSURE READING: ICD-10-CM

## 2025-06-09 DIAGNOSIS — M79.642 LEFT HAND PAIN: Primary | ICD-10-CM

## 2025-06-09 DIAGNOSIS — M79.642 LEFT HAND PAIN: ICD-10-CM

## 2025-06-09 PROCEDURE — 73130 X-RAY EXAM OF HAND: CPT

## 2025-06-09 RX ORDER — PREDNISONE 5 MG/1
TABLET ORAL
COMMUNITY
Start: 2025-05-23 | End: 2025-06-09

## 2025-06-09 RX ORDER — TRIAMCINOLONE ACETONIDE 40 MG/ML
40 INJECTION, SUSPENSION INTRA-ARTICULAR; INTRAMUSCULAR ONCE
Status: COMPLETED | OUTPATIENT
Start: 2025-06-09 | End: 2025-06-09

## 2025-06-09 RX ADMIN — TRIAMCINOLONE ACETONIDE 40 MG: 40 INJECTION, SUSPENSION INTRA-ARTICULAR; INTRAMUSCULAR at 11:18

## 2025-06-09 NOTE — PROGRESS NOTES
Chief Complaint     Hand Pain (Pt states she is having pain out of both hand but her left hand is the worse. Patient states she can't stop crying she can't eat,drink,sleep breath or do anything. )    History of Present Illness     Anne Jewell is a 74 y.o. female who presents to [facility].     Patient or patient representative verbalized consent for the use of Ambient Listening during the visit with  EUGENIO Little for chart documentation. 6/9/2025  10:47 EDT      History of Present Illness  The patient is a 74-year-old female who presents for evaluation of left hand pain.    She reports severe pain in her left hand, which she rates as 10 to 12 on a scale of 1 to 10. The pain is described as sharp and constant, and it has been unresponsive to various home remedies such as ice application, elevation, and dietary modifications. The intensity of the pain has disrupted her sleep and appetite. She has been experiencing neck discomfort due to her inability to sleep properly. She has been requesting testing for rheumatoid arthritis, but this has not yet been conducted. She is currently not on any steroid medication. She recalls that during her initial visit with Dr. Gaitan, she was prescribed a steroid medication that significantly alleviated her symptoms after only a few doses. However, the same treatment has not been effective in managing her current symptoms. She has been advised against using Voltaren cream due to her concurrent use of blood thinners. She has been on anticoagulant therapy for several years following two episodes of pulmonary embolism. She has been under the care of Dr. Alvarado for her arthritis, with her last consultation in 06/2024. She underwent surgery on her right hand for arthritis, followed by physical therapy, which resulted in significant improvement.    She comes here every week for allergy injections.    PAST SURGICAL HISTORY:  She underwent surgery on  her right hand for arthritis.         History      Past Medical History:   Diagnosis Date    Abnormal ECG Sept 2023    A-fib after procedures    Allergic     Allergic rhinitis     Anemia     Aneurysm     Arthritis of back     Arthritis of neck     Asthma     PE on blood thinners    Broken nose     Bronchospasm     Cancer     Adenocarcinoma of the stomach    Carpal tunnel syndrome, bilateral     Cervical disc disorder     Whole Spine    Clotting disorder     Colon polyp Don’t remember    Years ago    Deep vein thrombosis     Disc degeneration, lumbar     Edema     Esophageal reflux     Fatty liver 2/2015    Stage 3    Glaucoma     Hernia     Several hernias    Hip arthrosis     History of transfusion     Joint pain     Kidney disease 2018    stage 3    Lactose intolerance Life    Cant drink milk    Liver disease     stage 3    Low back pain     Also Osteonecrois    Osteoarthritis     Osteopenia Born with scoliosis’    Osteoporosis     Oxygen dependent     AT NIGHT    Periarthritis of shoulder     Pulmonary embolism     Scoliosis     Skin abrasion     LEFT ARM FIRST LAYER OF SKIN GONE, WRAPPED BY VISITING NURSE    Status post total knee replacement, left 08/31/2017    Status post total knee replacement, right 08/31/2017    Stomach cancer Sept 2023    UTI (urinary tract infection)     Venous thrombosis        Past Surgical History:   Procedure Laterality Date    ABDOMINAL SURGERY      All surgerys thru same incision    BARIATRIC SURGERY  O7/1981    Gastric by pass    BILATERAL BREAST REDUCTION      BLADDER SUSPENSION      vaginal sling operation for stress incontinence    BREAST SURGERY      CHOLECYSTECTOMY      COLONOSCOPY  08/05/2016    NORMAL    COLONOSCOPY N/A 01/25/2024    POLYPS    COSMETIC SURGERY      ENDOSCOPY N/A 01/25/2024    Procedure: ESOPHAGOGASTRODUODENOSCOPY;  Surgeon: Slava Loo Jr., MD;  Location: [facility] ENDOSCOPY;  Service: General;  Laterality: N/A;  pre gastric ca iron defiency anemia post wnl     ERCP N/A 08/30/2023    Procedure: ENDOSCOPIC RETROGRADE CHOLANGIOPANCREATOGRAPHY;  Surgeon: Elijah Simon MD;  Location: [facility] ENDOSCOPY;  Service: Gastroenterology;  Laterality: N/A;  cbd stone    ERCP N/A 09/01/2023    Procedure: ENDOSCOPIC RETROGRADE CHOLANGIOPANCREATOGRAPHY WITH CHOLANGIOGRAM, SPHINCTEROTOMY, BALLOON SWEEP;  Surgeon: Elijah Simon MD;  Location: [facility] MAIN OR;  Service: Gastroenterology;  Laterality: N/A;    EXPLORATORY LAPAROTOMY N/A 09/01/2023    DIATAL GASTRECTOMY    GASTRECTOMY N/A 09/08/2023    Procedure: EXPLORATORY LAPAROTOMY WITH GASTROJEJUNOSTOMY;  Surgeon: Slava Loo Jr., MD;  Location: [facility] MAIN OR;  Service: General;  Laterality: N/A;    GASTRIC BYPASS      HAND SURGERY Right 01/14/2016    HEMORRHOIDECTOMY      HERNIA REPAIR      x7    HYSTERECTOMY      TOTAL KNEE ARTHROPLASTY Bilateral     UPPER GASTROINTESTINAL ENDOSCOPY      All my intestines meshed together       Family History   Problem Relation Age of Onset    Cancer Mother     Diabetes Mother     Migraines Mother     Prostate cancer Father     Arthritis Father     Arthritis Other     Hypertension Other         benign essential    Cancer Other     Diabetes Other     Heart disease Other     Nephrolithiasis Other     Malig Hyperthermia Neg Hx         Current Medications        Current Outpatient Medications:     albuterol sulfate  (90 Base) MCG/ACT inhaler, Inhale 2 puffs Every 6 (Six) Hours As Needed for Wheezing. Proventil  (90 Base) MCG/ACT Inhalation Aerosol Solution; Patient Sig: Proventil  (90 Base) MCG/ACT Inhalation Aerosol Solution Inhale 2 puff(s) every 6 to 8 hours as needed; 6.7; 0; 05-Apr-2013; Active, Disp: 8 g, Rfl: 0    ALPRAZolam (XANAX) 2 MG tablet, Take 1 tablet by mouth As Needed for Anxiety., Disp: , Rfl:     baclofen (LIORESAL) 10 MG tablet, TAKE 1 TABLET BY MOUTH 3 TIMES A DAY, Disp: 90 tablet, Rfl: 0    Cholecalciferol (VITAMIN D3) 2000 units capsule, TAKE ONE  CAPSULE BY MOUTH DAILY, Disp: 30 capsule, Rfl: 9    colchicine 0.6 MG tablet, Take 1 tablet by mouth As Needed., Disp: , Rfl:     Cyanocobalamin (B-12) 1000 MCG sublingual tablet, PLACE 1 TABLET UNDER THE TONGUE AND LET DISSOLVE ONCE DAILY, Disp: 30 each, Rfl: 1    dicyclomine (BENTYL) 20 MG tablet, Take 1 tablet by mouth Every 6 (Six) Hours As Needed (diarrhea)., Disp: 90 tablet, Rfl: 1    doxycycline (VIBRAMYCIN) 100 MG capsule, Take 1 capsule by mouth 2 (Two) Times a Day., Disp: 20 capsule, Rfl: 0    EPINEPHrine (EPIPEN) 0.3 MG/0.3ML solution auto-injector injection, , Disp: , Rfl:     escitalopram (LEXAPRO) 20 MG tablet, Take 1 tablet by mouth Daily., Disp: , Rfl:     furosemide (LASIX) 20 MG tablet, TAKE 1 TABLET BY MOUTH DAILY, Disp: 90 tablet, Rfl: 1    levothyroxine (SYNTHROID, LEVOTHROID) 100 MCG tablet, Take 1 tablet by mouth Daily., Disp: 90 tablet, Rfl: 1    Magnesium Oxide -Mg Supplement 400 (240 Mg) MG tablet, TAKE 1 TABLET BY MOUTH TWICE A DAY, Disp: 180 tablet, Rfl: 1    metoprolol tartrate (LOPRESSOR) 25 MG tablet, TAKE ONE TABLET BY MOUTH EVERY 12 HOURS, Disp: 180 tablet, Rfl: 1    montelukast (SINGULAIR) 10 MG tablet, TAKE 1 TABLET BY MOUTH DAILY, Disp: 90 tablet, Rfl: 1    Multiple Vitamin tablet, Take 1 tablet by mouth Daily., Disp: , Rfl:     O2 (OXYGEN), Inhale 2 L/min Every Night., Disp: , Rfl:     Omega 3-6-9 capsule, Take 1 capsule by mouth Daily., Disp: , Rfl:     oxyCODONE ER (oxyCONTIN) 80 MG tablet extended-release 12 hour 12 hr tablet, Take 2 tablets by mouth Every 12 (Twelve) Hours. 30 day supply., Disp: 120 tablet, Rfl: 0    pantoprazole (PROTONIX) 40 MG EC tablet, TAKE 1 TABLET BY MOUTH DAILY, Disp: 90 tablet, Rfl: 3    potassium chloride (Klor-Con M20) 20 MEQ CR tablet, TAKE 1 TABLET BY MOUTH DAILY WITH FUROSEMIDE, Disp: 90 tablet, Rfl: 3    promethazine (PHENERGAN) 25 MG tablet, TAKE 1 TABLET BY MOUTH EVERY 8 HOURS AS NEEDED FOR NAUSEA AND/OR VOMITING, Disp: 90 tablet, Rfl: 2     "rivaroxaban (Xarelto) 20 MG tablet, Take 1 tablet by mouth Daily., Disp: 90 tablet, Rfl: 3    ciprofloxacin (CIPRO) 500 MG tablet, , Disp: , Rfl:   No current facility-administered medications for this visit.     Allergies     Allergies   Allergen Reactions    Penicillins Hives       Social History       Social History     Social History Narrative    Not on file       Immunizations     Immunization:  Immunization History   Administered Date(s) Administered    COVID-19 (PFIZER) 12YRS+ (COMIRNATY) 10/11/2024    COVID-19 (PFIZER) BIVALENT 12+YRS 10/05/2022    COVID-19 (PFIZER) Purple Cap Monovalent 02/23/2021, 03/16/2021, 08/25/2021    FluMist 2-49yrs 09/22/2014    Fluad Quad 65+ 10/17/2023    Fluzone High-Dose 65+YRS 10/17/2017, 10/08/2019, 09/02/2020, 09/20/2021, 09/21/2022, 10/11/2024    Fluzone High-Dose 65+yrs 09/12/2020, 09/20/2021, 09/21/2022    Influenza, Unspecified 10/29/2021    Pneumococcal Conjugate 13-Valent (PCV13) 08/08/2017    Pneumococcal Polysaccharide (PPSV23) 09/06/2019    Shingrix 04/29/2023    Tdap 08/08/2017, 12/21/2023          Objective     Objective     Vital Signs:   /71   Pulse 85   Temp 98.1 °F (36.7 °C) (Oral)   Resp 16   Ht 154.9 cm (60.98\")   Wt 89.8 kg (198 lb)   SpO2 97%   BMI 37.43 kg/m²       Physical Exam  Vitals and nursing note reviewed.   Constitutional:       Appearance: Normal appearance. She is obese.   HENT:      Head: Normocephalic.   Eyes:      Conjunctiva/sclera: Conjunctivae normal.      Pupils: Pupils are equal, round, and reactive to light.   Cardiovascular:      Rate and Rhythm: Normal rate and regular rhythm.      Pulses: Normal pulses.      Heart sounds: Normal heart sounds.   Pulmonary:      Effort: Pulmonary effort is normal.      Breath sounds: Normal breath sounds.   Abdominal:      General: Bowel sounds are normal.      Palpations: Abdomen is soft.   Musculoskeletal:         General: Normal range of motion.      Left hand: Swelling and tenderness " present.      Cervical back: Normal range of motion and neck supple.   Skin:     General: Skin is warm and dry.   Neurological:      General: No focal deficit present.      Mental Status: She is alert and oriented to person, place, and time.   Psychiatric:         Attention and Perception: Attention normal.         Mood and Affect: Mood is anxious. Affect is tearful.         Behavior: Behavior normal. Behavior is cooperative.         Physical Exam        Results    The following data was reviewed by: EUGENIO Little on 06/09/25             XR Hand 3+ View Left (06/09/2025 11:46)   Results           Assessment and Plan        Assessment and Plan       Left hand pain    Orders:    triamcinolone acetonide (KENALOG-40) injection 40 mg    XR Hand 3+ View Left; Future  Pain is most likely due to arthritis, encourage patient to follow-up with Dr. Alvarado.  Elevated blood pressure reading  Blood pressure reading elevated today at 145/71, pain is most likely exacerbating this.  Encourage patient to monitor blood pressure at home.            Assessment & Plan  1. Left hand pain.  - The patient reports severe, sharp, constant pain in the left hand, rated as 10/10 on the pain scale.  - Physical examination reveals significant discomfort in the left hand, with no relief from various home remedies.  - A steroid injection will be administered today, and an x-ray of the left hand will be ordered. The patient is advised to schedule an appointment with Dr. Alvarado for further evaluation. Over-the-counter creams such as Aspercreme are recommended for topical application to manage the pain. The patient is advised to postpone her allergy injection for today.        Follow Up        Follow Up   Return for With PCP, Next scheduled follow up, sooner if condition worsens.  Patient was given instructions and counseling regarding her condition or for health maintenance advice. Please see specific information pulled into the AVS if  appropriate.

## 2025-06-23 ENCOUNTER — CLINICAL SUPPORT (OUTPATIENT)
Dept: FAMILY MEDICINE CLINIC | Age: 75
End: 2025-06-23
Payer: MEDICARE

## 2025-06-23 DIAGNOSIS — J30.9 ALLERGIC RHINITIS, UNSPECIFIED SEASONALITY, UNSPECIFIED TRIGGER: Primary | ICD-10-CM

## 2025-06-23 PROCEDURE — 95115 IMMUNOTHERAPY ONE INJECTION: CPT | Performed by: FAMILY MEDICINE

## 2025-06-24 ENCOUNTER — TELEPHONE (OUTPATIENT)
Age: 75
End: 2025-06-24

## 2025-06-24 NOTE — TELEPHONE ENCOUNTER
\  Caller: Anne Jewell    Relationship to patient: Self    Best call back number: [phone]     Type of visit: FOLLOW UP     Requested date: 2-3 WEEKS     Additional notes:PATIENT REQUEST TO CALL BACK IN 2-3 WEEKS BECAUSE SHE HAS A LOT OF FAMILY MATTER RIGHT NOW

## 2025-07-07 ENCOUNTER — TELEPHONE (OUTPATIENT)
Dept: PAIN MEDICINE | Facility: CLINIC | Age: 75
End: 2025-07-07

## 2025-07-09 ENCOUNTER — CLINICAL SUPPORT (OUTPATIENT)
Dept: FAMILY MEDICINE CLINIC | Age: 75
End: 2025-07-09
Payer: MEDICARE

## 2025-07-09 DIAGNOSIS — J30.9 ALLERGIC RHINITIS, UNSPECIFIED SEASONALITY, UNSPECIFIED TRIGGER: Primary | ICD-10-CM

## 2025-07-09 PROCEDURE — 95115 IMMUNOTHERAPY ONE INJECTION: CPT | Performed by: FAMILY MEDICINE

## 2025-07-10 ENCOUNTER — TELEMEDICINE (OUTPATIENT)
Dept: PAIN MEDICINE | Facility: CLINIC | Age: 75
End: 2025-07-10
Payer: MEDICARE

## 2025-07-10 DIAGNOSIS — G89.4 CHRONIC PAIN SYNDROME: ICD-10-CM

## 2025-07-10 DIAGNOSIS — Z79.899 ENCOUNTER FOR LONG-TERM (CURRENT) USE OF HIGH-RISK MEDICATION: ICD-10-CM

## 2025-07-10 DIAGNOSIS — M06.9 RHEUMATOID ARTHRITIS, INVOLVING UNSPECIFIED SITE, UNSPECIFIED WHETHER RHEUMATOID FACTOR PRESENT: ICD-10-CM

## 2025-07-10 DIAGNOSIS — M54.16 LUMBAR RADICULOPATHY: ICD-10-CM

## 2025-07-10 DIAGNOSIS — M54.16 LUMBAR RADICULOPATHY: Primary | ICD-10-CM

## 2025-07-10 DIAGNOSIS — C16.9 GASTRIC ADENOCARCINOMA: ICD-10-CM

## 2025-07-10 DIAGNOSIS — M15.9 GENERALIZED OSTEOARTHRITIS: ICD-10-CM

## 2025-07-10 PROCEDURE — 1125F AMNT PAIN NOTED PAIN PRSNT: CPT

## 2025-07-10 PROCEDURE — 1159F MED LIST DOCD IN RCRD: CPT

## 2025-07-10 PROCEDURE — 1160F RVW MEDS BY RX/DR IN RCRD: CPT

## 2025-07-10 PROCEDURE — 99214 OFFICE O/P EST MOD 30 MIN: CPT

## 2025-07-10 RX ORDER — OXYCODONE HCL 80 MG/1
160 TABLET, FILM COATED, EXTENDED RELEASE ORAL EVERY 12 HOURS SCHEDULED
Qty: 120 TABLET | Refills: 0 | Status: SHIPPED | OUTPATIENT
Start: 2025-07-10

## 2025-07-10 NOTE — TELEPHONE ENCOUNTER
Telemedicine visit with patient today. Reviewed UDS and VICENTA. Both updated and appropriate. Refill appropriate.  Please refill. Thanks.

## 2025-07-10 NOTE — PROGRESS NOTES
TELEMEDICINE - VIDEO VISIT  You have chosen to receive care through a telehealth visit.  Do you consent to use a video/audio connection for your medical care today? Yes    Location of patient: Home  Location of Provider: Clinic  Anyone else present: No  Type of Technology used: Twilio through use of Epic/[employer] visit    CHIEF COMPLAINT  Back and joint pain    Subjective   Anne Jewell is a 74 y.o. female  who presents for a video visit follow-up. She has a history of chronic back and joint pain. She reports that her pain has remained consistent since her last office visit. Today pain is 8/10VAS in severity. Pain is located in her low back and throughout multiple joints. Describes this pain as a nearly continuous aching. Pain is worsened by rising from sitting to standing position, stress, weather changes, prolonged position, and walking long distances. Pain improves with rest/reposition, HEP, aqua therapy, heat/ice, and medications. She has completed home PT and OT in the past.       Continues with OxyContin 80mg 2 tablets BID and Baclofen 10mg 2-3/day (managed by PCP). Denies any side effects from the regimen, including constipation and somnolence. She reports that this regimen helps decrease pain by a significant amount allowing her to remain active. Patient reports that she takes Xanax 2mg TID as needed. This is prescribed by psychiatrist.      History of adenocarcinoma. PET scan on 10/6/23 showed no evidence of metastatic disease. No plans for chemotherapy at this time per office note from Dr. Slava Loo from 10/12/23.     She received a left wrist steroid injection with Dr. Alvarado on 6/23/2025.     Anticoagulated on Xarelto      Back Pain  Chronicity:  Chronic  Onset:  More than 1 year ago  Frequency:  Constantly  Progression since onset:  Unchanged (pain has remained consistent since her last office visit)  Pain location:  Lumbar spine  Pain quality:  Aching  Radiates to:  Right foot and left  foot  Pain-numeric:  8/10  Pain severity:  Moderate  Aggravated by:  Standing, position and sitting (rising from sitting to standing, prolonged position, walking long distances, stress, weather changes)  Associated symptoms: headaches, numbness (bilateral foot intermittently) and weakness (bilateral hand)    Associated symptoms: no abdominal pain, no chest pain, no dysuria and no fever    Treatments tried:  Analgesics, home exercises, heat and ice (aqua therapy)  Improvement on treatment:  Moderate  Joint Pain  Chronicity:  Chronic  Onset:  More than 1 year ago  Frequency:  Constantly  Progression since onset:  Unchanged (Rates pain 8/10 VAS - pain has remained consistent since her last office visit)  Symptoms: headaches, myalgias, neck pain, numbness (bilateral foot intermittently) and weakness (bilateral hand)    Symptoms: no abdominal pain, no chest pain, no chills, no congestion, no cough, no fatigue, no fever, no nausea, no dysuria, no vertigo and no vomiting    Aggravating factors:  Stress and exertion (weather changes, increased physical activity,)  Treatments tried:  Oral narcotics, position changes, rest, heat and ice  Improvement on treatment:  Mild    Review of Pertinent Medical Data ---    The following portions of the patient's history were reviewed and updated as appropriate: allergies, current medications, past family history, past medical history, past social history, past surgical history, and problem list.    Review of Systems   Constitutional:  Negative for chills, fatigue and fever.   HENT:  Negative for congestion.    Respiratory:  Negative for cough.    Cardiovascular:  Negative for chest pain.   Gastrointestinal:  Negative for abdominal pain, nausea and vomiting.   Genitourinary:  Negative for dysuria.   Musculoskeletal:  Positive for arthralgias, back pain, myalgias and neck pain.   Neurological:  Positive for weakness (bilateral hand), numbness (bilateral foot intermittently) and headaches.  Negative for vertigo.     I have reviewed and confirmed the accuracy of the ROS as documented by the MA/LPN/RN Sara N Simón, APRN    Vitals:    07/10/25 1250   PainSc: 8    PainLoc: Back     Objective   Physical Exam  Constitutional:       Appearance: Normal appearance.   HENT:      Head: Normocephalic.   Musculoskeletal:      Cervical back: Normal range of motion.   Neurological:      General: No focal deficit present.      Mental Status: She is alert and oriented to person, place, and time.   Psychiatric:         Mood and Affect: Mood normal.         Behavior: Behavior normal.         Thought Content: Thought content normal.         Cognition and Memory: Cognition normal.       Assessment & Plan   Diagnoses and all orders for this visit:    1. Lumbar radiculopathy (Primary)    2. Chronic pain syndrome    3. Gastric adenocarcinoma    4. Rheumatoid arthritis, involving unspecified site, unspecified whether rheumatoid factor present    5. Generalized osteoarthritis    6. Encounter for long-term (current) use of high-risk medication      --- The urine drug screen confirmation from 6/2/25 has been reviewed and the result is appropriate based on patient history and VICENTA report  --- The patient signed an updated copy of the controlled substance agreement on 11/4/24   --- Narcan prescription current   --- Continue OxyContin. Patient appears stable with current regimen. No adverse effects. Regarding continuation of opioids, there is no evidence of aberrant behavior or any red flags.  The patient continues with appropriate response to opioid therapy. ADL's remain intact by self.   --- Follow-up 1 month      [facility]A REPORT  As part of the patient's treatment plan, I am prescribing controlled substances. The patient has been made aware of appropriate use of such medications, including potential risk of somnolence, limited ability to drive and/or work safely, and the potential for dependence or overdose. It has also been  made clear that these medications are for use by this patient only, without concomitant use of alcohol or other substances unless prescribed.     Patient has completed prescribing agreement detailing terms of continued prescribing of controlled substances, including monitoring VICENTA reports, urine drug screening, and pill counts if necessary. The patient is aware that inappropriate use will results in cessation of prescribing such medications.    As the clinician, I personally reviewed the VICENTA from 7/10/25 while the patient was in the office today.    History and physical exam exhibit continued safe and appropriate use of controlled substances.  -------  Dictated utilizing Dragon Dictation

## 2025-07-24 ENCOUNTER — OFFICE VISIT (OUTPATIENT)
Dept: FAMILY MEDICINE CLINIC | Age: 75
End: 2025-07-24
Payer: MEDICARE

## 2025-07-24 ENCOUNTER — LAB (OUTPATIENT)
Dept: LAB | Facility: HOSPITAL | Age: 75
End: 2025-07-24
Payer: MEDICARE

## 2025-07-24 VITALS
WEIGHT: 195.2 LBS | HEIGHT: 61 IN | HEART RATE: 68 BPM | OXYGEN SATURATION: 92 % | TEMPERATURE: 98.7 F | BODY MASS INDEX: 36.85 KG/M2 | DIASTOLIC BLOOD PRESSURE: 72 MMHG | SYSTOLIC BLOOD PRESSURE: 125 MMHG

## 2025-07-24 DIAGNOSIS — J30.9 ALLERGIC RHINITIS, UNSPECIFIED SEASONALITY, UNSPECIFIED TRIGGER: ICD-10-CM

## 2025-07-24 DIAGNOSIS — Z13.220 SCREENING FOR LIPOID DISORDERS: ICD-10-CM

## 2025-07-24 DIAGNOSIS — K21.9 GERD WITHOUT ESOPHAGITIS: ICD-10-CM

## 2025-07-24 DIAGNOSIS — Z00.00 MEDICARE ANNUAL WELLNESS VISIT, SUBSEQUENT: Primary | ICD-10-CM

## 2025-07-24 DIAGNOSIS — I48.0 PAROXYSMAL A-FIB: ICD-10-CM

## 2025-07-24 DIAGNOSIS — E03.9 HYPOTHYROIDISM, ACQUIRED: ICD-10-CM

## 2025-07-24 DIAGNOSIS — I48.0 PAROXYSMAL ATRIAL FIBRILLATION: ICD-10-CM

## 2025-07-24 DIAGNOSIS — E83.42 HYPOMAGNESEMIA: ICD-10-CM

## 2025-07-24 DIAGNOSIS — E61.1 LOW SERUM IRON: ICD-10-CM

## 2025-07-24 DIAGNOSIS — K21.9 GASTROESOPHAGEAL REFLUX DISEASE, UNSPECIFIED WHETHER ESOPHAGITIS PRESENT: Chronic | ICD-10-CM

## 2025-07-24 DIAGNOSIS — N18.31 STAGE 3A CHRONIC KIDNEY DISEASE: ICD-10-CM

## 2025-07-24 LAB
ALBUMIN SERPL-MCNC: 3.5 G/DL (ref 3.5–5.2)
ALBUMIN/GLOB SERPL: 0.9 G/DL
ALP SERPL-CCNC: 155 U/L (ref 39–117)
ALT SERPL W P-5'-P-CCNC: 10 U/L (ref 1–33)
ANION GAP SERPL CALCULATED.3IONS-SCNC: 10 MMOL/L (ref 5–15)
AST SERPL-CCNC: 25 U/L (ref 1–32)
BILIRUB SERPL-MCNC: 0.3 MG/DL (ref 0–1.2)
BILIRUB UR QL STRIP: NEGATIVE
BUN SERPL-MCNC: 14 MG/DL (ref 8–23)
BUN/CREAT SERPL: 15.4 (ref 7–25)
CALCIUM SPEC-SCNC: 9.1 MG/DL (ref 8.6–10.5)
CHLORIDE SERPL-SCNC: 101 MMOL/L (ref 98–107)
CHOLEST SERPL-MCNC: 96 MG/DL (ref 0–200)
CLARITY UR: CLEAR
CO2 SERPL-SCNC: 26 MMOL/L (ref 22–29)
COLOR UR: YELLOW
CREAT SERPL-MCNC: 0.91 MG/DL (ref 0.57–1)
DEPRECATED RDW RBC AUTO: 50.1 FL (ref 37–54)
EGFRCR SERPLBLD CKD-EPI 2021: 66.3 ML/MIN/1.73
ERYTHROCYTE [DISTWIDTH] IN BLOOD BY AUTOMATED COUNT: 14.4 % (ref 12.3–15.4)
GLOBULIN UR ELPH-MCNC: 4 GM/DL
GLUCOSE SERPL-MCNC: 111 MG/DL (ref 65–99)
GLUCOSE UR STRIP-MCNC: NEGATIVE MG/DL
HCT VFR BLD AUTO: 37.9 % (ref 34–46.6)
HDLC SERPL-MCNC: 37 MG/DL (ref 40–60)
HGB BLD-MCNC: 11.6 G/DL (ref 12–15.9)
HGB UR QL STRIP.AUTO: NEGATIVE
IRON 24H UR-MRATE: 19 MCG/DL (ref 37–145)
IRON SATN MFR SERPL: 7 % (ref 20–50)
KETONES UR QL STRIP: NEGATIVE
LDLC SERPL CALC-MCNC: 43 MG/DL (ref 0–100)
LDLC/HDLC SERPL: 1.16 {RATIO}
LEUKOCYTE ESTERASE UR QL STRIP.AUTO: NEGATIVE
MAGNESIUM SERPL-MCNC: 1.8 MG/DL (ref 1.6–2.4)
MCH RBC QN AUTO: 29.1 PG (ref 26.6–33)
MCHC RBC AUTO-ENTMCNC: 30.6 G/DL (ref 31.5–35.7)
MCV RBC AUTO: 95.2 FL (ref 79–97)
NITRITE UR QL STRIP: NEGATIVE
PH UR STRIP.AUTO: 6 [PH] (ref 5–8)
PLATELET # BLD AUTO: 229 10*3/MM3 (ref 140–450)
PMV BLD AUTO: 9.4 FL (ref 6–12)
POTASSIUM SERPL-SCNC: 4.2 MMOL/L (ref 3.5–5.2)
PROT SERPL-MCNC: 7.5 G/DL (ref 6–8.5)
PROT UR QL STRIP: ABNORMAL
RBC # BLD AUTO: 3.98 10*6/MM3 (ref 3.77–5.28)
SODIUM SERPL-SCNC: 137 MMOL/L (ref 136–145)
SP GR UR STRIP: 1.02 (ref 1–1.03)
TIBC SERPL-MCNC: 261 MCG/DL (ref 298–536)
TRANSFERRIN SERPL-MCNC: 175 MG/DL (ref 200–360)
TRIGL SERPL-MCNC: 80 MG/DL (ref 0–150)
TSH SERPL DL<=0.05 MIU/L-ACNC: 0.97 UIU/ML (ref 0.27–4.2)
UROBILINOGEN UR QL STRIP: ABNORMAL
VLDLC SERPL-MCNC: 16 MG/DL (ref 5–40)
WBC NRBC COR # BLD AUTO: 8.19 10*3/MM3 (ref 3.4–10.8)

## 2025-07-24 PROCEDURE — 36415 COLL VENOUS BLD VENIPUNCTURE: CPT | Performed by: FAMILY MEDICINE

## 2025-07-24 PROCEDURE — 80053 COMPREHEN METABOLIC PANEL: CPT | Performed by: FAMILY MEDICINE

## 2025-07-24 PROCEDURE — 84443 ASSAY THYROID STIM HORMONE: CPT | Performed by: FAMILY MEDICINE

## 2025-07-24 PROCEDURE — 84466 ASSAY OF TRANSFERRIN: CPT | Performed by: FAMILY MEDICINE

## 2025-07-24 PROCEDURE — 83735 ASSAY OF MAGNESIUM: CPT | Performed by: FAMILY MEDICINE

## 2025-07-24 PROCEDURE — 83540 ASSAY OF IRON: CPT | Performed by: FAMILY MEDICINE

## 2025-07-24 PROCEDURE — 80061 LIPID PANEL: CPT | Performed by: FAMILY MEDICINE

## 2025-07-24 PROCEDURE — 81003 URINALYSIS AUTO W/O SCOPE: CPT | Performed by: FAMILY MEDICINE

## 2025-07-24 PROCEDURE — 85027 COMPLETE CBC AUTOMATED: CPT | Performed by: FAMILY MEDICINE

## 2025-07-24 NOTE — TELEPHONE ENCOUNTER
Rx Refill Note  Requested Prescriptions     Pending Prescriptions Disp Refills    metoprolol tartrate (LOPRESSOR) 25 MG tablet [Pharmacy Med Name: METOPROLOL TARTRATE 25 MG TAB] 180 tablet 1     Sig: TAKE ONE TABLET BY MOUTH EVERY 12 HOURS    pantoprazole (PROTONIX) 40 MG EC tablet [Pharmacy Med Name: PANTOPRAZOLE SOD DR 40 MG TAB] 90 tablet 3     Sig: TAKE 1 TABLET BY MOUTH DAILY      Last office visit with prescribing clinician: 1/23/2025   Last telemedicine visit with prescribing clinician: Visit date not found   Next office visit with prescribing clinician: 7/24/2025  Proton Pump Inhibitors Protocol Failed     Vaishnavi Keller LPN  07/24/25, 08:32 EDT

## 2025-07-24 NOTE — PROGRESS NOTES
Subjective   The ABCs of the Annual Wellness Visit  Medicare Wellness Visit      Anne Jewell is a 74 y.o. patient who presents for a Medicare Wellness Visit.    The following portions of the patient's history were reviewed and   updated as appropriate: allergies, current medications, past family history, past medical history, past social history, past surgical history, and problem list.    Compared to one year ago, the patient's physical   health is the same.  Compared to one year ago, the patient's mental   health is the same.    Recent Hospitalizations:  She was not admitted to the hospital during the last year.     Current Medical Providers:  Patient Care Team:  Jameson Dean MD as PCP - General (Family Medicine)  Edilberto Lemus MD as Consulting Physician (Nephrology)  Rashaun Alvarado MD as Surgeon (Orthopedic Surgery)  Delma Montoya MD as Consulting Physician (Obstetrics and Gynecology)  Stuart Borges MD (Hematology and Oncology)  Isael Barker MD as Consulting Physician (Gastroenterology)  Haven Gr APRN as Nurse Practitioner (Nurse Practitioner)  Jemal Mathews MD as Consulting Physician (Cardiology)  Cindy Cárdenas APRN as Nurse Practitioner (Nurse Practitioner)    Outpatient Medications Prior to Visit   Medication Sig Dispense Refill    albuterol sulfate  (90 Base) MCG/ACT inhaler Inhale 2 puffs Every 6 (Six) Hours As Needed for Wheezing. Proventil  (90 Base) MCG/ACT Inhalation Aerosol Solution; Patient Sig: Proventil  (90 Base) MCG/ACT Inhalation Aerosol Solution Inhale 2 puff(s) every 6 to 8 hours as needed; 6.7; 0; 05-Apr-2013; Active 8 g 0    ALPRAZolam (XANAX) 2 MG tablet Take 1 tablet by mouth As Needed for Anxiety.      baclofen (LIORESAL) 10 MG tablet TAKE 1 TABLET BY MOUTH 3 TIMES A DAY 90 tablet 0    Cholecalciferol (VITAMIN D3) 2000 units capsule TAKE ONE CAPSULE BY MOUTH DAILY 30 capsule 9    colchicine 0.6 MG tablet Take 1 tablet  by mouth As Needed.      Cyanocobalamin (B-12) 1000 MCG sublingual tablet PLACE 1 TABLET UNDER THE TONGUE AND LET DISSOLVE ONCE DAILY 30 each 1    dicyclomine (BENTYL) 20 MG tablet Take 1 tablet by mouth Every 6 (Six) Hours As Needed (diarrhea). 90 tablet 1    EPINEPHrine (EPIPEN) 0.3 MG/0.3ML solution auto-injector injection       escitalopram (LEXAPRO) 20 MG tablet Take 1 tablet by mouth Daily.      furosemide (LASIX) 20 MG tablet TAKE 1 TABLET BY MOUTH DAILY 90 tablet 1    levothyroxine (SYNTHROID, LEVOTHROID) 100 MCG tablet Take 1 tablet by mouth Daily. 90 tablet 1    Magnesium Oxide -Mg Supplement 400 (240 Mg) MG tablet TAKE 1 TABLET BY MOUTH TWICE A  tablet 1    metoprolol tartrate (LOPRESSOR) 25 MG tablet TAKE ONE TABLET BY MOUTH EVERY 12 HOURS 180 tablet 1    montelukast (SINGULAIR) 10 MG tablet TAKE 1 TABLET BY MOUTH DAILY 90 tablet 1    Multiple Vitamin tablet Take 1 tablet by mouth Daily.      O2 (OXYGEN) Inhale 2 L/min Every Night.      Omega 3-6-9 capsule Take 1 capsule by mouth Daily.      oxyCODONE ER (oxyCONTIN) 80 MG tablet extended-release 12 hour 12 hr tablet Take 2 tablets by mouth Every 12 (Twelve) Hours. 30 day supply. 120 tablet 0    pantoprazole (PROTONIX) 40 MG EC tablet TAKE 1 TABLET BY MOUTH DAILY 90 tablet 3    potassium chloride (Klor-Con M20) 20 MEQ CR tablet TAKE 1 TABLET BY MOUTH DAILY WITH FUROSEMIDE 90 tablet 3    promethazine (PHENERGAN) 25 MG tablet TAKE 1 TABLET BY MOUTH EVERY 8 HOURS AS NEEDED FOR NAUSEA AND/OR VOMITING 90 tablet 2    rivaroxaban (Xarelto) 20 MG tablet Take 1 tablet by mouth Daily. 90 tablet 3    ciprofloxacin (CIPRO) 500 MG tablet  (Patient not taking: Reported on 6/2/2025)      doxycycline (VIBRAMYCIN) 100 MG capsule Take 1 capsule by mouth 2 (Two) Times a Day. 20 capsule 0     No facility-administered medications prior to visit.     Opioid medication/s are on active medication list.  and I have evaluated her active treatment plan and pain score trends  "(see table).  Vitals:    07/24/25 1335   PainSc: 0-No pain     I have reviewed the chart for potential of high risk medication and harmful drug interactions in the elderly.        Aspirin is not on active medication list.  Aspirin use is not indicated based on review of current medical condition/s. Risk of harm outweighs potential benefits.  .    Patient Active Problem List   Diagnosis    Rheumatoid arthritis    Generalized osteoarthritis    Chronic midline low back pain without sciatica    Other allergic rhinitis    Dysthymic disorder (Psychiatry)    GERD without esophagitis    Hypothyroidism, acquired    Postmenopausal osteoporosis    Vitamin D deficiency    Irritable bowel syndrome with diarrhea    Stage 3a chronic kidney disease    Medicare annual wellness visit, subsequent    History of pulmonary embolism and recurrent DVT's    Paroxysmal atrial fibrillation    History of stomach cancer    Bariatric surgery status    Hypomagnesemia    Venous insufficiency    Low serum iron (Hematology)    Mild intermittent asthma, uncomplicated    History of colon polyps (last scope was in 2024)     Advance Care Planning Advance Directive is not on file.  ACP discussion was held with the patient during this visit. Patient has an advance directive (not in EMR), copy requested.            Objective   Vitals:    07/24/25 1335   BP: 125/72   BP Location: Right arm   Patient Position: Sitting   Pulse: 68   Temp: 98.7 °F (37.1 °C)   TempSrc: Oral   SpO2: 92%  Comment: on RA   Weight: 88.5 kg (195 lb 3.2 oz)   Height: 154.9 cm (61\")   PainSc: 0-No pain       Estimated body mass index is 36.88 kg/m² as calculated from the following:    Height as of this encounter: 154.9 cm (61\").    Weight as of this encounter: 88.5 kg (195 lb 3.2 oz).    Class 2 Severe Obesity (BMI >=35 and <=39.9). Obesity-related health conditions include the following: hypertension. Obesity is unchanged. BMI is is above average; BMI management plan is completed. We " discussed portion control and increasing exercise.           Does the patient have evidence of cognitive impairment? No  Lab Results   Component Value Date    TRIG 80 2025    HDL 37 (L) 2025    LDL 43 2025    VLDL 16 2025                                                                                                Health  Risk Assessment    Smoking Status:  Social History     Tobacco Use   Smoking Status Former    Current packs/day: 0.00    Average packs/day: 0.5 packs/day for 25.0 years (12.5 ttl pk-yrs)    Types: Cigarettes    Start date: 1980    Quit date: 2005    Years since quittin.8    Passive exposure: Past   Smokeless Tobacco Never     Alcohol Consumption:  Social History     Substance and Sexual Activity   Alcohol Use Never       Fall Risk Screen  STEADI Fall Risk Assessment was completed, and patient is at MODERATE risk for falls. Assessment completed on:2025    Depression Screening   Little interest or pleasure in doing things? Not at all   Feeling down, depressed, or hopeless? Several days   PHQ-2 Total Score 1      Health Habits and Functional and Cognitive Screenin/24/2025     1:46 PM   Functional & Cognitive Status   Do you have difficulty preparing food and eating? No   Do you have difficulty bathing yourself, getting dressed or grooming yourself? No   Do you have difficulty using the toilet? No   Do you have difficulty moving around from place to place? No   Do you have trouble with steps or getting out of a bed or a chair? No   Current Diet Other   Dental Exam Up to date   Eye Exam Up to date   Exercise (times per week) 0 times per week   Current Exercises Include No Regular Exercise   Do you need help using the phone?  No   Are you deaf or do you have serious difficulty hearing?  No   Do you need help to go to places out of walking distance? No   Do you need help shopping? No   Do you need help preparing meals?  No   Do you need help with  housework?  No   Do you need help with laundry? No   Do you need help taking your medications? No   Do you need help managing money? No   Do you ever drive or ride in a car without wearing a seat belt? No   Have you felt unusual fatigue (could be tiredness), stress, anger or loneliness in the last month? Yes   Who do you live with? Child   If you need help, do you have trouble finding someone available to you? No   Have you been bothered in the last four weeks by sexual problems? No   Do you have difficulty concentrating, remembering or making decisions? Yes           Age-appropriate Screening Schedule:  Refer to the list below for future screening recommendations based on patient's age, sex and/or medical conditions. Orders for these recommended tests are listed in the plan section. The patient has been provided with a written plan.    Health Maintenance List  Health Maintenance   Topic Date Due    DXA SCAN  03/31/2024    COVID-19 Vaccine (6 - 2024-25 season) 04/11/2025    ZOSTER VACCINE (2 of 2) 01/20/2026 (Originally 6/24/2023)    INFLUENZA VACCINE  10/01/2025    MAMMOGRAM  01/22/2026    ANNUAL WELLNESS VISIT  07/24/2026    TDAP/TD VACCINES (3 - Td or Tdap) 12/21/2033    COLORECTAL CANCER SCREENING  01/25/2034    HEPATITIS C SCREENING  Completed    Pneumococcal Vaccine 50+  Completed                                                                                                                                                CMS Preventative Services Quick Reference  Risk Factors Identified During Encounter  None Identified    The above risks/problems have been discussed with the patient.  Pertinent information has been shared with the patient in the After Visit Summary.  An After Visit Summary and PPPS were made available to the patient.    Follow Up:   Next Medicare Wellness visit to be scheduled in 1 year.         Additional E&M Note during same encounter follows:  Patient has additional, significant, and  "separately identifiable condition(s)/problem(s) that require work above and beyond the Medicare Wellness Visit     Chief Complaint  Medicare Wellness-subsequent    Subjective   --GERD IS WELL CONTROLLED WITH THE PPI  --LAST TSH WAS OK ON CURRENT DOSE OF SYNTHROID  --DUE FOR SOME ROUTINE LABS           Review of Systems   Constitutional:  Positive for fatigue. Negative for activity change, appetite change, chills and fever.   HENT:  Negative for congestion, ear pain, hearing loss, rhinorrhea and sore throat.    Eyes:  Negative for discharge and visual disturbance.   Respiratory:  Negative for cough and shortness of breath.    Cardiovascular:  Negative for chest pain, palpitations and leg swelling.   Gastrointestinal:  Negative for abdominal pain, diarrhea, nausea and vomiting.   Genitourinary:  Negative for dysuria and hematuria.   Musculoskeletal:  Negative for arthralgias and myalgias.   Psychiatric/Behavioral:  Negative for dysphoric mood.               Objective   Vital Signs:  /72 (BP Location: Right arm, Patient Position: Sitting)   Pulse 68   Temp 98.7 °F (37.1 °C) (Oral)   Ht 154.9 cm (61\")   Wt 88.5 kg (195 lb 3.2 oz)   SpO2 92% Comment: on RA  BMI 36.88 kg/m²   Physical Exam  Vitals and nursing note reviewed.   Constitutional:       General: She is not in acute distress.     Appearance: Normal appearance.   HENT:      Right Ear: Tympanic membrane normal.      Left Ear: Tympanic membrane normal.      Mouth/Throat:      Pharynx: Oropharynx is clear.   Eyes:      Conjunctiva/sclera: Conjunctivae normal.   Cardiovascular:      Rate and Rhythm: Normal rate and regular rhythm.      Heart sounds: Normal heart sounds. No murmur heard.  Pulmonary:      Effort: Pulmonary effort is normal.      Breath sounds: Normal breath sounds.   Abdominal:      General: Bowel sounds are normal.      Palpations: Abdomen is soft.      Tenderness: There is no abdominal tenderness.   Musculoskeletal:      Cervical back: " Neck supple.      Right lower leg: No edema.      Left lower leg: No edema.   Lymphadenopathy:      Cervical: No cervical adenopathy.   Neurological:      General: No focal deficit present.      Mental Status: She is alert.      Cranial Nerves: No cranial nerve deficit.      Coordination: Coordination normal.      Gait: Gait normal.   Psychiatric:         Mood and Affect: Mood normal.         Behavior: Behavior normal.                    Assessment and Plan      GERD without esophagitis  IMPROVED WITH CURRENT TREATMENT, WILL REEVALUATE AT NEXT VISIT     Orders:    Comprehensive Metabolic Panel    Hypothyroidism, acquired  IMPROVED WITH CURRENT TREATMENT, WILL REEVALUATE AT NEXT VISIT     Orders:    CBC (No Diff)    TSH Rfx On Abnormal To Free T4    Urinalysis With Culture If Indicated - Urine, Clean Catch    Medicare annual wellness visit, subsequent  ADVICE GIVEN RE:  SEATBELT USE, ALCOHOL USE, HEALTHY DIET, ROUTINE EYE AND DENTAL EXAM, ROUTINE VACCINATIONS.           Hypomagnesemia    Orders:    Magnesium    Low serum iron (Hematology)    Orders:    Iron Profile w/o Ferritin    Screening for lipoid disorders    Orders:    Lipid Panel    Allergic rhinitis, unspecified seasonality, unspecified trigger    Orders:    Allergy Serum Injection    Paroxysmal atrial fibrillation         Stage 3a chronic kidney disease                   Follow Up   Return in about 6 months (around 1/24/2026).  Patient was given instructions and counseling regarding her condition or for health maintenance advice. Please see specific information pulled into the AVS if appropriate.

## 2025-07-25 ENCOUNTER — RESULTS FOLLOW-UP (OUTPATIENT)
Dept: FAMILY MEDICINE CLINIC | Age: 75
End: 2025-07-25
Payer: MEDICARE

## 2025-07-25 DIAGNOSIS — E61.1 LOW SERUM IRON: Primary | ICD-10-CM

## 2025-07-25 NOTE — ASSESSMENT & PLAN NOTE
IMPROVED WITH CURRENT TREATMENT, WILL REEVALUATE AT NEXT VISIT     Orders:    CBC (No Diff)    TSH Rfx On Abnormal To Free T4    Urinalysis With Culture If Indicated - Urine, Clean Catch

## 2025-07-25 NOTE — ASSESSMENT & PLAN NOTE
IMPROVED WITH CURRENT TREATMENT, WILL REEVALUATE AT NEXT VISIT     Orders:    Comprehensive Metabolic Panel

## 2025-07-27 RX ORDER — METOPROLOL TARTRATE 25 MG/1
25 TABLET, FILM COATED ORAL EVERY 12 HOURS
Qty: 180 TABLET | Refills: 3 | Status: SHIPPED | OUTPATIENT
Start: 2025-07-27

## 2025-07-27 RX ORDER — PANTOPRAZOLE SODIUM 40 MG/1
40 TABLET, DELAYED RELEASE ORAL DAILY
Qty: 90 TABLET | Refills: 3 | Status: SHIPPED | OUTPATIENT
Start: 2025-07-27

## 2025-08-04 DIAGNOSIS — Z86.718 HISTORY OF DVT (DEEP VEIN THROMBOSIS): ICD-10-CM

## 2025-08-05 RX ORDER — RIVAROXABAN 20 MG/1
20 TABLET, FILM COATED ORAL DAILY
Qty: 90 TABLET | Refills: 3 | Status: SHIPPED | OUTPATIENT
Start: 2025-08-05

## 2025-08-08 ENCOUNTER — CLINICAL SUPPORT (OUTPATIENT)
Dept: FAMILY MEDICINE CLINIC | Age: 75
End: 2025-08-08
Payer: MEDICARE

## 2025-08-08 ENCOUNTER — OFFICE VISIT (OUTPATIENT)
Dept: PAIN MEDICINE | Facility: CLINIC | Age: 75
End: 2025-08-08
Payer: MEDICARE

## 2025-08-08 VITALS
HEIGHT: 61 IN | HEART RATE: 73 BPM | OXYGEN SATURATION: 97 % | BODY MASS INDEX: 35.38 KG/M2 | TEMPERATURE: 96.7 F | RESPIRATION RATE: 20 BRPM | DIASTOLIC BLOOD PRESSURE: 79 MMHG | SYSTOLIC BLOOD PRESSURE: 147 MMHG | WEIGHT: 187.4 LBS

## 2025-08-08 DIAGNOSIS — M54.16 LUMBAR RADICULOPATHY: ICD-10-CM

## 2025-08-08 DIAGNOSIS — C16.9 GASTRIC ADENOCARCINOMA: ICD-10-CM

## 2025-08-08 DIAGNOSIS — J30.9 ALLERGIC RHINITIS, UNSPECIFIED SEASONALITY, UNSPECIFIED TRIGGER: Primary | ICD-10-CM

## 2025-08-08 DIAGNOSIS — G89.4 CHRONIC PAIN SYNDROME: ICD-10-CM

## 2025-08-08 DIAGNOSIS — M15.9 GENERALIZED OSTEOARTHRITIS: ICD-10-CM

## 2025-08-08 DIAGNOSIS — Z79.899 ENCOUNTER FOR LONG-TERM (CURRENT) USE OF HIGH-RISK MEDICATION: ICD-10-CM

## 2025-08-08 DIAGNOSIS — M06.9 RHEUMATOID ARTHRITIS, INVOLVING UNSPECIFIED SITE, UNSPECIFIED WHETHER RHEUMATOID FACTOR PRESENT: ICD-10-CM

## 2025-08-08 DIAGNOSIS — M54.16 LUMBAR RADICULOPATHY: Primary | ICD-10-CM

## 2025-08-08 PROCEDURE — 1159F MED LIST DOCD IN RCRD: CPT

## 2025-08-08 PROCEDURE — 1125F AMNT PAIN NOTED PAIN PRSNT: CPT

## 2025-08-08 PROCEDURE — 1160F RVW MEDS BY RX/DR IN RCRD: CPT

## 2025-08-08 PROCEDURE — 99214 OFFICE O/P EST MOD 30 MIN: CPT

## 2025-08-08 PROCEDURE — 95115 IMMUNOTHERAPY ONE INJECTION: CPT | Performed by: FAMILY MEDICINE

## 2025-08-09 RX ORDER — OXYCODONE HCL 80 MG/1
160 TABLET, FILM COATED, EXTENDED RELEASE ORAL EVERY 12 HOURS SCHEDULED
Qty: 120 TABLET | Refills: 0 | Status: SHIPPED | OUTPATIENT
Start: 2025-08-09

## 2025-08-11 ENCOUNTER — TELEPHONE (OUTPATIENT)
Dept: FAMILY MEDICINE CLINIC | Age: 75
End: 2025-08-11
Payer: MEDICARE

## 2025-08-12 ENCOUNTER — TELEPHONE (OUTPATIENT)
Dept: FAMILY MEDICINE CLINIC | Age: 75
End: 2025-08-12
Payer: MEDICARE

## 2025-08-26 ENCOUNTER — CLINICAL SUPPORT (OUTPATIENT)
Dept: FAMILY MEDICINE CLINIC | Age: 75
End: 2025-08-26
Payer: MEDICARE

## 2025-08-26 DIAGNOSIS — J30.89 ALLERGIC RHINITIS DUE TO OTHER ALLERGIC TRIGGER, UNSPECIFIED SEASONALITY: Primary | ICD-10-CM

## 2025-08-26 PROCEDURE — 96372 THER/PROPH/DIAG INJ SC/IM: CPT | Performed by: FAMILY MEDICINE

## (undated) DEVICE — TBG PENCL TELESCP MEGADYNE SMOKE EVAC 10FT

## (undated) DEVICE — SPNG GZ WOVN 4X4IN 12PLY 10/BX STRL

## (undated) DEVICE — JACKSON-PRATT 100CC BULB RESERVOIR: Brand: CARDINAL HEALTH

## (undated) DEVICE — APPL CHLORAPREP HI/LITE 26ML ORNG

## (undated) DEVICE — SENSR O2 OXIMAX FNGR A/ 18IN NONSTR

## (undated) DEVICE — KT ORCA ORCAPOD DISP STRL

## (undated) DEVICE — LN SMPL CO2 SHTRM SD STREAM W/M LUER

## (undated) DEVICE — SINGLE USE DISTAL COVER MAJ-2315: Brand: SINGLE USE DISTAL COVER

## (undated) DEVICE — FRCP BX RADJAW4 NDL 2.8 240CM LG OG BX40

## (undated) DEVICE — ENSEAL 20 CM SHAFT, LARGE JAW: Brand: ENSEAL X1

## (undated) DEVICE — SUT SILK 4/0 TIES 18IN A183H

## (undated) DEVICE — CANN O2 ETCO2 FITS ALL CONN CO2 SMPL A/ 7IN DISP LF

## (undated) DEVICE — DRP SLUSH WARMR MACH CIR 44X44IN

## (undated) DEVICE — ERBE NESSY®PLATE 170 SPLIT; 168CM²; CABLE 3M: Brand: ERBE

## (undated) DEVICE — SUT PDS 1 CT1 36IN Z347H

## (undated) DEVICE — ANTIBACTERIAL UNDYED BRAIDED (POLYGLACTIN 910), SYNTHETIC ABSORBABLE SUTURE: Brand: COATED VICRYL

## (undated) DEVICE — PK PROC MAJ 40

## (undated) DEVICE — SUT SILK 3/0 TIES 18IN A184H

## (undated) DEVICE — SPHINCTEROTOME: Brand: HYDRATOME RX 44

## (undated) DEVICE — BITEBLOCK OMNI BLOC

## (undated) DEVICE — Device

## (undated) DEVICE — BLCK/BITE BLOX W/DENTL/RIM W/STRAP 54F

## (undated) DEVICE — MEDI-VAC YANKAUER SUCTION HANDLE W/BULBOUS TIP: Brand: CARDINAL HEALTH

## (undated) DEVICE — TBG SXN CONN/F UNIV 1/4IN 10FT LF STRL

## (undated) DEVICE — ADAPT CLN BIOGUARD AIR/H2O DISP

## (undated) DEVICE — SNAR POLYP SENSATION MD/OVL FLX 27MM/LP 2.4MM 240CM 1P/U

## (undated) DEVICE — TRAP FLD MINIVAC MEGADYNE 100ML

## (undated) DEVICE — SUT ETHLN 2/0 PS 18IN 585H

## (undated) DEVICE — TOTAL TRAY, 16FR 10ML SIL FOLEY, URN: Brand: MEDLINE

## (undated) DEVICE — POOLE SUCTION HANDLE: Brand: CARDINAL HEALTH

## (undated) DEVICE — TRAP POLYP ETRAP 2PK

## (undated) DEVICE — SUT SILK 3/0 SH CR5 18IN C0135

## (undated) DEVICE — DRAPE,REIN 53X77,STERILE: Brand: MEDLINE

## (undated) DEVICE — STPLR SKIN VISISTAT WD 35CT

## (undated) DEVICE — COVER,C-ARM,41X74: Brand: MEDLINE

## (undated) DEVICE — DEV LK WIREGUIDE FUSN OLYMP SCP

## (undated) DEVICE — DRSNG WND GZ PAD BORDERED 4X8IN STRL

## (undated) DEVICE — PAD GRND E/S MEGADYNE MONOPLR 2/PLT W/CORD A/ DISP

## (undated) DEVICE — SUT SILK 2/0 TIES 18IN A185H

## (undated) DEVICE — SUT SILK 2/0 SH CR5 18IN C0125

## (undated) DEVICE — GLV SURG PREMIERPRO ORTHO LTX PF SZ7.5 BRN

## (undated) DEVICE — TUBING, SUCTION, 1/4" X 10', STRAIGHT: Brand: MEDLINE

## (undated) DEVICE — RETRIEVAL BALLOON CATHETER: Brand: EXTRACTOR™ PRO RX